# Patient Record
Sex: FEMALE | Race: WHITE | NOT HISPANIC OR LATINO | Employment: OTHER | URBAN - METROPOLITAN AREA
[De-identification: names, ages, dates, MRNs, and addresses within clinical notes are randomized per-mention and may not be internally consistent; named-entity substitution may affect disease eponyms.]

---

## 2017-02-22 ENCOUNTER — GENERIC CONVERSION - ENCOUNTER (OUTPATIENT)
Dept: OTHER | Facility: OTHER | Age: 77
End: 2017-02-22

## 2017-02-23 ENCOUNTER — GENERIC CONVERSION - ENCOUNTER (OUTPATIENT)
Dept: OTHER | Facility: OTHER | Age: 77
End: 2017-02-23

## 2017-04-13 ENCOUNTER — ALLSCRIPTS OFFICE VISIT (OUTPATIENT)
Dept: OTHER | Facility: OTHER | Age: 77
End: 2017-04-13

## 2017-06-16 ENCOUNTER — ALLSCRIPTS OFFICE VISIT (OUTPATIENT)
Dept: OTHER | Facility: OTHER | Age: 77
End: 2017-06-16

## 2017-06-16 ENCOUNTER — TRANSCRIBE ORDERS (OUTPATIENT)
Dept: LAB | Facility: CLINIC | Age: 77
End: 2017-06-16

## 2017-06-16 ENCOUNTER — APPOINTMENT (OUTPATIENT)
Dept: LAB | Facility: CLINIC | Age: 77
End: 2017-06-16
Payer: MEDICARE

## 2017-06-16 ENCOUNTER — HOSPITAL ENCOUNTER (OUTPATIENT)
Dept: ULTRASOUND IMAGING | Facility: HOSPITAL | Age: 77
Discharge: HOME | End: 2017-06-16
Payer: MEDICARE

## 2017-06-16 DIAGNOSIS — I10 ESSENTIAL (PRIMARY) HYPERTENSION: ICD-10-CM

## 2017-06-16 DIAGNOSIS — E03.9 HYPOTHYROIDISM: ICD-10-CM

## 2017-06-16 DIAGNOSIS — M79.661 PAIN OF RIGHT LOWER LEG: ICD-10-CM

## 2017-06-16 DIAGNOSIS — R73.9 HYPERGLYCEMIA: ICD-10-CM

## 2017-06-16 LAB
ALBUMIN SERPL BCP-MCNC: 3.8 G/DL (ref 3.5–5)
ALP SERPL-CCNC: 77 U/L (ref 46–116)
ALT SERPL W P-5'-P-CCNC: 32 U/L (ref 12–78)
ANION GAP SERPL CALCULATED.3IONS-SCNC: 4 MMOL/L (ref 4–13)
AST SERPL W P-5'-P-CCNC: 25 U/L (ref 5–45)
BASOPHILS # BLD AUTO: 0.03 THOUSANDS/ΜL (ref 0–0.1)
BASOPHILS NFR BLD AUTO: 0 % (ref 0–1)
BILIRUB SERPL-MCNC: 0.31 MG/DL (ref 0.2–1)
BUN SERPL-MCNC: 19 MG/DL (ref 5–25)
CALCIUM SERPL-MCNC: 9.5 MG/DL (ref 8.3–10.1)
CHLORIDE SERPL-SCNC: 101 MMOL/L (ref 100–108)
CO2 SERPL-SCNC: 33 MMOL/L (ref 21–32)
CREAT SERPL-MCNC: 0.86 MG/DL (ref 0.6–1.3)
EOSINOPHIL # BLD AUTO: 0.33 THOUSAND/ΜL (ref 0–0.61)
EOSINOPHIL NFR BLD AUTO: 3 % (ref 0–6)
ERYTHROCYTE [DISTWIDTH] IN BLOOD BY AUTOMATED COUNT: 14.2 % (ref 11.6–15.1)
EST. AVERAGE GLUCOSE BLD GHB EST-MCNC: 154 MG/DL
GFR SERPL CREATININE-BSD FRML MDRD: >60 ML/MIN/1.73SQ M
GLUCOSE P FAST SERPL-MCNC: 86 MG/DL (ref 65–99)
HBA1C MFR BLD: 7 % (ref 4.2–6.3)
HCT VFR BLD AUTO: 44.6 % (ref 34.8–46.1)
HGB BLD-MCNC: 14.3 G/DL (ref 11.5–15.4)
LYMPHOCYTES # BLD AUTO: 3.22 THOUSANDS/ΜL (ref 0.6–4.47)
LYMPHOCYTES NFR BLD AUTO: 32 % (ref 14–44)
MCH RBC QN AUTO: 27.4 PG (ref 26.8–34.3)
MCHC RBC AUTO-ENTMCNC: 32.1 G/DL (ref 31.4–37.4)
MCV RBC AUTO: 85 FL (ref 82–98)
MONOCYTES # BLD AUTO: 0.65 THOUSAND/ΜL (ref 0.17–1.22)
MONOCYTES NFR BLD AUTO: 6 % (ref 4–12)
NEUTROPHILS # BLD AUTO: 5.82 THOUSANDS/ΜL (ref 1.85–7.62)
NEUTS SEG NFR BLD AUTO: 59 % (ref 43–75)
NRBC BLD AUTO-RTO: 0 /100 WBCS
PLATELET # BLD AUTO: 281 THOUSANDS/UL (ref 149–390)
PMV BLD AUTO: 11.6 FL (ref 8.9–12.7)
POTASSIUM SERPL-SCNC: 4.7 MMOL/L (ref 3.5–5.3)
PROT SERPL-MCNC: 7.7 G/DL (ref 6.4–8.2)
RBC # BLD AUTO: 5.22 MILLION/UL (ref 3.81–5.12)
SODIUM SERPL-SCNC: 138 MMOL/L (ref 136–145)
TSH SERPL DL<=0.05 MIU/L-ACNC: 2.13 UIU/ML (ref 0.36–3.74)
WBC # BLD AUTO: 10.08 THOUSAND/UL (ref 4.31–10.16)

## 2017-06-16 PROCEDURE — 93970 EXTREMITY STUDY: CPT

## 2017-06-16 PROCEDURE — 85025 COMPLETE CBC W/AUTO DIFF WBC: CPT

## 2017-06-16 PROCEDURE — 84443 ASSAY THYROID STIM HORMONE: CPT

## 2017-06-16 PROCEDURE — 83036 HEMOGLOBIN GLYCOSYLATED A1C: CPT

## 2017-06-16 PROCEDURE — 36415 COLL VENOUS BLD VENIPUNCTURE: CPT

## 2017-06-16 PROCEDURE — 80053 COMPREHEN METABOLIC PANEL: CPT

## 2017-06-18 ENCOUNTER — GENERIC CONVERSION - ENCOUNTER (OUTPATIENT)
Dept: OTHER | Facility: OTHER | Age: 77
End: 2017-06-18

## 2017-06-28 ENCOUNTER — GENERIC CONVERSION - ENCOUNTER (OUTPATIENT)
Dept: OTHER | Facility: OTHER | Age: 77
End: 2017-06-28

## 2017-07-19 ENCOUNTER — ALLSCRIPTS OFFICE VISIT (OUTPATIENT)
Dept: OTHER | Facility: OTHER | Age: 77
End: 2017-07-19

## 2017-07-31 DIAGNOSIS — M54.30 SCIATICA: ICD-10-CM

## 2017-08-17 ENCOUNTER — GENERIC CONVERSION - ENCOUNTER (OUTPATIENT)
Dept: OTHER | Facility: OTHER | Age: 77
End: 2017-08-17

## 2017-08-25 ENCOUNTER — GENERIC CONVERSION - ENCOUNTER (OUTPATIENT)
Dept: OTHER | Facility: OTHER | Age: 77
End: 2017-08-25

## 2017-10-23 ENCOUNTER — ALLSCRIPTS OFFICE VISIT (OUTPATIENT)
Dept: OTHER | Facility: OTHER | Age: 77
End: 2017-10-23

## 2017-10-23 DIAGNOSIS — G25.0 ESSENTIAL TREMOR: ICD-10-CM

## 2017-10-23 DIAGNOSIS — R26.89 OTHER ABNORMALITIES OF GAIT AND MOBILITY: ICD-10-CM

## 2017-10-23 DIAGNOSIS — E03.9 HYPOTHYROIDISM: ICD-10-CM

## 2017-10-23 DIAGNOSIS — R47.89 OTHER SPEECH DISTURBANCES (CODE): ICD-10-CM

## 2017-10-23 DIAGNOSIS — Z96.89 PRESENCE OF OTHER SPECIFIED FUNCTIONAL IMPLANTS: ICD-10-CM

## 2017-10-24 NOTE — PROGRESS NOTES
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)   · w/ surgical implant electrodes 2012  4  Hypertension (401 9) (I10)  5  Hypothyroidism (244 9) (E03 9)  6  S/P deep brain stimulator placement (V45 89) (Z96 89)  7  COPD (chronic obstructive pulmonary disease) (496) (J44 9)    Plan  Benign familial tremor, Episode of change in speech    · (1) CBC/PLT/DIFF; Status:Active; Requested SJD:85RYK0829;   Episode of change in speech    · (1) COMPREHENSIVE METABOLIC PANEL; Status:Active; Requested XTK:18ZQL3240;   Episode of change in speech, Hypertension, Imbalance    · EKG/ECG- POC; Status:Complete;   Done: 94SSF9289 06:10PM  Episode of change in speech, Imbalance, S/P deep brain stimulator placement    · * CT HEAD WO CONTRAST; Status:Hold For - Scheduling; Requested SUJ:73DAE7333;    · 1 - Sophie RUIZ, [place]  Neurology Co-Management  * please re-evaluate re change  in speech/ imbalance w past hx deep brain stimulator    last seen by you 4/17   Status: Active  Requested for: 23Oct2017  () Care Summary provided  : Yes  Hypothyroidism    · (1) TSH; Status:Active; Requested BNS:87GHR1214;     Discussion/Summary    She has noted imbalance x 2 weeks or so w occasional thickened speech for up to few minutes at a time  - past hx deep brain stim implant re tremor - no focal neurological deficit upon exam here today - if worsens- she needs to be seen asap at ER   want her to do CT head - compare to last 2012  run here today - artifact noted precordial leads but appears sinus rhythm ( see tracing)BW ( June CBC/CMP/ TSH were fine w A1C 7 0 then)up re-evaluation w Neurology/ Dr Gurjit Barba   here w us 1 week  w dysphagia at times - she should see GI to do EGD once we look into this change w speech/ imbalance  inhalers as is- resp status as at baseline        Chief Complaint  pt has been lightheaded the past few days and feels unsteady when she walks History of Present Illness  HPI: she relates the past 2 weeks she notes imbalance w gait - past few days w occ lightheadedness  Admits to occ slurred speech for few months, has x 1-2 mins and resolves  Has some choking sensation w swallowing -past months - did not relate at prior visit w me in July  saw Neurology 4/17 - see prev re DBS /tremor  Doing great re tremor  inc SOB/ wheeze  Review of Systems    Constitutional: as noted in HPI,-- no fever-- and-- no chills  ENT: No ear pain, no cold symptoms no increased shortness of breath, but-- no nosebleeds-- and-- no sore throat  Cardiovascular: no chest pain,-- no palpitations-- and-- no lower extremity edema  Respiratory: No increased shortness of breath, no increased cough  Gastrointestinal: Slight nausea with imbalance, but-- no abdominal pain,-- no vomiting-- and-- no blood in stools  Genitourinary: no dysuria  Musculoskeletal: No increased joint pain  Integumentary: No recent rash  Neurological: as noted in HPI,-- no headache,-- no confusion-- and-- no fainting  Active Problems  1  History of Acute UTI (599 0) (N39 0)  2  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)  3  COPD (chronic obstructive pulmonary disease) (496) (J44 9)  4  Edema (782 3) (R60 9)  5  Esophageal reflux (530 81) (K21 9)  6  History of Hip Replacement Left  7  History of peripheral vascular disease (V12 59) (Z86 79)  8  Hypercholesterolemia (272 0) (E78 00)  9  Hyperglycemia (790 29) (R73 9)  10  Hypertension (401 9) (I10)  11  Hypothyroidism (244 9) (E03 9)  12  Multiple joint pain (719 49) (M25 50)  13  OA (osteoarthritis) (715 90) (M19 90)  14  Osteoarthritis of hip (715 95) (M16 9)  15  Pain of right calf (729 5) (M79 661)  16  Right shoulder pain (719 41) (M25 511)  17  S/P deep brain stimulator placement (V45 89) (Z96 89)  18  Sciatica (724 3) (M54 30)  19  Sleep disorder (780 50) (G47 9)    Past Medical History  1  History of Acute UTI (599 0) (N39 0)  2  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)  3  History of Cellulitis of leg, right (682 6) (L03 115)  4  History of Closed Compression Fracture Of Lumbar Vertebral Body (805 4)  5  History of osteopenia (V13 59) (Z87 39)  6  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  7  History of Vitamin D deficiency (268 9) (E55 9)  Active Problems And Past Medical History Reviewed: The active problems and past medical history were reviewed and updated today  Family History  Mother   1  Family history of Breast Cancer (V16 3)  Family History   2  Family history of Laryngeal Cancer (V16 2)    Social History   · Caffeine Use   · Former smoker (U17 83) (X58 557)   · Quit 5/12   · Living Independently   · Marital History -   The social history was reviewed and updated today  Surgical History  1  History of Brain Surgery  2  History of Cataract Surgery  3  History of Complete Colonoscopy  4  History of Hip Replacement Left  5  History of Knee Arthroscopy (Therapeutic)  6  History of Rotator Cuff Repair  Surgical History Reviewed: The surgical history was reviewed and updated today  Current Meds  1  Albuterol Sulfate (2 5 MG/3ML) 0 083% Inhalation Nebulization Solution; USE 1 UNIT   DOSE EVERY 4-6 HOURS AS NEEDED FOR WHEEZING ; Therapy: 44NYW7016 to (Nel Bills)  Requested for: 86PGT8524; Last   Rx:10Mar2016 Ordered  2  Aspirin 81 MG TABS; TAKE 1 TABLET DAILY; Therapy: (Deann Xavier) to Recorded  3  Caltrate 600+D 600-400 MG-UNIT TABS; Therapy: (Deann Xavier) to Recorded  4  DilTIAZem HCl ER Beads 240 MG Oral Capsule Extended Release 24 Hour; take 1   capsule daily; Therapy: 52Uvq5428 to (Evaluate:15Jan2018)  Requested for: 02HWI0749; Last   Rx:67Drs3883 Ordered  5  Flovent Diskus 250 MCG/BLIST Inhalation Aerosol Powder Breath Activated; Uses 1 puff   daily;    Therapy: 42XMQ5289 to (Evaluate:15Jan2018)  Requested for: 27EOX9104; Last   Rx:30Uts8749 Ordered  6  HydroCHLOROthiazide 12 5 MG Oral Capsule; 1 cap by mouth daily; Therapy: 63IGU1809 to (Last Rx:19Jun2017)  Requested for: 01DSB4407 Ordered  7  Ibuprofen 600 MG Oral Tablet; TAKE 1 TAB up to twice a day as needed for pain, try to   avoid, use w food  Requested for: 04Aug2017; Last Rx:72Gao8108 Ordered  8  Levothyroxine Sodium 125 MCG Oral Tablet; 1 tab by mouth every day; Therapy: 19BPK5553 to (Evaluate:15Jan2018)  Requested for: 95ZOA7073; Last   Rx:81Oca7582 Ordered  9  Omeprazole 20 MG Oral Capsule Delayed Release; TAKE 1 CAPSULE Daily PRN   stomach acid  Requested for: 74Ame4225; Last Rx:48Xgv5839 Ordered  10  ProAir  (90 Base) MCG/ACT Inhalation Aerosol Solution; INHALE 2 PUFFS    EVERY 4 HOURS AS NEEDED; Therapy: 03ONH8111 to (Evaluate:28Jun2014)  Requested for: 45Yxb4586; Last    Rx:75Ipk7564 Ordered  11  Simvastatin 10 MG Oral Tablet; TAKE 1 TABLET IN THE EVENING every Monday and    Friday or as directed  Requested for: 80VAV9860; Last Rx:33Hlx9014 Ordered  12  Vitamin D 1000 UNIT CAPS; TAKE 1 CAPSULE Daily; Therapy: (Recorded:05Jun2012) to Recorded    The medication list was reviewed and updated today  Allergies  1  Simvastatin TABS  2  Tetracycline HCl CAPS    Vitals   Recorded: 43WDG5136 05:46PM   Temperature 98 8 F   Heart Rate 70   Respiration 18   Systolic 585   Diastolic 74   Height 5 ft 4 in   Weight 207 lb 4 oz   BMI Calculated 35 57   BSA Calculated 1 99   O2 Saturation 96, RA     Physical Exam    Constitutional pleasant overwt female - somewhat anxious here today but appears ess as at baseline  oxygenating well  Eyes   Conjunctiva and lids: No swelling, erythema or discharge  -- no pallor/ icterus  Pupils and irises: Equal, round and reactive to light  -- no nystagmus Does note sl frontal headache w upward gaze OS -' side I get my shots in eye)  Ears, Nose, Mouth, and Throat   Otoscopic examination: Tympanic membranes translucent with normal light reflex  Canals patent without erythema  Oropharynx: Normal with no erythema, edema, exudate or lesions  -- mucosa moist    Pulmonary sl dec but clear b/l  Cardiovascular RRR 1/6 CLAUDIA LSB/ R2ics  -- no ankle edema  Carotid pulses: Normal     Abdomen   Abdomen: Non-tender, no masses  Lymphatic   Palpation of lymph nodes in neck: No lymphadenopathy  Musculoskeletal mild imbalance noted w gait - has cane  Neurologic   Cranial nerves: Cranial nerves 2-12 intact  -- no weakness arm/ leg finger to nose intact  Psychiatric oriented to person, place- knows it is Oct 2017, Monday 3/3 recall     Mood and affect: Normal          Results/Data  EKG/ECG- POC 80RWA4926 06:10PM Winifred Comment     Test Name Result Flag Reference   EKG/ECG see         Future Appointments    Date/Time Provider Specialty Site   04/12/2018 11:00 AM Barbara Osborne MD Neurology [facility] 515   01/15/2018 11:15 AM Winifred Bates DO Family Medicine [address] FAMILY MEDICINE     Signatures   Electronically signed by : Zina Brown DO; Oct 23 2017  6:37PM EST                       (Author)

## 2017-10-27 ENCOUNTER — HOSPITAL ENCOUNTER (OUTPATIENT)
Dept: CT IMAGING | Facility: HOSPITAL | Age: 77
Discharge: HOME | End: 2017-10-27
Payer: MEDICARE

## 2017-10-27 DIAGNOSIS — R47.89 OTHER SPEECH DISTURBANCES (CODE): ICD-10-CM

## 2017-10-27 DIAGNOSIS — Z96.89 PRESENCE OF OTHER SPECIFIED FUNCTIONAL IMPLANTS: ICD-10-CM

## 2017-10-27 DIAGNOSIS — R26.89 OTHER ABNORMALITIES OF GAIT AND MOBILITY: ICD-10-CM

## 2017-10-27 PROCEDURE — 70450 CT HEAD/BRAIN W/O DYE: CPT

## 2017-10-30 ENCOUNTER — GENERIC CONVERSION - ENCOUNTER (OUTPATIENT)
Dept: OTHER | Facility: OTHER | Age: 77
End: 2017-10-30

## 2018-01-11 NOTE — MISCELLANEOUS
Provider Comments  Provider Comments:   [facility] regarding pts missed appointment  Signatures   Electronically signed by : Mike Nava [facility]; Mar  3 2016  1:52PM EST                       (Author)    Electronically signed by :  Jesenia Garza DO; Mar  4 2016  8:01AM EST                       (Author)

## 2018-01-11 NOTE — RESULT NOTES
Verified Results  ECHO COMPLETE WITH CONTRAST IF INDICATED, TTE / TRANSTHORACIC 74WQA2405 10:49AM Joshua Rudd Order Number: [id number]     Test Name Result Flag Reference   ECHO COMPLETE WITH CONTRAST IF INDICATED (Report)     [facility] [address] [facility]   [phone]     Transthoracic Echocardiogram   2D, M-mode, Doppler, and Color Doppler     Study date: 12-May-2016     Patient: Micheal Salvador   MR number: [id number]   Account number: [de-identified]   : 1940   Age: 66 years   Gender: Female   Status: Outpatient   Location: [facility]   Height: 64 in   Weight: 204 lb   BP: 132/ 70 mmHg     Indications: Dyspnea  Diagnoses: R06 00 - Dyspnea, unspecified     Sonographer: ODILON Cardona, RDCS   Referring Physician: Bhavesh Marroquin Og: Alline Friend [facility]   Interpreting Physician: Quinn Terry MD     SUMMARY     LEFT VENTRICLE:   Systolic function was normal  Ejection fraction was estimated to be 60 %  There were no regional wall motion abnormalities  There was mild concentric hypertrophy  Doppler parameters were consistent with abnormal left ventricular relaxation   (grade 1 diastolic dysfunction)  MITRAL VALVE:   There was mild annular calcification  AORTIC VALVE:   There was mild stenosis  Valve mean gradient was 12 mmHg  HISTORY: PRIOR HISTORY: Former smoker, Brain surgery, hypertension,   hyperlipidemia, hypothyroidism, COPD, edema  PROCEDURE: The study was performed in the [facility]  This was a routine study  The transthoracic approach was used  The study   included complete 2D imaging, M-mode, complete spectral Doppler, and color   Doppler  The heart rate was 74 bpm, at the start of the study  Images were   obtained from the parasternal, apical, subcostal, and suprasternal notch   acoustic windows  Image quality was adequate       LEFT VENTRICLE: Size was normal  Systolic function was normal  Ejection   fraction was estimated to be 60 %  There were no regional wall motion   abnormalities  Wall thickness was mildly increased  There was mild concentric   hypertrophy  DOPPLER: There was an increased relative contribution of atrial   contraction to ventricular filling  Doppler parameters were consistent with   abnormal left ventricular relaxation (grade 1 diastolic dysfunction)  RIGHT VENTRICLE: The size was normal  Systolic function was normal  Wall   thickness was normal      LEFT ATRIUM: Size was normal      RIGHT ATRIUM: Size was normal      MITRAL VALVE: There was mild annular calcification  Valve structure was normal    There was normal leaflet separation  DOPPLER: The transmitral velocity was   within the normal range  There was no evidence for stenosis  There was trace   regurgitation  AORTIC VALVE: The valve was trileaflet  Leaflets exhibited normal thickness,   mild calcification, and mildly reduced cuspal separation  DOPPLER: Transaortic   velocity was minimally increased  There was mild stenosis  There was no   significant regurgitation  TRICUSPID VALVE: The valve structure was normal  There was normal leaflet   separation  DOPPLER: The transtricuspid velocity was within the normal range  There was no evidence for stenosis  There was trace regurgitation  The   tricuspid jet envelope definition was inadequate for estimation of RV systolic   pressure  There are no indirect findings (abnormal RV volume or geometry,   altered pulmonary flow velocity profile, or leftward septal displacement) which   would suggest moderate or severe pulmonary hypertension  PULMONIC VALVE: Leaflets exhibited normal thickness, no calcification, and   normal cuspal separation  DOPPLER: The transpulmonic velocity was within the   normal range  There was trace regurgitation  PERICARDIUM: There was no pericardial effusion   The pericardium was normal in   appearance  AORTA: The root exhibited normal size  SYSTEMIC VEINS: IVC: The inferior vena cava was normal in size  Respirophasic   changes were normal      MEASUREMENT TABLES     DOPPLER MEASUREMENTS   Aortic valve  (Reference normals)   Peak krystian  231 cm/s  (--)   Peak gradient  21 mmHg  (--)   Mean gradient  12 mmHg  (--)     SYSTEM MEASUREMENT TABLES     2D   %FS: 27 84 %   AV Diam: 3 67 cm   EDV(Teich): 63 07 ml   EF(Cube): 62 43 %   EF(Teich): 54 67 %   ESV(Cube): 21 09 ml   ESV(Teich): 28 59 ml   IVSd: 1 26 cm   LA Area: 11 98 cm2   LA Diam: 4 37 cm   LVEDV MOD A4C: 87 75 ml   LVEF MOD A4C: 57 73 %   LVESV MOD A4C: 37 09 ml   LVIDd: 3 83 cm   LVIDs: 2 76 cm   LVLd A4C: 7 6 cm   LVLs A4C: 6 45 cm   LVOT Diam: 2 06 cm   LVPWd: 1 31 cm   RA Area: 11 46 cm2   RV Diam: 2 88 cm   SV MOD A4C: 50 66 ml   SV(Cube): 35 03 ml   SV(Teich): 34 48 ml     CW   AV Env  Ti: 284 66 ms   AV MaxP 75 mmHg   AV SV: 458 79 ml   AV VTI: 43 33 cm   AV Vmax: 2 22 m/s   AV Vmean: 1 52 m/s   AV meanPG: 10 56 mmHg     MM   TAPSE: 2 04 cm     PW   CAM (VTI): 1 87 cm2   CAM Vmax: 1 65 cm2   E': 0 04 m/s   E/E': 16 79   LVOT Env  Ti: 349 35 ms   LVOT VTI: 24 32 cm   LVOT Vmax: 1 09 m/s   LVOT Vmean: 0 7 m/s   LVOT maxP 79 mmHg   LVOT meanP 32 mmHg   LVSV Dopp: 81 2 ml   MV A Krystian: 1 17 m/s   MV Dec Gosper: 2 05 m/s2   MV DecT: 321 67 ms   MV E Krystian: 0 66 m/s   MV E/A Ratio: 0 56     [employer] Accredited Echocardiography Laboratory     Prepared and electronically signed by     Michael Da Silva MD   Signed 12-May-2016 14:58:15

## 2018-01-11 NOTE — MISCELLANEOUS
Message   Recorded as Task   Date: 03/14/2016 05:43 PM, Created By: Sherryle Colonel   Task Name: Medical Complaint Callback   Assigned To: Katie Castelan   Regarding Patient: Becky Gomez, Status: Active   Comment:    Jody Maki - 14 Mar 2016 5:43 PM     TASK CREATED  Caller: Ekaterina Bruce, Adult Child  Daughter called and was upset because pt was not given order for oxygen  She feels that her mother needs it  I did caution her that medicare has specific guidelines for O2 need before they will pay for it  She said she is taking her mother to [place] to be with her from June thru next 6 mo  and is planning to take her to [facility] over Christmas holidays  Concerned about her respiratory status  Freq SOB  Said that pt does not use her inhalers regularly as prescribed  Waits until she is having breathing difficulty to use them  Lucho Becerra - 14 Mar 2016 5:46 PM     TASK REPLIED TO: Previously Assigned To Lucho Becerra  noted-   please have her come in for recheck visit w her mom in few weeks  -  we will be checking oxygen levels then -  Oxygen today was ok  I discussed this w Desi's other daughter  Jody Maki - 14 Mar 2016 7:29 PM     TASK EDITED   Katie Castelan - 15 Mar 2016 10:37 AM     TASK EDITED  L/m for Ekaterina Barrera to call office  Katie Castelan - 15 Mar 2016 3:13 PM     TASK EDITED  Daughter notified and she will have Mom call in morning for an appt  Active Problems    1  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)   2  Chronic bronchitis (491 9) (J42)   3  COPD (chronic obstructive pulmonary disease) (496) (J44 9)   4  Edema (782 3) (R60 9)   5  Esophageal reflux (530 81) (K21 9)   6  Hip pain, unspecified laterality   7  History of peripheral vascular disease (V12 59) (Z86 79)   8  Hypercholesterolemia (272 0) (E78 0)   9  Hypertension (401 9) (I10)   10  Hypothyroidism (244 9) (E03 9)   11  Left hip pain (719 45) (M25 552)   12  Multiple joint pain (719 49) (M25 50)   13  Obstructive chronic bronchitis with acute exacerbation (491 21) (J44 1)   14  Osteoarthritis (715 90) (M19 90)   15  Right shoulder pain (719 41) (M25 511)   16  Sciatica (724 3) (M54 30)   17  Sleep disorder (780 50) (G47 9)    Current Meds   1  Albuterol Sulfate (2 5 MG/3ML) 0 083% Inhalation Nebulization Solution; USE 1 UNIT   DOSE EVERY 4-6 HOURS AS NEEDED FOR WHEEZING ; Therapy: 58SQK6507 to (Saman Finnegan)  Requested for: 62DGF5563; Last   Rx:10Mar2016 Ordered   2  Aspirin 81 MG Oral Tablet; TAKE 1 TABLET DAILY; Therapy: (Meño Arora) to Recorded   3  Azithromycin 250 MG Oral Tablet; TAKE 2 TABLETS ON DAY 1 THEN TAKE 1 TABLET A   DAY FOR 4 DAYS; Therapy: 64HAU3651 to (Complete:19Mar2016)  Requested for: 76EUV6258; Last   Rx:14Mar2016 Ordered   4  Caltrate 600+D 600-400 MG-UNIT TABS; Therapy: (Meño Arora) to Recorded   5  Cartia  MG Oral Capsule Extended Release 24 Hour; 1 CAP BY MOUTH EVERY   DAY; Therapy: 23FYF5516 to (Evaluate:39Gys6011)  Requested for: 73Fqv4135; Last   Rx:94Tvt0187 Ordered   6  Flovent Diskus 250 MCG/BLIST Inhalation Aerosol Powder Breath Activated; USE ONE   INHALATION TWICE A DAY; Therapy: 32TOH9632 to (Evaluate:10Oct2016)  Requested for: 16FUE3167; Last   Rx:14Mar2016 Ordered   7  Hydrochlorothiazide 12 5 MG Oral Capsule; take 1 capsule by mouth once daily; Therapy: 60TXX2331 to (Evaluate:28Rbg3754)  Requested for: 75Jhw7048; Last   Rx:51Mxz5459 Ordered   8  Levothyroxine Sodium 125 MCG Oral Tablet; 1 tab by mouth every day; Therapy: 79DEK3721 to (Evaluate:16Mar2016)  Requested for: 89KKU8589; Last   Rx:17Nov2015 Ordered   9  MethylPREDNISolone (Jeet) 4 MG Oral Tablet; TAKE AS DIRECTED ON PATIENT   INSTRUCTION CARD; Therapy: 32TET3409 to (Complete:20Mar2016)  Requested for: 40PWC8984; Last   Rx:14Mar2016 Ordered   10  Omeprazole 20 MG Oral Capsule Delayed Release; TAKE 1 CAPSULE Daily PRN    stomach acid  Requested for: 21Dec2015;  Last Rx:72Dhk4016 Ordered   11  ProAir  (90 Base) MCG/ACT Inhalation Aerosol Solution; INHALE 2 PUFFS    EVERY 4 HOURS AS NEEDED; Therapy: 51PED6173 to (Evaluate:28Jun2014)  Requested for: 30Dec2013; Last    Rx:30Dec2013 Ordered   12  Simvastatin 10 MG Oral Tablet; TAKE 1 TABLET IN THE EVENING every Monday and    Friday or as directed  Requested for: 21Dec2015; Last Rx:78Jyb7556 Ordered   13  Vitamin D 1000 UNIT CAPS; TAKE 1 CAPSULE Daily; Therapy: (Recorded:05Jun2012) to Recorded    Allergies    1  Simvastatin TABS   2   Tetracycline HCl CAPS    Signatures   Electronically signed by : Dawson Chan, ; Mar 15 2016  3:14PM EST                       (Author)

## 2018-01-12 VITALS
OXYGEN SATURATION: 96 % | SYSTOLIC BLOOD PRESSURE: 128 MMHG | BODY MASS INDEX: 35.68 KG/M2 | HEIGHT: 64 IN | DIASTOLIC BLOOD PRESSURE: 80 MMHG | WEIGHT: 209 LBS | HEART RATE: 75 BPM

## 2018-01-13 NOTE — MISCELLANEOUS
Message   Recorded as Task   Date: 06/07/2016 04:52 PM, Created By: Neeru Powell   Task Name: Medical Complaint Callback   Assigned To: Neeru Powell   Regarding Patient: Raji Cortes, Status: Active   Comment:    Neeru Powell - 07 Jun 2016 4:52 PM     TASK CREATED  Caller: Self; Medical Complaint; [phone] (Home)  Pt called and she is questioning results of urine done by visiting nurse recently  U/a result is in computer and pt now has chills, may we call something in  Pt cont to use the cystex and so does not have any burning with urination  Lucho Becerra - 07 Jun 2016 5:55 PM     TASK REPLIED TO: Previously Assigned To Lucho Becerra  I sent erx Cipro to use BID x 5 days -  culture pending  Neeru Powell - 07 Jun 2016 5:57 PM     TASK EDITED  Pt notified  Active Problems    1  Acute UTI (599 0) (N39 0)   2  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)   3  Chronic bronchitis (491 9) (J42)   4  Chronic left hip pain (174 29,093 78) (M25 552,G89 29)   5  COPD (chronic obstructive pulmonary disease) (496) (J44 9)   6  Dyspnea and respiratory abnormalities (786 09) (R06 00,R06 89)   7  Edema (782 3) (R60 9)   8  Esophageal reflux (530 81) (K21 9)   9  History of peripheral vascular disease (V12 59) (Z86 79)   10  Hypercholesterolemia (272 0) (E78 0)   11  Hyperglycemia (790 29) (R73 9)   12  Hypertension (401 9) (I10)   13  Hypothyroidism (244 9) (E03 9)   14  Multiple joint pain (719 49) (M25 50)   15  Osteoarthritis (715 90) (M19 90)   16  Osteoarthritis of hip (715 95) (M16 9)   17  Pre-op evaluation (V72 84) (Z01 818)   18  Right shoulder pain (719 41) (M25 511)   19  S/P deep brain stimulator placement (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 89) (Z96 89)   20  Sciatica (724 3) (M54 30)   21  Sleep disorder (780 50) (G47 9)    Current Meds   1  Albuterol Sulfate (2 5 MG/3ML) 0 083% Inhalation Nebulization Solution; USE 1 UNIT   DOSE EVERY 4-6 HOURS AS NEEDED FOR WHEEZING ;    Therapy: 01CAN4477 to (Willy Hall)  Requested for: 90WIN8487; Last   Rx:10Mar2016 Ordered   2  Aspirin 81 MG TABS; TAKE 1 TABLET DAILY; Therapy: (Leonora Fine) to Recorded   3  Caltrate 600+D 600-400 MG-UNIT TABS; Therapy: (Leonora Fine) to Recorded   4  Cartia  MG Oral Capsule Extended Release 24 Hour; 1 CAP BY MOUTH EVERY   DAY; Therapy: 44IQY5383 to (Evaluate:64Xal7973)  Requested for: 77Rdi7772; Last   Rx:21Dec2015 Ordered   5  Flovent Diskus 250 MCG/BLIST Inhalation Aerosol Powder Breath Activated; USE ONE   INHALATION TWICE A DAY; Therapy: 52WKG1330 to (Evaluate:10Oct2016)  Requested for: 17KSB4967; Last   Rx:14Mar2016 Ordered   6  Hydrochlorothiazide 12 5 MG Oral Capsule; take 1 capsule by mouth once daily; Therapy: 98JGH9234 to (Evaluate:92Pmd3509)  Requested for: 86Vra2590; Last   Rx:45Xki2303 Ordered   7  Levothyroxine Sodium 125 MCG Oral Tablet; 1 tab by mouth every day; Therapy: 96RMC6895 to (Evaluate:81Jwu2205)  Requested for: 15ILJ7149; Last   Rx:22Mar2016 Ordered   8  Omeprazole 20 MG Oral Capsule Delayed Release; TAKE 1 CAPSULE Daily PRN   stomach acid  Requested for: 89Fwz4862; Last Rx:23Pud5428 Ordered   9  ProAir  (90 Base) MCG/ACT Inhalation Aerosol Solution; INHALE 2 PUFFS   EVERY 4 HOURS AS NEEDED; Therapy: 93MQW8419 to (Evaluate:28Jun2014)  Requested for: 48Rrj3174; Last   Rx:49Yxc4779 Ordered   10  Simvastatin 10 MG Oral Tablet; TAKE 1 TABLET IN THE EVENING every Monday and    Friday or as directed  Requested for: 22Ajf3272; Last Rx:91Grr9373 Ordered   11  Vitamin D 1000 UNIT CAPS; TAKE 1 CAPSULE Daily; Therapy: (Recorded:05Jun2012) to Recorded    Allergies    1  Simvastatin TABS   2   Tetracycline HCl CAPS    Plan  Acute UTI    · Ciprofloxacin HCl - 500 MG Oral Tablet; TAKE 1 TABLET TWICE DAILY    Signatures   Electronically signed by : Lexis Pimentel, ; Jun 7 2016  5:57PM EST                       (Author)

## 2018-01-14 VITALS
HEART RATE: 79 BPM | BODY MASS INDEX: 36.02 KG/M2 | SYSTOLIC BLOOD PRESSURE: 140 MMHG | WEIGHT: 211 LBS | RESPIRATION RATE: 12 BRPM | HEIGHT: 64 IN | DIASTOLIC BLOOD PRESSURE: 80 MMHG

## 2018-01-15 VITALS
BODY MASS INDEX: 35.34 KG/M2 | SYSTOLIC BLOOD PRESSURE: 122 MMHG | HEIGHT: 64 IN | HEART RATE: 68 BPM | WEIGHT: 207 LBS | DIASTOLIC BLOOD PRESSURE: 66 MMHG

## 2018-01-16 NOTE — RESULT NOTES
Verified Results  (1) COMPREHENSIVE METABOLIC PANEL [id number] 96:03CQ Tahir Delgadillo Order Number: [id number]     Test Name Result Flag Reference   GLUCOSE,RANDM 108 mg/dL     If the patient is fasting, the ADA then defines impaired fasting glucose as > 100 mg/dL and diabetes as > or equal to 123 mg/dL  SODIUM 138 mmol/L  136-145   POTASSIUM 4 7 mmol/L  3 5-5 3   CHLORIDE 102 mmol/L  100-108   CARBON DIOXIDE 35 mmol/L H 21-32   ANION GAP (CALC) 1 mmol/L L 4-13   BLOOD UREA NITROGEN 22 mg/dL  5-25   CREATININE 1 01 mg/dL  0 60-1 30   Standardized to IDMS reference method   CALCIUM 9 3 mg/dL  8 3-10 1   BILI, TOTAL 0 33 mg/dL  0 20-1 00   ALK PHOSPHATAS 93 U/L     ALT (SGPT) 38 U/L  12-78   AST(SGOT) 28 U/L  5-45   ALBUMIN 3 9 g/dL  3 5-5 0   TOTAL PROTEIN 7 9 g/dL  6 4-8 2   eGFR Non-African American 53 0 ml/min/1 73sq m     - Patient Instructions: This bloodwork is non-fasting  Please drink two glasses of water morning of bloodwork  National Kidney Disease Education Program recommendations are as follows:  GFR calculation is accurate only with a steady state creatinine  Chronic Kidney disease less than 60 ml/min/1 73 sq  meters  Kidney failure less than 15 ml/min/1 73 sq  meters  (1) TSH [id number] 09:43AM Tahir Delgadillo Order Number: [id number]     Test Name Result Flag Reference   TSH 2 430 uIU/mL  0 358-3 740   - Patient Instructions: This bloodwork is non-fasting  Please drink two glasses of water morning of bloodwork  - Patient Instructions: This bloodwork is non-fasting  Please drink two glasses of water morning of bloodwork  Patients undergoing fluorescein dye angiography may retain small amounts of fluorescein in the body for 48-72 hours post procedure  Samples containing fluorescein can produce falsely depressed TSH values  If the patient had this procedure,a specimen should be resubmitted post fluorescein clearance            The recommended reference ranges for TSH during pregnancy are as follows:  First trimester 0 1 to 2 5 uIU/mL  Second trimester  0 2 to 3 0 uIU/mL  Third trimester 0 3 to 3 0 uIU/m

## 2018-01-17 NOTE — RESULT NOTES
Verified Results  VAS LOWER LIMB VENOUS DUPLEX STUDY, COMPLETE BILATERAL [id number] 04:01PM Korin Mendez Order Number: [id number]    - Patient Instructions: To schedule this appointment, please contact Central Scheduling at [phone]  Test Name Result Flag Reference   VAS LOWER LIMB VENOUS DUPLEX STUDY, COMPLETE BILATERAL (Report)     [facility] REPORT   CLINICAL:   Indications: Patient presents with right lower extremity pain and swelling x   7days  Clinical:Right Lower Limb   There is complaint of pain  FINDINGS:      Segment Right      Left          Impression    Impression       CFV   Normal (Patent) Normal (Patent)             CONCLUSION:      Impression:   RIGHT LOWER LIMB:   No evidence of acute or chronic deep vein thrombosis  No evidence of superficial thrombophlebitis noted  Doppler evaluation shows a normal response to augmentation maneuvers  Popliteal, posterior tibial and anterior tibial arterial Doppler waveforms are   triphasic  LEFT LOWER LIMB:   No evidence of acute or chronic deep vein thrombosis  No evidence of superficial thrombophlebitis noted  Doppler evaluation shows a normal response to augmentation maneuvers  Popliteal, posterior tibial and anterior tibial arterial Doppler waveforms are   triphasic  Tech note: Preliminary report given ton Dr Pat Tran at 4:30pm  VW      SIGNATURE:   Electronically Signed by: Vicki Severs on 2017-06-16 09:40:24 PM     (1) TSH [id number] 02:11PM Korin Mendez Order Number: [id number]     Test Name Result Flag Reference   TSH 2 130 uIU/mL  0 358-3 740   Patients undergoing fluorescein dye angiography may retain small amounts of fluorescein in the body for 48-72 hours post procedure  Samples containing fluorescein can produce falsely depressed TSH values  If the patient had this procedure,a specimen should be resubmitted post fluorescein clearance            The recommended reference ranges for TSH during pregnancy are as follows:  First trimester 0 1 to 2 5 uIU/mL  Second trimester  0 2 to 3 0 uIU/mL  Third trimester 0 3 to 3 0 uIU/m     (1) COMPREHENSIVE METABOLIC PANEL [id number] 86:43JK [facility] Order Number: [id number]     Test Name Result Flag Reference   SODIUM 138 mmol/L  136-145   POTASSIUM 4 7 mmol/L  3 5-5 3   CHLORIDE 101 mmol/L  100-108   CARBON DIOXIDE 33 mmol/L H 21-32   ANION GAP (CALC) 4 mmol/L  4-13   BLOOD UREA NITROGEN 19 mg/dL  5-25   CREATININE 0 86 mg/dL  0 60-1 30   Standardized to IDMS reference method   CALCIUM 9 5 mg/dL  8 3-10 1   BILI, TOTAL 0 31 mg/dL  0 20-1 00   ALK PHOSPHATAS 77 U/L     ALT (SGPT) 32 U/L  12-78   AST(SGOT) 25 U/L  5-45   ALBUMIN 3 8 g/dL  3 5-5 0   TOTAL PROTEIN 7 7 g/dL  6 4-8 2   eGFR Non-African American      >60 0 ml/min/1 73sq [facility] Disease Education Program recommendations are as follows:  GFR calculation is accurate only with a steady state creatinine  Chronic Kidney disease less than 60 ml/min/1 73 sq  meters  Kidney failure less than 15 ml/min/1 73 sq  meters  GLUCOSE FASTING 86 mg/dL  65-99     (1) HEMOGLOBIN A1C [id number] 02:11PM [facility] Order Number: [id number]     Test Name Result Flag Reference   HEMOGLOBIN A1C 7 0 % H 4 2-6 3   EST  AVG  GLUCOSE 154 mg/dl       (1) CBC/PLT/DIFF [id number] 02:11PM [facility] Order Number: [id number]     Test Name Result Flag Reference   WBC COUNT 10 08 Thousand/uL  4 31-10 16   RBC COUNT 5 22 Million/uL H 3 81-5 12   HEMOGLOBIN 14 3 g/dL  11 5-15 4   HEMATOCRIT 44 6 %  34 8-46  1   MCV 85 fL  82-98   MCH 27 4 pg  26 8-34 3   MCHC 32 1 g/dL  31 4-37 4   RDW 14 2 %  11 6-15 1   MPV 11 6 fL  8 9-12 7   PLATELET COUNT 352 Thousands/uL  149-390   nRBC AUTOMATED 0 /100 WBCs     NEUTROPHILS RELATIVE PERCENT 59 %  43-75   LYMPHOCYTES RELATIVE PERCENT 32 %  14-44   MONOCYTES RELATIVE PERCENT 6 %  4-12   EOSINOPHILS RELATIVE PERCENT 3 %  0-6   BASOPHILS RELATIVE PERCENT 0 %  0-1   NEUTROPHILS ABSOLUTE COUNT 5 82 Thousands/? ??L  1 85-7 62   LYMPHOCYTES ABSOLUTE COUNT 3 22 Thousands/? ??L  0 60-4 47   MONOCYTES ABSOLUTE COUNT 0 65 Thousand/? ??L  0 17-1 22   EOSINOPHILS ABSOLUTE COUNT 0 33 Thousand/? ??L  0 00-0 61   BASOPHILS ABSOLUTE COUNT 0 03 Thousands/? ??L  0 00-0 10

## 2018-01-17 NOTE — MISCELLANEOUS
Message   Recorded as Task   Date: 02/21/2017 03:17 PM, Created By: Giovani Levy   Task Name: Med Renewal Request   Assigned To: Keyonna Xie   Regarding Patient: Lien Reina, Status: Active   Comment:    Giovani Levy - 21 Feb 2017 3:17 PM     TASK CREATED  Caller: [facility]; Renew Medication; [phone]  Cartia XT 240mg is not available at the present time and pharmacy questions if there is something similar to switch this med to  It sounded like they had diltiazem  Lucho Becerra - 21 Feb 2017 5:11 PM     TASK REPLIED TO: Previously Assigned To Malgorzata Triage,Team      erx for Diltiazem sent in        Active Problems    1  History of Acute UTI (599 0) (N39 0)   2  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)   3  Chronic bronchitis (491 9) (J42)   4  COPD (chronic obstructive pulmonary disease) (496) (J44 9)   5  Edema (782 3) (R60 9)   6  Esophageal reflux (530 81) (K21 9)   7  Flu vaccine need (V04 81) (Z23)   8  History of Hip Replacement Left   9  History of peripheral vascular disease (V12 59) (Z86 79)   10  Hypercholesterolemia (272 0) (E78 00)   11  Hyperglycemia (790 29) (R73 9)   12  Hypertension (401 9) (I10)   13  Hypothyroidism (244 9) (E03 9)   14  Multiple joint pain (719 49) (M25 50)   15  OA (osteoarthritis) (715 90) (M19 90)   16  Osteoarthritis of hip (715 95) (M16 9)   17  Right shoulder pain (719 41) (M25 511)   18  S/P deep brain stimulator placement (V45 89) (Z96 89)   19  Sciatica (724 3) (M54 30)   20  Sleep disorder (780 50) (G47 9)    Current Meds   1  Albuterol Sulfate (2 5 MG/3ML) 0 083% Inhalation Nebulization Solution; USE 1 UNIT   DOSE EVERY 4-6 HOURS AS NEEDED FOR WHEEZING ; Therapy: 98IMW5576 to (Mariano Zamora)  Requested for: 98SYC2282; Last   Rx:10Mar2016 Ordered   2  Aspirin 81 MG TABS; TAKE 1 TABLET DAILY; Therapy: (Arlyce Leriche) to Recorded   3  Caltrate 600+D 600-400 MG-UNIT TABS;    Therapy: (Recorded:05Jun2012) to Recorded   4  DiltiaZEM HCl ER Beads 240 MG Oral Capsule Extended Release 24 Hour; take 1   capsule daily; Therapy: 89Vfu4792 to (Evaluate:70Qpz0524)  Requested for: 22Vqq2649; Last   Rx:25Jrk6391 Ordered   5  Flovent Diskus 250 MCG/BLIST Inhalation Aerosol Powder Breath Activated; Uses 1 puff   daily; Therapy: 19HAV0221 to (Evaluate:89Jzi1056)  Requested for: 67NBQ6836; Last   Rx:13Oct2016 Ordered   6  HydroCHLOROthiazide 12 5 MG Oral Capsule; 1 cap by mouth daily; Therapy: 70VHJ0697 to (Last Rx:19Jan2017)  Requested for: 80QCO4449 Ordered   7  Ibuprofen 200 MG Oral Tablet; uses 4 at night re arthritis pain; Therapy: (Recorded:13Oct2016) to Recorded   8  Levothyroxine Sodium 125 MCG Oral Tablet; 1 tab by mouth every day; Therapy: 89QFB2573 to (Last Rx:96Nkc6871)  Requested for: 54Qil3294 Ordered   9  Omeprazole 20 MG Oral Capsule Delayed Release; TAKE 1 CAPSULE Daily PRN   stomach acid  Requested for: 26Bxs5399; Last Rx:53Pvj6935 Ordered   10  ProAir  (90 Base) MCG/ACT Inhalation Aerosol Solution; INHALE 2 PUFFS    EVERY 4 HOURS AS NEEDED; Therapy: 78TFE6557 to (Evaluate:28Jun2014)  Requested for: 21Plh5021; Last    Rx:39Xhw6892 Ordered   11  Simvastatin 10 MG Oral Tablet; TAKE 1 TABLET IN THE EVENING every Monday and    Friday or as directed  Requested for: 47Lhb0798; Last Rx:43Hjq3661 Ordered   12  Vitamin D 1000 UNIT CAPS; TAKE 1 CAPSULE Daily; Therapy: (Recorded:05Jun2012) to Recorded    Allergies    1  Simvastatin TABS   2   Tetracycline HCl CAPS    Signatures   Electronically signed by : Johann Soto, ; Feb 22 2017  9:14AM EST                       (Author)

## 2018-01-22 VITALS
WEIGHT: 206.25 LBS | OXYGEN SATURATION: 96 % | SYSTOLIC BLOOD PRESSURE: 128 MMHG | DIASTOLIC BLOOD PRESSURE: 64 MMHG | BODY MASS INDEX: 35.21 KG/M2 | HEART RATE: 69 BPM | HEIGHT: 64 IN

## 2018-05-01 DIAGNOSIS — M19.90 OSTEOARTHRITIS, UNSPECIFIED OSTEOARTHRITIS TYPE, UNSPECIFIED SITE: Primary | ICD-10-CM

## 2018-05-01 RX ORDER — DILTIAZEM HYDROCHLORIDE 240 MG/1
1 CAPSULE, EXTENDED RELEASE ORAL DAILY
COMMUNITY
Start: 2017-02-21 | End: 2018-06-25 | Stop reason: SDUPTHER

## 2018-05-01 RX ORDER — IBUPROFEN 600 MG/1
600 TABLET ORAL 2 TIMES DAILY PRN
Qty: 30 TABLET | Refills: 0 | OUTPATIENT
Start: 2018-05-01

## 2018-05-01 RX ORDER — OMEPRAZOLE 20 MG/1
1 CAPSULE, DELAYED RELEASE ORAL DAILY PRN
COMMUNITY
End: 2021-04-29 | Stop reason: HOSPADM

## 2018-05-01 RX ORDER — IBUPROFEN 600 MG/1
1 TABLET ORAL 2 TIMES DAILY PRN
COMMUNITY
End: 2018-12-03 | Stop reason: ALTCHOICE

## 2018-05-01 NOTE — TELEPHONE ENCOUNTER
Please have her set up appointment with me, I have not seen her since October of last year    I did not refill ibuprofen at this time

## 2018-05-02 NOTE — TELEPHONE ENCOUNTER
Called patient   Ishmael on her home # asking patient to contact our office to help her set up appt with [facility]

## 2018-05-14 ENCOUNTER — TELEPHONE (OUTPATIENT)
Dept: FAMILY MEDICINE CLINIC | Facility: CLINIC | Age: 78
End: 2018-05-14

## 2018-05-14 PROBLEM — R26.9 GAIT DISTURBANCE: Status: ACTIVE | Noted: 2017-10-30

## 2018-05-14 PROBLEM — R26.89 IMBALANCE: Status: ACTIVE | Noted: 2017-10-23

## 2018-05-14 PROBLEM — I67.89 CEREBRAL MICROVASCULAR DISEASE: Status: ACTIVE | Noted: 2017-10-30

## 2018-05-14 NOTE — TELEPHONE ENCOUNTER
Pt's daughter called Karla Villarreal is having severe back pain, she is unable to move her back  She said she cannot bring her on a Tuesday or Thursday, and any other day it needs to be after 3:30pm  Since they have scheduling restrictions, the next available opening for them is June 25th  She does not want to wait that long, how would you like to proceed?

## 2018-06-25 ENCOUNTER — OFFICE VISIT (OUTPATIENT)
Dept: FAMILY MEDICINE CLINIC | Facility: CLINIC | Age: 78
End: 2018-06-25
Payer: MEDICARE

## 2018-06-25 VITALS
DIASTOLIC BLOOD PRESSURE: 76 MMHG | SYSTOLIC BLOOD PRESSURE: 142 MMHG | HEART RATE: 73 BPM | HEIGHT: 64 IN | OXYGEN SATURATION: 96 % | WEIGHT: 209.6 LBS | BODY MASS INDEX: 35.78 KG/M2

## 2018-06-25 DIAGNOSIS — E03.9 ACQUIRED HYPOTHYROIDISM: ICD-10-CM

## 2018-06-25 DIAGNOSIS — M54.50 LOW BACK PAIN WITHOUT SCIATICA, UNSPECIFIED BACK PAIN LATERALITY, UNSPECIFIED CHRONICITY: ICD-10-CM

## 2018-06-25 DIAGNOSIS — M51.36 LUMBAR DEGENERATIVE DISC DISEASE: Primary | ICD-10-CM

## 2018-06-25 DIAGNOSIS — I10 ESSENTIAL HYPERTENSION: ICD-10-CM

## 2018-06-25 DIAGNOSIS — E11.9 TYPE 2 DIABETES MELLITUS WITHOUT COMPLICATION, WITHOUT LONG-TERM CURRENT USE OF INSULIN (HCC): ICD-10-CM

## 2018-06-25 DIAGNOSIS — J44.9 CHRONIC OBSTRUCTIVE PULMONARY DISEASE, UNSPECIFIED COPD TYPE (HCC): ICD-10-CM

## 2018-06-25 LAB — SL AMB POCT HEMOGLOBIN AIC: 6.8

## 2018-06-25 PROCEDURE — 99214 OFFICE O/P EST MOD 30 MIN: CPT | Performed by: FAMILY MEDICINE

## 2018-06-25 PROCEDURE — 83036 HEMOGLOBIN GLYCOSYLATED A1C: CPT | Performed by: FAMILY MEDICINE

## 2018-06-25 RX ORDER — DIPHENOXYLATE HYDROCHLORIDE AND ATROPINE SULFATE 2.5; .025 MG/1; MG/1
1 TABLET ORAL DAILY
COMMUNITY

## 2018-06-25 NOTE — PROGRESS NOTES
FAMILY PRACTICE OFFICE VISIT  [address]  [address] [facility] [address]  [place], [place], [address]      NAME: Brandon Perales  AGE: 76 y o  SEX: female  : 1940   MRN: [id number]    DATE: 2018  TIME: 5:18 PM    Assessment and Plan     Problem List Items Addressed This Visit        Endocrine    Hypothyroidism    Relevant Orders    TSH, 3rd generation    Type 2 diabetes mellitus without complication, without long-term current use of insulin (HCC)    Relevant Orders    POCT hemoglobin A1c (Completed)    CBC    Glucometer    Glucometer test strips    Lancets       Respiratory    COPD (chronic obstructive pulmonary disease) ([address] )    Relevant Orders    CBC       Cardiovascular and Mediastinum    HTN (hypertension)    Relevant Orders    Comprehensive metabolic panel       Musculoskeletal and Integument    Lumbar degenerative disc disease - Primary       Other    Low back pain          Patient Instructions   She has acute on chronic low back pain, nonradicular  In the past she had done well with physical therapy at [facility] in [place] -   She will redo course of therapy  Last x-rays were in /  Past history hip replacement, no increased pain regarding that  See no need for further imaging at present as she has had no trauma  She can use ibuprofen with food watching for stomach upset  Needs recheck Crt also re nsaid use  She has not had blood work in quite some time, slip given to redo blood work  A1c run here today at 6 9 does show early diabetes, I would like her to  glucometer and check home glucometer readings 2-3 times weekly, either in the morning, goal reading around 100, at least less than 120, or 2 hours after a meal, goal reading less than 160/ 180  She will watch her diet closer, work at losing 5-10 lb  She will drop off readings with us in 1 month    We should continue to see her at least twice a year       She also will be seeing Neurology in follow-up, no increased tremors  She has had no increased shortness of breath      Chief Complaint     Chief Complaint   Patient presents with    Back Pain     lower        History of Present Illness   Jluis Delgadillo is a 76y o -year-old female who  He is in today complaining of low back pain, she has had no fall or new trauma, acute on chronic problem, worse past 1 month +  She did do therapy last year and did well, she is able to walk to therapy at [facility] in [place]     over the winter she had no increased shortness of breath or other issues  She is using medication as directed  She does use ibuprofen 600 mg at night for pain, no GI complaints  She has not needed her nebulizer recently  Tremors continue to be well controlled, she does see Neurology      Review of Systems   Review of Systems   Constitutional: Negative for appetite change, fatigue, fever and unexpected weight change  HENT: Negative for sore throat and trouble swallowing  Respiratory: Negative for cough, chest tightness and shortness of breath  Cardiovascular: Negative for chest pain, palpitations and leg swelling  Gastrointestinal: Negative for abdominal pain and blood in stool  No acid reflux     No change in bowel   Genitourinary: Positive for frequency (Chronic)  Negative for dysuria and hematuria  Neurological: Positive for dizziness (occ long term)  Negative for seizures, syncope, speech difficulty, light-headedness and headaches  Hematological: Negative for adenopathy  Does not bruise/bleed easily  Psychiatric/Behavioral: Negative for agitation, behavioral problems and confusion         Active Problem List     Patient Active Problem List   Diagnosis    HTN (hypertension)    Tremors of nervous system    Benign familial tremor    Cerebral microvascular disease    COPD (chronic obstructive pulmonary disease) ([facility] )    Edema    Esophageal reflux    Gait disturbance    Hypercholesterolemia    Sleep disorder    Sciatica    Right shoulder pain    Osteoarthritis of hip    OA (osteoarthritis)    Multiple joint pain    Imbalance    Hypothyroidism    Lumbar degenerative disc disease    Low back pain    Type 2 diabetes mellitus without complication, without long-term current use of insulin ([facility])       Past Medical History:  Past Medical History:   Diagnosis Date    Arthritis     Asthma     Benign neoplasm of large intestine     Cellulitis of leg, right     Closed compression fracture of body of lumbar vertebra (HCC)     L5    COPD (chronic obstructive pulmonary disease) ([facility])     Difficulty waking     post anesthesia    Disease of thyroid gland     High cholesterol     Hypertension     Osteopenia     Osteoporosis     Shortness of breath     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     Urinary leakage     Vitamin D deficiency     Wears glasses     Wears partial dentures     upper       Past Surgical History:  Past Surgical History:   Procedure Laterality Date    CATARACT EXTRACTION W/  INTRAOCULAR LENS IMPLANT Bilateral     COLONOSCOPY      DEEP BRAIN STIMULATOR PLACEMENT      KNEE ARTHROSCOPY      NM TOTAL HIP ARTHROPLASTY Left 5/20/2016    Procedure: ARTHROPLASTY HIP TOTAL ANTERIOR;  Surgeon: Eduar Osorio MD;  Location: [facility] OR;  Service: Orthopedics    ROTATOR CUFF REPAIR         Family History:  Family History   Problem Relation Age of Onset    Breast cancer Mother     Throat cancer Family        Social History:  Social History     Social History    Marital status:      Spouse name: N/A    Number of children: N/A    Years of education: N/A     Occupational History    Not on file       Social History Main Topics    Smoking status: Former Smoker     Quit date: 2013    Smokeless tobacco: Never Used    Alcohol use Yes      Comment: 2 a month    Drug use: No    Sexual activity: Not on file     Other Topics Concern  Not on file     Social History Narrative    Caffeine use    Living independently               Objective     Vitals:    06/25/18 1626   BP: 142/76   Pulse: 73   SpO2: 96%   Weight: 95 1 kg (209 lb 9 6 oz)   Height: 5' 4" (1 626 m)     Body mass index is 35 98 kg/m²  BP Readings from Last 3 Encounters:   06/25/18 142/76   10/30/17 128/64   07/19/17 122/66       Wt Readings from Last 3 Encounters:   06/25/18 95 1 kg (209 lb 9 6 oz)   10/30/17 93 6 kg (206 lb 4 oz)   07/19/17 93 9 kg (207 lb)       Physical Exam   Constitutional: She is oriented to person, place, and time  She appears well-developed and well-nourished  No distress  Eyes: Conjunctivae are normal  No scleral icterus  Cardiovascular: Normal rate and regular rhythm  Murmur (2/6 CLAUDIA R 2ics) heard  Pulmonary/Chest:   Slightly decreased breath sounds bilateral but clear, no rhonchi, rales, wheeze   Abdominal: Soft  There is no tenderness  Musculoskeletal: She exhibits no edema  Tender bilateral lower lumbar, negative straight leg raising, no focal weakness in legs  Mild to moderate decreased range of motion lumbar spine related to age due to pain  Nontender vertebral cervical dorsal spine  Lymphadenopathy:     She has no cervical adenopathy  Neurological: She is alert and oriented to person, place, and time  Psychiatric: She has a normal mood and affect  Her behavior is normal        ALLERGIES:  Allergies   Allergen Reactions    Simvastatin Myalgia    Tetracycline Rash     Reaction Date: [id number];        Current Medications     Current Outpatient Prescriptions   Medication Sig Dispense Refill    albuterol (2 5 mg/3 mL) 0 083 % nebulizer solution Take 2 5 mg by nebulization as needed for wheezing   ascorbic acid (VITAMIN C) 500 mg tablet Take 500 mg by mouth daily   aspirin 81 MG tablet Take 1 tablet by mouth daily      Calcium Carbonate-Vitamin D (CALTRATE 600+D) 600-400 MG-UNIT per chew tablet Chew 1 tablet daily   Cholecalciferol (VITAMIN D-3 PO) Take 1,000 Units by mouth daily   diltiazem (CARDIZEM CD) 240 mg 24 hr capsule Take 240 mg by mouth daily   Fluticasone Propionate, Inhal, (FLOVENT DISKUS) 250 MCG/BLIST AEPB Inhale 1 puff daily      hydrochlorothiazide (HYDRODIURIL) 12 5 mg tablet Take 12 5 mg by mouth daily   ibuprofen (MOTRIN) 600 mg tablet Take 1 tablet by mouth 2 (two) times a day as needed      levothyroxine 125 mcg tablet Take 125 mcg by mouth daily   multivitamin (THERAGRAN) TABS Take 1 tablet by mouth daily      omeprazole (PriLOSEC) 20 mg delayed release capsule Take 1 capsule by mouth daily as needed      simvastatin (ZOCOR) 10 mg tablet Take 10 mg by mouth daily at bedtime  No current facility-administered medications for this visit                Most recent labs available from [address] [place] [address]   ( others may be available in Care Everywhere / Media sections)  Lab Results   Component Value Date    WBC 10 08 06/16/2017    HGB 14 3 06/16/2017    HCT 44 6 06/16/2017     06/16/2017    CHOL 161 04/12/2016    TRIG 131 10/29/2014    HDL 47 04/12/2016    ALT 32 06/16/2017    AST 25 06/16/2017     06/16/2017    K 4 7 06/16/2017     06/16/2017    CREATININE 0 86 06/16/2017    BUN 19 06/16/2017    CO2 33 (H) 06/16/2017    INR 0 96 05/10/2016    GLUF 86 06/16/2017    HGBA1C 6 8 06/25/2018     Lab Results   Component Value Date    LDLCALC 99 10/29/2014     No results found for: TSH      Orders Placed This Encounter   Procedures    Glucometer    Glucometer test strips    Lancets    CBC    Comprehensive metabolic panel    TSH, 3rd generation    POCT hemoglobin A1c         Remy Leonard DO

## 2018-06-25 NOTE — PATIENT INSTRUCTIONS
She has acute on chronic low back pain, nonradicular  In the past she had done well with physical therapy at [facility] in [place] -   She will redo course of therapy  Last x-rays were in 2014/2016  Past history hip replacement, no increased pain regarding that  See no need for further imaging at present as she has had no trauma  She can use ibuprofen with food watching for stomach upset  Needs recheck Crt also re nsaid use  She has not had blood work in quite some time, slip given to redo blood work  A1c run here today at 6 9 does show early diabetes, I would like her to  glucometer and check home glucometer readings 2-3 times weekly, either in the morning, goal reading around 100, at least less than 120, or 2 hours after a meal, goal reading less than 160/ 180  She will watch her diet closer, work at losing 5-10 lb  She will drop off readings with us in 1 month  We should continue to see her at least twice a year  She also will be seeing Neurology in follow-up, no increased tremors      She has had no increased shortness of breath

## 2018-07-02 ENCOUNTER — OFFICE VISIT (OUTPATIENT)
Dept: FAMILY MEDICINE CLINIC | Facility: CLINIC | Age: 78
End: 2018-07-02
Payer: MEDICARE

## 2018-07-02 VITALS
SYSTOLIC BLOOD PRESSURE: 130 MMHG | HEIGHT: 64 IN | TEMPERATURE: 99.8 F | DIASTOLIC BLOOD PRESSURE: 70 MMHG | HEART RATE: 80 BPM | WEIGHT: 206.5 LBS | OXYGEN SATURATION: 92 % | BODY MASS INDEX: 35.26 KG/M2

## 2018-07-02 DIAGNOSIS — I10 ESSENTIAL HYPERTENSION: Primary | ICD-10-CM

## 2018-07-02 DIAGNOSIS — Z79.4 TYPE 2 DIABETES MELLITUS WITHOUT COMPLICATION, WITH LONG-TERM CURRENT USE OF INSULIN (HCC): Primary | ICD-10-CM

## 2018-07-02 DIAGNOSIS — E11.9 TYPE 2 DIABETES MELLITUS WITHOUT COMPLICATION, WITH LONG-TERM CURRENT USE OF INSULIN (HCC): Primary | ICD-10-CM

## 2018-07-02 DIAGNOSIS — R82.90 ABNORMAL URINALYSIS: ICD-10-CM

## 2018-07-02 DIAGNOSIS — R10.84 GENERALIZED ABDOMINAL PAIN: Primary | ICD-10-CM

## 2018-07-02 LAB
SL AMB  POCT GLUCOSE, UA: NEGATIVE
SL AMB LEUKOCYTE ESTERASE,UA: ABNORMAL
SL AMB POCT BILIRUBIN,UA: NEGATIVE
SL AMB POCT BLOOD,UA: NEGATIVE
SL AMB POCT CLARITY,UA: ABNORMAL
SL AMB POCT COLOR,UA: ABNORMAL
SL AMB POCT KETONES,UA: NEGATIVE
SL AMB POCT NITRITE,UA: NEGATIVE
SL AMB POCT PH,UA: 5
SL AMB POCT SPECIFIC GRAVITY,UA: 1.01
SL AMB POCT URINE PROTEIN: ABNORMAL
SL AMB POCT UROBILINOGEN: 0.2

## 2018-07-02 PROCEDURE — 87086 URINE CULTURE/COLONY COUNT: CPT | Performed by: PHYSICIAN ASSISTANT

## 2018-07-02 PROCEDURE — 81002 URINALYSIS NONAUTO W/O SCOPE: CPT | Performed by: PHYSICIAN ASSISTANT

## 2018-07-02 PROCEDURE — 99213 OFFICE O/P EST LOW 20 MIN: CPT | Performed by: PHYSICIAN ASSISTANT

## 2018-07-02 RX ORDER — CIPROFLOXACIN 500 MG/1
500 TABLET, FILM COATED ORAL EVERY 12 HOURS SCHEDULED
Qty: 14 TABLET | Refills: 0 | Status: SHIPPED | OUTPATIENT
Start: 2018-07-02 | End: 2018-07-09

## 2018-07-02 RX ORDER — BLOOD-GLUCOSE METER
EACH MISCELLANEOUS
Qty: 1 EACH | Refills: 0 | Status: SHIPPED | OUTPATIENT
Start: 2018-07-02 | End: 2018-12-06 | Stop reason: ALTCHOICE

## 2018-07-02 RX ORDER — HYDROCHLOROTHIAZIDE 12.5 MG/1
TABLET ORAL
Qty: 30 TABLET | Refills: 5 | Status: SHIPPED | OUTPATIENT
Start: 2018-07-02 | End: 2018-08-09

## 2018-07-02 NOTE — PROGRESS NOTES
[place] [facility]  FAMILY PRACTICE ACUTE OFFICE VISIT       NAME: Michaelle Perales  AGE: 76 y o  SEX: female       : 1940        MRN: [id number]    DATE: 2018  TIME: 7:58 PM    Assessment and Plan     Problem List Items Addressed This Visit     None      Visit Diagnoses     Generalized abdominal pain    -  Primary    Relevant Medications    esomeprazole (NexIUM) 20 mg capsule    Other Relevant Orders    POCT urine dip (Completed)    Abnormal urinalysis        Relevant Medications    ciprofloxacin (CIPRO) 500 mg tablet    Other Relevant Orders    Urine culture        Patient Instructions   I discussed with the patient that there are several possible causes of her abdominal pain  It appears that she could have a possible urinary tract infection given the leukocytes were present in her urine  She was started on ciprofloxacin 500 mg twice daily for the next week while we are awaiting her urine culture results  She was encouraged to drink plenty of fluids as well  We also discussed the possibility of some extra acid production/ gastritis in her stomach as she has primarily discomfort in her upper abdomen  She was given samples of Nexium to start once daily before breakfast   She was encouraged to continue with a bland diet and good fluid intake  She may also have a viral gastroenteritis that needs a few more days to resolve as she does live in a facility with numerous other seniors  Another less likely possibility is cholecystitis  We reviewed that she is not have any rebound tenderness on exam so this speaks against any acute abnormalities such as cholecystitis  She was encouraged to go to the emergency room if she does have any significant worsening of her symptoms  She should call with any problems or concerns prior to us calling her with a urine culture results            Chief Complaint     Chief Complaint   Patient presents with    Abdominal Pain     x 4 days, no diarrhea  History of Present Illness   Dean Richter is a 76y o -year-old female who presents for abdominal pain and nausea  Notes that she had oatmeal this morning and 3 oatmeal cookies - has had normal bowel movements and nausea/dry heaves - has abdominal pain in the upper and lower abdomen that is sharp - never had this - no trigger known - better with bowel movement - afraid to eat but okay with chicken noodle and oatmeal cookies - nothing taken for symptoms - normal TSH last week           Review of Systems   Review of Systems   Constitutional: Positive for chills (yesterday but better today)  Negative for fever  Respiratory: Negative for shortness of breath  Cardiovascular: Negative for chest pain  Gastrointestinal: Positive for abdominal pain and nausea  Negative for diarrhea and vomiting  Genitourinary: Negative for dysuria  Skin: Negative for rash  Neurological: Positive for headaches  Negative for dizziness  Active Problem List     Patient Active Problem List   Diagnosis    HTN (hypertension)    Tremors of nervous system    Benign familial tremor    Cerebral microvascular disease    COPD [facility]ronic obstructive pulmonary disease) ([facility] [address] [facility]    Edema    Esophageal reflux    Gait disturbance    Hypercholesterolemia    Sleep disorder    Sciatica    Right shoulder pain    Osteoarthritis of hip    OA (osteoarthritis)    Multiple joint pain    Imbalance    Hypothyroidism    Lumbar degenerative disc disease    Low back pain    Type 2 diabetes mellitus without complication, without long-term current use of insulin ([facility] [address] )         Social History:  Social History     Social History    Marital status:      Spouse name: N/A    Number of children: N/A    Years of education: N/A     Occupational History    Not on file       Social History Main Topics    Smoking status: Former Smoker     Quit date: 2013    Smokeless tobacco: Never Used    Alcohol use Yes Comment: 2 a month    Drug use: No    Sexual activity: Not on file     Other Topics Concern    Not on file     Social History Narrative    Caffeine use    Living independently               Objective     Vitals:    07/02/18 1507   BP: 130/70   Pulse: 80   Temp: 99 8 °F (37 7 °C)   SpO2: 92%     Wt Readings from Last 3 Encounters:   07/02/18 93 7 kg (206 lb 8 oz)   06/25/18 95 1 kg (209 lb 9 6 oz)   10/30/17 93 6 kg (206 lb 4 oz)       Physical Exam   Constitutional: She appears well-developed and well-nourished  No distress  Neck: Neck supple  No thyromegaly present  Cardiovascular: Normal rate, regular rhythm and intact distal pulses  Murmur heard  Systolic murmur is present with a grade of 3/6   Pulmonary/Chest: Effort normal and breath sounds normal  She has no wheezes  She has no rales  Abdominal: Soft  Bowel sounds are normal  There is no hepatosplenomegaly  There is generalized tenderness  There is CVA tenderness (left)  There is no rigidity, no rebound and no guarding  No hernia  Lymphadenopathy:     She has no cervical adenopathy  Skin: Skin is warm and dry  No rash noted  Psychiatric: She has a normal mood and affect  Her behavior is normal    Vitals reviewed        Pertinent Laboratory/Diagnostic Studies:  Results for orders placed or performed in visit on 07/02/18   POCT urine dip   Result Value Ref Range    LEUKOCYTE ESTERASE,UA Large      NITRITE,UA Negative     SL AMB POCT UROBILINOGEN 0 2     SL AMB POCT URINE PROTEIN 30+      PH,UA 5 0      BLOOD,UA Negative      SPECIFIC GRAVITY,UA 1 010      KETONES,UA Negative      BILIRUBIN,UA Negative     GLUCOSE, UA Negative      COLOR,UA Orange      CLARITY,UA Cloudy          ALLERGIES:  Allergies   Allergen Reactions    Simvastatin Myalgia    Tetracycline Rash     Reaction Date: [id number];        Current Medications     Current Outpatient Prescriptions   Medication Sig Dispense Refill    albuterol (2 5 mg/3 mL) 0 083 % nebulizer solution Take 2 5 mg by nebulization as needed for wheezing   ascorbic acid (VITAMIN C) 500 mg tablet Take 500 mg by mouth daily   aspirin 81 MG tablet Take 1 tablet by mouth daily      Calcium Carbonate-Vitamin D (CALTRATE 600+D) 600-400 MG-UNIT per chew tablet Chew 1 tablet daily   Cholecalciferol (VITAMIN D-3 PO) Take 1,000 Units by mouth daily   diltiazem (CARDIZEM CD) 240 mg 24 hr capsule Take 240 mg by mouth daily   Fluticasone Propionate, Inhal, (FLOVENT DISKUS) 250 MCG/BLIST AEPB Inhale 1 puff daily      hydrochlorothiazide (HYDRODIURIL) 12 5 mg tablet 1 tab BY MOUTH DAILY 30 tablet 5    ibuprofen (MOTRIN) 600 mg tablet Take 1 tablet by mouth 2 (two) times a day as needed      levothyroxine 125 mcg tablet Take 125 mcg by mouth daily   multivitamin (THERAGRAN) TABS Take 1 tablet by mouth daily      simvastatin (ZOCOR) 10 mg tablet Take 10 mg by mouth daily at bedtime   Blood Glucose Monitoring Suppl (ONE TOUCH ULTRA 2) w/Device KIT Obtain kit and check up to daily as discussed 1 each 0    ciprofloxacin (CIPRO) 500 mg tablet Take 1 tablet (500 mg total) by mouth every 12 (twelve) hours for 7 days 14 tablet 0    esomeprazole (NexIUM) 20 mg capsule Take 1 capsule (20 mg total) by mouth daily in the early morning 6 capsule 0    omeprazole (PriLOSEC) 20 mg delayed release capsule Take 1 capsule by mouth daily as needed      ONE TOUCH LANCETS MISC by Does not apply route daily 100 each 3     No current facility-administered medications for this visit            Rodrigue Garza PA-C  7/2/2018 7:58 PM  [facility]

## 2018-07-02 NOTE — TELEPHONE ENCOUNTER
Dr Soledad Kline pt cynthia back her scripts for lancets, strips and glucometer- she doesn't want the "ONE TOUCH MINI" They want One touch Ulta 2

## 2018-07-02 NOTE — PATIENT INSTRUCTIONS
I discussed with the patient that there are several possible causes of her abdominal pain  It appears that she could have a possible urinary tract infection given the leukocytes were present in her urine  She was started on ciprofloxacin 500 mg twice daily for the next week while we are awaiting her urine culture results  She was encouraged to drink plenty of fluids as well  We also discussed the possibility of some extra acid production/ gastritis in her stomach as she has primarily discomfort in her upper abdomen  She was given samples of Nexium to start once daily before breakfast   She was encouraged to continue with a bland diet and good fluid intake  She may also have a viral gastroenteritis that needs a few more days to resolve as she does live in a facility with numerous other seniors  Another less likely possibility is cholecystitis  We reviewed that she is not have any rebound tenderness on exam so this speaks against any acute abnormalities such as cholecystitis  She was encouraged to go to the emergency room if she does have any significant worsening of her symptoms  She should call with any problems or concerns prior to us calling her with a urine culture results

## 2018-07-03 LAB — BACTERIA UR CULT: NORMAL

## 2018-07-13 PROBLEM — K85.90 PANCREATITIS: Status: ACTIVE | Noted: 2018-07-13

## 2018-07-18 ENCOUNTER — TRANSITIONAL CARE MANAGEMENT (OUTPATIENT)
Dept: FAMILY MEDICINE CLINIC | Facility: CLINIC | Age: 78
End: 2018-07-18

## 2018-07-25 ENCOUNTER — TRANSITIONAL CARE MANAGEMENT (OUTPATIENT)
Dept: FAMILY MEDICINE CLINIC | Facility: CLINIC | Age: 78
End: 2018-07-25

## 2018-07-27 DIAGNOSIS — E03.9 ACQUIRED HYPOTHYROIDISM: ICD-10-CM

## 2018-07-27 DIAGNOSIS — I10 ESSENTIAL HYPERTENSION: Primary | ICD-10-CM

## 2018-07-27 RX ORDER — DILTIAZEM HYDROCHLORIDE 240 MG/1
CAPSULE, COATED, EXTENDED RELEASE ORAL
Qty: 30 CAPSULE | Refills: 4 | Status: SHIPPED | OUTPATIENT
Start: 2018-07-27 | End: 2019-01-07 | Stop reason: SDUPTHER

## 2018-07-27 RX ORDER — LEVOTHYROXINE SODIUM 0.12 MG/1
TABLET ORAL
Qty: 30 TABLET | Refills: 4 | Status: SHIPPED | OUTPATIENT
Start: 2018-07-27 | End: 2019-01-07 | Stop reason: SDUPTHER

## 2018-08-04 NOTE — PROGRESS NOTES
Assessment/Plan:     Pancreatitis   The patient appears to be doing well status post her recent hospitalization for pancreatitis  She no longer has abdominal discomfort  She will follow up with General surgery as recommended by the hospital   She was provided a referral for Dr Alem Marroquin as she would prefer to follow up with a [address] [place] [address] provider  She should call with any problems or concerns such as recurrence of previous abdominal discomfort  Type 2 diabetes mellitus without complication, without long-term current use of insulin (HCC)    The patient has been unable to check her blood sugar at home due to difficulty with inserting the lancets and to her current One-Touch lancing device  She was provided a slip to get a new lancing device, possibly the Accu-Chek fast clicks or multi clicks device  She will then resume checking her blood sugar on a daily basis  She should call with any problems or concerns  She reported having good readings in the hospital just over 100  HTN (hypertension)   We discussed that her blood pressure is very well controlled today  I have discontinued her amlodipine due to it being a duplication of a calcium channel blocker  She will continue with her diltiazem 240 mg daily as well as the Lasix 20 mg daily  She was asked to return in a few weeks for recheck on her blood pressure to make sure that she maintains adequate control of her blood pressure without the amlodipine  She should call if she has any concerning symptoms such as chest pain or headaches  Edema    The patient expressed that she has been having some increase in swelling in her legs since home from the hospital   We discussed that this could be related to the amlodipine  We will look for improvement as her amlodipine has been discontinued today  She will continue with the Lasix 20 mg daily  She was encouraged to elevate her legs regularly    We also discussed the importance of good fluid intake as well as limiting her sodium intake  She has had difficulty with compression stockings in the past   We will recheck this at her next visit in several weeks  She was also provided a lab slip to get a BMP checked given that she has now been on Lasix in replacement for her previous hydrochlorothiazide since her hospitalization  Diagnoses and all orders for this visit:    Acute pancreatitis without infection or necrosis, unspecified pancreatitis type  -     Ambulatory referral to General Surgery; Future    Type 2 diabetes mellitus without complication, without long-term current use of insulin (HCC)  -     Lancets  -     Lancet Devices (LANCING DEVICE) MISC; Use to test glucose daily or additionally as needed  Essential hypertension  -     Basic metabolic panel; Future    Edema, unspecified type  -     furosemide (LASIX) 20 mg tablet; Take 1 tablet (20 mg total) by mouth daily    Other orders  -     Discontinue: amLODIPine (NORVASC) 5 mg tablet; Take 5 mg by mouth daily    -     Discontinue: fluticasone-salmeterol (ADVAIR DISKUS) 250-50 mcg/dose inhaler; Inhale 1 puff  -     Discontinue: furosemide (LASIX) 20 mg tablet; Take 20 mg by mouth          General Hospital Follow-up:  Impression: recent hospitalization for acute pancreatitis without infection  Currently the patient's condition is significantly improved /resolved symptoms  Patient Discussion:  Patient was given referral for [facility] general surgeon as she does not want to have follow-up with [facility]  She is given a slip for a BMP to have done due to the recent switch from hydrochlorothiazide to furosemide  Amlodipine was discontinued due to an increase in swelling in her legs since starting this medication as well as a low normal blood pressure allowing room for discontinuation  She will return in a few weeks for recheck to see if her swelling has improved and to ensure that her blood pressure remains controlled without the medication  She is also on diltiazem 240 mg daily  Subjective:     Patient ID: Markie Gustafson is a [de-identified] y o  female  [facility] Hospital Follow-up:  The patient is being seen today for follow-up after hospitalization  Hospital records were reviewed  The patient was hospitalized at [facility]  The date of admission was  07/12/2018,  and date of discharge was  07/17/2018  Diagnosis:   Acute pancreatitis without infection     Hospital Course: The patient presented to the hospital due to abdominal pain  She was diagnosed with acute pancreatitis  It was felt this was possibly related to medication/hydrochlorothiazide versus gallstone pancreatitis  She was seen by Gastroenterology but MRCP was not performed due to mental from another surgery  It was recommended that she be evaluated outpatient by surgery  She was given IV fluids and her pain improved  She had some fluid overload from the IV fluids so she was given Lasix 20 mg to take once daily for a week after discharge  She was also given amlodipine 5 mg daily to take for better blood pressure control  She was directed to get labs done after 1 week  Medication changes:  Amlodipine 5 mg daily and Lasix 20 mg daily for 1 week    The patient was discharged to home  Follow-up appointments: has appointment with the Dr Dung Billingsley (Gen Surgery) next week but prefers [facility] providers    Symptoms: denies fever, fatigue, chest pain, shortness of breath, cough, wheeze, abdominal pain, anorexia, nausea, vomiting, diarrhea, constipation (goes every other day with stool softener on chronically), dysuria  Review of Systems   Constitutional: Negative for chills, fatigue and fever  Respiratory: Negative for cough, shortness of breath and wheezing  Cardiovascular: Positive for leg swelling  Negative for chest pain and palpitations  Gastrointestinal: Negative for abdominal pain, constipation, diarrhea, nausea and vomiting     Genitourinary: Negative for dysuria  Neurological: Negative for dizziness, syncope and headaches  Objective:     Physical Exam   Constitutional: She appears well-developed and well-nourished  No distress  Neck: Neck supple  No thyromegaly present  Cardiovascular: Normal rate, regular rhythm and intact distal pulses  Murmur heard  Systolic (  Aiken best over the right upper sternal border) murmur is present with a grade of 3/6   Pulmonary/Chest: Effort normal and breath sounds normal  She has no wheezes  She has no rales  Abdominal: Soft  Bowel sounds are normal  She exhibits no distension and no mass  There is no tenderness  There is no guarding  Musculoskeletal: She exhibits edema ( 1+ pitting over the shins)  Lymphadenopathy:     She has no cervical adenopathy  Skin: Skin is warm and dry  Psychiatric: She has a normal mood and affect  Her behavior is normal    Vitals reviewed  Vitals:    08/09/18 1407   BP: 122/74   Pulse: 68   Temp: 97 7 °F (36 5 °C)   SpO2: 94%   Weight: 91 9 kg (202 lb 8 oz)   Height: 5' 4" (1 626 m)       Transitional Care Management Review:  Juliocesar Eugene is a [de-identified] y o  female here for TCM follow up  During the TCM phone call patient stated:    Date and time hospital follow up call was made:  7/18/2018  8:09 AM  Hospital care reviewed:  Records reviewed  Patient was hopsitalized at:  [facility]  Date of admission:  7/12/18  Date of discharge:  7/17/18  Diagnosis:  Acute Pancreatitis w/o infection  Disposition:  Home  Were the patients medicaitons reviewed and updated:  No  I have advised the patient to call PCP with any new or worsening symptoms (please type in name along with any credentials):  francine/ Tammi Cardenas RN  Comments:  called and left message on pt voicemail to call the office back  LMOVM again 7/25                Daysi Bettencourt PA-C

## 2018-08-08 ENCOUNTER — TRANSITIONAL CARE MANAGEMENT (OUTPATIENT)
Dept: FAMILY MEDICINE CLINIC | Facility: CLINIC | Age: 78
End: 2018-08-08

## 2018-08-09 ENCOUNTER — APPOINTMENT (OUTPATIENT)
Dept: LAB | Facility: CLINIC | Age: 78
End: 2018-08-09
Payer: MEDICARE

## 2018-08-09 ENCOUNTER — OFFICE VISIT (OUTPATIENT)
Dept: FAMILY MEDICINE CLINIC | Facility: CLINIC | Age: 78
End: 2018-08-09
Payer: MEDICARE

## 2018-08-09 VITALS
OXYGEN SATURATION: 94 % | HEART RATE: 68 BPM | WEIGHT: 202.5 LBS | DIASTOLIC BLOOD PRESSURE: 74 MMHG | BODY MASS INDEX: 34.57 KG/M2 | TEMPERATURE: 97.7 F | HEIGHT: 64 IN | SYSTOLIC BLOOD PRESSURE: 122 MMHG

## 2018-08-09 DIAGNOSIS — K85.90 ACUTE PANCREATITIS WITHOUT INFECTION OR NECROSIS, UNSPECIFIED PANCREATITIS TYPE: Primary | ICD-10-CM

## 2018-08-09 DIAGNOSIS — R60.9 EDEMA, UNSPECIFIED TYPE: ICD-10-CM

## 2018-08-09 DIAGNOSIS — I10 ESSENTIAL HYPERTENSION: ICD-10-CM

## 2018-08-09 DIAGNOSIS — E11.9 TYPE 2 DIABETES MELLITUS WITHOUT COMPLICATION, WITHOUT LONG-TERM CURRENT USE OF INSULIN (HCC): ICD-10-CM

## 2018-08-09 LAB
ANION GAP SERPL CALCULATED.3IONS-SCNC: 6 MMOL/L (ref 4–13)
BUN SERPL-MCNC: 25 MG/DL (ref 5–25)
CALCIUM SERPL-MCNC: 9.5 MG/DL (ref 8.3–10.1)
CHLORIDE SERPL-SCNC: 102 MMOL/L (ref 100–108)
CO2 SERPL-SCNC: 31 MMOL/L (ref 21–32)
CREAT SERPL-MCNC: 0.81 MG/DL (ref 0.6–1.3)
GFR SERPL CREATININE-BSD FRML MDRD: 69 ML/MIN/1.73SQ M
GLUCOSE SERPL-MCNC: 73 MG/DL (ref 65–140)
POTASSIUM SERPL-SCNC: 4 MMOL/L (ref 3.5–5.3)
SODIUM SERPL-SCNC: 139 MMOL/L (ref 136–145)

## 2018-08-09 PROCEDURE — 36415 COLL VENOUS BLD VENIPUNCTURE: CPT

## 2018-08-09 PROCEDURE — 99214 OFFICE O/P EST MOD 30 MIN: CPT | Performed by: PHYSICIAN ASSISTANT

## 2018-08-09 PROCEDURE — 80048 BASIC METABOLIC PNL TOTAL CA: CPT

## 2018-08-09 RX ORDER — FUROSEMIDE 20 MG/1
20 TABLET ORAL
COMMUNITY
Start: 2018-07-18 | End: 2018-08-09 | Stop reason: SDUPTHER

## 2018-08-09 RX ORDER — AMLODIPINE BESYLATE 5 MG/1
5 TABLET ORAL DAILY
COMMUNITY
Start: 2018-07-18 | End: 2018-08-09

## 2018-08-09 RX ORDER — FUROSEMIDE 20 MG/1
20 TABLET ORAL DAILY
Qty: 30 TABLET | Refills: 1 | Status: SHIPPED | OUTPATIENT
Start: 2018-08-09 | End: 2018-08-27 | Stop reason: SDUPTHER

## 2018-08-09 RX ORDER — LANCING DEVICE
EACH MISCELLANEOUS
Qty: 1 EACH | Refills: 0 | Status: SHIPPED | OUTPATIENT
Start: 2018-08-09 | End: 2018-08-27 | Stop reason: SDUPTHER

## 2018-08-09 NOTE — ASSESSMENT & PLAN NOTE
The patient has been unable to check her blood sugar at home due to difficulty with inserting the lancets and to her current One-Touch lancing device  She was provided a slip to get a new lancing device, possibly the Accu-Chek fast clicks or multi clicks device  She will then resume checking her blood sugar on a daily basis  She should call with any problems or concerns  She reported having good readings in the hospital just over 100

## 2018-08-09 NOTE — ASSESSMENT & PLAN NOTE
We discussed that her blood pressure is very well controlled today  I have discontinued her amlodipine due to it being a duplication of a calcium channel blocker  She will continue with her diltiazem 240 mg daily as well as the Lasix 20 mg daily  She was asked to return in a few weeks for recheck on her blood pressure to make sure that she maintains adequate control of her blood pressure without the amlodipine  She should call if she has any concerning symptoms such as chest pain or headaches

## 2018-08-09 NOTE — ASSESSMENT & PLAN NOTE
The patient expressed that she has been having some increase in swelling in her legs since home from the hospital   We discussed that this could be related to the amlodipine  We will look for improvement as her amlodipine has been discontinued today  She will continue with the Lasix 20 mg daily  She was encouraged to elevate her legs regularly  We also discussed the importance of good fluid intake as well as limiting her sodium intake  She has had difficulty with compression stockings in the past   We will recheck this at her next visit in several weeks  She was also provided a lab slip to get a BMP checked given that she has now been on Lasix in replacement for her previous hydrochlorothiazide since her hospitalization

## 2018-08-09 NOTE — PATIENT INSTRUCTIONS
Pancreatitis   The patient appears to be doing well status post her recent hospitalization for pancreatitis  She no longer has abdominal discomfort  She will follow up with General surgery as recommended by the hospital   She was provided a referral for Dr Louise Patrick as she would prefer to follow up with a [facility] provider  She should call with any problems or concerns such as recurrence of previous abdominal discomfort  Type 2 diabetes mellitus without complication, without long-term current use of insulin (HCC)    The patient has been unable to check her blood sugar at home due to difficulty with inserting the lancets and to her current One-Touch lancing device  She was provided a slip to get a new lancing device, possibly the Accu-Chek fast clicks or multi clicks device  She will then resume checking her blood sugar on a daily basis  She should call with any problems or concerns  She reported having good readings in the hospital just over 100  HTN (hypertension)   We discussed that her blood pressure is very well controlled today  I have discontinued her amlodipine due to it being a duplication of a calcium channel blocker  She will continue with her diltiazem 240 mg daily as well as the Lasix 20 mg daily  She was asked to return in a few weeks for recheck on her blood pressure to make sure that she maintains adequate control of her blood pressure without the amlodipine  She should call if she has any concerning symptoms such as chest pain or headaches  Edema    The patient expressed that she has been having some increase in swelling in her legs since home from the hospital   We discussed that this could be related to the amlodipine  We will look for improvement as her amlodipine has been discontinued today  She will continue with the Lasix 20 mg daily  She was encouraged to elevate her legs regularly    We also discussed the importance of good fluid intake as well as limiting her sodium intake  She has had difficulty with compression stockings in the past   We will recheck this at her next visit in several weeks  She was also provided a lab slip to get a BMP checked given that she has now been on Lasix in replacement for her previous hydrochlorothiazide since her hospitalization

## 2018-08-09 NOTE — ASSESSMENT & PLAN NOTE
The patient appears to be doing well status post her recent hospitalization for pancreatitis  She no longer has abdominal discomfort  She will follow up with General surgery as recommended by the hospital   She was provided a referral for Dr Alem Marroquin as she would prefer to follow up with a [facility] provider  She should call with any problems or concerns such as recurrence of previous abdominal discomfort

## 2018-08-25 NOTE — PROGRESS NOTES
Barrett and BARBIE BEJARANO [facility]  FAMILY PRACTICE OFFICE VISIT       NAME: Natasha Perales  AGE: [de-identified] y o  SEX: female       : 1940        MRN: [id number]    DATE: 2018  TIME: 4:07 PM    Assessment and Plan     Problem List Items Addressed This Visit     HTN (hypertension) - Primary       Well controlled despite discontinuation of amlodipine  She will continue on her current medication  She should call with any problems or concerns prior to her next appointment in about 4 months with Dr Nanci Pandey  Relevant Medications    furosemide (LASIX) 20 mg tablet    Other Relevant Orders    Comprehensive metabolic panel    Edema       Only trace on exam   It has improved since her last visit this was likely related primarily to her amlodipine prescription  She does still have a small amount and for this reason, she was encouraged to elevate her legs periodically throughout the day  She was instructed to try to elevate them above the level of the heart  Will continue to monitor  Relevant Medications    furosemide (LASIX) 20 mg tablet    Hypercholesterolemia     Patient states that she has not been taking simvastatin for quite a while  She did have trouble with muscle aches from the medication in the past   She was given a slip to do some labs prior to her upcoming appointment with Dr Nanci Pandey in January  We will reassess her lipid panel with those labs and can be discussed whether she needs medication at that time  Relevant Orders    Comprehensive metabolic panel    Lipid Panel with Direct LDL reflex    Hypothyroidism       Patient is currently taking levothyroxine 125 mcg daily  She was given a lab slip to do prior to her next appointment with Dr Nanci Pandey in January which will include a TSH to recheck her thyroid function           Relevant Orders    TSH, 3rd generation with Free T4 reflex    Type 2 diabetes mellitus without complication, without long-term current use of insulin [facility])     Lab Results   Component Value Date    HGBA1C 6 8 06/25/2018       We reviewed that her last A1c was 6 8% in June  She does not take any medication for her diabetes and controls it solely with diet  She is provided a lab slip to have done prior to her next appointment in January which includes a repeat A1c as well as microalbumin level  She will continue to follow with her ophthalmologist on a regular basis  She notes that she is getting injections periodically for macular degeneration  Her foot exam was completed today  Relevant Orders    Hemoglobin A1C    Microalbumin / creatinine urine ratio    CBC and differential    Aortic valve stenosis       Mild on echocardiogram done in May of 2016  Recheck prior to next visit with Dr Catalina Seals  She denies any symptoms of progression such as exertional dyspnea  Relevant Orders    Echo complete with contrast if indicated          Hypercholesterolemia  Patient states that she has not been taking simvastatin for quite a while  She did have trouble with muscle aches from the medication in the past   She was given a slip to do some labs prior to her upcoming appointment with Dr Catalina Seals in January  We will reassess her lipid panel with those labs and can be discussed whether she needs medication at that time  Hypothyroidism    Patient is currently taking levothyroxine 125 mcg daily  She was given a lab slip to do prior to her next appointment with Dr Catalina Seals in January which will include a TSH to recheck her thyroid function  Type 2 diabetes mellitus without complication, without long-term current use of insulin ([facility] )  Lab Results   Component Value Date    HGBA1C 6 8 06/25/2018       We reviewed that her last A1c was 6 8% in June  She does not take any medication for her diabetes and controls it solely with diet    She is provided a lab slip to have done prior to her next appointment in January which includes a repeat A1c as well as microalbumin level  She will continue to follow with her ophthalmologist on a regular basis  She notes that she is getting injections periodically for macular degeneration  Her foot exam was completed today  HTN (hypertension)    Well controlled despite discontinuation of amlodipine  She will continue on her current medication  She should call with any problems or concerns prior to her next appointment in about 4 months with Dr Kelsey Hutton  Edema    Only trace on exam   It has improved since her last visit this was likely related primarily to her amlodipine prescription  She does still have a small amount and for this reason, she was encouraged to elevate her legs periodically throughout the day  She was instructed to try to elevate them above the level of the heart  Will continue to monitor  Aortic valve stenosis    Mild on echocardiogram done in May of 2016  Recheck prior to next visit with Dr Kelsey Hutton  She denies any symptoms of progression such as exertional dyspnea  Chief Complaint     Chief Complaint   Patient presents with    Follow-up     HTN  History of Present Illness   Luanen Marino is a [de-identified]y o -year-old female who presents for 3 week recheck on edema/blood pressure  The patient presents today for follow-up on her leg swelling and blood pressure since her last visit 3 weeks ago  She was seen at time for follow-up from hospitalization for acute pancreatitis without infection  During her stay, she had been switched from hydrochlorothiazide to furosemide and had also been started on amlodipine  She had been noticing an increase in swelling in her legs since the addition of the amlodipine  Her blood pressure was also low normal at the time of her last appointment  For these reasons, the amlodipine was discontinued  Of note she is also on diltiazem 240 mg daily  She reports a blood pressure readings at home has not been checked  Her swelling has improved    BMP done since her last appointment was completely normal           Review of Systems   Review of Systems   Constitutional: Negative for chills and fever  Respiratory: Negative for shortness of breath  Cardiovascular: Positive for leg swelling (but improving)  Negative for chest pain and palpitations  Neurological: Negative for dizziness and headaches         Active Problem List     Patient Active Problem List   Diagnosis    HTN (hypertension)    Tremors of nervous system    Benign familial tremor    Cerebral microvascular disease    COPD (chronic obstructive pulmonary disease) ([address] )    Edema    Esophageal reflux    Gait disturbance    Hypercholesterolemia    Sleep disorder    Sciatica    Right shoulder pain    Osteoarthritis of hip    OA (osteoarthritis)    Multiple joint pain    Imbalance    Hypothyroidism    Lumbar degenerative disc disease    Low back pain    Type 2 diabetes mellitus without complication, without long-term current use of insulin ([facility]    Pancreatitis    Aortic valve stenosis         Past Medical History:  Past Medical History:   Diagnosis Date    Arthritis     Asthma     Benign neoplasm of large intestine     Cellulitis of leg, right     Closed compression fracture of body of lumbar vertebra (HCC)     L5    [facility]ch[facility]) [facility]     Difficulty waking     post anesthesia    Disease of thyroid gland     High cholesterol     Hypertension     Osteopenia     Osteoporosis     Shortness of breath     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     Urinary leakage     Vitamin D deficiency     Wears glasses     Wears partial dentures     upper       Past Surgical History:  Past Surgical History:   Procedure Laterality Date    CATARACT EXTRACTION W/  INTRAOCULAR LENS IMPLANT Bilateral     COLONOSCOPY      DEEP BRAIN STIMULATOR PLACEMENT      KNEE ARTHROSCOPY      MI TOTAL HIP ARTHROPLASTY Left 5/20/2016    Procedure: ARTHROPLASTY HIP TOTAL ANTERIOR;  Surgeon: Enrrique Ricketts MD;  Location: [facility];  Service: Orthopedics    ROTATOR CUFF REPAIR         Family History:  Family History   Problem Relation Age of Onset    Breast cancer Mother     Throat cancer Family        Social History:  Social History     Social History    Marital status:      Spouse name: N/A    Number of children: N/A    Years of education: N/A     Occupational History    Not on file  Social History Main Topics    Smoking status: Former Smoker     Quit date: 2013    Smokeless tobacco: Never Used    Alcohol use Yes      Comment: 2 a month    Drug use: No    Sexual activity: Not on file     Other Topics Concern    Not on file     Social History Narrative    Caffeine use    Living independently               Objective     Vitals:    08/27/18 1532   BP: 134/70   BP Location: Left arm   Patient Position: Sitting   Cuff Size: Large   Pulse: 78   Temp: 98 8 °F (37 1 °C)   SpO2: 95%   Weight: 91 4 kg (201 lb 8 oz)   Height: 5' 4" (1 626 m)     Wt Readings from Last 3 Encounters:   08/27/18 91 4 kg (201 lb 8 oz)   08/09/18 91 9 kg (202 lb 8 oz)   07/02/18 93 7 kg (206 lb 8 oz)       Physical Exam   Constitutional: She appears well-developed and well-nourished  No distress  Neck: Neck supple  No thyromegaly present  Cardiovascular: Normal rate, regular rhythm and intact distal pulses  Murmur heard  Systolic murmur is present with a grade of 3/6   Pulmonary/Chest: Effort normal and breath sounds normal  She has no wheezes  She has no rales  Musculoskeletal: She exhibits edema (trace edema in the lower extremities bilaterally)  Lymphadenopathy:     She has no cervical adenopathy  Skin: Skin is warm and dry  Psychiatric: She has a normal mood and affect  Her behavior is normal    Vitals reviewed        Pertinent Laboratory/Diagnostic Studies:  Lab Results   Component Value Date    GLUCOSE 108 10/29/2014    BUN 25 08/09/2018    CREATININE 0 81 08/09/2018 CALCIUM 9 5 08/09/2018     08/09/2018    K 4 0 08/09/2018    CO2 31 08/09/2018     08/09/2018     Lab Results   Component Value Date    ALT 32 06/16/2017    AST 25 06/16/2017    ALKPHOS 77 06/16/2017    BILITOT 0 41 10/29/2014       Lab Results   Component Value Date    WBC 10 08 06/16/2017    HGB 14 3 06/16/2017    HCT 44 6 06/16/2017    MCV 85 06/16/2017     06/16/2017     Lab Results   Component Value Date    CHOL 176 10/29/2014     Lab Results   Component Value Date    TRIG 131 10/29/2014     Lab Results   Component Value Date    HDL 47 04/12/2016     Lab Results   Component Value Date    LDLCALC 99 10/29/2014     Lab Results   Component Value Date    HGBA1C 6 8 06/25/2018       Results for orders placed or performed in visit on 79/15/07   Basic metabolic panel   Result Value Ref Range    Sodium 139 136 - 145 mmol/L    Potassium 4 0 3 5 - 5 3 mmol/L    Chloride 102 100 - 108 mmol/L    CO2 31 21 - 32 mmol/L    ANION GAP 6 4 - 13 mmol/L    BUN 25 5 - 25 mg/dL    Creatinine 0 81 0 60 - 1 30 mg/dL    Glucose 73 65 - 140 mg/dL    Calcium 9 5 8 3 - 10 1 mg/dL    eGFR 69 ml/min/1 73sq m       Orders Placed This Encounter   Procedures    Hemoglobin A1C    TSH, 3rd generation with Free T4 reflex    Comprehensive metabolic panel    Lipid Panel with Direct LDL reflex    Microalbumin / creatinine urine ratio    CBC and differential    Echo complete with contrast if indicated       ALLERGIES:  Allergies   Allergen Reactions    Simvastatin Myalgia    Advair Diskus [Fluticasone-Salmeterol] Palpitations    Tetracycline Rash     Reaction Date: [id number];        Current Medications     Current Outpatient Prescriptions   Medication Sig Dispense Refill    albuterol (2 5 mg/3 mL) 0 083 % nebulizer solution Take 2 5 mg by nebulization as needed for wheezing        ascorbic acid (VITAMIN C) 500 mg tablet Take 1,000 mg by mouth daily        aspirin 81 MG tablet Take 1 tablet by mouth daily      Blood Glucose Monitoring Suppl (ONE TOUCH ULTRA 2) w/Device KIT Obtain kit and check up to daily as discussed 1 each 0    Calcium Carbonate-Vitamin D (CALTRATE 600+D) 600-400 MG-UNIT per chew tablet Chew 1 tablet daily   Cholecalciferol (VITAMIN D-3 PO) Take 1,000 Units by mouth daily   diltiazem (CARDIZEM CD) 240 mg 24 hr capsule TAKE 1 CAPSULE DAILY 30 capsule 4    Fluticasone Propionate, Inhal, (FLOVENT DISKUS) 250 MCG/BLIST AEPB Inhale 1 puff daily      furosemide (LASIX) 20 mg tablet Take 1 tablet (20 mg total) by mouth daily 30 tablet 3    levothyroxine 125 mcg tablet 1 TAB BY MOUTH EVERY DAY 30 tablet 4    multivitamin (THERAGRAN) TABS Take 1 tablet by mouth daily      omeprazole (PriLOSEC) 20 mg delayed release capsule Take 1 capsule by mouth daily as needed      ONE TOUCH LANCETS MISC by Does not apply route daily 100 each 3    ibuprofen (MOTRIN) 600 mg tablet Take 1 tablet by mouth 2 (two) times a day as needed       No current facility-administered medications for this visit            Health Maintenance     Health Maintenance   Topic Date Due    Medicare Annual Wellness Visit (AWV)  1940    DXA SCAN  1940    Diabetic Foot Exam  07/22/1948    URINE MICROALBUMIN  07/22/1948    DTaP,Tdap,and Td Vaccines (1 - Tdap) 07/22/1959    Fall Risk  07/22/2003    Urinary Incontinence Screening  07/22/2003    DM Eye Exam  02/22/2018    INFLUENZA VACCINE  09/01/2018    HEMOGLOBIN A1C  12/25/2018    Depression Screening PHQ-9  06/25/2019    Pneumococcal PPSV23/PCV13 65+ Years / Low and Medium Risk  Completed     Immunization History   Administered Date(s) Administered    Influenza 10/13/2016, 10/23/2017    Influenza Split High Dose Preservative Free IM 10/15/2013, 10/16/2014, 10/28/2015, 10/13/2016, 10/23/2017    Influenza TIV (IM) 10/31/2007, 01/05/2011, 10/01/2011, 10/05/2011    Pneumococcal Conjugate 13-Valent 02/24/2015    Pneumococcal Polysaccharide PPV23 04/13/2007 Holland Rick PA-C  8/27/2018 4:07 PM  Get [facility]ARANO [facility]

## 2018-08-27 ENCOUNTER — OFFICE VISIT (OUTPATIENT)
Dept: FAMILY MEDICINE CLINIC | Facility: CLINIC | Age: 78
End: 2018-08-27
Payer: MEDICARE

## 2018-08-27 VITALS
HEART RATE: 78 BPM | BODY MASS INDEX: 34.4 KG/M2 | TEMPERATURE: 98.8 F | WEIGHT: 201.5 LBS | SYSTOLIC BLOOD PRESSURE: 134 MMHG | HEIGHT: 64 IN | OXYGEN SATURATION: 95 % | DIASTOLIC BLOOD PRESSURE: 70 MMHG

## 2018-08-27 DIAGNOSIS — I10 ESSENTIAL HYPERTENSION: Primary | ICD-10-CM

## 2018-08-27 DIAGNOSIS — E78.00 HYPERCHOLESTEROLEMIA: ICD-10-CM

## 2018-08-27 DIAGNOSIS — E11.9 TYPE 2 DIABETES MELLITUS WITHOUT COMPLICATION, WITHOUT LONG-TERM CURRENT USE OF INSULIN (HCC): ICD-10-CM

## 2018-08-27 DIAGNOSIS — I35.0 AORTIC VALVE STENOSIS, ETIOLOGY OF CARDIAC VALVE DISEASE UNSPECIFIED: ICD-10-CM

## 2018-08-27 DIAGNOSIS — E03.9 ACQUIRED HYPOTHYROIDISM: ICD-10-CM

## 2018-08-27 DIAGNOSIS — R60.9 EDEMA, UNSPECIFIED TYPE: ICD-10-CM

## 2018-08-27 PROCEDURE — 99214 OFFICE O/P EST MOD 30 MIN: CPT | Performed by: PHYSICIAN ASSISTANT

## 2018-08-27 RX ORDER — FUROSEMIDE 20 MG/1
20 TABLET ORAL DAILY
Qty: 30 TABLET | Refills: 3 | Status: SHIPPED | OUTPATIENT
Start: 2018-08-27 | End: 2018-12-20 | Stop reason: SDUPTHER

## 2018-08-27 NOTE — ASSESSMENT & PLAN NOTE
Patient states that she has not been taking simvastatin for quite a while  She did have trouble with muscle aches from the medication in the past   She was given a slip to do some labs prior to her upcoming appointment with Dr Katie Jensen in January  We will reassess her lipid panel with those labs and can be discussed whether she needs medication at that time

## 2018-08-27 NOTE — ASSESSMENT & PLAN NOTE
Lab Results   Component Value Date    HGBA1C 6 8 06/25/2018       We reviewed that her last A1c was 6 8% in June  She does not take any medication for her diabetes and controls it solely with diet  She is provided a lab slip to have done prior to her next appointment in January which includes a repeat A1c as well as microalbumin level  She will continue to follow with her ophthalmologist on a regular basis  She notes that she is getting injections periodically for macular degeneration  Her foot exam was completed today

## 2018-08-27 NOTE — ASSESSMENT & PLAN NOTE
Mild on echocardiogram done in May of 2016  Recheck prior to next visit with Dr Jennifer Pereira  She denies any symptoms of progression such as exertional dyspnea

## 2018-08-27 NOTE — ASSESSMENT & PLAN NOTE
Patient is currently taking levothyroxine 125 mcg daily  She was given a lab slip to do prior to her next appointment with Dr Negrita Odom in January which will include a TSH to recheck her thyroid function

## 2018-08-27 NOTE — PROGRESS NOTES
Patient's shoes and socks removed  Right Foot/Ankle   Right Foot Inspection  Skin Exam: dry skin                            Sensory       Monofilament testing: absent  Vascular    The right DP pulse is 2+  The right PT pulse is 2+  Left Foot/Ankle  Left Foot Inspection  Skin Exam: dry skin                                         Sensory       Monofilament: absent  Vascular    The left DP pulse is 2+  The left PT pulse is 2+  Assign Risk Category:  No deformity present; Loss of protective sensation;  No weak pulses       Risk: 2

## 2018-08-27 NOTE — ASSESSMENT & PLAN NOTE
Only trace on exam   It has improved since her last visit this was likely related primarily to her amlodipine prescription  She does still have a small amount and for this reason, she was encouraged to elevate her legs periodically throughout the day  She was instructed to try to elevate them above the level of the heart  Will continue to monitor

## 2018-08-27 NOTE — ASSESSMENT & PLAN NOTE
Well controlled despite discontinuation of amlodipine  She will continue on her current medication  She should call with any problems or concerns prior to her next appointment in about 4 months with Dr Catalina Seals

## 2018-09-27 ENCOUNTER — PROCEDURE VISIT (OUTPATIENT)
Dept: NEUROLOGY | Facility: CLINIC | Age: 78
End: 2018-09-27
Payer: MEDICARE

## 2018-09-27 VITALS
WEIGHT: 200 LBS | HEIGHT: 62 IN | DIASTOLIC BLOOD PRESSURE: 68 MMHG | BODY MASS INDEX: 36.8 KG/M2 | HEART RATE: 68 BPM | SYSTOLIC BLOOD PRESSURE: 146 MMHG

## 2018-09-27 DIAGNOSIS — G25.0 BENIGN FAMILIAL TREMOR: Primary | ICD-10-CM

## 2018-09-27 DIAGNOSIS — R26.9 GAIT DISTURBANCE: ICD-10-CM

## 2018-09-27 PROCEDURE — 99213 OFFICE O/P EST LOW 20 MIN: CPT | Performed by: PSYCHIATRY & NEUROLOGY

## 2018-09-27 NOTE — PATIENT INSTRUCTIONS
Given tremors have been so controlled on these settings  We will see her back in a routine visit, instead of after surgery unless she has return of tremor

## 2018-09-27 NOTE — PROGRESS NOTES
Patient ID: Jorge Canela is a [de-identified] y o  female  Assessment/Plan:    Benign familial tremor  Tremors have been well controlled for over 6 years on low settings  DBS interrogated and right IPG was ANTWON 2 55  Left 2 76  Will refer back to neurosurgery for IPG replacement  Daughter must be called to schedule these appointments  Given tremors have been so controlled on these settings  We will see her back in a routine visit, instead of after surgery unless she has return of tremor for which she will call me  Diagnoses and all orders for this visit:    Benign familial tremor  -     Cancel: Ambulatory referral to Neurosurgery; Future  -     Ambulatory referral to Neurosurgery; Future    Gait disturbance           Subjective: Jorge Canela is a 64year old left handed female with ET with prominent mouth and bilateral hand tremor (L>R) who is s/p bilateral VIM DBS 6/14/12 with very good response who presents for follow up  To review, her diagnosis was changed from PD to ET, she was tapered off of Sinemet and started on primidone which was increased to 225mg qhs over time with improved sleep but little effect on tremor  Tapered off after surgery  Tremors continue to be controlled  She no longer has head or hand tremor  She is able to perform ADLs without difficulty  No trouble with writing but still write big  Previously with moderate head and hand tremors which were prominent, interfering with function and bothersome  She denies any other cramping, or stiffness  She has developed some imbalance, now using cane  She reports a fear of fallbi  No falls  She will use a cane at times  She is now living in [address] [place] [address] and enjoys doing puzzles all day  She is planning on increasing physical activity to lose weight  of system reviewed              The following portions of the patient's history were reviewed and updated as appropriate: allergies, current medications, past family history, past medical history, past social history and past surgical history  Objective:    Blood pressure 146/68, pulse 68, height 5' 2" (1 575 m), weight 90 7 kg (200 lb)  Physical Exam   Constitutional: She appears well-developed  Eyes: Pupils are equal, round, and reactive to light  EOM are normal    Neurological: She has normal strength and normal reflexes  Psychiatric: Her speech is normal    Vitals reviewed  Neurological Exam  Mental Status   Oriented to person, place, time and situation  Recent and remote memory are intact  Speech is normal  Language is fluent with no aphasia  Attention and concentration are normal     Cranial Nerves  CN II: Visual fields full to confrontation  CN III, IV, VI: Extraocular movements intact bilaterally  Pupils equal round and reactive to light bilaterally  CN V: Facial sensation is normal   CN VII: Full and symmetric facial movement  CN VIII: Hearing is normal   CN IX, X: Palate elevates symmetrically  CN XI: Shoulder shrug strength is normal   CN XII: Tongue midline without atrophy or fasciculations  Motor   Normal muscle tone  Strength is 5/5 throughout all four extremities  Sensory  Light touch is normal in upper and lower extremities  Reflexes  Deep tendon reflexes are 2+ and symmetric in all four extremities with downgoing toes bilaterally  Coordination  Right: Finger-to-nose normal   Left: Finger-to-nose normal   No head or vocal tremor       Gait  Casual gait is normal including stance, stride, and arm swing  Uses cane, more hesitant and slow without  ROS:    Review of Systems   Constitutional: Negative  Negative for appetite change and fever  HENT: Negative  Negative for hearing loss, tinnitus, trouble swallowing and voice change  Eyes: Negative  Negative for photophobia and pain  Respiratory: Negative  Negative for shortness of breath  Cardiovascular: Negative  Negative for palpitations  Gastrointestinal: Negative  Negative for nausea and vomiting  Endocrine: Negative  Negative for cold intolerance and heat intolerance  Genitourinary: Negative  Negative for dysuria, frequency and urgency  Musculoskeletal: Negative  Negative for myalgias and neck pain  Skin: Negative  Negative for rash  Neurological: Positive for tremors  Negative for dizziness, seizures, syncope, facial asymmetry, speech difficulty, weakness, light-headedness, numbness and headaches  Hematological: Negative  Does not bruise/bleed easily  Psychiatric/Behavioral: Negative  Negative for confusion, hallucinations and sleep disturbance

## 2018-09-27 NOTE — ASSESSMENT & PLAN NOTE
Tremors have been well controlled for over 6 years on low settings  DBS interrogated and right IPG was ANTWON 2 55  Left 2 76  Will refer back to neurosurgery for IPG replacement  Daughter must be called to schedule these appointments  Given tremors have been so controlled on these settings  We will see her back in a routine visit, instead of after surgery unless she has return of tremor for which she will call me

## 2018-10-15 ENCOUNTER — TELEPHONE (OUTPATIENT)
Dept: NEUROSURGERY | Facility: CLINIC | Age: 78
End: 2018-10-15

## 2018-10-15 ENCOUNTER — OFFICE VISIT (OUTPATIENT)
Dept: NEUROSURGERY | Facility: CLINIC | Age: 78
End: 2018-10-15
Payer: MEDICARE

## 2018-10-15 VITALS
HEART RATE: 80 BPM | DIASTOLIC BLOOD PRESSURE: 66 MMHG | SYSTOLIC BLOOD PRESSURE: 148 MMHG | HEIGHT: 63 IN | RESPIRATION RATE: 16 BRPM | BODY MASS INDEX: 34.73 KG/M2 | WEIGHT: 196 LBS

## 2018-10-15 DIAGNOSIS — G25.0 BENIGN FAMILIAL TREMOR: ICD-10-CM

## 2018-10-15 DIAGNOSIS — G25.0 BENIGN ESSENTIAL TREMOR: Primary | ICD-10-CM

## 2018-10-15 PROCEDURE — 95978 PR ANALYZE NEUROSTIM BRAIN, FIRST 1H: CPT | Performed by: NEUROLOGICAL SURGERY

## 2018-10-15 PROCEDURE — 99213 OFFICE O/P EST LOW 20 MIN: CPT | Performed by: NEUROLOGICAL SURGERY

## 2018-10-15 NOTE — TELEPHONE ENCOUNTER
Signed surgical consent after Dr Leonel Garcia explained procedure in detail REPLACEMENT  right chest deep brain stimulator generator (Right Head)  Scheduled on 11/30   Proposed surgical procedure, verbalized understanding:  Res[facility] in  [facility]   Assessment for comorbid medical conditions:   PCP:JHONATHAN  Cardiac:denies    Pulmonary: COPD nebulizer treated by PCP nebulizer and inhalers , denies Oxygen use   Endocrine:  DX prediabetic , no diabetic medication HgA1c 6/25/2018  6 8   MISC/ONCOLOGY/Hematology//:denies   Anticoagulant/Antiplatelet use etc : denies use   Personal history of venous thromboembolic disease: denies   Imagining: non required   Pain management:    Medication hold list for surgery (AC, ASA, NSAID, vitamins, dietary supplements, OTC): ibuprofen-prn pain  and ASA 81 mg daily   Discussed overview of surgical process from office appointment thru 6 weeks post op:  DBS: DX ET or PD ----Essential tremor need IPG replacement   Patient/grandson verbalized and understanding

## 2018-10-15 NOTE — PROGRESS NOTES
Assessment/Plan:    No problem-specific Assessment & Plan notes found for this encounter  Diagnoses and all orders for this visit:    Benign essential tremor    Benign familial tremor  -     Ambulatory referral to Neurosurgery       Summary:   She is now 6 years from placement of her bilateral deep brain stimulator electrodes into bilateral VIM thalamus with bilateral chest generators for her essential tremor  She has done very well with the device  She is on very low settings  She is presenting at end of service of the right-sided chest generator  She is followed by Dr Ivis Perry  Her right-sided device is ANTWON 2 54  Her left-sided device is service 2 75  Underwent device interrogation and programming in the office in which her voltage was slightly decreased to prolong longevity  Programming in result section  We will proceed with replacement of her right chest generator  The left-sided generator can be followed  Given the low settings with longevity of her generator a rechargeable device is not necessary  The risks and benefits of surgery reviewed with the patient and grandson and they wish to proceed  These risks include, but are not limited to bleeding, infection, device malpositioning, and device malfunction  Her bilateral generators are low lying in her chest, but she reports this is not bothersome to her and she wishes to keep them in the same location  She wishes to schedule the surgery greater than 6 weeks from now secondary to going on vacation for 1 month in [place]  Subjective:      Patient ID: Rain Guadarrama is a [de-identified] y o  female  HPI    She is now 6 years from placement of her bilateral deep brain stimulator electrodes into bilateral VIM thalamus with bilateral chest generators for her essential tremor  She has had complete resolution of her head and upper extremity tremors    She has stopped all her medications for tremor following the procedure 6 years ago   She is very pleased with her results  She is on very low settings  She is presenting at end of service of the right-sided chest generator  She is followed by Dr David Herrera  The following portions of the patient's history were reviewed and updated as appropriate: allergies, current medications, past family history, past medical history, past social history and past surgical history  Review of Systems   Constitutional: Negative for chills, fatigue and fever  HENT: Negative  Eyes: Negative for pain and visual disturbance  Respiratory: Negative for cough, shortness of breath and wheezing  Cardiovascular: Negative for chest pain and palpitations  Gastrointestinal: Negative for abdominal pain and nausea  Genitourinary: Negative for difficulty urinating  Musculoskeletal: Positive for gait problem  Negative for arthralgias, back pain, neck pain and neck stiffness  Neurological: Negative for dizziness, speech difficulty, weakness, numbness and headaches  Objective:      /66 (BP Location: Left arm, Patient Position: Sitting, Cuff Size: Standard)   Pulse 80   Resp 16   Ht 5' 3" (1 6 m)   Wt 91 2 kg (201 lb)   BMI 35 61 kg/m²          Physical Exam   Constitutional: She is oriented to person, place, and time  She appears well-developed  HENT:   Head: Normocephalic  Eyes: Pupils are equal, round, and reactive to light  Neck: Normal range of motion  Pulmonary/Chest: Effort normal    Musculoskeletal: Normal range of motion  Neurological: She is alert and oriented to person, place, and time  She has normal strength  No sensory deficit  Incisions well healed bilateral chest, low lying incision and generators           Results:   Right-sided ViM   ANTWON 2 54  C+ 2- at 1 7/90/130    Left ViM at service 2 75  C+2- at 1 2/90/130

## 2018-10-17 ENCOUNTER — TELEPHONE (OUTPATIENT)
Dept: NEUROSURGERY | Facility: CLINIC | Age: 78
End: 2018-10-17

## 2018-10-17 NOTE — TELEPHONE ENCOUNTER
Received call from [facility] labs reporting the patients PT 13 2 - INR 1 1 - results also faxed and will be sent to [facility]

## 2018-11-23 ENCOUNTER — TELEPHONE (OUTPATIENT)
Dept: NEUROLOGY | Facility: CLINIC | Age: 78
End: 2018-11-23

## 2018-11-23 NOTE — TELEPHONE ENCOUNTER
Amina Barroso patient*    FYI: Pt's daughter, Edd Post, calls to report the following symptoms:  Facial drooping on the right side  Left side lips feel heavy  Bottom jaw feels numb  Trouble talking    Symptoms started this morning  Pt is currently in [place] with family  Daughter is questioning if this could be from her DBS battery is dead  Pt's daughter made aware that she should have pt immediately go to the ED since these are stroke like symptoms

## 2018-11-26 ENCOUNTER — TELEPHONE (OUTPATIENT)
Dept: FAMILY MEDICINE CLINIC | Facility: CLINIC | Age: 78
End: 2018-11-26

## 2018-11-26 PROBLEM — I63.9 CVA (CEREBRAL VASCULAR ACCIDENT) (HCC): Status: ACTIVE | Noted: 2017-10-30

## 2018-11-26 PROBLEM — R47.81 SLURRED SPEECH: Status: ACTIVE | Noted: 2018-11-23

## 2018-11-26 NOTE — TELEPHONE ENCOUNTER
Patient's daughter stated patient had a stroke on Friday and was admitted for 24 hours  and now has to go back for a f/u at [facility] within 7 days  (does not have an appt yet)    She is requesting Dr Briana Huber place an order for patient's batteries to be changed when she's there for her f/u  She isn't sure who would be changing it but wanted to contact our office to see if Dr Briana Huber would be agreeable to entering order for battery change  She's keeping the patient there at [facility] because she's not comfortable with having the patient be in the car for 7 hours and driving her home  Sania Vargas, [phone]   She is requesting a call back

## 2018-11-26 NOTE — TELEPHONE ENCOUNTER
Pt is at her daughters in [place]  She had a stroke Fri and was in hospital down there  She is home with her daughter now and will be staying there a while  She will have records sent to you

## 2018-11-26 NOTE — TELEPHONE ENCOUNTER
I did review records through Tim, she should have visit with me soon after returns to [facility]

## 2018-11-27 ENCOUNTER — TELEPHONE (OUTPATIENT)
Dept: NEUROSURGERY | Facility: CLINIC | Age: 78
End: 2018-11-27

## 2018-11-27 NOTE — TELEPHONE ENCOUNTER
Please inform daughter changing out the battery is a surgical procedure so they would that be able to just do it at a follow up appointment with a script from me  She would need to be evaluated by a neurosurgeon who performs these surgeries and then be scheduled  How long does she plan on keeping her down in [address] ? If she wishes to have battery replacement down there we can contact the [employer] rep to see who the closest person who does IPG replacements is and have her seen by them but I am not sure on the time frame as to how long it would take to get done  If she plans on coming back in the next few weeks we can reach out to neurosurgery to get her the schedule here as her sx for 11/30 was cancelled

## 2018-11-27 NOTE — TELEPHONE ENCOUNTER
----- Message from Alvy Aase, MA sent at 11/27/2018  9:29 AM EST -----  Regarding: FW: RESCHEDULE SURGERY/APPT WITH STEF  Called patient to reschedule surgery date with Dr Karen Thomas for DBS IPG change  Spoke to Son in law  Patient has had a stroke on Friday the 23rd and will not be returning from [place]  They asked is all records could be sent and what type of doctor would do this procedure  I told him Neurosurgery and that as soon as they found a doc down there we could send the records as a continuation of care        ----- Message -----  From: Alvy Aase, MA  Sent: 11/21/2018  12:01 PM  To: Alvy Aase, MA  Subject: RESCHEDULE SURGERY/APPT WITH STEF               PATIENT WILL BE BACK FROM [facility] ON TUES 11/27/18  SHE ASKED THAT I CALL HER BACK TO DISCUSS RESCHEDULING SX AND APPT WITH NEW DOC

## 2018-11-27 NOTE — TELEPHONE ENCOUNTER
Daughter states she is not sure when if/when the patient will be coming back to [place]  Daughter states the patient is still a little confused and is leaving decisions up to the daughter  Daughter stated she would like to have a neurosurgeon set up in [place] to change battery  Daughter states she will also look into a neurosurgeon in the area, and if she finds one she will call us back

## 2018-11-28 NOTE — TELEPHONE ENCOUNTER
Can we reach out to Kameron with [employer] to see if he could help her daughter find a neurosurgeon near them that can replace the battery? Thanks!

## 2018-11-29 NOTE — TELEPHONE ENCOUNTER
Spoke to Lewis yesterday and provided him with Daughters phone number he will contact patients daughter with information      Thank you

## 2018-12-01 ENCOUNTER — TELEPHONE (OUTPATIENT)
Dept: FAMILY MEDICINE CLINIC | Facility: CLINIC | Age: 78
End: 2018-12-01

## 2018-12-01 NOTE — TELEPHONE ENCOUNTER
Daughter called demanding I make appt for pt Monday since she will be at work Monday and might not get a chance  To call herself-pt had stroke in [place] while visiting family over Thanksgiving   Apologized that I was unable to do this for her as I was not near a computer and even if I was I cannot schedule patients on another doctor's schedule

## 2018-12-02 RX ORDER — CIPROFLOXACIN 500 MG/1
TABLET, FILM COATED ORAL
Refills: 0 | COMMUNITY
Start: 2018-11-24 | End: 2018-12-03 | Stop reason: SINTOL

## 2018-12-02 RX ORDER — VANCOMYCIN HYDROCHLORIDE 125 MG/1
CAPSULE ORAL
Refills: 0 | COMMUNITY
Start: 2018-11-27 | End: 2018-11-27

## 2018-12-02 RX ORDER — CIPROFLOXACIN 500 MG/1
500 TABLET, FILM COATED ORAL
COMMUNITY
Start: 2018-11-24 | End: 2018-12-03 | Stop reason: SINTOL

## 2018-12-03 ENCOUNTER — OFFICE VISIT (OUTPATIENT)
Dept: NEUROSURGERY | Facility: CLINIC | Age: 78
End: 2018-12-03
Payer: MEDICARE

## 2018-12-03 ENCOUNTER — OFFICE VISIT (OUTPATIENT)
Dept: FAMILY MEDICINE CLINIC | Facility: CLINIC | Age: 78
End: 2018-12-03
Payer: MEDICARE

## 2018-12-03 VITALS
OXYGEN SATURATION: 94 % | SYSTOLIC BLOOD PRESSURE: 130 MMHG | BODY MASS INDEX: 33.77 KG/M2 | DIASTOLIC BLOOD PRESSURE: 60 MMHG | TEMPERATURE: 97.6 F | HEIGHT: 63 IN | WEIGHT: 190.6 LBS | HEART RATE: 66 BPM

## 2018-12-03 VITALS
BODY MASS INDEX: 33.66 KG/M2 | RESPIRATION RATE: 16 BRPM | TEMPERATURE: 98.6 F | SYSTOLIC BLOOD PRESSURE: 116 MMHG | DIASTOLIC BLOOD PRESSURE: 73 MMHG | HEIGHT: 63 IN | HEART RATE: 80 BPM | WEIGHT: 190 LBS

## 2018-12-03 DIAGNOSIS — E03.9 ACQUIRED HYPOTHYROIDISM: ICD-10-CM

## 2018-12-03 DIAGNOSIS — I63.9 CEREBROVASCULAR ACCIDENT (CVA), UNSPECIFIED MECHANISM (HCC): ICD-10-CM

## 2018-12-03 DIAGNOSIS — R47.81 SLURRED SPEECH: Primary | ICD-10-CM

## 2018-12-03 DIAGNOSIS — E11.9 TYPE 2 DIABETES MELLITUS WITHOUT COMPLICATION, WITHOUT LONG-TERM CURRENT USE OF INSULIN (HCC): ICD-10-CM

## 2018-12-03 DIAGNOSIS — G25.0 BENIGN ESSENTIAL TREMOR: Primary | ICD-10-CM

## 2018-12-03 DIAGNOSIS — Z45.42 END OF BATTERY LIFE OF DEEP BRAIN STIMULATOR: ICD-10-CM

## 2018-12-03 DIAGNOSIS — I35.0 AORTIC VALVE STENOSIS, ETIOLOGY OF CARDIAC VALVE DISEASE UNSPECIFIED: ICD-10-CM

## 2018-12-03 DIAGNOSIS — J44.9 CHRONIC OBSTRUCTIVE PULMONARY DISEASE, UNSPECIFIED COPD TYPE (HCC): ICD-10-CM

## 2018-12-03 DIAGNOSIS — I10 ESSENTIAL HYPERTENSION: ICD-10-CM

## 2018-12-03 DIAGNOSIS — G25.0 BENIGN FAMILIAL TREMOR: ICD-10-CM

## 2018-12-03 DIAGNOSIS — R26.9 NEUROLOGIC GAIT DYSFUNCTION: ICD-10-CM

## 2018-12-03 PROBLEM — R31.29 MICROSCOPIC HEMATURIA: Status: ACTIVE | Noted: 2018-12-03

## 2018-12-03 PROCEDURE — 99214 OFFICE O/P EST MOD 30 MIN: CPT | Performed by: NEUROLOGICAL SURGERY

## 2018-12-03 PROCEDURE — 99215 OFFICE O/P EST HI 40 MIN: CPT | Performed by: FAMILY MEDICINE

## 2018-12-03 RX ORDER — CEFAZOLIN SODIUM 2 G/50ML
2000 SOLUTION INTRAVENOUS ONCE
Status: CANCELLED | OUTPATIENT
Start: 2018-12-03 | End: 2018-12-03

## 2018-12-03 RX ORDER — CHLORHEXIDINE GLUCONATE 0.12 MG/ML
15 RINSE ORAL ONCE
Status: CANCELLED | OUTPATIENT
Start: 2018-12-03 | End: 2018-12-03

## 2018-12-03 NOTE — PROGRESS NOTES
FAMILY PRACTICE OFFICE VISIT  [address]  [address], [address]      NAME: Karen Perales  AGE: [de-identified] y o  SEX: female  : 1940   MRN: [id number]    DATE: 12/3/2018  TIME: 12:43 PM    Assessment and Plan     Problem List Items Addressed This Visit        Unprioritized    Acquired hypothyroidism    Aortic valve stenosis    Relevant Orders    Holter monitor - 48 hour    Benign familial tremor    COPD (chronic obstructive pulmonary disease) ([facility])    CVA (cerebral vascular accident) ([facility] [address] )    Relevant Orders    Ambulatory referral to Speech Therapy    Ambulatory referral to Neurology    Holter monitor - 48 hour    Ambulatory referral to Physical Therapy    End of battery life of deep brain stimulator    Relevant Orders    Ambulatory referral to Neurology    Essential hypertension    Slurred speech - Primary    Relevant Orders    Ambulatory referral to Speech Therapy    Ambulatory referral to Neurology    Type 2 diabetes mellitus without complication, without long-term current use of insulin ([facility] [address] )      Other Visit Diagnoses     Neurologic gait dysfunction        Relevant Orders    Ambulatory referral to Physical Therapy          Patient Instructions   We reviewed her hospital stay in  [place]  - , with slurred speech, mild right oral droop -  does appear she had clinical CVA, no acute finding on CT scan, unable to do MRI    I would like her to do speech therapy along with physical therapy  She is improving, does have mild dysphagia, consider swallowing study  To rule out atrial fibrillation I would like her to do 48 hr Holter monitor  Stay on aspirin 81 mg daily along with other medication  We reviewed inpatient echocardiogram showed moderate aortic stenosis, redo echocardiogram 1 year  Carotid ultrasound less than 50% bilateral stenosis  EKG with no acute finding    She is not on statin due to myalgias  Inpatient cholesterol 171 with HDL 40, LDL 90, triglyceride 2 8  Inpatient A1c 6 4, is watching her diet more closely, has dropped 10 lb, continue to monitor, off medication  Blood pressure is controlled  Inpatient BUN/creatinine 15/0 7 with potassium 4 1  Redo urinalysis in the future regarding mild protein/red blood cells  Respiratory status at baseline, continue with nebulizer at least once daily, stay on inhaler  She will use other medication as is  She was treated with Cipro for possible UTI, that caused diarrhea, stay off of that  Fortunately bowels returned to baseline  She will be setting up appointment with Neurosurgeon regarding brain implant/battery change  I would like her to also move up appointment with Neurology  Has January 7th appointment with me, keep appointment  Chief Complaint     Chief Complaint   Patient presents with    Follow-up     hospital f/up TIA        History of Present Illness   Nj Guadarrama is a [de-identified]y o -year-old female who is in today to evaluate after hospital stay in [facility]  She presented to emergency room November 23rd with right facial droop/slurred speech  ( had been fine on TGiving-   Next AM at breakfast daughter asked what was wrong w speech/ right lip drooped )   No dizziness/ chest pain, inc SOB, palps  No weakness arms/ legs  Inpatient blood work unrevealing, CT scan performed x2, chronic microvascular changes but no acute finding  MRI of head was not performed due to prior implants  Carotids less than 50% bilateral on ultrasound, EKG with no acute finding, chest x-ray with no acute finding  She did undergo echocardiogram which showed moderate AS  ( prev mild 2016)  EF wnl  She was d/cd the next day 'as I really wanted to go home "  Has now retd to area -   Relates '50 % ' better re speech -   No dysphagia w liquids, does note some diff swallowing solids    Appetite good -    Is down 10 lbs vs August-   Has been limiting carbs -   Home sugars not being checked often -    ' was 6'   ( in pt A1C 6 4)    At d/c she received prescription for Cipro, quickly developed loose stools with this, presented to an urgent care, Cipro was stopped, she received a prescription for vancomycin but did not use it, bowels are back to baseline, well-formed, q day  No dysuria  Is in process setting up w NS for f/up/ battery change  Got out to mall w family other day  Her breathing has been as at baseline-   Uses neb once a day    On all other meds -      Review of Systems   Review of Systems   Constitutional: Positive for fatigue  Negative for activity change, appetite change and fever  HENT: Positive for drooling and trouble swallowing  Negative for congestion, ear pain, nosebleeds and sore throat  Eyes: Negative for visual disturbance  Respiratory: Negative for chest tightness  No increased shortness of breath or wheezing, does have COPD, uses nebulizer once daily   Cardiovascular: Negative for chest pain, palpitations and leg swelling  Gastrointestinal: Negative for abdominal pain, blood in stool, nausea and vomiting  No acid reflux        Genitourinary: Negative for dysuria and hematuria  Musculoskeletal: Positive for gait problem  Skin: Negative for rash and wound  Neurological: Positive for tremors, speech difficulty, light-headedness and headaches (Had headache inpatient)  Negative for dizziness, seizures and syncope  Hematological: Bruises/bleeds easily  Psychiatric/Behavioral: Negative for behavioral problems and confusion         Active Problem List     Patient Active Problem List   Diagnosis    Essential hypertension    Benign familial tremor    [facility]cere[facility] accident) [facility] [address] )    COPD (chronic obstructive pulmonary disease) ([facility] [address] )    Edema    Esophageal reflux    Gait disturbance    Hypercholesterolemia    Sleep disorder    Sciatica    Right shoulder pain    Osteoarthritis of hip    OA (osteoarthritis)    Multiple joint pain    Imbalance    Acquired hypothyroidism    Lumbar degenerative disc disease    Low back pain    Type 2 diabetes mellitus without complication, without long-term current use of insulin (HCC)    Pancreatitis    Aortic valve stenosis    Benign essential tremor    Slurred speech    End of battery life of deep brain stimulator    Microscopic hematuria       Past Medical History:  Past Medical History:   Diagnosis Date    Arthritis     Asthma     Benign neoplasm of large intestine     Cellulitis of leg, right     Closed compression fracture of body of lumbar vertebra (HCC)     L5    COPD [facility]ch[facility] obstructive [facility] disease) ([facility])     Difficulty waking     post anesthesia    Disease of thyroid gland     High cholesterol     Hypertension     Osteopenia     Osteoporosis     Shortness of breath     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     Urinary leakage     Vitamin D deficiency     Wears glasses     Wears partial dentures     upper       Past Surgical History:  Past Surgical History:   Procedure Laterality Date    CATARACT EXTRACTION W/  INTRAOCULAR LENS IMPLANT Bilateral     COLONOSCOPY      DEEP BRAIN STIMULATOR PLACEMENT      KNEE ARTHROSCOPY      [place] TOTAL HIP ARTHROPLASTY Left 5/20/2016    Procedure: ARTHROPLASTY HIP TOTAL ANTERIOR;  Surgeon: Kurt Tan MD;  Location: [facility] OR;  Service: Orthopedics    ROTATOR CUFF REPAIR         Family History:  Family History   Problem Relation Age of Onset    Breast cancer Mother     Throat cancer Family        Social History:  Social History     Social History    Marital status:      Spouse name: N/A    Number of children: N/A    Years of education: N/A     Occupational History    Not on file       Social History Main Topics    Smoking status: Former Smoker     Quit date: 2013    Smokeless tobacco: Never Used    Alcohol use Yes      Comment: 2 a month    Drug use: No    Sexual activity: Not on file     Other Topics Concern    Not on file     Social History Narrative    Caffeine use    Living independently               Objective     Vitals:    12/03/18 0948   BP: 130/60   BP Location: Left arm   Patient Position: Sitting   Cuff Size: Large   Pulse: 66   Temp: 97 6 °F (36 4 °C)   SpO2: 94%   Weight: 86 5 kg (190 lb 9 6 oz)   Height: 5' 3" (1 6 m)     Body mass index is 33 76 kg/m²  BP Readings from Last 3 Encounters:   12/03/18 130/60   10/15/18 148/66   09/27/18 146/68       Wt Readings from Last 3 Encounters:   12/03/18 86 5 kg (190 lb 9 6 oz)   10/15/18 88 9 kg (196 lb)   09/27/18 90 7 kg (200 lb)       Physical Exam   Constitutional:   Pleasant female seated on table, no acute distress although noticeable thickened speech with mild right oral droop  She does arise from chair, places self on table as at baseline  Has cane w her -  4 point -  Also has rollator walker at home  HENT:   Right Ear: External ear normal    Left Ear: External ear normal    Mouth/Throat: No oropharyngeal exudate  Eyes: Pupils are equal, round, and reactive to light  Conjunctivae and EOM are normal  No scleral icterus  Neck: Neck supple  Cardiovascular:   Regular rate and rhythm, 2/6 systolic ejection murmur right 2nd intercostal space, no carotid bruit   Pulmonary/Chest:   Decreased breath sounds bilateral but clear without rhonchi, rales, wheeze   Abdominal: Soft  There is no tenderness  There is no guarding  Musculoskeletal: She exhibits no edema  Lymphadenopathy:     She has no cervical adenopathy  Neurological:   Speech thickened w mild right oral droop  No weakness arms/ legs  Able to close eyes b/l -  Symmetric -    Sensation facial intact  A&O x 3, 3/3 recall    DLROW    Serial 3s intact  Skin:   No wound   Psychiatric: She has a normal mood and affect   Her behavior is normal  Thought content normal        ALLERGIES:  Allergies   Allergen Reactions    Ciprofloxacin Diarrhea    Simvastatin Myalgia    Advair Diskus [Fluticasone-Salmeterol] Palpitations    Tetracycline Rash     Reaction Date: [id number];        Current Medications     Current Outpatient Prescriptions   Medication Sig Dispense Refill    albuterol (2 5 mg/3 mL) 0 083 % nebulizer solution Take 2 5 mg by nebulization as needed for wheezing   aspirin 81 MG tablet Take 1 tablet by mouth daily      Cholecalciferol (VITAMIN D-3 PO) Take 1,000 Units by mouth daily   diltiazem (CARDIZEM CD) 240 mg 24 hr capsule TAKE 1 CAPSULE DAILY 30 capsule 4    Fluticasone Propionate, Inhal, (FLOVENT DISKUS) 250 MCG/BLIST AEPB Inhale 1 puff daily      furosemide (LASIX) 20 mg tablet Take 1 tablet (20 mg total) by mouth daily 30 tablet 3    levothyroxine 125 mcg tablet 1 TAB BY MOUTH EVERY DAY 30 tablet 4    Multiple Vitamins-Minerals (PRESERVISION AREDS PO) Take 1 Cap by Mouth daily      multivitamin (THERAGRAN) TABS Take 1 tablet by mouth daily      omeprazole (PriLOSEC) 20 mg delayed release capsule Take 1 capsule by mouth daily as needed      Blood Glucose Monitoring Suppl (ONE TOUCH ULTRA 2) w/Device KIT Obtain kit and check up to daily as discussed 1 each 0    ONE TOUCH LANCETS MISC by Does not apply route daily 100 each 3     No current facility-administered medications for this visit                Most recent labs available from [address] [place] [address]   ( others may be available in Care Everywhere / Media sections)  Lab Results   Component Value Date    WBC 10 08 06/16/2017    HGB 14 3 06/16/2017    HCT 44 6 06/16/2017     06/16/2017    CHOL 176 10/29/2014    TRIG 131 10/29/2014    HDL 47 04/12/2016    ALT 32 06/16/2017    AST 25 06/16/2017     10/29/2014    K 4 0 08/09/2018     08/09/2018    CREATININE 0 81 08/09/2018    BUN 25 08/09/2018    CO2 31 08/09/2018    INR 0 96 05/10/2016    GLUF 86 06/16/2017    HGBA1C 6 8 06/25/2018     Lab Results   Component Value Date    [facility] 10/29/2014     Lab Results   Component Value Date    LOV0RKPVLMUE 2 130 06/16/2017         Orders Placed This Encounter   Procedures    Ambulatory referral to [address] [place] [address] Ambulatory referral to Neurology    Ambulatory referral to Physical Therapy    Holter monitor - 48 hour         Sanjeev Bryant DO

## 2018-12-03 NOTE — PROGRESS NOTES
Assessment/Plan:    No problem-specific Assessment & Plan notes found for this encounter  OR for replacement of DBS generator     Diagnoses and all orders for this visit:    Benign essential tremor  -     Case request operating room: REPLACEMENT IMPLANTABLE PULSE GENERATOR (IPG) DEEP BRAIN STIMULATION (DBS), RIGHT; Standing  -     Ambulatory referral to [facility]; Future  -     Cancel: Case request operating room: REPLACEMENT IMPLANTABLE PULSE GENERATOR (IPG) DEEP BRAIN STIMULATION (DBS), RIGHT    Other orders  -     Diet NPO; Sips with meds; Standing  -     Void on call to OR; Standing  -     Insert peripheral IV; Standing  -     Nursing Communication Use 2 CHG cloths, have the patient wash his/her body from the neck down or have staff wash entire body (from neck down) if patient is unable; Standing  -     chlorhexidine (PERIDEX) 0 12 % oral rinse 15 mL; Swish and spit 15 mL once   -     ceFAZolin (ANCEF) IVPB (premix) 2,000 mg; Infuse 2,000 mg into a venous catheter once           Subjective:      Patient ID: Toni Farias is a [de-identified] y o  female  [de-identified]year old female with essential tremor and bilateral deep brain stimulator implants  She is nearing end of life on her right DBS generator  She had a recent stroke and is on ASA  She reports no significant deficits from the stroke that happened last month  She did have a recent UTI  She otherwise has no major issues recently  She wished to have her generator replaced as her therapy is still effective  The following portions of the patient's history were reviewed and updated as appropriate:   She  has a past medical history of Arthritis; Asthma; Benign neoplasm of large intestine; Cellulitis of leg, right; Closed compression fracture of body of lumbar vertebra (HCC); COPD (chronic obstructive pulmonary disease) ([facility] [address] ); Difficulty waking; Disease of thyroid gland; High cholesterol; Hypertension; Osteopenia; Osteoporosis;  Shortness of breath; 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; Urinary leakage; Vitamin D deficiency; Wears glasses; and Wears partial dentures  She   Patient Active Problem List    Diagnosis Date Noted    End of battery life of deep brain stimulator 12/03/2018    Microscopic hematuria 12/03/2018    Slurred speech 11/23/2018    Benign essential tremor 10/15/2018    Aortic valve stenosis 08/27/2018    Pancreatitis 07/13/2018    Lumbar degenerative disc disease 06/25/2018    Low back pain 06/25/2018    Type 2 diabetes mellitus without complication, without long-term current use of insulin ([facility] [address] ) 06/25/2018    CVA (cerebral vascular accident) ([facility] [address] ) 10/30/2017    Gait disturbance 10/30/2017    Imbalance 10/23/2017    Essential hypertension 05/21/2016    Osteoarthritis of hip 04/12/2016    COPD (chronic obstructive pulmonary disease) ([facility] [address] ) 12/21/2015    Right shoulder pain 10/28/2014    Sciatica 06/23/2014    Esophageal reflux 04/15/2013    OA (osteoarthritis) 04/15/2013    Multiple joint pain 04/15/2013    Edema 10/16/2012    Sleep disorder 07/20/2012    Benign familial tremor 06/05/2012    Hypercholesterolemia 05/18/2012    Acquired hypothyroidism 05/18/2012     She  has a past surgical history that includes Cataract extraction w/  intraocular lens implant (Bilateral); Deep brain stimulator placement; pr total hip arthroplasty (Left, 5/20/2016); Colonoscopy; Knee arthroscopy; and Rotator cuff repair  Her family history includes Breast cancer in her mother; Throat cancer in her family  She  reports that she quit smoking about 5 years ago  She has never used smokeless tobacco  She reports that she drinks alcohol  She reports that she does not use drugs  Current Outpatient Prescriptions on File Prior to Visit   Medication Sig    albuterol (2 5 mg/3 mL) 0 083 % nebulizer solution Take 2 5 mg by nebulization as needed for wheezing      aspirin 81 MG tablet Take 1 tablet by mouth daily    Cholecalciferol (VITAMIN D-3 PO) Take 1,000 Units by mouth daily   diltiazem (CARDIZEM CD) 240 mg 24 hr capsule TAKE 1 CAPSULE DAILY    Fluticasone Propionate, Inhal, (FLOVENT DISKUS) 250 MCG/BLIST AEPB Inhale 1 puff daily as needed      furosemide (LASIX) 20 mg tablet Take 1 tablet (20 mg total) by mouth daily    levothyroxine 125 mcg tablet 1 TAB BY MOUTH EVERY DAY    Multiple Vitamins-Minerals (PRESERVISION AREDS PO) Take 1 Cap by Mouth daily    multivitamin (THERAGRAN) TABS Take 1 tablet by mouth daily    omeprazole (PriLOSEC) 20 mg delayed release capsule Take 1 capsule by mouth daily as needed     No current facility-administered medications on file prior to visit       Review of Systems   Constitutional: Negative for chills, fatigue and fever  HENT: Negative  Eyes: Negative for pain and visual disturbance  Respiratory: Negative for cough, shortness of breath and wheezing  Cardiovascular: Negative for chest pain and palpitations  Gastrointestinal: Negative for abdominal pain and nausea  Genitourinary: Negative for difficulty urinating  Musculoskeletal: Positive for gait problem  Negative for arthralgias, back pain, neck pain and neck stiffness  Neurological: Positive for tremors  Negative for dizziness, speech difficulty, weakness, numbness and headaches  Objective:      /73 (BP Location: Left arm, Patient Position: Sitting, Cuff Size: Standard)   Pulse 80   Temp 98 6 °F (37 °C) (Tympanic)   Resp 16   Ht 5' 3" (1 6 m)   Wt 86 2 kg (190 lb)   BMI 33 66 kg/m²          Physical Exam   Constitutional: She is oriented to person, place, and time  She appears well-developed  HENT:   Head: Normocephalic  Eyes: Pupils are equal, round, and reactive to light  EOM are normal    Neck: Normal range of motion  Cardiovascular: Normal rate  Pulmonary/Chest: Effort normal    Neurological: She is alert and oriented to person, place, and time

## 2018-12-03 NOTE — PATIENT INSTRUCTIONS
We reviewed her hospital stay in  [place] November 23rd - 24th, with slurred speech, mild right oral droop -  does appear she had clinical CVA, no acute finding on CT scan, unable to do MRI    I would like her to do speech therapy along with physical therapy  She is improving, does have mild dysphagia, consider swallowing study  To rule out atrial fibrillation I would like her to do 48 hr Holter monitor  Stay on aspirin 81 mg daily along with other medication  We reviewed inpatient echocardiogram showed moderate aortic stenosis, redo echocardiogram 1 year  Carotid ultrasound less than 50% bilateral stenosis  EKG with no acute finding  She is not on statin due to myalgias  Inpatient cholesterol 171 with HDL 40, LDL 90, triglyceride 2 8  Inpatient A1c 6 4, is watching her diet more closely, has dropped 10 lb, continue to monitor, off medication  Blood pressure is controlled  Inpatient BUN/creatinine 15/0 7 with potassium 4 1  Redo urinalysis in the future regarding mild protein/red blood cells  Respiratory status at baseline, continue with nebulizer at least once daily, stay on inhaler  She will use other medication as is  She was treated with Cipro for possible UTI, that caused diarrhea, stay off of that  Fortunately bowels returned to baseline  She will be setting up appointment with Neurosurgeon regarding brain implant/battery change  I would like her to also move up appointment with Neurology  Has January 7th appointment with me, keep appointment

## 2018-12-05 ENCOUNTER — TELEPHONE (OUTPATIENT)
Dept: FAMILY MEDICINE CLINIC | Facility: CLINIC | Age: 78
End: 2018-12-05

## 2018-12-05 NOTE — TELEPHONE ENCOUNTER
She is scheduled to have the battery replaced in her Deep Brain Stimulator on Fri  Asking if you would be able to clear her considering battery is completely dead?

## 2018-12-06 ENCOUNTER — TELEPHONE (OUTPATIENT)
Dept: NEUROSURGERY | Facility: CLINIC | Age: 78
End: 2018-12-06

## 2018-12-06 ENCOUNTER — DOCUMENTATION (OUTPATIENT)
Dept: NEUROSURGERY | Facility: CLINIC | Age: 78
End: 2018-12-06

## 2018-12-06 DIAGNOSIS — N39.0 URINARY TRACT INFECTION WITHOUT HEMATURIA, SITE UNSPECIFIED: ICD-10-CM

## 2018-12-06 DIAGNOSIS — N39.0 URINARY TRACT INFECTION WITHOUT HEMATURIA, SITE UNSPECIFIED: Primary | ICD-10-CM

## 2018-12-06 RX ORDER — SULFAMETHOXAZOLE AND TRIMETHOPRIM 800; 160 MG/1; MG/1
1 TABLET ORAL EVERY 12 HOURS SCHEDULED
Qty: 14 TABLET | Refills: 0 | Status: SHIPPED | OUTPATIENT
Start: 2018-12-06 | End: 2018-12-12 | Stop reason: SDUPTHER

## 2018-12-06 RX ORDER — SULFAMETHOXAZOLE AND TRIMETHOPRIM 800; 160 MG/1; MG/1
1 TABLET ORAL EVERY 12 HOURS SCHEDULED
Qty: 14 TABLET | Refills: 0 | Status: SHIPPED | OUTPATIENT
Start: 2018-12-06 | End: 2018-12-06 | Stop reason: SDUPTHER

## 2018-12-06 NOTE — PROGRESS NOTES
[place] and [place]  websites were searched for patient that is scheduled for surgery tomorrow  No current record exists

## 2018-12-06 NOTE — TELEPHONE ENCOUNTER
Surgery cancelled for 12/7/2018  Multiple risk after discussion w/ Dr Carlitos Maxwell  UTI treatment just ordered today  Never stopped ASA took dose today   HO recent CVA --late December -early November , need consensus from provider if safe to stop ASA for 7 days before surgery and 7 days after surgery  Will reschedule   Patient expresses concern secondary to low battery DBS for tremor ---  Patient verbalized a d understanding need to start ABX tonight and continue q 12 hours and why surgery cancelled

## 2018-12-07 ENCOUNTER — TELEPHONE (OUTPATIENT)
Dept: NEUROSURGERY | Facility: CLINIC | Age: 78
End: 2018-12-07

## 2018-12-07 ENCOUNTER — TELEPHONE (OUTPATIENT)
Dept: FAMILY MEDICINE CLINIC | Facility: CLINIC | Age: 78
End: 2018-12-07

## 2018-12-07 DIAGNOSIS — Z00.00 PREVENTATIVE HEALTH CARE: Primary | ICD-10-CM

## 2018-12-07 DIAGNOSIS — N39.0 ACUTE UTI: ICD-10-CM

## 2018-12-07 RX ORDER — LACTOBACILLUS ACIDOPH-L.BULGARICUS 1 MILLION CELL CHEWABLE TABLET 1MM CELL
TABLET,CHEWABLE ORAL
Qty: 30 TABLET | Refills: 0 | Status: SHIPPED | OUTPATIENT
Start: 2018-12-07 | End: 2019-01-03 | Stop reason: ALTCHOICE

## 2018-12-07 NOTE — TELEPHONE ENCOUNTER
Telephoned daughter ---informed patient, her mother rescheduled for surgery on 12/13 2018  I will call patient as well per daughter request --called x 2 cannot reach -will try again later  Rich Loya reports Dr Curran--scheduled appointment for 12/12 Wednesday  She is to take patient to emergency room immediately if she develops uncontrolled tremor  I will e-mail Dr Jn Wheatley with new surgery date

## 2018-12-07 NOTE — TELEPHONE ENCOUNTER
Finally reached patient stop ASA last dose today ,none  tomorrow , off until after surgery  Surgery on Thursday 12/13 ----restart ASA 7 days postoperatively  I will call her days before surgery

## 2018-12-07 NOTE — TELEPHONE ENCOUNTER
Received evonne for call center to speak w/ daughter Carol Ty as to why patients surgery was cancelled today  I spoke with patient late yesterday , early evening , explained reasons  The following information is explained to daughter;   Multiple Risk factors  For increased mortality in an [de-identified] YO F to proceed with surgery today  Acute UTI treatment started w/ Septra DS --yesterday  Patient did not stop ASA 5-7 days preoperatively , took dose yesterday ( Thursday)   Patient is a recent stroke (per daughter day after thanksgiving) need documented consensus from PCP when it is appropriate to start holding ASA for 5 - 7 days preoperatively  to proceed with DBS/IPG replacement  As per documentation 12/5 Dr Mary Ann Blevins "She is medically cleared from my standpoint to undergo this -  Uses Aspirin, can hold if needed"    Daughter questioned if patient should be on acidophilus while taking Septra sos she dose not get CDIFF  Questioned daughter if patient has a history of CDIFF -she replied no but that she always takes acidophilus when she is on septra as prophylaxis against Cdiff---reports she, herself has never has cdiff  Daughter reports the IPG is completely dead now , questions what she should do if patient starts with severe uncontrolled tremors  She is advised to contact neurologist today and advise that IPG has reached EOL and patient need alternative management of essential tremor  Sent in-box communication to Dr Loreli Denver and her assistant [place] Handler  Daughter also request to be added as contact because she heard GLADIS call dictating care for patient   Verified she is the emergency contact and will be the only person will be contacted and receive information  Telephoned patient --directed and explained restart ASA now, acidophils ordered , will reschedule surgery, patient verbalized an understanding , made aware spoke w/ daughter, and contacted to Dr Loreli Denver      Returned call to daughter informed of all the above ---she is satisfies and verbalized an understanding

## 2018-12-10 ENCOUNTER — TELEPHONE (OUTPATIENT)
Dept: NEUROLOGY | Facility: CLINIC | Age: 78
End: 2018-12-10

## 2018-12-10 NOTE — TELEPHONE ENCOUNTER
----- Message from MAGNOLIA Heranndez sent at 12/10/2018 10:08 AM EST -----  Regarding: RE: DBS IPG replacement  Thank u scheduled for DBS replacement IPG 12/13/2018   ----- Message -----  From: Morales Leblanc  Sent: 12/10/2018   7:51 AM  To: MAGNOLIA Hernandez  Subject: RE: DBS IPG replacement                          Patient is scheduled with Dr Jn Wheatley on 12/12/18 @ 2pm    ----- Message -----  From: Caleb Cleary  Sent: 12/7/2018   9:29 AM  To: Trinidad Nino MD, Morales Leblanc  Subject: DBS IPG replacement                              Please be advised surgery for today to replace DBS IPG cancelled secondary to     Multiple Risk factors  For increased mortality in an [de-identified] YO F to proceed with surgery today  Acute UTI treatment started w/ Septra DS --yesterday  Patient did not hold ASA 5-7 days preoperatively , took dose yesterday  Patient is a recent stroke (per daughter day after thanksgiving) need documented consensus from PCP when it is appropriate to start holding ASA for 5 - 7 days preoperatively  to proceed with DBS/IPG replacement  Daughter reports IPG has reached EOL , questions how to manage tremors if they become unbearable and uncontrolled  I advised her to contact your office and advise of need for alternative  management of essential tremor  [facility] emergency contact , can you please call her      Thank YUMIKO MERIDA

## 2018-12-12 ENCOUNTER — PROCEDURE VISIT (OUTPATIENT)
Dept: NEUROLOGY | Facility: CLINIC | Age: 78
End: 2018-12-12
Payer: MEDICARE

## 2018-12-12 ENCOUNTER — DOCUMENTATION (OUTPATIENT)
Dept: NEUROSURGERY | Facility: CLINIC | Age: 78
End: 2018-12-12

## 2018-12-12 ENCOUNTER — TELEPHONE (OUTPATIENT)
Dept: NEUROSURGERY | Facility: CLINIC | Age: 78
End: 2018-12-12

## 2018-12-12 VITALS
HEART RATE: 77 BPM | SYSTOLIC BLOOD PRESSURE: 139 MMHG | HEIGHT: 63 IN | DIASTOLIC BLOOD PRESSURE: 65 MMHG | WEIGHT: 188 LBS | BODY MASS INDEX: 33.31 KG/M2

## 2018-12-12 DIAGNOSIS — N39.0 URINARY TRACT INFECTION WITHOUT HEMATURIA, SITE UNSPECIFIED: ICD-10-CM

## 2018-12-12 DIAGNOSIS — G25.0 BENIGN FAMILIAL TREMOR: Primary | ICD-10-CM

## 2018-12-12 DIAGNOSIS — R47.81 SLURRED SPEECH: ICD-10-CM

## 2018-12-12 DIAGNOSIS — Z98.890 STATUS POST SURGERY: Primary | ICD-10-CM

## 2018-12-12 DIAGNOSIS — I63.9 CEREBROVASCULAR ACCIDENT (CVA), UNSPECIFIED MECHANISM (HCC): ICD-10-CM

## 2018-12-12 PROCEDURE — 99215 OFFICE O/P EST HI 40 MIN: CPT | Performed by: PSYCHIATRY & NEUROLOGY

## 2018-12-12 PROCEDURE — 95970 ALYS NPGT W/O PRGRMG: CPT | Performed by: PSYCHIATRY & NEUROLOGY

## 2018-12-12 RX ORDER — SULFAMETHOXAZOLE AND TRIMETHOPRIM 800; 160 MG/1; MG/1
1 TABLET ORAL EVERY 12 HOURS SCHEDULED
Qty: 14 TABLET | Refills: 0 | Status: SHIPPED | OUTPATIENT
Start: 2018-12-12 | End: 2021-05-20 | Stop reason: SDUPTHER

## 2018-12-12 RX ORDER — SULFAMETHOXAZOLE AND TRIMETHOPRIM 800; 160 MG/1; MG/1
1 TABLET ORAL EVERY 12 HOURS SCHEDULED
Qty: 6 TABLET | Refills: 0 | Status: SHIPPED | OUTPATIENT
Start: 2018-12-13 | End: 2018-12-12 | Stop reason: SDUPTHER

## 2018-12-12 NOTE — PROGRESS NOTES
Patient ID: Gage Cordoba is a [de-identified] y o  female  Assessment/Plan:    Benign familial tremor  Tremors have been well controlled for over 6 years on low settings  She now has breakthrough jaw tremor and mild left hand tremor  DBS interrogated:   Right SC 2 39, Implanted 6/2012  1 7V, PW90, Rate 130, C+2-  Left SC2  70  Implanted 6/2012    1 2V, PW90, Rate 130, C+2-    See attached clinical sheets for further details  IPG replacement is scheduled  Needs both IPG changed  CVA (cerebral vascular accident) ([facility] [address] )  History of recent CVA while in [facility] described as right facial weakness and slurred speech  Speech continues be slurred but facial weakness improved  History of sudden onset and symptoms consistent with CVA but not seen on CT head  Will obtain MRI brain to further evaluate  She is very claustrophobic so it needs to be done under anesthesia  Her right DBS battery is ANTWON but left is still functioning  This is unlikely to be related to facial weakness  Perhaps possible that return of jaw tremor contributes to slurred speech but less likely   If this is the case, it would improve after battery replacement  Aspirin being held for IPG replacement surgery at this time  She stopped her statin due to being unable to tolerate cramping in legs  Will need to discuss trying a new one when she returns after MRI  Counseled on better controlling her diabetes via diet  HgA1C 6 4  Diagnoses and all orders for this visit:    Benign familial tremor    Slurred speech  -     MRI brain without contrast; Future    Cerebrovascular accident (CVA), unspecified mechanism ([facility] [address] )  -     MRI brain without contrast; Future           Subjective: Gage Cordoba is a [de-identified]year old left handed female with ET with prominent mouth and bilateral hand tremor (L>R) who is s/p bilateral VIM DBS 6/14/12 with very good response who presents for follow up   To review, her diagnosis was changed from PD to ET, she was tapered off of Sinemet and started on primidone which was increased to 225mg qhs over time with improved sleep but little effect on tremor  Tapered off after surgery  She was down in [place] at her daughters house  The day after Thanksgiving she suddenly developed right lip drooping, there was facial weakness around her eye and her speech was slurred  She went to the ER  CT head was done twice but there was no signs of an acute stroke on imaging  On exam she appeared to have a stroke and therefore was treated as such  They had called interested in getting her DBS battery changed in [place] but daughter here had her come back to have it replaced  This has been delayed due to UTI which is being treated  She has been on Aspirin for years  This was not changed after the stroke  Carotids and Echo were performed  She was started on simvastatin but she stopped it due to leg cramps  She is scheduled for IPG replacement tomorrow  She has mild breakthough jaw tremor and tremor in left hand  No head tremor tremor  She is able to perform ADLs without difficulty  Writing is big and sloppy  Previously with moderate head and hand tremors which were prominent, interfering with function and bothersome  She has developed some imbalance, now using walker since the stroke  She fears falling  No falls  The following portions of the patient's history were reviewed and updated as appropriate: allergies, current medications, past family history, past medical history, past social history and past surgical history  Objective:    Blood pressure 139/65, pulse 77, height 5' 3" (1 6 m), weight 85 3 kg (188 lb)  Physical Exam   Constitutional: She appears well-developed  Eyes: Pupils are equal, round, and reactive to light  EOM are normal    Neurological: She has normal strength  Reflex Scores:       Tricep reflexes are 1+ on the right side and 1+ on the left side         Bicep reflexes are 1+ on the right side and 1+ on the left side  Patellar reflexes are 1+ on the right side and 1+ on the left side  Achilles reflexes are 1+ on the right side and 1+ on the left side  Vitals reviewed  Neurological Exam  Mental Status   Oriented to person, place, time and situation  Recent and remote memory are intact  Language is fluent with no aphasia  Attention and concentration are normal   Mild slurring of speech       Cranial Nerves  CN II: Visual fields full to confrontation  CN III, IV, VI: Extraocular movements intact bilaterally  Pupils equal round and reactive to light bilaterally  CN V: Facial sensation is normal   CN VII: Full and symmetric facial movement  Right: Very slight flattening of the right nasolabial fold  CN VIII: Hearing is normal   CN IX, X: Palate elevates symmetrically  CN XI: Shoulder shrug strength is normal   CN XII: Tongue midline without atrophy or fasciculations  Motor   Normal muscle tone  Strength is 5/5 throughout all four extremities  Sensory  Light touch is normal in upper and lower extremities  Reflexes                                           Right                      Left  Biceps                                 1+                         1+  Triceps                                1+                         1+  Finger flex                           1+                         1+  Patellar                                1+                         1+  Achilles                                1+                         1+  Plantar                           Downgoing                Downgoing    Coordination  Right: Finger-to-nose normal   Left: Finger-to-nose normal   Intermittent moderate jaw tremor  No rest tremor  No tremor on right FTN  Mild on left FTN       Gait  Casual gait is normal including stance, stride, and arm swing  Uses cane, more hesitant and slow without  ROS:    Review of Systems   Constitutional: Negative  Negative for appetite change and fever     HENT: Negative  Negative for hearing loss, tinnitus, trouble swallowing and voice change  Eyes: Negative  Negative for photophobia and pain  Respiratory: Positive for shortness of breath  Cardiovascular: Negative  Negative for palpitations  Gastrointestinal: Negative  Negative for nausea and vomiting  Endocrine: Negative  Negative for cold intolerance and heat intolerance  Genitourinary: Negative  Negative for dysuria, frequency and urgency  Musculoskeletal: Positive for gait problem  Negative for myalgias and neck pain  Skin: Negative  Negative for rash  Neurological: Positive for speech difficulty  Negative for dizziness, tremors, seizures, syncope, facial asymmetry, weakness, light-headedness, numbness and headaches  Positive for waking up at night, trouble falling asleep, balance problems, difficulty walking and slurred speech   Hematological: Negative  Does not bruise/bleed easily  Psychiatric/Behavioral: Negative  Negative for confusion, hallucinations and sleep disturbance

## 2018-12-12 NOTE — ASSESSMENT & PLAN NOTE
Tremors have been well controlled for over 6 years on low settings  She now has breakthrough jaw tremor and mild left hand tremor  DBS interrogated:   Right SC 2 39, Implanted 6/2012  1 7V, PW90, Rate 130, C+2-  Left SC2  70  Implanted 6/2012    1 2V, PW90, Rate 130, C+2-    See attached clinical sheets for further details  IPG replacement is scheduled  Needs both IPG changed

## 2018-12-12 NOTE — TELEPHONE ENCOUNTER
Pre operative call day prior surgery scheduled in the AM w/ Dr Alis Lundberg the following information is confirmed / discussed: REPLACEMENT RIGHT DBS IMPLANTABLE PULSE GENERATOR (Right Chest)  Spoke with daughter Clement Fuller  unable to reach patient   Allergies Reviewed with dgt   Hold medications reviewed: with patient last week ,no ASA hold 7 day preop and 7 days postop  NPO after MN, night prior surgery reviewed:w/dgt  Medication (s) instructed by healthcare provider to take the morning of surgery w/ sip of water 4 OZ discussed:--as per ASU nurse   Post operative scripts electronic transmission: none required --started ABX septra 12/12  X 7 days continue thru 12/19 2018 ----asked daughter please verify patient continues to take medication and thru 12/19 for UTI and now for surgery prophylaxis   PDMP site reviewed accessed and reviewed scheduled drug list printed and scanned into record--no record   Pain management script:non required will take EST every 4 - 6 hours   Pre- operative shower protocol reviewed; Clarify instructions as per protocol, third chlorhexidine shower tonight before surgery, then use MELISSA wipes as per packaging instructions, Use a clean towel and wash cloth starting tonight and continue nightly until seen 2 weeks post operative visit for staple removal --reinforced  Change bed linens tonight and continue at least 1-2 times weekly  --reinforced     Informed will receive a telephone call tonight from a hospital representative with time to report on surgery day: --1200   Informed will receive a f/u call within in 24 -48 hours post-op to assess recovery reinforce instructions, and to answer any questions  --reinforced   Follow-up appointments reviewed  2 week incision assessment   Patient/daughter  verbalized understanding information provided /discussed  Finally reached patient  ---on spoke w/ both on phone

## 2018-12-13 ENCOUNTER — HOSPITAL ENCOUNTER (OUTPATIENT)
Facility: HOSPITAL | Age: 78
Setting detail: OUTPATIENT SURGERY
Discharge: HOME | End: 2018-12-14
Attending: NEUROLOGICAL SURGERY | Admitting: NEUROLOGICAL SURGERY
Payer: MEDICARE

## 2018-12-13 ENCOUNTER — TELEPHONE (OUTPATIENT)
Dept: NEUROLOGY | Facility: CLINIC | Age: 78
End: 2018-12-13

## 2018-12-13 ENCOUNTER — ANESTHESIA EVENT (OUTPATIENT)
Dept: PERIOP | Facility: HOSPITAL | Age: 78
End: 2018-12-13
Payer: MEDICARE

## 2018-12-13 ENCOUNTER — ANESTHESIA (OUTPATIENT)
Dept: PERIOP | Facility: HOSPITAL | Age: 78
End: 2018-12-13
Payer: MEDICARE

## 2018-12-13 DIAGNOSIS — Z45.42 END OF BATTERY LIFE OF DEEP BRAIN STIMULATOR: ICD-10-CM

## 2018-12-13 DIAGNOSIS — G25.0 BENIGN FAMILIAL TREMOR: ICD-10-CM

## 2018-12-13 DIAGNOSIS — Z98.890 POST-OPERATIVE STATE: Primary | ICD-10-CM

## 2018-12-13 DIAGNOSIS — N39.0 URINARY TRACT INFECTION WITHOUT HEMATURIA, SITE UNSPECIFIED: ICD-10-CM

## 2018-12-13 LAB
GLUCOSE SERPL-MCNC: 77 MG/DL (ref 65–140)
PLATELET # BLD AUTO: 208 THOUSANDS/UL (ref 149–390)
PMV BLD AUTO: 10.5 FL (ref 8.9–12.7)

## 2018-12-13 PROCEDURE — C1767 GENERATOR, NEURO NON-RECHARG: HCPCS | Performed by: NEUROLOGICAL SURGERY

## 2018-12-13 PROCEDURE — 61885 INSRT/REDO NEUROSTIM 1 ARRAY: CPT | Performed by: NEUROLOGICAL SURGERY

## 2018-12-13 PROCEDURE — 95978 PR ANALYZE NEUROSTIM BRAIN, FIRST 1H: CPT | Performed by: NEUROLOGICAL SURGERY

## 2018-12-13 PROCEDURE — 82948 REAGENT STRIP/BLOOD GLUCOSE: CPT

## 2018-12-13 PROCEDURE — C1787 PATIENT PROGR, NEUROSTIM: HCPCS | Performed by: NEUROLOGICAL SURGERY

## 2018-12-13 PROCEDURE — 85049 AUTOMATED PLATELET COUNT: CPT | Performed by: NEUROLOGICAL SURGERY

## 2018-12-13 DEVICE — NEUROSTIMULATOR IMPLANTABLE ACTIVA SC MULTIPROGRAM: Type: IMPLANTABLE DEVICE | Site: CHEST | Status: FUNCTIONAL

## 2018-12-13 RX ORDER — OXYCODONE HYDROCHLORIDE AND ACETAMINOPHEN 5; 325 MG/1; MG/1
1 TABLET ORAL EVERY 4 HOURS PRN
Status: DISCONTINUED | OUTPATIENT
Start: 2018-12-13 | End: 2018-12-14 | Stop reason: HOSPADM

## 2018-12-13 RX ORDER — KETAMINE HYDROCHLORIDE 50 MG/ML
INJECTION, SOLUTION, CONCENTRATE INTRAMUSCULAR; INTRAVENOUS AS NEEDED
Status: DISCONTINUED | OUTPATIENT
Start: 2018-12-13 | End: 2018-12-13 | Stop reason: SURG

## 2018-12-13 RX ORDER — FENTANYL CITRATE 50 UG/ML
INJECTION, SOLUTION INTRAMUSCULAR; INTRAVENOUS AS NEEDED
Status: DISCONTINUED | OUTPATIENT
Start: 2018-12-13 | End: 2018-12-13 | Stop reason: SURG

## 2018-12-13 RX ORDER — ALBUTEROL SULFATE 2.5 MG/3ML
2.5 SOLUTION RESPIRATORY (INHALATION) AS NEEDED
Status: DISCONTINUED | OUTPATIENT
Start: 2018-12-13 | End: 2018-12-14 | Stop reason: HOSPADM

## 2018-12-13 RX ORDER — SULFAMETHOXAZOLE AND TRIMETHOPRIM 800; 160 MG/1; MG/1
1 TABLET ORAL EVERY 12 HOURS SCHEDULED
Status: DISCONTINUED | OUTPATIENT
Start: 2018-12-13 | End: 2018-12-14 | Stop reason: HOSPADM

## 2018-12-13 RX ORDER — FLUTICASONE PROPIONATE 220 UG/1
2 AEROSOL, METERED RESPIRATORY (INHALATION) DAILY
Status: DISCONTINUED | OUTPATIENT
Start: 2018-12-14 | End: 2018-12-14 | Stop reason: HOSPADM

## 2018-12-13 RX ORDER — LEVOTHYROXINE SODIUM 0.12 MG/1
125 TABLET ORAL
Status: DISCONTINUED | OUTPATIENT
Start: 2018-12-14 | End: 2018-12-14 | Stop reason: HOSPADM

## 2018-12-13 RX ORDER — SODIUM CHLORIDE, SODIUM LACTATE, POTASSIUM CHLORIDE, CALCIUM CHLORIDE 600; 310; 30; 20 MG/100ML; MG/100ML; MG/100ML; MG/100ML
75 INJECTION, SOLUTION INTRAVENOUS CONTINUOUS
Status: DISCONTINUED | OUTPATIENT
Start: 2018-12-13 | End: 2018-12-14

## 2018-12-13 RX ORDER — SODIUM CHLORIDE, SODIUM LACTATE, POTASSIUM CHLORIDE, CALCIUM CHLORIDE 600; 310; 30; 20 MG/100ML; MG/100ML; MG/100ML; MG/100ML
125 INJECTION, SOLUTION INTRAVENOUS CONTINUOUS
Status: DISCONTINUED | OUTPATIENT
Start: 2018-12-13 | End: 2018-12-14

## 2018-12-13 RX ORDER — DILTIAZEM HYDROCHLORIDE 240 MG/1
240 CAPSULE, COATED, EXTENDED RELEASE ORAL DAILY
Status: DISCONTINUED | OUTPATIENT
Start: 2018-12-14 | End: 2018-12-14 | Stop reason: HOSPADM

## 2018-12-13 RX ORDER — FENTANYL CITRATE/PF 50 MCG/ML
25 SYRINGE (ML) INJECTION
Status: DISCONTINUED | OUTPATIENT
Start: 2018-12-13 | End: 2018-12-13 | Stop reason: HOSPADM

## 2018-12-13 RX ORDER — LACTOBACILLUS ACIDOPHILUS / LACTOBACILLUS BULGARICUS 100 MILLION CFU STRENGTH
1 GRANULES ORAL
Status: DISCONTINUED | OUTPATIENT
Start: 2018-12-14 | End: 2018-12-14 | Stop reason: HOSPADM

## 2018-12-13 RX ORDER — LIDOCAINE HYDROCHLORIDE AND EPINEPHRINE 10; 10 MG/ML; UG/ML
INJECTION, SOLUTION INFILTRATION; PERINEURAL AS NEEDED
Status: DISCONTINUED | OUTPATIENT
Start: 2018-12-13 | End: 2018-12-13 | Stop reason: HOSPADM

## 2018-12-13 RX ORDER — FUROSEMIDE 20 MG/1
20 TABLET ORAL DAILY
Status: DISCONTINUED | OUTPATIENT
Start: 2018-12-14 | End: 2018-12-14 | Stop reason: HOSPADM

## 2018-12-13 RX ORDER — PROPOFOL 10 MG/ML
INJECTION, EMULSION INTRAVENOUS AS NEEDED
Status: DISCONTINUED | OUTPATIENT
Start: 2018-12-13 | End: 2018-12-13 | Stop reason: SURG

## 2018-12-13 RX ORDER — PROPOFOL 10 MG/ML
INJECTION, EMULSION INTRAVENOUS CONTINUOUS PRN
Status: DISCONTINUED | OUTPATIENT
Start: 2018-12-13 | End: 2018-12-13 | Stop reason: SURG

## 2018-12-13 RX ORDER — CEFAZOLIN SODIUM 2 G/50ML
SOLUTION INTRAVENOUS AS NEEDED
Status: DISCONTINUED | OUTPATIENT
Start: 2018-12-13 | End: 2018-12-13 | Stop reason: SURG

## 2018-12-13 RX ORDER — ONDANSETRON 2 MG/ML
INJECTION INTRAMUSCULAR; INTRAVENOUS AS NEEDED
Status: DISCONTINUED | OUTPATIENT
Start: 2018-12-13 | End: 2018-12-13 | Stop reason: SURG

## 2018-12-13 RX ADMIN — PROPOFOL 50 MG: 10 INJECTION, EMULSION INTRAVENOUS at 14:51

## 2018-12-13 RX ADMIN — SODIUM CHLORIDE, SODIUM LACTATE, POTASSIUM CHLORIDE, AND CALCIUM CHLORIDE 125 ML/HR: .6; .31; .03; .02 INJECTION, SOLUTION INTRAVENOUS at 13:14

## 2018-12-13 RX ADMIN — FENTANYL CITRATE 25 MCG: 50 INJECTION, SOLUTION INTRAMUSCULAR; INTRAVENOUS at 15:09

## 2018-12-13 RX ADMIN — PROPOFOL 30 MG: 10 INJECTION, EMULSION INTRAVENOUS at 15:09

## 2018-12-13 RX ADMIN — SULFAMETHOXAZOLE AND TRIMETHOPRIM 1 TABLET: 800; 160 TABLET ORAL at 20:16

## 2018-12-13 RX ADMIN — LACTOBACILLUS ACIDOPHILUS / LACTOBACILLUS BULGARICUS 1 PACKET: 100 MILLION CFU STRENGTH GRANULES at 22:11

## 2018-12-13 RX ADMIN — FENTANYL CITRATE 25 MCG: 50 INJECTION, SOLUTION INTRAMUSCULAR; INTRAVENOUS at 14:51

## 2018-12-13 RX ADMIN — KETAMINE HYDROCHLORIDE 20 MG: 50 INJECTION, SOLUTION INTRAMUSCULAR; INTRAVENOUS at 14:51

## 2018-12-13 RX ADMIN — CEFAZOLIN SODIUM 2000 MG: 2 SOLUTION INTRAVENOUS at 14:59

## 2018-12-13 RX ADMIN — PROPOFOL 60 MCG/KG/MIN: 10 INJECTION, EMULSION INTRAVENOUS at 14:51

## 2018-12-13 RX ADMIN — ONDANSETRON 4 MG: 2 INJECTION INTRAMUSCULAR; INTRAVENOUS at 15:12

## 2018-12-13 NOTE — ANESTHESIA PREPROCEDURE EVALUATION
Review of Systems/Medical History  Patient summary reviewed  Chart reviewed  No history of anesthetic complications     Cardiovascular  EKG reviewed, Exercise tolerance (METS): >4,  Hyperlipidemia, Hypertension on > 1 medication, Valvular heart disease , aortic valve stenosis,    Pulmonary  COPD mild- PRN medicaiton , No asthma , No shortness of breath, No recent URI ,        GI/Hepatic    GERD well controlled,             Endo/Other  Diabetes well controlled type 2 Diet controlled, History of thyroid disease , hypothyroidism,   Obesity    GYN       Hematology   Musculoskeletal    Arthritis     Neurology    CVA , no residual symptoms,    Psychology           Physical Exam    Airway    Mallampati score: III  TM Distance: >3 FB  Neck ROM: full     Dental   upper dentures,     Cardiovascular      Pulmonary      Other Findings        Anesthesia Plan  ASA Score- 3     Anesthesia Type- IV sedation with anesthesia with ASA Monitors  Additional Monitors:   Airway Plan:         Plan Factors-    Induction- intravenous  Postoperative Plan-     Informed Consent- Anesthetic plan and risks discussed with patient

## 2018-12-13 NOTE — H&P (VIEW-ONLY)
Assessment/Plan:    No problem-specific Assessment & Plan notes found for this encounter  OR for replacement of DBS generator     Diagnoses and all orders for this visit:    Benign essential tremor  -     Case request operating room: REPLACEMENT IMPLANTABLE PULSE GENERATOR (IPG) DEEP BRAIN STIMULATION (DBS), RIGHT; Standing  -     Ambulatory referral to [facility]; Future  -     Cancel: Case request operating room: REPLACEMENT IMPLANTABLE PULSE GENERATOR (IPG) DEEP BRAIN STIMULATION (DBS), RIGHT    Other orders  -     Diet NPO; Sips with meds; Standing  -     Void on call to OR; Standing  -     Insert peripheral IV; Standing  -     Nursing Communication Use 2 CHG cloths, have the patient wash his/her body from the neck down or have staff wash entire body (from neck down) if patient is unable; Standing  -     chlorhexidine (PERIDEX) 0 12 % oral rinse 15 mL; Swish and spit 15 mL once   -     ceFAZolin (ANCEF) IVPB (premix) 2,000 mg; Infuse 2,000 mg into a venous catheter once           Subjective:      Patient ID: Nj Guadarrama is a [de-identified] y o  female  [de-identified]year old female with essential tremor and bilateral deep brain stimulator implants  She is nearing end of life on her right DBS generator  She had a recent stroke and is on ASA  She reports no significant deficits from the stroke that happened last month  She did have a recent UTI  She otherwise has no major issues recently  She wished to have her generator replaced as her therapy is still effective  The following portions of the patient's history were reviewed and updated as appropriate:   She  has a past medical history of Arthritis; Asthma; Benign neoplasm of large intestine; Cellulitis of leg, right; Closed compression fracture of body of lumbar vertebra (HCC); COPD (chronic obstructive pulmonary disease) ([facility] [address] ); Difficulty waking; Disease of thyroid gland; High cholesterol; Hypertension; Osteopenia; Osteoporosis;  Shortness of breath; 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; Urinary leakage; Vitamin D deficiency; Wears glasses; and Wears partial dentures  She   Patient Active Problem List    Diagnosis Date Noted    End of battery life of deep brain stimulator 12/03/2018    Microscopic hematuria 12/03/2018    Slurred speech 11/23/2018    Benign essential tremor 10/15/2018    Aortic valve stenosis 08/27/2018    Pancreatitis 07/13/2018    Lumbar degenerative disc disease 06/25/2018    Low back pain 06/25/2018    Type 2 diabetes mellitus without complication, without long-term current use of insulin ([facility] [address] ) 06/25/2018    CVA (cerebral vascular accident) ([facility] [address] ) 10/30/2017    Gait disturbance 10/30/2017    Imbalance 10/23/2017    Essential hypertension 05/21/2016    Osteoarthritis of hip 04/12/2016    COPD (chronic obstructive pulmonary disease) ([facility] [address] ) 12/21/2015    Right shoulder pain 10/28/2014    Sciatica 06/23/2014    Esophageal reflux 04/15/2013    OA (osteoarthritis) 04/15/2013    Multiple joint pain 04/15/2013    Edema 10/16/2012    Sleep disorder 07/20/2012    Benign familial tremor 06/05/2012    Hypercholesterolemia 05/18/2012    Acquired hypothyroidism 05/18/2012     She  has a past surgical history that includes Cataract extraction w/  intraocular lens implant (Bilateral); Deep brain stimulator placement; pr total hip arthroplasty (Left, 5/20/2016); Colonoscopy; Knee arthroscopy; and Rotator cuff repair  Her family history includes Breast cancer in her mother; Throat cancer in her family  She  reports that she quit smoking about 5 years ago  She has never used smokeless tobacco  She reports that she drinks alcohol  She reports that she does not use drugs  Current Outpatient Prescriptions on File Prior to Visit   Medication Sig    albuterol (2 5 mg/3 mL) 0 083 % nebulizer solution Take 2 5 mg by nebulization as needed for wheezing      aspirin 81 MG tablet Take 1 tablet by mouth daily    Cholecalciferol (VITAMIN D-3 PO) Take 1,000 Units by mouth daily   diltiazem (CARDIZEM CD) 240 mg 24 hr capsule TAKE 1 CAPSULE DAILY    Fluticasone Propionate, Inhal, (FLOVENT DISKUS) 250 MCG/BLIST AEPB Inhale 1 puff daily as needed      furosemide (LASIX) 20 mg tablet Take 1 tablet (20 mg total) by mouth daily    levothyroxine 125 mcg tablet 1 TAB BY MOUTH EVERY DAY    Multiple Vitamins-Minerals (PRESERVISION AREDS PO) Take 1 Cap by Mouth daily    multivitamin (THERAGRAN) TABS Take 1 tablet by mouth daily    omeprazole (PriLOSEC) 20 mg delayed release capsule Take 1 capsule by mouth daily as needed     No current facility-administered medications on file prior to visit       Review of Systems   Constitutional: Negative for chills, fatigue and fever  HENT: Negative  Eyes: Negative for pain and visual disturbance  Respiratory: Negative for cough, shortness of breath and wheezing  Cardiovascular: Negative for chest pain and palpitations  Gastrointestinal: Negative for abdominal pain and nausea  Genitourinary: Negative for difficulty urinating  Musculoskeletal: Positive for gait problem  Negative for arthralgias, back pain, neck pain and neck stiffness  Neurological: Positive for tremors  Negative for dizziness, speech difficulty, weakness, numbness and headaches  Objective:      /73 (BP Location: Left arm, Patient Position: Sitting, Cuff Size: Standard)   Pulse 80   Temp 98 6 °F (37 °C) (Tympanic)   Resp 16   Ht 5' 3" (1 6 m)   Wt 86 2 kg (190 lb)   BMI 33 66 kg/m²          Physical Exam   Constitutional: She is oriented to person, place, and time  She appears well-developed  HENT:   Head: Normocephalic  Eyes: Pupils are equal, round, and reactive to light  EOM are normal    Neck: Normal range of motion  Cardiovascular: Normal rate  Pulmonary/Chest: Effort normal    Neurological: She is alert and oriented to person, place, and time

## 2018-12-13 NOTE — ASSESSMENT & PLAN NOTE
History of recent CVA while in [place] described as right facial weakness and slurred speech  Speech continues be slurred but facial weakness improved  History of sudden onset and symptoms consistent with CVA but not seen on CT head  Will obtain MRI brain to further evaluate  She is very claustrophobic so it needs to be done under anesthesia  Her right DBS battery is ANTWON but left is still functioning  This is unlikely to be related to facial weakness  Perhaps possible that return of jaw tremor contributes to slurred speech but less likely   If this is the case, it would improve after battery replacement  Aspirin being held for IPG replacement surgery at this time  She stopped her statin due to being unable to tolerate cramping in legs  Will need to discuss trying a new one when she returns after MRI  Counseled on better controlling her diabetes via diet  HgA1C 6 4

## 2018-12-13 NOTE — OP NOTE
OPERATIVE REPORT  PATIENT NAME: Wagner Dinh    :  1940  MRN: [id number]  Pt Location: BE OR ROOM 17    SURGERY DATE: 2018    Surgeon(s) and Role:     * Lexi Marroquin MD - Primary    Preop Diagnosis:  Benign essential tremor [G25 0]    Post-Op Diagnosis Codes: * Benign essential tremor [G25 0]    Procedure(s) (LRB):  REPLACEMENT RIGHT DBS IMPLANTABLE PULSE GENERATOR (Right)    Specimen(s):  * No specimens in log *    Estimated Blood Loss:   Minimal    Drains:       Anesthesia Type:   IV Sedation with Anesthesia    Operative Indications:  Benign essential tremor [G25 0]      Operative Findings:  See dictated note    Complications:   None    Procedure and Technique:  The patient was taken to the operative theater and successfully induced under sedation  The patient was positioned supine  The patients prior incision was marked on the right chest  Then the patient was prepped and draped in sterile fashion, a timeout was performed  I began by making an incision along the old scar with a #10 Blade  I then used monopolar cautery to dissect down to the generator       I then removed the old generator and this was disconnected from the electrode using the proprietary screwdriver  Then the new single channel generator was brought into the field  The new single channel generator was reconnected with the screwdriver and then the impedances were tested and they were found to be within normal limits  Then I placed the new generator into the pocket and irrigated the wound  I then proceeded to close in layers with 2-0 Vicryl for the deeper tissues and 4-0 running monocryl for the skin       The patient was then was allowed to awaken from sedation and taken to the recovery area in stable condition  All needle and sponge counts were correct at the end of the procedure        I was present for the entire procedure,     Patient Disposition:  PACU     SIGNATURE: Lexi Marroquin MD  DATE: 2018  TIME: 5:01 PM    medtronic activa sc

## 2018-12-13 NOTE — RESPIRATORY THERAPY NOTE
RT Protocol Note  Weston Spain [de-identified] y o  female MRN: [id number]  Unit/Bed#: CW3 340-01 Encounter: [id number]    Assessment    Active Problems:    * No active hospital problems  *      Home Pulmonary Medications:  Albuterol        Past Medical History:   Diagnosis Date    Arthritis     Asthma     Benign neoplasm of large intestine     Cellulitis of leg, right     Closed compression fracture of body of lumbar vertebra (HCC)     L5    COPD (chronic obstructive pulmonary disease) (HCC)     Difficulty waking     post anesthesia    Disease of thyroid gland     High cholesterol     Hypertension     Osteopenia     Osteoporosis     Shortness of breath     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     Urinary leakage     Vitamin D deficiency     Wears glasses     Wears partial dentures     upper     Social History     Social History    Marital status:      Spouse name: N/A    Number of children: N/A    Years of education: N/A     Social History Main Topics    Smoking status: Former Smoker     Quit date: 2013    Smokeless tobacco: Never Used    Alcohol use Yes      Comment: 2 a month    Drug use: No    Sexual activity: Not Asked     Other Topics Concern    None     Social History Narrative    Caffeine use    Living independently               Subjective         Objective    Physical Exam:   Assessment Type: (P) Assess only  General Appearance: (P) Awake, Alert  Respiratory Pattern: (P) Normal  Chest Assessment: (P) Chest expansion symmetrical  Bilateral Breath Sounds: (P) Clear    Vitals:  Blood pressure 119/58, pulse 67, temperature 97 8 °F (36 6 °C), temperature source Oral, resp  rate 19, height 5' 3" (1 6 m), weight 86 2 kg (190 lb), SpO2 94 %  Imaging and other studies: I have personally reviewed pertinent reports              Plan    Respiratory Plan: Home Bronchodilator Patient pathway        Resp Comments: (P) pt uses albuterol udn @ home

## 2018-12-13 NOTE — TELEPHONE ENCOUNTER
FYI: Pt's daughter calls in to state that she spoke with Dr Paula Caruso who states that medicare will only cover for 1 battery replacement and so she will only be getting 1 done today

## 2018-12-14 ENCOUNTER — TELEPHONE (OUTPATIENT)
Dept: NEUROLOGY | Facility: CLINIC | Age: 78
End: 2018-12-14

## 2018-12-14 VITALS
HEART RATE: 66 BPM | RESPIRATION RATE: 18 BRPM | TEMPERATURE: 97.5 F | HEIGHT: 63 IN | WEIGHT: 190 LBS | OXYGEN SATURATION: 92 % | SYSTOLIC BLOOD PRESSURE: 146 MMHG | BODY MASS INDEX: 33.66 KG/M2 | DIASTOLIC BLOOD PRESSURE: 57 MMHG

## 2018-12-14 DIAGNOSIS — I63.9 CEREBROVASCULAR ACCIDENT (CVA), UNSPECIFIED MECHANISM (HCC): Primary | ICD-10-CM

## 2018-12-14 PROCEDURE — 99024 POSTOP FOLLOW-UP VISIT: CPT | Performed by: PHYSICIAN ASSISTANT

## 2018-12-14 RX ADMIN — LEVOTHYROXINE SODIUM 125 MCG: 125 TABLET ORAL at 06:06

## 2018-12-14 RX ADMIN — ENOXAPARIN SODIUM 40 MG: 40 INJECTION SUBCUTANEOUS at 08:23

## 2018-12-14 RX ADMIN — DILTIAZEM HYDROCHLORIDE 240 MG: 240 CAPSULE, COATED, EXTENDED RELEASE ORAL at 08:24

## 2018-12-14 RX ADMIN — SULFAMETHOXAZOLE AND TRIMETHOPRIM 1 TABLET: 800; 160 TABLET ORAL at 08:23

## 2018-12-14 RX ADMIN — FUROSEMIDE 20 MG: 20 TABLET ORAL at 08:23

## 2018-12-14 RX ADMIN — LACTOBACILLUS ACIDOPHILUS / LACTOBACILLUS BULGARICUS 1 PACKET: 100 MILLION CFU STRENGTH GRANULES at 08:24

## 2018-12-14 NOTE — TELEPHONE ENCOUNTER
Awilda Cox from [facility] called stating this patient would need an H/P before her MRI w/ anesthesia  I called pt to let her know this and she stated she would be making appt with her PCP before her MRI  Awilda Cox also stated that the pt would need to get BUN/CREAT, which orders need to be placed into [facility] for  I told patient I would mail BUN/CREAT scripts to her home and would fax script to [facility] home, where lab is located  (contact at [facility] lab     Melina Lewis [phone])

## 2018-12-14 NOTE — PROGRESS NOTES
Rounds done with Ellie (neuro sx) plan is for patient to go home later today  Need to check with neurology about MRI, but will most likely be done out patient

## 2018-12-14 NOTE — PROGRESS NOTES
Progress Note - Neurosurgery   Gage Cordoba [de-identified] y o  female MRN: [id number]  Unit/Bed#: CW3 340-01 Encounter: [id number]    Assessment:  1  POD#1 replacement right DBS implantable pulse generator  2  Benign essential tremor  3  Hypothyroidism  4  Aortic valve stenosis  5  COPD  6  Hx CVA  7  Hypertension  8  Hypercholesteremia  9  OA  10  Type 2 diabetes    Plan:  · Exam:  Alert and oriented x 3, RUELAS without focal weakness, strength 5/5, reflexes 2+ and symmetrical, no huitron or clonus noted, no drift, dressing is clean dry intact and incision appears well approximated  · Patient is okay to start back on ASA 81mg tomorrow due to high risk of stroke per Dr Kiko Zavala  · Okay to remove dressing on POD#2 and shower on POD#3  · Pain control - well controlled on current pain medication  · Percocet 5-325 q4hrs for moderate pain  · D/c all IV fluids and IV sites  · Mobilize as tolerated with assistance  · DVT PPX:  SCDs and Lovenox  · Spoke to Dr Rosalie Serrano - will reschedule MRI outpatient as patient is okay to discharge today   · Patient is cleared medically for discharge today  · All discharge instructions have been reviewed with the patient  · She will follow up outpatient on 12/21/2018 for incision check  · Please call with questions or concerns    Subjective/Objective   Chief Complaint: "I feel good " / POD#1 replacement right DBS implantable pulse generator    Subjective: Patient has no complaints at this time  She has no pain at the incision site and states she can use tylenol at home if she is in pain  She is urinating per baseline; no BM yet but is passing some gas  She did not sleep well last night due to mattress quality  Eating fine  At baseline has numbness in fingertips but otherwise denies new numbness / tingling / weakness  Denies headaches, dizziness, CP, SOB, abdominal pain, N/V     Nursing rounds completed with Ike Bird - no overnight event  Objective: Sitting in chair, NAD      I/O       12/12 0701 - 12/13 0700 12/13 0701 - 12/14 0700 12/14 0701 - 12/15 0700    P  O   300     I V  (mL/kg)  500 (5 8)     Total Intake(mL/kg)  800 (9 3)     Net   +800             Unmeasured Urine Occurrence  4 x           Invasive Devices     Peripheral Intravenous Line            Peripheral IV 12/13/18 Right Arm less than 1 day                Physical Exam:  Vitals: Blood pressure 146/57, pulse 66, temperature 97 5 °F (36 4 °C), temperature source Oral, resp  rate 18, height 5' 3" (1 6 m), weight 86 2 kg (190 lb), SpO2 92 %  ,Body mass index is 33 66 kg/m²  General appearance: alert, appears stated age, cooperative and no distress  Head: Normocephalic, without obvious abnormality, atraumatic  Eyes: EOMI, PERRL  Neck: supple, symmetrical, trachea midline and NT  Back: no kyphosis present, no tenderness to percussion or palpation  Lungs: non labored breathing  Heart: regular heart rate  Skin:  Right sided chest dressing clean dry intact, incision appears well approximated  Neurologic:   Mental status: Alert, oriented x 3, follows commands, able to do simple calculations, able to repeat a sentence, thought content appropriate  Cranial nerves: grossly intact (Cranial nerves II-XII)  Sensory: normal to LT, JPS 3/3, DST intact  Motor: moving all extremities without focal weakness, strength 5/5 throughout  Reflexes: 2+ and symmetric, no huitron or clonus noted  Coordination: finger to nose normal bilaterally, no drift bilaterally    Lab Results:    Results from last 7 days  Lab Units 12/13/18  1913   PLATELETS Thousands/uL 208           Invalid input(s): LABALBU              No results found for: TROPONINT  ABG:No results found for: PHART, PMY1YXA, PO2ART, XTT6RWC, H8EZANHG, BEART, SOURCE    Imaging Studies: I have personally reviewed pertinent reports  and I have personally reviewed pertinent films in PACS    EKG, Pathology, and Other Studies: I have personally reviewed pertinent reports        VTE Pharmacologic Prophylaxis: Enoxaparin (Lovenox)    VTE Mechanical Prophylaxis: sequential compression device

## 2018-12-14 NOTE — DISCHARGE SUMMARY
Discharge Summary - Neurosurgery   Bekah Ellison [de-identified] y o  female MRN: [id number]  Unit/Bed#: CW3 340-01 Encounter: [id number]    Discharge Date: 12/14/2018    Admitting Diagnosis: Benign essential tremor [G25 0]    Discharge Diagnosis:   1  S/p replacement right DBS implantable pulse generator  2  Benign essential tremor  3  Hypothyroidism  4  Aortic valve stenosis  5  COPD  6  Hx CVA  7  Hypertension  8  Hypercholesteremia  9  OA  10  Type 2 diabetes    Resolved Problems  Date Reviewed: 12/13/2018    None          Attending: Cierra Bonds MD    Consulting Physician(s): Francy Kramer MD (anesthesiologist)    Procedures Performed: Replacement of right DBS implantable pulse generator    Hospital Course: Bekah Ellison is an [de-identified] y/o female who presented to the outpatient with hx of benign essential tremors and end of battery life on DBS generator  Patient underwent replacement of right DBS implantable pulse generator under general anesthesia with minimal blood loss and no complications  Patient was kept in the PACU until stable and then moved to the floor  Patient was cleared medically for discharge  Prior to discharge, postoperative instructions were discussed with patient  During that time, all questions and concerns were addressed  Patient will follow up outpatient in about 1 week outpatient  Condition at Discharge: good     Discharge instructions/Information to patient and family:   See after visit summary for information provided to patient and family  Provisions for Follow-Up Care:  See after visit summary for information related to follow-up care and any pertinent home health orders  Disposition: Home        Planned Readmission: No    Discharge Statement   I spent 25 minutes discharging the patient  This time was spent on the day of discharge  I had direct contact with the patient on the day of discharge   Additional documentation is required if more than 30 minutes were spent on discharge  Discharge Medications:  See after visit summary for reconciled discharge medications provided to patient and family

## 2018-12-17 ENCOUNTER — TELEPHONE (OUTPATIENT)
Dept: NEUROSURGERY | Facility: CLINIC | Age: 78
End: 2018-12-17

## 2018-12-17 NOTE — TELEPHONE ENCOUNTER
1st attempt to reach patient to see how she is doing after surgery and recent d/c from the hospital with no answer  LMOM for patient to call back at her convenience

## 2018-12-17 NOTE — TELEPHONE ENCOUNTER
Labs entered & mailed home  [facility] home re labs, please see note below [phone]    I did speak w/david @  PCP will need to complete H&P & they can sign the clearance form  I did call PCP @[phone], spoke w/klaus Kovacs will call pt to schedule, I did fax forms to : [phone]

## 2018-12-18 NOTE — TELEPHONE ENCOUNTER
Faxed lab scripts (bun and creatinine) to [facility] @ [phone] [facility] as requested

## 2018-12-19 NOTE — TELEPHONE ENCOUNTER
2nd attempt - Spoke with Kanwal Jean to see how she is doing after surgery 12/13/2018 with hospital discharge date of 12/14/2018  She reports that she is doing well overall, chin quivering has stopped, she denies any incisional issues or fevers    Advised that after three days she may take a shower and allow soapy water to wash over the surgical site, no direct contact with the water stream, and do not submerge in water until cleared by the surgeon  Went over incision care with patient including keeping incision clean and dry and not to apply any creams or ointments to the site, she has no further questions at this time  Verified date/time/location of her upcoming POV on Visit date not found and advised her to call the office with any further questions or concerns, or if any incisional issues or fevers would arise  Patient was appreciative for the call

## 2018-12-20 ENCOUNTER — HOSPITAL ENCOUNTER (OUTPATIENT)
Dept: NON INVASIVE DIAGNOSTICS | Facility: HOSPITAL | Age: 78
Discharge: HOME | End: 2018-12-20
Payer: MEDICARE

## 2018-12-20 DIAGNOSIS — I63.9 CEREBROVASCULAR ACCIDENT (CVA), UNSPECIFIED MECHANISM (HCC): ICD-10-CM

## 2018-12-20 DIAGNOSIS — I35.0 AORTIC VALVE STENOSIS, ETIOLOGY OF CARDIAC VALVE DISEASE UNSPECIFIED: ICD-10-CM

## 2018-12-20 DIAGNOSIS — R60.9 EDEMA, UNSPECIFIED TYPE: ICD-10-CM

## 2018-12-20 PROCEDURE — 93225 XTRNL ECG REC<48 HRS REC: CPT

## 2018-12-20 PROCEDURE — 93306 TTE W/DOPPLER COMPLETE: CPT

## 2018-12-20 PROCEDURE — 93306 TTE W/DOPPLER COMPLETE: CPT | Performed by: INTERNAL MEDICINE

## 2018-12-20 RX ORDER — FUROSEMIDE 20 MG/1
20 TABLET ORAL DAILY
Qty: 90 TABLET | Refills: 3 | Status: SHIPPED | OUTPATIENT
Start: 2018-12-20 | End: 2019-12-23 | Stop reason: SDUPTHER

## 2018-12-21 ENCOUNTER — OFFICE VISIT (OUTPATIENT)
Dept: NEUROSURGERY | Facility: CLINIC | Age: 78
End: 2018-12-21

## 2018-12-21 VITALS
TEMPERATURE: 98.7 F | DIASTOLIC BLOOD PRESSURE: 61 MMHG | BODY MASS INDEX: 33.66 KG/M2 | SYSTOLIC BLOOD PRESSURE: 117 MMHG | WEIGHT: 190 LBS | HEART RATE: 74 BPM | HEIGHT: 63 IN | RESPIRATION RATE: 16 BRPM

## 2018-12-21 DIAGNOSIS — G25.0 BENIGN FAMILIAL TREMOR: Primary | ICD-10-CM

## 2018-12-21 DIAGNOSIS — Z48.89 AFTERCARE FOLLOWING SURGERY: ICD-10-CM

## 2018-12-21 PROBLEM — G20.A1 PARKINSON'S DISEASE WITH USE OF ELECTRICAL BRAIN STIMULATION: Status: ACTIVE | Noted: 2018-12-21

## 2018-12-21 PROBLEM — G20 PARKINSON'S DISEASE WITH USE OF ELECTRICAL BRAIN STIMULATION (HCC): Status: ACTIVE | Noted: 2018-12-21

## 2018-12-21 PROCEDURE — 99024 POSTOP FOLLOW-UP VISIT: CPT | Performed by: NURSE PRACTITIONER

## 2018-12-21 NOTE — PROGRESS NOTES
Assessment/Plan:  Diagnoses and all orders for this visit:    Benign Familial essential tremor     Aftercare following surgery        Subjective:      Patient ID: Sabina Solares is a [de-identified] y o  female  HPI   She presents for 2 week postoperative visit status post surgery on 12/13/2018 w/ DR Juan Carlos Russell for REPLACEMENT RIGHT DBS IMPLANTABLE PULSE GENERATOR (Right Chest)  She has a longstanding history of familial essential tremor for which she has a DBS delivering efficacy  DBS battery reached ANTWON requiring replacement  She denies exacerbation of symptoms since replacement  Right upper chest incision with dissolvable vicryl inner tissue layers per or report and Monocryl sutures intact in skin  The incision is  clean , dry and intact, without erythema, dehiscence, drainage or s/s of infection, well healed with well approximated wound edges  Reports she has 2 remaining doses of ABX --unsure why, she self medicates and lives in senior living apartment  Wearing a heart monitor current w/u for etiology of recent CVA vs TIA  Dysarthric slow speech  The following instructions are reviewed in detail and continue thru next 4 weeks (6 week post op)    At 2 weeks postop can resume restricted medications such as ASA, products containing ASA, NSAID, fish oils, and OTC products or as previously directed  ---she resumed asa 7 day postoperatively  Resume driving 2 week postoperatively--she does not drive , transported to appointment by KIMBERLY Andre Continue to observe incisions for redness, drainage, swelling dehiscence, increased pain, fever >/= 101, warmth to touch incision or skin surrounding, if occur call or report to office immediately for reassessment  Contact office in 2 weeks if sutures hav not dissolved  Continue showers using clean towel and wash cloth with OTC antibacterial body wash for an additional 2 weeks    Do not apply lotions, creams, powder, or ointments to surgical incisions  Activity as tolerated, Avoid excess use of right arm, avoid repetitive pushing , pulling, and rotation of arm, refrain from  lifting greater than 10 lbs or extreme stretching  No Immersion of incision in water such as swimming, hot tub, or tub bath  If  there is any significant change in her neurologic status, call and/or return to the office immediately for reassessment  Has DBS postoperative interrogation appointment 1/3/2019  These findings, impressions and recommendations are reviewed in great detail with the patient  Patient verbalizes an understanding of information  The following portions of the patient's history were reviewed and updated as appropriate:   She  has a past medical history of Arthritis; Asthma; Benign neoplasm of large intestine; Cellulitis of leg, right; Closed compression fracture of body of lumbar vertebra (HCC); COPD (chronic obstructive pulmonary disease) (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; Urinary leakage; Vitamin D deficiency; Wears glasses; and Wears partial dentures    She   Patient Active Problem List    Diagnosis Date Noted    Parkinson's disease with use of electrical brain stimulation ([facility] ) 12/21/2018    End of battery life of deep brain stimulator 12/03/2018    Microscopic hematuria 12/03/2018    Slurred speech 11/23/2018    Benign essential tremor 10/15/2018    Aortic valve stenosis 08/27/2018    Pancreatitis 07/13/2018    Lumbar degenerative disc disease 06/25/2018    Low back pain 06/25/2018    Type 2 diabetes mellitus without complication, without long-term current use of insulin ([facility] [address] ) 06/25/2018    [facility]cere[facility] vascular accident[facility] ) 10/30/2017    Gait disturbance 10/30/2017    Imbalance 10/23/2017    Essential hypertension 05/21/2016    Osteoarthritis of hip 04/12/2016    COPD (chronic obstructive pulmonary disease) ([facility] [address] ) 12/21/2015    Right shoulder pain 10/28/2014    Sciatica 06/23/2014    Esophageal reflux 04/15/2013    OA (osteoarthritis) 04/15/2013    Multiple joint pain 04/15/2013    Edema 10/16/2012    Sleep disorder 07/20/2012    Benign familial tremor 06/05/2012    Hypercholesterolemia 05/18/2012    Acquired hypothyroidism 05/18/2012     She  has a past surgical history that includes Cataract extraction w/  intraocular lens implant (Bilateral); Deep brain stimulator placement; pr total hip arthroplasty (Left, 5/20/2016); Colonoscopy; Knee arthroscopy; Rotator cuff repair; and pr imp stim,cranial,subq,>1 array (Right, 12/13/2018)  Her family history includes Breast cancer in her mother; Throat cancer in her family  She  reports that she quit smoking about 5 years ago  She has never used smokeless tobacco  She reports that she drinks alcohol  She reports that she does not use drugs  Current Outpatient Prescriptions   Medication Sig Dispense Refill    albuterol (2 5 mg/3 mL) 0 083 % nebulizer solution Take 2 5 mg by nebulization as needed for wheezing   aspirin 81 MG tablet Take 1 tablet (81 mg total) by mouth daily YOU MAY RESTART ASPIRIN ON SATURDAY 12/15/2018   0    Cholecalciferol (VITAMIN D-3 PO) Take 1,000 Units by mouth daily        diltiazem (CARDIZEM CD) 240 mg 24 hr capsule TAKE 1 CAPSULE DAILY 30 capsule 4    Fluticasone Propionate, Inhal, (FLOVENT DISKUS) 250 MCG/BLIST AEPB Inhale 1 puff daily as needed        furosemide (LASIX) 20 mg tablet Take 1 tablet (20 mg total) by mouth daily 90 tablet 3    lactobacillus acidophilus-bulgaricus (LACTINEX) chewable tablet Take one tablet by mouth 3 times per day with meals, 1 hour after antibiotic dose x 10 days (Patient taking differently: Chew 1 tablet 3 (three) times a day with meals Take one tablet by mouth 3 times per day with meals, 1 hour after antibiotic dose x 10 days ) 30 tablet 0    levothyroxine 125 mcg tablet 1 TAB BY MOUTH EVERY DAY 30 tablet 4    Multiple Vitamins-Minerals (PRESERVISION AREDS PO) Take 1 Cap by Mouth daily      multivitamin (THERAGRAN) TABS Take 1 tablet by mouth daily      omeprazole (PriLOSEC) 20 mg delayed release capsule Take 1 capsule by mouth daily as needed       No current facility-administered medications for this visit  She is allergic to ciprofloxacin; simvastatin; advair diskus [fluticasone-salmeterol]; and tetracycline       Review of Systems   Constitutional: Positive for fatigue  HENT: Negative  Eyes: Negative  Respiratory: Negative  Cardiovascular:        Heart monitor since yesterday  Gastrointestinal: Negative  Endocrine: Negative  Genitourinary: Negative  Musculoskeletal: Negative  Skin: Negative  Allergic/Immunologic: Negative  Neurological: Negative  Hematological:        ASA 81 mg   Psychiatric/Behavioral: Negative  Objective:      /61 (BP Location: Left arm, Patient Position: Sitting, Cuff Size: Standard)   Pulse 74   Temp 98 7 °F (37 1 °C) (Tympanic)   Resp 16   Ht 5' 3" (1 6 m)   Wt 86 2 kg (190 lb)   BMI 33 66 kg/m²          Physical Exam   Constitutional: She appears well-developed and well-nourished  HENT:   Head: Normocephalic and atraumatic  Eyes: Right eye exhibits no discharge  Left eye exhibits no discharge  No scleral icterus  Neck: Normal range of motion  Neck supple  No JVD present  Cardiovascular: Normal rate and regular rhythm  Pulmonary/Chest: Effort normal and breath sounds normal  No respiratory distress  Lymphadenopathy:     She has no cervical adenopathy  Skin: Skin is dry  Psychiatric: She has a normal mood and affect  Her behavior is normal  Thought content normal    Nursing note and vitals reviewed

## 2019-01-02 PROCEDURE — 93227 XTRNL ECG REC<48 HR R&I: CPT | Performed by: INTERNAL MEDICINE

## 2019-01-03 ENCOUNTER — PROCEDURE VISIT (OUTPATIENT)
Dept: NEUROLOGY | Facility: CLINIC | Age: 79
End: 2019-01-03
Payer: MEDICARE

## 2019-01-03 VITALS
SYSTOLIC BLOOD PRESSURE: 141 MMHG | DIASTOLIC BLOOD PRESSURE: 63 MMHG | BODY MASS INDEX: 32.96 KG/M2 | WEIGHT: 186 LBS | HEART RATE: 61 BPM | HEIGHT: 63 IN

## 2019-01-03 DIAGNOSIS — G25.0 BENIGN ESSENTIAL TREMOR: ICD-10-CM

## 2019-01-03 DIAGNOSIS — G25.0 BENIGN FAMILIAL TREMOR: Primary | ICD-10-CM

## 2019-01-03 PROCEDURE — 95970 ALYS NPGT W/O PRGRMG: CPT | Performed by: PSYCHIATRY & NEUROLOGY

## 2019-01-03 PROCEDURE — 99213 OFFICE O/P EST LOW 20 MIN: CPT | Performed by: PSYCHIATRY & NEUROLOGY

## 2019-01-03 NOTE — ASSESSMENT & PLAN NOTE
Tremor overall well controlled  She is satisfied with response  Deep brain stimulator was interrogated but no changes were needed  Right stitches are healing well  See attached scanned clinical sheets for detail of settings  Left battery is  2 67  She will need this replaced in the coming months  She is not good at checking her stimulator we may need to plan out surgery

## 2019-01-03 NOTE — PROGRESS NOTES
Patient ID: Ruby Gardner is a [de-identified] y o  female  Assessment/Plan:    Benign familial tremor  Tremor overall well controlled  She is satisfied with response  Deep brain stimulator was interrogated but no changes were needed  Right stitches are healing well  See attached scanned clinical sheets for detail of settings  Left battery is  2 67  She will need this replaced in the coming months  She is not good at checking her stimulator so will try to plan out surgery  Diagnoses and all orders for this visit:    Benign familial tremor    Benign essential tremor           Subjective: Ruby Gardner is a [de-identified]year old left handed female with ET with prominent mouth and bilateral hand tremor (L>R) who is s/p bilateral VIM DBS 6/14/12, s/p right IPG replacement 12/13/18, with very good response, who presents for follow up  To review, her diagnosis was changed from PD to ET, she was tapered off of Sinemet and started on primidone which was increased to 225mg qhs over time with improved sleep but little effect on tremor  Tapered off after surgery  Previously with moderate head and hand tremors which were prominent, interfering with function and bothersome but have been controlled with stimulation  She returns today s/p right IPG replacement 12/13/18  Surgery went well  She is doing well  She no longer has mild jaw tremor or tremor in left hand  No head tremor  She is able to perform ADLs without difficulty  Writing is large  She is still awaiting MRI of the brain for further workup of possible stroke last month  It is scheduled the end of this month as it needs to be done under anesthesia  She has developed imbalance since her hospitalization  No falls  The following portions of the patient's history were reviewed and updated as appropriate: allergies, current medications, past family history, past medical history, past social history and past surgical history  Objective:    Blood pressure 141/63, pulse 61, height 5' 3" (1 6 m), weight 84 4 kg (186 lb)  Physical Exam   Constitutional: She appears well-developed  Eyes: Pupils are equal, round, and reactive to light  Vitals reviewed  Neurological Exam  Mental Status   Oriented to person, place, time and situation  Recent and remote memory are intact  Language is fluent with no aphasia  Attention and concentration are normal     Cranial Nerves  CN III, IV, VI: Pupils equal round and reactive to light bilaterally  CN V: Facial sensation is normal   CN VII: Full and symmetric facial movement  Right: Very slight flattening of the right nasolabial fold  CN VIII: Hearing is normal   CN IX, X: Palate elevates symmetrically  CN XI: Shoulder shrug strength is normal   CN XII: Tongue midline without atrophy or fasciculations  Motor   Normal muscle tone  Sensory  Light touch is normal in upper and lower extremities  Coordination  Right: Finger-to-nose normal   Left: Finger-to-nose normal   No jaw tremor  No rest tremor  No tremor on FTN bilaterally       Gait  Hesitant and slow, good stride  ROS:    Review of Systems   Constitutional: Negative  Negative for appetite change and fever  HENT: Positive for drooling and voice change  Negative for hearing loss, tinnitus and trouble swallowing  Eyes: Negative  Negative for photophobia and pain  Respiratory: Negative  Negative for shortness of breath  Cardiovascular: Negative  Negative for palpitations  Gastrointestinal: Positive for constipation  Negative for nausea and vomiting  Endocrine: Negative  Negative for cold intolerance and heat intolerance  Genitourinary: Positive for frequency and urgency  Negative for dysuria  Musculoskeletal: Positive for arthralgias, gait problem, myalgias and neck pain  Skin: Negative  Negative for rash  Neurological: Positive for speech difficulty   Negative for dizziness, tremors, seizures, syncope, facial asymmetry, weakness, light-headedness, numbness and headaches  Hematological: Negative  Does not bruise/bleed easily  Psychiatric/Behavioral: Positive for sleep disturbance  Negative for confusion and hallucinations  Reviewed ROS

## 2019-01-04 ENCOUNTER — TELEPHONE (OUTPATIENT)
Dept: NEUROSURGERY | Facility: CLINIC | Age: 79
End: 2019-01-04

## 2019-01-04 NOTE — TELEPHONE ENCOUNTER
Telephoned patient --left UC DBS IPG need replacement ---appointment w/ Dr Karlynn Kawasaki yesterday ---stats for note below  Left battery is  2 67  She will need this replaced in the coming months  She is not good at checking her stimulator so will try to plan out surgery     To review, her diagnosis was changed from PD to ET,  tremor  She is able to perform ADLs without difficulty  Writing is large  MA working with patient to schedule H & P for next week ,will sign OR consent at  that time and   surgery date    Patient reports she does not drive and can come for appointment only on a Thursday or Friday

## 2019-01-04 NOTE — TELEPHONE ENCOUNTER
Spoke with patient and scheduled H&P appointment with Mary on 1/10/19 @ 1300  I confirmed date time and location with patient       I also spoke with Gallo N Gonzalez Kaiser to place patient on Dr Brown Rogers schedule for a tentative surgery date of 1/24/2019 w/ a possibility for an earlier date (1/16/19)

## 2019-01-06 PROBLEM — Z96.89 S/P DEEP BRAIN STIMULATOR PLACEMENT: Status: ACTIVE | Noted: 2018-12-03

## 2019-01-06 PROBLEM — G25.0 BENIGN ESSENTIAL TREMOR: Status: RESOLVED | Noted: 2018-10-15 | Resolved: 2019-01-06

## 2019-01-07 ENCOUNTER — OFFICE VISIT (OUTPATIENT)
Dept: FAMILY MEDICINE CLINIC | Facility: CLINIC | Age: 79
End: 2019-01-07
Payer: MEDICARE

## 2019-01-07 VITALS
SYSTOLIC BLOOD PRESSURE: 128 MMHG | WEIGHT: 182.6 LBS | OXYGEN SATURATION: 95 % | BODY MASS INDEX: 32.36 KG/M2 | HEIGHT: 63 IN | DIASTOLIC BLOOD PRESSURE: 74 MMHG | HEART RATE: 69 BPM

## 2019-01-07 DIAGNOSIS — I35.0 AORTIC VALVE STENOSIS, ETIOLOGY OF CARDIAC VALVE DISEASE UNSPECIFIED: ICD-10-CM

## 2019-01-07 DIAGNOSIS — R26.9 GAIT DISTURBANCE: ICD-10-CM

## 2019-01-07 DIAGNOSIS — I10 ESSENTIAL HYPERTENSION: ICD-10-CM

## 2019-01-07 DIAGNOSIS — R26.89 IMBALANCE: ICD-10-CM

## 2019-01-07 DIAGNOSIS — E03.9 ACQUIRED HYPOTHYROIDISM: ICD-10-CM

## 2019-01-07 DIAGNOSIS — I63.9 CEREBROVASCULAR ACCIDENT (CVA), UNSPECIFIED MECHANISM (HCC): ICD-10-CM

## 2019-01-07 DIAGNOSIS — E11.9 TYPE 2 DIABETES MELLITUS WITHOUT COMPLICATION, WITHOUT LONG-TERM CURRENT USE OF INSULIN (HCC): ICD-10-CM

## 2019-01-07 DIAGNOSIS — Z00.00 MEDICARE ANNUAL WELLNESS VISIT, SUBSEQUENT: Primary | ICD-10-CM

## 2019-01-07 DIAGNOSIS — R47.81 SLURRED SPEECH: ICD-10-CM

## 2019-01-07 PROBLEM — Z87.19 HISTORY OF PANCREATITIS: Status: ACTIVE | Noted: 2018-07-13

## 2019-01-07 PROCEDURE — 99213 OFFICE O/P EST LOW 20 MIN: CPT | Performed by: FAMILY MEDICINE

## 2019-01-07 PROCEDURE — G0439 PPPS, SUBSEQ VISIT: HCPCS | Performed by: FAMILY MEDICINE

## 2019-01-07 RX ORDER — DILTIAZEM HYDROCHLORIDE 240 MG/1
240 CAPSULE, COATED, EXTENDED RELEASE ORAL DAILY
Qty: 30 CAPSULE | Refills: 5 | Status: SHIPPED | OUTPATIENT
Start: 2019-01-07 | End: 2019-01-28 | Stop reason: SDUPTHER

## 2019-01-07 RX ORDER — ROSUVASTATIN CALCIUM 5 MG/1
5 TABLET, COATED ORAL 3 TIMES WEEKLY
Qty: 15 TABLET | Refills: 5 | Status: SHIPPED | OUTPATIENT
Start: 2019-01-07 | End: 2019-03-07 | Stop reason: SDUPTHER

## 2019-01-07 RX ORDER — LEVOTHYROXINE SODIUM 0.12 MG/1
125 TABLET ORAL DAILY
Qty: 30 TABLET | Refills: 5 | Status: SHIPPED | OUTPATIENT
Start: 2019-01-07 | End: 2019-07-29 | Stop reason: SDUPTHER

## 2019-01-07 NOTE — PROGRESS NOTES
Assessment and Plan:    Problem List Items Addressed This Visit        Unprioritized    Acquired hypothyroidism    Relevant Medications    levothyroxine 125 mcg tablet    Other Relevant Orders    TSH, 3rd generation    Aortic valve stenosis    Relevant Medications    diltiazem (CARDIZEM CD) 240 mg 24 hr capsule    CVA (cerebral vascular accident) ([address] )    Relevant Medications    rosuvastatin (CRESTOR) 5 mg tablet    Essential hypertension    Relevant Medications    diltiazem (CARDIZEM CD) 240 mg 24 hr capsule    Other Relevant Orders    Comprehensive metabolic panel    Gait disturbance    Imbalance    Slurred speech    Type 2 diabetes mellitus without complication, without long-term current use of insulin (HCC)    Relevant Medications    rosuvastatin (CRESTOR) 5 mg tablet    Other Relevant Orders    Hemoglobin A1C      Other Visit Diagnoses     Medicare annual wellness visit, subsequent    -  Primary        Health Maintenance Due   Topic Date Due    DXA SCAN  1940    URINE MICROALBUMIN  07/22/1948    DTaP,Tdap,and Td Vaccines (1 - Tdap) 07/22/1959    DM Eye Exam  02/22/2018     See other note re provider info    HPI:  Pepe Huddleston is a [de-identified] y o  female here for her Subsequent Wellness Visit      Patient Active Problem List   Diagnosis    Essential hypertension    Benign familial tremor    CVA (cerebral vascular accident) ([address] )    COPD (chronic obstructive pulmonary disease) ([address] )    Edema    Esophageal reflux    Gait disturbance    Hypercholesterolemia    Sleep disorder    Sciatica    Right shoulder pain    Osteoarthritis of hip    OA (osteoarthritis)    Multiple joint pain    Imbalance    Acquired hypothyroidism    Lumbar degenerative disc disease    Low back pain    Type 2 diabetes mellitus without complication, without long-term current use of insulin (HCC)    History of pancreatitis    Aortic valve stenosis    Slurred speech    S/P deep brain stimulator placement    Microscopic hematuria     Past Medical History:   Diagnosis Date    Arthritis     Asthma     Benign essential tremor 10/15/2018    Added automatically from request for surgery [id number]    Benign neoplasm of large intestine     Cellulitis of leg, right     Closed compression fracture of body of lumbar vertebra (HCC)     L5    COPD (chronic obstructive pulmonary disease) (HCC)     Difficulty waking     post anesthesia    Disease of thyroid gland     High cholesterol     Hypertension     Osteopenia     Osteoporosis     Shortness of breath     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     Urinary leakage     Vitamin D deficiency     Wears glasses     Wears partial dentures     upper     Past Surgical History:   Procedure Laterality Date    CATARACT EXTRACTION W/  INTRAOCULAR LENS IMPLANT Bilateral     COLONOSCOPY      DEEP BRAIN STIMULATOR PLACEMENT      KNEE ARTHROSCOPY      MN IMP STIM,CRANIAL,SUBQ,>1 ARRAY Right 12/13/2018    Procedure: REPLACEMENT RIGHT DBS IMPLANTABLE PULSE GENERATOR;  Surgeon: Kristine Meraz MD;  Location: [facility];  Service: Neurosurgery    MN TOTAL HIP ARTHROPLASTY Left 5/20/2016    Procedure: ARTHROPLASTY HIP TOTAL ANTERIOR;  Surgeon: Meseret Bermudez MD;  Location: [facility];  Service: Orthopedics    ROTATOR CUFF REPAIR       Family History   Problem Relation Age of Onset    Breast cancer Mother     Throat cancer Family      History   Smoking Status    Former Smoker    Quit date: 2013   Smokeless Tobacco    Never Used     History   Alcohol Use    Yes     Comment: 2 a month      History   Drug Use No       Current Outpatient Prescriptions   Medication Sig Dispense Refill    albuterol (2 5 mg/3 mL) 0 083 % nebulizer solution Take 2 5 mg by nebulization as needed for wheezing   aspirin 81 MG tablet Take 1 tablet (81 mg total) by mouth daily YOU MAY RESTART ASPIRIN ON SATURDAY 12/15/2018   0    Cholecalciferol (VITAMIN D-3 PO) Take 1,000 Units by mouth daily        diltiazem (CARDIZEM CD) 240 mg 24 hr capsule Take 1 capsule (240 mg total) by mouth daily 30 capsule 5    Fluticasone Propionate, Inhal, (FLOVENT DISKUS) 250 MCG/BLIST AEPB Inhale 1 puff daily as needed        furosemide (LASIX) 20 mg tablet Take 1 tablet (20 mg total) by mouth daily 90 tablet 3    levothyroxine 125 mcg tablet Take 1 tablet (125 mcg total) by mouth daily 30 tablet 5    Multiple Vitamins-Minerals (PRESERVISION AREDS PO) Take 1 Cap by Mouth daily      multivitamin (THERAGRAN) TABS Take 1 tablet by mouth daily      omeprazole (PriLOSEC) 20 mg delayed release capsule Take 1 capsule by mouth daily as needed      rosuvastatin (CRESTOR) 5 mg tablet Take 1 tablet (5 mg total) by mouth 3 (three) times a week Mon/Weds/Fri 15 tablet 5     No current facility-administered medications for this visit  Allergies   Allergen Reactions    Ciprofloxacin Diarrhea    Simvastatin Myalgia    Advair Diskus [Fluticasone-Salmeterol] Palpitations    Tetracycline Rash     Reaction Date: [id number];      Immunization History   Administered Date(s) Administered    Influenza 10/13/2016, 10/23/2017    Influenza Split High Dose Preservative Free IM 10/15/2013, 10/16/2014, 10/28/2015, 10/13/2016, 10/23/2017    Influenza TIV (IM) 10/31/2007, 01/05/2011, 10/01/2011, 10/05/2011, 10/18/2018    Influenza, high dose seasonal 0 5 mL 10/12/2018    Pneumococcal Conjugate 13-Valent 02/24/2015    Pneumococcal Polysaccharide PPV23 04/13/2007       Patient Care Team:  Cecile De Jesus DO as PCP - General    Medicare Screening Tests and Risk Assessments:  Francesca Varela is here for her Subsequent Wellness visit  Health Risk Assessment:  Patient rates overall health as good  Patient feels that their physical health rating is Same  Eyesight was rated as Same  Hearing was rated as Same  Patient feels that their emotional and mental health rating is Same  Pain experienced by patient in the last 7 days has been None       Emotional/Mental Health:  Patient has been feeling nervous/anxious  PHQ-9 Depression Screening:    Frequency of the following problems over the past two weeks:      1  Little interest or pleasure in doing things: 0 - not at all      2  Feeling down, depressed, or hopeless: 0 - not at all  PHQ-2 Score: 0          Broken Bones/Falls: Fall Risk Assessment:    In the past year, patient has experienced: No history of falling in past year          Bladder/Bowel:  Patient has leaked urine accidently in the last six months  Patient reports no loss of bowel control  Immunizations:  Patient has had a flu vaccination within the last year  Patient has received a pneumonia shot  Patient has not received a shingles shot  Home Safety:  Patient does not have trouble with stairs inside or outside of their home  Patient currently reports that there are no safety hazards present in home, working smoke alarms, working carbon monoxide detectors  Preventative Screenings:   Breast cancer screening performed, no colon cancer screen completed, no cholesterol screen completed, glaucoma eye exam completed,     Nutrition:  Current diet: Regular and Limited junk food with servings of the following:    Medications:  Patient is currently taking over-the-counter supplements  List of OTC medications includes: see medication list   Patient is able to manage medications  Lifestyle Choices:  Patient reports no tobacco use  Patient has smoked or used tobacco in the past   Patient has stopped her tobacco use  Tobacco use quit date: 2012  Patient reports no alcohol use  Patient does not drive a vehicle  Patient does not wear seat belt  Current level of exercise of physical activity described by patient as: walks into casino           Activities of Daily Living:  Can get out of bed by his or her self, able to dress self, able to make own meals, able to do own shopping, able to bathe self, can do own laundry/housekeeping, unable to manage own money and other related tasks    Previous Hospitalizations:  Hospitalization or ED visit in past 12 months  Number of hospitalizations within the last year: 1-2        Advanced Directives:  Patient has decided on a power of   Patient has spoken to designated power of   Patient has completed advanced directive

## 2019-01-07 NOTE — PATIENT INSTRUCTIONS
She is slowly improving after stroke, she will continue with speech therapy/physical therapy  We will continue to work at lowering cardiovascular risk, blood pressure control, aspirin 81 mg daily, I would like her to try another statin, Crestor 5 mg 3 days weekly  Call if muscle aches  She has been working on her diet, she has dropped 7-1/2 lb since I saw her in December, keep up the good work  Holter monitor showed average heart rate 72 with rare PVC, no sign of AFib  We will plan to redo echocardiogram in 6 to 12 months, did have some progression to moderate aortic stenosis on echo in [place]  Carotids were w mild stenosis in [place]  She saw Neurology 1 week ago -  To do scan later in month  She does remain medically stable to undergo upcoming left-sided battery replacement  Immunization History   Administered Date(s) Administered    Influenza 10/13/2016, 10/23/2017    Influenza Split High Dose Preservative Free IM 10/15/2013, 10/16/2014, 10/28/2015, 10/13/2016, 10/23/2017    Influenza TIV (IM) 10/31/2007, 01/05/2011, 10/01/2011, 10/05/2011, 10/18/2018    Influenza, high dose seasonal 0 5 mL 10/12/2018    Pneumococcal Conjugate 13-Valent 02/24/2015    Pneumococcal Polysaccharide PPV23 04/13/2007     Discussed Pneumococcal vaccines,    Prevnar  is up to date   Pneumovax  is up to date  She does do yearly Flu shot  Tdap/tetanus shot will be done at a future date  (done every 10 yrs for superficial cuts, every 5 yrs for deep wounds)   Can also look into coverage for new shingles shot, Shingrix (indicated if over 48years of age ) Can do that at pharmacy  Is a former smoker, quit 6 years ago -   she has not required nebulizer treatments, stay on Flovent, uses 1 puff daily  Increase/call us if needed      Regarding Colon Cancer screening,    Not indicated   Uses Omeprazole rarely re acid reflux  She does not see her Gynecologist routinely    not indicated  Mammogram screening was discussed, is not indicated as routine screening -    Discussed bone density screening/ DEXA Scan-   Done in past - stay on Vit D      We discussed end of life planning, she does have a  "LIVING WILL"     Glaucoma screening is up-to-date    We did review previous blood work, testing previously done in November at hospital stay [place], see previous visit with me  Stay on Levothyroxine as is  She is up to date with Lipid screening  She is up to date with Diabetes screening  We will see her back in 6-8 weeks-  sooner as needed    She will redo CMP, TSH, A1c prior to visit

## 2019-01-07 NOTE — PROGRESS NOTES
Lucho LARSEN  [facility]  [address]  [address]  [address] [place], [place], [address]     MEDICARE SUBSEQUENT VISIT NOTE ( part 1 )      NAME: Norma Perales  AGE: [de-identified] y o  SEX: female  : 1940   MRN: [id number]    DATE: 2019  TIME: 4:57 PM    Assessment and Plan     Problem List Items Addressed This Visit        Unprioritized    Acquired hypothyroidism    Relevant Medications    levothyroxine 125 mcg tablet    Other Relevant Orders    TSH, 3rd generation    Aortic valve stenosis    Relevant Medications    diltiazem (CARDIZEM CD) 240 mg 24 hr capsule    CVA (cerebral vascular accident) ([address] )    Relevant Medications    rosuvastatin (CRESTOR) 5 mg tablet    Essential hypertension    Relevant Medications    diltiazem (CARDIZEM CD) 240 mg 24 hr capsule    Other Relevant Orders    Comprehensive metabolic panel    Gait disturbance    Imbalance    Slurred speech    Type 2 diabetes mellitus without complication, without long-term current use of insulin (HCC)    Relevant Medications    rosuvastatin (CRESTOR) 5 mg tablet    Other Relevant Orders    Hemoglobin A1C      Other Visit Diagnoses     Medicare annual wellness visit, subsequent    -  Primary          Medicare Wellness Counseling/ Discussion    Patient Instructions     She is slowly improving after stroke, she will continue with speech therapy/physical therapy  We will continue to work at lowering cardiovascular risk, blood pressure control, aspirin 81 mg daily, I would like her to try another statin, Crestor 5 mg 3 days weekly  Call if muscle aches  She has been working on her diet, she has dropped 7-1/2 lb since I saw her in December, keep up the good work  Holter monitor showed average heart rate 72 with rare PVC, no sign of AFib  We will plan to redo echocardiogram in 6 to 12 months, did have some progression to moderate aortic stenosis on echo in [place]     [place]ot[place] were w [place]  She saw Neurology 1 week ago -  To do scan later in month  She does remain medically stable to undergo upcoming left-sided battery replacement  Immunization History   Administered Date(s) Administered    Influenza 10/13/2016, 10/23/2017    Influenza Split High Dose Preservative Free IM 10/15/2013, 10/16/2014, 10/28/2015, 10/13/2016, 10/23/2017    Influenza TIV (IM) 10/31/2007, 01/05/2011, 10/01/2011, 10/05/2011, 10/18/2018    Influenza, high dose seasonal 0 5 mL 10/12/2018    Pneumococcal Conjugate 13-Valent 02/24/2015    Pneumococcal Polysaccharide PPV23 04/13/2007     Discussed Pneumococcal vaccines,    Prevnar  is up to date   Pneumovax  is up to date  She does do yearly Flu shot  Tdap/tetanus shot will be done at a future date  (done every 10 yrs for superficial cuts, every 5 yrs for deep wounds)   Can also look into coverage for new shingles shot, Shingrix (indicated if over 48years of age ) Can do that at pharmacy  Is a former smoker, quit 6 years ago -   she has not required nebulizer treatments, stay on Flovent, uses 1 puff daily  Increase/call us if needed      Regarding Colon Cancer screening,    Not indicated   Uses Omeprazole rarely re acid reflux  She does not see her Gynecologist routinely  not indicated  Mammogram screening was discussed, is  not indicated as routine screening -    Discussed bone density screening/ DEXA Scan-   Done in past - stay on Vit D      We discussed end of life planning, she does have a  "LIVING WILL"     Glaucoma screening is up-to-date    We did review previous blood work, testing previously done in November at hospital stay [place], see previous visit with me  Stay on Levothyroxine as is  She is up to date with Lipid screening  She is up to date with Diabetes screening  We will see her back in 6-8 weeks-  sooner as needed    She will redo CMP, TSH, A1c prior to visit                 Chief Complaint     Chief Complaint Patient presents with   [facility] Wellness Visit     Subsequent        History of Present Illness     Tyree Feng is a [de-identified]y o -year-old female who is in today for a follow-up visit, I had seen her December 3rd after hospitalization in [place], she is doing speech therapy along with physical therapy, is improving regarding stroke symptoms/change in speech  She is watching her diet closely, has dropped another 7 lb  She is using all medication as directed  Review of Systems     Review of Systems   Constitutional: Negative for activity change (Ambulates with cane, no falls), appetite change, chills, fatigue, fever and unexpected weight change  HENT: Negative for congestion, mouth sores, nosebleeds, sinus pressure, sore throat and trouble swallowing  Eyes: Negative for visual disturbance  Respiratory: Negative for cough, choking, chest tightness and shortness of breath  Cardiovascular: Negative for chest pain, palpitations and leg swelling (Improved, uses Lasix)  Gastrointestinal: Negative for abdominal pain, blood in stool, constipation, diarrhea, nausea and vomiting  Uses PPI once every 1 to 2 weeks  No change in bowel   Genitourinary: Negative for dysuria and hematuria  Skin: Negative for wound  Neurological: Positive for speech difficulty  Negative for dizziness, syncope, light-headedness and headaches  Hematological: Does not bruise/bleed easily  Psychiatric/Behavioral: Negative for behavioral problems, confusion, dysphoric mood and sleep disturbance         Active Problem List     Patient Active Problem List   Diagnosis    Essential hypertension    Benign familial tremor    [facility]) [facility]    COPD (chronic obstructive pulmonary disease) ([facility])    Edema    Esophageal reflux    Gait disturbance    Hypercholesterolemia    Sleep disorder    Sciatica    Right shoulder pain    Osteoarthritis of hip    OA (osteoarthritis)    Multiple joint pain    Imbalance    Acquired hypothyroidism    Lumbar degenerative disc disease    Low back pain    Type 2 diabetes mellitus without complication, without long-term current use of insulin ([facility])    History of pancreatitis    Aortic valve stenosis    Slurred speech    S/P deep brain stimulator placement    Microscopic hematuria       Past Medical History:  Past Medical History:   Diagnosis Date    Arthritis     Asthma     Benign essential tremor 10/15/2018    Added automatically from request for surgery [id number]    Benign neoplasm of large intestine     Cellulitis of leg, right     Closed compression fracture of body of lumbar vertebra ([facility])     L5    COPD (chronic obstructive pulmonary disease) ([facility])     Difficulty waking     post anesthesia    Disease of thyroid gland     High cholesterol     Hypertension     Osteopenia     Osteoporosis     Shortness of breath     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     Urinary leakage     Vitamin D deficiency     Wears glasses     Wears partial dentures     upper       Past Surgical History:  Past Surgical History:   Procedure Laterality Date    CATARACT EXTRACTION W/  INTRAOCULAR LENS IMPLANT Bilateral     COLONOSCOPY      DEEP BRAIN STIMULATOR PLACEMENT      KNEE ARTHROSCOPY      [place] IMP STIM,CRANIAL,SUBQ,>1 ARRAY Right 12/13/2018    Procedure: REPLACEMENT RIGHT DBS IMPLANTABLE PULSE GENERATOR;  Surgeon: Makenna Castelan MD;  [facility];  Service: Neurosurgery    AZ TOTAL HIP ARTHROPLASTY Left 5/20/2016    Procedure: ARTHROPLASTY HIP TOTAL ANTERIOR;  Surgeon: Debbie Alvarez MD;  Location: [facility];  Service: Orthopedics    ROTATOR CUFF REPAIR         Family History:  Family History   Problem Relation Age of Onset    Breast cancer Mother     Throat cancer Family        Social History:  Social History     Social History    Marital status:       Spouse name: N/A    Number of children: N/A    Years of education: N/A     Occupational History    Not on file  Social History Main Topics    Smoking status: Former Smoker     Quit date: 2013    Smokeless tobacco: Never Used    Alcohol use Yes      Comment: 2 a month    Drug use: No    Sexual activity: Not on file     Other Topics Concern    Not on file     Social History Narrative    Caffeine use    Living independently               Objective     Vitals:    01/07/19 1544   BP: 128/74   BP Location: Left arm   Patient Position: Sitting   Cuff Size: Large   Pulse: 69   SpO2: 95%   Weight: 82 8 kg (182 lb 9 6 oz)   Height: 5' 3" (1 6 m)     Body mass index is 32 35 kg/m²  BP Readings from Last 3 Encounters:   01/07/19 128/74   01/03/19 141/63   12/21/18 117/61       Wt Readings from Last 3 Encounters:   01/07/19 82 8 kg (182 lb 9 6 oz)   01/03/19 84 4 kg (186 lb)   12/21/18 86 2 kg (190 lb)       Physical Exam   Constitutional: She is oriented to person, place, and time  Very pleasant overweight female seated on table, speech intelligible, actually appears improved versus prior visit   HENT:   Mouth/Throat: Oropharynx is clear and moist    Eyes: Conjunctivae are normal  No scleral icterus  Neck: Neck supple  Cardiovascular:   Regular rate and rhythm, 2/6 systolic ejection murmur right 2nd intercostal space   Pulmonary/Chest: Effort normal  No respiratory distress  She has no wheezes  She has no rales  Abdominal: Soft  There is no tenderness  Musculoskeletal: She exhibits no edema  Lymphadenopathy:     She has no cervical adenopathy  Neurological: She is alert and oriented to person, place, and time  Psychiatric: She has a normal mood and affect   Her behavior is normal        ALLERGIES:  Allergies   Allergen Reactions    Ciprofloxacin Diarrhea    Simvastatin Myalgia    Advair Diskus [Fluticasone-Salmeterol] Palpitations    Tetracycline Rash     Reaction Date: [id number];        Current Medications     Current Outpatient Prescriptions   Medication Sig Dispense Refill    albuterol (2 5 mg/3 mL) 0 083 % nebulizer solution Take 2 5 mg by nebulization as needed for wheezing   aspirin 81 MG tablet Take 1 tablet (81 mg total) by mouth daily YOU MAY RESTART ASPIRIN ON SATURDAY 12/15/2018   0    Cholecalciferol (VITAMIN D-3 PO) Take 1,000 Units by mouth daily   diltiazem (CARDIZEM CD) 240 mg 24 hr capsule Take 1 capsule (240 mg total) by mouth daily 30 capsule 5    Fluticasone Propionate, Inhal, (FLOVENT DISKUS) 250 MCG/BLIST AEPB Inhale 1 puff daily as needed        furosemide (LASIX) 20 mg tablet Take 1 tablet (20 mg total) by mouth daily 90 tablet 3    levothyroxine 125 mcg tablet Take 1 tablet (125 mcg total) by mouth daily 30 tablet 5    Multiple Vitamins-Minerals (PRESERVISION AREDS PO) Take 1 Cap by Mouth daily      multivitamin (THERAGRAN) TABS Take 1 tablet by mouth daily      omeprazole (PriLOSEC) 20 mg delayed release capsule Take 1 capsule by mouth daily as needed      rosuvastatin (CRESTOR) 5 mg tablet Take 1 tablet (5 mg total) by mouth 3 (three) times a week Mon/Weds/Fri 15 tablet 5     No current facility-administered medications for this visit            Health Maintenance     See other note today re clinical AWV info             Most recent labs available from [address] [facility] [address]   ( others may be available in Care Everywhere / Media sections)  Lab Results   Component Value Date    WBC 10 08 06/16/2017    HGB 14 3 06/16/2017    HCT 44 6 06/16/2017     12/13/2018    CHOL 176 10/29/2014    TRIG 131 10/29/2014    HDL 47 04/12/2016    ALT 32 06/16/2017    AST 25 06/16/2017     10/29/2014    K 4 0 08/09/2018     08/09/2018    CREATININE 0 81 08/09/2018    BUN 25 08/09/2018    CO2 31 08/09/2018    INR 0 96 05/10/2016    GLUF 86 06/16/2017    HGBA1C 6 8 06/25/2018     Lab Results   Component Value Date    LDLCALC 99 10/29/2014     Lab Results   Component Value Date    JHI2KMZFGNIL 2 130 06/16/2017       Orders Placed This Encounter Procedures    Comprehensive metabolic panel    TSH, 3rd generation    Hemoglobin A1C             Judy Coronado DO

## 2019-01-10 ENCOUNTER — OFFICE VISIT (OUTPATIENT)
Dept: NEUROSURGERY | Facility: CLINIC | Age: 79
End: 2019-01-10

## 2019-01-10 VITALS
SYSTOLIC BLOOD PRESSURE: 144 MMHG | HEART RATE: 62 BPM | TEMPERATURE: 98.4 F | RESPIRATION RATE: 16 BRPM | DIASTOLIC BLOOD PRESSURE: 72 MMHG | HEIGHT: 63 IN | WEIGHT: 187 LBS | BODY MASS INDEX: 33.13 KG/M2

## 2019-01-10 DIAGNOSIS — G25.0 BENIGN FAMILIAL TREMOR: Primary | ICD-10-CM

## 2019-01-10 DIAGNOSIS — Z01.818 PREOPERATIVE EXAMINATION: ICD-10-CM

## 2019-01-10 PROCEDURE — PREOP: Performed by: NURSE PRACTITIONER

## 2019-01-10 NOTE — PROGRESS NOTES
Assessment/Plan:   Diagnoses and all orders for this visit:    Benign familial tremor    Preoperative examination       Subjective:      Patient ID: Chata Page is a [de-identified] y o  female  HPI   She presents accompanied by grandson for preoperative H & P , is scheduled for surgery on 1/22 for replacement of DBS IPG Left upper chest   She has a longstanding history of essentia tremor  Recent neurology visit revealed left upper chest DBS nearing EOL 2 62  She recently had right DBS IPG replacement  She reports DBS delivers efficacy for controlling ET symptoms  She has residual slurred speech from a recent CVA but also exhibits left jaw tremor       PLAN:   Preoperative Assessments :  Prior General  Anesthesia Reactions:  Denies   Exercise Capacity ---   she  denies exertional symptoms  of dyspnea or chest pain when climbing 2 flights of stairs or when walking 2 city blocks  Nicotine dependence  ----   Never smoked    Personal history of venous thromboembolic disease;denies recent CVA unk if embolic  Bleeding Tendency --- denies easily bruising    History of cancer or other health condition that requires routine MRI imagining- --denies    Surgery Clearance;  PCP/Medical Clearance- pending completion-pending scheduling appointment  1/17/2019 Medicare wellness examination she understands to evonne Swan and schedule appointment  Cardiac-  Holter report  12/2018  PAT-completed  11/24/2018, HgA1c 6 4   Imagining requirement: none required      In preparation for surgery the following information is explained;  Medication exclusion  list provided and reviewed - do not take 7 days prior surgery or 14 days postoperatively including  OTC, supplements,  ASA containing  products (Excedrin migraine) and NSAID  Explained pre operative skin preparation to bathe/ shower with Hibiclens (chlorhexidine) and use Tad cloth wipes as per protocol         Read and review the education booklet provided by the OR  several weeks ago  Informed an antibiotic will be prescribed the day prior surgery,   start night of surgery and continue x 1 week  Postoperative activity restrictions were reviewed , follow the basic "BLTs" no bending, no lifting greater than 10 lbs  , no twisting, and no stretching thru 6 weeks post op  Can ambulate as tolerated immediately post op   Avoid immersion in water no swimming, hot tub use , or tub bath thru  6 week post -op   Postoperative pain management and postoperative opioid induced constipation and bowel hygiene measures discussed  Will medicated prn w/ EST   Postoperative follow-up appointments  reviewed for 2 week incision assessment cut suture knots  Notified neurologist of surgery replacement date      Impressions and treatment recommendations were discussed in detail with the patient who verbalized understanding, all questions were answered and contact information provided in the event future questions arise  The following portions of the patient's history were reviewed and updated as appropriate:   She  has a past medical history of Arthritis; Asthma; Benign essential tremor (10/15/2018); Benign neoplasm of large intestine; Cellulitis of leg, right; Closed compression fracture of body of lumbar vertebra (HCC); COPD (chronic obstructive pulmonary disease) ([address] ); Difficulty waking; Disease of thyroid gland; High cholesterol; Hypertension; Osteopenia; Osteoporosis; Shortness of breath; 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; Urinary leakage; Vitamin D deficiency; Wears glasses; and Wears partial dentures    She   Patient Active Problem List    Diagnosis Date Noted    Preoperative examination 01/10/2019    S/P deep brain stimulator placement 12/03/2018    Microscopic hematuria 12/03/2018    Slurred speech 11/23/2018    Aortic valve stenosis 08/27/2018    History of pancreatitis 07/13/2018    Lumbar degenerative disc disease 06/25/2018    Low back pain 06/25/2018    Type 2 diabetes mellitus without complication, without long-term current use of insulin ([facility] ) 06/25/2018    CVA (cerebral vascular accident) ([facility] ) 10/30/2017    Gait disturbance 10/30/2017    Imbalance 10/23/2017    Essential hypertension 05/21/2016    Osteoarthritis of hip 04/12/2016    COPD (chronic obstructive pulmonary disease) ([facility] ) 12/21/2015    Right shoulder pain 10/28/2014    Sciatica 06/23/2014    Esophageal reflux 04/15/2013    OA (osteoarthritis) 04/15/2013    Multiple joint pain 04/15/2013    Edema 10/16/2012    Sleep disorder 07/20/2012    Benign familial tremor 06/05/2012    Hypercholesterolemia 05/18/2012    Acquired hypothyroidism 05/18/2012     She  has a past surgical history that includes Cataract extraction w/  intraocular lens implant (Bilateral); Deep brain stimulator placement; pr total hip arthroplasty (Left, 5/20/2016); Colonoscopy; Knee arthroscopy; Rotator cuff repair; and pr imp stim,cranial,subq,>1 array (Right, 12/13/2018)  Her family history includes Breast cancer in her mother; Throat cancer in her family  She  reports that she quit smoking about 6 years ago  She has never used smokeless tobacco  She reports that she drinks alcohol  She reports that she does not use drugs  Current Outpatient Prescriptions   Medication Sig Dispense Refill    albuterol (2 5 mg/3 mL) 0 083 % nebulizer solution Take 2 5 mg by nebulization as needed for wheezing   aspirin 81 MG tablet Take 1 tablet (81 mg total) by mouth daily YOU MAY RESTART ASPIRIN ON SATURDAY 12/15/2018   0    Cholecalciferol (VITAMIN D-3 PO) Take 1,000 Units by mouth daily        diltiazem (CARDIZEM CD) 240 mg 24 hr capsule Take 1 capsule (240 mg total) by mouth daily 30 capsule 5    Fluticasone Propionate, Inhal, (FLOVENT DISKUS) 250 MCG/BLIST AEPB Inhale 1 puff daily as needed        furosemide (LASIX) 20 mg tablet Take 1 tablet (20 mg total) by mouth daily 90 tablet 3    levothyroxine 125 mcg tablet Take 1 tablet (125 mcg total) by mouth daily 30 tablet 5    Multiple Vitamins-Minerals (PRESERVISION AREDS PO) Take 1 Cap by Mouth daily      multivitamin (THERAGRAN) TABS Take 1 tablet by mouth daily      omeprazole (PriLOSEC) 20 mg delayed release capsule Take 1 capsule by mouth as needed        rosuvastatin (CRESTOR) 5 mg tablet Take 1 tablet (5 mg total) by mouth 3 (three) times a week Mon/Weds/Fri 15 tablet 5     No current facility-administered medications for this visit  She is allergic to ciprofloxacin; simvastatin; advair diskus [fluticasone-salmeterol]; and tetracycline       Review of Systems   Constitutional: Negative  HENT: Negative  Eyes: Negative  Respiratory: Negative  Cardiovascular: Negative  Gastrointestinal: Negative  Endocrine: Negative  Genitourinary: Negative  Musculoskeletal: Positive for gait problem  Skin: Negative  Allergic/Immunologic: Negative  Hematological: Negative  Psychiatric/Behavioral: Negative  All other systems reviewed and are negative  Objective:      /72 (BP Location: Right arm, Patient Position: Sitting, Cuff Size: Adult)   Pulse 62   Temp 98 4 °F (36 9 °C) (Tympanic)   Resp 16   Ht 5' 3" (1 6 m)   Wt 84 8 kg (187 lb)   BMI 33 13 kg/m²          Physical Exam   Constitutional: She is oriented to person, place, and time  She appears well-developed and well-nourished  No distress  Eyes: Right eye exhibits no discharge  Left eye exhibits no discharge  No scleral icterus  Neck: Normal range of motion  Neck supple  No JVD present  Cardiovascular: Normal rate, regular rhythm, normal heart sounds and intact distal pulses  Exam reveals no gallop and no friction rub  No murmur heard  Pulmonary/Chest: Effort normal and breath sounds normal  No respiratory distress  She has no wheezes  She has no rales  Abdominal: Soft  Bowel sounds are normal  She exhibits no distension     Neurological: She is alert and oriented to person, place, and time  Speech slurred , left face tremor    Skin: Skin is warm and dry  She is not diaphoretic  Psychiatric: She has a normal mood and affect  Her behavior is normal  Thought content normal    Nursing note and vitals reviewed

## 2019-01-11 PROBLEM — G25.0 BENIGN ESSENTIAL TREMOR: Status: ACTIVE | Noted: 2019-01-11

## 2019-01-11 RX ORDER — CHLORHEXIDINE GLUCONATE 0.12 MG/ML
15 RINSE ORAL ONCE
Status: CANCELLED | OUTPATIENT
Start: 2019-01-22 | End: 2019-01-11

## 2019-01-11 RX ORDER — CEFAZOLIN SODIUM 2 G/50ML
2000 SOLUTION INTRAVENOUS ONCE
Status: CANCELLED | OUTPATIENT
Start: 2019-01-22 | End: 2019-01-11

## 2019-01-17 ENCOUNTER — OFFICE VISIT (OUTPATIENT)
Dept: FAMILY MEDICINE CLINIC | Facility: CLINIC | Age: 79
End: 2019-01-17
Payer: MEDICARE

## 2019-01-17 ENCOUNTER — TELEPHONE (OUTPATIENT)
Dept: FAMILY MEDICINE CLINIC | Facility: CLINIC | Age: 79
End: 2019-01-17

## 2019-01-17 VITALS
SYSTOLIC BLOOD PRESSURE: 128 MMHG | HEIGHT: 63 IN | TEMPERATURE: 98.6 F | DIASTOLIC BLOOD PRESSURE: 70 MMHG | HEART RATE: 76 BPM | WEIGHT: 190 LBS | BODY MASS INDEX: 33.66 KG/M2

## 2019-01-17 DIAGNOSIS — M25.473 ANKLE SWELLING, UNSPECIFIED LATERALITY: Primary | ICD-10-CM

## 2019-01-17 DIAGNOSIS — I87.2 ACUTE VENOUS STASIS DERMATITIS OF BOTH LEGS: ICD-10-CM

## 2019-01-17 PROCEDURE — 99213 OFFICE O/P EST LOW 20 MIN: CPT | Performed by: FAMILY MEDICINE

## 2019-01-17 RX ORDER — TRIAMCINOLONE ACETONIDE 1 MG/G
CREAM TOPICAL
Qty: 30 G | Refills: 0 | Status: SHIPPED | OUTPATIENT
Start: 2019-01-17 | End: 2019-03-07 | Stop reason: ALTCHOICE

## 2019-01-17 RX ORDER — CHLORHEXIDINE GLUCONATE 0.12 MG/ML
15 RINSE ORAL ONCE
Status: CANCELLED | OUTPATIENT
Start: 2019-01-17 | End: 2019-01-17

## 2019-01-17 NOTE — TELEPHONE ENCOUNTER
----- Message from Rodrigue Vaz DO sent at 1/16/2019  9:51 AM EST -----  Regarding: pre-op clearance  I just saw Felix Hernandez this month ( see 1/7/19 notes )  and she is medically stable for upcoming procedure

## 2019-01-17 NOTE — PATIENT INSTRUCTIONS
I would like her to restart furosemide at prior dose 20 mg daily  Continue to avoid salt  Use other medication as is other than she will remain off aspirin/supplements for 1 week preop  She can hold water pill morning of surgery  She can use triamcinolone cream on rash lower extremities/right foot, small amount 2 to 3 times daily for up to 5 to 7 days -  long-term she should use good skin moisturizer at least daily on legs/feet  She remains medically stable for upcoming procedure, she will call if she notes increased redness, no improvement with swelling, other symptoms

## 2019-01-19 NOTE — PRE-PROCEDURE INSTRUCTIONS
Pre-Surgery Instructions:   Medication Instructions    albuterol (2 5 mg/3 mL) 0 083 % nebulizer solution Instructed patient per Anesthesia Guidelines   Cholecalciferol (VITAMIN D-3 PO) Instructed patient per Anesthesia Guidelines   diltiazem (CARDIZEM CD) 240 mg 24 hr capsule Instructed patient per Anesthesia Guidelines   Fluticasone Propionate, Inhal, (FLOVENT DISKUS) 250 MCG/BLIST AEPB Instructed patient per Anesthesia Guidelines   furosemide (LASIX) 20 mg tablet Instructed patient per Anesthesia Guidelines   levothyroxine 125 mcg tablet Instructed patient per Anesthesia Guidelines   omeprazole (PriLOSEC) 20 mg delayed release capsule Instructed patient per Anesthesia Guidelines   rosuvastatin (CRESTOR) 5 mg tablet Instructed patient per Anesthesia Guidelines   triamcinolone (KENALOG) 0 1 % cream Instructed patient per Anesthesia Guidelines  Pre-procedure instructions given to patient without any further questions or concerns at this time  Patient resides at [facility], and [address], uses a walker for mobility  Instructed patient to bring day of surgery  Patient reports she has the G was and will review the written instructions prior to use  Patient reports her grandson will be driving her to and from her procedure

## 2019-01-21 ENCOUNTER — TELEPHONE (OUTPATIENT)
Dept: NEUROSURGERY | Facility: CLINIC | Age: 79
End: 2019-01-21

## 2019-01-21 ENCOUNTER — DOCUMENTATION (OUTPATIENT)
Dept: NEUROSURGERY | Facility: CLINIC | Age: 79
End: 2019-01-21

## 2019-01-21 DIAGNOSIS — Z98.890 STATUS POST SURGERY: Primary | ICD-10-CM

## 2019-01-21 DIAGNOSIS — Z98.890 STATES FOLLOWING SURGERY OF EYE AND ADNEXA: ICD-10-CM

## 2019-01-21 RX ORDER — SULFAMETHOXAZOLE AND TRIMETHOPRIM 800; 160 MG/1; MG/1
TABLET ORAL
Qty: 6 TABLET | Refills: 0 | Status: SHIPPED | OUTPATIENT
Start: 2019-01-22 | End: 2019-01-25

## 2019-01-21 RX ORDER — SULFAMETHOXAZOLE AND TRIMETHOPRIM 800; 160 MG/1; MG/1
TABLET ORAL
Qty: 6 TABLET | Refills: 0 | Status: SHIPPED | OUTPATIENT
Start: 2019-01-22 | End: 2019-01-21 | Stop reason: RX

## 2019-01-21 NOTE — TELEPHONE ENCOUNTER
Attempted preoperative call --left VM ABX sent to pharmacy ;   Take tylenol as pre prior discussions

## 2019-01-21 NOTE — TELEPHONE ENCOUNTER
Pre operative call day prior surgery scheduled in the AM w/ Dr Reddy Arora the following information is confirmed / discussed: 1/22/2019 REPLACEMENT IMPLANTABLE PULSE GENERATOR (IPG) DEEP BRAIN STIMULATION (DBS), LEFT (Left Chest)  Allergies Reviewed done   Hold medications reviewed: asa OTC vitamins and dietary supplement  7/7 days   NPO after MN, night prior surgery reviewed:---reinforced as per ASU nurse   Medication (s) instructed by healthcare provider to take the morning of surgery w/ sip of water 4 OZ discussed: as per ASU nurse   Post operative scripts electronic transmission: Bactrim DS----request I sent to [facility] instead of [facility] ---re=entered   Acidophilus---she will purchase OTC to start with antibiotic per daughter request   PDMP site reviewed accessed and reviewed scheduled drug list printed and scanned into record  Pain management script: will take EST 1 every 6 hours or 2 every 8 hours   Pre- operative shower protocol reviewed; Clarify instructions as per protocol, third chlorhexidine shower tonight before surgery, then use MELISSA wipes as per packaging instructions, Use a clean towel and wash cloth starting tonight and continue nightly until seen 2 weeks post operative visit for staple removal  Change bed linens tonight and continue at least 1-2 times weekly  ---reinforced     Informed will receive a telephone call tonight from a hospital representative with time to report on surgery day: ----reinforced   Informed will receive a f/u call within in 24 -48 hours post-op to assess recovery reinforce instructions, and to answer any questions  ---reinforced   Follow-up appointments reviewed  2 week---2/7/2019   Patient verbalized understanding information provided /discussed

## 2019-01-22 ENCOUNTER — HOSPITAL ENCOUNTER (OUTPATIENT)
Facility: HOSPITAL | Age: 79
Setting detail: OUTPATIENT SURGERY
Discharge: HOME | End: 2019-01-22
Attending: NEUROLOGICAL SURGERY | Admitting: NEUROLOGICAL SURGERY
Payer: MEDICARE

## 2019-01-22 ENCOUNTER — ANESTHESIA (OUTPATIENT)
Dept: PERIOP | Facility: HOSPITAL | Age: 79
End: 2019-01-22
Payer: MEDICARE

## 2019-01-22 ENCOUNTER — ANESTHESIA EVENT (OUTPATIENT)
Dept: PERIOP | Facility: HOSPITAL | Age: 79
End: 2019-01-22
Payer: MEDICARE

## 2019-01-22 VITALS
BODY MASS INDEX: 33.06 KG/M2 | HEART RATE: 57 BPM | HEIGHT: 63 IN | OXYGEN SATURATION: 96 % | DIASTOLIC BLOOD PRESSURE: 65 MMHG | TEMPERATURE: 97.4 F | WEIGHT: 186.6 LBS | RESPIRATION RATE: 18 BRPM | SYSTOLIC BLOOD PRESSURE: 157 MMHG

## 2019-01-22 LAB
BILIRUB UR QL STRIP: NEGATIVE
CLARITY UR: CLEAR
COLOR UR: YELLOW
GLUCOSE UR STRIP-MCNC: NEGATIVE MG/DL
HGB UR QL STRIP.AUTO: NEGATIVE
KETONES UR STRIP-MCNC: NEGATIVE MG/DL
LEUKOCYTE ESTERASE UR QL STRIP: NEGATIVE
NITRITE UR QL STRIP: NEGATIVE
PH UR STRIP.AUTO: 6 [PH] (ref 4.5–8)
PROT UR STRIP-MCNC: NEGATIVE MG/DL
SP GR UR STRIP.AUTO: 1.02 (ref 1–1.03)
UROBILINOGEN UR QL STRIP.AUTO: 0.2 E.U./DL

## 2019-01-22 PROCEDURE — 95983 ALYS BRN NPGT PRGRMG 15 MIN: CPT | Performed by: NEUROLOGICAL SURGERY

## 2019-01-22 PROCEDURE — C1767 GENERATOR, NEURO NON-RECHARG: HCPCS | Performed by: NEUROLOGICAL SURGERY

## 2019-01-22 PROCEDURE — 61885 INSRT/REDO NEUROSTIM 1 ARRAY: CPT | Performed by: NEUROLOGICAL SURGERY

## 2019-01-22 PROCEDURE — 81003 URINALYSIS AUTO W/O SCOPE: CPT | Performed by: PHYSICIAN ASSISTANT

## 2019-01-22 DEVICE — INS 37603 ACTIVA SC NO PARYLN EMN DBSALP
Type: IMPLANTABLE DEVICE | Site: CHEST | Status: NON-FUNCTIONAL
Brand: ACTIVA® SC
Removed: 2021-09-08

## 2019-01-22 RX ORDER — MIDAZOLAM HYDROCHLORIDE 1 MG/ML
INJECTION INTRAMUSCULAR; INTRAVENOUS AS NEEDED
Status: DISCONTINUED | OUTPATIENT
Start: 2019-01-22 | End: 2019-01-22 | Stop reason: SURG

## 2019-01-22 RX ORDER — LIDOCAINE HYDROCHLORIDE AND EPINEPHRINE 10; 10 MG/ML; UG/ML
INJECTION, SOLUTION INFILTRATION; PERINEURAL AS NEEDED
Status: DISCONTINUED | OUTPATIENT
Start: 2019-01-22 | End: 2019-01-22 | Stop reason: HOSPADM

## 2019-01-22 RX ORDER — PROPOFOL 10 MG/ML
INJECTION, EMULSION INTRAVENOUS AS NEEDED
Status: DISCONTINUED | OUTPATIENT
Start: 2019-01-22 | End: 2019-01-22 | Stop reason: SURG

## 2019-01-22 RX ORDER — SODIUM CHLORIDE, SODIUM LACTATE, POTASSIUM CHLORIDE, CALCIUM CHLORIDE 600; 310; 30; 20 MG/100ML; MG/100ML; MG/100ML; MG/100ML
75 INJECTION, SOLUTION INTRAVENOUS CONTINUOUS
Status: DISCONTINUED | OUTPATIENT
Start: 2019-01-22 | End: 2019-01-22 | Stop reason: HOSPADM

## 2019-01-22 RX ORDER — FENTANYL CITRATE 50 UG/ML
INJECTION, SOLUTION INTRAMUSCULAR; INTRAVENOUS AS NEEDED
Status: DISCONTINUED | OUTPATIENT
Start: 2019-01-22 | End: 2019-01-22 | Stop reason: SURG

## 2019-01-22 RX ORDER — HYDROCODONE BITARTRATE AND ACETAMINOPHEN 5; 325 MG/1; MG/1
1 TABLET ORAL EVERY 6 HOURS PRN
Status: DISCONTINUED | OUTPATIENT
Start: 2019-01-22 | End: 2019-01-22 | Stop reason: HOSPADM

## 2019-01-22 RX ORDER — CEFAZOLIN SODIUM 2 G/50ML
2000 SOLUTION INTRAVENOUS ONCE
Status: COMPLETED | OUTPATIENT
Start: 2019-01-22 | End: 2019-01-22

## 2019-01-22 RX ORDER — CHLORHEXIDINE GLUCONATE 0.12 MG/ML
15 RINSE ORAL ONCE
Status: COMPLETED | OUTPATIENT
Start: 2019-01-22 | End: 2019-01-22

## 2019-01-22 RX ADMIN — FENTANYL CITRATE 50 MCG: 50 INJECTION, SOLUTION INTRAMUSCULAR; INTRAVENOUS at 09:25

## 2019-01-22 RX ADMIN — CEFAZOLIN SODIUM 2000 MG: 2 SOLUTION INTRAVENOUS at 09:25

## 2019-01-22 RX ADMIN — CHLORHEXIDINE GLUCONATE 0.12% ORAL RINSE 15 ML: 1.2 LIQUID ORAL at 07:50

## 2019-01-22 RX ADMIN — PROPOFOL 20 MG: 10 INJECTION, EMULSION INTRAVENOUS at 09:32

## 2019-01-22 RX ADMIN — MIDAZOLAM HYDROCHLORIDE 2 MG: 1 INJECTION, SOLUTION INTRAMUSCULAR; INTRAVENOUS at 09:25

## 2019-01-22 RX ADMIN — SODIUM CHLORIDE, SODIUM LACTATE, POTASSIUM CHLORIDE, AND CALCIUM CHLORIDE 75 ML/HR: .6; .31; .03; .02 INJECTION, SOLUTION INTRAVENOUS at 07:49

## 2019-01-22 NOTE — DISCHARGE INSTRUCTIONS
Follow Up Dr Sachin Ulloa 2 weeks  Remove Dressing 3 days  Antibiotics and Pain prescription at pharmacy  Hold aspirin for 7 days after surgery  BATTERY CHANGE DISCHARGE INSTRUCTIONS    · No strenous activities for 3 days following surgery  · You can remove the dressing in 3 days  · You may shower in 3 days  · You will be given a prescription for one week of post-operative antibiotics  Please take them as directed  · Please set up a two-week follow-up appointment with our office  · Contact our office if you experience any of the following after your surgery:       Skin around the incision feels warm to the touch; there is redness, swelling, drainage, or bleeding from the incision site  You are experiencing a fever over 101 degrees  · If any questions arise after your procedure, do not hesitate to call our office at [phone]

## 2019-01-22 NOTE — OP NOTE
OPERATIVE REPORT  PATIENT NAME: Masoud Hernandez    :  1940  MRN: [id number]  Pt Location: [facility] OR ROOM 03    SURGERY DATE: 2019    Surgeon(s) and Role:     * Vickie Root MD - Primary     * Jennifer Peres PA-C - Assisting    Preop Diagnosis:  Benign essential tremor [G25 0]    Post-Op Diagnosis Codes: * Benign essential tremor [G25 0]    Procedure(s) (LRB):  REPLACEMENT IMPLANTABLE PULSE GENERATOR (IPG) DEEP BRAIN STIMULATION (DBS), LEFT (Left)    Specimen(s):  * No specimens in log *    Estimated Blood Loss:   Minimal    Drains:       Anesthesia Type:   IV Sedation with Anesthesia    Operative Indications:  Benign essential tremor [G25 0]      Operative Findings:  See dictated note    Complications:   None    Procedure and Technique:  The patient was taken to the operative theater and successfully induced under sedation and was positioned supine  The patients prior incision was marked on the left chest  Then the patient was prepped and draped in sterile fashion, a timeout was performed  We began by making an incision along the old scar with a #10 Blade  We then used monopolar cautery to dissect down to the generator       We then removed the old generator and this was disconnected from the electrode using the proprietary screwdriver  Then the new single channel generator was brought into the field  The new single channel generator was reconnected with the screwdriver and then the impedances were tested and they were found to be within normal limits  The generator was programed to the prior pre-poerative settings of the 4 contact electrode, amplitude frequency and pulse width  Then we placed the new single channel generator into the pocket  We then proceeded to close in layers with 2-0 Vicryl for the deeper tissues and 4-0 running monocryl for the skin      The patient was then allowed to awaken from anesthesia and taken to the recovery area in stable condition   All needle and sponge counts were correct at the end of the procedure      I was present for the entire procedure, A qualified resident physician was not available and A physician assistant was required during the procedure for retraction tissue handling,dissection and suturing    Patient Disposition:  PACU     SIGNATURE: Juan Antonio Eduardo MD  DATE: January 22, 2019  TIME: 10:09 AM    [employer]

## 2019-01-22 NOTE — ANESTHESIA PREPROCEDURE EVALUATION
Review of Systems/Medical History          Cardiovascular  Hyperlipidemia, Hypertension ,    Pulmonary  COPD moderate- medication dependent , Asthma , well controlled/ stable , Shortness of breath,        GI/Hepatic    GERD well controlled,             Endo/Other  Diabetes type 2 , History of thyroid disease , hypothyroidism,      GYN       Hematology   Musculoskeletal    Comment: BMI 33 Arthritis     Neurology    CVA ,   Comment: tremors Psychology           Physical Exam    Airway    Mallampati score: II         Dental   upper dentures,     Cardiovascular  Rhythm: regular, Rate: normal, Murmur,     Pulmonary  Breath sounds clear to auscultation,     Other Findings        Anesthesia Plan  ASA Score- 3     Anesthesia Type- IV sedation with anesthesia with ASA Monitors  Additional Monitors:   Airway Plan:         Plan Factors-    Induction- intravenous  Postoperative Plan-     Informed Consent- Anesthetic plan and risks discussed with patient

## 2019-01-22 NOTE — H&P (VIEW-ONLY)
FAMILY PRACTICE OFFICE VISIT  [address]  [address], [address]      NAME: Vahe Perales  AGE: [de-identified] y o  SEX: female  : 1940   MRN: [id number]    DATE: 2019  TIME: 10:49 AM    Assessment and Plan     Problem List Items Addressed This Visit        Unprioritized    Ankle swelling - Primary      Other Visit Diagnoses     Acute venous stasis dermatitis of both legs        Relevant Medications    triamcinolone (KENALOG) 0 1 % cream          Patient Instructions   I would like her to restart furosemide at prior dose 20 mg daily  Continue to avoid salt  Use other medication as is other than she will remain off aspirin/supplements for 1 week preop  She can hold water pill morning of surgery  She can use triamcinolone cream on rash lower extremities/right foot, small amount 2 to 3 times daily for up to 5 to 7 days -  long-term she should use good skin moisturizer at least daily on legs/feet  She remains medically stable for upcoming procedure, she will call if she notes increased redness, no improvement with swelling, other symptoms  Chief Complaint     Chief Complaint   Patient presents with    Foot Swelling       History of Present Illness   Rebel Marrufo is a [de-identified]y o -year-old female who has noted increased swelling of her ankles along with itching/excoriations anterior tibial bilateral, right foot past few days  Appears to correlate to her holding Lasix past few days, she thought she was supposed to stop this for 1 week preop  I had seen her  and cleared her for upcoming surgery  She has gained 7 lb since then  Otherwise she has been feeling okay  Review of Systems   Review of Systems   Constitutional: Positive for fatigue (Baseline) and unexpected weight change  Negative for activity change, appetite change, chills and fever     HENT: Negative for congestion, mouth sores, nosebleeds, sinus pressure, sore throat and trouble swallowing  Eyes: Negative for visual disturbance  Respiratory: Negative for cough, choking, chest tightness and shortness of breath  Cardiovascular: Positive for leg swelling  Negative for chest pain and palpitations  No orthopnea   Gastrointestinal: Negative for abdominal pain, blood in stool, constipation, diarrhea, nausea and vomiting  No acid reflux     No change in bowel   Genitourinary: Negative for dysuria and hematuria  Neurological: Negative for dizziness, syncope, speech difficulty, light-headedness and headaches  Hematological: Does not bruise/bleed easily  Psychiatric/Behavioral: Negative for behavioral problems and confusion         Active Problem List     Patient Active Problem List   Diagnosis    Essential hypertension    Benign familial tremor    [facility] accident[facility] [address] [facility]    [facility] obstructive [facility] disease) [facility]    Ankle swelling    Esophageal reflux    Gait disturbance    Hypercholesterolemia    Sleep disorder    Sciatica    Right shoulder pain    Osteoarthritis of hip    OA (osteoarthritis)    Multiple joint pain    Imbalance    Acquired hypothyroidism    Lumbar degenerative disc disease    Low back pain    Type 2 diabetes mellitus without complication, without long-term current use of insulin ([facility])    History of pancreatitis    Aortic valve stenosis    Slurred speech    S/P deep brain stimulator placement    Microscopic hematuria    Preoperative examination    Benign essential tremor       Past Medical History:  Past Medical History:   Diagnosis Date    Arthritis     Asthma     Benign essential tremor 10/15/2018    Added automatically from request for surgery [id number]    Benign neoplasm of large intestine     Cellulitis of leg, right     Closed compression fracture of body of lumbar vertebra ([facility])     L5    COPD (chronic obstructive pulmonary disease) ([facility] [address] [facility]     Difficulty waking     post anesthesia    Disease of thyroid gland     High cholesterol     Hypertension     Osteopenia     Osteoporosis     Shortness of breath     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     Urinary leakage     Vitamin D deficiency     Wears glasses     Wears partial dentures     upper       Past Surgical History:  Past Surgical History:   Procedure Laterality Date    CATARACT EXTRACTION W/  INTRAOCULAR LENS IMPLANT Bilateral     COLONOSCOPY      DEEP BRAIN STIMULATOR PLACEMENT      KNEE ARTHROSCOPY      W[place] IMP STIM,CRANIAL,SUBQ,>1 ARRAY Right 12/13/2018    Procedure: REPLACEMENT RIGHT DBS IMPLANTABLE PULSE GENERATOR;  Surgeon: Kristine Meraz MD;  Location: [facility];  Service: Neurosurgery    W[place] TOTAL HIP ARTHROPLASTY Left 5/20/2016    Procedure: ARTHROPLASTY HIP TOTAL ANTERIOR;  Surgeon: Meseret Bermudez MD;  Location: [facility];  Service: Orthopedics    ROTATOR CUFF REPAIR         Family History:  Family History   Problem Relation Age of Onset    Breast cancer Mother     Throat cancer Family        Social History:  Social History     Social History    Marital status:      Spouse name: N/A    Number of children: N/A    Years of education: N/A     Occupational History    Not on file  Social History Main Topics    Smoking status: Former Smoker     Quit date: 2013    Smokeless tobacco: Never Used    Alcohol use Yes      Comment: 2 a month    Drug use: No    Sexual activity: Not on file     Other Topics Concern    Not on file     Social History Narrative    Caffeine use    Living independently               Objective     Vitals:    01/17/19 1019   BP: 128/70   BP Location: Left arm   Patient Position: Sitting   Cuff Size: Large   Pulse: 76   Temp: 98 6 °F (37 °C)   Weight: 86 2 kg (190 lb)   Height: 5' 3" (1 6 m)     Body mass index is 33 66 kg/m²      BP Readings from Last 3 Encounters:   01/17/19 128/70   01/10/19 144/72   01/07/19 128/74 Wt Readings from Last 3 Encounters:   01/17/19 86 2 kg (190 lb)   01/10/19 84 8 kg (187 lb)   01/07/19 82 8 kg (182 lb 9 6 oz)       Physical Exam   Constitutional: She is oriented to person, place, and time  Seated no acute distress   Eyes: Conjunctivae are normal  No scleral icterus  Cardiovascular: Normal rate, regular rhythm and normal heart sounds  Pulmonary/Chest:   Few rhonchi initially, clear after cough   Musculoskeletal: She exhibits edema (+1 slightly pitting edema bilateral lower extremity, has anterior tibial excoriations without sign of infection bilateral anterior tibial/dorsum right foot  )  Neurological: She is alert and oriented to person, place, and time  Psychiatric: She has a normal mood and affect  Her behavior is normal        ALLERGIES:  Allergies   Allergen Reactions    Ciprofloxacin Diarrhea    Simvastatin Myalgia    Advair Diskus [Fluticasone-Salmeterol] Palpitations     Ok w flovent    Tetracycline Rash     Reaction Date: [id number];        Current Medications     Current Outpatient Prescriptions   Medication Sig Dispense Refill    albuterol (2 5 mg/3 mL) 0 083 % nebulizer solution Take 2 5 mg by nebulization as needed for wheezing   aspirin 81 MG tablet Take 1 tablet (81 mg total) by mouth daily YOU MAY RESTART ASPIRIN ON SATURDAY 12/15/2018   0    Cholecalciferol (VITAMIN D-3 PO) Take 1,000 Units by mouth daily        diltiazem (CARDIZEM CD) 240 mg 24 hr capsule Take 1 capsule (240 mg total) by mouth daily 30 capsule 5    Fluticasone Propionate, Inhal, (FLOVENT DISKUS) 250 MCG/BLIST AEPB Inhale 1 puff daily as needed        furosemide (LASIX) 20 mg tablet Take 1 tablet (20 mg total) by mouth daily 90 tablet 3    levothyroxine 125 mcg tablet Take 1 tablet (125 mcg total) by mouth daily 30 tablet 5    Multiple Vitamins-Minerals (PRESERVISION AREDS PO) Take 1 Cap by Mouth daily      multivitamin (THERAGRAN) TABS Take 1 tablet by mouth daily      omeprazole (PriLOSEC) 20 mg delayed release capsule Take 1 capsule by mouth as needed        rosuvastatin (CRESTOR) 5 mg tablet Take 1 tablet (5 mg total) by mouth 3 (three) times a week Mon/Weds/Fri 15 tablet 5    triamcinolone (KENALOG) 0 1 % cream Use small amount on rash lower legs 2 to 3 times daily for up to 5 to 7 days as needed  30 g 0     No current facility-administered medications for this visit  Most recent labs available from [address] [place] [address]   ( others may be available in [facility] / Media sections)  Lab Results   Component Value Date    WBC 10 08 06/16/2017    HGB 14 3 06/16/2017    HCT 44 6 06/16/2017     12/13/2018    CHOL 176 10/29/2014    TRIG 131 10/29/2014    HDL 47 04/12/2016    ALT 32 06/16/2017    AST 25 06/16/2017     10/29/2014    K 4 0 08/09/2018     08/09/2018    CREATININE 0 81 08/09/2018    BUN 25 08/09/2018    CO2 31 08/09/2018    INR 0 96 05/10/2016    GLUF 86 06/16/2017    HGBA1C 6 8 06/25/2018     Lab Results   Component Value Date    LDLCALC 99 10/29/2014     Lab Results   Component Value Date    ORV5AFHYHMGK 2 130 06/16/2017         No orders of the defined types were placed in this encounter          Xavier Wu DO

## 2019-01-23 ENCOUNTER — TELEPHONE (OUTPATIENT)
Dept: NEUROSURGERY | Facility: CLINIC | Age: 79
End: 2019-01-23

## 2019-01-23 NOTE — TELEPHONE ENCOUNTER
Called Dario Perales to see how she is doing after surgery on 1/22/19  She reports that she is doing very well overall and denies any incisional issues or fevers  Patient denies any bleeding, fever, redness, significant pain and swelling  Verified date/time/location of her upcoming POV on 2/7/2019 and advised her to call the office with any further questions or concerns, or if any incisional issues or fevers would arise  Went over incision care with patient and she has no further questions at this time  Patient was appreciative for the call

## 2019-01-28 DIAGNOSIS — I10 ESSENTIAL HYPERTENSION: ICD-10-CM

## 2019-01-28 RX ORDER — DILTIAZEM HYDROCHLORIDE 240 MG/1
CAPSULE, COATED, EXTENDED RELEASE ORAL
Qty: 30 CAPSULE | Refills: 5 | Status: SHIPPED | OUTPATIENT
Start: 2019-01-28 | End: 2019-07-29 | Stop reason: SDUPTHER

## 2019-01-29 ENCOUNTER — ANESTHESIA EVENT (OUTPATIENT)
Dept: RADIOLOGY | Facility: HOSPITAL | Age: 79
End: 2019-01-29

## 2019-01-30 ENCOUNTER — ANESTHESIA (OUTPATIENT)
Dept: RADIOLOGY | Facility: HOSPITAL | Age: 79
End: 2019-01-30

## 2019-01-30 ENCOUNTER — HOSPITAL ENCOUNTER (OUTPATIENT)
Dept: RADIOLOGY | Facility: HOSPITAL | Age: 79
Discharge: HOME | End: 2019-01-30
Attending: PSYCHIATRY & NEUROLOGY
Payer: MEDICARE

## 2019-01-30 VITALS
TEMPERATURE: 98.9 F | RESPIRATION RATE: 20 BRPM | WEIGHT: 186 LBS | SYSTOLIC BLOOD PRESSURE: 139 MMHG | HEART RATE: 80 BPM | OXYGEN SATURATION: 97 % | DIASTOLIC BLOOD PRESSURE: 77 MMHG | HEIGHT: 63 IN | BODY MASS INDEX: 32.96 KG/M2

## 2019-01-30 DIAGNOSIS — R47.81 SLURRED SPEECH: ICD-10-CM

## 2019-01-30 DIAGNOSIS — I63.9 CEREBROVASCULAR ACCIDENT (CVA), UNSPECIFIED MECHANISM (HCC): ICD-10-CM

## 2019-01-30 PROCEDURE — 70551 MRI BRAIN STEM W/O DYE: CPT

## 2019-01-30 RX ORDER — PROPOFOL 10 MG/ML
INJECTION, EMULSION INTRAVENOUS AS NEEDED
Status: DISCONTINUED | OUTPATIENT
Start: 2019-01-30 | End: 2019-01-30 | Stop reason: SURG

## 2019-01-30 RX ORDER — LIDOCAINE HYDROCHLORIDE 10 MG/ML
INJECTION, SOLUTION INFILTRATION; PERINEURAL AS NEEDED
Status: DISCONTINUED | OUTPATIENT
Start: 2019-01-30 | End: 2019-01-30 | Stop reason: SURG

## 2019-01-30 RX ORDER — SODIUM CHLORIDE, SODIUM LACTATE, POTASSIUM CHLORIDE, CALCIUM CHLORIDE 600; 310; 30; 20 MG/100ML; MG/100ML; MG/100ML; MG/100ML
125 INJECTION, SOLUTION INTRAVENOUS CONTINUOUS
Status: DISCONTINUED | OUTPATIENT
Start: 2019-01-30 | End: 2019-01-31 | Stop reason: HOSPADM

## 2019-01-30 RX ADMIN — PROPOFOL 200 MG: 10 INJECTION, EMULSION INTRAVENOUS at 13:48

## 2019-01-30 RX ADMIN — LIDOCAINE HYDROCHLORIDE 40 MG: 10 INJECTION, SOLUTION INFILTRATION; PERINEURAL at 13:48

## 2019-01-30 RX ADMIN — SODIUM CHLORIDE, SODIUM LACTATE, POTASSIUM CHLORIDE, AND CALCIUM CHLORIDE: .6; .31; .03; .02 INJECTION, SOLUTION INTRAVENOUS at 13:43

## 2019-01-30 RX ADMIN — SODIUM CHLORIDE, SODIUM LACTATE, POTASSIUM CHLORIDE, AND CALCIUM CHLORIDE 125 ML/HR: .6; .31; .03; .02 INJECTION, SOLUTION INTRAVENOUS at 11:24

## 2019-01-30 NOTE — DISCHARGE INSTRUCTIONS
Magnetic Resonance Imaging   WHAT YOU NEED TO KNOW:   Magnetic resonance imaging (MRI) is a test that uses magnetic fields and radio waves to take pictures inside of your body  An MRI is used to see blood vessels, tissue, muscles, and bones  It can also show organs, such as your heart, lungs, or liver  An MRI can help your healthcare provider diagnose or treat a medical condition  It does not use radiation  DISCHARGE INSTRUCTIONS:   Drink liquids as directed:  Liquids will help flush the contrast dye out of your body  Ask how much liquid to drink after your MRI, and which liquids to drink  Follow up with your healthcare provider as directed:  Write down your questions so you remember to ask them during your visits  Contact your healthcare provider if:   · You have questions or concerns about your condition or care  Seek care immediately or call 911 if:  You have any signs of an allergic reaction to the contrast dye, such as:  · Trouble breathing    · Dizziness or fainting    · Swelling of your mouth or face    · Nausea or vomiting    · Sudden decrease in urination    · A rash, itching, or swollen skin  © 2017 [facility] Information is for End User's use only and may not be sold, redistributed or otherwise used for commercial purposes  All illustrations and images included in CareNotes® are the copyrighted property of A D A M , Inc  or Louis Walker  The above information is an  only  It is not intended as medical advice for individual conditions or treatments  Talk to your doctor, nurse or pharmacist before following any medical regimen to see if it is safe and effective for you

## 2019-01-30 NOTE — ANESTHESIA POSTPROCEDURE EVALUATION
Post-Op Assessment Note      CV Status:  Stable    Mental Status:  Somnolent and awake    Hydration Status:  Stable    PONV Controlled:  None    Airway Patency:  Patent and adequate  Airway: intubated    Post Op Vitals Reviewed: Yes          Staff: Anesthesiologist           BP      Temp      Pulse     Resp      SpO2

## 2019-01-30 NOTE — ANESTHESIA PREPROCEDURE EVALUATION
Review of Systems/Medical History  Patient summary reviewed  Chart reviewed      Cardiovascular  Hyperlipidemia, Hypertension ,    Pulmonary  COPD mild- PRN medicaiton , Asthma , Shortness of breath,        GI/Hepatic    GERD ,             Endo/Other  Diabetes well controlled type 2 Oral agent, History of thyroid disease , hypothyroidism,   Obesity  morbid obesity   GYN  Negative gynecology ROS          Hematology   Musculoskeletal    Arthritis     Neurology    CVA , no residual symptoms,    Psychology   Negative psychology ROS              Physical Exam    Airway    Mallampati score: II  TM Distance: >3 FB  Neck ROM: full     Dental   upper dentures,     Cardiovascular  Rhythm: regular, Rate: normal,     Pulmonary  Breath sounds clear to auscultation, Decreased breath sounds,     Other Findings        Anesthesia Plan  ASA Score- 3     Anesthesia Type- general with ASA Monitors  Additional Monitors:   Airway Plan: LMA  Plan Factors-    Induction- intravenous  Postoperative Plan- Plan for postoperative opioid use  Informed Consent- Anesthetic plan and risks discussed with patient and daughter  I personally reviewed this patient with the CRNA  Discussed and agreed on the Anesthesia Plan with the CRNA  Corey Hernandez

## 2019-01-30 NOTE — PERIOPERATIVE NURSING NOTE
Spoke to Latrell Decker in MRI  She stated that the representative turned the pt's dbs on in recovery

## 2019-01-30 NOTE — PROGRESS NOTES
Pt awake and alert  VS stable  Pt resting comfortably, no questions at this time  Notified pt of transfer to [facility], called [facility] spoke with Charbel

## 2019-02-07 ENCOUNTER — TELEPHONE (OUTPATIENT)
Dept: NEUROLOGY | Facility: CLINIC | Age: 79
End: 2019-02-07

## 2019-02-07 ENCOUNTER — CLINICAL SUPPORT (OUTPATIENT)
Dept: NEUROSURGERY | Facility: CLINIC | Age: 79
End: 2019-02-07

## 2019-02-07 ENCOUNTER — PROCEDURE VISIT (OUTPATIENT)
Dept: NEUROLOGY | Facility: CLINIC | Age: 79
End: 2019-02-07
Payer: MEDICARE

## 2019-02-07 VITALS
SYSTOLIC BLOOD PRESSURE: 147 MMHG | HEIGHT: 63 IN | WEIGHT: 184 LBS | HEART RATE: 81 BPM | DIASTOLIC BLOOD PRESSURE: 71 MMHG | BODY MASS INDEX: 32.6 KG/M2

## 2019-02-07 VITALS — TEMPERATURE: 97.7 F | SYSTOLIC BLOOD PRESSURE: 150 MMHG | DIASTOLIC BLOOD PRESSURE: 70 MMHG

## 2019-02-07 DIAGNOSIS — Z98.890 POST-OPERATIVE STATE: Primary | ICD-10-CM

## 2019-02-07 DIAGNOSIS — G25.0 BENIGN FAMILIAL TREMOR: Primary | ICD-10-CM

## 2019-02-07 DIAGNOSIS — I63.9 CEREBROVASCULAR ACCIDENT (CVA), UNSPECIFIED MECHANISM (HCC): ICD-10-CM

## 2019-02-07 PROBLEM — R47.81 SLURRED SPEECH: Status: RESOLVED | Noted: 2018-11-23 | Resolved: 2019-02-07

## 2019-02-07 PROCEDURE — 95970 ALYS NPGT W/O PRGRMG: CPT | Performed by: PSYCHIATRY & NEUROLOGY

## 2019-02-07 PROCEDURE — 99213 OFFICE O/P EST LOW 20 MIN: CPT | Performed by: PSYCHIATRY & NEUROLOGY

## 2019-02-07 PROCEDURE — 99024 POSTOP FOLLOW-UP VISIT: CPT

## 2019-02-07 NOTE — PROGRESS NOTES
Post-Op Visit-Neurosurgery    Mirtha Barnes [de-identified] y o  female MRN: [id number]    Chief Complaint  Patient presents post: REPLACEMENT IMPLANTABLE PULSE GENERATOR (IPG) DEEP BRAIN STIMULATION (DBS), LEFT (Left Chest)    History of Present Illness  Patient presents for 2 week POV for incision check  Arrived accompanied by her daughter, who stayed in the waiting room during visit  Mildly unsteady and ambulated without assistive device  Reports she has no surgical pain at this time and has not had to take any pain meds for some time now  Assessment  Wound Exam: Incision is clean, dry, and intact, well approximated without, heat, redness, swelling, or drainage  See image below:           Procedure  Staple/suture removal    location: Left chest wall  Procedure Note: Dissolvable sutures not visible  Patient Status:  the patient tolerated the procedure well  Complications: None  Incision SIS  Discussion/Summary  Had appointment with Dr Nina Chandler earlier today for programming check  Reports she does not have to follow up with Dr Nina Chandler for 1 year  Reviewed incision care with patient including daily observation for s/s infection including: increased erythema, edema, drainage, dehiscence of incision or fever >101  Should these be observed, she understands that she is to call and/or return immediately for reassessment  Advised patient to continue cleansing area with mild soap and water and pat dry  Not to apply any lotions, creams, or ointments, & not to submerge in any water for two more weeks  She is to maintain activity restrictions and continue wearing cervical collar until cleared by the surgeon  Activity levels were also reviewed with the patient in detail, she is to lift no greater than 10 pounds, advised to limit bend/twisting at the waist and ambulation is encouraged as tolerated   Patient is aware that she cannot drive while still in the cervical collar  Verified that she required a 6w pov with Dr Jayjay Correia however she was hesitant to schedule this and transportation is an issue for her  Discussed this with Dr Jayjay Correia who would like to see her at her convenience and may wait until after 3 months to see him if she would like     No appointment scheduled at this time will coordinate with the patient in the near future

## 2019-02-07 NOTE — ASSESSMENT & PLAN NOTE
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

## 2019-02-07 NOTE — PROGRESS NOTES
Patient ID: Jacoby Jones is a [de-identified] y o  female  Assessment/Plan:    Benign familial tremor  Tremor continues to be well controlled  IPG replacements went well Deep brain stimulator was interrogated given recent IPG change but no changes made  See attaches clinical sheets  CVA (cerebral vascular accident) ([facility] [address] )  MRI of the brain showed a chronic right posterior lateral frontal lobe infarct  She reports today facial weakness was left sided  She has no residual symptoms other than a sense of imbalance  She continues on Aspirin  Now tolerating rosuvastatin  Discussed blood pressure and diet control  Repeat labs have already been ordered by PCP  She was encouraged to exercise  Diagnoses and all orders for this visit:    Benign familial tremor    Cerebrovascular accident (CVA), unspecified mechanism ([facility] [address] )           Subjective: Jacoby Jones is a left handed female with ET with prominent mouth and bilateral hand tremor (L>R) who is s/p bilateral VIM DBS 6/14/12, s/p right IPG replacement 12/13/18, with very good response, who presents for follow up  To review, her diagnosis was changed from PD to ET, she was tapered off of Sinemet and started on primidone which was increased to 225mg qhs over time with improved sleep but little effect on tremor  Tapered off after surgery  Previously with moderate head and hand tremors which were prominent, interfering with function and bothersome but have been controlled with stimulation  She returns today s/p right IPG replacement 12/13/18 and left IPG replacement 1/22/19  Surgery went well  Tremor is well controlled with no jaw, head or hand tremor  She is able to perform ADLs without difficulty  Writing is large  MRI of the brain performed to follow up on suspected stroke event in November 2018  MRI showed chronic infarct right posterior lateral frontal lobe and advanced small vessel disease  Time spent reviewing imaging and discussing results  She continues on statin and aspirin  She previously could not tolerate simvastatin due to myalgia  She is tolerating rosunvastatin is being taken only M, W, F  She is on Aspirin 81mg  She is on cardizem for hypertension  Lasix recenlty prescribed for lower extremity edema  She is trying to watch salt intake  PCP has ordered follow up labs for next visit  She does walk with a walker  No falls  No residual deficits from stroke other than feeling less steady on her feet  She can walk with cane  The following portions of the patient's history were reviewed and updated as appropriate: allergies, current medications, past family history, past medical history, past social history and past surgical history  Objective:    Blood pressure 147/71, pulse 81, height 5' 3" (1 6 m), weight 83 5 kg (184 lb)  Physical Exam   Constitutional: She appears well-developed  Neurological: She has normal strength  Vitals reviewed  Neurological Exam  Mental Status   Oriented to person, place, time and situation  Recent and remote memory are intact  Language is fluent with no aphasia  Attention and concentration are normal     Cranial Nerves  CN II: Visual fields full to confrontation  CN III, IV, VI: Extraocular movements intact bilaterally  CN V: Facial sensation is normal   CN VII: Full and symmetric facial movement  CN VIII: Hearing is normal   CN IX, X: Palate elevates symmetrically  CN XI: Shoulder shrug strength is normal   CN XII: Tongue midline without atrophy or fasciculations  Motor   Normal muscle tone  Strength is 5/5 throughout all four extremities  Sensory  Light touch is normal in upper and lower extremities  Coordination  Right: Finger-to-nose normal  Rapid alternating movement normal   Left: Finger-to-nose normal  Rapid alternating movement normal   No head, jaw,  vocal , or hand tremor       Gait Able to rise from chair without using arms  Ambulates with walker  Good stride           ROS:    Review of Systems   Constitutional: Negative  Negative for appetite change and fever  HENT: Negative  Negative for hearing loss, tinnitus, trouble swallowing and voice change  Eyes: Negative  Negative for photophobia and pain  Respiratory: Negative  Negative for shortness of breath  Cardiovascular: Negative  Negative for palpitations  Gastrointestinal: Negative  Negative for nausea and vomiting  Endocrine: Negative  Negative for cold intolerance and heat intolerance  Genitourinary: Positive for frequency and urgency  Negative for dysuria  Musculoskeletal: Positive for back pain and gait problem  Negative for myalgias and neck pain  Skin: Negative  Negative for rash  Neurological: Positive for speech difficulty  Negative for dizziness, tremors, seizures, syncope, facial asymmetry, weakness, light-headedness, numbness and headaches  Hematological: Negative  Does not bruise/bleed easily  Psychiatric/Behavioral: Positive for sleep disturbance  Negative for confusion and hallucinations  Review of system reviewed and agree with above

## 2019-02-07 NOTE — PATIENT INSTRUCTIONS
Mri brain showed chronic infarct right posterior lateral frontal lobe,  Also advanced small vessel disease  Continue statin and aspirin  Obtain labs as ordered by PCP for follow up  Encouraged to exercise  Call prior to follow up should tremor return

## 2019-02-07 NOTE — ASSESSMENT & PLAN NOTE
Tremor continues to be well controlled  IPG replacements went well Deep brain stimulator was interrogated given recent IPG change but no changes made  See attaches clinical sheets

## 2019-02-08 ENCOUNTER — TELEPHONE (OUTPATIENT)
Dept: NEUROSURGERY | Facility: CLINIC | Age: 79
End: 2019-02-08

## 2019-02-08 NOTE — TELEPHONE ENCOUNTER
Contacted [place] to get her scheduled for 6w post op visit  Discussed with Dr Pat Villa and would like to see her post operatively  Said he was willing to push this visit out to after 3 months if this makes it easier on her  Left her message to call back at her convenience

## 2019-02-14 LAB — HBA1C MFR BLD HPLC: 6.5 %

## 2019-03-07 ENCOUNTER — OFFICE VISIT (OUTPATIENT)
Dept: FAMILY MEDICINE CLINIC | Facility: CLINIC | Age: 79
End: 2019-03-07
Payer: MEDICARE

## 2019-03-07 VITALS
WEIGHT: 185 LBS | HEIGHT: 63 IN | SYSTOLIC BLOOD PRESSURE: 112 MMHG | OXYGEN SATURATION: 96 % | BODY MASS INDEX: 32.78 KG/M2 | HEART RATE: 67 BPM | DIASTOLIC BLOOD PRESSURE: 66 MMHG

## 2019-03-07 DIAGNOSIS — I63.9 CEREBROVASCULAR ACCIDENT (CVA), UNSPECIFIED MECHANISM (HCC): ICD-10-CM

## 2019-03-07 DIAGNOSIS — E11.9 TYPE 2 DIABETES MELLITUS WITHOUT COMPLICATION, WITHOUT LONG-TERM CURRENT USE OF INSULIN (HCC): ICD-10-CM

## 2019-03-07 PROCEDURE — 99213 OFFICE O/P EST LOW 20 MIN: CPT | Performed by: FAMILY MEDICINE

## 2019-03-07 RX ORDER — UREA 10 %
1 LOTION (ML) TOPICAL DAILY
COMMUNITY
End: 2019-06-28 | Stop reason: ALTCHOICE

## 2019-03-07 RX ORDER — DOXYCYCLINE 100 MG/1
100 TABLET ORAL 2 TIMES DAILY
COMMUNITY
End: 2019-05-28 | Stop reason: ALTCHOICE

## 2019-03-07 RX ORDER — ASCORBIC ACID 250 MG
500 TABLET ORAL DAILY
COMMUNITY
End: 2020-02-13

## 2019-03-07 RX ORDER — ROSUVASTATIN CALCIUM 5 MG/1
5 TABLET, COATED ORAL DAILY
Qty: 30 TABLET | Refills: 5 | Status: SHIPPED | OUTPATIENT
Start: 2019-03-07 | End: 2019-03-07 | Stop reason: SDUPTHER

## 2019-03-07 RX ORDER — ROSUVASTATIN CALCIUM 5 MG/1
5 TABLET, COATED ORAL DAILY
Qty: 30 TABLET | Refills: 5 | Status: SHIPPED | OUTPATIENT
Start: 2019-03-07 | End: 2019-10-08 | Stop reason: SDUPTHER

## 2019-03-07 NOTE — PATIENT INSTRUCTIONS
She is doing well here today, she will complete out the doxycycline she received from urgent center regarding respiratory infection, continue with twice daily nebulizer treatments along with Flovent  She will use other medication as is and we will see her again in 4 months, redo CMP/TSH/lipids/ A1C after that ( see 2/19 BW)  She is tolerating Crestor 5 mg 3 times weekly, she can increase to once daily

## 2019-06-28 ENCOUNTER — OFFICE VISIT (OUTPATIENT)
Dept: FAMILY MEDICINE CLINIC | Facility: CLINIC | Age: 79
End: 2019-06-28
Payer: MEDICARE

## 2019-06-28 VITALS
SYSTOLIC BLOOD PRESSURE: 136 MMHG | WEIGHT: 183 LBS | HEART RATE: 72 BPM | OXYGEN SATURATION: 97 % | HEIGHT: 63 IN | DIASTOLIC BLOOD PRESSURE: 80 MMHG | BODY MASS INDEX: 32.43 KG/M2 | TEMPERATURE: 97.3 F

## 2019-06-28 DIAGNOSIS — R26.89 IMBALANCE: ICD-10-CM

## 2019-06-28 DIAGNOSIS — R47.1 DYSARTHRIA: ICD-10-CM

## 2019-06-28 DIAGNOSIS — I10 ESSENTIAL HYPERTENSION: ICD-10-CM

## 2019-06-28 DIAGNOSIS — E03.9 ACQUIRED HYPOTHYROIDISM: ICD-10-CM

## 2019-06-28 DIAGNOSIS — J44.9 CHRONIC OBSTRUCTIVE PULMONARY DISEASE, UNSPECIFIED COPD TYPE (HCC): ICD-10-CM

## 2019-06-28 DIAGNOSIS — I63.9 CEREBROVASCULAR ACCIDENT (CVA), UNSPECIFIED MECHANISM (HCC): Primary | ICD-10-CM

## 2019-06-28 DIAGNOSIS — E11.9 TYPE 2 DIABETES MELLITUS WITHOUT COMPLICATION, WITHOUT LONG-TERM CURRENT USE OF INSULIN (HCC): ICD-10-CM

## 2019-06-28 DIAGNOSIS — R26.9 GAIT DISTURBANCE: ICD-10-CM

## 2019-06-28 DIAGNOSIS — I35.0 AORTIC VALVE STENOSIS, ETIOLOGY OF CARDIAC VALVE DISEASE UNSPECIFIED: ICD-10-CM

## 2019-06-28 PROBLEM — G25.0 BENIGN ESSENTIAL TREMOR: Status: RESOLVED | Noted: 2019-01-11 | Resolved: 2019-06-28

## 2019-06-28 PROBLEM — Z01.818 PREOPERATIVE EXAMINATION: Status: RESOLVED | Noted: 2019-01-10 | Resolved: 2019-06-28

## 2019-06-28 PROCEDURE — 99214 OFFICE O/P EST MOD 30 MIN: CPT | Performed by: FAMILY MEDICINE

## 2019-07-09 ENCOUNTER — HOSPITAL ENCOUNTER (OUTPATIENT)
Dept: RADIOLOGY | Facility: HOSPITAL | Age: 79
Discharge: HOME | End: 2019-07-09
Payer: MEDICARE

## 2019-07-09 ENCOUNTER — OFFICE VISIT (OUTPATIENT)
Dept: FAMILY MEDICINE CLINIC | Facility: CLINIC | Age: 79
End: 2019-07-09
Payer: MEDICARE

## 2019-07-09 VITALS
OXYGEN SATURATION: 92 % | TEMPERATURE: 98.7 F | DIASTOLIC BLOOD PRESSURE: 62 MMHG | BODY MASS INDEX: 32.74 KG/M2 | HEART RATE: 73 BPM | HEIGHT: 63 IN | SYSTOLIC BLOOD PRESSURE: 104 MMHG | WEIGHT: 184.8 LBS

## 2019-07-09 DIAGNOSIS — J45.41 MODERATE PERSISTENT ASTHMATIC BRONCHITIS WITH ACUTE EXACERBATION: Primary | ICD-10-CM

## 2019-07-09 DIAGNOSIS — J45.41 MODERATE PERSISTENT ASTHMATIC BRONCHITIS WITH ACUTE EXACERBATION: ICD-10-CM

## 2019-07-09 DIAGNOSIS — J44.9 CHRONIC OBSTRUCTIVE PULMONARY DISEASE, UNSPECIFIED COPD TYPE (HCC): ICD-10-CM

## 2019-07-09 LAB — HBA1C MFR BLD HPLC: 6.7 %

## 2019-07-09 PROCEDURE — 1124F ACP DISCUSS-NO DSCNMKR DOCD: CPT | Performed by: FAMILY MEDICINE

## 2019-07-09 PROCEDURE — 71046 X-RAY EXAM CHEST 2 VIEWS: CPT

## 2019-07-09 PROCEDURE — 99214 OFFICE O/P EST MOD 30 MIN: CPT | Performed by: FAMILY MEDICINE

## 2019-07-09 RX ORDER — AZITHROMYCIN 250 MG/1
TABLET, FILM COATED ORAL
Qty: 6 TABLET | Refills: 0 | Status: SHIPPED | OUTPATIENT
Start: 2019-07-09 | End: 2019-07-13

## 2019-07-09 RX ORDER — ALBUTEROL SULFATE 2.5 MG/3ML
2.5 SOLUTION RESPIRATORY (INHALATION) 2 TIMES DAILY
Qty: 60 VIAL | Refills: 1 | Status: ON HOLD | OUTPATIENT
Start: 2019-07-09 | End: 2020-02-19 | Stop reason: SDUPTHER

## 2019-07-09 RX ORDER — PREDNISONE 10 MG/1
TABLET ORAL
Qty: 30 TABLET | Refills: 0 | Status: SHIPPED | OUTPATIENT
Start: 2019-07-09 | End: 2019-08-08

## 2019-07-09 NOTE — PROGRESS NOTES
FAMILY PRACTICE OFFICE VISIT  [facility]  [address]  [address], [address]      NAME: Madisyn Perales  AGE: [de-identified] y o  SEX: female  : 1940   MRN: [id number]    DATE: 2019  TIME: 4:59 PM    Assessment and Plan     Problem List Items Addressed This Visit        Respiratory    COPD (chronic obstructive pulmonary disease) (HCC)    Relevant Medications    albuterol (2 5 mg/3 mL) 0 083 % nebulizer solution    predniSONE 10 mg tablet      Other Visit Diagnoses     Moderate persistent asthmatic bronchitis with acute exacerbation    -  Primary    Relevant Medications    albuterol (2 5 mg/3 mL) 0 083 % nebulizer solution    predniSONE 10 mg tablet    azithromycin (ZITHROMAX) 250 mg tablet    Other Relevant Orders    XR chest pa & lateral          Patient Instructions   Has exacerbation COPD/asthma, she will use azithromycin, she will use prednisone, watch for palpitations-in the past she did have palpitations with prednisone, discussed use , can use albuterol nebulizer every 4 to 6 hours as needed  She will go for chest x-ray  If any worsening she will present to the emergency room, call if questions  She did have blood work earlier today, CBC, CMP, TSH were fine, cholesterol improved  She will cancel her follow-up appointment with me in 2 days, update us then  Chief Complaint     Chief Complaint   Patient presents with    Cough    Wheezing       History of Present Illness   Carlitos yRan is a [de-identified]y o -year-old female who had onset 3 days ago increased cough, chest congestion with wheezing  Notes some mild increased dyspnea on exertion  Cough kept her up last night, mostly nonproductive  No chest pain or increased palpitations, no increased ankle edema  Did use some DayQuil  Review of Systems   Review of Systems   Constitutional: Negative for chills and fever     HENT:        Recent days had mild upper congestion with postnasal drip/rhinorrhea  No sinus pain, no difficulty swallowing, no sore throat   Respiratory: Positive for cough and wheezing  Cardiovascular: Negative for chest pain and leg swelling  Gastrointestinal: Negative for abdominal pain  No change in bowel, no rectal bleeding, no nausea or vomiting   Genitourinary: Negative for dysuria and hematuria  Neurological: Negative for dizziness and light-headedness  Hematological: Does not bruise/bleed easily  Psychiatric/Behavioral: Negative for behavioral problems and confusion  Active Problem List     Patient Active Problem List   Diagnosis    Essential hypertension    Benign familial tremor    [facility]A [facility]cere[facility] accident) [facility] [address] )    [facility]    Esophageal reflux    Gait disturbance    Hypercholesterolemia    Sleep disorder    Sciatica    Right shoulder pain    Osteoarthritis of hip    OA (osteoarthritis)    Multiple joint pain    Imbalance    Acquired hypothyroidism    Lumbar degenerative disc disease    Low back pain    Type 2 diabetes mellitus without complication, without long-term current use of insulin (HCC)    History of pancreatitis    Aortic valve stenosis - Moderate    S/P deep brain stimulator placement    Microscopic hematuria    Dysarthria related to Nov 2018 CVA       Past Medical History:  Reviewed    Past Surgical History:  Reviewed    Family History:  Reviewed    Social History:  Reviewed    Objective     Vitals:    07/09/19 1611   BP: 104/62   BP Location: Left arm   Patient Position: Sitting   Cuff Size: Large   Pulse: 73   Temp: 98 7 °F (37 1 °C)   SpO2: 92%   Weight: 83 8 kg (184 lb 12 8 oz)   Height: 5' 3" (1 6 m)     Body mass index is 32 74 kg/m²      BP Readings from Last 3 Encounters:   07/09/19 104/62   06/28/19 136/80   03/07/19 112/66       Wt Readings from Last 3 Encounters:   07/09/19 83 8 kg (184 lb 12 8 oz) 06/28/19 83 kg (183 lb)   03/07/19 83 9 kg (185 lb)       Physical Exam   Constitutional: She is oriented to person, place, and time  Occasional cough, oxygenating at 92%/93% room air, afebrile   HENT:   Mouth/Throat: Oropharynx is clear and moist  No oropharyngeal exudate  Eyes: Conjunctivae are normal  No scleral icterus  Cardiovascular: Normal rate, regular rhythm and normal heart sounds  Pulmonary/Chest:   Diffuse rhonchi,   Abdominal: Soft  There is no tenderness  Musculoskeletal: She exhibits no edema  Lymphadenopathy:     She has no cervical adenopathy  Neurological: She is alert and oriented to person, place, and time  Psychiatric: She has a normal mood and affect  ALLERGIES:  Allergies   Allergen Reactions    Ciprofloxacin Diarrhea    Simvastatin Myalgia    Advair Diskus [Fluticasone-Salmeterol] Palpitations     Ok w flovent    Tetracycline Rash     Reaction Date: [id number];        Current Medications     Current Outpatient Medications   Medication Sig Dispense Refill    albuterol (2 5 mg/3 mL) 0 083 % nebulizer solution Take 1 vial (2 5 mg total) by nebulization 2 (two) times a day 60 vial 1    ascorbic acid (VITAMIN C) 250 MG tablet Take 500 mg by mouth daily      aspirin 81 MG tablet Take 1 tablet (81 mg total) by mouth daily YOU MAY RESTART ASPIRIN ON SATURDAY 12/15/2018   0    Cholecalciferol (VITAMIN D-3 PO) Take 1,000 Units by mouth daily        diltiazem (CARDIZEM CD) 240 mg 24 hr capsule TAKE 1 CAPSULE DAILY 30 capsule 5    Fluticasone Propionate, Inhal, (FLOVENT DISKUS) 250 MCG/BLIST AEPB Inhale 1 puff daily as needed        furosemide (LASIX) 20 mg tablet Take 1 tablet (20 mg total) by mouth daily 90 tablet 3    levothyroxine 125 mcg tablet Take 1 tablet (125 mcg total) by mouth daily 30 tablet 5    Multiple Vitamins-Minerals (PRESERVISION AREDS PO) Take 1 Cap by Mouth daily      multivitamin (THERAGRAN) TABS Take 1 tablet by mouth daily      omeprazole (PriLOSEC) 20 mg delayed release capsule Take 1 capsule by mouth as needed        rosuvastatin (CRESTOR) 5 mg tablet Take 1 tablet (5 mg total) by mouth daily 30 tablet 5    azithromycin (ZITHROMAX) 250 mg tablet Take 2 tablets today then 1 tablet daily x 4 days 6 tablet 0    predniSONE 10 mg tablet Use 40mg x 3 days, 30 mg x 3 days, 20 mg x 3 days, 10 mg x 3 days then stop 30 tablet 0     No current facility-administered medications for this visit               Orders Placed This Encounter   Procedures    XR chest pa & lateral         Bessie Bars, DO

## 2019-07-09 NOTE — PATIENT INSTRUCTIONS
Has exacerbation COPD/asthma, she will use azithromycin, she will use prednisone, watch for palpitations-in the past she did have palpitations with prednisone, discussed use , can use albuterol nebulizer every 4 to 6 hours as needed  She will go for chest x-ray  If any worsening she will present to the emergency room, call if questions  She did have blood work earlier today, CBC, CMP, TSH were fine, cholesterol improved  She will cancel her follow-up appointment with me in 2 days, update us then

## 2019-07-16 ENCOUNTER — TELEPHONE (OUTPATIENT)
Dept: FAMILY MEDICINE CLINIC | Facility: CLINIC | Age: 79
End: 2019-07-16

## 2019-07-16 DIAGNOSIS — J44.9 CHRONIC OBSTRUCTIVE PULMONARY DISEASE, UNSPECIFIED COPD TYPE (HCC): Primary | ICD-10-CM

## 2019-07-16 RX ORDER — PROMETHAZINE HYDROCHLORIDE AND CODEINE PHOSPHATE 6.25; 1 MG/5ML; MG/5ML
SYRUP ORAL
Qty: 120 ML | Refills: 0 | Status: SHIPPED | OUTPATIENT
Start: 2019-07-16 | End: 2019-08-08

## 2019-07-16 NOTE — TELEPHONE ENCOUNTER
Please call her back, it is unusual that she did not respond to steroids, antibiotic, along with nebulizer treatments  Fortunately her chest x-ray did not show pneumonia  I did send in a codeine cough medicine to use 1 tsp at bedtime, no more  If she is not improving in 48 hours she needs re-evaluation in our office    Call us sooner if needed

## 2019-07-16 NOTE — TELEPHONE ENCOUNTER
Pt called and lm on nurse line stating she cannot shake this cough and would really like some cough medicine if you can send some to the pharmacy for her  I called pt and she states cough is a little better than when she saw you but is still there  Pt states she is productive of a very small amount of sputum  Pt denies fevers, cp, sob, etc   Pt did complete the medications you previously ordered for her  Please advise

## 2019-07-29 DIAGNOSIS — I10 ESSENTIAL HYPERTENSION: ICD-10-CM

## 2019-07-29 DIAGNOSIS — E03.9 ACQUIRED HYPOTHYROIDISM: ICD-10-CM

## 2019-07-29 RX ORDER — DILTIAZEM HYDROCHLORIDE 240 MG/1
240 CAPSULE, COATED, EXTENDED RELEASE ORAL DAILY
Qty: 30 CAPSULE | Refills: 5 | Status: SHIPPED | OUTPATIENT
Start: 2019-07-29 | End: 2020-01-24 | Stop reason: SDUPTHER

## 2019-07-29 RX ORDER — LEVOTHYROXINE SODIUM 0.12 MG/1
125 TABLET ORAL DAILY
Qty: 30 TABLET | Refills: 5 | Status: SHIPPED | OUTPATIENT
Start: 2019-07-29 | End: 2020-01-24 | Stop reason: SDUPTHER

## 2019-08-15 NOTE — PROGRESS NOTES
FAMILY PRACTICE OFFICE VISIT  [address]  [address], [address]      NAME: Fauzia Perales  AGE: [de-identified] y o  SEX: female  : 1940   MRN: [id number]    DATE: 2019  TIME: 10:49 AM    Assessment and Plan     Problem List Items Addressed This Visit        Unprioritized    Ankle swelling - Primary      Other Visit Diagnoses     Acute venous stasis dermatitis of both legs        Relevant Medications    triamcinolone (KENALOG) 0 1 % cream          Patient Instructions   I would like her to restart furosemide at prior dose 20 mg daily  Continue to avoid salt  Use other medication as is other than she will remain off aspirin/supplements for 1 week preop  She can hold water pill morning of surgery  She can use triamcinolone cream on rash lower extremities/right foot, small amount 2 to 3 times daily for up to 5 to 7 days -  long-term she should use good skin moisturizer at least daily on legs/feet  She remains medically stable for upcoming procedure, she will call if she notes increased redness, no improvement with swelling, other symptoms  Chief Complaint     Chief Complaint   Patient presents with    Foot Swelling       History of Present Illness   Wagner Dinh is a [de-identified]y o -year-old female who has noted increased swelling of her ankles along with itching/excoriations anterior tibial bilateral, right foot past few days  Appears to correlate to her holding Lasix past few days, she thought she was supposed to stop this for 1 week preop  I had seen her  and cleared her for upcoming surgery  She has gained 7 lb since then  Otherwise she has been feeling okay  Review of Systems   Review of Systems   Constitutional: Positive for fatigue (Baseline) and unexpected weight change  Negative for activity change, appetite change, chills and fever     HENT: Negative for congestion, mouth sores, nosebleeds, sinus pressure, sore throat and trouble swallowing  Eyes: Negative for visual disturbance  Respiratory: Negative for cough, choking, chest tightness and shortness of breath  Cardiovascular: Positive for leg swelling  Negative for chest pain and palpitations  No orthopnea   Gastrointestinal: Negative for abdominal pain, blood in stool, constipation, diarrhea, nausea and vomiting  No acid reflux     No change in bowel   Genitourinary: Negative for dysuria and hematuria  Neurological: Negative for dizziness, syncope, speech difficulty, light-headedness and headaches  Hematological: Does not bruise/bleed easily  Psychiatric/Behavioral: Negative for behavioral problems and confusion         Active Problem List     Patient Active Problem List   Diagnosis    Essential hypertension    Benign familial tremor    [facility] [address] [facility]    [facility]    Ankle swelling    Esophageal reflux    Gait disturbance    Hypercholesterolemia    Sleep disorder    Sciatica    Right shoulder pain    Osteoarthritis of hip    OA (osteoarthritis)    Multiple joint pain    Imbalance    Acquired hypothyroidism    Lumbar degenerative disc disease    Low back pain    Type 2 diabetes mellitus without complication, without long-term current use of insulin ([facility])    History of pancreatitis    Aortic valve stenosis    Slurred speech    S/P deep brain stimulator placement    Microscopic hematuria    Preoperative examination    Benign essential tremor       Past Medical History:  Past Medical History:   Diagnosis Date    Arthritis     Asthma     Benign essential tremor 10/15/2018    Added automatically from request for surgery [id number]    Benign neoplasm of large intestine     Cellulitis of leg, right     Closed compression fracture of body of lumbar vertebra ([facility])     L5    COPD [facility]chronic obstructive pulmonary disease) ([facility] [address] [facility]     Difficulty waking     post anesthesia    Disease of thyroid gland     High cholesterol     Hypertension     Osteopenia     Osteoporosis     Shortness of breath     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     Urinary leakage     Vitamin D deficiency     Wears glasses     Wears partial dentures     upper       Past Surgical History:  Past Surgical History:   Procedure Laterality Date    CATARACT EXTRACTION W/  INTRAOCULAR LENS IMPLANT Bilateral     COLONOSCOPY      DEEP BRAIN STIMULATOR PLACEMENT      KNEE ARTHROSCOPY      FL IMP STIM,CRANIAL,SUBQ,>1 ARRAY Right 12/13/2018    Procedure: REPLACEMENT RIGHT DBS IMPLANTABLE PULSE GENERATOR;  Surgeon: Erma Wilson MD;  Location: [facility];  Service: Neurosurgery    FL TOTAL HIP ARTHROPLASTY Left 5/20/2016    Procedure: ARTHROPLASTY HIP TOTAL ANTERIOR;  Surgeon: Tesha Yadav MD;  Location: [facility];  Service: Orthopedics    ROTATOR CUFF REPAIR         Family History:  Family History   Problem Relation Age of Onset    Breast cancer Mother     Throat cancer Family        Social History:  Social History     Social History    Marital status:      Spouse name: N/A    Number of children: N/A    Years of education: N/A     Occupational History    Not on file  Social History Main Topics    Smoking status: Former Smoker     Quit date: 2013    Smokeless tobacco: Never Used    Alcohol use Yes      Comment: 2 a month    Drug use: No    Sexual activity: Not on file     Other Topics Concern    Not on file     Social History Narrative    Caffeine use    Living independently               Objective     Vitals:    01/17/19 1019   BP: 128/70   BP Location: Left arm   Patient Position: Sitting   Cuff Size: Large   Pulse: 76   Temp: 98 6 °F (37 °C)   Weight: 86 2 kg (190 lb)   Height: 5' 3" (1 6 m)     Body mass index is 33 66 kg/m²      BP Readings from Last 3 Encounters:   01/17/19 128/70   01/10/19 144/72   01/07/19 128/74 Wt Readings from Last 3 Encounters:   01/17/19 86 2 kg (190 lb)   01/10/19 84 8 kg (187 lb)   01/07/19 82 8 kg (182 lb 9 6 oz)       Physical Exam   Constitutional: She is oriented to person, place, and time  Seated no acute distress   Eyes: Conjunctivae are normal  No scleral icterus  Cardiovascular: Normal rate, regular rhythm and normal heart sounds  Pulmonary/Chest:   Few rhonchi initially, clear after cough   Musculoskeletal: She exhibits edema (+1 slightly pitting edema bilateral lower extremity, has anterior tibial excoriations without sign of infection bilateral anterior tibial/dorsum right foot  )  Neurological: She is alert and oriented to person, place, and time  Psychiatric: She has a normal mood and affect  Her behavior is normal        ALLERGIES:  Allergies   Allergen Reactions    Ciprofloxacin Diarrhea    Simvastatin Myalgia    Advair Diskus [Fluticasone-Salmeterol] Palpitations     Ok w flovent    Tetracycline Rash     Reaction Date: [id number];        Current Medications     Current Outpatient Prescriptions   Medication Sig Dispense Refill    albuterol (2 5 mg/3 mL) 0 083 % nebulizer solution Take 2 5 mg by nebulization as needed for wheezing   aspirin 81 MG tablet Take 1 tablet (81 mg total) by mouth daily YOU MAY RESTART ASPIRIN ON SATURDAY 12/15/2018   0    Cholecalciferol (VITAMIN D-3 PO) Take 1,000 Units by mouth daily        diltiazem (CARDIZEM CD) 240 mg 24 hr capsule Take 1 capsule (240 mg total) by mouth daily 30 capsule 5    Fluticasone Propionate, Inhal, (FLOVENT DISKUS) 250 MCG/BLIST AEPB Inhale 1 puff daily as needed        furosemide (LASIX) 20 mg tablet Take 1 tablet (20 mg total) by mouth daily 90 tablet 3    levothyroxine 125 mcg tablet Take 1 tablet (125 mcg total) by mouth daily 30 tablet 5    Multiple Vitamins-Minerals (PRESERVISION AREDS PO) Take 1 Cap by Mouth daily      multivitamin (THERAGRAN) TABS Take 1 tablet by mouth daily      omeprazole (PriLOSEC) 20 mg delayed release capsule Take 1 capsule by mouth as needed        rosuvastatin (CRESTOR) 5 mg tablet Take 1 tablet (5 mg total) by mouth 3 (three) times a week Mon/Weds/Fri 15 tablet 5    triamcinolone (KENALOG) 0 1 % cream Use small amount on rash lower legs 2 to 3 times daily for up to 5 to 7 days as needed  30 g 0     No current facility-administered medications for this visit  Most recent labs available from [address] [place] [address]   ( others may be available in [facility] / Media sections)  Lab Results   Component Value Date    WBC 10 08 06/16/2017    HGB 14 3 06/16/2017    HCT 44 6 06/16/2017     12/13/2018    CHOL 176 10/29/2014    TRIG 131 10/29/2014    HDL 47 04/12/2016    ALT 32 06/16/2017    AST 25 06/16/2017     10/29/2014    K 4 0 08/09/2018     08/09/2018    CREATININE 0 81 08/09/2018    BUN 25 08/09/2018    CO2 31 08/09/2018    INR 0 96 05/10/2016    GLUF 86 06/16/2017    HGBA1C 6 8 06/25/2018     Lab Results   Component Value Date    LDLCALC 99 10/29/2014     Lab Results   Component Value Date    RPE5UWFLANZS 2 130 06/16/2017         No orders of the defined types were placed in this encounter          Taty Lemus DO  used

## 2019-09-03 NOTE — TELEPHONE ENCOUNTER
Therese Brito,     I had a reminder to call patient and schedule a 1 year follow up  Dr Jn Wheatley did not have her scheduled built this far out at the time of patients last appointment  Could you please call patient and schedule her with Dr Jn Wheatley      Thank you,  [facility]

## 2019-09-12 LAB — LEFT EYE DIABETIC RETINOPATHY: NORMAL

## 2019-09-17 NOTE — TELEPHONE ENCOUNTER
Spoke to the patient regarding scheduling a dbs f/u  Call got disconnected and tried to call again but [facility]  When patient calls back please transfer call to either me or Kike

## 2019-09-23 LAB — HBA1C MFR BLD HPLC: 6.8 %

## 2019-09-30 ENCOUNTER — TRANSITIONAL CARE MANAGEMENT (OUTPATIENT)
Dept: FAMILY MEDICINE CLINIC | Facility: CLINIC | Age: 79
End: 2019-09-30

## 2019-10-08 ENCOUNTER — OFFICE VISIT (OUTPATIENT)
Dept: FAMILY MEDICINE CLINIC | Facility: CLINIC | Age: 79
End: 2019-10-08
Payer: MEDICARE

## 2019-10-08 VITALS
OXYGEN SATURATION: 96 % | HEART RATE: 72 BPM | SYSTOLIC BLOOD PRESSURE: 158 MMHG | BODY MASS INDEX: 33.13 KG/M2 | DIASTOLIC BLOOD PRESSURE: 84 MMHG | WEIGHT: 187 LBS | HEIGHT: 63 IN

## 2019-10-08 DIAGNOSIS — R29.898 WEAKNESS OF LEFT LEG: ICD-10-CM

## 2019-10-08 DIAGNOSIS — R60.9 EDEMA, UNSPECIFIED TYPE: ICD-10-CM

## 2019-10-08 DIAGNOSIS — I10 ESSENTIAL HYPERTENSION: ICD-10-CM

## 2019-10-08 DIAGNOSIS — J44.9 CHRONIC OBSTRUCTIVE PULMONARY DISEASE, UNSPECIFIED COPD TYPE (HCC): ICD-10-CM

## 2019-10-08 DIAGNOSIS — Z96.89 S/P DEEP BRAIN STIMULATOR PLACEMENT: ICD-10-CM

## 2019-10-08 DIAGNOSIS — E11.9 TYPE 2 DIABETES MELLITUS WITHOUT COMPLICATION, WITHOUT LONG-TERM CURRENT USE OF INSULIN (HCC): ICD-10-CM

## 2019-10-08 DIAGNOSIS — I63.9 CEREBROVASCULAR ACCIDENT (CVA), UNSPECIFIED MECHANISM (HCC): Primary | ICD-10-CM

## 2019-10-08 DIAGNOSIS — R26.89 IMBALANCE: ICD-10-CM

## 2019-10-08 DIAGNOSIS — G25.0 BENIGN FAMILIAL TREMOR: ICD-10-CM

## 2019-10-08 DIAGNOSIS — I35.0 AORTIC VALVE STENOSIS, ETIOLOGY OF CARDIAC VALVE DISEASE UNSPECIFIED: ICD-10-CM

## 2019-10-08 DIAGNOSIS — E03.9 ACQUIRED HYPOTHYROIDISM: ICD-10-CM

## 2019-10-08 DIAGNOSIS — I63.239 CAROTID STENOSIS, SYMPTOMATIC, WITH INFARCTION (HCC): ICD-10-CM

## 2019-10-08 DIAGNOSIS — R47.1 DYSARTHRIA: ICD-10-CM

## 2019-10-08 PROBLEM — R13.19 OTHER DYSPHAGIA: Status: ACTIVE | Noted: 2019-10-08

## 2019-10-08 PROBLEM — R13.10 SWALLOWING DYSFUNCTION: Status: ACTIVE | Noted: 2019-09-24

## 2019-10-08 PROCEDURE — 99495 TRANSJ CARE MGMT MOD F2F 14D: CPT | Performed by: FAMILY MEDICINE

## 2019-10-08 RX ORDER — CLOPIDOGREL BISULFATE 75 MG/1
75 TABLET ORAL DAILY
Status: ON HOLD
Start: 2019-10-08 | End: 2019-11-08 | Stop reason: SDUPTHER

## 2019-10-08 RX ORDER — ROSUVASTATIN CALCIUM 5 MG/1
5 TABLET, COATED ORAL DAILY
Qty: 30 TABLET | Refills: 5 | Status: SHIPPED | OUTPATIENT
Start: 2019-10-08 | End: 2019-11-13 | Stop reason: SDUPTHER

## 2019-10-08 NOTE — PATIENT INSTRUCTIONS
We reviewed her hospital stay September 22nd through 25th at [facility], was discharged with diagnosis TIA/dysarthria, past history CVA November 2018, appears she had new event although no new brain lesion was seen on CT scanning  As event occurred on aspirin, Plavix was added, hospital discharge states to use both Plavix and aspirin for 1 month, then stop aspirin  Hospital had switched her from Crestor 5 mg daily to pravastatin, I would like her to go back to Crestor 5 mg daily, inpatient cholesterol 179 with HDL 43, LDL 86  She will do PT/OT/speech therapy  Had inpatient swallowing study, her diet should be --   modified diet with Level 5 Minced & Moist (dysphagia mechanically altered), Level 2 Mildly Thick (nectar thick)  [id number] [address] for cup sips thin water between meals- if increased symptoms aspiration, please discontinue sips of water and thicken all liquids to nectar thick consistency   and whole medication with puree  I would like her to re-evaluate with her neurologist, Dr Mellisa Redding  Appears she would benefit from re-doing MRI of brain, does have implant for tremor as before  Inpatient CTA along with ultrasound did show right-sided carotid stenosis, I would like her to see vascular regarding possible intervention  No known history AFib, hospital reports did not state atrial fibrillation, inpatient echocardiogram read is only mild AS/mild valvular changes with normal ejection fraction  No wall motion abnormalities  We reviewed medication, her furosemide 20 mg was stopped inpatient, I would like her to restart 20 mg daily  She will use other medication as before  History COPD, has not required nebulizer recently, had required prednisone few months ago  Observe  Inpatient TSH 2 9, stay on levothyroxine 125 mcg daily  History hyperglycemia, A1c inpatient essentially unchanged at 6 8  Continue with healthy diet  Re-evaluate here in 3 to 4 weeks          Last MRI 1/19 = Bilateral deep brain stimulator leads are noted terminating in the topography of the basal ganglia  Minimal FLAIR signal abnormality along the tract is noted    Chronic infarct right posterior lateral frontal lobe    Advanced chronic small vessel ischemic changes      CT angiogram neck and head:  Done inpatient at [facility]   Calcification of bilateral proximal internal carotid arteries with stenosis  Appearance of high-grade stenosis of the origin of right internal carotid  artery, difficult to assess the exact degree of stenosis and probably about 70%  stenosis by NASCET criteria  About 23% stenosis of left proximal internal carotid artery by NASCET criteria  Calcification with mild to moderate stenoses of bilateral cavernous and  paraclinoid internal carotid arteries  No filling defect or significant stenosis of proximal branches of [place] of  [place]  Questionable stenosis or partial filling defect of a distal branch of left  posterior cerebral artery  Carotid ultrasound- Moderate to marked calcified plaque in the proximal right internal  carotid artery with degree of stenosis at the upper limits of 50-69% range    0-19% narrowing on the left

## 2019-10-08 NOTE — PROGRESS NOTES
Diabetic Foot Exam    Patient's shoes and socks removed  Right Foot/Ankle   Right Foot Inspection  Skin Exam: skin normal and skin intact no dry skin, no warmth, no callus, no erythema, no maceration, no abnormal color, no pre-ulcer, no ulcer and no callus                            Sensory       Monofilament testing: diminished      Left Foot/Ankle  Left Foot Inspection  Skin Exam: skin normal and skin intactno dry skin, no warmth, no erythema, no maceration, normal color, no pre-ulcer, no ulcer and no callus                                         Sensory       Monofilament: intact    Assign Risk Category:  No deformity present; No loss of protective sensation;  No weak pulses       Risk: 0

## 2019-10-08 NOTE — PROGRESS NOTES
FAMILY PRACTICE OFFICE VISIT  Lola SCHRADER O  [facility]  [id number]   [address] [place], [place], [address]      NAME: Radha Perales  AGE: 78 y o   SEX: female  : 1940   MRN: [id number]    DATE: 10/8/2019  TIME: 6:05 PM    Assessment and Plan     Problem List Items Addressed This Visit        Endocrine    Acquired hypothyroidism    Type 2 diabetes mellitus without complication, without long-term current use of insulin ([facility])    Relevant Medications    rosuvastatin (CRESTOR) 5 mg tablet       Respiratory    COPD (chronic obstructive pulmonary disease) ([facility])       Cardiovascular and Mediastinum    Aortic valve stenosis - Mild -Moderate    Relevant Medications    clopidogrel (PLAVIX) 75 mg tablet    Carotid stenosis right,  symptomatic, with infarction [facility])    Relevant Orders    Ambulatory referral to Neurology    Ambulatory referral to Vascular Surgery    CVA (cerebral vascular accident) ([facility] [address] ) - Primary    Relevant Medications    rosuvastatin (CRESTOR) 5 mg tablet    clopidogrel (PLAVIX) 75 mg tablet    Other Relevant Orders    Ambulatory referral to Neurology    Ambulatory referral to Vascular Surgery    Essential hypertension       Nervous and Auditory    Benign familial tremor    Relevant Orders    Ambulatory referral to Neurology    Weakness of left leg    Relevant Orders    Ambulatory referral to Neurology       Other    Dysarthria related to 2018 CVA, worsened 2019    Relevant Orders    Ambulatory referral to Neurology    Imbalance    S/P deep brain stimulator placement    Relevant Orders    Ambulatory referral to Neurology      Other Visit Diagnoses     Edema, unspecified type              Patient Instructions   We reviewed her hospital stay  through  at [facility], was discharged with diagnosis TIA/dysarthria, past history CVA 2018, appears she had new event although no new brain lesion was seen on CT scanning  As event occurred on aspirin, Plavix was added, hospital discharge states to use both Plavix and aspirin for 1 month, then stop aspirin  Hospital had switched her from Crestor 5 mg daily to pravastatin, I would like her to go back to Crestor 5 mg daily, inpatient cholesterol 179 with HDL 43, LDL 86  She will do PT/OT/speech therapy  Had inpatient swallowing study, her diet should be --   modified diet with Level 5 Minced & Moist (dysphagia mechanically altered), Level 2 Mildly Thick (nectar thick)  [id number] [address] for cup sips thin water between meals- if increased symptoms aspiration, please discontinue sips of water and thicken all liquids to nectar thick consistency   and whole medication with puree  I would like her to re-evaluate with her neurologist, Dr Stella Mora  Appears she would benefit from re-doing MRI of brain, does have implant for tremor as before  Inpatient CTA along with ultrasound did show right-sided carotid stenosis, I would like her to see vascular regarding possible intervention  No known history AFib, hospital reports did not state atrial fibrillation, inpatient echocardiogram read is only mild AS/mild valvular changes with normal ejection fraction  No wall motion abnormalities  We reviewed medication, her furosemide 20 mg was stopped inpatient, I would like her to restart 20 mg daily  She will use other medication as before  History COPD, has not required nebulizer recently, had required prednisone few months ago  Observe  Inpatient TSH 2 9, stay on levothyroxine 125 mcg daily  History hyperglycemia, A1c inpatient essentially unchanged at 6 8  Continue with healthy diet  Re-evaluate here in 3 to 4 weeks  Last MRI 1/19 = Bilateral deep brain stimulator leads are noted terminating in the topography of the basal ganglia      Minimal FLAIR signal abnormality along the tract is noted    Chronic infarct right posterior lateral frontal lobe    Advanced chronic small vessel ischemic changes      CT angiogram neck and head:  Done inpatient at [facility]   Calcification of bilateral proximal internal carotid arteries with stenosis  Appearance of high-grade stenosis of the origin of right internal carotid  artery, difficult to assess the exact degree of stenosis and probably about 70%  stenosis by NASCET criteria  About 23% stenosis of left proximal internal carotid artery by NASCET criteria  Calcification with mild to moderate stenoses of bilateral cavernous and  paraclinoid internal carotid arteries  No filling defect or significant stenosis of proximal branches of [place] of  [place]  Questionable stenosis or partial filling defect of a distal branch of left  posterior cerebral artery  Carotid ultrasound- Moderate to marked calcified plaque in the proximal right internal  carotid artery with degree of stenosis at the upper limits of 50-69% range  0-19% narrowing on the left      Chief Complaint     Chief Complaint   Patient presents with    Transition of Care Management     TCM Call (since 9/7/2019)     Date and time call was made  9/30/2019  5:30 PM    Hospital care reviewed  Records reviewed    Patient was hospitialized at  [facility]    Date of Admission  09/22/19    Date of discharge  09/25/19    Diagnosis  TIA    Disposition  Home    Were the patients medications reviewed and updated  No    Current Symptoms  None      TCM Call (since 9/7/2019)     Post hospital issues  None    Should patient be enrolled in anticoag monitoring? No    Scheduled for follow up?   Yes    Did you obtain your prescribed medications  Yes    Do you need help managing your prescriptions or medications  No    Is transportation to your appointment needed  No    I have advised the patient to call PCP with any new or worsening symptoms  lhunter    Comments  scheduled with Dr Raj Mcduffie on 10/8        History of Present Illness   Adry Reynaga is a 78y o -year-old female who is in today for a post hospital evaluation  Was hospitalized September 22nd through 25th  She was seated, had sudden aphasia, went blank  During admission she was seen by Neurology, CTA head neck showed no acute finding, MRI was not performed  Note she did have CVA last November, see prior notes  She returned quickly to her mental baseline but is complaining of increased dysarthria along with some left-sided leg weakness  She was started on Plavix  Is tolerating this okay, no bleeding  Had swallowing study, did have aspiration, is now on dietary modification  Denies any choking episodes with swallowing since home  Has been doing speech therapy, PT/OT  Plays cards with friends frequently  Inpatient testing did show roughly 70% stenosis right carotid  Review of Systems   Review of Systems   Constitutional: Negative for appetite change, fatigue, fever and unexpected weight change  HENT: Positive for trouble swallowing  Negative for sore throat  Eyes: Negative for visual disturbance  Respiratory: Negative for chest tightness  She had use prednisone back in July for exacerbation COPD, recently has not required rescue inhaler, feels her shortness of breath/mild cough is at baseline  Cardiovascular: Negative for chest pain, palpitations and leg swelling  Gastrointestinal: Negative for abdominal pain, blood in stool, nausea and vomiting  No acid reflux     No change in bowel   Genitourinary: Negative for dysuria and hematuria  Musculoskeletal:        No increased joint pain, neck pain or back pain  Skin: Negative for wound  Neurological: Positive for tremors (See prior regarding implant) and speech difficulty  Negative for dizziness, seizures, syncope, light-headedness and headaches  Hematological: Does not bruise/bleed easily  Psychiatric/Behavioral: Negative for behavioral problems and confusion         Active Problem List     Patient Active Problem List   Diagnosis    Essential hypertension    Benign familial tremor    CVA [facility]cerebral vascular accident) ([facility] [address] )    COPD (chronic obstructive pulmonary disease) ([facility] [address] )    Esophageal reflux    Gait disturbance    Hypercholesterolemia    Sleep disorder    Sciatica    Right shoulder pain    Osteoarthritis of hip    OA (osteoarthritis)    Multiple joint pain    Imbalance    Acquired hypothyroidism    Lumbar degenerative disc disease    Low back pain    Type 2 diabetes mellitus without complication, without long-term current use of insulin ([facility])    History of pancreatitis    Aortic valve stenosis - Mild -Moderate    S/P deep brain stimulator placement    Microscopic hematuria    Dysarthria related to Nov 2018 CVA, worsened Sept 2019    Swallowing dysfunction    Weakness of left leg    Carotid stenosis right,  symptomatic, with infarction [facility])       Past Medical History:  Reviewed    Past Surgical History:  Reviewed    Family History:  Reviewed    Social History:  Reviewed    Objective     Vitals:    10/08/19 1359   BP: 158/84   BP Location: Left arm   Patient Position: Sitting   Cuff Size: Large   Pulse: 72   SpO2: 96%   Weight: 84 8 kg (187 lb)   Height: 5' 3" (1 6 m)     Body mass index is 33 13 kg/m²  BP Readings from Last 3 Encounters:   10/08/19 158/84   07/09/19 104/62   06/28/19 136/80       Wt Readings from Last 3 Encounters:   10/08/19 84 8 kg (187 lb)   07/09/19 83 8 kg (184 lb 12 8 oz)   06/28/19 83 kg (183 lb)       Physical Exam   Constitutional: She is oriented to person, place, and time  Very pleasant 78year-old seated on table, noticeable change in speech, prior dysarthria has worsened  Appears comfortable, well-kept  HENT:   Right Ear: External ear normal    Left Ear: External ear normal    Eyes: Pupils are equal, round, and reactive to light  Conjunctivae and EOM are normal  No scleral icterus  No nystagmus   Cardiovascular: Normal rate and regular rhythm     Murmur (1/6 systolic ejection murmur right 2nd intercostal space) heard  Pulmonary/Chest:   Decreased breath sounds bilateral but no rhonchi, rales, wheeze   Abdominal: Soft  There is no tenderness  There is no guarding  Musculoskeletal: She exhibits no edema  Lymphadenopathy:     She has no cervical adenopathy  Neurological: She is alert and oriented to person, place, and time  Mild weakness left lower extremity  Thickened speech  Answers are appropriate  Psychiatric: She has a normal mood and affect  Her behavior is normal        ALLERGIES:  Allergies   Allergen Reactions    Ciprofloxacin Diarrhea    Simvastatin Myalgia    Advair Diskus [Fluticasone-Salmeterol] Palpitations     Ok w flovent    Tetracycline Rash     Reaction Date: [id number];        Current Medications     Current Outpatient Medications   Medication Sig Dispense Refill    ascorbic acid (VITAMIN C) 250 MG tablet Take 500 mg by mouth daily      aspirin 81 MG tablet Take 1 tablet (81 mg total) by mouth daily YOU MAY RESTART ASPIRIN ON SATURDAY 12/15/2018   0    Cholecalciferol (VITAMIN D-3 PO) Take 1,000 Units by mouth daily        diltiazem (CARDIZEM CD) 240 mg 24 hr capsule Take 1 capsule (240 mg total) by mouth daily 30 capsule 5    Fluticasone Propionate, Inhal, (FLOVENT DISKUS) 250 MCG/BLIST AEPB Inhale 1 puff daily as needed        furosemide (LASIX) 20 mg tablet Take 1 tablet (20 mg total) by mouth daily 90 tablet 3    levothyroxine 125 mcg tablet Take 1 tablet (125 mcg total) by mouth daily 30 tablet 5    Multiple Vitamins-Minerals (PRESERVISION AREDS PO) Take 1 Cap by Mouth daily      multivitamin (THERAGRAN) TABS Take 1 tablet by mouth daily      omeprazole (PriLOSEC) 20 mg delayed release capsule Take 1 capsule by mouth as needed        rosuvastatin (CRESTOR) 5 mg tablet Take 1 tablet (5 mg total) by mouth daily 30 tablet 5    albuterol (2 5 mg/3 mL) 0 083 % nebulizer solution Take 1 vial (2 5 mg total) by nebulization 2 (two) times a day 60 vial 1    clopidogrel (PLAVIX) 75 mg tablet Take 1 tablet (75 mg total) by mouth daily       No current facility-administered medications for this visit               Orders Placed This Encounter   Procedures    Ambulatory referral to Neurology    Ambulatory referral to Vascular Surgery         Juan Lundberg DO

## 2019-10-10 ENCOUNTER — TELEPHONE (OUTPATIENT)
Dept: NEUROLOGY | Facility: CLINIC | Age: 79
End: 2019-10-10

## 2019-10-11 ENCOUNTER — TELEPHONE (OUTPATIENT)
Dept: FAMILY MEDICINE CLINIC | Facility: CLINIC | Age: 79
End: 2019-10-11

## 2019-10-11 DIAGNOSIS — I63.9 CEREBROVASCULAR ACCIDENT (CVA), UNSPECIFIED MECHANISM (HCC): Primary | ICD-10-CM

## 2019-10-11 NOTE — TELEPHONE ENCOUNTER
Pt is going to be D/C ed and going home so they will need a referral faxed for home health and pt and speech

## 2019-10-13 NOTE — TELEPHONE ENCOUNTER
I have not seen this patient since 2013  She will need to be seen by 1 of our providers prior to us initiating prescriptions for any services

## 2019-10-14 NOTE — TELEPHONE ENCOUNTER
I printed orders, but don't have a fax # for [facility]  I LM at PT dept  [phone], for a call back w fax #

## 2019-10-14 NOTE — TELEPHONE ENCOUNTER
Her daughter called  Pt is upset b/c she thought she was to have PT /OT/speech therapy at [facility]   They do not have an order  OK to fax order for her?

## 2019-10-14 NOTE — TELEPHONE ENCOUNTER
I just saw her October 8th, I had thought she was already set up from the hospital with OT/PT/speech therapy, please set those up, I am unsure why this note went to Dr Arlene Eric as I had just seen her and have seen her for years

## 2019-10-16 ENCOUNTER — OFFICE VISIT (OUTPATIENT)
Dept: NEUROLOGY | Facility: CLINIC | Age: 79
End: 2019-10-16
Payer: MEDICARE

## 2019-10-16 VITALS
HEIGHT: 63 IN | WEIGHT: 189 LBS | HEART RATE: 68 BPM | BODY MASS INDEX: 33.49 KG/M2 | SYSTOLIC BLOOD PRESSURE: 140 MMHG | DIASTOLIC BLOOD PRESSURE: 80 MMHG

## 2019-10-16 DIAGNOSIS — G25.0 BENIGN FAMILIAL TREMOR: ICD-10-CM

## 2019-10-16 DIAGNOSIS — I63.239 CAROTID STENOSIS, SYMPTOMATIC, WITH INFARCTION (HCC): ICD-10-CM

## 2019-10-16 DIAGNOSIS — R29.898 WEAKNESS OF LEFT LEG: ICD-10-CM

## 2019-10-16 DIAGNOSIS — R47.1 DYSARTHRIA: ICD-10-CM

## 2019-10-16 DIAGNOSIS — Z96.89 S/P DEEP BRAIN STIMULATOR PLACEMENT: ICD-10-CM

## 2019-10-16 DIAGNOSIS — I63.9 CEREBROVASCULAR ACCIDENT (CVA), UNSPECIFIED MECHANISM (HCC): ICD-10-CM

## 2019-10-16 PROCEDURE — 99215 OFFICE O/P EST HI 40 MIN: CPT | Performed by: PSYCHIATRY & NEUROLOGY

## 2019-10-16 RX ORDER — LORAZEPAM 1 MG/1
1 TABLET ORAL
Qty: 2 TABLET | Refills: 0 | Status: SHIPPED | OUTPATIENT
Start: 2019-10-16 | End: 2019-11-19 | Stop reason: SDUPTHER

## 2019-10-16 NOTE — PATIENT INSTRUCTIONS
Stop aspirin 10/22 and continue with plavix indefinitely     C/w crestor   C/w PT, OT and ST 3 times a week (she will start Monday)

## 2019-10-16 NOTE — ASSESSMENT & PLAN NOTE
-for secondary stroke prevention, c/w with combination of aspirin, plavix and crestor      -I want to get MRI brain to see if she did infact have a small stroke, although CT head done from 9/22 which I reviewed the images of was negative for any acute findings  -BP goal < 130/80, BP is at goal currently     -Defer to PCP regarding BP management  -does not smoke  -denies any history of snoring    -will start PT/OT/ST from Monday   -I advised patient to avoid using NSAIDs for headaches or other pain and instead to just stick to tylenol    -I educated regarding mediterranean style diet and regular exercise regimen   -I educated patient/family regarding medication compliance

## 2019-10-16 NOTE — PROGRESS NOTES
Patient ID: Dane Leon is a 78 y o  female  Assessment/Plan: This is a 77 y/o Female with R CVA 2018 and patient had Left sided symptoms and was maintained on aspirin for stroke prevention  Patient does have essential tremor with b/l DBS who follows Dr Alka Davies  Patient had TIA with L sided facial droop which did resolve  She had CT and CTA head and neck which shows R ICA stenosis 50-69%  At this time carotid stenosis will be managed medically  She is seeing Dr Moises Smyth next week for evaluation of the Carotid stenosis  She has some dysarthria and L sided weakness since the stroke  Denies any new TIA/CVA like symptoms  PLAN:      Problem List Items Addressed This Visit        Cardiovascular and Mediastinum    CVA (cerebral vascular accident) ([facility] )     -for secondary stroke prevention, c/w with combination of aspirin, plavix and crestor      -I want to get MRI brain to see if she did infact have a small stroke, although CT head done from 9/22 which I reviewed the images of was negative for any acute findings  -BP goal < 130/80, BP is at goal currently  -Defer to PCP regarding BP management  -does not smoke  -denies any history of snoring    -will start PT/OT/ST from Monday   -I advised patient to avoid using NSAIDs for headaches or other pain and instead to just stick to tylenol    -I educated regarding mediterranean style diet and regular exercise regimen   -I educated patient/family regarding medication compliance           Relevant Medications    LORazepam (ATIVAN) 1 mg tablet    Other Relevant Orders    MRI brain without contrast    Carotid stenosis right,  symptomatic, with infarction ([facility] )     -patient on Aspirin and plavix for stroke prevention  -I do think the right carotid is likely symptomatic at this time  -patient seeing vascular surgery next week               Nervous and Auditory    Benign familial tremor     -s/p DBS placement  -c/w current management           Weakness of left leg       Other    S/P deep brain stimulator placement    Dysarthria related to Nov 2018 CVA, worsened Sept 2019          I would like to follow up in 4 weeks  I would be happy to see the patient sooner if any new questions/concerns arise  Patient/Guardian was advised to the call the office if they have any questions and concerns in the meantime  Patient/Guardian does understand that if they have any new stroke like symptoms such as facial droop on one side, weakness/paralysis on either side, speech trouble, numbness on one side, balance issues, any vision changes, extreme dizziness or any new headache, to call 9-1-1 immediately or to proceed to the nearest ER immediately  Subjective:    HPI    This is a 79 y/o Female who is here for workup for CVA  Patient had Chronic R posterior lateral frontal lobe infarct  Patient has been following up with Dr Trenton Eisenmenger for benign familial tremor and has DBS in place  Patient had a stroke in November 2018  Patient then was seen at [facility] on September 22, 2019 with witnessed aphasia, left facial droop, left-sided weakness  After the prior stroke patient did have some residual left-sided weakness and dysarthria for which he was receiving PT and speech therapy  Stroke alert was canceled  Patient actually wanted to leave  She had a CT head and CTA head and neck which were all within normal limits  She did not want to get an MRI brain because she does have severe anxiety and is scared  Also did not get MRI brain due to her DBS  Neurology at [facility] start her on Plavix in addition to aspirin to treat empirically for Cerebrovascular accident/TIA  She did have a carotid ultrasound which showed the right ICA to be 50-69% stenosis  This was deemed to be managed medically for now  She will be getting a repeat ultrasound in 6 months  She was discharged with home PT OT and speech therapy follow-up    She had echocardiogram which showed EF of 55% without any wall motion abnormalities, or atrial size abnormalities  She is here now is doing well  She is complaint with her medications  She is currently maintained on aspirin, plavix for 1 month and atorvastatin  She is seeing vascular surgery next week  She is starting PT, OT, ST next Monday  She denies any other concerns at this time  The following portions of the patient's history were reviewed and updated as appropriate:   She  has a past medical history of Arthritis, Asthma, Benign essential tremor (10/15/2018), Benign neoplasm of large intestine, Cellulitis of leg, right, Closed compression fracture of body of lumbar vertebra ([address] ), COPD (chronic obstructive pulmonary disease) ([address] ), Difficulty waking, Disease of thyroid gland, GERD (gastroesophageal reflux disease), High cholesterol, Hypertension, Osteopenia, Osteoporosis, Shortness of breath, 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, Urinary leakage, Vitamin D deficiency, Wears glasses, and Wears partial dentures    She   Patient Active Problem List    Diagnosis Date Noted    Weakness of left leg 10/08/2019    Carotid stenosis right,  symptomatic, with infarction ([facility] ) 10/08/2019    Swallowing dysfunction 09/24/2019    Dysarthria related to Nov 2018 CVA, worsened Sept 2019 06/28/2019    S/P deep brain stimulator placement 12/03/2018    Microscopic hematuria 12/03/2018    Aortic valve stenosis - Mild -Moderate 08/27/2018    History of pancreatitis 07/13/2018    Lumbar degenerative disc disease 06/25/2018    Low back pain 06/25/2018    Type 2 diabetes mellitus without complication, without long-term current use of insulin ([facility] ) 06/25/2018    CVA (cerebral vascular accident) ([facility] ) 10/30/2017    Gait disturbance 10/30/2017    Imbalance 10/23/2017    Essential hypertension 05/21/2016    Osteoarthritis of hip 04/12/2016    COPD (chronic obstructive pulmonary disease) ([address] ) 12/21/2015    Right shoulder pain 10/28/2014    Sciatica 06/23/2014    Esophageal reflux 04/15/2013    OA (osteoarthritis) 04/15/2013    Multiple joint pain 04/15/2013    Sleep disorder 07/20/2012    Benign familial tremor 06/05/2012    Hypercholesterolemia 05/18/2012    Acquired hypothyroidism 05/18/2012     She  has a past surgical history that includes Cataract extraction w/  intraocular lens implant (Bilateral); Deep brain stimulator placement; pr total hip arthroplasty (Left, 5/20/2016); Colonoscopy; Knee arthroscopy; Rotator cuff repair; pr imp stim,cranial,subq,>1 array (Right, 12/13/2018); and pr imp stim,cranial,subq,1 array (Left, 1/22/2019)  Her family history includes Breast cancer in her mother; Throat cancer in her family  She  reports that she quit smoking about 6 years ago  She has never used smokeless tobacco  She reports that she drinks alcohol  She reports that she does not use drugs  Current Outpatient Medications   Medication Sig Dispense Refill    albuterol (2 5 mg/3 mL) 0 083 % nebulizer solution Take 1 vial (2 5 mg total) by nebulization 2 (two) times a day 60 vial 1    ascorbic acid (VITAMIN C) 250 MG tablet Take 500 mg by mouth daily      aspirin 81 MG tablet Take 1 tablet (81 mg total) by mouth daily YOU MAY RESTART ASPIRIN ON SATURDAY 12/15/2018   0    Cholecalciferol (VITAMIN D-3 PO) Take 1,000 Units by mouth daily        clopidogrel (PLAVIX) 75 mg tablet Take 1 tablet (75 mg total) by mouth daily      diltiazem (CARDIZEM CD) 240 mg 24 hr capsule Take 1 capsule (240 mg total) by mouth daily 30 capsule 5    Fluticasone Propionate, Inhal, (FLOVENT DISKUS) 250 MCG/BLIST AEPB Inhale 1 puff daily as needed        furosemide (LASIX) 20 mg tablet Take 1 tablet (20 mg total) by mouth daily 90 tablet 3    levothyroxine 125 mcg tablet Take 1 tablet (125 mcg total) by mouth daily 30 tablet 5    Multiple Vitamins-Minerals (PRESERVISION AREDS PO) Take 1 Cap by Mouth daily      multivitamin (THERAGRAN) TABS Take 1 tablet by mouth daily  omeprazole (PriLOSEC) 20 mg delayed release capsule Take 1 capsule by mouth as needed        rosuvastatin (CRESTOR) 5 mg tablet Take 1 tablet (5 mg total) by mouth daily 30 tablet 5    LORazepam (ATIVAN) 1 mg tablet Take 1 tablet (1 mg total) by mouth 30 min pre-procedure 2 tablet 0     No current facility-administered medications for this visit  Current Outpatient Medications on File Prior to Visit   Medication Sig    albuterol (2 5 mg/3 mL) 0 083 % nebulizer solution Take 1 vial (2 5 mg total) by nebulization 2 (two) times a day    ascorbic acid (VITAMIN C) 250 MG tablet Take 500 mg by mouth daily    aspirin 81 MG tablet Take 1 tablet (81 mg total) by mouth daily YOU MAY RESTART ASPIRIN ON SATURDAY 12/15/2018   Cholecalciferol (VITAMIN D-3 PO) Take 1,000 Units by mouth daily   clopidogrel (PLAVIX) 75 mg tablet Take 1 tablet (75 mg total) by mouth daily    diltiazem (CARDIZEM CD) 240 mg 24 hr capsule Take 1 capsule (240 mg total) by mouth daily    Fluticasone Propionate, Inhal, (FLOVENT DISKUS) 250 MCG/BLIST AEPB Inhale 1 puff daily as needed      furosemide (LASIX) 20 mg tablet Take 1 tablet (20 mg total) by mouth daily    levothyroxine 125 mcg tablet Take 1 tablet (125 mcg total) by mouth daily    Multiple Vitamins-Minerals (PRESERVISION AREDS PO) Take 1 Cap by Mouth daily    multivitamin (THERAGRAN) TABS Take 1 tablet by mouth daily    omeprazole (PriLOSEC) 20 mg delayed release capsule Take 1 capsule by mouth as needed      rosuvastatin (CRESTOR) 5 mg tablet Take 1 tablet (5 mg total) by mouth daily     No current facility-administered medications on file prior to visit  She is allergic to ciprofloxacin; simvastatin; advair diskus [fluticasone-salmeterol]; and tetracycline            Objective:    Blood pressure 140/80, pulse 68, height 5' 3" (1 6 m), weight 85 7 kg (189 lb)      Physical Exam  General - Patient is alert, awake, follows commands  Speech - mild to moderate dysarthria noted, no aphasia noted  Neuro:   Cranial nerves: PERRL, EOMI, no facial droop noted, facial sensation intact, tongue midline  Motor: LUE and LLE 4-/5 and RUE and RLE 5/5  Sensory - intact to soft touch throughout  Coordination - no ataxia/dysmetria noted  Gait - ambulates with a cane    ROS:  I personally reviewed ROS  Review of Systems   Constitutional: Negative  Negative for appetite change and fever  HENT: Negative  Negative for hearing loss, tinnitus, trouble swallowing and voice change  Eyes: Negative  Negative for photophobia and pain  Respiratory: Negative  Negative for shortness of breath  Cardiovascular: Negative  Negative for palpitations  Gastrointestinal: Negative  Negative for nausea and vomiting  Endocrine: Negative  Negative for cold intolerance and heat intolerance  Genitourinary: Negative  Negative for dysuria, frequency and urgency  Musculoskeletal: Positive for gait problem  Negative for myalgias and neck pain  Patient states that her balance is off at times  Patient also use a cane to help  balance  Skin: Negative  Negative for rash  Neurological: Negative for dizziness, tremors, seizures, syncope, facial asymmetry, speech difficulty, weakness, light-headedness, numbness and headaches  Hematological: Negative  Does not bruise/bleed easily  Psychiatric/Behavioral: Negative  Negative for confusion, hallucinations and sleep disturbance

## 2019-10-16 NOTE — ASSESSMENT & PLAN NOTE
-patient on Aspirin and plavix for stroke prevention  -I do think the right carotid is likely symptomatic at this time  -patient seeing vascular surgery next week

## 2019-10-22 ENCOUNTER — CONSULT (OUTPATIENT)
Dept: VASCULAR SURGERY | Facility: CLINIC | Age: 79
End: 2019-10-22
Payer: MEDICARE

## 2019-10-22 ENCOUNTER — TELEPHONE (OUTPATIENT)
Dept: ADMINISTRATIVE | Facility: HOSPITAL | Age: 79
End: 2019-10-22

## 2019-10-22 VITALS — SYSTOLIC BLOOD PRESSURE: 152 MMHG | DIASTOLIC BLOOD PRESSURE: 78 MMHG | HEART RATE: 75 BPM

## 2019-10-22 DIAGNOSIS — I63.239 CAROTID STENOSIS, SYMPTOMATIC, WITH INFARCTION (HCC): ICD-10-CM

## 2019-10-22 DIAGNOSIS — I63.9 CEREBROVASCULAR ACCIDENT (CVA), UNSPECIFIED MECHANISM (HCC): ICD-10-CM

## 2019-10-22 PROCEDURE — 99205 OFFICE O/P NEW HI 60 MIN: CPT | Performed by: SURGERY

## 2019-10-22 RX ORDER — CHLORHEXIDINE GLUCONATE 0.12 MG/ML
15 RINSE ORAL ONCE
Status: CANCELLED | OUTPATIENT
Start: 2020-02-18 | End: 2019-10-22

## 2019-10-22 RX ORDER — PRAVASTATIN SODIUM 10 MG
TABLET ORAL
Refills: 1 | COMMUNITY
Start: 2019-09-25 | End: 2019-10-22 | Stop reason: ALTCHOICE

## 2019-10-22 NOTE — TELEPHONE ENCOUNTER
Patient needs cardiac clearance for Right Carotid Endarterectomy by Dr Raleigh Pedraza  Spoke with Bonita from [address] [place] [address] office and schedule patient with Dr Donna Timmons for [email or web address]  Form were given  to Bonita to forward them to Dr Donna Timmons

## 2019-10-22 NOTE — LETTER
October 22, 2019     Stalin Rincon, DO  [facility]  [address]    Patient: Main Rivas   YOB: 1940   Date of Visit: 10/22/2019       Dear Dr Otoniel Norris: Thank you for referring Main Rivas to me for evaluation  Below are the relevant portions of my assessment and plan of care  Diagnoses and all orders for this visit:    Carotid stenosis right,  symptomatic, with infarction ([address] )  Symptomatic right carotid artery stenosis with a remote history of right hemispheric stroke and recent recurrent stroke with residual left leg weakness and dysphagia  Her recovery appears to have plateaued  We discussed the options of treatment along with the associated risks and benefits along with appropriate timing  At this time we will plan on proceeding with right carotid endarterectomy following cardiac clearance  In the interval she will continue her current medical management  Of note to the degree of stenosis in the right internal carotid artery is estimated at 60-70% and though somewhat borderline in degree of stenosis, based upon her recurrent unilateral right hemispheric events and negative cardiac workup it is likely this is the etiology of her stroke  As well the lesion is fairly eccentric and irregular  If you have questions, please do not hesitate to call me  I look forward to following Kelly Singh along with you           Sincerely,        Estrada Hernández MD        CC: No Recipients

## 2019-10-22 NOTE — ASSESSMENT & PLAN NOTE
Symptomatic right carotid artery stenosis with a remote history of right hemispheric stroke and recent recurrent stroke with residual left leg weakness and dysphagia  Her recovery appears to have plateaued  We discussed the options of treatment along with the associated risks and benefits along with appropriate timing  At this time we will plan on proceeding with right carotid endarterectomy following cardiac clearance  In the interval she will continue her current medical management  Of note to the degree of stenosis in the right internal carotid artery is estimated at 60-70% and though somewhat borderline in degree of stenosis, based upon her recurrent unilateral right hemispheric events and negative cardiac workup it is likely this is the etiology of her stroke  As well the lesion is fairly eccentric and irregular

## 2019-10-22 NOTE — PROGRESS NOTES
Assessment/Plan:    Carotid stenosis right,  symptomatic, with infarction (HCC)  Symptomatic right carotid artery stenosis with a remote history of right hemispheric stroke and recent recurrent stroke with residual left leg weakness and dysphagia  Her recovery appears to have plateaued  We discussed the options of treatment along with the associated risks and benefits along with appropriate timing  At this time we will plan on proceeding with right carotid endarterectomy following cardiac clearance  In the interval she will continue her current medical management  Of note to the degree of stenosis in the right internal carotid artery is estimated at 60-70% and though somewhat borderline in degree of stenosis, based upon her recurrent unilateral right hemispheric events and negative cardiac workup it is likely this is the etiology of her stroke  As well the lesion is fairly eccentric and irregular  Diagnoses and all orders for this visit:    Cerebrovascular accident (CVA), unspecified mechanism ([facility] )  -     Ambulatory referral to Vascular Surgery    Carotid stenosis right,  symptomatic, with infarction [facility])  -     Ambulatory referral to Vascular Surgery          Subjective:      Patient ID: Arthur Hammond is a 78 y o  female  Pt is new to our office  She was referred here by Dr Ernesto Leal DO for carotid stenosis  Pt had a CTA- head done 9/22/2019, a CV done 9/23/2019, and a CT head done 9/24/2019 done at [facility]  Pt has a hx of stroke 11 months ago at [facility]   Second stroke was 9/22/2019  Pt has a continuing problem with left sided weakness and dysphasia since the 2nd stroke  She says that she had some trouble swallowing right after the stroke but that has subsided  She denies any sudden loss of vision, headache, dizziness, worsening or changing of weakness symptoms, TIA or Stroke symptoms  Pt is taking Plavix, ASA 81 mg and Crestor  Pt is a former smoker      78year-old with remote history of right hemispheric stroke approximately 1 year ago at [facility]  At that time she had dysarthria with left leg and arm weakness  She now has had a similar event at which time she was admitted to hospital 09/22/2019 again with left facial droop, dysarthria, dysphagia and left leg weakness  She has recovered and no longer has any associated dysphagia but does have some residual left leg weakness and dysarthria  During her hospitalization she underwent cardiac evaluation for dysrhythmia which was negative  She denies any previous cardiac history  Carotid duplex from [facility] 09/23/2018 with right carotid artery stenosis with flow velocity 184/58 centimeters/second  On the left there is a less than 50% stenosis  CT angiogram 09/22/2019 from [facility] is reviewed at length which shows a highly calcified eccentric irregular plaque creating a stenosis of 60-70% within the internal carotid artery  By my measurement the degree of stenosis is approximately 60% but the formal report indicates a 70% stenosis  The following portions of the patient's history were reviewed and updated as appropriate: allergies, current medications, past family history, past medical history, past social history, past surgical history and problem list     The ROS as bellow was reviewed and changes made as indictated       Review of Systems   Constitutional: Positive for fatigue  HENT: Positive for drooling  Eyes: Negative  Respiratory: Positive for shortness of breath (SOB with activity) and wheezing  Cardiovascular: Negative  Gastrointestinal: Negative  Endocrine: Positive for cold intolerance  Genitourinary: Negative  Musculoskeletal:        Leg cramping when walking   Skin: Negative  Allergic/Immunologic: Negative  Neurological: Positive for speech difficulty and weakness  Hematological: Negative  Psychiatric/Behavioral: Negative  Objective:      /78 (BP Location: Right arm, Patient Position: Sitting, Cuff Size: Large)   Pulse 75          Physical Exam   Constitutional: She is oriented to person, place, and time  She appears well-developed and well-nourished  HENT:   Head: Normocephalic and atraumatic  Eyes: Pupils are equal, round, and reactive to light  EOM are normal    Neck: Normal range of motion  Neck supple  No JVD present  Carotid bruit is not present  Cardiovascular: Normal rate and regular rhythm  Murmur heard  Systolic murmur is present  Pulses:       Carotid pulses are 2+ on the right side, and 2+ on the left side  Radial pulses are 2+ on the right side, and 2+ on the left side  Pulmonary/Chest: Effort normal and breath sounds normal  No respiratory distress  Musculoskeletal: Normal range of motion  She exhibits no edema or tenderness  Neurological: She is alert and oriented to person, place, and time  No sensory deficit  Left leg with 4/5 strength  Speech is slow with some residual slurring  Also with some word searching difficulty  Skin: Skin is warm and dry  Psychiatric: She has a normal mood and affect           Operative Scheduling Information:    Hospital:  [facility]    Physician:  [address] [place] [address]    Surgery:  Right carotid endarterectomy    Urgency:  Standard    Case Length:  Normal    Post-op Bed:  ICU    OR Table:  Standard    Equipment Needs:  Vascular technologist    Medication Instructions:  Aspirin:   Continue (do not hold)  Plavix:  Continue (do not hold)    Hydration:  No

## 2019-10-22 NOTE — PATIENT INSTRUCTIONS
Carotid stenosis right,  symptomatic, with infarction (HCC)  Symptomatic right carotid artery stenosis with a remote history of right hemispheric stroke and recent recurrent stroke with residual left leg weakness and dysphagia  Her recovery appears to have plateaued  We discussed the options of treatment along with the associated risks and benefits along with appropriate timing  At this time we will plan on proceeding with right carotid endarterectomy following cardiac clearance  In the interval she will continue her current medical management  Of note to the degree of stenosis in the right internal carotid artery is estimated at 60-70% and though somewhat borderline in degree of stenosis, based upon her recurrent unilateral right hemispheric events and negative cardiac workup it is likely this is the etiology of her stroke  As well the lesion is fairly eccentric and irregular

## 2019-11-04 PROBLEM — Z01.810 PREOPERATIVE CARDIOVASCULAR EXAMINATION: Status: ACTIVE | Noted: 2019-11-04

## 2019-11-04 NOTE — PROGRESS NOTES
Cardiology Consultation     Ag Sterling  [id number]  1940  [address] [place] [address]      1  Preoperative cardiovascular examination  POCT ECG    NM myocardial perfusion spect (rx stress and/or rest)   2  Carotid stenosis right,  symptomatic, with infarction [facility])  Ambulatory referral to Cardiology   3  Nonrheumatic aortic valve stenosis     4  Benign essential hypertension     5  Dyslipidemia         Discussion/Summary:  Ms Hima Rosraio is an 25-year-old female who presents to the office today for preoperative evaluation for upcoming carotid surgery  She is rather sedentary given her stroke  She does report shortness of breath albeit chronic and she attributes this to her underlying COPD  Nonetheless given her multiple risk factors for coronary disease I have asked that she undergo a nuclear stress test prior to proceeding  Otherwise she is noted to have mild-to-moderate aortic stenosis by echo, the most recent echo from [facility] revealing this being mild with an aortic valve area of 1 8 centimeters squared and mean gradient of 8 mmHg  This will require ongoing surveillance  She is maintained on Plavix and statin therapy in the setting of her stroke  Her blood pressure is well controlled in the office today  Her lipids are under the care of her primary care provider  No specific follow-up will be arranged  However if it is felt she needs re-evaluation at any point in future I would be glad to see her again  History of Present Illness:  Ms Hima Rosario is an 25-year-old female who presents to the office today for preoperative evaluation  Apparently she was admitted to [facility] with a stroke  She had aphasia and left-sided facial droop and weakness    Due to a deep brain stimulator she did not have an MRI although underwent a CTA of her head and neck revealing high-grade lesion in her right carotid artery  She was seen in consultation by vascular surgery and is scheduled to undergo a carotid endarterectomy in the near future  She leads a sedentary lifestyle  She does report that she has ongoing issues with speech and weakness in her left thigh from the stroke  She ambulates with the aid of a cane  She does walk for exercise on a regular basis  She walks 3/4 of a mile around the building in which she lives  She occasionally will ascending steps  In doing so she does report some exertional shortness of breath which is chronic and unchanged in the last few years  She denies any exertional chest pain  She denies any signs or symptoms of congestive heart failure including increasing lower extremity edema, paroxysmal nocturnal dyspnea, orthopnea, acute weight gain or increasing abdominal girth  She denies lightheadedness, syncope or presyncope  She denies palpitations or symptoms of claudication      Patient Active Problem List   Diagnosis    Benign essential hypertension    Benign familial tremor    [facility]ce[facility] vascular accident) [facility]    [facility]    Esophageal reflux    Gait disturbance    Dyslipidemia    Sleep disorder    Sciatica    Right shoulder pain    Osteoarthritis of hip    OA (osteoarthritis)    Multiple joint pain    Imbalance    Acquired hypothyroidism    Lumbar degenerative disc disease    Low back pain    Type 2 diabetes mellitus without complication, without long-term current use of insulin (HCC)    History of pancreatitis    Aortic valve stenosis - Mild -Moderate    S/P deep brain stimulator placement    Microscopic hematuria    Dysarthria related to Nov 2018 CVA, worsened Sept 2019    Swallowing dysfunction    Weakness of left leg    Carotid stenosis right,  symptomatic, with infarction [facility])    Preoperative cardiovascular examination     Past Medical History: Diagnosis Date    Arthritis     Asthma     Benign essential tremor 10/15/2018    Added automatically from request for surgery [id number]    Benign neoplasm of large intestine     Cellulitis of leg, right     Closed compression fracture of body of lumbar vertebra (HCC)     L5    COPD (chronic obstructive pulmonary disease) (HCC)     Difficulty waking     post anesthesia    Disease of thyroid gland     GERD (gastroesophageal reflux disease)     High cholesterol     Hypertension     Osteopenia     Osteoporosis     Shortness of breath     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     Urinary leakage     Vitamin D deficiency     Wears glasses     Wears partial dentures     upper     Social History     Socioeconomic History    Marital status:       Spouse name: Not on file    Number of children: Not on file    Years of education: Not on file    Highest education level: Not on file   Occupational History    Not on file   Social Needs    Financial resource strain: Not on file    Food insecurity:     Worry: Not on file     Inability: Not on file    Transportation needs:     Medical: Not on file     Non-medical: Not on file   Tobacco Use    Smoking status: Former Smoker     Last attempt to quit: 2013     Years since quittin 8    Smokeless tobacco: Never Used   Substance and Sexual Activity    Alcohol use: Yes     Comment: 2 a month    Drug use: No    Sexual activity: Not on file   Lifestyle    Physical activity:     Days per week: Not on file     Minutes per session: Not on file    Stress: Not on file   Relationships    Social connections:     Talks on phone: Not on file     Gets together: Not on file     Attends Confucianism service: Not on file     Active member of club or organization: Not on file     Attends meetings of clubs or organizations: Not on file     Relationship status: Not on file    Intimate partner violence:     Fear of current or ex partner: Not on file     Emotionally abused: Not on file     Physically abused: Not on file     Forced sexual activity: Not on file   Other Topics Concern    Not on file   Social History Narrative    Caffeine use    Living independently          Family History   Problem Relation Age of Onset    Breast cancer Mother     Throat cancer Family      Past Surgical History:   Procedure Laterality Date    CATARACT EXTRACTION W/  INTRAOCULAR LENS IMPLANT Bilateral     COLONOSCOPY      DEEP BRAIN STIMULATOR PLACEMENT      KNEE ARTHROSCOPY      HI IMP STIM,CRANIAL,SUBQ,1 ARRAY Left 1/22/2019    Procedure: REPLACEMENT IMPLANTABLE PULSE GENERATOR (IPG) DEEP BRAIN STIMULATION (DBS), LEFT;  Surgeon: Tanesha Mcmahan MD;  [facility];  Service: Neurosurgery    HI IMP STIM,CRANIAL,SUBQ,>1 ARRAY Right 12/13/2018    Procedure: REPLACEMENT RIGHT DBS IMPLANTABLE PULSE GENERATOR;  Surgeon: Tanesha Mcmahan MD;  [facility];  Service: Neurosurgery    HI TOTAL HIP ARTHROPLASTY Left 5/20/2016    Procedure: ARTHROPLASTY HIP TOTAL ANTERIOR;  Surgeon: Joseph Stuart MD;  [facility];  Service: Orthopedics    ROTATOR CUFF REPAIR         Current Outpatient Medications:     albuterol (2 5 mg/3 mL) 0 083 % nebulizer solution, Take 1 vial (2 5 mg total) by nebulization 2 (two) times a day, Disp: 60 vial, Rfl: 1    ascorbic acid (VITAMIN C) 250 MG tablet, Take 500 mg by mouth daily, Disp: , Rfl:     Cholecalciferol (VITAMIN D-3 PO), Take 1,000 Units by mouth daily  , Disp: , Rfl:     clopidogrel (PLAVIX) 75 mg tablet, Take 1 tablet (75 mg total) by mouth daily, Disp: , Rfl:     diltiazem (CARDIZEM CD) 240 mg 24 hr capsule, Take 1 capsule (240 mg total) by mouth daily, Disp: 30 capsule, Rfl: 5    Fluticasone Propionate, Inhal, (FLOVENT DISKUS) 250 MCG/BLIST AEPB, Inhale 1 puff daily as needed  , Disp: , Rfl:     furosemide (LASIX) 20 mg tablet, Take 1 tablet (20 mg total) by mouth daily, Disp: 90 tablet, Rfl: 3    levothyroxine 125 mcg tablet, Take 1 tablet (125 mcg total) by mouth daily, Disp: 30 tablet, Rfl: 5    LORazepam (ATIVAN) 1 mg tablet, Take 1 tablet (1 mg total) by mouth 30 min pre-procedure, Disp: 2 tablet, Rfl: 0    Multiple Vitamins-Minerals (PRESERVISION AREDS PO), Take 1 Cap by Mouth daily, Disp: , Rfl:     multivitamin (THERAGRAN) TABS, Take 1 tablet by mouth daily, Disp: , Rfl:     omeprazole (PriLOSEC) 20 mg delayed release capsule, Take 1 capsule by mouth as needed  , Disp: , Rfl:     rosuvastatin (CRESTOR) 5 mg tablet, Take 1 tablet (5 mg total) by mouth daily, Disp: 30 tablet, Rfl: 5    aspirin 81 MG tablet, Take 1 tablet (81 mg total) by mouth daily YOU MAY RESTART ASPIRIN ON SATURDAY 12/15/2018  (Patient not taking: Reported on 11/5/2019), Disp: , Rfl: 0  Allergies   Allergen Reactions    Ciprofloxacin Diarrhea    Simvastatin Myalgia    Advair Diskus [Fluticasone-Salmeterol] Palpitations     Ok w flovent    Tetracycline Rash     Reaction Date: [id number];      ECG:  Baseline artifact, normal sinus rhythm with an isolated PVC, left axis deviation, poor anterior R-wave progression     Review of Systems:  Review of Systems   Respiratory: Positive for shortness of breath  All other systems reviewed and are negative          Vitals:    11/05/19 1330   BP: 132/70   Pulse: 79   Weight: 84 6 kg (186 lb 9 6 oz)   Height: 5' 3" (1 6 m)     Vitals:    11/05/19 1330   Weight: 84 6 kg (186 lb 9 6 oz)     Height: 5' 3" (160 cm)     Physical Exam:  General appearance:  Appears stated age, alert, well appearing and in no distress  HEENT:  PERRLA, EOMI, no scleral icterus, no conjunctival pallor  NECK:  Supple, No elevated JVP, no thyromegaly, no carotid bruits  HEART:  Regular rate and rhythm, normal S1/S2, no S3/S4, no murmur or rub  LUNGS:  Clear to auscultation bilaterally  ABDOMEN:  Soft, non-tender, positive bowel sounds, no rebound or guarding, no organomegaly   EXTREMITIES:  No edema  VASCULAR:  Normal pedal pulses   SKIN: No lesions or rashes on exposed skin  NEURO:  CN II-XII intact, no focal deficits

## 2019-11-05 ENCOUNTER — CONSULT (OUTPATIENT)
Dept: CARDIOLOGY CLINIC | Facility: CLINIC | Age: 79
End: 2019-11-05
Payer: MEDICARE

## 2019-11-05 VITALS
BODY MASS INDEX: 33.06 KG/M2 | HEIGHT: 63 IN | DIASTOLIC BLOOD PRESSURE: 70 MMHG | HEART RATE: 79 BPM | SYSTOLIC BLOOD PRESSURE: 132 MMHG | WEIGHT: 186.6 LBS

## 2019-11-05 DIAGNOSIS — Z01.810 PREOPERATIVE CARDIOVASCULAR EXAMINATION: Primary | ICD-10-CM

## 2019-11-05 DIAGNOSIS — I63.239 CAROTID STENOSIS, SYMPTOMATIC, WITH INFARCTION (HCC): ICD-10-CM

## 2019-11-05 DIAGNOSIS — E78.5 DYSLIPIDEMIA: ICD-10-CM

## 2019-11-05 DIAGNOSIS — I10 BENIGN ESSENTIAL HYPERTENSION: ICD-10-CM

## 2019-11-05 DIAGNOSIS — I35.0 NONRHEUMATIC AORTIC VALVE STENOSIS: ICD-10-CM

## 2019-11-05 PROCEDURE — 93000 ELECTROCARDIOGRAM COMPLETE: CPT | Performed by: INTERNAL MEDICINE

## 2019-11-05 PROCEDURE — 99204 OFFICE O/P NEW MOD 45 MIN: CPT | Performed by: INTERNAL MEDICINE

## 2019-11-06 ENCOUNTER — HOSPITAL ENCOUNTER (INPATIENT)
Facility: HOSPITAL | Age: 79
LOS: 2 days | Discharge: HOME | DRG: 661 | End: 2019-11-08
Attending: EMERGENCY MEDICINE | Admitting: INTERNAL MEDICINE
Payer: MEDICARE

## 2019-11-06 ENCOUNTER — ANESTHESIA EVENT (INPATIENT)
Dept: PERIOP | Facility: HOSPITAL | Age: 79
DRG: 661 | End: 2019-11-06
Payer: MEDICARE

## 2019-11-06 ENCOUNTER — APPOINTMENT (EMERGENCY)
Dept: CT IMAGING | Facility: HOSPITAL | Age: 79
DRG: 661 | End: 2019-11-06
Payer: MEDICARE

## 2019-11-06 DIAGNOSIS — R31.29 MICROSCOPIC HEMATURIA: ICD-10-CM

## 2019-11-06 DIAGNOSIS — R10.9 LEFT FLANK PAIN: Primary | ICD-10-CM

## 2019-11-06 DIAGNOSIS — N13.30 HYDRONEPHROSIS OF LEFT KIDNEY: ICD-10-CM

## 2019-11-06 DIAGNOSIS — I63.9 CEREBROVASCULAR ACCIDENT (CVA), UNSPECIFIED MECHANISM (HCC): ICD-10-CM

## 2019-11-06 PROBLEM — N12 PYELONEPHRITIS OF LEFT KIDNEY: Status: ACTIVE | Noted: 2019-11-06

## 2019-11-06 PROBLEM — R93.89 ABNORMAL CT SCAN: Status: ACTIVE | Noted: 2019-11-06

## 2019-11-06 PROBLEM — M54.9 BACK PAIN: Status: ACTIVE | Noted: 2018-06-25

## 2019-11-06 LAB
ALBUMIN SERPL BCP-MCNC: 3.4 G/DL (ref 3.5–5)
ALP SERPL-CCNC: 78 U/L (ref 46–116)
ALT SERPL W P-5'-P-CCNC: 21 U/L (ref 12–78)
ANION GAP SERPL CALCULATED.3IONS-SCNC: 7 MMOL/L (ref 4–13)
APTT PPP: 33 SECONDS (ref 23–37)
AST SERPL W P-5'-P-CCNC: 22 U/L (ref 5–45)
BACTERIA UR QL AUTO: ABNORMAL /HPF
BASOPHILS # BLD AUTO: 0.05 THOUSANDS/ΜL (ref 0–0.1)
BASOPHILS NFR BLD AUTO: 1 % (ref 0–1)
BILIRUB SERPL-MCNC: 0.24 MG/DL (ref 0.2–1)
BILIRUB UR QL STRIP: NEGATIVE
BUN SERPL-MCNC: 18 MG/DL (ref 5–25)
CALCIUM SERPL-MCNC: 8.9 MG/DL (ref 8.3–10.1)
CHLORIDE SERPL-SCNC: 101 MMOL/L (ref 100–108)
CLARITY UR: ABNORMAL
CO2 SERPL-SCNC: 30 MMOL/L (ref 21–32)
COLOR UR: YELLOW
CREAT SERPL-MCNC: 0.98 MG/DL (ref 0.6–1.3)
EOSINOPHIL # BLD AUTO: 0.28 THOUSAND/ΜL (ref 0–0.61)
EOSINOPHIL NFR BLD AUTO: 3 % (ref 0–6)
ERYTHROCYTE [DISTWIDTH] IN BLOOD BY AUTOMATED COUNT: 13.7 % (ref 11.6–15.1)
GFR SERPL CREATININE-BSD FRML MDRD: 54 ML/MIN/1.73SQ M
GLUCOSE SERPL-MCNC: 131 MG/DL (ref 65–140)
GLUCOSE SERPL-MCNC: 88 MG/DL (ref 65–140)
GLUCOSE UR STRIP-MCNC: NEGATIVE MG/DL
HCT VFR BLD AUTO: 43.5 % (ref 34.8–46.1)
HGB BLD-MCNC: 13.4 G/DL (ref 11.5–15.4)
HGB UR QL STRIP.AUTO: ABNORMAL
IMM GRANULOCYTES # BLD AUTO: 0.03 THOUSAND/UL (ref 0–0.2)
IMM GRANULOCYTES NFR BLD AUTO: 0 % (ref 0–2)
INR PPP: 1 (ref 0.84–1.19)
KETONES UR STRIP-MCNC: NEGATIVE MG/DL
LEUKOCYTE ESTERASE UR QL STRIP: ABNORMAL
LYMPHOCYTES # BLD AUTO: 1.51 THOUSANDS/ΜL (ref 0.6–4.47)
LYMPHOCYTES NFR BLD AUTO: 14 % (ref 14–44)
MCH RBC QN AUTO: 27.1 PG (ref 26.8–34.3)
MCHC RBC AUTO-ENTMCNC: 30.8 G/DL (ref 31.4–37.4)
MCV RBC AUTO: 88 FL (ref 82–98)
MONOCYTES # BLD AUTO: 0.72 THOUSAND/ΜL (ref 0.17–1.22)
MONOCYTES NFR BLD AUTO: 7 % (ref 4–12)
NEUTROPHILS # BLD AUTO: 8.35 THOUSANDS/ΜL (ref 1.85–7.62)
NEUTS SEG NFR BLD AUTO: 75 % (ref 43–75)
NITRITE UR QL STRIP: NEGATIVE
NON-SQ EPI CELLS URNS QL MICRO: ABNORMAL /HPF
NRBC BLD AUTO-RTO: 0 /100 WBCS
PH UR STRIP.AUTO: 7 [PH] (ref 4.5–8)
PLATELET # BLD AUTO: 253 THOUSANDS/UL (ref 149–390)
PMV BLD AUTO: 9.4 FL (ref 8.9–12.7)
POTASSIUM SERPL-SCNC: 4.2 MMOL/L (ref 3.5–5.3)
PROT SERPL-MCNC: 7.9 G/DL (ref 6.4–8.2)
PROT UR STRIP-MCNC: >=300 MG/DL
PROTHROMBIN TIME: 13.3 SECONDS (ref 11.6–14.5)
RBC # BLD AUTO: 4.95 MILLION/UL (ref 3.81–5.12)
RBC #/AREA URNS AUTO: ABNORMAL /HPF
SODIUM SERPL-SCNC: 138 MMOL/L (ref 136–145)
SP GR UR STRIP.AUTO: 1.02 (ref 1–1.03)
UROBILINOGEN UR QL STRIP.AUTO: 0.2 E.U./DL
WBC # BLD AUTO: 10.94 THOUSAND/UL (ref 4.31–10.16)
WBC #/AREA URNS AUTO: ABNORMAL /HPF

## 2019-11-06 PROCEDURE — 74177 CT ABD & PELVIS W/CONTRAST: CPT

## 2019-11-06 PROCEDURE — 71275 CT ANGIOGRAPHY CHEST: CPT

## 2019-11-06 PROCEDURE — 99223 1ST HOSP IP/OBS HIGH 75: CPT | Performed by: INTERNAL MEDICINE

## 2019-11-06 PROCEDURE — 85025 COMPLETE CBC W/AUTO DIFF WBC: CPT | Performed by: EMERGENCY MEDICINE

## 2019-11-06 PROCEDURE — 90662 IIV NO PRSV INCREASED AG IM: CPT | Performed by: INTERNAL MEDICINE

## 2019-11-06 PROCEDURE — 99285 EMERGENCY DEPT VISIT HI MDM: CPT | Performed by: EMERGENCY MEDICINE

## 2019-11-06 PROCEDURE — 81001 URINALYSIS AUTO W/SCOPE: CPT

## 2019-11-06 PROCEDURE — 96361 HYDRATE IV INFUSION ADD-ON: CPT

## 2019-11-06 PROCEDURE — 99285 EMERGENCY DEPT VISIT HI MDM: CPT

## 2019-11-06 PROCEDURE — 94760 N-INVAS EAR/PLS OXIMETRY 1: CPT

## 2019-11-06 PROCEDURE — 82948 REAGENT STRIP/BLOOD GLUCOSE: CPT

## 2019-11-06 PROCEDURE — 94640 AIRWAY INHALATION TREATMENT: CPT

## 2019-11-06 PROCEDURE — 85730 THROMBOPLASTIN TIME PARTIAL: CPT | Performed by: EMERGENCY MEDICINE

## 2019-11-06 PROCEDURE — G0008 ADMIN INFLUENZA VIRUS VAC: HCPCS | Performed by: INTERNAL MEDICINE

## 2019-11-06 PROCEDURE — 80053 COMPREHEN METABOLIC PANEL: CPT | Performed by: EMERGENCY MEDICINE

## 2019-11-06 PROCEDURE — 85610 PROTHROMBIN TIME: CPT | Performed by: EMERGENCY MEDICINE

## 2019-11-06 PROCEDURE — 87040 BLOOD CULTURE FOR BACTERIA: CPT | Performed by: EMERGENCY MEDICINE

## 2019-11-06 PROCEDURE — 87086 URINE CULTURE/COLONY COUNT: CPT

## 2019-11-06 PROCEDURE — 96374 THER/PROPH/DIAG INJ IV PUSH: CPT

## 2019-11-06 PROCEDURE — 96375 TX/PRO/DX INJ NEW DRUG ADDON: CPT

## 2019-11-06 PROCEDURE — 99223 1ST HOSP IP/OBS HIGH 75: CPT | Performed by: UROLOGY

## 2019-11-06 PROCEDURE — 93005 ELECTROCARDIOGRAM TRACING: CPT

## 2019-11-06 PROCEDURE — 36415 COLL VENOUS BLD VENIPUNCTURE: CPT | Performed by: EMERGENCY MEDICINE

## 2019-11-06 RX ORDER — LEVOTHYROXINE SODIUM 0.12 MG/1
125 TABLET ORAL DAILY
Status: DISCONTINUED | OUTPATIENT
Start: 2019-11-06 | End: 2019-11-08 | Stop reason: HOSPADM

## 2019-11-06 RX ORDER — CLOPIDOGREL BISULFATE 75 MG/1
75 TABLET ORAL DAILY
Status: DISCONTINUED | OUTPATIENT
Start: 2019-11-06 | End: 2019-11-08 | Stop reason: HOSPADM

## 2019-11-06 RX ORDER — DILTIAZEM HYDROCHLORIDE 240 MG/1
240 CAPSULE, COATED, EXTENDED RELEASE ORAL DAILY
Status: DISCONTINUED | OUTPATIENT
Start: 2019-11-06 | End: 2019-11-08 | Stop reason: HOSPADM

## 2019-11-06 RX ORDER — FUROSEMIDE 20 MG/1
20 TABLET ORAL DAILY
Status: DISCONTINUED | OUTPATIENT
Start: 2019-11-06 | End: 2019-11-08 | Stop reason: HOSPADM

## 2019-11-06 RX ORDER — FLUTICASONE PROPIONATE 220 UG/1
1 AEROSOL, METERED RESPIRATORY (INHALATION) EVERY 12 HOURS SCHEDULED
Status: DISCONTINUED | OUTPATIENT
Start: 2019-11-06 | End: 2019-11-08 | Stop reason: HOSPADM

## 2019-11-06 RX ORDER — ACETAMINOPHEN 325 MG/1
650 TABLET ORAL EVERY 6 HOURS PRN
Status: DISCONTINUED | OUTPATIENT
Start: 2019-11-06 | End: 2019-11-08 | Stop reason: HOSPADM

## 2019-11-06 RX ORDER — HYDROMORPHONE HCL/PF 1 MG/ML
0.5 SYRINGE (ML) INJECTION ONCE
Status: COMPLETED | OUTPATIENT
Start: 2019-11-06 | End: 2019-11-06

## 2019-11-06 RX ORDER — MELATONIN
1000 DAILY
COMMUNITY
End: 2019-11-22 | Stop reason: SDUPTHER

## 2019-11-06 RX ORDER — HEPARIN SODIUM 5000 [USP'U]/ML
5000 INJECTION, SOLUTION INTRAVENOUS; SUBCUTANEOUS EVERY 8 HOURS SCHEDULED
Status: DISCONTINUED | OUTPATIENT
Start: 2019-11-06 | End: 2019-11-08 | Stop reason: HOSPADM

## 2019-11-06 RX ORDER — IPRATROPIUM BROMIDE AND ALBUTEROL SULFATE 2.5; .5 MG/3ML; MG/3ML
3 SOLUTION RESPIRATORY (INHALATION)
Status: DISCONTINUED | OUTPATIENT
Start: 2019-11-06 | End: 2019-11-08 | Stop reason: HOSPADM

## 2019-11-06 RX ORDER — PANTOPRAZOLE SODIUM 40 MG/1
40 TABLET, DELAYED RELEASE ORAL
Status: DISCONTINUED | OUTPATIENT
Start: 2019-11-06 | End: 2019-11-08 | Stop reason: HOSPADM

## 2019-11-06 RX ORDER — ASPIRIN 81 MG/1
81 TABLET, CHEWABLE ORAL DAILY
Status: DISCONTINUED | OUTPATIENT
Start: 2019-11-06 | End: 2019-11-08 | Stop reason: HOSPADM

## 2019-11-06 RX ADMIN — HYDROMORPHONE HYDROCHLORIDE 0.5 MG: 1 INJECTION, SOLUTION INTRAMUSCULAR; INTRAVENOUS; SUBCUTANEOUS at 06:37

## 2019-11-06 RX ADMIN — MORPHINE SULFATE 2 MG: 2 INJECTION, SOLUTION INTRAMUSCULAR; INTRAVENOUS at 05:08

## 2019-11-06 RX ADMIN — FLUTICASONE PROPIONATE 1 PUFF: 220 AEROSOL, METERED RESPIRATORY (INHALATION) at 21:58

## 2019-11-06 RX ADMIN — SODIUM CHLORIDE 500 ML: 0.9 INJECTION, SOLUTION INTRAVENOUS at 05:06

## 2019-11-06 RX ADMIN — HEPARIN SODIUM 5000 UNITS: 5000 INJECTION INTRAVENOUS; SUBCUTANEOUS at 21:58

## 2019-11-06 RX ADMIN — IODIXANOL 100 ML: 320 INJECTION, SOLUTION INTRAVASCULAR at 07:00

## 2019-11-06 RX ADMIN — CEFTRIAXONE 1000 MG: 1 INJECTION, POWDER, FOR SOLUTION INTRAMUSCULAR; INTRAVENOUS at 23:23

## 2019-11-06 RX ADMIN — CEFTRIAXONE SODIUM 1000 MG: 10 INJECTION, POWDER, FOR SOLUTION INTRAVENOUS at 08:53

## 2019-11-06 RX ADMIN — DILTIAZEM HYDROCHLORIDE 240 MG: 240 CAPSULE, COATED, EXTENDED RELEASE ORAL at 13:37

## 2019-11-06 RX ADMIN — IPRATROPIUM BROMIDE AND ALBUTEROL SULFATE 3 ML: 2.5; .5 SOLUTION RESPIRATORY (INHALATION) at 18:48

## 2019-11-06 RX ADMIN — INFLUENZA A VIRUS A/MICHIGAN/45/2015 X-275 (H1N1) ANTIGEN (FORMALDEHYDE INACTIVATED), INFLUENZA A VIRUS A/SINGAPORE/INFIMH-16-0019/2016 IVR-186 (H3N2) ANTIGEN (FORMALDEHYDE INACTIVATED), AND INFLUENZA B VIRUS B/MARYLAND/15/2016 BX-69A (A B/COLORADO/6/2017-LIKE VIRUS) ANTIGEN (FORMALDEHYDE INACTIVATED) 0.5 ML: 60; 60; 60 INJECTION, SUSPENSION INTRAMUSCULAR at 13:41

## 2019-11-06 RX ADMIN — FLUTICASONE PROPIONATE 1 PUFF: 220 AEROSOL, METERED RESPIRATORY (INHALATION) at 13:40

## 2019-11-06 RX ADMIN — ASPIRIN 81 MG 81 MG: 81 TABLET ORAL at 13:37

## 2019-11-06 RX ADMIN — FUROSEMIDE 20 MG: 20 TABLET ORAL at 13:37

## 2019-11-06 RX ADMIN — HEPARIN SODIUM 5000 UNITS: 5000 INJECTION INTRAVENOUS; SUBCUTANEOUS at 13:43

## 2019-11-06 RX ADMIN — CLOPIDOGREL BISULFATE 75 MG: 75 TABLET ORAL at 13:37

## 2019-11-06 RX ADMIN — IPRATROPIUM BROMIDE AND ALBUTEROL SULFATE 3 ML: 2.5; .5 SOLUTION RESPIRATORY (INHALATION) at 13:10

## 2019-11-06 NOTE — ASSESSMENT & PLAN NOTE
77 y/o F with history of hypertension, hyperlipidemia, CVA, right carotid stenosis, COPD not on home oxygen, hypothyroidism presenting with acute onset of left flank pain      -CT scan revealed moderate left LV ureteral urothelial hyperemia and thickening extending through UPJ, mild proximal hydroureter with potential transition into inferior sacroiliac joint level, no distal obstructing lesion or calculus, finding suggested of ascending pyelo ureteritis, moderate left hydronephrosis with transition at the UPJ and multifocal cortical scarring attributed to chronic UPJ stenosis  -urology consultation appreciated, plan for cystoscopy and left ureteral stent placement tomorrow, NPO after midnight

## 2019-11-06 NOTE — ASSESSMENT & PLAN NOTE
-no acute exacerbation, continue with bronchodilators  -CT scan revealed 2 mm noncalcified nodule in left upper lobe likely could noncalcified granuloma recommend 12 month follow-up with noncontrast CT chest

## 2019-11-06 NOTE — ED PROVIDER NOTES
History  Chief Complaint   Patient presents with    Back Pain     Back pain to L flank radiating to abdomen  Recent stroke in Sept '19 w/ minimal L side residual deficits & speech       History provided by:  Patient and EMS personnel   used: No    Back Pain   Associated symptoms: no abdominal pain, no chest pain, no dysuria, no fever and no headaches    Flank Pain   Pain location:  L flank  Pain quality: aching    Pain radiates to:  Does not radiate  Pain severity:  Moderate  Onset quality:  Gradual  Duration:  1 hour  Timing:  Intermittent  Progression:  Waxing and waning  Chronicity:  New  Context: awakening from sleep    Relieved by:  Nothing  Worsened by:  Nothing  Ineffective treatments:  None tried  Associated symptoms: no chest pain, no chills, no constipation, no cough, no diarrhea, no dysuria, no fever, no hematuria, no nausea, no shortness of breath, no sore throat and no vomiting    Risk factors: being elderly and obesity        Prior to Admission Medications   Prescriptions Last Dose Informant Patient Reported? Taking? Cholecalciferol (VITAMIN D-3 PO) Not Taking at Unknown time Self Yes No   Sig: Take 1,000 Units by mouth daily     Fluticasone Propionate, Inhal, (FLOVENT DISKUS) 250 MCG/BLIST AEPB Not Taking at Unknown time Self Yes No   Sig: Inhale 1 puff daily as needed     LORazepam (ATIVAN) 1 mg tablet Not Taking at Unknown time Self No No   Sig: Take 1 tablet (1 mg total) by mouth 30 min pre-procedure   Patient not taking: Reported on 11/6/2019   Multiple Vitamins-Minerals (PRESERVISION AREDS PO)  Self Yes Yes   Sig: Take 1 Cap by Mouth daily   albuterol (2 5 mg/3 mL) 0 083 % nebulizer solution Not Taking at Unknown time Self No No   Sig: Take 1 vial (2 5 mg total) by nebulization 2 (two) times a day   Patient not taking: Reported on 11/6/2019   ascorbic acid (VITAMIN C) 250 MG tablet Not Taking at Unknown time Self Yes No   Sig: Take 500 mg by mouth daily   aspirin 81 MG tablet  Self No Yes   Sig: Take 1 tablet (81 mg total) by mouth daily YOU MAY RESTART ASPIRIN ON SATURDAY 12/15/2018     cholecalciferol (VITAMIN D3) 1,000 units tablet  Self Yes Yes   Sig: Take 1,000 Units by mouth daily   clopidogrel (PLAVIX) 75 mg tablet Not Taking at Unknown time Self No No   Sig: Take 1 tablet (75 mg total) by mouth daily   Patient not taking: Reported on 11/6/2019   diltiazem (CARDIZEM CD) 240 mg 24 hr capsule  Self No Yes   Sig: Take 1 capsule (240 mg total) by mouth daily   furosemide (LASIX) 20 mg tablet  Self No Yes   Sig: Take 1 tablet (20 mg total) by mouth daily   levothyroxine 125 mcg tablet  Self No Yes   Sig: Take 1 tablet (125 mcg total) by mouth daily   multivitamin (THERAGRAN) TABS  Self Yes Yes   Sig: Take 1 tablet by mouth daily   omeprazole (PriLOSEC) 20 mg delayed release capsule Not Taking at Unknown time Self Yes No   Sig: Take 1 capsule by mouth as needed     rosuvastatin (CRESTOR) 5 mg tablet  Self No Yes   Sig: Take 1 tablet (5 mg total) by mouth daily      Facility-Administered Medications: None       Past Medical History:   Diagnosis Date    Arthritis     Asthma     Benign essential tremor 10/15/2018    Added automatically from request for surgery [id number]    Benign neoplasm of large intestine     Cellulitis of leg, right     Closed compression fracture of body of lumbar vertebra (HCC)     L5    COPD (chronic obstructive pulmonary disease) (HCC)     Difficulty waking     post anesthesia    Disease of thyroid gland     GERD (gastroesophageal reflux disease)     High cholesterol     Hypertension     Osteopenia     Osteoporosis     Shortness of breath     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     Urinary leakage     Vitamin D deficiency     Wears glasses     Wears partial dentures     upper       Past Surgical History:   Procedure Laterality Date    CATARACT EXTRACTION W/  INTRAOCULAR LENS IMPLANT Bilateral     COLONOSCOPY      DEEP BRAIN STIMULATOR PLACEMENT  KNEE ARTHROSCOPY      NM IMP STIM,CRANIAL,SUBQ,1 ARRAY Left 2019    Procedure: REPLACEMENT IMPLANTABLE PULSE GENERATOR (IPG) DEEP BRAIN STIMULATION (DBS), LEFT;  Surgeon: Rancho Cole MD;  [facility];  Service: Neurosurgery    NM IMP STIM,CRANIAL,SUBQ,>1 ARRAY Right 2018    Procedure: REPLACEMENT RIGHT DBS IMPLANTABLE PULSE GENERATOR;  Surgeon: Rancho Cole MD;  [facility];  Service: Neurosurgery    NM TOTAL HIP ARTHROPLASTY Left 2016    Procedure: ARTHROPLASTY HIP TOTAL ANTERIOR;  Surgeon: Ephraim Urias MD;  [facility];  Service: Orthopedics    ROTATOR CUFF REPAIR         Family History   Problem Relation Age of Onset    Breast cancer Mother     Throat cancer Family      I have reviewed and agree with the history as documented  Social History     Tobacco Use    Smoking status: Former Smoker     Last attempt to quit:      Years since quittin 8    Smokeless tobacco: Never Used   Substance Use Topics    Alcohol use: Never     Frequency: Never    Drug use: No        Review of Systems   Constitutional: Negative for chills and fever  HENT: Negative for facial swelling, sore throat and trouble swallowing  Eyes: Negative for pain and visual disturbance  Respiratory: Negative for cough and shortness of breath  Cardiovascular: Negative for chest pain and leg swelling  Gastrointestinal: Negative for abdominal pain, blood in stool, constipation, diarrhea, nausea and vomiting  Genitourinary: Positive for flank pain  Negative for dysuria and hematuria  Musculoskeletal: Positive for back pain  Negative for neck pain and neck stiffness  Skin: Negative for pallor and rash  Allergic/Immunologic: Negative for environmental allergies and immunocompromised state  Neurological: Negative for dizziness and headaches  Hematological: Negative for adenopathy  Does not bruise/bleed easily     Psychiatric/Behavioral: Negative for agitation and behavioral problems  All other systems reviewed and are negative  Physical Exam  Physical Exam   Constitutional: She is oriented to person, place, and time  She appears well-developed and well-nourished  No distress  HENT:   Head: Normocephalic and atraumatic  Eyes: EOM are normal    Neck: Normal range of motion  Neck supple  Cardiovascular: Normal rate, regular rhythm, normal heart sounds and intact distal pulses  Pulmonary/Chest: Effort normal and breath sounds normal    Abdominal: Soft  Bowel sounds are normal  There is no tenderness  There is no rebound and no guarding  Mild left CVA tenderness   Musculoskeletal: Normal range of motion  Neurological: She is alert and oriented to person, place, and time  Skin: Skin is warm and dry  Psychiatric: She has a normal mood and affect  Nursing note and vitals reviewed        Vital Signs  ED Triage Vitals [11/06/19 0459]   Temperature Pulse Respirations Blood Pressure SpO2   98 °F (36 7 °C) 87 20 (!) 173/79 94 %      Temp Source Heart Rate Source Patient Position - Orthostatic VS BP Location FiO2 (%)   Oral Monitor Sitting Right arm --      Pain Score       9           Vitals:    11/06/19 0459 11/06/19 0636   BP: (!) 173/79 162/82   Pulse: 87 77   Patient Position - Orthostatic VS: Sitting          Visual Acuity      ED Medications  Medications   morphine injection 2 mg (2 mg Intravenous Given 11/6/19 0508)   sodium chloride 0 9 % bolus 500 mL (0 mL Intravenous Stopped 11/6/19 0635)   HYDROmorphone (DILAUDID) injection 0 5 mg (0 5 mg Intravenous Given 11/6/19 0637)       Diagnostic Studies  Results Reviewed     Procedure Component Value Units Date/Time    Comprehensive metabolic panel [[id number]]  (Abnormal) Collected:  11/06/19 0502    Lab Status:  Final result Specimen:  Blood from Arm, Left Updated:  11/06/19 0529     Sodium 138 mmol/L      Potassium 4 2 mmol/L      Chloride 101 mmol/L      CO2 30 mmol/L      ANION GAP 7 mmol/L      BUN 18 mg/dL Creatinine 0 98 mg/dL      Glucose 131 mg/dL      Calcium 8 9 mg/dL      AST 22 U/L      ALT 21 U/L      Alkaline Phosphatase 78 U/L      Total Protein 7 9 g/dL      Albumin 3 4 g/dL      Total Bilirubin 0 24 mg/dL      eGFR 54 ml/min/1 73sq m     Narrative:       [facility] guidelines for Chronic Kidney Disease (CKD):     Stage 1 with normal or high GFR (GFR > 90 mL/min/1 73 square meters)    Stage 2 Mild CKD (GFR = 60-89 mL/min/1 73 square meters)    Stage 3A Moderate CKD (GFR = 45-59 mL/min/1 73 square meters)    Stage 3B Moderate CKD (GFR = 30-44 mL/min/1 73 square meters)    Stage 4 Severe CKD (GFR = 15-29 mL/min/1 73 square meters)    Stage 5 End Stage CKD (GFR <15 mL/min/1 73 square meters)  Note: GFR calculation is accurate only with a steady state creatinine    Protime-INR [[id number]]  (Normal) Collected:  11/06/19 0502    Lab Status:  Final result Specimen:  Blood from Arm, Left Updated:  11/06/19 0518     Protime 13 3 seconds      INR 1 00    APTT [[id number]]  (Normal) Collected:  11/06/19 0502    Lab Status:  Final result Specimen:  Blood from Arm, Left Updated:  11/06/19 0518     PTT 33 seconds     CBC and differential [[id number]]  (Abnormal) Collected:  11/06/19 0502    Lab Status:  Final result Specimen:  Blood from Arm, Left Updated:  11/06/19 0508     WBC 10 94 Thousand/uL      RBC 4 95 Million/uL      Hemoglobin 13 4 g/dL      Hematocrit 43 5 %      MCV 88 fL      MCH 27 1 pg      MCHC 30 8 g/dL      RDW 13 7 %      MPV 9 4 fL      Platelets 389 Thousands/uL      nRBC 0 /100 WBCs      Neutrophils Relative 75 %      Immat GRANS % 0 %      Lymphocytes Relative 14 %      Monocytes Relative 7 %      Eosinophils Relative 3 %      Basophils Relative 1 %      Neutrophils Absolute 8 35 Thousands/µL      Immature Grans Absolute 0 03 Thousand/uL      Lymphocytes Absolute 1 51 Thousands/µL      Monocytes Absolute 0 72 Thousand/µL      Eosinophils Absolute 0 28 Thousand/µL Basophils Absolute 0 05 Thousands/µL     POCT urinalysis dipstick [[id number]]     Lab Status:  No result Specimen:  Urine                  PE Study with CT Abdomen and Pelvis with contrast    (Results Pending)              Procedures  ECG 12 Lead Documentation Only  Date/Time: 11/6/2019 5:06 AM  Performed by: Governor Sage MD  Authorized by: Governor Sage MD     Indications / Diagnosis:  Left flank pain  ECG reviewed by me, the ED Provider: yes    Patient location:  ED  Interpretation:     Interpretation: normal    Rate:     ECG rate assessment: normal    Rhythm:     Rhythm: sinus rhythm    Ectopy:     Ectopy: none    QRS:     QRS axis:  Normal  Conduction:     Conduction: normal    ST segments:     ST segments:  Normal  T waves:     T waves: normal             ED Course  ED Course as of Nov 06 0643   Wed Nov 06, 2019   0630 WBC(!): 10 94   0630 Hemoglobin: 13 4   0630 Sodium: 138   0630 Potassium: 4 2   0630 BUN: 18   0630 Labs no significant acute abnormality  Creatinine: 0 98     0650  Patient signed out to Dr Marcello Mahmood, for follow up of ER workup including CTA Chest, A/P                           MDM  Number of Diagnoses or Management Options  Left flank pain: new and requires workup  Diagnosis management comments: Patient is an 75-year-old female, comes in with acute left flank pain, woke up from sleep with the pain, called 911, brought to the ER for evaluation, denies chest pain, dyspnea, fever, cough, dysuria, hematuria, vomiting, diarrhea, constipation, blood in stool or any other symptoms  On exam patient is elderly, vital signs noted, afebrile, oxygen saturation 94% noted, lungs clear, heart sounds normal, abdomen soft nontender, mild left CVA tenderness noted  Differential diagnosis: Left flank pain, possible ureteric colic, diverticulitis, will check labs, urine, get CT scan, give IV fluids, pain meds         Amount and/or Complexity of Data Reviewed  Clinical lab tests: reviewed and ordered  Tests in the radiology section of CPT®: ordered and reviewed  Tests in the medicine section of CPT®: ordered and reviewed  Independent visualization of images, tracings, or specimens: yes        Disposition  Final diagnoses:   Left flank pain     Time reflects when diagnosis was documented in both MDM as applicable and the Disposition within this note     Time User Action Codes Description Comment    11/6/2019  5:11 AM Fariha Lester Add [R10 9] Left flank pain       ED Disposition     None      Follow-up Information    None         Patient's Medications   Discharge Prescriptions    No medications on file     No discharge procedures on file      ED Provider  Electronically Signed by           Silvia Parker MD  11/06/19 9534

## 2019-11-06 NOTE — ANESTHESIA PREPROCEDURE EVALUATION
Review of Systems/Medical History  Patient summary reviewed  Chart reviewed  History of anesthetic complications     Cardiovascular  Hyperlipidemia, Hypertension ,   Comment: Echo 12/2018  SUMMARY:  1  Sinus rhythm  2  Good technical quality  3  Mild calcific aortic stenosis with trivial aortic regurgitation  4  Minimal aortic root enlargement  5  Mild mitral regurgitation with severe mitral annulus calcification  No evidence of mitral stenosis  6  Mild left atrial enlargement  7  Mild left ventricular systolic dysfunction, EF 54%  8  Mild left ventricular cavity enlargement  9  Grade 1 left ventricular diastolic dysfunction with mildly elevated left ventricular filling pressures,  Pulmonary  COPD moderate- medication dependent , Asthma , well controlled/ stable Asthma type of rescue: chronic medication usage, Shortness of breath,        GI/Hepatic    GERD ,             Endo/Other  Diabetes Diet controlled, History of thyroid disease , hypothyroidism,   Obesity  morbid obesity   GYN       Hematology   Musculoskeletal  Back pain ,   Arthritis     Neurology    CVA , no residual symptoms,   Comment: Tremor, deep brain stimulator Psychology           Physical Exam    Airway    Mallampati score: II  TM Distance: >3 FB  Neck ROM: full     Dental   upper dentures and lower dentures,     Cardiovascular  Rhythm: regular, Rate: normal, Murmur,     Pulmonary  Wheezes,     Other Findings  Pt states she has delayed emergence      Anesthesia Plan  ASA Score- 3 Emergent    Anesthesia Type- general with ASA Monitors  Additional Monitors:   Airway Plan: ETT and LMA  Comment: DNR status suspended for OR per pts request      Plan Factors-  Patient did not smoke on day of surgery  Induction- intravenous  Postoperative Plan- Plan for postoperative opioid use  Informed Consent- Anesthetic plan and risks discussed with patient

## 2019-11-06 NOTE — ASSESSMENT & PLAN NOTE
-CT scan reveals 2 mm noncalcified nodule of left upper lobe likely representing noncalcified granuloma, 2 cm short axis noncalcified left paraesophageal lymph node recommend CT PET scanner CT chest in 3 months  1 2 cm short axis portacaval lymph node nonspecific could be followed up with CT abdomen pelvis in 3 months

## 2019-11-06 NOTE — PLAN OF CARE
Problem: Potential for Falls  Goal: Patient will remain free of falls  Description  INTERVENTIONS:  - Assess patient frequently for physical needs  -  Identify cognitive and physical deficits and behaviors that affect risk of falls    -  [place] fall precautions as indicated by assessment   - Educate patient/family on patient safety including physical limitations  - Instruct patient to call for assistance with activity based on assessment  - Modify environment to reduce risk of injury  - Consider OT/PT consult to assist with strengthening/mobility  Outcome: Progressing     Problem: PAIN - ADULT  Goal: Verbalizes/displays adequate comfort level or baseline comfort level  Description  Interventions:  - Encourage patient to monitor pain and request assistance  - Assess pain using appropriate pain scale  - Administer analgesics based on type and severity of pain and evaluate response  - Implement non-pharmacological measures as appropriate and evaluate response  - Consider cultural and social influences on pain and pain management  - Notify physician/advanced practitioner if interventions unsuccessful or patient reports new pain  Outcome: Progressing     Problem: INFECTION - ADULT  Goal: Absence or prevention of progression during hospitalization  Description  INTERVENTIONS:  - Assess and monitor for signs and symptoms of infection  - Monitor lab/diagnostic results  - Monitor all insertion sites, i e  indwelling lines, tubes, and drains  - Monitor endotracheal if appropriate and nasal secretions for changes in amount and color  - [place] appropriate cooling/warming therapies per order  - Administer medications as ordered  - Instruct and encourage patient and family to use good hand hygiene technique  - Identify and instruct in appropriate isolation precautions for identified infection/condition  Outcome: Progressing  Goal: Absence of fever/infection during neutropenic period  Description  INTERVENTIONS:  - Monitor WBC    Outcome: Progressing     Problem: SAFETY ADULT  Goal: Maintain or return to baseline ADL function  Description  INTERVENTIONS:  -  Assess patient's ability to carry out ADLs; assess patient's baseline for ADL function and identify physical deficits which impact ability to perform ADLs (bathing, care of mouth/teeth, toileting, grooming, dressing, etc )  - Assess/evaluate cause of self-care deficits   - Assess range of motion  - Assess patient's mobility; develop plan if impaired  - Assess patient's need for assistive devices and provide as appropriate  - Encourage maximum independence but intervene and supervise when necessary  - Involve family in performance of ADLs  - Assess for home care needs following discharge   - Consider OT consult to assist with ADL evaluation and planning for discharge  - Provide patient education as appropriate  Outcome: Progressing  Goal: Maintain or return mobility status to optimal level  Description  INTERVENTIONS:  - Assess patient's baseline mobility status (ambulation, transfers, stairs, etc )    - Identify cognitive and physical deficits and behaviors that affect mobility  - Identify mobility aids required to assist with transfers and/or ambulation (gait belt, sit-to-stand, lift, walker, cane, etc )  - [place] fall precautions as indicated by assessment  - Record patient progress and toleration of activity level on Mobility SBAR; progress patient to next Phase/Stage  - Instruct patient to call for assistance with activity based on assessment  - Consider rehabilitation consult to assist with strengthening/weightbearing, etc   Outcome: Progressing     Problem: DISCHARGE PLANNING  Goal: Discharge to home or other facility with appropriate resources  Description  INTERVENTIONS:  - Identify barriers to discharge w/patient and caregiver  - Arrange for needed discharge resources and transportation as appropriate  - Identify discharge learning needs (meds, wound care, etc )  - Arrange for interpretive services to assist at discharge as needed  - Refer to Case Management Department for coordinating discharge planning if the patient needs post-hospital services based on physician/advanced practitioner order or complex needs related to functional status, cognitive ability, or social support system  Outcome: Progressing     Problem: Knowledge Deficit  Goal: Patient/family/caregiver demonstrates understanding of disease process, treatment plan, medications, and discharge instructions  Description  Complete learning assessment and assess knowledge base    Interventions:  - Provide teaching at level of understanding  - Provide teaching via preferred learning methods  Outcome: Progressing

## 2019-11-06 NOTE — H&P
H&P- Evelio Potter 1940, 78 y o  female MRN: [id number]    Unit/Bed#: E5 -01 Encounter: [id number]    Primary Care Provider: Lynda Downing DO   Date and time admitted to hospital: 11/6/2019  4:58 AM        * Left flank pain  Assessment & Plan   77 y/o F with history of hypertension, hyperlipidemia, CVA, right carotid stenosis, COPD not on home oxygen, hypothyroidism presenting with acute onset of left flank pain      -CT scan revealed moderate left LV ureteral urothelial hyperemia and thickening extending through UPJ, mild proximal hydroureter with potential transition into inferior sacroiliac joint level, no distal obstructing lesion or calculus, finding suggested of ascending pyelo ureteritis, moderate left hydronephrosis with transition at the UPJ and multifocal cortical scarring attributed to chronic UPJ stenosis  -urology consultation appreciated, plan for cystoscopy and left ureteral stent placement tomorrow, NPO after midnight    Pyelonephritis of left kidney  Assessment & Plan  -continue with ceftriaxone, follow-up cultures    Abnormal CT scan  Assessment & Plan  -CT scan reveals 2 mm noncalcified nodule of left upper lobe likely representing noncalcified granuloma, 2 cm short axis noncalcified left paraesophageal lymph node recommend CT PET scanner CT chest in 3 months  1 2 cm short axis portacaval lymph node nonspecific could be followed up with CT abdomen pelvis in 3 months    Carotid stenosis right,  symptomatic, with infarction ([address] )  Assessment & Plan  -maintained on aspirin, Plavix, statin  -follow with vascular surgery, Dr Toyin Duran, plan for right carotid endarterectomy  -patient is scheduled to undergo nuclear stress test prior to proceeding with surgery    Microscopic hematuria  Assessment & Plan  -urinalysis feels large blood, RBC present  -urology consultation appreciated    Acquired hypothyroidism  Assessment & Plan  C/w levothyroxine    COPD (chronic obstructive pulmonary disease) ([facility] )  Assessment & Plan  -no acute exacerbation, continue with bronchodilators  -CT scan revealed 2 mm noncalcified nodule in left upper lobe likely could noncalcified granuloma recommend 12 month follow-up with noncontrast CT chest    CVA (cerebral vascular accident) ([facility] )  Assessment & Plan  -history of CVA, maintained on aspirin, statin    Benign familial tremor  Assessment & Plan  -follows with Neurology outpatient  -S/P DBS placement    Benign essential hypertension  Assessment & Plan  -continue Cardizem, monitor blood pressure        VTE Prophylaxis: Heparin  / sequential compression device   Code Status: DNR/DNI  POLST: There is no POLST form on file for this patient (pre-hospital)    Anticipated Length of Stay:  Patient will be admitted on an Inpatient basis with an anticipated length of stay of  greater than 2 midnights  Justification for Hospital Stay:  Left flank pain    Total Time for Visit, including Counseling / Coordination of Care: 30 minutes  Greater than 50% of this total time spent on direct patient counseling and coordination of care  Chief Complaint:   Left flank pain    History of Present Illness:    Jimmy Ballard is a 78 y o  female who presents with acute onset of left flank pain  Patient has history of hypertension, hyperlipidemia, hypothyroidism, history of CVA with minimal left sided residual deficits, lives at home with family walks on assistance  States she woke up with left-sided flank pain  Denies any fever, chills, nausea, vomiting  Denies any chest pain, palpitations, dyspnea  Denies any dysuria or hematuria  Review of Systems:    Review of Systems   Constitutional: Negative  HENT: Negative  Eyes: Negative  Respiratory: Negative  Cardiovascular: Negative  Gastrointestinal: Negative  Endocrine: Negative  Genitourinary: Positive for flank pain  Skin: Negative  Allergic/Immunologic: Negative  Neurological: Negative  Hematological: Negative  Psychiatric/Behavioral: Negative  Past Medical and Surgical History:     Past Medical History:   Diagnosis Date    Arthritis     Asthma     Benign essential tremor 10/15/2018    Added automatically from request for surgery [id number]    Benign neoplasm of large intestine     Cellulitis of leg, right     Closed compression fracture of body of lumbar vertebra (HCC)     L5    COPD (chronic obstructive pulmonary disease) (HCC)     Difficulty waking     post anesthesia    Disease of thyroid gland     GERD (gastroesophageal reflux disease)     High cholesterol     Hypertension     Osteopenia     Osteoporosis     Shortness of breath     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     Urinary leakage     Vitamin D deficiency     Wears glasses     Wears partial dentures     upper       Past Surgical History:   Procedure Laterality Date    CATARACT EXTRACTION W/  INTRAOCULAR LENS IMPLANT Bilateral     COLONOSCOPY      DEEP BRAIN STIMULATOR PLACEMENT      KNEE ARTHROSCOPY      NC IMP STIM,CRANIAL,SUBQ,1 ARRAY Left 1/22/2019    Procedure: REPLACEMENT IMPLANTABLE PULSE GENERATOR (IPG) DEEP BRAIN STIMULATION (DBS), LEFT;  Surgeon: Fredy Chery MD;  Location: [facility];  Service: Neurosurgery    NC IMP STIM,CRANIAL,SUBQ,>1 ARRAY Right 12/13/2018    Procedure: REPLACEMENT RIGHT DBS IMPLANTABLE PULSE GENERATOR;  Surgeon: Fredy Chery MD;  [facility];  Service: Neurosurgery    NC TOTAL HIP ARTHROPLASTY Left 5/20/2016    Procedure: ARTHROPLASTY HIP TOTAL ANTERIOR;  Surgeon: Sánchez Hoover MD;  Location: [facility];  Service: Orthopedics    ROTATOR CUFF REPAIR         Meds/Allergies:    Prior to Admission medications    Medication Sig Start Date End Date Taking?  Authorizing Provider   aspirin 81 MG tablet Take 1 tablet (81 mg total) by mouth daily YOU MAY RESTART ASPIRIN ON SATURDAY 12/15/2018  12/14/18  Yes Flores Perez PA-C   cholecalciferol (VITAMIN D3) 1,000 units tablet Take 1,000 Units by mouth daily   Yes Historical Provider, MD   diltiazem (CARDIZEM CD) 240 mg 24 hr capsule Take 1 capsule (240 mg total) by mouth daily 7/29/19  Yes Stalin Rincon DO   furosemide (LASIX) 20 mg tablet Take 1 tablet (20 mg total) by mouth daily 12/20/18  Yes Stalin Rincon, DO   levothyroxine 125 mcg tablet Take 1 tablet (125 mcg total) by mouth daily 7/29/19  Yes Stalin Rincon, DO   Multiple Vitamins-Minerals (PRESERVISION AREDS PO) Take 1 Cap by Mouth daily   Yes Historical Provider, MD   multivitamin (THERAGRAN) TABS Take 1 tablet by mouth daily   Yes Historical Provider, MD   rosuvastatin (CRESTOR) 5 mg tablet Take 1 tablet (5 mg total) by mouth daily 10/8/19  Yes Stalin Rincon DO   albuterol (2 5 mg/3 mL) 0 083 % nebulizer solution Take 1 vial (2 5 mg total) by nebulization 2 (two) times a day  Patient not taking: Reported on 11/6/2019 7/9/19   Stalin Rincon DO   ascorbic acid (VITAMIN C) 250 MG tablet Take 500 mg by mouth daily    Historical Provider, MD   Cholecalciferol (VITAMIN D-3 PO) Take 1,000 Units by mouth daily  Historical Provider, MD   clopidogrel (PLAVIX) 75 mg tablet Take 1 tablet (75 mg total) by mouth daily  Patient not taking: Reported on 11/6/2019 10/8/19   Stalin Rincon DO   Fluticasone Propionate, Inhal, (FLOVENT DISKUS) 250 MCG/BLIST AEPB Inhale 1 puff daily as needed   3/14/16   Historical Provider, MD   LORazepam (ATIVAN) 1 mg tablet Take 1 tablet (1 mg total) by mouth 30 min pre-procedure  Patient not taking: Reported on 11/6/2019 10/16/19   Elijah Cintron MD   omeprazole (PriLOSEC) 20 mg delayed release capsule Take 1 capsule by mouth as needed      Historical Provider, MD     I have reviewed home medications with patient personally  Allergies:    Allergies   Allergen Reactions    Ciprofloxacin Diarrhea    Simvastatin Myalgia    Advair Diskus [Fluticasone-Salmeterol] Palpitations     Ok w flovent    Tetracycline Rash     Reaction Date: [id number]; Social History:     Social History     Substance and Sexual Activity   Alcohol Use Never    Frequency: Never     Social History     Tobacco Use   Smoking Status Former Smoker    Last attempt to quit: 2013    Years since quittin 8   Smokeless Tobacco Never Used     Social History     Substance and Sexual Activity   Drug Use No       Family History:    Family History   Problem Relation Age of Onset    Breast cancer Mother     Throat cancer Family        Physical Exam:     Vitals:   Blood Pressure: 123/71 (19 1524)  Pulse: 76 (19 1524)  Temperature: 97 7 °F (36 5 °C) (19 1524)  Temp Source: Temporal (19 1524)  Respirations: 20 (19 1524)  Height: 5' 4" (162 6 cm) (19 1135)  Weight - Scale: 85 kg (187 lb 6 3 oz) (19 1135)  SpO2: 96 % (19 1524)    Constitutional: Patient is oriented to person, place and time, no acute distress  HEENT:  Normocephalic, atraumatic, EOMI, PERRLA, no scleral icterus, no pallor, moist oral mucosa  Neck:  Supple, no masses, no thyromegaly, no bruits Normal range of motion  Lymph nodes:  No lymphadenopathy  Cardiovascular: Normal S1S2, RRR, No murmurs/rubs/gallops appreciated  Pulmonary:  Bilateral air entry, No rhonchi/rales/wheezing appreciated  Abdominal: Soft, Bowel sounds present, Non-tender, Non-distended, No rebound/guarding, no hepatomegaly   Musculoskeletal: No tenderness/abnormality   Extremities:  No cyanosis, clubbing or edema  Peripheral pulses palpable and equal bilaterally  Neurological: Cranial nerves II-XII grossly intact, sensation intact, otherwise no focal neurological symptoms  Skin: Skin is warm and dry, no rashes  Additional Data:     Lab Results: I have personally reviewed pertinent reports        Results from last 7 days   Lab Units 19  0502   WBC Thousand/uL 10 94*   HEMOGLOBIN g/dL 13 4   HEMATOCRIT % 43 5   PLATELETS Thousands/uL 253   NEUTROS PCT % 75   LYMPHS PCT % 14   MONOS PCT % 7   EOS PCT % 3     Results from last 7 days   Lab Units 11/06/19  0502   POTASSIUM mmol/L 4 2   CHLORIDE mmol/L 101   CO2 mmol/L 30   BUN mg/dL 18   CREATININE mg/dL 0 98   CALCIUM mg/dL 8 9   ALK PHOS U/L 78   ALT U/L 21   AST U/L 22     Results from last 7 days   Lab Units 11/06/19  0502   INR  1 00       Imaging: I have personally reviewed pertinent reports  Pe Study With Ct Abdomen And Pelvis With Contrast    Addendum Date: 11/6/2019 Addendum:   ADDENDUM: IV Contrast:  100 mL of iohexol (Visipaque)  320 Enteric Contrast:  Enteric contrast was not administered  Result Date: 11/6/2019  Narrative: CT PULMONARY ANGIOGRAM OF THE CHEST AND CT ABDOMEN AND PELVIS WITH INTRAVENOUS CONTRAST INDICATION:   Left flank/lower rib pain, Low oxygen saturation  COMPARISON:  None  TECHNIQUE:  CT examination of the chest, abdomen and pelvis was performed  Thin section CT angiographic technique was used in the chest in order to evaluate for pulmonary embolus and coronal 3D MIP postprocessing was performed on the acquisition scanner  In addition to portal venous phase postcontrast scanning through the abdomen and pelvis, delayed phase postcontrast scanning was performed through the upper abdominal viscera  Axial, sagittal, and coronal 2D reformatted images were created from the source data and submitted for interpretation  Radiation dose length product (DLP) for this visit:  6744 mGy-cm   This examination, like all CT scans performed in the [facility], was performed utilizing techniques to minimize radiation dose exposure, including the use of iterative reconstruction and automated exposure control  IV Contrast:  100 mL of iohexol (OMNIPAQUE)  350 Enteric Contrast:  Enteric contrast was not administered  FINDINGS: CHEST PULMONARY ARTERIAL TREE:  No pulmonary embolus is seen  LUNGS:  Diffuse hypoattenuating alveolar disease compatible with chronic obstructive pulmonary disease    Minimal bibasilar left greater than right and lingular subsegmental atelectasis  Minimal subpleural scarring at the lung bases left greater than right  No infiltrate  No suspicious lung nodule  2 mm subpleural calcified granuloma left upper lobe  2 mm noncalcified left upper lobe nodule image 20 series 3  Thin wispy mucosal strands in the trachea  Central airways otherwise clear  PLEURA:  Unremarkable  HEART/AORTA:  Heart is borderline enlarged  No pericardial effusion  Aortic and coronary artery calcification  MEDIASTINUM AND SACHA:  2 cm short axis left paraesophageal noncalcified lymph node node image 108 series 2  Small calcified mediastinal lymph nodes compatible with old granulomata  Prominent bilateral cardiophrenic angle fat pads  Small sliding hiatal  hernia  CHEST WALL AND LOWER NECK:   Bilateral chest wall deep brain stimulator generator pack in place  ABDOMEN LIVER/BILIARY TREE:  Unremarkable  GALLBLADDER:  No calcified gallstones  No pericholecystic inflammatory change  SPLEEN:  Calcified granulomata are noted in the spleen  No suspicious splenic mass  PANCREAS:  Unremarkable  ADRENAL GLANDS:  Unremarkable  KIDNEYS/URETERS:  Moderate hydronephrosis and mild hydroureter with thickened hyperemic urothelium extending to the UVJ no definitive distal obstructing calculus or lesion  Transition is present at the UPJ with hydroureter tapering distally to nondilated distal ureter  Potential 2nd transition at the inferior SI joint level pelvicalyceal excreted contrast noted on the delayed images  Noncalcific nodular debris in the left renal pelvis  One or more sharply circumscribed subcentimeter renal hypodensities are noted  These lesions are too small to accurately characterize, but are statistically most likely to represent benign cortical renal cyst(s)  According to the guidelines published in the [facility] of the ACR Incidental Findings Committee (Radiology 2010), no further workup of these lesions is recommended  Multifocal cortical scarring of the left kidney  4 mm and smaller left renal calculi  Punctate vascular calcifications bilateral renal nazanin  No perinephric collection  STOMACH AND BOWEL:  Sigmoid diverticulosis without immediate adjacent stranding  No bowel obstruction  APPENDIX:  A normal appendix was visualized  ABDOMINOPELVIC CAVITY:  1 2 cm short axis portacaval lymph node  Prominent subcentimeter short axis left para-aortic lymph nodes likely reactive  No free fluid or free gas  VESSELS:  Aortoiliac calcification  Multifocal infrarenal aortic ectasia maximum diameter 2 2 cm  Incidentally noted duplicated IVC  Margarite Keddie PELVIS REPRODUCTIVE ORGANS:  Unremarkable for patient's age  URINARY BLADDER:  Small cystocele  Otherwise unremarkable  ABDOMINAL WALL/INGUINAL REGIONS:  Subcentimeter ventral umbilical abdominal wall diastases containing fat  No bowel herniation  No inguinal hernia or mass  OSSEOUS STRUCTURES:  No acute fracture or osseous destructive lesion identified  Degenerative changes of the spine, pubic symphysis, and multiple joints  Partially visualized left hip prosthesis  Diffuse osteopenia  Impression: CTA chest: No pulmonary embolus  Trace bibasilar and lingular subsegmental atelectasis  Otherwise, no evidence of acute thoracic process  COPD  2 mm noncalcified nodule left upper lobe with unknown long-term stability  This likely represents a noncalcified granuloma  Based on current Fleischner Society 2017 Guidelines on incidental pulmonary nodule, because the patient is considered high risk for lung cancer, 12 month follow-up non-contrast chest CT is recommended  Old calcified intrathoracic granulomata  2 cm short axis noncalcified left paraesophageal lymph node  This could be followed up with CT PET or CT chest, preferably with IV contrast, in 3 months  CT abdomen and pelvis: Moderate left pelviureteral urothelial hyperemia and thickening extending through the UVJ    Mild proximal hydroureter with potential transition at the inferior SI joint level  No definitive distal obstructing lesion or calculus  Findings suggestive of ascending pyeloureteritis  Pyonephrosis not excluded  No perinephric fluid collection  Multifocal nonenhancing noncalcific presumed debris in the dependent left renal pelvis  Moderate left hydronephrosis with transition at the UPJ and multifocal cortical scarring attributed to chronic UPJ stenosis  Prominent subcentimeter left para-aortic and perirenal lymph nodes likely reactive  Left nephrolithiasis 4 mm and smaller Sigmoid diverticulosis  1 2 cm short axis portacaval lymph node is nonspecific  This could be followed up with CT abdomen and pelvis, preferably with IV contrast, in 3 months  The study was marked in [facility] for immediate notification  Workstation performed: [id number]       EKG, Pathology, and Other Studies Reviewed on Admission:   · EKG:  Sinus rhythm without acute ST-T changes    Allscripts / Epic Records Reviewed: Yes     ** Please Note: This note has been constructed using a voice recognition system   **

## 2019-11-06 NOTE — CONSULTS
UROLOGY CONSULTATION NOTE     Patient Identifiers: Thien Gomez (MRN [id number])  Service Requesting Consultation:  [facility]  Service Providing Consultation:  Urology, Shelbi Garcia MD    Date of Service: 11/6/2019  Consults    Reason for Consultation:  Left flank pain, hydronephrosis    History of Present Illness:     Thien Gomez is a 78 y o  old with a history of left flank pain which she describes as moderate to severe, comes and goes, bad enough for her to come to the hospital for further evaluation  This is been going on for a number of hours  In terms of associated symptoms she does not have any nausea or vomiting or other gastrointestinal or urologic symptoms     Nothing particular and pain medications are identified as aggravating and alleviating factors, respectively  Previous therapies include intravenous fluids and IV antibiotics and previous work-up includes imaging and laboratory workup  The patient has not previously seen a urologist for this problem  The patient otherwise denies a history of urologic malignancy or malady  The patient denies a family history of urologic malignancy or malady      Past Medical, Past Surgical History:     Past Medical History:   Diagnosis Date    Arthritis     Asthma     Benign essential tremor 10/15/2018    Added automatically from request for surgery [id number]    Benign neoplasm of large intestine     Cellulitis of leg, right     Closed compression fracture of body of lumbar vertebra (HCC)     L5    COPD (chronic obstructive pulmonary disease) (HCC)     Difficulty waking     post anesthesia    Disease of thyroid gland     GERD (gastroesophageal reflux disease)     High cholesterol     Hypertension     Osteopenia     Osteoporosis     Shortness of breath     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     Urinary leakage     Vitamin D deficiency     Wears glasses     Wears partial dentures     upper   :    Past Surgical History:   Procedure Laterality Date    CATARACT EXTRACTION W/  INTRAOCULAR LENS IMPLANT Bilateral     COLONOSCOPY      DEEP BRAIN STIMULATOR PLACEMENT      KNEE ARTHROSCOPY      TN IMP STIM,CRANIAL,SUBQ,1 ARRAY Left 1/22/2019    Procedure: REPLACEMENT IMPLANTABLE PULSE GENERATOR (IPG) DEEP BRAIN STIMULATION (DBS), LEFT;  Surgeon: Renata Silver MD;  [facility];  Service: Neurosurgery    TN IMP STIM,CRANIAL,SUBQ,>1 ARRAY Right 12/13/2018    Procedure: REPLACEMENT RIGHT DBS IMPLANTABLE PULSE GENERATOR;  Surgeon: Renata Silver MD;  [facility];  Service: Neurosurgery    TN TOTAL HIP ARTHROPLASTY Left 5/20/2016    Procedure: ARTHROPLASTY HIP TOTAL ANTERIOR;  Surgeon: Katy Silva MD;  [facility];  Service: Orthopedics    ROTATOR CUFF REPAIR     :    Medications, Allergies:     Current Facility-Administered Medications   Medication Dose Route Frequency    acetaminophen (TYLENOL) tablet 650 mg  650 mg Oral Q6H PRN    aspirin chewable tablet 81 mg  81 mg Oral Daily    [START ON 11/7/2019] cefTRIAXone (ROCEPHIN) 1,000 mg in dextrose 5 % 50 mL IVPB  1,000 mg Intravenous Q24H    clopidogrel (PLAVIX) tablet 75 mg  75 mg Oral Daily    diltiazem (CARDIZEM CD) 24 hr capsule 240 mg  240 mg Oral Daily    fluticasone (FLOVENT HFA) 220 mcg/act inhaler 1 puff  1 puff Inhalation Q12H NILS    furosemide (LASIX) tablet 20 mg  20 mg Oral Daily    heparin (porcine) subcutaneous injection 5,000 Units  5,000 Units Subcutaneous Q8H [address]    influenza vaccine, high-dose (FLUZONE HIGH-DOSE) IM injection BAUDILIO 0 5 mL  0 5 mL Intramuscular Once    ipratropium-albuterol (DUO-NEB) 0 5-2 5 mg/3 mL inhalation solution 3 mL  3 mL Nebulization Q6H    levothyroxine tablet 125 mcg  125 mcg Oral Daily    morphine injection 2 mg  2 mg Intravenous Q4H PRN    pantoprazole (PROTONIX) EC tablet 40 mg  40 mg Oral Early Morning       Allergies:   Allergies   Allergen Reactions    Ciprofloxacin Diarrhea    Simvastatin Myalgia    Advair Diskus [Fluticasone-Salmeterol] Palpitations     Ok w flovent    Tetracycline Rash     Reaction Date: ;    :    Social and Family History:   Social History:   Social History     Tobacco Use    Smoking status: Former Smoker     Last attempt to quit:      Years since quittin 8    Smokeless tobacco: Never Used   Substance Use Topics    Alcohol use: Never     Frequency: Never    Drug use: No        Social History     Tobacco Use   Smoking Status Former Smoker    Last attempt to quit:     Years since quittin 8   Smokeless Tobacco Never Used       Family History:  Family History   Problem Relation Age of Onset    Breast cancer Mother     Throat cancer Family    :     Review of Systems:     General: Fever, chills, or night sweats: negative  Cardiac: Negative for chest pain  Pulmonary: Negative for shortness of breath  Gastrointestinal: Abdominal pain negative  Nausea, vomiting, or diarrhea negative,  Genitourinary: See HPI above  Patient does not have hematuria  All other systems were queried and were negative except as listed above in HPI and here in the ROS  Physical Exam:   /88 (BP Location: Right arm)   Pulse 84   Temp 98 2 °F (36 8 °C) (Temporal)   Resp 20   Ht 5' 4" (1 626 m)   Wt 85 kg (187 lb 6 3 oz)   SpO2 96%   BMI 32 17 kg/m² Temp (24hrs), Av 1 °F (36 7 °C), Min:98 °F (36 7 °C), Max:98 2 °F (36 8 °C)  current; Temperature: 98 2 °F (36 8 °C)  No intake/output data recorded  General: Patient is pleasant and in NAD  Awake and alert, somewhat pale, appears stated age    Cardiac: Peripheral edema: negative, peripheral pulses are present and indicated a regular rate and rhythm    Pulmonary: Non-labored breathing, speaking in full sentences, no wheezing, no coughing    Abdomen: Soft, non-tender, non-distended  Genitourinary: Positive CVA tenderness, negative suprapubic tenderness          Neurological: Cranial nerves II-XII are intact, no sensory deficits, no neurological deficits    Musculoskeletal: Extremities are warm, ROM is limited    Psychiatric: The patient's train of thought is linear and logical, mood and affect are normal, the patient denies suicidal and homicidal ideations  Lymphatic: There is not adenopathy in the abdominal region  Skin: intact    WILKS: none        Labs:     Lab Results   Component Value Date    HGB 13 4 11/06/2019    HCT 43 5 11/06/2019    WBC 10 94 (H) 11/06/2019     11/06/2019   ]    Lab Results   Component Value Date     10/29/2014    K 4 2 11/06/2019     11/06/2019    CO2 30 11/06/2019    BUN 18 11/06/2019    CREATININE 0 98 11/06/2019    CALCIUM 8 9 11/06/2019    GLUCOSE 108 10/29/2014   ]    Imaging:   I personally reviewed the images and report of the following studies, and reviewed them with the patient:    CT Abdomen/Pelvis: Patient with pyelitis, also with an apparent filling defect within the proximal ureter and the collecting system, question pus versus blood products versus mass      ASSESSMENT:     78 y o  old female with  left-sided flank pain, slight leukocytosis, and normal creatinine  Her imaging is quite impressive with concern for either mass or blood clot or pus within the collecting system  At the bedside and with her family members I explained to her her normal anatomy on the right-hand side, as well as her abnormal anatomy on the left which shows hydronephrosis with filling defects  I spoke with him about the decision for surgery for cystoscopy with retrograde pyelography and ureteral stent placement with a plan for subsequent ureteroscopic intervention to look for tumor and other process that could be causing these filling defects  I spoke with him about the risks of infection, bleeding, discomfort, need for an additional, staged procedure, and risk of stent colic and discomfort  Informed consent for this procedure was signed    I feel it prudent that this be done tomorrow after another day of intravenous antibiotics, should the patient start having fevers we would place a stent more emergently  At this point we will plan for a operative intervention tomorrow afternoon  Sandra Carter PLAN:     Patient should be NPO after midnight, I have placed order for this purpose  Please optimize for sedation/general anesthesia from a medical perspective  Plan for cystoscopy and left ureteral stent placement with retrograde pyelography tomorrow, to be followed by eventual diagnostic ureteroscopy and potential intervention for whatever is found at that time, with the time frame of a number of weeks from now after initial stent placement  Continued management per primary team     The following portions of the patient's history were reviewed and updated as appropriate: allergies, current medications, past family history, past medical history, past social history, past surgical history and problem list     Portions of the above record have been created with voice recognition software  Occasional wrong word or "sound alike" substitution may have occurred due to the inherent limitations of voice recognition software  Read the chart carefully and recognize, using context, where substitution may have occurred  Thank you for allowing me to participate in this patients care  Please do not hesitate to call with any additional questions    Leonor Nunn MD

## 2019-11-06 NOTE — ASSESSMENT & PLAN NOTE
-maintained on aspirin, Plavix, statin  -follow with vascular surgery, Dr Raleigh Pedraza, plan for right carotid endarterectomy  -patient is scheduled to undergo nuclear stress test prior to proceeding with surgery

## 2019-11-07 ENCOUNTER — TELEPHONE (OUTPATIENT)
Dept: NEUROLOGY | Facility: CLINIC | Age: 79
End: 2019-11-07

## 2019-11-07 ENCOUNTER — APPOINTMENT (INPATIENT)
Dept: RADIOLOGY | Facility: HOSPITAL | Age: 79
DRG: 661 | End: 2019-11-07
Payer: MEDICARE

## 2019-11-07 ENCOUNTER — TELEPHONE (OUTPATIENT)
Dept: UROLOGY | Facility: CLINIC | Age: 79
End: 2019-11-07

## 2019-11-07 ENCOUNTER — ANESTHESIA (INPATIENT)
Dept: PERIOP | Facility: HOSPITAL | Age: 79
DRG: 661 | End: 2019-11-07
Payer: MEDICARE

## 2019-11-07 DIAGNOSIS — Q62.11 HYDRONEPHROSIS WITH URETEROPELVIC JUNCTION (UPJ) OBSTRUCTION: Primary | ICD-10-CM

## 2019-11-07 LAB
ALBUMIN SERPL BCP-MCNC: 3.2 G/DL (ref 3.5–5)
ALP SERPL-CCNC: 73 U/L (ref 46–116)
ALT SERPL W P-5'-P-CCNC: 14 U/L (ref 12–78)
ANION GAP SERPL CALCULATED.3IONS-SCNC: 9 MMOL/L (ref 4–13)
AST SERPL W P-5'-P-CCNC: 14 U/L (ref 5–45)
ATRIAL RATE: 83 BPM
BACTERIA UR CULT: NORMAL
BASOPHILS # BLD AUTO: 0.05 THOUSANDS/ΜL (ref 0–0.1)
BASOPHILS NFR BLD AUTO: 1 % (ref 0–1)
BILIRUB SERPL-MCNC: 0.18 MG/DL (ref 0.2–1)
BUN SERPL-MCNC: 22 MG/DL (ref 5–25)
CALCIUM SERPL-MCNC: 8.9 MG/DL (ref 8.3–10.1)
CHLORIDE SERPL-SCNC: 102 MMOL/L (ref 100–108)
CO2 SERPL-SCNC: 29 MMOL/L (ref 21–32)
CREAT SERPL-MCNC: 0.96 MG/DL (ref 0.6–1.3)
EOSINOPHIL # BLD AUTO: 0.53 THOUSAND/ΜL (ref 0–0.61)
EOSINOPHIL NFR BLD AUTO: 8 % (ref 0–6)
ERYTHROCYTE [DISTWIDTH] IN BLOOD BY AUTOMATED COUNT: 13.9 % (ref 11.6–15.1)
GFR SERPL CREATININE-BSD FRML MDRD: 56 ML/MIN/1.73SQ M
GLUCOSE SERPL-MCNC: 99 MG/DL (ref 65–140)
HCT VFR BLD AUTO: 40.9 % (ref 34.8–46.1)
HGB BLD-MCNC: 12.4 G/DL (ref 11.5–15.4)
IMM GRANULOCYTES # BLD AUTO: 0.02 THOUSAND/UL (ref 0–0.2)
IMM GRANULOCYTES NFR BLD AUTO: 0 % (ref 0–2)
LYMPHOCYTES # BLD AUTO: 1.93 THOUSANDS/ΜL (ref 0.6–4.47)
LYMPHOCYTES NFR BLD AUTO: 27 % (ref 14–44)
MCH RBC QN AUTO: 27.1 PG (ref 26.8–34.3)
MCHC RBC AUTO-ENTMCNC: 30.3 G/DL (ref 31.4–37.4)
MCV RBC AUTO: 90 FL (ref 82–98)
MONOCYTES # BLD AUTO: 0.57 THOUSAND/ΜL (ref 0.17–1.22)
MONOCYTES NFR BLD AUTO: 8 % (ref 4–12)
NEUTROPHILS # BLD AUTO: 3.98 THOUSANDS/ΜL (ref 1.85–7.62)
NEUTS SEG NFR BLD AUTO: 56 % (ref 43–75)
NRBC BLD AUTO-RTO: 0 /100 WBCS
P AXIS: 83 DEGREES
PLATELET # BLD AUTO: 251 THOUSANDS/UL (ref 149–390)
PMV BLD AUTO: 9.7 FL (ref 8.9–12.7)
POTASSIUM SERPL-SCNC: 4 MMOL/L (ref 3.5–5.3)
PR INTERVAL: 120 MS
PROT SERPL-MCNC: 7.7 G/DL (ref 6.4–8.2)
QRS AXIS: -52 DEGREES
QRSD INTERVAL: 72 MS
QT INTERVAL: 344 MS
QTC INTERVAL: 404 MS
RBC # BLD AUTO: 4.57 MILLION/UL (ref 3.81–5.12)
SODIUM SERPL-SCNC: 140 MMOL/L (ref 136–145)
T WAVE AXIS: 47 DEGREES
VENTRICULAR RATE: 83 BPM
WBC # BLD AUTO: 7.08 THOUSAND/UL (ref 4.31–10.16)

## 2019-11-07 PROCEDURE — BT1F1ZZ FLUOROSCOPY OF LEFT KIDNEY, URETER AND BLADDER USING LOW OSMOLAR CONTRAST: ICD-10-PCS | Performed by: RADIOLOGY

## 2019-11-07 PROCEDURE — 85025 COMPLETE CBC W/AUTO DIFF WBC: CPT | Performed by: INTERNAL MEDICINE

## 2019-11-07 PROCEDURE — 0T778DZ DILATION OF LEFT URETER WITH INTRALUMINAL DEVICE, VIA NATURAL OR ARTIFICIAL OPENING ENDOSCOPIC: ICD-10-PCS | Performed by: UROLOGY

## 2019-11-07 PROCEDURE — 80053 COMPREHEN METABOLIC PANEL: CPT | Performed by: INTERNAL MEDICINE

## 2019-11-07 PROCEDURE — NC001 PR NO CHARGE: Performed by: UROLOGY

## 2019-11-07 PROCEDURE — 93010 ELECTROCARDIOGRAM REPORT: CPT | Performed by: INTERNAL MEDICINE

## 2019-11-07 PROCEDURE — 94760 N-INVAS EAR/PLS OXIMETRY 1: CPT

## 2019-11-07 PROCEDURE — C1769 GUIDE WIRE: HCPCS | Performed by: UROLOGY

## 2019-11-07 PROCEDURE — 99232 SBSQ HOSP IP/OBS MODERATE 35: CPT | Performed by: INTERNAL MEDICINE

## 2019-11-07 PROCEDURE — 52351 CYSTOURETERO & OR PYELOSCOPE: CPT | Performed by: UROLOGY

## 2019-11-07 PROCEDURE — 99232 SBSQ HOSP IP/OBS MODERATE 35: CPT | Performed by: UROLOGY

## 2019-11-07 PROCEDURE — 94640 AIRWAY INHALATION TREATMENT: CPT

## 2019-11-07 PROCEDURE — 74420 UROGRAPHY RTRGR +-KUB: CPT

## 2019-11-07 RX ORDER — ONDANSETRON 2 MG/ML
INJECTION INTRAMUSCULAR; INTRAVENOUS AS NEEDED
Status: DISCONTINUED | OUTPATIENT
Start: 2019-11-07 | End: 2019-11-07 | Stop reason: SURG

## 2019-11-07 RX ORDER — FENTANYL CITRATE 50 UG/ML
INJECTION, SOLUTION INTRAMUSCULAR; INTRAVENOUS AS NEEDED
Status: DISCONTINUED | OUTPATIENT
Start: 2019-11-07 | End: 2019-11-07 | Stop reason: SURG

## 2019-11-07 RX ORDER — MAGNESIUM HYDROXIDE 1200 MG/15ML
LIQUID ORAL AS NEEDED
Status: DISCONTINUED | OUTPATIENT
Start: 2019-11-07 | End: 2019-11-07 | Stop reason: HOSPADM

## 2019-11-07 RX ORDER — PROPOFOL 10 MG/ML
INJECTION, EMULSION INTRAVENOUS AS NEEDED
Status: DISCONTINUED | OUTPATIENT
Start: 2019-11-07 | End: 2019-11-07 | Stop reason: SURG

## 2019-11-07 RX ORDER — SODIUM CHLORIDE 9 MG/ML
125 INJECTION, SOLUTION INTRAVENOUS CONTINUOUS
Status: DISCONTINUED | OUTPATIENT
Start: 2019-11-07 | End: 2019-11-08 | Stop reason: HOSPADM

## 2019-11-07 RX ORDER — HYDROMORPHONE HCL/PF 1 MG/ML
0.5 SYRINGE (ML) INJECTION
Status: DISCONTINUED | OUTPATIENT
Start: 2019-11-07 | End: 2019-11-07 | Stop reason: HOSPADM

## 2019-11-07 RX ORDER — OXYCODONE HYDROCHLORIDE AND ACETAMINOPHEN 5; 325 MG/1; MG/1
1 TABLET ORAL EVERY 6 HOURS PRN
Qty: 8 TABLET | Refills: 0
Start: 2019-11-07 | End: 2019-11-08 | Stop reason: SDUPTHER

## 2019-11-07 RX ORDER — ONDANSETRON 2 MG/ML
4 INJECTION INTRAMUSCULAR; INTRAVENOUS ONCE AS NEEDED
Status: DISCONTINUED | OUTPATIENT
Start: 2019-11-07 | End: 2019-11-07 | Stop reason: HOSPADM

## 2019-11-07 RX ORDER — MEPERIDINE HYDROCHLORIDE 50 MG/ML
12.5 INJECTION INTRAMUSCULAR; INTRAVENOUS; SUBCUTANEOUS ONCE AS NEEDED
Status: DISCONTINUED | OUTPATIENT
Start: 2019-11-07 | End: 2019-11-07 | Stop reason: HOSPADM

## 2019-11-07 RX ORDER — DOCUSATE SODIUM 100 MG/1
100 CAPSULE, LIQUID FILLED ORAL 3 TIMES DAILY
Qty: 21 CAPSULE | Refills: 0
Start: 2019-11-07 | End: 2019-11-22

## 2019-11-07 RX ORDER — FENTANYL CITRATE/PF 50 MCG/ML
50 SYRINGE (ML) INJECTION
Status: DISCONTINUED | OUTPATIENT
Start: 2019-11-07 | End: 2019-11-07 | Stop reason: HOSPADM

## 2019-11-07 RX ADMIN — HEPARIN SODIUM 5000 UNITS: 5000 INJECTION INTRAVENOUS; SUBCUTANEOUS at 20:31

## 2019-11-07 RX ADMIN — HEPARIN SODIUM 5000 UNITS: 5000 INJECTION INTRAVENOUS; SUBCUTANEOUS at 05:32

## 2019-11-07 RX ADMIN — IPRATROPIUM BROMIDE AND ALBUTEROL SULFATE 3 ML: 2.5; .5 SOLUTION RESPIRATORY (INHALATION) at 01:21

## 2019-11-07 RX ADMIN — IPRATROPIUM BROMIDE AND ALBUTEROL SULFATE 3 ML: 2.5; .5 SOLUTION RESPIRATORY (INHALATION) at 19:15

## 2019-11-07 RX ADMIN — FENTANYL CITRATE 25 MCG: 50 INJECTION, SOLUTION INTRAMUSCULAR; INTRAVENOUS at 13:34

## 2019-11-07 RX ADMIN — SODIUM CHLORIDE 125 ML/HR: 0.9 INJECTION, SOLUTION INTRAVENOUS at 12:55

## 2019-11-07 RX ADMIN — SODIUM CHLORIDE: 0.9 INJECTION, SOLUTION INTRAVENOUS at 13:56

## 2019-11-07 RX ADMIN — FUROSEMIDE 20 MG: 20 TABLET ORAL at 09:08

## 2019-11-07 RX ADMIN — FLUTICASONE PROPIONATE 1 PUFF: 220 AEROSOL, METERED RESPIRATORY (INHALATION) at 20:30

## 2019-11-07 RX ADMIN — LEVOTHYROXINE SODIUM 125 MCG: 125 TABLET ORAL at 05:32

## 2019-11-07 RX ADMIN — FLUTICASONE PROPIONATE 1 PUFF: 220 AEROSOL, METERED RESPIRATORY (INHALATION) at 09:09

## 2019-11-07 RX ADMIN — PANTOPRAZOLE SODIUM 40 MG: 40 TABLET, DELAYED RELEASE ORAL at 05:32

## 2019-11-07 RX ADMIN — FENTANYL CITRATE 25 MCG: 50 INJECTION, SOLUTION INTRAMUSCULAR; INTRAVENOUS at 13:49

## 2019-11-07 RX ADMIN — IPRATROPIUM BROMIDE AND ALBUTEROL SULFATE 3 ML: 2.5; .5 SOLUTION RESPIRATORY (INHALATION) at 07:20

## 2019-11-07 RX ADMIN — ONDANSETRON 4 MG: 2 INJECTION INTRAMUSCULAR; INTRAVENOUS at 13:47

## 2019-11-07 RX ADMIN — PROPOFOL 150 MG: 10 INJECTION, EMULSION INTRAVENOUS at 13:27

## 2019-11-07 RX ADMIN — DILTIAZEM HYDROCHLORIDE 240 MG: 240 CAPSULE, COATED, EXTENDED RELEASE ORAL at 09:08

## 2019-11-07 RX ADMIN — LIDOCAINE HYDROCHLORIDE 60 MG: 20 INJECTION, SOLUTION INTRAVENOUS at 13:27

## 2019-11-07 RX ADMIN — FENTANYL CITRATE 50 MCG: 50 INJECTION, SOLUTION INTRAMUSCULAR; INTRAVENOUS at 14:13

## 2019-11-07 NOTE — OP NOTE
OPERATIVE REPORT  PATIENT NAME: Austen Figueredo    :  1940  MRN: [id number]  Pt Location: [facility] 04    SURGERY DATE: 2019    Surgeon(s) and Role:     * Delmy Leiva MD - Primary    Preop Diagnosis:  Left flank pain [R10 9]  Hydronephrosis of left kidney [N13 30]  Microscopic hematuria [R31 29]    Post-Op Diagnosis Codes:     * Left flank pain [R10 9]     * Hydronephrosis of left kidney [N13 30]     * Microscopic hematuria [R31 29]    Procedure(s) (LRB):  CYSTO RETROGRADE PYELOGRAM, URETEROSCOPY (Left) common attempted ureteral stent placement    Specimen(s):  * No specimens in log *    Estimated Blood Loss:   Minimal    Drains:  * No LDAs found *    Anesthesia Type:   General    Operative Indications:  Left flank pain [R10 9]  Hydronephrosis of left kidney [N13 30]  Microscopic hematuria [R31 29]      Operative Findings:  Pelvic organ prolapse is present, anterior vaginal wall prolapse present requiring vaginal packing (this was removed at the end of the case), orthotopic orifices, bladder otherwise normal and without lesions, retrograde pyelogram be shows an abrupt cut off of the ureter and a bird beak fashion with a very scant amount of contrast passing proximally, multiple attempts at passing a wire with fluoroscopic and direct visual guidance with a ureteral scope were not successful  Complications:   None    Procedure and Technique: On an inpatient basis the patient was seen to have hydronephrosis with some filling defects within the left ureter  As such she was advised to undergo cystoscopy and retrograde pyelogram with stent placement  Informed consent was signed for this reason  Her preference of DNR DNI status was verified preoperatively, we also mentioned that in the operating room this is suspended, briefly  She was cleared for the operating room by the perioperative nursing and anesthesia staff and brought to operating room 4   For induction of general anesthesia by way of a laryngeal mask airway without issue  She had been on Rocephin for prophylaxis  She was placed in the lithotomy position taking care to pad all pressure points, examination under anesthesia showed stage III pelvic organ prolapse with anterior vaginal wall weakness and prolapse, a vaginal packing was necessary to restore the trigone to a mostly normal position  Cystoscopy was then performed which showed no lesions, tumors, or defects, the scope did have to be rotated 180° to gain access to the left ureteral orifice with moderate difficulty  A wire was placed and seen to curl in the proximal ureter, a retrograde pyelogram was performed which showed a bird beak appearance to the proximal ureter consistent with a stricture or UPJ obstruction  There was a very scant amount of contrast that was seen to pass proximally into a very dilated renal pelvis that otherwise looked normal apart from pelvocaliectasis  I made multiple attempts to pass a wire under fluoroscopic guidance including some brief periods of live fluoroscopy  I then placed a semi rigid scope into the ureter and attempted to visualize a lumen within the proximal ureter but was unable to see any lumen, multiple wire temp placements were then attempted with a hydrophilic wire under direct vision, these also were unsuccessful  At this point the decision was made to terminate our efforts with the plan for potential nephrostomy tube placement after discussion with the patient's daughter, this was deemed to not be in the patient's best interest and she was awakened from general anesthesia  All instrument counts and sponge counts were correct, the previously placed vaginal packing was removed    And the bladder was drained     I was present for the entire procedure and A qualified resident physician was not available    Patient Disposition:  PACU      Plan:  The patient has retrograde pyelogram shows nearly complete UPJ obstruction, 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, I was unable to gain access in a retrograde fashion to the kidney, despite multiple attempts  I did have a telephone call with the patient's daughter, who works for the Urology group, regarding for ostomy tube placement versus observation and she believes that a nephrostomy tube would not be in Desi's best interest   After today's operative findings I am inclined to agree  I recommended the patient be kept overnight to look for any infectious complications after today's procedure, pending a smooth postoperative recovery she could be discharged home from a urologic perspective if cleared by the Medicine Service, with a plan for outpatient follow-up in our office in 6 weeks with a renal ultrasound prior      SIGNATURE: Justin Buchanan MD  DATE: November 7, 2019  TIME: 2:11 PM

## 2019-11-07 NOTE — ASSESSMENT & PLAN NOTE
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

## 2019-11-07 NOTE — PROGRESS NOTES
Progress Note - Urology  Lisa Mancini 1940, 78 y o  female MRN: [id number]    Unit/Bed#: E5 -01 Encounter: [id number]    Hydronephrosis of left kidney  Assessment & Plan  Left hydronephrosis with purulence vs  Mass in collecting system  NPO for OR today for cystoscopy with retrograde pyelography and ureteral stent placement with a plan for subsequent ureteroscopic intervention to look for tumor and other process that could be causing these filling defects with Dr Gabino Gustafson  Consent signed and on chart  OR scheduled for 1pm    Plan for stent removal with office cystoscopy in 3 weeks to follow discharge  Discussed with Dr Gabino Gustafson  Subjective/Objective     Subjective:   CC: "My pain is a little bit better today"  HPI:  Patient reports her left flank pain is improved today  No nausea or vomiting  She has had nothing to eat or drink since midnight  ROS:  Review of Systems   Constitutional: Negative for activity change and appetite change  HENT: Negative for congestion and ear pain  Eyes: Negative for pain  Respiratory: Negative for cough and shortness of breath  Cardiovascular: Negative for chest pain and palpitations  Gastrointestinal: Negative for abdominal distention, abdominal pain, blood in stool, constipation, diarrhea and nausea  Genitourinary: Negative for difficulty urinating and dysuria  Musculoskeletal: Negative for arthralgias and myalgias  Skin: Negative for rash  Allergic/Immunologic: Negative for immunocompromised state  Neurological: Negative for dizziness and headaches  Hematological: Negative for adenopathy  Does not bruise/bleed easily  Psychiatric/Behavioral: Negative for agitation  The patient is not nervous/anxious  Objective:  Vitals: Blood pressure 128/66, pulse 85, temperature 97 5 °F (36 4 °C), temperature source Temporal, resp  rate 22, height 5' 4" (1 626 m), weight 85 kg (187 lb 6 3 oz), SpO2 93 %  ,Body mass index is 32 17 kg/m²  Intake/Output Summary (Last 24 hours) at 11/7/2019 0838  Last data filed at 11/7/2019 0535  Gross per 24 hour   Intake 215 ml   Output    Net 215 ml     Invasive Devices     Peripheral Intravenous Line            Peripheral IV 11/06/19 Left Antecubital 1 day                Physical Exam   Constitutional: She is oriented to person, place, and time  She appears well-developed and well-nourished  She is cooperative  She does not appear ill  No distress  Pleasant 49-year-old female, out of bed to the chair  No acute distress  HENT:   Head: Normocephalic and atraumatic  Moist mucous membranes  Eyes: Conjunctivae and EOM are normal    Neck: Normal range of motion  Neck supple  No tracheal deviation present  Cardiovascular: Normal rate, regular rhythm and normal heart sounds  No murmur heard  Pulmonary/Chest: Effort normal and breath sounds normal  No respiratory distress  She has no wheezes  Good airflow bilaterally on deep inspiration  Abdominal: Soft  Bowel sounds are normal  She exhibits no distension and no mass  There is no tenderness  Abdomen obese, soft and benign  No rigidity rebound or guarding  Musculoskeletal: Normal range of motion  She exhibits no edema  Bilateral CVA without tenderness   Neurological: She is alert and oriented to person, place, and time  Skin: Skin is warm and dry  No rash noted  She is not diaphoretic  No erythema  No pallor  Psychiatric: She has a normal mood and affect  Her behavior is normal  Judgment and thought content normal    Nursing note and vitals reviewed        History:    Past Medical History:   Diagnosis Date    Arthritis     Asthma     Benign essential tremor 10/15/2018    Added automatically from request for surgery [id number]    Benign neoplasm of large intestine     Cellulitis of leg, right     Closed compression fracture of body of lumbar vertebra (HCC)     L5    COPD (chronic obstructive pulmonary disease) (HCC)     Difficulty waking     post anesthesia    Disease of thyroid gland     GERD (gastroesophageal reflux disease)     High cholesterol     Hypertension     Osteopenia     Osteoporosis     Shortness of breath     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     Urinary leakage     Vitamin D deficiency     Wears glasses     Wears partial dentures     upper     Past Surgical History:   Procedure Laterality Date    CATARACT EXTRACTION W/  INTRAOCULAR LENS IMPLANT Bilateral     COLONOSCOPY      DEEP BRAIN STIMULATOR PLACEMENT      KNEE ARTHROSCOPY      SC IMP STIM,CRANIAL,SUBQ,1 ARRAY Left 2019    Procedure: REPLACEMENT IMPLANTABLE PULSE GENERATOR (IPG) DEEP BRAIN STIMULATION (DBS), LEFT;  Surgeon: Rene Buenrostro MD;  [facility];  Service: Neurosurgery    SC IMP STIM,CRANIAL,SUBQ,>1 ARRAY Right 2018    Procedure: REPLACEMENT RIGHT DBS IMPLANTABLE PULSE GENERATOR;  Surgeon: Rene Buenrostro MD;  [facility];  Service: Neurosurgery    SC TOTAL HIP ARTHROPLASTY Left 2016    Procedure: ARTHROPLASTY HIP TOTAL ANTERIOR;  Surgeon: Ezequiel Guadarrama MD;  [facility];  Service: Orthopedics    ROTATOR CUFF REPAIR       Family History   Problem Relation Age of Onset    Breast cancer Mother     Throat cancer Family      Social History     Socioeconomic History    Marital status:       Spouse name: None    Number of children: None    Years of education: None    Highest education level: None   Occupational History    None   Social Needs    Financial resource strain: None    Food insecurity:     Worry: None     Inability: None    Transportation needs:     Medical: None     Non-medical: None   Tobacco Use    Smoking status: Former Smoker     Last attempt to quit: 2013     Years since quittin 8    Smokeless tobacco: Never Used   Substance and Sexual Activity    Alcohol use: Never     Frequency: Never    Drug use: No    Sexual activity: None   Lifestyle    Physical activity:     Days per week: None     Minutes per session: None    Stress: None   Relationships    Social connections:     Talks on phone: None     Gets together: None     Attends [employer] service: None     Active member of club or organization: None     Attends meetings of clubs or organizations: None     Relationship status: None    Intimate partner violence:     Fear of current or ex partner: None     Emotionally abused: None     Physically abused: None     Forced sexual activity: None   Other Topics Concern    None   Social History Narrative    Caffeine use    Living independently           Labs:  Results from last 7 days   Lab Units 11/07/19  0531 11/06/19  0502   WBC Thousand/uL 7 08 10 94*   HEMOGLOBIN g/dL 12 4 13 4   PLATELETS Thousands/uL 251 253     Results from last 7 days   Lab Units 11/07/19  0531 11/06/19  0502   SODIUM mmol/L 140 138   POTASSIUM mmol/L 4 0 4 2   CHLORIDE mmol/L 102 101   CO2 mmol/L 29 30   BUN mg/dL 22 18   CREATININE mg/dL 0 96 0 98   EGFR ml/min/1 73sq m 56 54   CALCIUM mg/dL 8 9 8 9   AST U/L 14 22   ALT U/L 14 21   ALK PHOS U/L 73 78               Sridhar Holman PA-C  Date: 11/7/2019 Time: 8:38 AM

## 2019-11-07 NOTE — PLAN OF CARE
Problem: Potential for Falls  Goal: Patient will remain free of falls  Description  INTERVENTIONS:  - Assess patient frequently for physical needs  -  Identify cognitive and physical deficits and behaviors that affect risk of falls    -  [place] fall precautions as indicated by assessment   - Educate patient/family on patient safety including physical limitations  - Instruct patient to call for assistance with activity based on assessment  - Modify environment to reduce risk of injury  - Consider OT/PT consult to assist with strengthening/mobility  Outcome: Progressing     Problem: PAIN - ADULT  Goal: Verbalizes/displays adequate comfort level or baseline comfort level  Description  Interventions:  - Encourage patient to monitor pain and request assistance  - Assess pain using appropriate pain scale  - Administer analgesics based on type and severity of pain and evaluate response  - Implement non-pharmacological measures as appropriate and evaluate response  - Consider cultural and social influences on pain and pain management  - Notify physician/advanced practitioner if interventions unsuccessful or patient reports new pain  Outcome: Progressing     Problem: INFECTION - ADULT  Goal: Absence or prevention of progression during hospitalization  Description  INTERVENTIONS:  - Assess and monitor for signs and symptoms of infection  - Monitor lab/diagnostic results  - Monitor all insertion sites, i e  indwelling lines, tubes, and drains  - Monitor endotracheal if appropriate and nasal secretions for changes in amount and color  - [place] appropriate cooling/warming therapies per order  - Administer medications as ordered  - Instruct and encourage patient and family to use good hand hygiene technique  - Identify and instruct in appropriate isolation precautions for identified infection/condition  Outcome: Progressing  Goal: Absence of fever/infection during neutropenic period  Description  INTERVENTIONS:  - Monitor WBC    Outcome: Progressing     Problem: SAFETY ADULT  Goal: Maintain or return to baseline ADL function  Description  INTERVENTIONS:  -  Assess patient's ability to carry out ADLs; assess patient's baseline for ADL function and identify physical deficits which impact ability to perform ADLs (bathing, care of mouth/teeth, toileting, grooming, dressing, etc )  - Assess/evaluate cause of self-care deficits   - Assess range of motion  - Assess patient's mobility; develop plan if impaired  - Assess patient's need for assistive devices and provide as appropriate  - Encourage maximum independence but intervene and supervise when necessary  - Involve family in performance of ADLs  - Assess for home care needs following discharge   - Consider OT consult to assist with ADL evaluation and planning for discharge  - Provide patient education as appropriate  Outcome: Progressing  Goal: Maintain or return mobility status to optimal level  Description  INTERVENTIONS:  - Assess patient's baseline mobility status (ambulation, transfers, stairs, etc )    - Identify cognitive and physical deficits and behaviors that affect mobility  - Identify mobility aids required to assist with transfers and/or ambulation (gait belt, sit-to-stand, lift, walker, cane, etc )  - [place] fall precautions as indicated by assessment  - Record patient progress and toleration of activity level on Mobility SBAR; progress patient to next Phase/Stage  - Instruct patient to call for assistance with activity based on assessment  - Consider rehabilitation consult to assist with strengthening/weightbearing, etc   Outcome: Progressing     Problem: DISCHARGE PLANNING  Goal: Discharge to home or other facility with appropriate resources  Description  INTERVENTIONS:  - Identify barriers to discharge w/patient and caregiver  - Arrange for needed discharge resources and transportation as appropriate  - Identify discharge learning needs (meds, wound care, etc )  - Arrange for interpretive services to assist at discharge as needed  - Refer to Case Management Department for coordinating discharge planning if the patient needs post-hospital services based on physician/advanced practitioner order or complex needs related to functional status, cognitive ability, or social support system  Outcome: Progressing     Problem: Knowledge Deficit  Goal: Patient/family/caregiver demonstrates understanding of disease process, treatment plan, medications, and discharge instructions  Description  Complete learning assessment and assess knowledge base    Interventions:  - Provide teaching at level of understanding  - Provide teaching via preferred learning methods  Outcome: Progressing

## 2019-11-07 NOTE — DISCHARGE INSTRUCTIONS
Ureteral Stent Placement   WHAT YOU NEED TO KNOW:     Jocelyn Crane,    Today you had cystoscopy with retrograde pyelogram as well as ureterostomy with attempted stent placement, this was not successful due to a severe left-sided ureteropelvic junction obstruction  I am sorry that we could not gain access to your kidney  I did have a conversation with your daughter who has the benefit of working for a urology group, regarding the options of a nephrostomy tube placement (a small tube it goes through the skin of her back and into your kidney tube drainage), observation, and other surgical reconstruction of you're ureteropelvic junction  Your daughter and I both believe that observation is the best course of action here given that we are unable to gain access to drain your kidney in a noninvasive fashion  We will arrange for a follow-up visit in our office in 6 weeks with a renal ultrasound prior, if you have questions or concerns, please not hesitate contact our office  Take care and be well,  Dr Lavern Martínez  Ureteral stent placement is a procedure to open a blocked or narrow ureter  The ureter is the tube that carries urine from your kidney into your bladder  A stent is a thin hollow plastic tube used to hold your ureter open and allow urine to flow  The stent may stay in for several weeks  DISCHARGE INSTRUCTIONS:   Medicines:   · Pain medicine  may be given to take away or decrease pain  Do not wait until the pain is severe before you take your medicine  · Antibiotics  help prevent infections  Your healthcare provider may prescribe these for you while your stent remains in  · Take your medicine as directed  Contact your healthcare provider if you think your medicine is not helping or if you have side effects  Tell him or her if you are allergic to any medicine  Keep a list of the medicines, vitamins, and herbs you take  Include the amounts, and when and why you take them   Bring the list or the pill bottles to follow-up visits  Carry your medicine list with you in case of an emergency  Follow up with your urologist as directed: You will need regular follow-up visits with your urologist as long as the stent remains in  He will check to make sure the stent is working properly  He may do urine cultures to check for infection  Write down your questions so you remember to ask them during your visits  Self-care:   · Drink liquids  as directed  Ask your healthcare provider how much liquid to drink each day and which liquids are best for you  Fluids such as cranberry or apple juice may be especially helpful to prevent urinary infections  · Return to normal activities  the day after your stent placement or as directed by your healthcare provider  · You may take a shower  the day after your stent placement if your healthcare provider says it is okay  Contact your healthcare provider or urologist if:   · You have a fever or chills  · You feel like you need to urinate often  · You have pain when you urinate or pain around your bladder or kidney  · You see blood in your urine or it looks cloudy  · You have questions or concerns about your condition or care  Seek care immediately or call 911 if:   · You urinate little or not at all  · You have severe pain in your abdomen  © 2017 [facility] Information is for End User's use only and may not be sold, redistributed or otherwise used for commercial purposes  All illustrations and images included in CareNotes® are the copyrighted property of [employer]  or Louis Walker  The above information is an  only  It is not intended as medical advice for individual conditions or treatments  Talk to your doctor, nurse or pharmacist before following any medical regimen to see if it is safe and effective for you

## 2019-11-07 NOTE — TELEPHONE ENCOUNTER
Patient found to have complete ureteropelvic junction obstruction, needs a visit with 1 of the [facility] urologist in 6 weeks with an ultrasound prior, likely long-term plan will be for observation

## 2019-11-07 NOTE — ASSESSMENT & PLAN NOTE
Left hydronephrosis with Left UPJ obstruction  1 Day Post-Op  Procedure(s):  CYSTO RETROGRADE PYELOGRAM, URETEROSCOPY  Surgeon(s):  Kayla Hernandez MD  11/7/2019    Unfortunately, stent was unable to be placed in this left ureter  Patient will require L PCN - Plavix must be held for 5 days  Planned for Tuesday 11/12  Discussed with Dr Rodger Manuel of IR, Dr Alley Jimenez, and patient's daughter, Maggie Ariza

## 2019-11-07 NOTE — ANESTHESIA POSTPROCEDURE EVALUATION
Post-Op Assessment Note    CV Status:  Stable    Pain management: adequate     Mental Status:  Alert and awake   Hydration Status:  Euvolemic   PONV Controlled:  Controlled   Airway Patency:  Patent   Post Op Vitals Reviewed: Yes      Staff: Anesthesiologist           /67 (11/07/19 1433)    Temp      Pulse 60 (11/07/19 1433)   Resp 18 (11/07/19 1433)    SpO2 99 % (11/07/19 1433)

## 2019-11-07 NOTE — PROGRESS NOTES
Progress Note - Zofia Sees 1940, 78 y o  female MRN: [id number]    Unit/Bed#: E5 -01 Encounter: [id number]    Primary Care Provider: Woodrow Koyanagi, DO   Date and time admitted to hospital: 11/6/2019  4:58 AM        * Left flank pain  Assessment & Plan   77 y/o F with history of hypertension, hyperlipidemia, CVA, right carotid stenosis, COPD not on home oxygen, hypothyroidism presenting with acute onset of left flank pain      -CT scan revealed moderate left LV ureteral urothelial hyperemia and thickening extending through UPJ, mild proximal hydroureter with potential transition into inferior sacroiliac joint level, no distal obstructing lesion or calculus, finding suggested of ascending pyelo ureteritis, moderate left hydronephrosis with transition at the UPJ and multifocal cortical scarring attributed to chronic UPJ stenosis  -urology consultation appreciated, plan for cystoscopy and left ureteral stent placement today    Pyelonephritis of left kidney  Assessment & Plan  -continue with ceftriaxone, follow-up cultures    Abnormal CT scan  Assessment & Plan  -CT scan reveals 2 mm noncalcified nodule of left upper lobe likely representing noncalcified granuloma, 2 cm short axis noncalcified left paraesophageal lymph node recommend CT PET scanner CT chest in 3 months  1 2 cm short axis portacaval lymph node nonspecific could be followed up with CT abdomen pelvis in 3 months    Carotid stenosis right,  symptomatic, with infarction (HCC)  Assessment & Plan  -maintained on aspirin, Plavix, statin  -follow with vascular surgery, Dr Zunilda Mancia, plan for right carotid endarterectomy  -patient is scheduled to undergo nuclear stress test prior to proceeding with surgery    Microscopic hematuria  Assessment & Plan  -urinalysis feels large blood, RBC present  -urology consultation appreciated    Acquired hypothyroidism  Assessment & Plan  C/w levothyroxine    COPD (chronic obstructive pulmonary disease) [facility])  Assessment & Plan  -no acute exacerbation, continue with bronchodilators  -CT scan revealed 2 mm noncalcified nodule in left upper lobe likely could noncalcified granuloma recommend 12 month follow-up with noncontrast CT chest    CVA (cerebral vascular accident) ([address] )  Assessment & Plan  -history of CVA, maintained on aspirin, statin    Benign familial tremor  Assessment & Plan  -follows with Neurology outpatient  -S/P DBS placement    Benign essential hypertension  Assessment & Plan  -continue Cardizem, monitor blood pressure      VTE Pharmacologic Prophylaxis:   Pharmacologic:  Heparin    Patient Centered Rounds: I have performed bedside rounds with nursing staff today  Time Spent for Care: 20 minutes  More than 50% of total time spent on counseling and coordination of care as described above  Current Length of Stay: 1 day(s)    Current Patient Status: Inpatient   Certification Statement: The patient will continue to require additional inpatient hospital stay due to Cystoscopy, stent placement    Discharge Plan / Estimated Discharge Date: 24-48 hours    Code Status: Level 3 - DNAR and DNI      Subjective:   Patient seen and examined at bedside, currently denies any complaints    Objective:     Vitals:   Temp (24hrs), Av 7 °F (36 5 °C), Min:97 °F (36 1 °C), Max:98 3 °F (36 8 °C)    Temp:  [97 °F (36 1 °C)-98 3 °F (36 8 °C)] 97 4 °F (36 3 °C)  HR:  [60-85] 68  Resp:  [14-22] 20  BP: (111-138)/(58-67) 138/64  SpO2:  [91 %-99 %] 91 %  Body mass index is 32 17 kg/m²  Input and Output Summary (last 24 hours):        Intake/Output Summary (Last 24 hours) at 2019 1719  Last data filed at 2019 1501  Gross per 24 hour   Intake 1365 ml   Output 400 ml   Net 965 ml       Physical Exam:    Constitutional: Patient is oriented to person, place and time, no acute distress  HEENT:  Normocephalic, atraumatic, EOMI, PERRLA, no scleral icterus, no pallor, moist oral mucosa  Neck:  Supple, no masses, no thyromegaly, no bruits Normal range of motion  Lymph nodes:  No lymphadenopathy  Cardiovascular: Normal S1S2, RRR, No murmurs/rubs/gallops appreciated  Pulmonary:  Bilateral air entry, No rhonchi/rales/wheezing appreciated  Abdominal: Soft, Bowel sounds present, Non-tender, Non-distended, No rebound/guarding, no hepatomegaly   Musculoskeletal: No tenderness/abnormality   Extremities:  No cyanosis, clubbing or edema  Peripheral pulses palpable and equal bilaterally  Neurological: Cranial nerves II-XII grossly intact, sensation intact, otherwise no focal neurological symptoms  Skin: Skin is warm and dry, no rashes  Additional Data:     Labs:    Results from last 7 days   Lab Units 11/07/19  0531   WBC Thousand/uL 7 08   HEMOGLOBIN g/dL 12 4   HEMATOCRIT % 40 9   PLATELETS Thousands/uL 251   NEUTROS PCT % 56   LYMPHS PCT % 27   MONOS PCT % 8   EOS PCT % 8*     Results from last 7 days   Lab Units 11/07/19  0531   POTASSIUM mmol/L 4 0   CHLORIDE mmol/L 102   CO2 mmol/L 29   BUN mg/dL 22   CREATININE mg/dL 0 96   CALCIUM mg/dL 8 9   ALK PHOS U/L 73   ALT U/L 14   AST U/L 14     Results from last 7 days   Lab Units 11/06/19  0502   INR  1 00        I Have Reviewed All Lab Data Listed Above  Recent Cultures (last 7 days):     Results from last 7 days   Lab Units 11/06/19  0852 11/06/19  0846 11/06/19  0811   BLOOD CULTURE  No Growth at 24 hrs   No Growth at 24 hrs   --    URINE CULTURE   --   --  [id number] cfu/ml        Last 24 Hours Medication List:     Current Facility-Administered Medications:  acetaminophen 650 mg Oral Q6H PRN Ernesto Bland MD    aspirin 81 mg Oral Daily Ernesto Bland MD    cefTRIAXone 1,000 mg Intravenous Q24H Ernesto Bland MD Last Rate: Stopped (11/07/19 0018)   clopidogrel 75 mg Oral Daily Ernesto Bland MD    diltiazem 240 mg Oral Daily Ernesto Bland MD    fluticasone 1 puff Inhalation Q12H [address] Ernesto Bland MD    furosemide 20 mg Oral Daily Ernesto Bland MD heparin (porcine) 5,000 Units Subcutaneous [facility] Martita Edwards MD    ipratropium-albuterol 3 mL Nebulization Q6H Martita Edwards MD    levothyroxine 125 mcg Oral Daily Martita Edwards MD    morphine injection 2 mg Intravenous Q4H PRN Martita Edwards MD    pantoprazole 40 mg Oral Early Morning Martita Edwards MD    sodium chloride 125 mL/hr Intravenous Continuous Danish Diaz DO Last Rate: 125 mL/hr (11/07/19 1255)        Today, Patient Was Seen By: Omar Rodriguez MD

## 2019-11-07 NOTE — H&P
The patient was seen and examined  There are no changes from the prior inpatient history and physical examination  The patient is appropriately prepared for surgery today as planned      /66 (BP Location: Right arm)   Pulse 85   Temp 97 5 °F (36 4 °C) (Temporal)   Resp 22   Ht 5' 4" (1 626 m)   Wt 85 kg (187 lb 6 3 oz)   SpO2 93%   BMI 32 17 kg/m²       Marked on left arm

## 2019-11-07 NOTE — TELEPHONE ENCOUNTER
Called pt to set up appointment during the week of December 16th  Does not have Mailbox set up  Will attempt later

## 2019-11-07 NOTE — ASSESSMENT & PLAN NOTE
-maintained on aspirin, Plavix, statin  -follow with vascular surgery, Dr Toyin Duran, plan for right carotid endarterectomy  -patient is scheduled to undergo nuclear stress test prior to proceeding with surgery

## 2019-11-08 ENCOUNTER — TELEPHONE (OUTPATIENT)
Dept: OTHER | Facility: HOSPITAL | Age: 79
End: 2019-11-08

## 2019-11-08 ENCOUNTER — TELEPHONE (OUTPATIENT)
Dept: RADIOLOGY | Facility: HOSPITAL | Age: 79
End: 2019-11-08

## 2019-11-08 VITALS
HEIGHT: 64 IN | WEIGHT: 187.39 LBS | RESPIRATION RATE: 18 BRPM | SYSTOLIC BLOOD PRESSURE: 133 MMHG | OXYGEN SATURATION: 92 % | TEMPERATURE: 98.9 F | BODY MASS INDEX: 31.99 KG/M2 | HEART RATE: 77 BPM | DIASTOLIC BLOOD PRESSURE: 63 MMHG

## 2019-11-08 LAB
ANION GAP SERPL CALCULATED.3IONS-SCNC: 9 MMOL/L (ref 4–13)
BASOPHILS # BLD AUTO: 0.04 THOUSANDS/ΜL (ref 0–0.1)
BASOPHILS NFR BLD AUTO: 1 % (ref 0–1)
BUN SERPL-MCNC: 21 MG/DL (ref 5–25)
CALCIUM SERPL-MCNC: 8.8 MG/DL (ref 8.3–10.1)
CHLORIDE SERPL-SCNC: 105 MMOL/L (ref 100–108)
CO2 SERPL-SCNC: 27 MMOL/L (ref 21–32)
CREAT SERPL-MCNC: 0.89 MG/DL (ref 0.6–1.3)
EOSINOPHIL # BLD AUTO: 0.38 THOUSAND/ΜL (ref 0–0.61)
EOSINOPHIL NFR BLD AUTO: 6 % (ref 0–6)
ERYTHROCYTE [DISTWIDTH] IN BLOOD BY AUTOMATED COUNT: 14 % (ref 11.6–15.1)
GFR SERPL CREATININE-BSD FRML MDRD: 61 ML/MIN/1.73SQ M
GLUCOSE SERPL-MCNC: 123 MG/DL (ref 65–140)
HCT VFR BLD AUTO: 38.6 % (ref 34.8–46.1)
HGB BLD-MCNC: 11.7 G/DL (ref 11.5–15.4)
IMM GRANULOCYTES # BLD AUTO: 0.03 THOUSAND/UL (ref 0–0.2)
IMM GRANULOCYTES NFR BLD AUTO: 1 % (ref 0–2)
LYMPHOCYTES # BLD AUTO: 1.42 THOUSANDS/ΜL (ref 0.6–4.47)
LYMPHOCYTES NFR BLD AUTO: 22 % (ref 14–44)
MCH RBC QN AUTO: 27 PG (ref 26.8–34.3)
MCHC RBC AUTO-ENTMCNC: 30.3 G/DL (ref 31.4–37.4)
MCV RBC AUTO: 89 FL (ref 82–98)
MONOCYTES # BLD AUTO: 0.44 THOUSAND/ΜL (ref 0.17–1.22)
MONOCYTES NFR BLD AUTO: 7 % (ref 4–12)
NEUTROPHILS # BLD AUTO: 4.23 THOUSANDS/ΜL (ref 1.85–7.62)
NEUTS SEG NFR BLD AUTO: 63 % (ref 43–75)
NRBC BLD AUTO-RTO: 0 /100 WBCS
PLATELET # BLD AUTO: 245 THOUSANDS/UL (ref 149–390)
PMV BLD AUTO: 9.6 FL (ref 8.9–12.7)
POTASSIUM SERPL-SCNC: 4.2 MMOL/L (ref 3.5–5.3)
RBC # BLD AUTO: 4.33 MILLION/UL (ref 3.81–5.12)
SODIUM SERPL-SCNC: 141 MMOL/L (ref 136–145)
WBC # BLD AUTO: 6.54 THOUSAND/UL (ref 4.31–10.16)

## 2019-11-08 PROCEDURE — 94640 AIRWAY INHALATION TREATMENT: CPT

## 2019-11-08 PROCEDURE — 85025 COMPLETE CBC W/AUTO DIFF WBC: CPT | Performed by: INTERNAL MEDICINE

## 2019-11-08 PROCEDURE — 99239 HOSP IP/OBS DSCHRG MGMT >30: CPT | Performed by: INTERNAL MEDICINE

## 2019-11-08 PROCEDURE — 99221 1ST HOSP IP/OBS SF/LOW 40: CPT | Performed by: RADIOLOGY

## 2019-11-08 PROCEDURE — 99232 SBSQ HOSP IP/OBS MODERATE 35: CPT | Performed by: UROLOGY

## 2019-11-08 PROCEDURE — 94760 N-INVAS EAR/PLS OXIMETRY 1: CPT

## 2019-11-08 PROCEDURE — 80048 BASIC METABOLIC PNL TOTAL CA: CPT | Performed by: INTERNAL MEDICINE

## 2019-11-08 RX ORDER — OXYCODONE HYDROCHLORIDE AND ACETAMINOPHEN 5; 325 MG/1; MG/1
1 TABLET ORAL EVERY 6 HOURS PRN
Qty: 6 TABLET | Refills: 0
Start: 2019-11-08 | End: 2019-11-08 | Stop reason: SDUPTHER

## 2019-11-08 RX ORDER — OXYCODONE HYDROCHLORIDE AND ACETAMINOPHEN 5; 325 MG/1; MG/1
1 TABLET ORAL EVERY 6 HOURS PRN
Qty: 8 TABLET | Refills: 0
Start: 2019-11-08 | End: 2019-11-08 | Stop reason: HOSPADM

## 2019-11-08 RX ORDER — CLOPIDOGREL BISULFATE 75 MG/1
75 TABLET ORAL DAILY
Qty: 1 TABLET | Refills: 0
Start: 2019-11-08 | End: 2019-12-05 | Stop reason: SDUPTHER

## 2019-11-08 RX ORDER — CEFDINIR 300 MG/1
300 CAPSULE ORAL EVERY 12 HOURS SCHEDULED
Qty: 8 CAPSULE | Refills: 0 | Status: SHIPPED | OUTPATIENT
Start: 2019-11-08 | End: 2019-11-12

## 2019-11-08 RX ORDER — IPRATROPIUM BROMIDE AND ALBUTEROL SULFATE 2.5; .5 MG/3ML; MG/3ML
SOLUTION RESPIRATORY (INHALATION)
Status: DISCONTINUED
Start: 2019-11-08 | End: 2019-11-08 | Stop reason: HOSPADM

## 2019-11-08 RX ORDER — CEFDINIR 300 MG/1
300 CAPSULE ORAL EVERY 12 HOURS SCHEDULED
Qty: 8 CAPSULE | Refills: 0
Start: 2019-11-08 | End: 2019-11-08 | Stop reason: SDUPTHER

## 2019-11-08 RX ORDER — SODIUM CHLORIDE 9 MG/ML
50 INJECTION, SOLUTION INTRAVENOUS ONCE
Status: CANCELLED | OUTPATIENT
Start: 2019-11-12

## 2019-11-08 RX ORDER — OXYCODONE HYDROCHLORIDE AND ACETAMINOPHEN 5; 325 MG/1; MG/1
1 TABLET ORAL EVERY 6 HOURS PRN
Qty: 6 TABLET | Refills: 0 | Status: SHIPPED | OUTPATIENT
Start: 2019-11-08 | End: 2019-11-18

## 2019-11-08 RX ADMIN — CEFTRIAXONE 1000 MG: 1 INJECTION, POWDER, FOR SOLUTION INTRAMUSCULAR; INTRAVENOUS at 00:32

## 2019-11-08 RX ADMIN — IPRATROPIUM BROMIDE AND ALBUTEROL SULFATE 3 ML: 2.5; .5 SOLUTION RESPIRATORY (INHALATION) at 07:15

## 2019-11-08 RX ADMIN — IPRATROPIUM BROMIDE AND ALBUTEROL SULFATE 3 ML: 2.5; .5 SOLUTION RESPIRATORY (INHALATION) at 01:47

## 2019-11-08 RX ADMIN — IPRATROPIUM BROMIDE AND ALBUTEROL SULFATE 3 ML: 2.5; .5 SOLUTION RESPIRATORY (INHALATION) at 13:19

## 2019-11-08 NOTE — DISCHARGE SUMMARY
Discharge- Fahad Grier 1940, 78 y o  female MRN: [id number]    Unit/Bed#: E5 -01 Encounter: [id number]    Primary Care Provider: Pietro Manning DO   Date and time admitted to hospital: 11/6/2019  4:58 AM        * Left flank pain  Assessment & Plan   79 y/o F with history of hypertension, hyperlipidemia, CVA, right carotid stenosis, COPD not on home oxygen, hypothyroidism presenting with acute onset of left flank pain  -CT scan revealed moderate left LV ureteral urothelial hyperemia and thickening extending through UPJ, mild proximal hydroureter with potential transition into inferior sacroiliac joint level, no distal obstructing lesion or calculus, finding suggested of ascending pyelo ureteritis, moderate left hydronephrosis with transition at the UPJ and multifocal cortical scarring attributed to chronic UPJ stenosis  -status post cystoscopy on 11/07/2019, unfortunately stent unable to be placed in left ureter  Patient will require left percutaneous nephrostomy however needs to hold Plavix for 5 days    Explained to patient that she should not take Plavix upon return home, needs to follow up with intervention Radiology for percutaneous nephrostomy tube placement on 11/12/2019   -interventional radiology consult placed    Pyelonephritis of left kidney  Assessment & Plan  -continue with ceftriaxone, urine cultures nonrevealing however given patient's CT scan findings of pyelo ureter right is, will continue with empiric antibiotics to complete 7 days of therapy until nephrostomy tube placement    Abnormal CT scan  Assessment & Plan  -CT scan reveals 2 mm noncalcified nodule of left upper lobe likely representing noncalcified granuloma, 2 cm short axis noncalcified left paraesophageal lymph node recommend CT PET scanner CT chest in 3 months  1 2 cm short axis portacaval lymph node nonspecific could be followed up with CT abdomen pelvis in 3 months  -dated patient at bedside and patient's daughter Laura Raya via telephone, explained that patient needs close follow with repeat CT scan in 3 months and 12 months to monitor nodules, unclear whether these are inflammatory and benign, if worsening or increasing in size or quantity can be concerning and may require biopsy to rule out malignancy, patient and patient's daughter Laura Raya states the understand      Carotid stenosis right,  symptomatic, with infarction [facility])  Assessment & Plan  -maintained on aspirin, Plavix, statin  -follow with vascular surgery, Dr Ag Standing, plan for right carotid endarterectomy  -patient is scheduled to undergo nuclear stress test prior to proceeding with surgery  -Plavix to be held for the next 5 days for percutaneous nephrostomy tube placement    Microscopic hematuria  Assessment & Plan  -urinalysis feels large blood, RBC present  -urology consultation appreciated  -will follow with Urology outpatient    Acquired hypothyroidism  Assessment & Plan  C/w levothyroxine    [facility]ch[facility] ([facility] )  Assessment & Plan  -no acute exacerbation, continue with bronchodilators  -CT scan revealed 2 mm noncalcified nodule in left upper lobe likely could noncalcified granuloma recommend 12 month follow-up with noncontrast CT chest    CVA (cerebral vascular accident) ([facility] )  Assessment & Plan  -history of CVA, maintained on aspirin, statin    Benign familial tremor  Assessment & Plan  -follows with Neurology outpatient  -S/P DBS placement    Benign essential hypertension  Assessment & Plan  -continue Cardizem, monitor blood pressure      Transition of Care Discharge Summary - [facility] Internal Medicine    Patient Information: Lisa Mancini 78 y o  female MRN: [id number]  Unit/Bed#: E5 -01 Encounter: [id number]    Discharging Physician / Practitioner: Marco Antonio Beard MD  PCP: Kit Blakely DO  Admission Date: 11/6/2019  Discharge Date: 11/08/19    Disposition:      Other: independent living facility    For Discharges to   [address] SNF:   · Not Applicable to this Patient - Not Applicable to this Patient    Reason for Admission:  Left flank pain    Discharge Diagnoses:     Principal Problem:    Left flank pain  Active Problems:    Pyelonephritis of left kidney    Benign essential hypertension    Benign familial tremor    CVA (cerebral vascular accident) ([facility] [facility] )    COPD (chronic obstructive pulmonary disease) ([facility] )    Acquired hypothyroidism    Microscopic hematuria    Carotid stenosis right,  symptomatic, with infarction ([facility] [place] [facility] )    Hydronephrosis of left kidney    Abnormal CT scan  Resolved Problems:    * No resolved hospital problems  *      Consultations During Hospital Stay:  · Urology    Procedures Performed:     · Cystoscopy    Medication Adjustments and Discharge Medications:  · Medication Dosing Tapers - Please refer to Discharge Medication List for details on any medication dosing tapers (if applicable to patient)  · Discharge Medication List: See after visit summary for reconciled discharge medications  Wound Care Recommendations:  When applicable, please see wound care section of After Visit Summary  Diet Recommendations at Discharge:  Diet -        Diet Orders   (From admission, onward)             Start     Ordered    11/08/19 0001  Diet NPO  Diet effective midnight     Question Answer Comment   Diet Type NPO    RD to adjust diet per protocol? No        11/07/19 1951              Fluid Restriction - No Fluid Restriction at Discharge  Significant Findings / Test Results:     CT chest abdomen pelvis:CTA chest:     No pulmonary embolus      Trace bibasilar and lingular subsegmental atelectasis  Otherwise, no evidence of acute thoracic process      COPD      2 mm noncalcified nodule left upper lobe with unknown long-term stability  This likely represents a noncalcified granuloma   Based on current Fleischner Society 2017 Guidelines on incidental pulmonary nodule, because the patient is considered high risk   for lung cancer, 12 month follow-up non-contrast chest CT is recommended      Old calcified intrathoracic granulomata      2 cm short axis noncalcified left paraesophageal lymph node  This could be followed up with CT PET or CT chest, preferably with IV contrast, in 3 months      CT abdomen and pelvis:     Moderate left pelviureteral urothelial hyperemia and thickening extending through the UVJ  Mild proximal hydroureter with potential transition at the inferior SI joint level  No definitive distal obstructing lesion or calculus  Findings suggestive of   ascending pyeloureteritis  Pyonephrosis not excluded  No perinephric fluid collection      Multifocal nonenhancing noncalcific presumed debris in the dependent left renal pelvis      Moderate left hydronephrosis with transition at the UPJ and multifocal cortical scarring attributed to chronic UPJ stenosis      Prominent subcentimeter left para-aortic and perirenal lymph nodes likely reactive      Left nephrolithiasis 4 mm and smaller     Sigmoid diverticulosis      1 2 cm short axis portacaval lymph node is nonspecific  This could be followed up with CT abdomen and pelvis, preferably with IV contrast, in 3 months  Hospital Course: Duc Hoff is a 78 y o  female patient who originally presented to the hospital on 11/6/2019 due to left flank pain  Patient history of hypertension, hyperlipidemia, hypothyroidism, CVA  Imaging revealed UPJ stenosis, urology consulted and patient underwent cystoscopy  Unfortunately, stent was unable to be placed in left ureter and therefore patient will require left percutaneous nephrostomy however Plavix needs to be held 5 days prior to procedure  Plan for Tuesday 11/12/2019  Patient is otherwise medically stable and cleared from Urology aspect for discharge home today with outpatient follow-up  Discussed with patient, patient's daughter Ronda Barksdalearlin via telephone      Please see above problem list for further details  Condition at Discharge: good     Discharge Day Visit / Exam:     Subjective:  Patient seen examined at bedside, currently denies any abdominal pain, nausea, vomiting    Vitals: Blood Pressure: 133/63 (11/08/19 0724)  Pulse: 77 (11/08/19 0724)  Temperature: 98 9 °F (37 2 °C) (11/08/19 0724)  Temp Source: Temporal (11/08/19 0724)  Respirations: 18 (11/08/19 0724)  Height: 5' 4" (162 6 cm) (11/06/19 1135)  Weight - Scale: 85 kg (187 lb 6 3 oz) (11/06/19 1135)  SpO2: 92 % (11/08/19 1319)    Physical Exam:    Constitutional: Patient is oriented to person, place and time, no acute distress  HEENT:  Normocephalic, atraumatic, EOMI, PERRLA, no scleral icterus, no pallor, moist oral mucosa  Neck:  Supple, no masses, no thyromegaly, no bruits Normal range of motion  Lymph nodes:  No lymphadenopathy  Cardiovascular: Normal S1S2, RRR, No murmurs/rubs/gallops appreciated  Pulmonary:  Bilateral air entry, No rhonchi/rales/wheezing appreciated  Abdominal: Soft, Bowel sounds present, Non-tender, Non-distended, No rebound/guarding, no hepatomegaly   Musculoskeletal: No tenderness/abnormality   Extremities:  No cyanosis, clubbing or edema  Peripheral pulses palpable and equal bilaterally  Neurological: Cranial nerves II-XII grossly intact, sensation intact, otherwise no focal neurological symptoms  Skin: Skin is warm and dry, no rashes  Discharge instructions/Information to patient and family:   See after visit summary section titled Discharge Instructions for information provided to patient and family  Planned Readmission: no     Discharge Statement:  I spent 35 minutes discharging the patient  This time was spent on the day of discharge  I had direct contact with the patient on the day of discharge   Greater than 50% of the total time was spent examining patient, answering all patient questions, arranging and discussing plan of care with patient as well as directly providing post-discharge instructions  Additional time then spent on discharge activities      ** Please Note: This note has been constructed using a voice recognition system **

## 2019-11-08 NOTE — ASSESSMENT & PLAN NOTE
79 y/o F with history of hypertension, hyperlipidemia, CVA, right carotid stenosis, COPD not on home oxygen, hypothyroidism presenting with acute onset of left flank pain      -CT scan revealed moderate left LV ureteral urothelial hyperemia and thickening extending through UPJ, mild proximal hydroureter with potential transition into inferior sacroiliac joint level, no distal obstructing lesion or calculus, finding suggested of ascending pyelo ureteritis, moderate left hydronephrosis with transition at the UPJ and multifocal cortical scarring attributed to chronic UPJ stenosis  -urology consultation appreciated, plan for cystoscopy and left ureteral stent placement today

## 2019-11-08 NOTE — PROGRESS NOTES
Progress Note - Urology  Arthur Hammond 1940, 78 y o  female MRN: [id number]    Unit/Bed#: E5 -01 Encounter: [id number]    Hydronephrosis of left kidney  Assessment & Plan  Left hydronephrosis with Left UPJ obstruction  1 Day Post-Op  Procedure(s):  CYSTO RETROGRADE PYELOGRAM, URETEROSCOPY  Surgeon(s):  Renetta Pineda MD  11/7/2019    Unfortunately, stent was unable to be placed in this left ureter  Patient will require L PCN - Plavix must be held for 5 days  Planned for Tuesday 11/12  Discussed with Dr Kevin Gold of IR, Dr Chula Estrella, and patient's daughter, Prabha Saavedra  Urology will sign off but remain available for any further inpatient needs  Please feel free to call with questions or concerns  Bedside rounds performed with RN  Discussed with Dr Wilder Black  Subjective/Objective     Subjective:   CC: "I feel well today"  HPI:  Salena Arreguin is feeling well  No nausea or vomiting  No flank pain  No chest pain or shortness of breath  She is having some hematuria, clearing with each void  With some dysuria  ROS:  Review of Systems   Constitutional: Negative for activity change and appetite change  HENT: Negative for congestion and ear pain  Eyes: Negative for pain  Respiratory: Negative for cough and shortness of breath  Cardiovascular: Negative for chest pain and palpitations  Gastrointestinal: Negative for abdominal distention, abdominal pain, blood in stool, constipation, diarrhea and nausea  Genitourinary: Negative for difficulty urinating, dysuria, flank pain and hematuria  Musculoskeletal: Negative for arthralgias and myalgias  Skin: Negative for rash  Allergic/Immunologic: Negative for immunocompromised state  Neurological: Negative for dizziness and headaches  Hematological: Negative for adenopathy  Does not bruise/bleed easily  Psychiatric/Behavioral: Negative for agitation  The patient is not nervous/anxious          Objective:  Vitals: Blood pressure 133/63, pulse 77, temperature 98 9 °F (37 2 °C), temperature source Temporal, resp  rate 18, height 5' 4" (1 626 m), weight 85 kg (187 lb 6 3 oz), SpO2 92 %  ,Body mass index is 32 17 kg/m²  Intake/Output Summary (Last 24 hours) at 11/8/2019 1411  Last data filed at 11/8/2019 0915  Gross per 24 hour   Intake 150 ml   Output 1000 ml   Net -850 ml     Invasive Devices     Peripheral Intravenous Line            Peripheral IV 11/07/19 Right Hand 1 day                Physical Exam   Constitutional: She is oriented to person, place, and time  She appears well-developed and well-nourished  She is cooperative  She does not appear ill  No distress  Maria De Jesus Lee is out of bed in the chair, resting comfortably  HENT:   Head: Normocephalic and atraumatic  Moist mucous membranes  Eyes: Conjunctivae and EOM are normal    Neck: Normal range of motion  Neck supple  No tracheal deviation present  Cardiovascular: Normal rate, regular rhythm and normal heart sounds  No murmur heard  Pulmonary/Chest: Effort normal and breath sounds normal  No respiratory distress  She has no wheezes  Good airflow bilaterally on deep inspiration  Abdominal: Soft  Bowel sounds are normal  She exhibits no distension and no mass  There is no tenderness  Abdomen soft, nontender  Musculoskeletal: Normal range of motion  She exhibits no edema  Bilateral CVA without tenderness  Neurological: She is alert and oriented to person, place, and time  Skin: Skin is warm and dry  No rash noted  She is not diaphoretic  No erythema  No pallor  Psychiatric: She has a normal mood and affect  Her behavior is normal  Judgment and thought content normal    Nursing note and vitals reviewed        History:    Past Medical History:   Diagnosis Date    Arthritis     Asthma     Benign essential tremor 10/15/2018    Added automatically from request for surgery [id number]    Benign neoplasm of large intestine     Cellulitis of leg, right     Closed compression fracture of body of lumbar vertebra (HCC)     L5    COPD (chronic obstructive pulmonary disease) (HCC)     Difficulty waking     post anesthesia    Disease of thyroid gland     GERD (gastroesophageal reflux disease)     High cholesterol     Hypertension     Osteopenia     Osteoporosis     Shortness of breath     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     Urinary leakage     Vitamin D deficiency     Wears glasses     Wears partial dentures     upper     Past Surgical History:   Procedure Laterality Date    CATARACT EXTRACTION W/  INTRAOCULAR LENS IMPLANT Bilateral     COLONOSCOPY      DEEP BRAIN STIMULATOR PLACEMENT      FL RETROGRADE PYELOGRAM  11/7/2019    KNEE ARTHROSCOPY      CA CYSTOURETHROSCOPY,URETER CATHETER Left 11/7/2019    Procedure: CYSTO RETROGRADE PYELOGRAM, URETEROSCOPY;  Surgeon: Munir Calixto MD;  Location: [facility];  Service: Urology    CA IMP STIM,CRANIAL,SUBQ,1 ARRAY Left 1/22/2019    Procedure: REPLACEMENT IMPLANTABLE PULSE GENERATOR (IPG) DEEP BRAIN STIMULATION (DBS), LEFT;  Surgeon: Juana Acharya MD;  Location: [facility];  Service: Neurosurgery    CA IMP STIM,CRANIAL,SUBQ,>1 ARRAY Right 12/13/2018    Procedure: REPLACEMENT RIGHT DBS IMPLANTABLE PULSE GENERATOR;  Surgeon: Juana Acharya MD;  [facility];  Service: Neurosurgery    CA TOTAL HIP ARTHROPLASTY Left 5/20/2016    Procedure: ARTHROPLASTY HIP TOTAL ANTERIOR;  Surgeon: Darien Davis MD;  Location: [facility];  Service: Orthopedics    ROTATOR CUFF REPAIR       Family History   Problem Relation Age of Onset    Breast cancer Mother     Throat cancer Family      Social History     Socioeconomic History    Marital status:       Spouse name: None    Number of children: None    Years of education: None    Highest education level: None   Occupational History    None   Social Needs    Financial resource strain: None    Food insecurity:     Worry: None     Inability: None    Transportation needs: Medical: None     Non-medical: None   Tobacco Use    Smoking status: Former Smoker     Last attempt to quit: 2013     Years since quittin 8    Smokeless tobacco: Never Used   Substance and Sexual Activity    Alcohol use: Never     Frequency: Never    Drug use: No    Sexual activity: None   Lifestyle    Physical activity:     Days per week: None     Minutes per session: None    Stress: None   Relationships    Social connections:     Talks on phone: None     Gets together: None     Attends Mosque service: None     Active member of club or organization: None     Attends meetings of clubs or organizations: None     Relationship status: None    Intimate partner violence:     Fear of current or ex partner: None     Emotionally abused: None     Physically abused: None     Forced sexual activity: None   Other Topics Concern    None   Social History Narrative    Caffeine use    Living independently           Labs:  Results from last 7 days   Lab Units 19  0531 19  0531 19  0502   WBC Thousand/uL 6 54 7 08 10 94*   HEMOGLOBIN g/dL 11 7 12 4 13 4   PLATELETS Thousands/uL 245 251 253     Results from last 7 days   Lab Units 19  0531 19  0531 19  0502   SODIUM mmol/L 141 140 138   POTASSIUM mmol/L 4 2 4 0 4 2   CHLORIDE mmol/L 105 102 101   CO2 mmol/L 27 29 30   BUN mg/dL 21 22 18   CREATININE mg/dL 0 89 0 96 0 98   EGFR ml/min/1 73sq m 61 56 54   CALCIUM mg/dL 8 8 8 9 8 9   AST U/L  --  14 22   ALT U/L  --  14 21   ALK PHOS U/L  --  73 78     Results from last 7 days   Lab Units 19  0852 19  0846 19  0811   BLOOD CULTURE  No Growth at 48 hrs  No Growth at 48 hrs    --    URINE CULTURE   --   --  1437-1226 cfu/ml            Honorio Clinton PA-C  Date: 2019 Time: 2:11 PM

## 2019-11-08 NOTE — ASSESSMENT & PLAN NOTE
-maintained on aspirin, Plavix, statin  -follow with vascular surgery, Dr Bisi Varghese, plan for right carotid endarterectomy  -patient is scheduled to undergo nuclear stress test prior to proceeding with surgery

## 2019-11-08 NOTE — NURSING NOTE
Awake, alert, oriented  Denies any dizziness or lightheadedness at this time/ Ambulating with stand by assist to bathroom with walker and steady gait  Continent of pink bloody urine  SCDs in place while in recliner  Denies pain  Will continue to monitor

## 2019-11-08 NOTE — CONSULTS
IR has been consulted to evaluate the patient, determine the appropriate procedure, and whether or not a procedure can and should be performed  Interventional Radiology  Consultation 11/8/2019     Ewelina Kramer   1940   [id number]      Assessment/Plan:  High-grade left ureteropelvic junction obstruction requiring percutaneous nephrostomy/nephroureterostomy  The patient is currently taking Plavix which will require a 5 day  Will schedule procedure as an outpatient early next week unless she begins to show signs of sepsis requiring drainage sooner  Problem List     * (Principal) Left flank pain    Benign essential hypertension    Benign familial tremor    Overview Addendum 1/6/2019 10:43 AM by Beverley Faye DO      w/ surgical implant electrodes 2012 12/13/2018 w/ DR Mendoza Bolton for REPLACEMENT RIGHT DBS IMPLANTABLE PULSE GENERATOR (Right Chest)  DBS battery reached ANTWON requiring replacement  CVA (cerebral vascular accident) [facility])    Overview Addendum 10/8/2019  2:39 PM by Beverley Faye DO     Tx in [facility] -   Had right facial droop w some dysphagia/ imbalance  Also w clinical CVA 9/2019  ( hospitalized at [facility] )    MRI 1/2019 =   Bilateral deep brain stimulator leads are noted terminating in the topography of the basal ganglia  Minimal FLAIR signal abnormality along the tract is noted      Chronic infarct right posterior lateral frontal lobe      Advanced chronic small vessel ischemic changes           COPD (chronic obstructive pulmonary disease) ([facility] [address] )    Overview Signed 5/14/2018 11:11 AM by Beverley Faye DO     Transitioned From: Chronic bronchitis         Esophageal reflux    Overview Signed 5/14/2018 11:11 AM by Beverley Faye DO     Transitioned From: Esophageal Reflux         Gait disturbance    Dyslipidemia    Overview Signed 11/26/2018  4:28 PM by Beverley Faye DO     Overview:   Last Assessment & Plan:   Patient states that she has not been taking simvastatin for quite a while   She did have trouble with muscle aches from the medication in the past   She was given a slip to do some labs prior to her upcoming appointment with Dr Lakesha Allison in January  We will reassess her lipid panel with those labs and can be discussed whether she needs medication at that time  Sleep disorder    Sciatica    Right shoulder pain    Osteoarthritis of hip    Overview Signed 5/14/2018 11:11 AM by Prabha Pina DO     Transitioned From: Chronic left hip pain         OA (osteoarthritis)    Overview Signed 5/14/2018 11:11 AM by Prabha Pina DO     Transitioned From: Osteoarthritis         Multiple joint pain    Imbalance    Acquired hypothyroidism    Overview Addendum 12/3/2018 10:43 AM by Prabha Pina DO     Overview:   Last Assessment & Plan:     Patient is currently taking levothyroxine 125 mcg daily  Lumbar degenerative disc disease    Back pain    Type 2 diabetes mellitus without complication, without long-term current use of insulin ([facility]    Overview Signed 11/26/2018  4:28 PM by Prabha Pina DO     Overview:   Last Assessment & Plan:   Formatting of this note may be different from the original   Lab Results   Component Value Date    HGBA1C 6 8 06/25/2018       We reviewed that her last A1c was 6 8% in June  She does not take any medication for her diabetes and controls it solely with diet  She is provided a lab slip to have done prior to her next appointment in January which includes a repeat A1c as well as microalbumin level  She will continue to follow with her ophthalmologist on a regular basis  She notes that she is getting injections periodically for macular degeneration  Her foot exam was completed today           Lab Results   Component Value Date    HGBA1C 6 7 07/09/2019       Recent Labs     11/06/19  1208   POCGLU 88       Blood Sugar Average: Last 72 hrs:  (P) 88        History of pancreatitis    Aortic valve stenosis - Mild -Moderate    Overview Addendum 10/8/2019 2:42 PM by Stalin Rincon, DO     Mild on Echo 2016    Mod on Echo 11/18 in [place], Mild on Echo at [address] 9/19         S/P deep brain stimulator placement    Overview Signed 1/6/2019 10:42 AM by Stalin Rincon, DO     status post surgery on 12/13/2018 w/ DR Jude Sidhu for REPLACEMENT RIGHT DBS IMPLANTABLE PULSE GENERATOR (Right Chest)  She has a longstanding history of familial essential tremor for which she has a DBS delivering efficacy  DBS battery reached ANTWON requiring replacement  Microscopic hematuria    Dysarthria related to Nov 2018 CVA, worsened Sept 2019    Swallowing dysfunction    Overview Addendum 10/8/2019  1:03 PM by Stalin Rincon, DO     Diet as of Sept 2019 [facility] Admission w TIA )  Recommended --   modified diet with Level 5 Minced & Moist (dysphagia mechanically altered), Level 2 Mildly Thick (nectar thick)  [facility] for cup sips thin water between meals- if increased symptoms aspiration, please discontinue sips of water and thicken all liquids to nectar thick consistency   and whole medication with puree  Weakness of left leg    Carotid stenosis right,  symptomatic, with infarction [facility])    Preoperative cardiovascular examination    Hydronephrosis of left kidney    Overview Signed 11/6/2019  2:29 PM by Azeb Falk MD     Added automatically from request for surgery [id number]         Pyelonephritis of left kidney    Abnormal CT scan            Subjective:     Patient ID: Main Rivas is a 78 y o  female  History of Present Illness  The patient is an [de-identified] yo female who presented to the emergency department with a complaint of intermittent moderate to severe left flank pain  A CT scan shows a left uteropelvic junction obstruction resulting in moderate hydronephrosis with findings c/w pyelonephritis  On 11/7 the patient underwent a retrograde pyelogram which confirmed the UPJ, but was unable to be crossed and treated with a double J ureteral stent      Review of Systems Constitutional: Negative  HENT: Negative  Eyes: Negative  Respiratory: Negative  Cardiovascular: Negative  Gastrointestinal: Negative  Genitourinary: Positive for dysuria and flank pain  Skin: Negative  Allergic/Immunologic: Negative  Neurological: Negative            Past Medical History:   Diagnosis Date    Arthritis     Asthma     Benign essential tremor 10/15/2018    Added automatically from request for surgery [id number]    Benign neoplasm of large intestine     Cellulitis of leg, right     Closed compression fracture of body of lumbar vertebra (HCC)     L5    COPD (chronic obstructive pulmonary disease) (HCC)     Difficulty waking     post anesthesia    Disease of thyroid gland     GERD (gastroesophageal reflux disease)     High cholesterol     Hypertension     Osteopenia     Osteoporosis     Shortness of breath     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     Urinary leakage     Vitamin D deficiency     Wears glasses     Wears partial dentures     upper        Past Surgical History:   Procedure Laterality Date    CATARACT EXTRACTION W/  INTRAOCULAR LENS IMPLANT Bilateral     COLONOSCOPY      DEEP BRAIN STIMULATOR PLACEMENT      FL RETROGRADE PYELOGRAM  11/7/2019    KNEE ARTHROSCOPY      KY CYSTOURETHROSCOPY,URETER CATHETER Left 11/7/2019    Procedure: CYSTO RETROGRADE PYELOGRAM, URETEROSCOPY;  Surgeon: Ryan Roman MD;  [facility];  Service: Urology    KY IMP STIM,CRANIAL,SUBQ,1 ARRAY Left 1/22/2019    Procedure: REPLACEMENT IMPLANTABLE PULSE GENERATOR (IPG) DEEP BRAIN STIMULATION (DBS), LEFT;  Surgeon: Marsha Coronado MD;  [facility]: [facility];  Service: Neurosurgery    KY IMP STIM,CRANIAL,SUBQ,>1 ARRAY Right 12/13/2018    Procedure: REPLACEMENT RIGHT DBS IMPLANTABLE PULSE GENERATOR;  Surgeon: Marsha Coronado MD;  [facility];  Service: Neurosurgery    KY TOTAL HIP ARTHROPLASTY Left 5/20/2016    Procedure: ARTHROPLASTY HIP TOTAL ANTERIOR;  Surgeon: Janusz Robbins MD;  [facility]: [facility];  Service: Orthopedics    ROTATOR CUFF REPAIR          Social History     Tobacco Use   Smoking Status Former Smoker    Last attempt to quit:     Years since quittin 8   Smokeless Tobacco Never Used        Social History     Substance and Sexual Activity   Alcohol Use Never    Frequency: Never        Social History     Substance and Sexual Activity   Drug Use No        Allergies   Allergen Reactions    Ciprofloxacin Diarrhea    Simvastatin Myalgia    Advair Diskus [Fluticasone-Salmeterol] Palpitations     Ok w flovent    Tetracycline Rash     Reaction Date: 2012;        Current Facility-Administered Medications   Medication Dose Route Frequency Provider Last Rate Last Dose    acetaminophen (TYLENOL) tablet 650 mg  650 mg Oral Q6H PRN Crystal Mcgee MD        aspirin chewable tablet 81 mg  81 mg Oral Daily Crystal Mcgee MD   Stopped at 19 0931    cefTRIAXone (ROCEPHIN) 1,000 mg in dextrose 5 % 50 mL IVPB  1,000 mg Intravenous Q24H Crystal Mcgee  mL/hr at 19 0032 1,000 mg at 19 0032    clopidogrel (PLAVIX) tablet 75 mg  75 mg Oral Daily Crystal Mcgee MD   Stopped at 19 0931    diltiazem (CARDIZEM CD) 24 hr capsule 240 mg  240 mg Oral Daily Crystal Mcgee MD   240 mg at 19 0908    fluticasone (FLOVENT HFA) 220 mcg/act inhaler 1 puff  1 puff Inhalation Q12H [facility] Crystal Mcgee MD   1 puff at 19    furosemide (LASIX) tablet 20 mg  20 mg Oral Daily Crystal Mcgee MD   20 mg at 19 0908    heparin (porcine) subcutaneous injection 5,000 Units  5,000 Units Subcutaneous [facility] Crystal Mcgee MD   5,000 Units at 19    ipratropium-albuterol (DUO-NEB) 0 5-2 5 mg/3 mL inhalation solution **ADS Override Pull**             ipratropium-albuterol (DUO-NEB) 0 5-2 5 mg/3 mL inhalation solution 3 mL  3 mL Nebulization Q6H Crystal Mcgee MD   3 mL at 19 1319    levothyroxine tablet 125 mcg  125 mcg Oral Daily Nguyen Rayo MD   125 mcg at 11/07/19 0532    morphine injection 2 mg  2 mg Intravenous Q4H PRN Nguyen Rayo MD        pantoprazole (PROTONIX) EC tablet 40 mg  40 mg Oral Early Morning Nguyen Rayo MD   40 mg at 11/07/19 0532    sodium chloride 0 9 % infusion  125 mL/hr Intravenous Continuous Zack Dimple,  mL/hr at 11/07/19 1255            Objective:    Vitals:    11/08/19 0147 11/08/19 0715 11/08/19 0724 11/08/19 1319   BP:   133/63    BP Location:   Left arm    Pulse:   77    Resp:   18    Temp:   98 9 °F (37 2 °C)    TempSrc:   Temporal    SpO2: 93% 94% 99% 92%   Weight:       Height:            Physical Exam   Constitutional: She appears well-developed and well-nourished  No distress  HENT:   Head: Normocephalic and atraumatic  Neck: Normal range of motion  Neck supple  Cardiovascular: Normal rate and regular rhythm  Exam reveals no gallop and no friction rub  Murmur heard  Pulmonary/Chest: Effort normal  She has wheezes  She has rales  Abdominal: Soft  Bowel sounds are normal  She exhibits no distension and no mass  There is no tenderness  There is no rebound and no guarding  Musculoskeletal: She exhibits tenderness  Arms:  Skin: Skin is warm  She is not diaphoretic  No erythema  Vitals reviewed  Lab Results   Component Value Date    BNP 20 01/06/2014      Lab Results   Component Value Date    WBC 6 54 11/08/2019    HGB 11 7 11/08/2019    HCT 38 6 11/08/2019    MCV 89 11/08/2019     11/08/2019     Lab Results   Component Value Date    INR 1 00 11/06/2019    INR 0 96 05/10/2016    PROTIME 13 3 11/06/2019    PROTIME 12 8 05/10/2016     Lab Results   Component Value Date    PTT 33 11/06/2019         I have personally reviewed pertinent imaging and laboratory results  This procedure has been fully reviewed with the patient and written informed consent has been obtained       Code Status: Level 3 - DNAR and DNI  Advance Directive and Living Will:      Power of :    POLST:      IR has been consulted to evaluate the patient, determine the appropriate procedure, and whether or not a procedure can and should be performed  Thank you for allowing me to participate in the care of Tamika Magana  Please don't hesitate to call, text, email, or TigerText with any questions  This text is generated with voice recognition software  There may be translation, syntax,  or grammatical errors  If you have any questions, please contact the dictating provider

## 2019-11-08 NOTE — TELEPHONE ENCOUNTER
Spoke to pt  Appointment set for 12/16 with Dr Acuna Lose  Needs US scheduled prior to appointment  Will direct to Clerical to schedule  Pt lives at [facility], to be scheduled at facility near residence  Pt request paperwork with directions and date and time of US and appointment to be mailed to her

## 2019-11-08 NOTE — TELEPHONE ENCOUNTER
Patient went to the OR with Dr Paras Urrutia 11/7 with attempt at L ureteral stent attempt - unable to pass past L UPJ obstruction  Will hold plavix  Will order L PCN on 11/12 in IR  Please schedule patient for office visit in 4-5 weeks to see how her symptoms are s/p L PCN      Pop [place] PA-C  Date: 11/8/2019 Time: 2:14 PM

## 2019-11-11 ENCOUNTER — TRANSITIONAL CARE MANAGEMENT (OUTPATIENT)
Dept: FAMILY MEDICINE CLINIC | Facility: CLINIC | Age: 79
End: 2019-11-11

## 2019-11-11 ENCOUNTER — TRANSCRIBE ORDERS (OUTPATIENT)
Dept: ADMINISTRATIVE | Facility: HOSPITAL | Age: 79
End: 2019-11-11

## 2019-11-11 DIAGNOSIS — I63.9 CEREBRAL INFARCTION, UNSPECIFIED MECHANISM (HCC): ICD-10-CM

## 2019-11-11 DIAGNOSIS — I63.9 CEREBROVASCULAR ACCIDENT (CVA), UNSPECIFIED MECHANISM (HCC): Primary | ICD-10-CM

## 2019-11-11 LAB
BACTERIA BLD CULT: NORMAL
BACTERIA BLD CULT: NORMAL

## 2019-11-12 ENCOUNTER — HOSPITAL ENCOUNTER (OUTPATIENT)
Dept: RADIOLOGY | Facility: HOSPITAL | Age: 79
Discharge: HOME | End: 2019-11-12
Admitting: RADIOLOGY
Payer: MEDICARE

## 2019-11-12 ENCOUNTER — ANESTHESIA EVENT (OUTPATIENT)
Dept: RADIOLOGY | Facility: HOSPITAL | Age: 79
End: 2019-11-12

## 2019-11-12 ENCOUNTER — OFFICE VISIT (OUTPATIENT)
Dept: NEUROLOGY | Facility: CLINIC | Age: 79
End: 2019-11-12
Payer: MEDICARE

## 2019-11-12 ENCOUNTER — ANESTHESIA (OUTPATIENT)
Dept: RADIOLOGY | Facility: HOSPITAL | Age: 79
End: 2019-11-12

## 2019-11-12 ENCOUNTER — TELEPHONE (OUTPATIENT)
Dept: NEUROLOGY | Facility: CLINIC | Age: 79
End: 2019-11-12

## 2019-11-12 VITALS
WEIGHT: 186 LBS | HEIGHT: 64 IN | TEMPERATURE: 98.1 F | SYSTOLIC BLOOD PRESSURE: 140 MMHG | DIASTOLIC BLOOD PRESSURE: 64 MMHG | RESPIRATION RATE: 18 BRPM | HEART RATE: 71 BPM | BODY MASS INDEX: 31.76 KG/M2 | OXYGEN SATURATION: 92 %

## 2019-11-12 VITALS
WEIGHT: 186 LBS | BODY MASS INDEX: 31.76 KG/M2 | HEART RATE: 70 BPM | SYSTOLIC BLOOD PRESSURE: 152 MMHG | HEIGHT: 64 IN | DIASTOLIC BLOOD PRESSURE: 79 MMHG

## 2019-11-12 DIAGNOSIS — I63.239 CAROTID STENOSIS, SYMPTOMATIC, WITH INFARCTION (HCC): ICD-10-CM

## 2019-11-12 DIAGNOSIS — Z86.73 HISTORY OF CVA (CEREBROVASCULAR ACCIDENT): Primary | ICD-10-CM

## 2019-11-12 PROCEDURE — 99214 OFFICE O/P EST MOD 30 MIN: CPT | Performed by: PHYSICIAN ASSISTANT

## 2019-11-12 PROCEDURE — 50432 PLMT NEPHROSTOMY CATHETER: CPT | Performed by: RADIOLOGY

## 2019-11-12 PROCEDURE — C1729 CATH, DRAINAGE: HCPCS

## 2019-11-12 PROCEDURE — 50432 PLMT NEPHROSTOMY CATHETER: CPT

## 2019-11-12 RX ORDER — SODIUM CHLORIDE 9 MG/ML
INJECTION, SOLUTION INTRAVENOUS CONTINUOUS PRN
Status: DISCONTINUED | OUTPATIENT
Start: 2019-11-12 | End: 2019-11-12 | Stop reason: SURG

## 2019-11-12 RX ORDER — ROCURONIUM BROMIDE 10 MG/ML
INJECTION, SOLUTION INTRAVENOUS AS NEEDED
Status: DISCONTINUED | OUTPATIENT
Start: 2019-11-12 | End: 2019-11-12 | Stop reason: SURG

## 2019-11-12 RX ORDER — FENTANYL CITRATE 50 UG/ML
INJECTION, SOLUTION INTRAMUSCULAR; INTRAVENOUS AS NEEDED
Status: DISCONTINUED | OUTPATIENT
Start: 2019-11-12 | End: 2019-11-12 | Stop reason: SURG

## 2019-11-12 RX ORDER — GLYCOPYRROLATE 0.2 MG/ML
INJECTION INTRAMUSCULAR; INTRAVENOUS AS NEEDED
Status: DISCONTINUED | OUTPATIENT
Start: 2019-11-12 | End: 2019-11-12 | Stop reason: SURG

## 2019-11-12 RX ORDER — SODIUM CHLORIDE 9 MG/ML
50 INJECTION, SOLUTION INTRAVENOUS ONCE
Status: COMPLETED | OUTPATIENT
Start: 2019-11-12 | End: 2019-11-12

## 2019-11-12 RX ORDER — ONDANSETRON 2 MG/ML
INJECTION INTRAMUSCULAR; INTRAVENOUS AS NEEDED
Status: DISCONTINUED | OUTPATIENT
Start: 2019-11-12 | End: 2019-11-12 | Stop reason: SURG

## 2019-11-12 RX ORDER — EPHEDRINE SULFATE 50 MG/ML
INJECTION INTRAVENOUS AS NEEDED
Status: DISCONTINUED | OUTPATIENT
Start: 2019-11-12 | End: 2019-11-12 | Stop reason: SURG

## 2019-11-12 RX ORDER — NEOSTIGMINE METHYLSULFATE 1 MG/ML
INJECTION INTRAVENOUS AS NEEDED
Status: DISCONTINUED | OUTPATIENT
Start: 2019-11-12 | End: 2019-11-12 | Stop reason: SURG

## 2019-11-12 RX ORDER — PROPOFOL 10 MG/ML
INJECTION, EMULSION INTRAVENOUS AS NEEDED
Status: DISCONTINUED | OUTPATIENT
Start: 2019-11-12 | End: 2019-11-12 | Stop reason: SURG

## 2019-11-12 RX ORDER — CEFAZOLIN SODIUM 1 G/3ML
INJECTION, POWDER, FOR SOLUTION INTRAMUSCULAR; INTRAVENOUS AS NEEDED
Status: DISCONTINUED | OUTPATIENT
Start: 2019-11-12 | End: 2019-11-12 | Stop reason: SURG

## 2019-11-12 RX ADMIN — SODIUM CHLORIDE: 0.9 INJECTION, SOLUTION INTRAVENOUS at 14:45

## 2019-11-12 RX ADMIN — NEOSTIGMINE METHYLSULFATE 3 MG: 1 INJECTION, SOLUTION INTRAVENOUS at 15:51

## 2019-11-12 RX ADMIN — PROPOFOL 170 MG: 10 INJECTION, EMULSION INTRAVENOUS at 14:57

## 2019-11-12 RX ADMIN — FENTANYL CITRATE 100 MCG: 50 INJECTION, SOLUTION INTRAMUSCULAR; INTRAVENOUS at 14:56

## 2019-11-12 RX ADMIN — EPHEDRINE SULFATE 5 MG: 50 INJECTION, SOLUTION INTRAVENOUS at 15:30

## 2019-11-12 RX ADMIN — CEFAZOLIN SODIUM 2000 MG: 1 INJECTION, POWDER, FOR SOLUTION INTRAMUSCULAR; INTRAVENOUS at 15:19

## 2019-11-12 RX ADMIN — IOHEXOL 15 ML: 350 INJECTION, SOLUTION INTRAVENOUS at 17:50

## 2019-11-12 RX ADMIN — GLYCOPYRROLATE 0.4 MG: 0.2 INJECTION INTRAMUSCULAR; INTRAVENOUS at 15:51

## 2019-11-12 RX ADMIN — ROCURONIUM BROMIDE 30 MG: 50 INJECTION, SOLUTION INTRAVENOUS at 14:57

## 2019-11-12 RX ADMIN — EPHEDRINE SULFATE 10 MG: 50 INJECTION, SOLUTION INTRAVENOUS at 15:44

## 2019-11-12 RX ADMIN — SODIUM CHLORIDE 50 ML/HR: 0.9 INJECTION, SOLUTION INTRAVENOUS at 12:45

## 2019-11-12 RX ADMIN — SODIUM CHLORIDE: 0.9 INJECTION, SOLUTION INTRAVENOUS at 15:55

## 2019-11-12 RX ADMIN — ONDANSETRON 4 MG: 2 INJECTION INTRAMUSCULAR; INTRAVENOUS at 15:47

## 2019-11-12 NOTE — ANESTHESIA POSTPROCEDURE EVALUATION
Post-Op Assessment Note    CV Status:  Stable  Pain Score: 1    Pain management: adequate     Mental Status:  Alert and awake   Hydration Status:  Euvolemic   PONV Controlled:  Controlled   Airway Patency:  Patent   Post Op Vitals Reviewed: Yes      Staff: Anesthesiologist           /70 (11/12/19 1608)    Temp 98 2 °F (36 8 °C) (11/12/19 1608)    Pulse 73 (11/12/19 1608)   Resp 18 (11/12/19 1608)    SpO2 95 % (11/12/19 1608)

## 2019-11-12 NOTE — PROGRESS NOTES
Patient ID: Fahad Grier is a 78 y o  female  Assessment/Plan:    Patient with history of right sided CVA in Nov 2018  She more recently presented to [facility] in Sept 2019 with aphasia, left facial droop and left sided weakness which continued after hospitalization  MRI not done in the hospital   She had a carotid ultrasound which showed 50-69% stenosis of ANURADHA  She was started on Plavix in addition to her home ASA with plan for DAPT x 1 month  She saw Dr Ivon Zamora for follow up last month, MRI brain was ordered  She is currently doing well, denies new symptoms to indicate recurrent CVA/TIA  She stopped ASA on 10/22 as directed and now on Plavix 75mg daily as monotherapy, along with statin  She saw vascular surgery who is planning right CEA for symptomatic carotid stenosis  Unfortunately she was back in the hospital for UPJ stenosis and stent not able to be placed  She is scheduled later today for percutaneous nephrostomy tube placement  She has been holding Plavix x 5 days for this procedure  Plan:  -currently holding Plavix due to IR procedure  Advised patient to confirm when she is able to restart Plavix  Very important  She should then remain on Plavix 75mg daily for secondary stroke prevention, along with statin  -maintain good blood pressure control <130/80; management per PCP  -MRI brain pending (scheduled 11/19/19)  -continue PT/OT/ST  -continue to follow with vascular surgery for eventual right CEA  -follow up with Dr Ivon Zamora as scheduled on 12/31/19    Patient was advised to call for any questions or concerns  Signs and symptoms or stroke reviewed with patient in detail today  Diagnoses and all orders for this visit:    History of CVA (cerebrovascular accident)    Carotid stenosis right,  symptomatic, with infarction [facility])           Subjective:    HPI    Patient is an 79 y/o female who is here for CVA follow up    She was seen by Dr Ivon Zamora 1 month ago regarding prior CVA  She is also known to our practice as she follows with Dr Gerald Cameron for DBS due to essential tremor, which has been quite stable  Patient had Chronic R posterior lateral frontal lobe infarct  Patient had a stroke in November 2018  Patient then was more recently seen at [facility] on September 22, 2019 with witnessed aphasia, left facial droop, left-sided weakness  After the prior stroke patient did have some residual left-sided weakness and dysarthria for which he was receiving PT and ST  Stroke alert was cancelled and patient actually wanted to leave  She had a CT head and CTA head and neck which were both unremarkable  She did not want to get an MRI brain because she does have severe anxiety and claustrophobia, as well as having DBS  Neurology at [facility] start her on Plavix in addition to aspirin to treat empirically for Cerebrovascular accident/TIA  She had a carotid ultrasound which showed 50-69% stenosis of ANURADHA  She will be getting a repeat ultrasound in 6 months  She had echocardiogram which showed EF of 55% without any wall motion abnormalities, or atrial size abnormalities  At [facility] with Dr Joy Lundberg, she was told to cont DAPT x 1 month and then stop ASA and continue on Plavix and statin  Patient was to follow up with vascular surgery for symptomatic right carotid  It was advised that she obtain MRI brain to see if in fact she had a new small stroke  Today, patient reports she is doing ok from a neuro standpoint  She stopped ASA 10/22 as directed by Dr Joy Lundberg   She continues with therapy (PT/OT/ST)  No new symptoms at this time to indicate recurrent CVA/TIA  She saw Dr Keshia Hadley from vascular who suggested right CEA pending cardiac clearance  Unfortunately, she was recently hospitalized last week due to left flank pain and found to have UPJ stenosis    Stent was not able to be placed in the hospital, so she will require percutaneous nephrostomy tube placement, which she is having done later this afternoon  She was told to hold Plavix x 5 days leading up to today, so she is not currently taking it  She is scheduled for brain MRI next week on 11/19  The following portions of the patient's history were reviewed and updated as appropriate: current medications, past family history, past medical history, past social history, past surgical history and problem list          Objective:    Blood pressure 152/79, pulse 70, height 5' 4" (1 626 m), weight 84 4 kg (186 lb)  Physical Exam   Constitutional: She appears well-developed and well-nourished  HENT:   Head: Normocephalic and atraumatic  Eyes: Pupils are equal, round, and reactive to light  EOM are normal    Cardiovascular: Intact distal pulses  Neurological: She has normal reflexes  Coordination normal    Skin: Skin is warm and dry  Psychiatric: She has a normal mood and affect  Neurological Exam  Mental Status   Oriented to person, place, time and situation  Recent and remote memory are intact  Mild dysarthria present  Language is fluent with no aphasia  Attention and concentration are normal     Cranial Nerves  CN II: Visual fields full to confrontation  CN III, IV, VI: Extraocular movements intact bilaterally  Pupils equal round and reactive to light bilaterally  CN V: Facial sensation is normal   CN VII: Full and symmetric facial movement  CN VIII: Hearing is normal   CN IX, X: Palate elevates symmetrically  CN XI: Shoulder shrug strength is normal   CN XII: Tongue midline without atrophy or fasciculations  Motor   Normal muscle tone  Strength is 5/5 in all four extremities except as noted  LUE/LLE 4+/5  Sensory  Light touch is normal in upper and lower extremities  Reflexes  Deep tendon reflexes are 2+ and symmetric in all four extremities with downgoing toes bilaterally      Coordination  Finger-to-nose, rapid alternating movements and heel-to-shin normal bilaterally without dysmetria  Gait  Uses cane  ROS:    Review of Systems   Constitutional: Positive for fatigue  Negative for appetite change and fever  HENT: Negative  Negative for hearing loss, tinnitus, trouble swallowing and voice change  Eyes: Negative  Negative for photophobia and pain  Respiratory: Positive for shortness of breath  Cardiovascular: Negative  Negative for palpitations  Gastrointestinal: Negative  Negative for nausea and vomiting  Endocrine: Negative  Negative for cold intolerance and heat intolerance  Genitourinary: Positive for frequency  Negative for dysuria and urgency  Loss of bladder control   Musculoskeletal: Negative  Negative for myalgias and neck pain  Skin: Negative  Negative for rash  Neurological: Negative  Negative for dizziness, tremors, seizures, syncope, facial asymmetry, speech difficulty, weakness, light-headedness, numbness and headaches  Hematological: Negative  Does not bruise/bleed easily  Psychiatric/Behavioral: Positive for confusion (memory problems) and sleep disturbance  Negative for hallucinations       I personally reviewed and updated the ROS as appropriate

## 2019-11-12 NOTE — TELEPHONE ENCOUNTER
Called patient regarding her hospital follow up appointment with [facility] in [facility] today  Patient already saw Dr Jude Tirado for her hospital follow up  Calling patient to see if she would still like to keep appointment with [facility] today at [facility] as per [facility]

## 2019-11-12 NOTE — DISCHARGE INSTRUCTIONS
Nephrostomy Tube Care     WHAT YOU NEED TO KNOW:   A nephrostomy tube is a catheter (thin plastic tube) that is inserted through your skin and into your kidney  The nephrostomy tube drains urine from your kidney into a collecting bag outside your body  You may need a nephrostomy tube when something is blocking the normal flow of urine  A nephrostomy tube may be used for a short or a long period of time  The nephrostomy tube comes out of your back, so you will need someone to help care for your nephrostomy tube  DISCHARGE INSTRUCTIONS:      How to clean the skin around the nephrostomy tube and change the bandage:  Since the nephrostomy tube comes out of your back, you will not be able to care for it by yourself  Ask someone to follow the general directions below to check and care for your nephrostomy tube  Gather the items you will need  Disposable (single use) under-pad, and a clean washcloth  ¨ Plain soap, warm water, and new medical gloves  ¨ Sterile gauze bandages  ¨ Clear adhesive dressing or medical tape  ¨ Skin barrier  ¨ Protective skin film  ¨ Trash bag  · Remove the old bandage, and check the tube entry site  ¨ Have the patient lie on his side with the nephrostomy tube entry site facing up  Place the under-pad where it will catch drainage as you are working with the nephrostomy tube  ¨ Wash your hands with soap and water  Put on new medical gloves  ¨ Gently remove the old bandage, without pulling on the tube  Do this by holding the skin beside the tube with one hand  With the other hand, gently remove sticky tape and the skin barrier by pulling in the same direction as hair growth  Do not touch the side of the bandage that is placed over or around the tube  Throw the bandage and skin barrier away in a trash bag  ¨ Look for signs of infection, such as skin redness and swelling  Report any skin changes to healthcare providers  ¨ Clean the tube entry site      ¨ Hold the tube in place to keep it from being pulled out while you are cleaning around it  ¨ You will need to clean the area twice  For the first cleaning, wet a new gauze bandage with soap and water  Begin at the entry site of the tube  Wipe the skin in circles, moving away from the entry site  Remove blood and any other material with the gauze  Do this as often as needed  Use a new gauze bandage each time you clean the area, moving away from the entry site  ¨ For the second cleaning, wet a new gauze bandage with water  Begin at the entry site of the tube  Wipe the skin in circles, moving away from the entry site  Use a new gauze bandage each time you clean the area, moving away from the entry site  ¨ Gently pat the skin with a clean washcloth to dry it  · Apply the skin barrier and bandages  ¨ Roll up a bandage to make it thick, and place it under  the place where the tube enters the skin  Place it to support the tube, and stop it from kinking or bending  Tape the bandage in place, and apply more bandages if directed by a healthcare provider  ¨ Bring the tubing forward to the front and tape it to the skin  Do not stretch the tube tight, because this may pull the nephrostomy tube out  How often to change the bandage  Change the bandage around the tube, every other day  If your bandages  get dirty or wet, change them right away, and as often as needed  If your nephrostomy tube is to be used for a long period of time, the tube needs to be changed every 2 to 3 months  Healthcare providers will tell you when you need to make an appointment to have your tube changed  How to care for the urine drainage bag:   · Ask if you need to measure and write down how much urine is in the bag before you empty it  Drain urine out of the drainage bag when it is ½ to ? full  Open the spout at the bottom of the bag to empty the urine into the toilet  · You may need to detach the drainage bag from the nephrostomy tube to change it    If so, attach a new drainage bag tightly to the nephrostomy tube  ·   How to prevent problems with your nephrostomy tube:   · Change bandages, directed  This helps to prevent infection  Throw away or clean your drainage bag as directed by your healthcare provider  · Wipe the connecting ends of the drainage bag with alcohol before you reconnect the bag to the tube  This helps prevent infection  Keep the tube taped to your skin and connected to a drainage bag placed below the level of your kidneys  This helps prevent urine from backing up into your kidneys  You may wear a small drainage bag strapped to your leg to let you move around more easily  · Check the catheter to be sure it is in place after you change your clothes or do other activities  Do not wear tight clothing over the tube  Place the tubing over your thigh rather than under it when you are sitting down  Be sure that nothing is pulling on the nephrostomy tube when you move around  · Change positions if you see little or no urine in your drainage bag  Check to see if the urine tube is twisted or bent  Be sure that you are not sitting or lying on the tube  If there are no kinks and there is little or no urine in the drainage bag, tell your healthcare provider  · Flush out the tube as directed  Some tubes get flushed one time a day with 10 mls of NSS You will be given a prescription for the flushes  To flush the nephrostomy tube, clean both connections with alcohol swap  Twist off the drainage bag tube and twist the saline syringe into the nephrostomy tube and flush briskly  Remove the syringe and twist the drainage bag tube back into the nephrostomy tube  · Keep the site covered while you shower  Tape a piece of clear adhesive plastic over the dressing to keep it dry while you shower  Do not take tub baths      Contact Interventional Radiology at [phone] [facility] PATIENTS: Contact Interventional Radiology at [phone]) Neda Dubin PATIENTS: Contact Interventional Radiology at [phone]) if:  · The skin around the nephrostomy tube is red, swollen, itches, or has a rash  · You have a fever greater than 101 or chills  · You have lower back or hip pain  · There are changes in how your urine looks or smells  · You have little or no urine draining from the nephrostomy tube  · You have nausea and are vomiting  · The black wander on your tube has moved, or the tube is longer than when it was put in    · You have questions or concerns about your condition or care  · The nephrostomy tube comes out completely  · There is blood, pus, or a bad smell coming from the place where the tube enters your skin  · Urine is leaking around the tube  The following pharmacies carry the flush syringes  [facility]                     [facility]       [address]  Phone [phone]            Phone [phone]  [address] [facility]                      [address]                                 [phone]  Phone [phone]            Phone [phone]    [facility]                                                                         [facility] [phone]  [address]   Phone [phone]

## 2019-11-12 NOTE — PATIENT INSTRUCTIONS
Plan for MRI brain as scheduled on 11/19/19  Continue on Plavix (understand it is being held for surgery today  Please ask specifically for when you can restart this)  Continue to maintain good control of blood pressure, cholesterol, blood sugar  Agree with carotid intervention through vascular surgery  Follow up with Dr Vish Choudhury as scheduled on 12/31/19  Call for any new or worsening symptoms  If you experience any facial droop, weakness on one side of the body, speech or swallowing difficulty, painless loss of vision in one eye, double vision, vertigo that does not resolve quickly/imbalance, go to the ER/call 911

## 2019-11-12 NOTE — ANESTHESIA PREPROCEDURE EVALUATION
Review of Systems/Medical History  Patient summary reviewed  Chart reviewed  No history of anesthetic complications     Cardiovascular  Hyperlipidemia, Hypertension controlled, Valvular heart disease , aortic valve stenosis,   Comment: SUMMARY:  1  Sinus rhythm  2  Good technical quality  3  Mild calcific aortic stenosis with trivial aortic regurgitation  4  Minimal aortic root enlargement  5  Mild mitral regurgitation with severe mitral annulus calcification  No evidence of mitral stenosis  6  Mild left atrial enlargement  7  Mild left ventricular systolic dysfunction, EF 23%  8  Mild left ventricular cavity enlargement  9  Grade 1 left ventricular diastolic dysfunction with mildly elevated left ventricular filling pressures  ,  Pulmonary  COPD moderate- medication dependent ,        GI/Hepatic       Kidney disease ,        Endo/Other  Diabetes type 2 , History of thyroid disease , hypothyroidism,      GYN       Hematology   Musculoskeletal  Back pain , lumbar pain, Sciatica,   Arthritis     Neurology    CVA (2018) , residual symptoms,   Comment: Benign tremor  DBS implantation Psychology           Physical Exam    Airway    Mallampati score: II  TM Distance: >3 FB  Neck ROM: full     Dental   upper dentures and lower dentures,     Cardiovascular  Rhythm: regular, Rate: normal, Murmur,     Pulmonary  Rhonchi, Decreased breath sounds,     Other Findings        Anesthesia Plan  ASA Score- 3     Anesthesia Type- general with ASA Monitors  Additional Monitors:   Airway Plan:         Plan Factors-    Induction- intravenous  Postoperative Plan-     Informed Consent- Anesthetic plan and risks discussed with patient

## 2019-11-12 NOTE — BRIEF OP NOTE (RAD/CATH)
IR TUBE PLACEMENT  Procedure Note    PATIENT NAME: Harry Collet  : 1940  MRN: [id number]     Pre-op Diagnosis: No diagnosis found  Post-op Diagnosis: No diagnosis found  Surgeon:   Roddy Smyth DO  Assistants:     No qualified resident was available  Estimated Blood Loss:  None  Findings:  Dilated left renal pelvis with high-grade stenosis of UPJ  Eight Polish nephrostomy tube placed  Specimens:  None  Complications:  None      Anesthesia: General    Roddy Smyth DO     Date: 2019  Time: 3:55 PM

## 2019-11-13 DIAGNOSIS — N13.30 HYDRONEPHROSIS OF LEFT KIDNEY: Primary | ICD-10-CM

## 2019-11-13 DIAGNOSIS — E11.9 TYPE 2 DIABETES MELLITUS WITHOUT COMPLICATION, WITHOUT LONG-TERM CURRENT USE OF INSULIN (HCC): ICD-10-CM

## 2019-11-13 DIAGNOSIS — I63.9 CEREBROVASCULAR ACCIDENT (CVA), UNSPECIFIED MECHANISM (HCC): ICD-10-CM

## 2019-11-13 RX ORDER — ROSUVASTATIN CALCIUM 5 MG/1
5 TABLET, COATED ORAL DAILY
Qty: 30 TABLET | Refills: 5 | Status: SHIPPED | OUTPATIENT
Start: 2019-11-13 | End: 2020-01-24 | Stop reason: SDUPTHER

## 2019-11-13 NOTE — SOCIAL WORK
CM was asked by the hospital supervisor to set up VNA for patient  CM confirmed with Dr Lavern Martínez home care orders  CM called patient at home  Pt lives alone in an independent senior housing  She walks with a walker and has some weakness due to an old stroke but is independent at home  Her daughter is able to change the nephrostomy tube tonight but pt reports she will need VNA to help with tube after tonight  CM provided pt with a choice of VNA providers  Pt did not have a preference and asked that the soonest agency is able to see her  CM arranged [facility] - orders sent  CM informed pt and her daughter Jessenia Bustillos [phone]

## 2019-11-19 DIAGNOSIS — I63.9 CEREBROVASCULAR ACCIDENT (CVA), UNSPECIFIED MECHANISM (HCC): ICD-10-CM

## 2019-11-19 RX ORDER — LORAZEPAM 1 MG/1
1 TABLET ORAL
Qty: 2 TABLET | Refills: 0 | Status: SHIPPED | OUTPATIENT
Start: 2019-11-19 | End: 2019-12-16

## 2019-11-21 PROBLEM — R10.9 LEFT FLANK PAIN: Status: RESOLVED | Noted: 2019-11-06 | Resolved: 2019-11-21

## 2019-11-21 PROBLEM — Z01.810 PREOPERATIVE CARDIOVASCULAR EXAMINATION: Status: RESOLVED | Noted: 2019-11-04 | Resolved: 2019-11-21

## 2019-11-22 ENCOUNTER — OFFICE VISIT (OUTPATIENT)
Dept: FAMILY MEDICINE CLINIC | Facility: CLINIC | Age: 79
End: 2019-11-22
Payer: MEDICARE

## 2019-11-22 VITALS
TEMPERATURE: 98.6 F | WEIGHT: 186 LBS | BODY MASS INDEX: 31.76 KG/M2 | DIASTOLIC BLOOD PRESSURE: 74 MMHG | OXYGEN SATURATION: 94 % | RESPIRATION RATE: 18 BRPM | SYSTOLIC BLOOD PRESSURE: 138 MMHG | HEART RATE: 72 BPM | HEIGHT: 64 IN

## 2019-11-22 DIAGNOSIS — I35.0 NONRHEUMATIC AORTIC VALVE STENOSIS: ICD-10-CM

## 2019-11-22 DIAGNOSIS — Z96.89 S/P DEEP BRAIN STIMULATOR PLACEMENT: ICD-10-CM

## 2019-11-22 DIAGNOSIS — N12 PYELONEPHRITIS OF LEFT KIDNEY: Primary | ICD-10-CM

## 2019-11-22 DIAGNOSIS — R13.10 SWALLOWING DYSFUNCTION: ICD-10-CM

## 2019-11-22 DIAGNOSIS — R47.1 DYSARTHRIA: ICD-10-CM

## 2019-11-22 DIAGNOSIS — E03.9 ACQUIRED HYPOTHYROIDISM: ICD-10-CM

## 2019-11-22 DIAGNOSIS — R10.9 LEFT FLANK PAIN: ICD-10-CM

## 2019-11-22 DIAGNOSIS — J44.1 COPD EXACERBATION (HCC): ICD-10-CM

## 2019-11-22 DIAGNOSIS — J44.9 CHRONIC OBSTRUCTIVE PULMONARY DISEASE, UNSPECIFIED COPD TYPE (HCC): ICD-10-CM

## 2019-11-22 DIAGNOSIS — I63.9 CEREBROVASCULAR ACCIDENT (CVA), UNSPECIFIED MECHANISM (HCC): ICD-10-CM

## 2019-11-22 DIAGNOSIS — G25.0 BENIGN FAMILIAL TREMOR: ICD-10-CM

## 2019-11-22 DIAGNOSIS — R29.898 WEAKNESS OF LEFT LEG: ICD-10-CM

## 2019-11-22 DIAGNOSIS — I10 BENIGN ESSENTIAL HYPERTENSION: ICD-10-CM

## 2019-11-22 DIAGNOSIS — E11.9 TYPE 2 DIABETES MELLITUS WITHOUT COMPLICATION, WITHOUT LONG-TERM CURRENT USE OF INSULIN (HCC): ICD-10-CM

## 2019-11-22 DIAGNOSIS — I63.239 CAROTID STENOSIS, SYMPTOMATIC, WITH INFARCTION (HCC): ICD-10-CM

## 2019-11-22 DIAGNOSIS — Z93.6 NEPHROSTOMY STATUS (HCC): ICD-10-CM

## 2019-11-22 DIAGNOSIS — N13.30 HYDRONEPHROSIS OF LEFT KIDNEY: ICD-10-CM

## 2019-11-22 DIAGNOSIS — R26.9 GAIT DISTURBANCE: ICD-10-CM

## 2019-11-22 PROBLEM — R26.89 IMBALANCE: Status: RESOLVED | Noted: 2017-10-23 | Resolved: 2019-11-22

## 2019-11-22 PROCEDURE — 99495 TRANSJ CARE MGMT MOD F2F 14D: CPT | Performed by: FAMILY MEDICINE

## 2019-11-22 RX ORDER — PREDNISONE 10 MG/1
TABLET ORAL
Qty: 20 TABLET | Refills: 0 | Status: SHIPPED | OUTPATIENT
Start: 2019-11-22 | End: 2019-12-12 | Stop reason: ALTCHOICE

## 2019-11-22 RX ORDER — DOCUSATE SODIUM 100 MG/1
100 CAPSULE, LIQUID FILLED ORAL DAILY PRN
Qty: 21 CAPSULE | Refills: 0 | Status: ON HOLD
Start: 2019-11-22 | End: 2021-03-27 | Stop reason: CLARIF

## 2019-11-22 NOTE — PATIENT INSTRUCTIONS
We reviewed most recent hospital stay, she is status post nephrostomy tube placement on left November 12th  She appears to be tolerating this, urine is clear  She will be seeing Urology in follow-up December 16th  Has home nurse to keep an eye on tubing  Nurse had requested urine culture, slip given for same although she has no fevers or chills  She is status post cefdinir use for 4 days after discharge  Hospital blood work reviewed, CBC essentially unremarkable with hemoglobin 11 7, WBC 6 54  Glucose 123 with BUN/creatinine 21/0 89, potassium 4 2  Last A1c in September 6 0 8, TSH 2 9 then  Cholesterol in September 1 79 with HDL 43, LDL 86  She does have increased cough with wheezing, exacerbation COPD, long history of same  She needs a new nebulizer, I did give slip to get new nebulizer, she has albuterol to use at home, does get palpitations with this, if notes significant palpitations she can use 1/2 amp you will every 4 to 6 hours rather than 1 whole  I do feel she requires prednisone course  Hold off on antibiotic  Did have inpatient CTA chest, abdomen, pelvis, consider redo CT of chest 3 to 6 months  We reviewed visit with vascular, she will be doing preop stress test December 9th, had seen Cardiology for preop clearance  She does have some increased swelling in ankles, I would like her to take extra furosemide daily for 5 days  Then resume as before  Regarding prior stroke, she will continue to see Neurology, continue on Plavix as is  No current bleeding, she did have some bleeding at nephrostomy site previously  Plans to redo MR brain  Stay on other medication as is, call us with any questions, re-evaluate here 3 to 4 weeks

## 2019-11-22 NOTE — PROGRESS NOTES
BMI Counseling: Body mass index is 31 93 kg/m²  Follow-up plan was not completed due to patient being in urgent or emergent medical situation  Body mass index is 31 93 kg/m²  Follow-up plan was not completed due to elderly patient (70 years old) where weight reduction/weight gain would complicate underlying health condition such as: illness or physical disability  FAMILY PRACTICE OFFICE VISIT  [facility]  [address]   [address] [place], [place], [address]      NAME: Alix Perales  AGE: 78 y o   SEX: female  : 1940   MRN: [id number]    DATE: 2019  TIME: 4:08 PM    Assessment and Plan     Problem List Items Addressed This Visit        Endocrine    Acquired hypothyroidism    Relevant Medications    predniSONE 10 mg tablet    Type 2 diabetes mellitus without complication, without long-term current use of insulin (HCC)       Respiratory    COPD (chronic obstructive pulmonary disease) (HCC)    Relevant Medications    Fluticasone Propionate, Inhal, (FLOVENT DISKUS) 250 MCG/BLIST AEPB    predniSONE 10 mg tablet    Other Relevant Orders    Nebulizer    Nebulizer Supplies    Nebulizer       Cardiovascular and Mediastinum    Aortic valve stenosis - Mild -Moderate    Benign essential hypertension    Carotid stenosis right,  symptomatic, with infarction ([address] )    Cerebrovascular accident (CVA) ([address] )       Nervous and Auditory    Benign familial tremor    Weakness of left leg       Genitourinary    Hydronephrosis of left kidney    Relevant Orders    Urine culture    Pyelonephritis of left kidney - Primary    Relevant Orders    Urine culture       Other    Dysarthria related to 2018 CVA, worsened 2019    Gait disturbance    Nephrostomy tube Left    Relevant Orders    Urine culture    S/P deep brain stimulator placement    Swallowing dysfunction      Other Visit Diagnoses     Left flank pain        Relevant Medications docusate sodium (COLACE) 100 mg capsule    COPD exacerbation (HCC)        Relevant Medications    Fluticasone Propionate, Inhal, (FLOVENT DISKUS) 250 MCG/BLIST AEPB    predniSONE 10 mg tablet    Other Relevant Orders    Nebulizer          Patient Instructions   We reviewed most recent hospital stay, she is status post nephrostomy tube placement on left November 12th  She appears to be tolerating this, urine is clear  She will be seeing Urology in follow-up December 16th  Has home nurse to keep an eye on tubing  Nurse had requested urine culture, slip given for same although she has no fevers or chills  She is status post cefdinir use for 4 days after discharge  Hospital blood work reviewed, CBC essentially unremarkable with hemoglobin 11 7, WBC 6 54  Glucose 123 with BUN/creatinine 21/0 89, potassium 4 2  Last A1c in September 6 0 8, TSH 2 9 then  Cholesterol in September 1 79 with HDL 43, LDL 86  She does have increased cough with wheezing, exacerbation COPD, long history of same  She needs a new nebulizer, I did give slip to get new nebulizer, she has albuterol to use at home, does get palpitations with this, if notes significant palpitations she can use 1/2 amp you will every 4 to 6 hours rather than 1 whole  I do feel she requires prednisone course  Hold off on antibiotic  Did have inpatient CTA chest, abdomen, pelvis, consider redo CT of chest 3 to 6 months  We reviewed visit with vascular, she will be doing preop stress test December 9th, had seen Cardiology for preop clearance  She does have some increased swelling in ankles, I would like her to take extra furosemide daily for 5 days  Then resume as before  Regarding prior stroke, she will continue to see Neurology, continue on Plavix as is  No current bleeding, she did have some bleeding at nephrostomy site previously  Plans to redo MR brain      Stay on other medication as is, call us with any questions, re-evaluate here 3 to 4 weeks       Chief Complaint     Chief Complaint   Patient presents with    Transition of Care Management     TCM Call (since 10/22/2019)     Date and time call was made  11/11/2019  5:05 PM    Hospital care reviewed  Records reviewed    Patient was hospitialized at  [facility] [facility] [address]    Date of Admission  11/06/19    Date of discharge  11/08/19    Diagnosis  left flank pain    Disposition  Home    Were the patients medications reviewed and updated  No    Current Symptoms  None      TCM Call (since 10/22/2019)     Post hospital issues  None    Should patient be enrolled in anticoag monitoring? No    Did you obtain your prescribed medications  Yes    Do you need help managing your prescriptions or medications  No    Is transportation to your appointment needed  Yes    I have advised the patient to call PCP with any new or worsening symptoms  lhunter    Living Arrangements  Family members    Comments  scheduled with dr Rigo Uribe 11/22          History of Present Illness   Donavon Vides is a 78y o -year-old female who I had seen in early October after hospital stay, had symptoms of stroke at that time  After visit she did see Neurology, off aspirin, still on Plavix  Had not redone MRI brain yet, presented to do test but needed premedicated with benzo  She had seen vascular regarding carotid stenosis on right, they had advised right CEA  Did see Cardiology for preop evaluation, they ordered stress testing which will be done in December  She has had no new neurological symptoms  Unfortunately she was admitted November 6th with left flank pain, testing showed pyelonephritis left kidney with ureteropelvic obstruction, subsequently after discharge had nephrostomy tube placed  Since that time she relates she has not had fevers or chills, had used antibiotic, Omnicef, for 4 days after discharge    She does feel she has increased ankle swelling, blood pressure has been up at times in home with visiting nurse   No chest pain or increased palpitations  History significant COPD, she relates her breathing recently has been worse, relates she needs a new nebulizer  Has increased cough with congestion for 10 to 14 days  White mucus production     She had undergone CTA chest, abdomen, pelvis  This did show nodules which appeared benign but inpatient was advised to redo CT of chest in 3 months  Regarding prior stroke, she had been doing OT, PT, speech therapy  Therapy currently on hold  See previous note regarding issues with dysphagia, she has had no choking with food intake, is using a regular diet    Review of Systems   Review of Systems   Constitutional: Positive for fatigue (Improved)  Negative for appetite change, fever and unexpected weight change  HENT: Negative for sore throat and trouble swallowing  Eyes:        No vision change   Respiratory: Positive for cough, shortness of breath and wheezing  Negative for choking and chest tightness  Cardiovascular: Positive for leg swelling  Negative for chest pain and palpitations  Gastrointestinal: Negative for abdominal pain, blood in stool, nausea and vomiting  No acid reflux     No diarrhea, did have some constipation, has Colace to use as needed  Genitourinary: Negative for dysuria and hematuria  Had some bleeding at left nephrostomy site, that resolved, relates urine has been clear   Neurological: Negative for dizziness, syncope, light-headedness and headaches  Hematological: Bruises/bleeds easily (No other bleeding, no hemoptysis)  Psychiatric/Behavioral: Negative for behavioral problems and confusion         Active Problem List     Patient Active Problem List   Diagnosis    Benign essential hypertension    Benign familial tremor    History of CVA (cerebrovascular accident)    COPD [facility] disease) [facility]    Esophageal reflux    Gait disturbance    Dyslipidemia    Sleep disorder    Sciatica    Right shoulder pain    Osteoarthritis of hip    OA (osteoarthritis)    Multiple joint pain    Acquired hypothyroidism    Lumbar degenerative disc disease    Back pain    Type 2 diabetes mellitus without complication, without long-term current use of insulin (HCC)    History of pancreatitis    Aortic valve stenosis - Mild -Moderate    S/P deep brain stimulator placement    Microscopic hematuria    Dysarthria related to Nov 2018 CVA, worsened Sept 2019    Swallowing dysfunction    Weakness of left leg    Carotid stenosis right,  symptomatic, with infarction ([address] )    Hydronephrosis of left kidney    Pyelonephritis of left kidney    Abnormal CT scan    Cerebrovascular accident (CVA) ([address] )    Nephrostomy tube Left       Past Medical History:  Reviewed    Past Surgical History:  Reviewed    Family History:  Reviewed    Social History:  Reviewed    Objective     Vitals:    11/22/19 1422   BP: 138/74   Pulse: 72   Resp: 18   Temp: 98 6 °F (37 °C)   SpO2: 94%   Weight: 84 4 kg (186 lb)   Height: 5' 4" (1 626 m)     Body mass index is 31 93 kg/m²  BP Readings from Last 3 Encounters:   11/22/19 138/74   11/12/19 140/64   11/12/19 152/79       Wt Readings from Last 3 Encounters:   11/22/19 84 4 kg (186 lb)   11/12/19 84 4 kg (186 lb)   11/12/19 84 4 kg (186 lb)       Physical Exam   Constitutional: She is oriented to person, place, and time  Pleasant overweight female, actually looks fairly well here today  Oxygenating okay on room air, afebrile  Eyes: Conjunctivae are normal  No scleral icterus  Cardiovascular:   Regular rate and rhythm, 1/6 systolic ejection murmur   Pulmonary/Chest:   Bilateral scattered rhonchi, no rales   Abdominal: There is no tenderness  There is no guarding  Nephrostomy tube on left, urine clear   Musculoskeletal:   Trace pitting bilateral ankles   Lymphadenopathy:     She has no cervical adenopathy  Neurological: She is alert and oriented to person, place, and time  Psychiatric: She has a normal mood and affect  Her behavior is normal        ALLERGIES:  Allergies   Allergen Reactions    Ciprofloxacin Diarrhea    Simvastatin Myalgia    Advair Diskus [Fluticasone-Salmeterol] Palpitations     Ok w flovent    Tetracycline Rash     Reaction Date: [id number];        Current Medications     Current Outpatient Medications   Medication Sig Dispense Refill    albuterol (2 5 mg/3 mL) 0 083 % nebulizer solution Take 1 vial (2 5 mg total) by nebulization 2 (two) times a day 60 vial 1    ascorbic acid (VITAMIN C) 250 MG tablet Take 500 mg by mouth daily      Cholecalciferol (VITAMIN D-3 PO) Take 1,000 Units by mouth daily   clopidogrel (PLAVIX) 75 mg tablet Take 1 tablet (75 mg total) by mouth daily HOLD FOR NEPHROSTOMY TUBE PLACEMENT 1 tablet 0    diltiazem (CARDIZEM CD) 240 mg 24 hr capsule Take 1 capsule (240 mg total) by mouth daily 30 capsule 5    docusate sodium (COLACE) 100 mg capsule Take 1 capsule (100 mg total) by mouth daily as needed for constipation for up to 7 days 21 capsule 0    Fluticasone Propionate, Inhal, (FLOVENT DISKUS) 250 MCG/BLIST AEPB Inhale 1 puff 2 (two) times a day      furosemide (LASIX) 20 mg tablet Take 1 tablet (20 mg total) by mouth daily 90 tablet 3    levothyroxine 125 mcg tablet Take 1 tablet (125 mcg total) by mouth daily 30 tablet 5    Multiple Vitamins-Minerals (PRESERVISION AREDS PO) Take 1 Cap by Mouth daily      multivitamin (THERAGRAN) TABS Take 1 tablet by mouth daily      omeprazole (PriLOSEC) 20 mg delayed release capsule Take 1 capsule by mouth as needed        rosuvastatin (CRESTOR) 5 mg tablet Take 1 tablet (5 mg total) by mouth daily 30 tablet 5    LORazepam (ATIVAN) 1 mg tablet Take 1 tablet (1 mg total) by mouth 30 min pre-procedure 2 tablet 0    predniSONE 10 mg tablet 4 tablets daily for 5 days 20 tablet 0     No current facility-administered medications for this visit               Orders Placed This Encounter Procedures    Nebulizer   [employer]    Nebulizer    Urine culture         Leona Ask, DO

## 2019-11-26 ENCOUNTER — TELEPHONE (OUTPATIENT)
Dept: FAMILY MEDICINE CLINIC | Facility: CLINIC | Age: 79
End: 2019-11-26

## 2019-11-26 DIAGNOSIS — R60.9 EDEMA, UNSPECIFIED TYPE: Primary | ICD-10-CM

## 2019-12-04 ENCOUNTER — HOSPITAL ENCOUNTER (OUTPATIENT)
Dept: ULTRASOUND IMAGING | Facility: HOSPITAL | Age: 79
Discharge: HOME | End: 2019-12-04
Attending: UROLOGY
Payer: MEDICARE

## 2019-12-04 DIAGNOSIS — Q62.11 HYDRONEPHROSIS WITH URETEROPELVIC JUNCTION (UPJ) OBSTRUCTION: ICD-10-CM

## 2019-12-04 PROCEDURE — 51798 US URINE CAPACITY MEASURE: CPT

## 2019-12-05 DIAGNOSIS — I63.9 CEREBROVASCULAR ACCIDENT (CVA), UNSPECIFIED MECHANISM (HCC): ICD-10-CM

## 2019-12-05 RX ORDER — CLOPIDOGREL BISULFATE 75 MG/1
75 TABLET ORAL DAILY
Qty: 30 TABLET | Refills: 5 | Status: SHIPPED | OUTPATIENT
Start: 2019-12-05 | End: 2020-02-12 | Stop reason: SDUPTHER

## 2019-12-09 ENCOUNTER — HOSPITAL ENCOUNTER (OUTPATIENT)
Dept: NON INVASIVE DIAGNOSTICS | Facility: HOSPITAL | Age: 79
Discharge: HOME | End: 2019-12-09
Attending: INTERNAL MEDICINE
Payer: MEDICARE

## 2019-12-09 ENCOUNTER — HOSPITAL ENCOUNTER (OUTPATIENT)
Dept: NUCLEAR MEDICINE | Facility: HOSPITAL | Age: 79
Discharge: HOME | End: 2019-12-09
Attending: INTERNAL MEDICINE
Payer: MEDICARE

## 2019-12-09 DIAGNOSIS — Z01.810 PREOPERATIVE CARDIOVASCULAR EXAMINATION: ICD-10-CM

## 2019-12-09 LAB
CHEST PAIN STATEMENT: NORMAL
MAX DIASTOLIC BP: 80 MMHG
MAX HEART RATE: 107 BPM
MAX PREDICTED HEART RATE: 139 BPM
MAX. SYSTOLIC BP: 146 MMHG
PROTOCOL NAME: NORMAL
REASON FOR TERMINATION: NORMAL
TARGET HR FORMULA: NORMAL
TIME IN EXERCISE PHASE: NORMAL

## 2019-12-09 PROCEDURE — 93017 CV STRESS TEST TRACING ONLY: CPT

## 2019-12-09 PROCEDURE — A9502 TC99M TETROFOSMIN: HCPCS

## 2019-12-09 PROCEDURE — 78452 HT MUSCLE IMAGE SPECT MULT: CPT

## 2019-12-09 RX ADMIN — REGADENOSON 0.4 MG: 0.08 INJECTION, SOLUTION INTRAVENOUS at 10:17

## 2019-12-10 PROCEDURE — 93018 CV STRESS TEST I&R ONLY: CPT

## 2019-12-10 PROCEDURE — 78452 HT MUSCLE IMAGE SPECT MULT: CPT

## 2019-12-10 PROCEDURE — 93016 CV STRESS TEST SUPVJ ONLY: CPT

## 2019-12-12 ENCOUNTER — TELEPHONE (OUTPATIENT)
Dept: CARDIOLOGY CLINIC | Facility: CLINIC | Age: 79
End: 2019-12-12

## 2019-12-12 ENCOUNTER — OFFICE VISIT (OUTPATIENT)
Dept: FAMILY MEDICINE CLINIC | Facility: CLINIC | Age: 79
End: 2019-12-12
Payer: MEDICARE

## 2019-12-12 VITALS
TEMPERATURE: 98.3 F | HEART RATE: 71 BPM | OXYGEN SATURATION: 95 % | HEIGHT: 64 IN | WEIGHT: 186.8 LBS | BODY MASS INDEX: 31.89 KG/M2 | SYSTOLIC BLOOD PRESSURE: 130 MMHG | DIASTOLIC BLOOD PRESSURE: 70 MMHG

## 2019-12-12 DIAGNOSIS — I10 BENIGN ESSENTIAL HYPERTENSION: ICD-10-CM

## 2019-12-12 DIAGNOSIS — R47.1 DYSARTHRIA: ICD-10-CM

## 2019-12-12 DIAGNOSIS — I63.9 CEREBROVASCULAR ACCIDENT (CVA), UNSPECIFIED MECHANISM (HCC): ICD-10-CM

## 2019-12-12 DIAGNOSIS — J44.9 CHRONIC OBSTRUCTIVE PULMONARY DISEASE, UNSPECIFIED COPD TYPE (HCC): ICD-10-CM

## 2019-12-12 DIAGNOSIS — E11.9 TYPE 2 DIABETES MELLITUS WITHOUT COMPLICATION, WITHOUT LONG-TERM CURRENT USE OF INSULIN (HCC): ICD-10-CM

## 2019-12-12 DIAGNOSIS — Z93.6 NEPHROSTOMY STATUS (HCC): ICD-10-CM

## 2019-12-12 DIAGNOSIS — I63.239 CAROTID STENOSIS, SYMPTOMATIC, WITH INFARCTION (HCC): ICD-10-CM

## 2019-12-12 DIAGNOSIS — E03.9 ACQUIRED HYPOTHYROIDISM: Primary | ICD-10-CM

## 2019-12-12 PROCEDURE — 99213 OFFICE O/P EST LOW 20 MIN: CPT | Performed by: FAMILY MEDICINE

## 2019-12-12 NOTE — TELEPHONE ENCOUNTER
Her stress test is abnormal - patient aware  Placed task to Silvio Him  Will need cath       Triceene Nickel

## 2019-12-12 NOTE — TELEPHONE ENCOUNTER
Called [facility]ology, left message on nursing line requesting update on clearance  Pt had stress 12/9/19

## 2019-12-12 NOTE — TELEPHONE ENCOUNTER
Alirio De La Torre from [facility]  [place] calling for update on clearance  Pt had recent cardiac testing done

## 2019-12-12 NOTE — PATIENT INSTRUCTIONS
Reviewed health hx/ meds  She is status post nephrostomy tube placement on left November 12th  She will be seeing Urology in follow-up December 16th  Has home nurse to keep an eye on [place] blood work again  reviewed, CBC essentially unremarkable with hemoglobin 11 7, WBC 6 54  Glucose 123 with BUN/creatinine 21/0 89, potassium 4 2  Last A1c in September 6 8, TSH 2 9 then  Cholesterol in September 179 with HDL 43, LDL 86       After visit November 22nd she did use prednisone, she feels her respiratory status is back to baseline, does use nebulizer with albuterol twice daily  Did have inpatient CTA chest, abdomen, pelvis, consider redo CT of chest 3 to 6 months      We reviewed visit with vascular,  Planning future carotid surgery  Underwent stress test via Cardiology for preop clearance, some apical ischemia, she is in the process of doing cardiac catheterization  She does have some increased swelling in ankles , resolves with compression stockings  Blood pressure 130/70 here today, stay on diltiazem 240 daily, furosemide 20 mg daily,     Regarding prior stroke, she will continue to see Neurology, she is trying to set up with Dr iLz Day who she knows well  She will continue on Plavix as is  No current bleeding, she did have some bleeding at nephrostomy site previously  Plans to redo MR brain via Neurology ( test was cancelled, is upset was not told needed to  Lorazepam at pharmacy, thought it would be given at facility before MRI -   She now does have rx and can use that 1 hr prior)   We will try to reschedule study for her  She placed therapy on hold as she has so many other appts-  Try to do home exercise as tolerated, read aloud, play games  Stay on levothyroxine 125 mcg daily, TSH in September 2 9      Recheck here late Jan -call sooner if needed

## 2019-12-12 NOTE — PROGRESS NOTES
FAMILY PRACTICE OFFICE VISIT  [facility]  [address]  [address] [place], [place], [address]      NAME: Maryjane Perales  AGE: 78 y o  SEX: female  : 1940   MRN: [id number]    DATE: 2019  TIME: 5:12 PM    Assessment and Plan     Problem List Items Addressed This Visit        Endocrine    Acquired hypothyroidism - Primary    Type 2 diabetes mellitus without complication, without long-term current use of insulin (HCC)       Respiratory    COPD (chronic obstructive pulmonary disease) ([facility] )       Cardiovascular and Mediastinum    Benign essential hypertension    Carotid stenosis right,  symptomatic, with infarction [facility])    Cerebrovascular accident (CVA) ([facility] )       Other    Dysarthria related to 2018 CVA, worsened 2019    Nephrostomy tube Left          Patient Instructions   Reviewed health hx/ meds  She is status post nephrostomy tube placement on left   She will be seeing Urology in follow-up   Has home nurse to keep an eye on [place] blood work again  reviewed, CBC essentially unremarkable with hemoglobin 11 7, WBC 6 54  Glucose 123 with BUN/creatinine 21/0 89, potassium 4 2  Last A1c in  8, TSH 2 9 then  Cholesterol in September 179 with HDL 43, LDL 86       After visit  she did use prednisone, she feels her respiratory status is back to baseline, does use nebulizer with albuterol twice daily  Did have inpatient CTA chest, abdomen, pelvis, consider redo CT of chest 3 to 6 months      We reviewed visit with vascular,  Planning future carotid surgery  Underwent stress test via Cardiology for preop clearance, some apical ischemia, she is in the process of doing cardiac catheterization  She does have some increased swelling in ankles , resolves with compression stockings    Blood pressure 130/70 here today, stay on diltiazem 240 daily, furosemide 20 mg daily,     Regarding prior stroke, she will continue to see Neurology,  she is trying to set up with Dr Kalpesh Almaraz who she knows well  She will continue on Plavix as is  No current bleeding, she did have some bleeding at nephrostomy site previously  Plans to redo MR brain via Neurology ( test was cancelled, is upset was not told needed to  Lorazepam at pharmacy, thought it would be given at facility before MRI -   She now does have rx and can use that 1 hr prior)   We will try to reschedule study for her  She placed therapy on hold as she has so many other appts-  Try to do home exercise as tolerated, read aloud, play games  Stay on levothyroxine 125 mcg daily, TSH in September 2 9  Recheck here late Jan -call sooner if needed          Chief Complaint     Chief Complaint   Patient presents with    Follow-up     3-4 week FU       History of Present Illness   Hardeep Majano is a 78y o -year-old female who is in today for a follow-up visit, I had seen her November 22nd after hospital stay/for Tcm with nephrostomy tube placement, at that time she had significant shortness of breath with cough and wheezing, she had used antibiotic along with prednisone, feels her breathing status is back to baseline, did obtain new nebulizer in uses that twice daily  As before status post another CVA, she is in the process of having cardiac clearance prior to carotid surgery  Has no chest pain or increased palpitations  No new weakness  She relates she placed her therapy on hold due to nephrostomy tube/multiple other doctor visits  Had seen Neurology, did not do MRI as she showed up at facility thinking she would receive lorazepam there  Feels overwhelmed with multiple medical issues  She does have friends/family helping her out  Review of Systems   Review of Systems   Constitutional: Positive for fatigue  Negative for appetite change, fever and unexpected weight change     HENT: Negative for sore throat and trouble swallowing  Respiratory: Positive for cough and shortness of breath  Negative for chest tightness  Back to baseline   Cardiovascular: Negative for chest pain, palpitations and leg swelling  Gastrointestinal: Negative for abdominal pain, blood in stool, nausea and vomiting  No acid reflux     No change in bowel   Genitourinary:        No further bleeding nephrostomy site left, urine has been clear,   Neurological: Positive for speech difficulty (Unchanged)  Negative for dizziness, syncope, light-headedness and headaches  Psychiatric/Behavioral: Negative for behavioral problems and confusion         Active Problem List     Patient Active Problem List   Diagnosis    Benign essential hypertension    Benign familial tremor    History of CVA (cerebrovascular accident)    COPD (chronic obstructive pulmonary disease) ([address] )    Esophageal reflux    Gait disturbance    Dyslipidemia    Sleep disorder    Sciatica    Right shoulder pain    Osteoarthritis of hip    OA (osteoarthritis)    Multiple joint pain    Acquired hypothyroidism    Lumbar degenerative disc disease    Back pain    Type 2 diabetes mellitus without complication, without long-term current use of insulin ([facility])    History of pancreatitis    Aortic valve stenosis - Mild -Moderate    S/P deep brain stimulator placement    Microscopic hematuria    Dysarthria related to Nov 2018 CVA, worsened Sept 2019    Swallowing dysfunction    Weakness of left leg    Carotid stenosis right,  symptomatic, with infarction ([address] )    Hydronephrosis of left kidney    Pyelonephritis of left kidney    Abnormal CT scan    Cerebrovascular accident (CVA) ([address] )    Nephrostomy tube Left       Past Medical History:  Reviewed    Past Surgical History:  Reviewed    Family History:  Reviewed    Social History:  Reviewed    Objective     Vitals:    12/12/19 1414   BP: 130/70   BP Location: Right arm   Patient Position: Sitting   Cuff Size: Large   Pulse: 71   Temp: 98 3 °F (36 8 °C)   TempSrc: Tympanic   SpO2: 95%   Weight: 84 7 kg (186 lb 12 8 oz)   Height: 5' 4" (1 626 m)     Body mass index is 32 06 kg/m²  BP Readings from Last 3 Encounters:   12/12/19 130/70   11/22/19 138/74   11/12/19 140/64       Wt Readings from Last 3 Encounters:   12/12/19 84 7 kg (186 lb 12 8 oz)   11/22/19 84 4 kg (186 lb)   11/12/19 84 4 kg (186 lb)       Physical Exam   Constitutional: She is oriented to person, place, and time  Pleasant overweight female seated in chair, speech intelligible but somewhat thickened as before  Eyes: Conjunctivae are normal  No scleral icterus  Cardiovascular: Normal rate and regular rhythm  Murmur (1/6 systolic ejection murmur) heard  Pulmonary/Chest:   Slightly decreased bilateral but clear without rhonchi, rales, wheeze   Abdominal: Soft  There is no tenderness  Musculoskeletal: She exhibits edema (Trace pitting bilateral)  Lymphadenopathy:     She has no cervical adenopathy  Neurological: She is alert and oriented to person, place, and time  Psychiatric:   Somewhat anxious here today discussing multiple medical issues along with concerns, this is understandable  ALLERGIES:  Allergies   Allergen Reactions    Ciprofloxacin Diarrhea    Simvastatin Myalgia    Advair Diskus [Fluticasone-Salmeterol] Palpitations     Ok w flovent    Tetracycline Rash     Reaction Date: [id number];        Current Medications     Current Outpatient Medications   Medication Sig Dispense Refill    albuterol (2 5 mg/3 mL) 0 083 % nebulizer solution Take 1 vial (2 5 mg total) by nebulization 2 (two) times a day 60 vial 1    Cholecalciferol (VITAMIN D-3 PO) Take 1,000 Units by mouth daily        clopidogrel (PLAVIX) 75 mg tablet Take 1 tablet (75 mg total) by mouth daily HOLD FOR NEPHROSTOMY TUBE PLACEMENT 30 tablet 5    diltiazem (CARDIZEM CD) 240 mg 24 hr capsule Take 1 capsule (240 mg total) by mouth daily 30 capsule 5    docusate sodium (COLACE) 100 mg capsule Take 1 capsule (100 mg total) by mouth daily as needed for constipation for up to 7 days 21 capsule 0    Fluticasone Propionate, Inhal, (FLOVENT DISKUS) 250 MCG/BLIST AEPB Inhale 1 puff 2 (two) times a day      furosemide (LASIX) 20 mg tablet Take 1 tablet (20 mg total) by mouth daily 90 tablet 3    levothyroxine 125 mcg tablet Take 1 tablet (125 mcg total) by mouth daily 30 tablet 5    Multiple Vitamins-Minerals (PRESERVISION AREDS PO) Take 1 Cap by Mouth daily      multivitamin (THERAGRAN) TABS Take 1 tablet by mouth daily      omeprazole (PriLOSEC) 20 mg delayed release capsule Take 1 capsule by mouth as needed        rosuvastatin (CRESTOR) 5 mg tablet Take 1 tablet (5 mg total) by mouth daily 30 tablet 5    ascorbic acid (VITAMIN C) 250 MG tablet Take 500 mg by mouth daily      LORazepam (ATIVAN) 1 mg tablet Take 1 tablet (1 mg total) by mouth 30 min pre-procedure 2 tablet 0     No current facility-administered medications for this visit  No orders of the defined types were placed in this encounter          Leona Ask, DO

## 2019-12-16 ENCOUNTER — OFFICE VISIT (OUTPATIENT)
Dept: UROLOGY | Facility: MEDICAL CENTER | Age: 79
End: 2019-12-16
Payer: MEDICARE

## 2019-12-16 VITALS
HEART RATE: 71 BPM | SYSTOLIC BLOOD PRESSURE: 116 MMHG | DIASTOLIC BLOOD PRESSURE: 78 MMHG | WEIGHT: 186 LBS | HEIGHT: 64 IN | BODY MASS INDEX: 31.76 KG/M2

## 2019-12-16 DIAGNOSIS — N13.30 HYDRONEPHROSIS OF LEFT KIDNEY: Primary | ICD-10-CM

## 2019-12-16 PROCEDURE — 87186 SC STD MICRODIL/AGAR DIL: CPT | Performed by: UROLOGY

## 2019-12-16 PROCEDURE — 87077 CULTURE AEROBIC IDENTIFY: CPT | Performed by: UROLOGY

## 2019-12-16 PROCEDURE — 87086 URINE CULTURE/COLONY COUNT: CPT | Performed by: UROLOGY

## 2019-12-16 PROCEDURE — 99214 OFFICE O/P EST MOD 30 MIN: CPT | Performed by: UROLOGY

## 2019-12-16 NOTE — H&P (VIEW-ONLY)
HISTORY:    Now five weeks out from attempted left stent placement for tight stricture at [facility]  Unable to get a wire through the stricture, underwent percutaneous nephrostomy which is draining well since that time  Never had any kidney troubles or pain before that, unclear etiology  No stones seen  I told her she can shower with the tube in place         ASSESSMENT / PLAN:    Will have IR do nephrostogram, place a ureteral stent, and removed the perc neph  Patient's understandably disappointed that she cannot have been perc removed at this time, but I think she understand why that is not possible  May need further investigation as to why the stricture, at this point nothing to suggest any neoplasm on the imaging  The following portions of the patient's history were reviewed and updated as appropriate: allergies, current medications, past family history, past medical history, past social history, past surgical history and problem list     Review of Systems   All other systems reviewed and are negative  Objective:     Physical Exam   Constitutional: She appears well-developed and well-nourished     Abdominal:   Percutaneous site clean         No results found for: PSA]  BUN   Date Value Ref Range Status   11/08/2019 21 5 - 25 mg/dL Final   10/29/2014 18 5 - 25 mg/dL Final     Creatinine   Date Value Ref Range Status   11/08/2019 0 89 0 60 - 1 30 mg/dL Final     Comment:     Standardized to IDMS reference method   10/29/2014 0 95 0 60 - 1 30 mg/dL Final     Comment:     Standardized to IDMS reference method     No components found for: CBC      Patient Active Problem List   Diagnosis    Benign essential hypertension    Benign familial tremor    History of CVA (cerebrovascular accident)    COPD (chronic obstructive pulmonary disease) ([address]    Esophageal reflux    Gait disturbance    Dyslipidemia    Sleep disorder    Sciatica    Right shoulder pain    Osteoarthritis of hip    OA (osteoarthritis)    Multiple joint pain    Acquired hypothyroidism    Lumbar degenerative disc disease    Back pain    Type 2 diabetes mellitus without complication, without long-term current use of insulin (HCC)    History of pancreatitis    Aortic valve stenosis - Mild -Moderate    S/P deep brain stimulator placement    Microscopic hematuria    Dysarthria related to Nov 2018 CVA, worsened Sept 2019    Swallowing dysfunction    Weakness of left leg    Carotid stenosis right,  symptomatic, with infarction ([address] )    Hydronephrosis of left kidney    Pyelonephritis of left kidney    Abnormal CT scan    Cerebrovascular accident (CVA) ([address] )    Nephrostomy tube Left        Diagnoses and all orders for this visit:    Hydronephrosis of left kidney  -     IR consult; Future  -     Urine culture           Patient ID: Austen Figueredo is a 78 y o  female  Current Outpatient Medications:     albuterol (2 5 mg/3 mL) 0 083 % nebulizer solution, Take 1 vial (2 5 mg total) by nebulization 2 (two) times a day, Disp: 60 vial, Rfl: 1    ascorbic acid (VITAMIN C) 250 MG tablet, Take 500 mg by mouth daily, Disp: , Rfl:     Cholecalciferol (VITAMIN D-3 PO), Take 1,000 Units by mouth daily  , Disp: , Rfl:     clopidogrel (PLAVIX) 75 mg tablet, Take 1 tablet (75 mg total) by mouth daily HOLD FOR NEPHROSTOMY TUBE PLACEMENT, Disp: 30 tablet, Rfl: 5    diltiazem (CARDIZEM CD) 240 mg 24 hr capsule, Take 1 capsule (240 mg total) by mouth daily, Disp: 30 capsule, Rfl: 5    docusate sodium (COLACE) 100 mg capsule, Take 1 capsule (100 mg total) by mouth daily as needed for constipation for up to 7 days, Disp: 21 capsule, Rfl: 0    Fluticasone Propionate, Inhal, (FLOVENT DISKUS) 250 MCG/BLIST AEPB, Inhale 1 puff 2 (two) times a day, Disp: , Rfl:     furosemide (LASIX) 20 mg tablet, Take 1 tablet (20 mg total) by mouth daily, Disp: 90 tablet, Rfl: 3    levothyroxine 125 mcg tablet, Take 1 tablet (125 mcg total) by mouth daily, Disp: 30 tablet, Rfl: 5    Multiple Vitamins-Minerals (PRESERVISION AREDS PO), Take 1 Cap by Mouth daily, Disp: , Rfl:     multivitamin (THERAGRAN) TABS, Take 1 tablet by mouth daily, Disp: , Rfl:     omeprazole (PriLOSEC) 20 mg delayed release capsule, Take 1 capsule by mouth as needed  , Disp: , Rfl:     rosuvastatin (CRESTOR) 5 mg tablet, Take 1 tablet (5 mg total) by mouth daily, Disp: 30 tablet, Rfl: 5    Past Medical History:   Diagnosis Date    Arthritis     Asthma     Benign essential tremor 10/15/2018    Added automatically from request for surgery [id number]    Benign neoplasm of large intestine     Cellulitis of leg, right     Closed compression fracture of body of lumbar vertebra (HCC)     L5    COPD (chronic obstructive [facility] disease) [facility]     Difficulty waking     post anesthesia    Disease of thyroid gland     GERD (gastroesophageal reflux disease)     High cholesterol     Hypertension     Osteopenia     Osteoporosis     Shortness of breath     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     Urinary leakage     Vitamin D deficiency     Wears glasses     Wears partial dentures     upper       Past Surgical History:   Procedure Laterality Date    CATARACT EXTRACTION W/  INTRAOCULAR LENS IMPLANT Bilateral     COLONOSCOPY      DEEP BRAIN STIMULATOR PLACEMENT      FL RETROGRADE PYELOGRAM  11/7/2019    IR TUBE PLACEMENT  11/12/2019    KNEE ARTHROSCOPY      LA CYSTOURETHROSCOPY,URETER CATHETER Left 11/7/2019    Procedure: CYSTO RETROGRADE PYELOGRAM, URETEROSCOPY;  Surgeon: Azeb Falk MD;  [facility];  Service: Urology    LA IMP STIM,CRANIAL,SUBQ,1 ARRAY Left 1/22/2019    Procedure: REPLACEMENT IMPLANTABLE PULSE GENERATOR (IPG) DEEP BRAIN STIMULATION (DBS), LEFT;  Surgeon: Johnny Valencia MD;  [facility];  Service: Neurosurgery    LA IMP STIM,CRANIAL,SUBQ,>1 ARRAY Right 12/13/2018    Procedure: REPLACEMENT RIGHT DBS IMPLANTABLE PULSE GENERATOR; Surgeon: Renata Silver MD;  [facility];  Service: Neurosurgery    CO TOTAL HIP ARTHROPLASTY Left 5/20/2016    Procedure: ARTHROPLASTY HIP TOTAL ANTERIOR;  Surgeon: Katy Silva MD;  [facility];  Service: Orthopedics    ROTATOR CUFF REPAIR         Social History

## 2019-12-16 NOTE — PROGRESS NOTES
HISTORY:    Now five weeks out from attempted left stent placement for tight stricture at [facility]  Unable to get a wire through the stricture, underwent percutaneous nephrostomy which is draining well since that time  Never had any kidney troubles or pain before that, unclear etiology  No stones seen  I told her she can shower with the tube in place         ASSESSMENT / PLAN:    Will have IR do nephrostogram, place a ureteral stent, and removed the perc neph  Patient's understandably disappointed that she cannot have been perc removed at this time, but I think she understand why that is not possible  May need further investigation as to why the stricture, at this point nothing to suggest any neoplasm on the imaging  The following portions of the patient's history were reviewed and updated as appropriate: allergies, current medications, past family history, past medical history, past social history, past surgical history and problem list     Review of Systems   All other systems reviewed and are negative  Objective:     Physical Exam   Constitutional: She appears well-developed and well-nourished     Abdominal:   Percutaneous site clean         No results found for: PSA]  BUN   Date Value Ref Range Status   11/08/2019 21 5 - 25 mg/dL Final   10/29/2014 18 5 - 25 mg/dL Final     Creatinine   Date Value Ref Range Status   11/08/2019 0 89 0 60 - 1 30 mg/dL Final     Comment:     Standardized to IDMS reference method   10/29/2014 0 95 0 60 - 1 30 mg/dL Final     Comment:     Standardized to IDMS reference method     No components found for: CBC      Patient Active Problem List   Diagnosis    Benign essential hypertension    Benign familial tremor    History of CVA (cerebrovascular accident)    COPD (chronic obstructive pulmonary disease) ([address] )    Esophageal reflux    Gait disturbance    Dyslipidemia    Sleep disorder    Sciatica    Right shoulder pain    Osteoarthritis of hip    OA (osteoarthritis)    Multiple joint pain    Acquired hypothyroidism    Lumbar degenerative disc disease    Back pain    Type 2 diabetes mellitus without complication, without long-term current use of insulin (HCC)    History of pancreatitis    Aortic valve stenosis - Mild -Moderate    S/P deep brain stimulator placement    Microscopic hematuria    Dysarthria related to Nov 2018 CVA, worsened Sept 2019    Swallowing dysfunction    Weakness of left leg    Carotid stenosis right,  symptomatic, with infarction ([address] )    Hydronephrosis of left kidney    Pyelonephritis of left kidney    Abnormal CT scan    [facility] ([facility]) [facility]    Nephrostomy tube Left        Diagnoses and all orders for this visit:    Hydronephrosis of left kidney  -     IR consult; Future  -     Urine culture           Patient ID: Michael Nur is a 78 y o  female  Current Outpatient Medications:     albuterol (2 5 mg/3 mL) 0 083 % nebulizer solution, Take 1 vial (2 5 mg total) by nebulization 2 (two) times a day, Disp: 60 vial, Rfl: 1    ascorbic acid (VITAMIN C) 250 MG tablet, Take 500 mg by mouth daily, Disp: , Rfl:     Cholecalciferol (VITAMIN D-3 PO), Take 1,000 Units by mouth daily  , Disp: , Rfl:     clopidogrel (PLAVIX) 75 mg tablet, Take 1 tablet (75 mg total) by mouth daily HOLD FOR NEPHROSTOMY TUBE PLACEMENT, Disp: 30 tablet, Rfl: 5    diltiazem (CARDIZEM CD) 240 mg 24 hr capsule, Take 1 capsule (240 mg total) by mouth daily, Disp: 30 capsule, Rfl: 5    docusate sodium (COLACE) 100 mg capsule, Take 1 capsule (100 mg total) by mouth daily as needed for constipation for up to 7 days, Disp: 21 capsule, Rfl: 0    Fluticasone Propionate, Inhal, (FLOVENT DISKUS) 250 MCG/BLIST AEPB, Inhale 1 puff 2 (two) times a day, Disp: , Rfl:     furosemide (LASIX) 20 mg tablet, Take 1 tablet (20 mg total) by mouth daily, Disp: 90 tablet, Rfl: 3    levothyroxine 125 mcg tablet, Take 1 tablet (125 mcg total) by mouth daily, Disp: 30 tablet, Rfl: 5    Multiple Vitamins-Minerals (PRESERVISION AREDS PO), Take 1 Cap by Mouth daily, Disp: , Rfl:     multivitamin (THERAGRAN) TABS, Take 1 tablet by mouth daily, Disp: , Rfl:     omeprazole (PriLOSEC) 20 mg delayed release capsule, Take 1 capsule by mouth as needed  , Disp: , Rfl:     rosuvastatin (CRESTOR) 5 mg tablet, Take 1 tablet (5 mg total) by mouth daily, Disp: 30 tablet, Rfl: 5    Past Medical History:   Diagnosis Date    Arthritis     Asthma     Benign essential tremor 10/15/2018    Added automatically from request for surgery [id number]    Benign neoplasm of large intestine     Cellulitis of leg, right     Closed compression fracture of body of lumbar vertebra (HCC)     L5    COPD (chronic obstructive [facility] disease) [facility] [address] [facility]     Difficulty waking     post anesthesia    Disease of thyroid gland     GERD (gastroesophageal reflux disease)     High cholesterol     Hypertension     Osteopenia     Osteoporosis     Shortness of breath     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     Urinary leakage     Vitamin D deficiency     Wears glasses     Wears partial dentures     upper       Past Surgical History:   Procedure Laterality Date    CATARACT EXTRACTION W/  INTRAOCULAR LENS IMPLANT Bilateral     COLONOSCOPY      DEEP BRAIN STIMULATOR PLACEMENT      FL RETROGRADE PYELOGRAM  11/7/2019    IR TUBE PLACEMENT  11/12/2019    KNEE ARTHROSCOPY      FL CYSTOURETHROSCOPY,URETER CATHETER Left 11/7/2019    Procedure: CYSTO RETROGRADE PYELOGRAM, URETEROSCOPY;  Surgeon: Delmy Leiva MD;  [facility];  Service: Urology    FL IMP STIM,CRANIAL,SUBQ,1 ARRAY Left 1/22/2019    Procedure: REPLACEMENT IMPLANTABLE PULSE GENERATOR (IPG) DEEP BRAIN STIMULATION (DBS), LEFT;  Surgeon: Autumn Rosenthal MD;  [facility];  Service: Neurosurgery    FL IMP STIM,CRANIAL,SUBQ,>1 ARRAY Right 12/13/2018    Procedure: REPLACEMENT RIGHT DBS IMPLANTABLE PULSE GENERATOR; Surgeon: Bernardo Cortes MD;  [facility];  Service: Neurosurgery    VT TOTAL HIP ARTHROPLASTY Left 5/20/2016    Procedure: ARTHROPLASTY HIP TOTAL ANTERIOR;  Surgeon: Ravi Cisneros MD;  [facility];  Service: Orthopedics    ROTATOR CUFF REPAIR         Social History

## 2019-12-17 ENCOUNTER — TELEPHONE (OUTPATIENT)
Dept: CARDIOLOGY CLINIC | Facility: CLINIC | Age: 79
End: 2019-12-17

## 2019-12-17 NOTE — TELEPHONE ENCOUNTER
[facility] cardiology and spoke w/ Joel Wall to see if cardiac cath is scheduled  She is going to check w/ Dr Leonora Patino today and will call us back

## 2019-12-17 NOTE — TELEPHONE ENCOUNTER
Received call back from [facility] at [facility] cardiology  She discussed clearance w/ Dr Melvin Mckay and she stated that Dr Melvin Mckay needs to talk to Dr Issa Lee directly about clearance and cardiac cath  Dr Melvin Mckay sent task to Dr Issa Lee on Epic and is waiting to discuss w/ him  Will send this to Dr Issa Lee

## 2019-12-17 NOTE — TELEPHONE ENCOUNTER
Call received from vascular center about patient/ spoke with Reji Robison and told her Dr Irina Quesada had sent Task and is wainting to hear from Dr Maris Robison will check into this

## 2019-12-18 DIAGNOSIS — N30.00 ACUTE CYSTITIS WITHOUT HEMATURIA: Primary | ICD-10-CM

## 2019-12-18 LAB
BACTERIA UR CULT: ABNORMAL
BACTERIA UR CULT: ABNORMAL

## 2019-12-18 RX ORDER — CEPHALEXIN 500 MG/1
500 CAPSULE ORAL EVERY 12 HOURS SCHEDULED
Qty: 20 CAPSULE | Refills: 0 | Status: SHIPPED | OUTPATIENT
Start: 2019-12-18 | End: 2019-12-28

## 2019-12-20 ENCOUNTER — TELEPHONE (OUTPATIENT)
Dept: UROLOGY | Facility: MEDICAL CENTER | Age: 79
End: 2019-12-20

## 2019-12-20 NOTE — TELEPHONE ENCOUNTER
Called Bao Anaya - she is told that it was fine that she missed her flush  She will have someone go out first thing tomorrow to do the flush

## 2019-12-20 NOTE — TELEPHONE ENCOUNTER
Patient of Dr Marii Gale seen in the [facility] office  Rachel VILLALBA called and advised that the patient gets daily flushes for her Nephrostomy tube she stated that the patient had a death in the family and is refusing it today  The nurse would like to know if that is okay  Please advise  I have personally evaluated and examined the patient. The Attending was available to me as a supervising provider if needed.

## 2019-12-23 DIAGNOSIS — R60.9 EDEMA, UNSPECIFIED TYPE: ICD-10-CM

## 2019-12-23 RX ORDER — FUROSEMIDE 20 MG/1
20 TABLET ORAL DAILY
Qty: 90 TABLET | Refills: 0 | Status: SHIPPED | OUTPATIENT
Start: 2019-12-23 | End: 2019-12-30 | Stop reason: SDUPTHER

## 2019-12-24 ENCOUNTER — TELEPHONE (OUTPATIENT)
Dept: CARDIOLOGY CLINIC | Facility: CLINIC | Age: 79
End: 2019-12-24

## 2019-12-24 ENCOUNTER — TELEPHONE (OUTPATIENT)
Dept: UROLOGY | Facility: MEDICAL CENTER | Age: 79
End: 2019-12-24

## 2019-12-24 NOTE — TELEPHONE ENCOUNTER
Call from Roby Moe at cardiology, she said Dr Ranjana Le s/w Dr Duy Corea, they are trying to reach pt's urologist Dr Tiana Varela "to see if procedures can be done while on plavix"

## 2019-12-24 NOTE — TELEPHONE ENCOUNTER
Guerrero Abdullahi from Cardiology calling for additional information      She can be reached at [phone]

## 2019-12-24 NOTE — TELEPHONE ENCOUNTER
Lory Mccoy           12/24/19 10:08 AM   Note      Vascular left message about scheduling cath for pt  I did message Dr Humera Doyle on tiger text if Dr Raymundo Walter had sent back task :     Per Dr Humera Doyle-   Yes    I talked to him  And I tried to get a hold of Dr Mikhail Alvarez her urologist  She has ureteral Stenta and wanted to make  Sure any further procedures he needed to do could be done on Plavix  Can someone please call his office and make sure he is aware of this before we set up the cath  He never got back to me  Its Rosario Fletcher      I did speak to Mendy Josue from Vascular, she is aware we are waiting on Dr Emerald Del Angel response   I will try calling today again to urology

## 2019-12-24 NOTE — TELEPHONE ENCOUNTER
Per chart pt not yet scheduled for cath[facility]  for nursing req update

## 2019-12-24 NOTE — TELEPHONE ENCOUNTER
I did call Dr Nikki Barnett office, spoke with Jb Christianson  She does see original task sent by Dr Zhen Nails is not in office today  She will forward to clinical staff and Dr Michaelle Nails  Office will call back

## 2019-12-26 ENCOUNTER — TELEPHONE (OUTPATIENT)
Dept: CARDIOLOGY CLINIC | Facility: CLINIC | Age: 79
End: 2019-12-26

## 2019-12-26 DIAGNOSIS — R94.39 ABNORMAL STRESS TEST: ICD-10-CM

## 2019-12-26 DIAGNOSIS — I35.0 NONRHEUMATIC AORTIC VALVE STENOSIS: Primary | ICD-10-CM

## 2019-12-26 DIAGNOSIS — R94.39 ABNORMAL CARDIOVASCULAR STRESS TEST: Primary | ICD-10-CM

## 2019-12-26 NOTE — TELEPHONE ENCOUNTER
Pt's daughter calling to set up cardiac cath  Pt scheduled for 1/16/20  Lab work to be done  Pt aware cath is in [place]

## 2019-12-26 NOTE — TELEPHONE ENCOUNTER
Please schedule for [facility] - ok per Dr Michaelle Nails to proceed with any procedures on Plavix      Iris Marck

## 2019-12-26 NOTE — TELEPHONE ENCOUNTER
Calling pt to set up cardiac cath  No answer, mailbox not available  I did try calling daughter, No answer as well  I did leave message for daughter Brea Arm to call back

## 2019-12-30 ENCOUNTER — TELEPHONE (OUTPATIENT)
Dept: CARDIOLOGY CLINIC | Facility: CLINIC | Age: 79
End: 2019-12-30

## 2019-12-30 DIAGNOSIS — R60.9 EDEMA, UNSPECIFIED TYPE: ICD-10-CM

## 2019-12-30 RX ORDER — FUROSEMIDE 20 MG/1
20 TABLET ORAL DAILY
Qty: 90 TABLET | Refills: 1 | Status: SHIPPED | OUTPATIENT
Start: 2019-12-30 | End: 2020-06-29 | Stop reason: SDUPTHER

## 2019-12-30 NOTE — TELEPHONE ENCOUNTER
Spoke with Madeleine Lozano and DEVI for daughter Fadi Goode  BW scripts mailed last Friday to pt's home

## 2019-12-30 NOTE — TELEPHONE ENCOUNTER
Lo Cosby from PCP office called  She states she got a call from pt for their office to order pre-cath labs? Please call Lo Cosby back at [phone]        Thank you

## 2020-01-02 ENCOUNTER — TELEPHONE (OUTPATIENT)
Dept: RADIOLOGY | Facility: HOSPITAL | Age: 80
End: 2020-01-02

## 2020-01-02 RX ORDER — CEFAZOLIN SODIUM 2 G/50ML
2000 SOLUTION INTRAVENOUS ONCE
Status: CANCELLED | OUTPATIENT
Start: 2020-01-10

## 2020-01-02 RX ORDER — SODIUM CHLORIDE 9 MG/ML
25 INJECTION, SOLUTION INTRAVENOUS ONCE
Status: CANCELLED | OUTPATIENT
Start: 2020-01-10

## 2020-01-09 ENCOUNTER — TELEPHONE (OUTPATIENT)
Dept: SURGERY | Facility: HOSPITAL | Age: 80
End: 2020-01-09

## 2020-01-10 ENCOUNTER — HOSPITAL ENCOUNTER (OUTPATIENT)
Dept: RADIOLOGY | Facility: HOSPITAL | Age: 80
Discharge: HOME | End: 2020-01-10
Attending: UROLOGY | Admitting: RADIOLOGY
Payer: MEDICARE

## 2020-01-10 VITALS
RESPIRATION RATE: 18 BRPM | OXYGEN SATURATION: 94 % | HEIGHT: 64 IN | SYSTOLIC BLOOD PRESSURE: 176 MMHG | HEART RATE: 84 BPM | WEIGHT: 186 LBS | TEMPERATURE: 97.8 F | DIASTOLIC BLOOD PRESSURE: 77 MMHG | BODY MASS INDEX: 31.76 KG/M2

## 2020-01-10 DIAGNOSIS — N13.30 HYDRONEPHROSIS OF LEFT KIDNEY: ICD-10-CM

## 2020-01-10 PROCEDURE — 88112 CYTOPATH CELL ENHANCE TECH: CPT | Performed by: PATHOLOGY

## 2020-01-10 PROCEDURE — C1769 GUIDE WIRE: HCPCS

## 2020-01-10 PROCEDURE — 50706 BALLOON DILATE URTRL STRIX: CPT | Performed by: RADIOLOGY

## 2020-01-10 PROCEDURE — 50606 ENDOLUMINAL BX URTR RNL PLVS: CPT | Performed by: RADIOLOGY

## 2020-01-10 PROCEDURE — C1894 INTRO/SHEATH, NON-LASER: HCPCS

## 2020-01-10 PROCEDURE — 99152 MOD SED SAME PHYS/QHP 5/>YRS: CPT | Performed by: RADIOLOGY

## 2020-01-10 PROCEDURE — 50434 CONVERT NEPHROSTOMY CATHETER: CPT | Performed by: RADIOLOGY

## 2020-01-10 PROCEDURE — C2625 STENT, NON-COR, TEM W/DEL SY: HCPCS

## 2020-01-10 PROCEDURE — 50434 CONVERT NEPHROSTOMY CATHETER: CPT

## 2020-01-10 PROCEDURE — 99152 MOD SED SAME PHYS/QHP 5/>YRS: CPT

## 2020-01-10 PROCEDURE — C1725 CATH, TRANSLUMIN NON-LASER: HCPCS

## 2020-01-10 PROCEDURE — 99153 MOD SED SAME PHYS/QHP EA: CPT

## 2020-01-10 RX ORDER — SODIUM CHLORIDE 9 MG/ML
25 INJECTION, SOLUTION INTRAVENOUS ONCE
Status: COMPLETED | OUTPATIENT
Start: 2020-01-10 | End: 2020-01-10

## 2020-01-10 RX ORDER — MIDAZOLAM HYDROCHLORIDE 2 MG/2ML
INJECTION, SOLUTION INTRAMUSCULAR; INTRAVENOUS CODE/TRAUMA/SEDATION MEDICATION
Status: COMPLETED | OUTPATIENT
Start: 2020-01-10 | End: 2020-01-10

## 2020-01-10 RX ORDER — FENTANYL CITRATE 50 UG/ML
INJECTION, SOLUTION INTRAMUSCULAR; INTRAVENOUS CODE/TRAUMA/SEDATION MEDICATION
Status: COMPLETED | OUTPATIENT
Start: 2020-01-10 | End: 2020-01-10

## 2020-01-10 RX ORDER — CEFAZOLIN SODIUM 2 G/50ML
2000 SOLUTION INTRAVENOUS ONCE
Status: COMPLETED | OUTPATIENT
Start: 2020-01-10 | End: 2020-01-10

## 2020-01-10 RX ADMIN — IOHEXOL 35 ML: 350 INJECTION, SOLUTION INTRAVENOUS at 09:26

## 2020-01-10 RX ADMIN — FENTANYL CITRATE 50 MCG: 50 INJECTION INTRAMUSCULAR; INTRAVENOUS at 08:25

## 2020-01-10 RX ADMIN — MIDAZOLAM 1 MG: 1 INJECTION INTRAMUSCULAR; INTRAVENOUS at 08:25

## 2020-01-10 RX ADMIN — FENTANYL CITRATE 25 MCG: 50 INJECTION INTRAMUSCULAR; INTRAVENOUS at 08:35

## 2020-01-10 RX ADMIN — MIDAZOLAM 1 MG: 1 INJECTION INTRAMUSCULAR; INTRAVENOUS at 08:35

## 2020-01-10 RX ADMIN — FENTANYL CITRATE 25 MCG: 50 INJECTION INTRAMUSCULAR; INTRAVENOUS at 08:44

## 2020-01-10 RX ADMIN — CEFAZOLIN SODIUM 2000 MG: 2 SOLUTION INTRAVENOUS at 07:40

## 2020-01-10 RX ADMIN — SODIUM CHLORIDE 25 ML/HR: 0.9 INJECTION, SOLUTION INTRAVENOUS at 07:26

## 2020-01-10 NOTE — DISCHARGE INSTRUCTIONS
Nephrostomy Tube Care     WHAT YOU NEED TO KNOW:   A nephrostomy tube is a catheter (thin plastic tube) that is inserted through your skin and into your kidney  The nephrostomy tube drains urine from your kidney into a collecting bag outside your body  You may need a nephrostomy tube when something is blocking the normal flow of urine  A nephrostomy tube may be used for a short or a long period of time  The nephrostomy tube comes out of your back, so you will need someone to help care for your nephrostomy tube  DISCHARGE INSTRUCTIONS:      How to clean the skin around the nephrostomy tube and change the bandage:  Since the nephrostomy tube comes out of your back, you will not be able to care for it by yourself  Ask someone to follow the general directions below to check and care for your nephrostomy tube  Gather the items you will need  Disposable (single use) under-pad, and a clean washcloth  ¨ Plain soap, warm water, and new medical gloves  ¨ Sterile gauze bandages  ¨ Clear adhesive dressing or medical tape  ¨ Skin barrier  ¨ Protective skin film  ¨ Trash bag  · Remove the old bandage, and check the tube entry site  ¨ Have the patient lie on his side with the nephrostomy tube entry site facing up  Place the under-pad where it will catch drainage as you are working with the nephrostomy tube  ¨ Wash your hands with soap and water  Put on new medical gloves  ¨ Gently remove the old bandage, without pulling on the tube  Do this by holding the skin beside the tube with one hand  With the other hand, gently remove sticky tape and the skin barrier by pulling in the same direction as hair growth  Do not touch the side of the bandage that is placed over or around the tube  Throw the bandage and skin barrier away in a trash bag  ¨ Look for signs of infection, such as skin redness and swelling  Report any skin changes to healthcare providers  ¨ Clean the tube entry site      ¨ Hold the tube in place to keep it from being pulled out while you are cleaning around it  ¨ You will need to clean the area twice  For the first cleaning, wet a new gauze bandage with soap and water  Begin at the entry site of the tube  Wipe the skin in circles, moving away from the entry site  Remove blood and any other material with the gauze  Do this as often as needed  Use a new gauze bandage each time you clean the area, moving away from the entry site  ¨ For the second cleaning, wet a new gauze bandage with water  Begin at the entry site of the tube  Wipe the skin in circles, moving away from the entry site  Use a new gauze bandage each time you clean the area, moving away from the entry site  ¨ Gently pat the skin with a clean washcloth to dry it  · Apply the skin barrier and bandages  ¨ Roll up a bandage to make it thick, and place it under  the place where the tube enters the skin  Place it to support the tube, and stop it from kinking or bending  Tape the bandage in place, and apply more bandages if directed by a healthcare provider  ¨ Bring the tubing forward to the front and tape it to the skin  Do not stretch the tube tight, because this may pull the nephrostomy tube out  How often to change the bandage  Change the bandage around the tube, every other day  If your bandages  get dirty or wet, change them right away, and as often as needed  If your nephrostomy tube is to be used for a long period of time, the tube needs to be changed every 2 to 3 months  Healthcare providers will tell you when you need to make an appointment to have your tube changed  How to care for the urine drainage bag:   · Ask if you need to measure and write down how much urine is in the bag before you empty it  Drain urine out of the drainage bag when it is ½ to ? full  Open the spout at the bottom of the bag to empty the urine into the toilet  · You may need to detach the drainage bag from the nephrostomy tube to change it    If so, attach a new drainage bag tightly to the nephrostomy tube  ·   How to prevent problems with your nephrostomy tube:   · Change bandages, directed  This helps to prevent infection  Throw away or clean your drainage bag as directed by your healthcare provider  · Wipe the connecting ends of the drainage bag with alcohol before you reconnect the bag to the tube  This helps prevent infection  Keep the tube taped to your skin and connected to a drainage bag placed below the level of your kidneys  This helps prevent urine from backing up into your kidneys  You may wear a small drainage bag strapped to your leg to let you move around more easily  · Check the catheter to be sure it is in place after you change your clothes or do other activities  Do not wear tight clothing over the tube  Place the tubing over your thigh rather than under it when you are sitting down  Be sure that nothing is pulling on the nephrostomy tube when you move around  · Change positions if you see little or no urine in your drainage bag  Check to see if the urine tube is twisted or bent  Be sure that you are not sitting or lying on the tube  If there are no kinks and there is little or no urine in the drainage bag, tell your healthcare provider  · Flush out the tube as directed  Some tubes get flushed one time a day with 10 mls of NSS You will be given a prescription for the flushes  To flush the nephrostomy tube, clean both connections with alcohol swap  Twist off the drainage bag tube and twist the saline syringe into the nephrostomy tube and flush briskly  Remove the syringe and twist the drainage bag tube back into the nephrostomy tube  · Keep the site covered while you shower  Tape a piece of clear adhesive plastic over the dressing to keep it dry while you shower  Do not take tub baths      Contact Interventional Radiology at [phone] [facility] PATIENTS: Contact Interventional Radiology at [phone]) See [facility] PATIENTS: Contact Interventional Radiology at [phone]) if:  · The skin around the nephrostomy tube is red, swollen, itches, or has a rash  · You have a fever greater than 101 or chills  · You have lower back or hip pain  · There are changes in how your urine looks or smells  · You have little or no urine draining from the nephrostomy tube  · You have nausea and are vomiting  · The black wander on your tube has moved, or the tube is longer than when it was put in    · You have questions or concerns about your condition or care  · The nephrostomy tube comes out completely  · There is blood, pus, or a bad smell coming from the place where the tube enters your skin  · Urine is leaking around the tube  Please cap tube on 1/11/2020      The following pharmacies carry the flush syringes  [facility]                     [facility]       [address]  Phone [phone]            Phone [phone]  [address] & [facility]                      [address]                                 [phone]  Phone [phone]            Phone [phone]    [facility]                                                                         [facility] [phone]  [address]   Phone [phone]

## 2020-01-10 NOTE — INTERVAL H&P NOTE
Update: (This section must be completed if the H&P was completed greater than 24 hrs to procedure or admission)    H&P reviewed  After examining the patient, I find no changed to the H&P since it had been written  /75   Pulse 74   Temp 97 8 °F (36 6 °C) (Tympanic)   Resp 18   Ht 5' 4" (1 626 m)   Wt 84 4 kg (186 lb)   SpO2 97%   BMI 31 93 kg/m²      Patient re-evaluated  Accept as history and physical     Assessment/Plan     79 yo female with left UPJ stricture post nephrostomy placement referred for conversion to nephroureterostomy (PCNU)  Will attempt conversion to PCNU and plan for dilatation of the stricture with a 4 mm balloon  Stricture can be re evaluated at next tube change      Jas Vazquez MD/January 10, 2020/8:16 AM

## 2020-01-10 NOTE — BRIEF OP NOTE (RAD/CATH)
IR PCN TO PCNU CONVERSION Procedure Note    PATIENT NAME: Yaritza Bermeo  : 1940  MRN: [id number]    Pre-op Diagnosis:   1  Hydronephrosis of left kidney      Post-op Diagnosis:   1  Hydronephrosis of left kidney        Surgeon:   Bladimir Weems MD  Assistants:     No qualified resident was available, Resident is only observing    Estimated Blood Loss: minimal  Findings: Left UPJ stricture successfully crossed with brush biopsy for cytology, dilatation with a 4 mm balloon, and placement of an 8 Fr x 26 cm PCNU      Specimens: left ureter brushing    Complications:  none    Anesthesia: Conscious sedation and Local    Bladimir Weems MD     Date: 1/10/2020  Time: 9:07 AM

## 2020-01-13 ENCOUNTER — OFFICE VISIT (OUTPATIENT)
Dept: UROLOGY | Facility: MEDICAL CENTER | Age: 80
End: 2020-01-13
Payer: MEDICARE

## 2020-01-13 VITALS
DIASTOLIC BLOOD PRESSURE: 86 MMHG | HEART RATE: 84 BPM | HEIGHT: 64 IN | SYSTOLIC BLOOD PRESSURE: 142 MMHG | WEIGHT: 186 LBS | BODY MASS INDEX: 31.76 KG/M2

## 2020-01-13 DIAGNOSIS — Q62.11 HYDRONEPHROSIS WITH URETEROPELVIC JUNCTION (UPJ) OBSTRUCTION: Primary | ICD-10-CM

## 2020-01-13 PROCEDURE — 99213 OFFICE O/P EST LOW 20 MIN: CPT | Performed by: UROLOGY

## 2020-01-13 RX ORDER — CEPHALEXIN 500 MG/1
500 CAPSULE ORAL EVERY 8 HOURS SCHEDULED
Qty: 15 CAPSULE | Refills: 0 | Status: SHIPPED | OUTPATIENT
Start: 2020-01-13 | End: 2020-01-18

## 2020-01-13 NOTE — PROGRESS NOTES
HISTORY:    Follow-up for left hydronephrosis, uncertain etiology  Last week, IR changed the tube to an internal nephro ureteral stent, brush biopsy of stricture was taken and is pending  Doing well with internal stent, no significant pain, slight increased frequency  External tube has been capped for several days, no pain         ASSESSMENT / PLAN:    Will ask IR to completely internalize the stent, not leave an external tube  I will then change the internal stent every three months or so  I told her she can shower with the tube bandage in place, just before visiting nurse changes it for her    The following portions of the patient's history were reviewed and updated as appropriate: allergies, current medications, past family history, past medical history, past social history, past surgical history and problem list     Review of Systems   All other systems reviewed and are negative  Objective:     Physical Exam   Constitutional: She appears well-developed and well-nourished     Abdominal:   Perc site clean         No results found for: PSA]  BUN   Date Value Ref Range Status   11/08/2019 21 5 - 25 mg/dL Final   10/29/2014 18 5 - 25 mg/dL Final     Creatinine   Date Value Ref Range Status   11/08/2019 0 89 0 60 - 1 30 mg/dL Final     Comment:     Standardized to IDMS reference method   10/29/2014 0 95 0 60 - 1 30 mg/dL Final     Comment:     Standardized to IDMS reference method     No components found for: CBC      Patient Active Problem List   Diagnosis    Benign essential hypertension    Benign familial tremor    History of CVA (cerebrovascular accident)    COPD [facility]chronic obstructive pulmonary disease) [facility] [address] [facility]    Esophageal reflux    Gait disturbance    Dyslipidemia    Sleep disorder    Sciatica    Right shoulder pain    Osteoarthritis of hip    OA (osteoarthritis)    Multiple joint pain    Acquired hypothyroidism    Lumbar degenerative disc disease    Back pain    Type 2 diabetes mellitus without complication, without long-term current use of insulin ([facility])    History of pancreatitis    Aortic valve stenosis - Mild -Moderate    S/P deep brain stimulator placement    Microscopic hematuria    Dysarthria related to Nov 2018 CVA, worsened Sept 2019    Swallowing dysfunction    Weakness of left leg    Carotid stenosis right,  symptomatic, with infarction ([address] )    Hydronephrosis of left kidney    Pyelonephritis of left kidney    Abnormal CT scan    Cerebrovascular accident (CVA) [facility] [address] [facility]    Nephrostomy tube Left        Diagnoses and all orders for this visit:    Hydronephrosis with ureteropelvic junction (UPJ) obstruction  -     IR consult; Future           Patient ID: Yaritza Bermeo is a 78 y o  female  Current Outpatient Medications:     albuterol (2 5 mg/3 mL) 0 083 % nebulizer solution, Take 1 vial (2 5 mg total) by nebulization 2 (two) times a day, Disp: 60 vial, Rfl: 1    ascorbic acid (VITAMIN C) 250 MG tablet, Take 500 mg by mouth daily, Disp: , Rfl:     Cholecalciferol (VITAMIN D-3 PO), Take 1,000 Units by mouth daily  , Disp: , Rfl:     clopidogrel (PLAVIX) 75 mg tablet, Take 1 tablet (75 mg total) by mouth daily HOLD FOR NEPHROSTOMY TUBE PLACEMENT, Disp: 30 tablet, Rfl: 5    diltiazem (CARDIZEM CD) 240 mg 24 hr capsule, Take 1 capsule (240 mg total) by mouth daily, Disp: 30 capsule, Rfl: 5    Fluticasone Propionate, Inhal, (FLOVENT DISKUS) 250 MCG/BLIST AEPB, Inhale 1 puff 2 (two) times a day, Disp: , Rfl:     furosemide (LASIX) 20 mg tablet, Take 1 tablet (20 mg total) by mouth daily, Disp: 90 tablet, Rfl: 1    levothyroxine 125 mcg tablet, Take 1 tablet (125 mcg total) by mouth daily, Disp: 30 tablet, Rfl: 5    Multiple Vitamins-Minerals (PRESERVISION AREDS PO), Take 1 Cap by Mouth daily, Disp: , Rfl:     multivitamin (THERAGRAN) TABS, Take 1 tablet by mouth daily, Disp: , Rfl:     omeprazole (PriLOSEC) 20 mg delayed release capsule, Take 1 capsule by mouth as needed  , Disp: , Rfl:     rosuvastatin (CRESTOR) 5 mg tablet, Take 1 tablet (5 mg total) by mouth daily, Disp: 30 tablet, Rfl: 5    docusate sodium (COLACE) 100 mg capsule, Take 1 capsule (100 mg total) by mouth daily as needed for constipation for up to 7 days, Disp: 21 capsule, Rfl: 0    Past Medical History:   Diagnosis Date    Arthritis     Asthma     Benign essential tremor 10/15/2018    Added automatically from request for surgery [id number]    Benign neoplasm of large intestine     Cellulitis of leg, right     Closed compression fracture of body of lumbar vertebra (HCC)     L5    COPD (chronic obstructive pulmonary disease) [facility] [address] [facility]     Difficulty waking     post anesthesia    Disease of thyroid gland     GERD (gastroesophageal reflux disease)     High cholesterol     Hypertension     Osteopenia     Osteoporosis     Shortness of breath     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     Urinary leakage     Vitamin D deficiency     Wears glasses     Wears partial dentures     upper       Past Surgical History:   Procedure Laterality Date    CATARACT EXTRACTION W/  INTRAOCULAR LENS IMPLANT Bilateral     COLONOSCOPY      DEEP BRAIN STIMULATOR PLACEMENT      FL RETROGRADE PYELOGRAM  11/7/2019    IR PCN TO PCNU CONVERSION  1/10/2020    IR TUBE PLACEMENT  11/12/2019    KNEE ARTHROSCOPY      ID CYSTOURETHROSCOPY,URETER CATHETER Left 11/7/2019    Procedure: CYSTO RETROGRADE PYELOGRAM, URETEROSCOPY;  Surgeon: Azeb Falk MD;  [facility];  Service: Urology    ID IMP STIM,CRANIAL,SUBQ,1 ARRAY Left 1/22/2019    Procedure: REPLACEMENT IMPLANTABLE PULSE GENERATOR (IPG) DEEP BRAIN STIMULATION (DBS), LEFT;  Surgeon: Johnny Valencia MD;  [facility];  Service: Neurosurgery    ID IMP STIM,CRANIAL,SUBQ,>1 ARRAY Right 12/13/2018    Procedure: REPLACEMENT RIGHT DBS IMPLANTABLE PULSE GENERATOR;  Surgeon: Johnny Valencia MD;  [facility];  Service: Neurosurgery    ID TOTAL HIP ARTHROPLASTY Left 5/20/2016    Procedure: ARTHROPLASTY HIP TOTAL ANTERIOR;  Surgeon: Evin Collins MD;  [facility];  Service: Orthopedics    ROTATOR CUFF REPAIR         Social History

## 2020-01-14 ENCOUNTER — TELEPHONE (OUTPATIENT)
Dept: CARDIOLOGY CLINIC | Facility: CLINIC | Age: 80
End: 2020-01-14

## 2020-01-14 NOTE — TELEPHONE ENCOUNTER
Spoke with daughter just now, she states they cannot go for cath tomorrow since her ride ( her grandson) works   If you have any questions please call office and ask for [facility]

## 2020-01-14 NOTE — TELEPHONE ENCOUNTER
[facility] office calling to move pt's cath up to 1/15/20 in [place]  I did try calling pt's grandson Jessica Chapin who is her ride and got no answer  I did leave a detailed message on daughter Leonora's cell  Ronda Nicely in office is aware of this as well in case I am not in office

## 2020-01-15 ENCOUNTER — TELEPHONE (OUTPATIENT)
Dept: SURGERY | Facility: HOSPITAL | Age: 80
End: 2020-01-15

## 2020-01-15 RX ORDER — ASPIRIN 325 MG
325 TABLET ORAL ONCE
Status: CANCELLED | OUTPATIENT
Start: 2020-01-16

## 2020-01-15 RX ORDER — SODIUM CHLORIDE 9 MG/ML
100 INJECTION, SOLUTION INTRAVENOUS ONCE
Status: CANCELLED | OUTPATIENT
Start: 2020-01-16 | End: 2020-01-16

## 2020-01-16 ENCOUNTER — HOSPITAL ENCOUNTER (EMERGENCY)
Facility: HOSPITAL | Age: 80
Discharge: HOME | End: 2020-01-17
Attending: EMERGENCY MEDICINE
Payer: MEDICARE

## 2020-01-16 ENCOUNTER — HOSPITAL ENCOUNTER (OUTPATIENT)
Dept: NON INVASIVE DIAGNOSTICS | Facility: HOSPITAL | Age: 80
Discharge: HOME | End: 2020-01-16
Attending: INTERNAL MEDICINE | Admitting: INTERNAL MEDICINE
Payer: MEDICARE

## 2020-01-16 ENCOUNTER — APPOINTMENT (EMERGENCY)
Dept: RADIOLOGY | Facility: HOSPITAL | Age: 80
End: 2020-01-16
Payer: MEDICARE

## 2020-01-16 ENCOUNTER — DOCUMENTATION (OUTPATIENT)
Dept: CARDIOLOGY CLINIC | Facility: CLINIC | Age: 80
End: 2020-01-16

## 2020-01-16 VITALS
DIASTOLIC BLOOD PRESSURE: 59 MMHG | BODY MASS INDEX: 31.76 KG/M2 | SYSTOLIC BLOOD PRESSURE: 115 MMHG | RESPIRATION RATE: 17 BRPM | WEIGHT: 186 LBS | HEIGHT: 64 IN | HEART RATE: 83 BPM | OXYGEN SATURATION: 94 %

## 2020-01-16 DIAGNOSIS — R50.9 FEVER, UNSPECIFIED FEVER CAUSE: Primary | ICD-10-CM

## 2020-01-16 DIAGNOSIS — R50.9 FEVER: Primary | ICD-10-CM

## 2020-01-16 DIAGNOSIS — N39.0 UTI (URINARY TRACT INFECTION): ICD-10-CM

## 2020-01-16 DIAGNOSIS — R94.39 ABNORMAL CARDIOVASCULAR STRESS TEST: ICD-10-CM

## 2020-01-16 LAB
ANION GAP SERPL CALCULATED.3IONS-SCNC: 7 MMOL/L (ref 4–13)
BILIRUB UR QL STRIP: NEGATIVE
BUN SERPL-MCNC: 22 MG/DL (ref 5–25)
CALCIUM SERPL-MCNC: 8.7 MG/DL (ref 8.3–10.1)
CHLORIDE SERPL-SCNC: 103 MMOL/L (ref 100–108)
CLARITY UR: CLEAR
CO2 SERPL-SCNC: 30 MMOL/L (ref 21–32)
COLOR UR: YELLOW
CREAT SERPL-MCNC: 0.89 MG/DL (ref 0.6–1.3)
ERYTHROCYTE [DISTWIDTH] IN BLOOD BY AUTOMATED COUNT: 14.7 % (ref 11.6–15.1)
GFR SERPL CREATININE-BSD FRML MDRD: 61 ML/MIN/1.73SQ M
GLUCOSE P FAST SERPL-MCNC: 110 MG/DL (ref 65–99)
GLUCOSE SERPL-MCNC: 110 MG/DL (ref 65–140)
GLUCOSE UR STRIP-MCNC: NEGATIVE MG/DL
HCT VFR BLD AUTO: 39.6 % (ref 34.8–46.1)
HGB BLD-MCNC: 12.2 G/DL (ref 11.5–15.4)
HGB UR QL STRIP.AUTO: ABNORMAL
KETONES UR STRIP-MCNC: NEGATIVE MG/DL
LEUKOCYTE ESTERASE UR QL STRIP: ABNORMAL
MCH RBC QN AUTO: 27.2 PG (ref 26.8–34.3)
MCHC RBC AUTO-ENTMCNC: 30.8 G/DL (ref 31.4–37.4)
MCV RBC AUTO: 88 FL (ref 82–98)
NITRITE UR QL STRIP: NEGATIVE
PH UR STRIP.AUTO: 6 [PH] (ref 4.5–8)
PLATELET # BLD AUTO: 210 THOUSANDS/UL (ref 149–390)
PMV BLD AUTO: 9.5 FL (ref 8.9–12.7)
POTASSIUM SERPL-SCNC: 4.3 MMOL/L (ref 3.5–5.3)
PROT UR STRIP-MCNC: >=300 MG/DL
RBC # BLD AUTO: 4.49 MILLION/UL (ref 3.81–5.12)
SODIUM SERPL-SCNC: 140 MMOL/L (ref 136–145)
SP GR UR STRIP.AUTO: 1.01 (ref 1–1.03)
UROBILINOGEN UR QL STRIP.AUTO: 0.2 E.U./DL
WBC # BLD AUTO: 9.36 THOUSAND/UL (ref 4.31–10.16)

## 2020-01-16 PROCEDURE — 87186 SC STD MICRODIL/AGAR DIL: CPT

## 2020-01-16 PROCEDURE — 99152 MOD SED SAME PHYS/QHP 5/>YRS: CPT | Performed by: INTERNAL MEDICINE

## 2020-01-16 PROCEDURE — 85027 COMPLETE CBC AUTOMATED: CPT | Performed by: PHYSICIAN ASSISTANT

## 2020-01-16 PROCEDURE — 93454 CORONARY ARTERY ANGIO S&I: CPT | Performed by: INTERNAL MEDICINE

## 2020-01-16 PROCEDURE — 99283 EMERGENCY DEPT VISIT LOW MDM: CPT | Performed by: EMERGENCY MEDICINE

## 2020-01-16 PROCEDURE — 87077 CULTURE AEROBIC IDENTIFY: CPT

## 2020-01-16 PROCEDURE — 80048 BASIC METABOLIC PNL TOTAL CA: CPT | Performed by: PHYSICIAN ASSISTANT

## 2020-01-16 PROCEDURE — 93005 ELECTROCARDIOGRAM TRACING: CPT

## 2020-01-16 PROCEDURE — C1769 GUIDE WIRE: HCPCS | Performed by: INTERNAL MEDICINE

## 2020-01-16 PROCEDURE — C1894 INTRO/SHEATH, NON-LASER: HCPCS | Performed by: INTERNAL MEDICINE

## 2020-01-16 PROCEDURE — 99284 EMERGENCY DEPT VISIT MOD MDM: CPT

## 2020-01-16 PROCEDURE — 71046 X-RAY EXAM CHEST 2 VIEWS: CPT

## 2020-01-16 PROCEDURE — 81001 URINALYSIS AUTO W/SCOPE: CPT

## 2020-01-16 PROCEDURE — 87631 RESP VIRUS 3-5 TARGETS: CPT | Performed by: EMERGENCY MEDICINE

## 2020-01-16 PROCEDURE — 87086 URINE CULTURE/COLONY COUNT: CPT

## 2020-01-16 RX ORDER — CLOPIDOGREL BISULFATE 75 MG/1
TABLET ORAL CODE/TRAUMA/SEDATION MEDICATION
Status: COMPLETED | OUTPATIENT
Start: 2020-01-16 | End: 2020-01-16

## 2020-01-16 RX ORDER — MIDAZOLAM HYDROCHLORIDE 2 MG/2ML
INJECTION, SOLUTION INTRAMUSCULAR; INTRAVENOUS CODE/TRAUMA/SEDATION MEDICATION
Status: COMPLETED | OUTPATIENT
Start: 2020-01-16 | End: 2020-01-16

## 2020-01-16 RX ORDER — ASPIRIN 325 MG
325 TABLET ORAL ONCE
Status: COMPLETED | OUTPATIENT
Start: 2020-01-16 | End: 2020-01-16

## 2020-01-16 RX ORDER — HEPARIN SODIUM 1000 [USP'U]/ML
INJECTION, SOLUTION INTRAVENOUS; SUBCUTANEOUS CODE/TRAUMA/SEDATION MEDICATION
Status: COMPLETED | OUTPATIENT
Start: 2020-01-16 | End: 2020-01-16

## 2020-01-16 RX ORDER — LIDOCAINE HYDROCHLORIDE 10 MG/ML
INJECTION, SOLUTION EPIDURAL; INFILTRATION; INTRACAUDAL; PERINEURAL CODE/TRAUMA/SEDATION MEDICATION
Status: COMPLETED | OUTPATIENT
Start: 2020-01-16 | End: 2020-01-16

## 2020-01-16 RX ORDER — SODIUM CHLORIDE 9 MG/ML
100 INJECTION, SOLUTION INTRAVENOUS CONTINUOUS
Status: DISPENSED | OUTPATIENT
Start: 2020-01-16 | End: 2020-01-16

## 2020-01-16 RX ORDER — SODIUM CHLORIDE 9 MG/ML
100 INJECTION, SOLUTION INTRAVENOUS ONCE
Status: COMPLETED | OUTPATIENT
Start: 2020-01-16 | End: 2020-01-16

## 2020-01-16 RX ORDER — VERAPAMIL HYDROCHLORIDE 2.5 MG/ML
INJECTION, SOLUTION INTRAVENOUS CODE/TRAUMA/SEDATION MEDICATION
Status: COMPLETED | OUTPATIENT
Start: 2020-01-16 | End: 2020-01-16

## 2020-01-16 RX ORDER — ACETAMINOPHEN 325 MG/1
975 TABLET ORAL ONCE
Status: COMPLETED | OUTPATIENT
Start: 2020-01-16 | End: 2020-01-16

## 2020-01-16 RX ORDER — NITROGLYCERIN 20 MG/100ML
INJECTION INTRAVENOUS CODE/TRAUMA/SEDATION MEDICATION
Status: COMPLETED | OUTPATIENT
Start: 2020-01-16 | End: 2020-01-16

## 2020-01-16 RX ORDER — FENTANYL CITRATE 50 UG/ML
INJECTION, SOLUTION INTRAMUSCULAR; INTRAVENOUS CODE/TRAUMA/SEDATION MEDICATION
Status: COMPLETED | OUTPATIENT
Start: 2020-01-16 | End: 2020-01-16

## 2020-01-16 RX ADMIN — MIDAZOLAM 1 MG: 1 INJECTION INTRAMUSCULAR; INTRAVENOUS at 09:43

## 2020-01-16 RX ADMIN — HEPARIN SODIUM 4000 UNITS: 1000 INJECTION INTRAVENOUS; SUBCUTANEOUS at 09:43

## 2020-01-16 RX ADMIN — ACETAMINOPHEN 975 MG: 325 TABLET ORAL at 23:34

## 2020-01-16 RX ADMIN — IOHEXOL 55 ML: 350 INJECTION, SOLUTION INTRAVENOUS at 09:49

## 2020-01-16 RX ADMIN — CLOPIDOGREL 75 MG: 75 TABLET, FILM COATED ORAL at 09:11

## 2020-01-16 RX ADMIN — VERAPAMIL HYDROCHLORIDE 2.5 MG: 2.5 INJECTION INTRAVENOUS at 09:44

## 2020-01-16 RX ADMIN — SODIUM CHLORIDE 100 ML/HR: 0.9 INJECTION, SOLUTION INTRAVENOUS at 08:49

## 2020-01-16 RX ADMIN — MIDAZOLAM 1 MG: 1 INJECTION INTRAMUSCULAR; INTRAVENOUS at 09:34

## 2020-01-16 RX ADMIN — ASPIRIN 325 MG ORAL TABLET 325 MG: 325 PILL ORAL at 08:39

## 2020-01-16 RX ADMIN — FENTANYL CITRATE 50 MCG: 50 INJECTION, SOLUTION INTRAMUSCULAR; INTRAVENOUS at 09:34

## 2020-01-16 RX ADMIN — LIDOCAINE HYDROCHLORIDE 1 ML: 10 INJECTION, SOLUTION EPIDURAL; INFILTRATION; INTRACAUDAL; PERINEURAL at 09:43

## 2020-01-16 RX ADMIN — NITROGLYCERIN 200 MCG: 20 INJECTION INTRAVENOUS at 09:43

## 2020-01-16 NOTE — BRIEF OP NOTE (RAD/CATH)
Catheterization    Findings:  Left main coronary artery, circumflex and LAD have mild nonobstructive atherosclerotic disease  The right coronary artery is 100% chronically occluded proximally and well collateralized of the LAD septals and distal circumflex        Plan:  Medical therapy with statin, aspirin, blood pressure control recommended

## 2020-01-16 NOTE — DISCHARGE INSTRUCTIONS
After Radial Heart Catheterization   WHAT YOU NEED TO KNOW:   What will happen after a radial heart catheterization? · You will be attached to a heart monitor until you are fully awake  A heart monitor is an EKG that stays on continuously to record your heart's electrical activity  Healthcare providers will monitor your vital signs and pulses in your arm  They will frequently check your pressure bandage for bleeding or swelling  · You may have a band wrapped tightly around your wrist  The band puts pressure on your wound and helps prevent bleeding  A healthcare provider can put air into the band or remove air from the band  A healthcare provider will gradually remove air from the band and decrease pressure on your wrist  The band may be removed in 2 hours or when your wound stops bleeding  · You will need to keep your wrist straight for 2 to 4 hours  Do not  push or pull with your arm  Arm movements can cause serious bleeding  After you are monitored for several hours, you may go home or may need to stay in the hospital overnight  What do I need to know before I go home? · Care for your wound as directed  Remove the pressure bandage in 24 hours or as directed  Mild bruising is normal and expected  A small bandage can be placed on your wound after you remove the pressure bandage  Do not put powders, lotions, or creams on your wound  They may cause your wound to get infected  Monitor your wound every day for signs of infection, such as redness, swelling, or pus  · Shower the day after your procedure or as directed  Remove your pressure bandage before you shower  Do not take baths or go in hot tubs or pools  Carefully wash the wound with soap and water  Pat the area dry  A small bandage can be placed on your wound after you shower  · Apply firm, steady pressure to your wound if it bleeds  Apply pressure with a clean gauze or towel for 5 to 10 minutes   Call 911 if bleeding becomes heavy or does not stop  · Drink liquids as directed  Liquids will help flush the contrast liquid from your body  Ask how much liquid to drink each day and which liquids are best for you  · Do not lift anything heavier than 5 pounds until directed by your healthcare provider  Heavy lifting can put stress on your wound and cause bleeding  Do not push or pull with the arm that was used for the procedure  Do not do vigorous activity for at least 48 hours  Vigorous activity may cause bleeding from your wound  Rest and do quiet activities  Take short walks around the house to prevent a blood clot  Ask your healthcare provider when you can return to your normal activities  · Do not drive or return to work until your healthcare provider says it is okay  Your healthcare provider may tell you to wait 48 hours before you drive to decrease your risk for bleeding  You may not be able to return to work for at least 2 days after your procedure if your job involves heavy lifting  What medicines may I need? You may need any of the following:  · Blood thinners    help prevent blood clots  Examples of blood thinners include heparin and warfarin  Clots can cause strokes, heart attacks, and death  The following are general safety guidelines to follow while you are taking a blood thinner:    ¨ Watch for bleeding and bruising while you take blood thinners  Watch for bleeding from your gums or nose  Watch for blood in your urine and bowel movements  Use a soft washcloth on your skin, and a soft toothbrush to brush your teeth  This can keep your skin and gums from bleeding  If you shave, use an electric shaver  Do not play contact sports  ¨ Tell your dentist and other healthcare providers that you take anticoagulants  Wear a bracelet or necklace that says you take this medicine  ¨ Do not start or stop any medicines unless your healthcare provider tells you to  Many medicines cannot be used with blood thinners       ¨ Tell your healthcare provider right away if you forget to take the medicine, or if you take too much  ¨ Warfarin  is a blood thinner that you may need to take  The following are things you should be aware of if you take warfarin  § Foods and medicines can affect the amount of warfarin in your blood  Do not make major changes to your diet while you take warfarin  Warfarin works best when you eat about the same amount of vitamin K every day  Vitamin K is found in green leafy vegetables and certain other foods  Ask for more information about what to eat when you are taking warfarin  § You will need to see your healthcare provider for follow-up visits when you are on warfarin  You will need regular blood tests  These tests are used to decide how much medicine you need  · Acetaminophen  helps decrease pain and fever  This medicine is available without a doctor's order  Ask how much medicine is safe to take, and how often to take it  Acetaminophen can cause liver damage if not taken correctly  · Take your medicine as directed  Contact your healthcare provider if you think your medicine is not helping or if you have side effects  Tell him or her if you are allergic to any medicine  Keep a list of the medicines, vitamins, and herbs you take  Include the amounts, and when and why you take them  Bring the list or the pill bottles to follow-up visits  Carry your medicine list with you in case of an emergency    Call 911 for any of the following:   · You have any of the following signs of a heart attack:      ¨ Squeezing, pressure, or pain in your chest that lasts longer than 5 minutes or returns    ¨ Discomfort or pain in your back, neck, jaw, stomach, or arm     ¨ Trouble breathing    ¨ Nausea or vomiting    ¨ Lightheadedness or a sudden cold sweat, especially with chest pain or trouble breathing    · You have any of the following signs of a stroke:      ¨ Numbness or drooping on one side of your face     ¨ Weakness in an arm or leg    ¨ Confusion or difficulty speaking    ¨ Dizziness, a severe headache, or vision loss    · You feel lightheaded, short of breath, and have chest pain  · You cough up blood  · You have trouble breathing  · You cannot stop the bleeding from your wound even after you hold firm pressure for 10 minutes  When should I seek immediate care? · Blood soaks through your bandage  · Your stitches come apart  · Your hand or arm feels numb, cool, or looks pale  · Your wound gets swollen quickly  When should I contact my healthcare provider? · You have a fever or chills  · Your wound is red, swollen, or draining pus  · Your wound looks more bruised or you have new bruising on the side of your wrist      · You have nausea or are vomiting  · Your skin is itchy, swollen, or you have a rash  · You have questions or concerns about your condition or care  CARE AGREEMENT:   You have the right to help plan your care  Learn about your health condition and how it may be treated  Discuss treatment options with your caregivers to decide what care you want to receive  You always have the right to refuse treatment  The above information is an  only  It is not intended as medical advice for individual conditions or treatments  Talk to your doctor, nurse or pharmacist before following any medical regimen to see if it is safe and effective for you  © 2017 [employer] Information is for End User's use only and may not be sold, redistributed or otherwise used for commercial purposes  All illustrations and images included in CareNotes® are the copyrighted property of [employer]  or [employer]

## 2020-01-16 NOTE — H&P
HISTORY AND PHYSICAL - Cardiology   Dane Leon 78 y o  female MRN: [id number]  Unit/Bed#:  Encounter: [id number]      Assessment:  1  Apical ischemia, referred for elective cath by Dr Bhavesh Hanks:  Catheterization today    History of Present Illness     HPI: Dane Leon is a 78y o  year old female who presents for diagnostic cardiac catheterization as referred to by her cardiologist Dr Jerry Hutson    Has apical ischemia on the stress test         Review of Systems:  Review of Systems    14 systems reviewed and negative with the exception of the above and the following      Current Facility-Administered Medications:  fentanyl citrate (PF)  Intravenous Code/Trauma/Sedation Med Rhoderick Spillers, DO   heparin (porcine)   Code/Trauma/Sedation Med Rhoderick Spillers, DO   lidocaine (PF)   Code/Trauma/Sedation Med Rhoderick Spillers, DO   midazolam   Code/Trauma/Sedation Med Rhoderick Spillers, DO   nitroGLYcerin  Intra-arterial Code/Trauma/Sedation Med Rhoderick Spillers, DO   verapamil   Code/Trauma/Sedation Med Rhoderick Spillers, DO              Historical Information   Past Medical History:   Diagnosis Date    Arthritis     Asthma     Benign essential tremor 10/15/2018    Added automatically from request for surgery [id number]    Benign neoplasm of large intestine     Cellulitis of leg, right     Closed compression fracture of body of lumbar vertebra (HCC)     L5    COPD [facility]chronic obstructive pulmonary disease) ([facility] [address] [facility]     Difficulty waking     post anesthesia    Disease of thyroid gland     GERD (gastroesophageal reflux disease)     High cholesterol     Hypertension     Osteopenia     Osteoporosis     Shortness of breath     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     Urinary leakage     Vitamin D deficiency     Wears glasses     Wears partial dentures     upper     Past Surgical History:   Procedure Laterality Date    CATARACT EXTRACTION W/  INTRAOCULAR LENS IMPLANT Bilateral     COLONOSCOPY      DEEP BRAIN STIMULATOR PLACEMENT      FL RETROGRADE PYELOGRAM  2019    IR PCN TO PCNU CONVERSION  1/10/2020    IR TUBE PLACEMENT  2019    KNEE ARTHROSCOPY      FL CYSTOURETHROSCOPY,URETER CATHETER Left 2019    Procedure: CYSTO RETROGRADE PYELOGRAM, URETEROSCOPY;  Surgeon: Nirmal Lo MD;  [facility];  Service: Urology    FL IMP STIM,CRANIAL,SUBQ,1 ARRAY Left 2019    Procedure: REPLACEMENT IMPLANTABLE PULSE GENERATOR (IPG) DEEP BRAIN STIMULATION (DBS), LEFT;  Surgeon: Teresa Cardona MD;  [facility];  Service: Neurosurgery    FL IMP STIM,CRANIAL,SUBQ,>1 ARRAY Right 2018    Procedure: REPLACEMENT RIGHT DBS IMPLANTABLE PULSE GENERATOR;  Surgeon: Teresa Cardona MD;  [facility];  Service: Neurosurgery    FL TOTAL HIP ARTHROPLASTY Left 2016    Procedure: ARTHROPLASTY HIP TOTAL ANTERIOR;  Surgeon: Romelia Sorenson MD;  [facility];  Service: Orthopedics    ROTATOR CUFF REPAIR       Social History     Substance and Sexual Activity   Alcohol Use Never    Frequency: Never     Social History     Substance and Sexual Activity   Drug Use No     Social History     Tobacco Use   Smoking Status Former Smoker    Last attempt to quit:     Years since quittin 0   Smokeless Tobacco Never Used     Family History:   Family History   Problem Relation Age of Onset    Breast cancer Mother     Throat cancer Family        Meds/Allergies         Allergies   Allergen Reactions    Ciprofloxacin Diarrhea    Simvastatin Myalgia    Advair Diskus [Fluticasone-Salmeterol] Palpitations     Ok w flovent    Tetracycline Rash     Reaction Date: 2012;        Objective   Vitals: Blood pressure 130/83, pulse 89, resp  rate 16, height 5' 4" (1 626 m), weight 84 4 kg (186 lb), SpO2 94 %  , Body mass index is 31 93 kg/m² , Orthostatic Blood Pressures      Most Recent Value   Blood Pressure  130/83 filed at 2020 3331 No intake or output data in the 24 hours ending 01/16/20 0948    Invasive Devices     Peripheral Intravenous Line            Peripheral IV 01/16/20 Left Hand less than 1 day          Line            Arterial Sheath 6 Fr  Right Radial less than 1 day          Drain            Percutaneous Nephroureteral Tube (PCNU) Left 8 5 Fr 26 Cm 6 days                    Physical Exam:  Physical Exam    Gen: No acute distress  HEENT: anicteric, mucous membranes moist  Neck: supple, no jugular venous distention, or carotid bruit  Heart: regular, normal s1 and s2, no murmur/rub or gallop  Lungs :clear to auscultation bilaterally, no rales/rhonchi or wheeze  Abdomen: soft nontender, normoactive bowel sounds, no organomegaly  Ext: warm and perfused, normal femoral pulses, no edema, clubbing  Skin: warm, no rashes  Neuro: AAO x 3, no focal findings  Psychiatric: normal affect  Musculoskeletal: no obvious joint deformities      Lab Results:     Results from last 7 days   Lab Units 01/16/20  0842   POTASSIUM mmol/L 4 3   CHLORIDE mmol/L 103   CO2 mmol/L 30   BUN mg/dL 22   CREATININE mg/dL 0 89   CALCIUM mg/dL 8 7             Results from last 7 days   Lab Units 01/16/20  0842   WBC Thousand/uL 9 36   HEMOGLOBIN g/dL 12 2   HEMATOCRIT % 39 6   PLATELETS Thousands/uL 210       Lab Results   Component Value Date    CHOL 176 10/29/2014     Lab Results   Component Value Date    HDL 47 04/12/2016    HDL 51 10/29/2014     Lab Results   Component Value Date    LDLCALC 99 10/29/2014     Lab Results   Component Value Date    TRIG 131 10/29/2014       Lab Results   Component Value Date    ALT 14 11/07/2019    ALT 21 11/06/2019    AST 14 11/07/2019    AST 22 11/06/2019             No results found for: NTBNP    Lab Results   Component Value Date    HGBA1C 6 7 07/09/2019    HGBA1C 6 5 02/14/2019    HGBA1C 7 0 (H) 06/16/2017

## 2020-01-17 ENCOUNTER — TELEPHONE (OUTPATIENT)
Dept: UROLOGY | Facility: MEDICAL CENTER | Age: 80
End: 2020-01-17

## 2020-01-17 VITALS
WEIGHT: 192.46 LBS | TEMPERATURE: 99.6 F | HEART RATE: 91 BPM | RESPIRATION RATE: 18 BRPM | BODY MASS INDEX: 33.04 KG/M2 | SYSTOLIC BLOOD PRESSURE: 121 MMHG | OXYGEN SATURATION: 94 % | DIASTOLIC BLOOD PRESSURE: 58 MMHG

## 2020-01-17 LAB
BACTERIA UR QL AUTO: ABNORMAL /HPF
FLUAV RNA NPH QL NAA+PROBE: NORMAL
FLUBV RNA NPH QL NAA+PROBE: NORMAL
NON-SQ EPI CELLS URNS QL MICRO: ABNORMAL /HPF
RBC #/AREA URNS AUTO: ABNORMAL /HPF
RSV RNA NPH QL NAA+PROBE: NORMAL
WBC #/AREA URNS AUTO: ABNORMAL /HPF

## 2020-01-17 RX ORDER — CEPHALEXIN 250 MG/1
500 CAPSULE ORAL EVERY 8 HOURS SCHEDULED
Qty: 60 CAPSULE | Refills: 0 | Status: SHIPPED | OUTPATIENT
Start: 2020-01-17 | End: 2020-01-27

## 2020-01-17 RX ORDER — CEPHALEXIN 250 MG/1
500 CAPSULE ORAL ONCE
Status: COMPLETED | OUTPATIENT
Start: 2020-01-17 | End: 2020-01-17

## 2020-01-17 RX ADMIN — CEPHALEXIN 500 MG: 250 CAPSULE ORAL at 01:26

## 2020-01-17 NOTE — ED PROVIDER NOTES
History  Chief Complaint   Patient presents with    Fever - 9 weeks to 74 years     Pt reports fever and feeling lightheaded since today  Pt reports she had cardiac cath today and discharged home  Denies taking meds for fever  Pt underwent cardiac cath on 1/16 without intervention and was released around 1230 per pt  Since cardiac cath, she says that she has been lightheaded, dizzy, and had a temp of 102 w/chills  Denies any nasal congestion, cough, or URI symptoms, abd pain, or urinary discomfort  R radial insertion site w/out swelling or erythema  Patient states that she was with her grandson throughout the day and reported chills  Patient's grandson states that they did have the temperature in the house up to 75° with a 90 degree space heater next to the patient and the patient was wrapped in blankets  They took her temperature after several hours and noted that it was 102  They called Cardiology who stated to them to unwrap the patient, decrease the heat in the house and take the temperature again  They did so, waited 30 minutes and the temperature was still 102 so they came to the ER for further evaluation    No treatment was given prior to arrival       History provided by:  Patient and relative   used: No    Fever - 75 years or older   Max temp prior to arrival:  102  Temp source:  Oral  Severity:  Moderate  Onset quality:  Gradual  Timing:  Constant  Progression:  Improving  Chronicity:  New  Relieved by:  None tried  Ineffective treatments:  None tried  Associated symptoms: chills    Associated symptoms: no chest pain, no congestion, no cough, no diarrhea, no dysuria, no headaches, no nausea, no rhinorrhea and no sore throat    Risk factors: no sick contacts    Dizziness   Quality:  Lightheadedness  Progression:  Unchanged  Chronicity:  New  Relieved by:  None tried  Associated symptoms: no chest pain, no diarrhea, no headaches, no nausea, no palpitations, no shortness of breath and no syncope        Prior to Admission Medications   Prescriptions Last Dose Informant Patient Reported? Taking? Cholecalciferol (VITAMIN D-3 PO)  Self Yes Yes   Sig: Take 1,000 Units by mouth daily     Fluticasone Propionate, Inhal, (FLOVENT DISKUS) 250 MCG/BLIST AEPB  Self No Yes   Sig: Inhale 1 puff 2 (two) times a day   Multiple Vitamins-Minerals (PRESERVISION AREDS PO)  Self Yes Yes   Sig: Take 1 Cap by Mouth daily   albuterol (2 5 mg/3 mL) 0 083 % nebulizer solution  Self No Yes   Sig: Take 1 vial (2 5 mg total) by nebulization 2 (two) times a day   ascorbic acid (VITAMIN C) 250 MG tablet  Self Yes Yes   Sig: Take 500 mg by mouth daily   cephalexin (KEFLEX) 500 mg capsule   No Yes   Sig: Take 1 capsule (500 mg total) by mouth every 8 (eight) hours for 5 days Start two days before procedure at radiology   clopidogrel (PLAVIX) 75 mg tablet  Self No Yes   Sig: Take 1 tablet (75 mg total) by mouth daily HOLD FOR NEPHROSTOMY TUBE PLACEMENT   diltiazem (CARDIZEM CD) 240 mg 24 hr capsule  Self No Yes   Sig: Take 1 capsule (240 mg total) by mouth daily   docusate sodium (COLACE) 100 mg capsule   No Yes   Sig: Take 1 capsule (100 mg total) by mouth daily as needed for constipation for up to 7 days   furosemide (LASIX) 20 mg tablet   No Yes   Sig: Take 1 tablet (20 mg total) by mouth daily   levothyroxine 125 mcg tablet  Self No Yes   Sig: Take 1 tablet (125 mcg total) by mouth daily   multivitamin (THERAGRAN) TABS  Self Yes Yes   Sig: Take 1 tablet by mouth daily   omeprazole (PriLOSEC) 20 mg delayed release capsule  Self Yes Yes   Sig: Take 1 capsule by mouth as needed     rosuvastatin (CRESTOR) 5 mg tablet  Self No Yes   Sig: Take 1 tablet (5 mg total) by mouth daily      Facility-Administered Medications: None       Past Medical History:   Diagnosis Date    Arthritis     Asthma     Benign essential tremor 10/15/2018    Added automatically from request for surgery [id number]    Benign neoplasm of large intestine     Cellulitis of leg, right     Closed compression fracture of body of lumbar vertebra (HCC)     L5    COPD (chronic obstructive pulmonary disease) (HCC)     Difficulty waking     post anesthesia    Disease of thyroid gland     GERD (gastroesophageal reflux disease)     High cholesterol     Hypertension     Osteopenia     Osteoporosis     Shortness of breath     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     Urinary leakage     Vitamin D deficiency     Wears glasses     Wears partial dentures     upper       Past Surgical History:   Procedure Laterality Date    CATARACT EXTRACTION W/  INTRAOCULAR LENS IMPLANT Bilateral     COLONOSCOPY      DEEP BRAIN STIMULATOR PLACEMENT      FL RETROGRADE PYELOGRAM  2019    IR PCN TO PCNU CONVERSION  1/10/2020    IR TUBE PLACEMENT  2019    KNEE ARTHROSCOPY      WI CYSTOURETHROSCOPY,URETER CATHETER Left 2019    Procedure: CYSTO RETROGRADE PYELOGRAM, URETEROSCOPY;  Surgeon: Otoniel Griffiths MD;  Location[facility];  Service: Urology    WI IMP STIM,CRANIAL,SUBQ,1 ARRAY Left 2019    Procedure: REPLACEMENT IMPLANTABLE PULSE GENERATOR (IPG) DEEP BRAIN STIMULATION (DBS), LEFT;  Surgeon: Reinaldo Houston MD;  [facility];  Service: Neurosurgery    WI IMP STIM,CRANIAL,SUBQ,>1 ARRAY Right 2018    Procedure: REPLACEMENT RIGHT DBS IMPLANTABLE PULSE GENERATOR;  Surgeon: Reinaldo Houston MD;  [facility];  Service: Neurosurgery    WI TOTAL HIP ARTHROPLASTY Left 2016    Procedure: ARTHROPLASTY HIP TOTAL ANTERIOR;  Surgeon: Rick Leiva MD;  Location: [facility];  Service: Orthopedics    ROTATOR CUFF REPAIR         Family History   Problem Relation Age of Onset    Breast cancer Mother     Throat cancer Family      I have reviewed and agree with the history as documented      Social History     Tobacco Use    Smoking status: Former Smoker     Last attempt to quit:      Years since quittin 0    Smokeless tobacco: Never Used   Substance Use Topics    Alcohol use: Never     Frequency: Never    Drug use: No        Review of Systems   Constitutional: Positive for chills and fever  HENT: Negative for congestion, rhinorrhea and sore throat  Respiratory: Negative for cough, chest tightness and shortness of breath  Cardiovascular: Negative for chest pain, palpitations and syncope  Gastrointestinal: Negative for abdominal pain, constipation, diarrhea and nausea  Genitourinary: Negative for difficulty urinating, dysuria and flank pain  Neurological: Positive for dizziness and light-headedness  Negative for headaches  All other systems reviewed and are negative  Physical Exam  Physical Exam   Constitutional: She is oriented to person, place, and time  She appears well-developed and well-nourished  Non-toxic appearance  She does not have a sickly appearance  She does not appear ill  No distress  HENT:   Head: Normocephalic and atraumatic  Mouth/Throat: Oropharynx is clear and moist    Eyes: Conjunctivae are normal    Neck: Normal range of motion  Neck supple  Cardiovascular: Normal rate, regular rhythm and intact distal pulses  Exam reveals no friction rub  Murmur heard  Systolic murmur is present with a grade of 3/6  Pulmonary/Chest: Effort normal and breath sounds normal  No respiratory distress  She has no wheezes  She has no rales  Abdominal: Soft  She exhibits no distension  There is no tenderness  There is no rigidity, no rebound, no guarding, no CVA tenderness, no tenderness at McBurney's point and negative Ellis's sign  Musculoskeletal: Normal range of motion  She exhibits edema (mild 1+ b/l LE)  She exhibits no tenderness or deformity  Neurological: She is alert and oriented to person, place, and time  Skin: Skin is warm and dry  No petechiae noted  She is not diaphoretic  No cyanosis or erythema  Psychiatric: She has a normal mood and affect     Nursing note and vitals reviewed  Vital Signs  ED Triage Vitals   Temperature Pulse Respirations Blood Pressure SpO2   01/16/20 2230 01/16/20 2230 01/16/20 2230 01/16/20 2230 01/16/20 2230   100 3 °F (37 9 °C) 102 20 136/64 92 %      Temp Source Heart Rate Source Patient Position - Orthostatic VS BP Location FiO2 (%)   01/16/20 2230 01/16/20 2230 01/16/20 2327 01/16/20 2230 --   Oral Monitor Sitting Right arm       Pain Score       01/16/20 2334       No Pain           Vitals:    01/16/20 2230 01/16/20 2327 01/17/20 0022 01/17/20 0127   BP: 136/64 125/79 114/57 121/58   Pulse: 102 99 96 91   Patient Position - Orthostatic VS:  Sitting Sitting Sitting         Visual Acuity      ED Medications  Medications   acetaminophen (TYLENOL) tablet 975 mg (975 mg Oral Given 1/16/20 2334)   cephalexin (KEFLEX) capsule 500 mg (500 mg Oral Given 1/17/20 0126)       Diagnostic Studies  Results Reviewed     Procedure Component Value Units Date/Time    Urine Microscopic [[id number]]  (Abnormal) Collected:  01/16/20 2346    Lab Status:  Final result Specimen:  Urine, Clean Catch Updated:  01/17/20 0120     RBC, UA       Field obscured, unable to enumerate     /hpf     WBC, UA Innumerable /hpf      Epithelial Cells       Field obscured, unable to enumerate     /hpf     Bacteria, UA       Field obscured, unable to enumerate     /hpf    Urine culture [[id number]] Collected:  01/16/20 2346    Lab Status:   In process Specimen:  Urine, Clean Catch Updated:  01/17/20 0120    Influenza A/B and RSV PCR [[id number]]  (Normal) Collected:  01/16/20 2329    Lab Status:  Final result Specimen:  Nasopharyngeal Swab Updated:  01/17/20 0040     INFLUENZA A PCR None Detected     INFLUENZA B PCR None Detected     RSV PCR None Detected    POCT urinalysis dipstick [[id number]]  (Abnormal) Resulted:  01/16/20 2354    Lab Status:  Final result Specimen:  Urine Updated:  01/16/20 2354    Urine Macroscopic, POC [[id number]]  (Abnormal) Collected:  01/16/20 2346    Lab Status:  Final result Specimen:  Urine Updated:  01/16/20 2377     Color, UA Yellow     Clarity, UA Clear     pH, UA 6 0     Leukocytes, UA Large     Nitrite, UA Negative     Protein, UA >=300 mg/dl      Glucose, UA Negative mg/dl      Ketones, UA Negative mg/dl      Urobilinogen, UA 0 2 E U /dl      Bilirubin, UA Negative     Blood, UA Moderate     Specific [place], UA 1 015    Narrative:       CLINITEK RESULT                 XR chest 2 views   ED Interpretation by Catherine Brasher DO (01/17 0019)   NAD                 Procedures  Procedures         ED Course                               MDM  Number of Diagnoses or Management Options  Fever: new and requires workup  UTI (urinary tract infection): new and requires workup  Diagnosis management comments: Patient presents with general complaints of fever and chills since being discharged earlier today after cardiac catheterization  Patient did not receive any intervention today  She denies any symptoms such as upper respiratory symptoms, dysuria, pain or drainage around her left-sided nephrostomy tube, etc   Temperature here is normal but she is slightly tachycardic  Patient denies any preceding symptoms of infection prior to the catheterization  Will obtain a chest x-ray, urinalysis and give Tylenol  I do not see the need for lab values at this time  1:23 AM  UA noted  Patient had a similar urinalysis in December and grew out E coli  Previous 1 to that was mixed  Patient does have a prescription for Keflex to be taken prior to her next nephrostomy procedure  Given her symptoms of fever and chills, chronic nephrostomy and urinalysis I will start her on Keflex, have her follow up with Urology to review results and the next 2-3 days and have her return to the ER if anything was to worsen  Patient understands these precautions, when to return and agrees with plan of care  Questions and concerns answered          Amount and/or Complexity of Data Reviewed  Clinical lab tests: ordered and reviewed  Tests in the radiology section of CPT®: ordered and reviewed  Tests in the medicine section of CPT®: reviewed and ordered  Review and summarize past medical records: yes  Independent visualization of images, tracings, or specimens: yes          Disposition  Final diagnoses:   Fever   UTI (urinary tract infection)     Time reflects when diagnosis was documented in both MDM as applicable and the Disposition within this note     Time User Action Codes Description Comment    1/17/2020  1:27 AM Elicia Miranda Add [R50 9] Fever     1/17/2020  1:27 AM Pricila Addison Add [N39 0] UTI (urinary tract infection)       ED Disposition     ED Disposition Condition Date/Time Comment    Discharge Stable Fri Jan 17, 2020  1:27 AM Amber Perales discharge to home/self care  Follow-up Information     Follow up With Specialties Details Why Contact Info Additional Information    Ita Posey, DO Family Medicine Call in 3 days To schedule an appointment as soon as you can, If symptoms persist [id number] Emergency Department Emergency Medicine Go to  If symptoms worsen [facility] [id number] [phone] [place] [facility], [address] [facility] Awilda Maldonado , Shawna Sepulveda, Main Reza MD Urology Call on 1/20/2020 To discuss urine culture results, continue treatment with antibiotics per results and her upcoming nephrostomy change [address]  [phone]             Discharge Medication List as of 1/17/2020  1:37 AM      START taking these medications    Details   !! cephalexin (KEFLEX) 250 mg capsule Take 2 capsules (500 mg total) by mouth every 8 (eight) hours for 10 days, Starting Fri 1/17/2020, Until Mon 1/27/2020, Print       !! - Potential duplicate medications found  Please discuss with provider  CONTINUE these medications which have NOT CHANGED    Details   albuterol (2 5 mg/3 mL) 0 083 % nebulizer solution Take 1 vial (2 5 mg total) by nebulization 2 (two) times a day, Starting Tue 7/9/2019, Normal      ascorbic acid (VITAMIN C) 250 MG tablet Take 500 mg by mouth daily, Historical Med      !! cephalexin (KEFLEX) 500 mg capsule Take 1 capsule (500 mg total) by mouth every 8 (eight) hours for 5 days Start two days before procedure at radiology, Starting Mon 1/13/2020, Until Sat 1/18/2020, Normal      Cholecalciferol (VITAMIN D-3 PO) Take 1,000 Units by mouth daily  , Until Discontinued, Historical Med      clopidogrel (PLAVIX) 75 mg tablet Take 1 tablet (75 mg total) by mouth daily HOLD FOR NEPHROSTOMY TUBE PLACEMENT, Starting Thu 12/5/2019, Normal      diltiazem (CARDIZEM CD) 240 mg 24 hr capsule Take 1 capsule (240 mg total) by mouth daily, Starting Mon 7/29/2019, Normal      docusate sodium (COLACE) 100 mg capsule Take 1 capsule (100 mg total) by mouth daily as needed for constipation for up to 7 days, Starting Fri 11/22/2019, Until Thu 1/16/2020, No Print      Fluticasone Propionate, Inhal, (FLOVENT DISKUS) 250 MCG/BLIST AEPB Inhale 1 puff 2 (two) times a day, Starting Fri 11/22/2019, No Print      furosemide (LASIX) 20 mg tablet Take 1 tablet (20 mg total) by mouth daily, Starting Mon 12/30/2019, Normal      levothyroxine 125 mcg tablet Take 1 tablet (125 mcg total) by mouth daily, Starting Mon 7/29/2019, Normal      Multiple Vitamins-Minerals (PRESERVISION AREDS PO) Take 1 Cap by Mouth daily, Historical Med      multivitamin (THERAGRAN) TABS Take 1 tablet by mouth daily, Historical Med      omeprazole (PriLOSEC) 20 mg delayed release capsule Take 1 capsule by mouth as needed  , Historical Med      rosuvastatin (CRESTOR) 5 mg tablet Take 1 tablet (5 mg total) by mouth daily, Starting Wed 11/13/2019, Normal       !! - Potential duplicate medications found  Please discuss with provider          No discharge procedures on file      ED Provider  Electronically Signed by           Sammie Ballard DO  01/17/20 0424

## 2020-01-17 NOTE — TELEPHONE ENCOUNTER
Patient of Dr Usman Bettencourt seen in the [facility] office  Rachel VILLALBA called and advised that the patient was in the ED yesterday with a fever and a UTI  She advised that when she got to the patient house today she took her temperature and it was still 102 5 she gave her 1000mg of Tylenol and it only brought her fever down to 101 3  Spoke to Michelle YANG at the back line she advised to tell the ORLY to have the patient go back to the ED due to her fever

## 2020-01-17 NOTE — TELEPHONE ENCOUNTER
FYI  As per the VNA, patient is refusing to go back to the Emergency room  [facility]NA services are daily at this time  Please be advised    Thank you

## 2020-01-18 ENCOUNTER — HOSPITAL ENCOUNTER (EMERGENCY)
Facility: HOSPITAL | Age: 80
Discharge: HOME | End: 2020-01-18
Attending: EMERGENCY MEDICINE | Admitting: EMERGENCY MEDICINE
Payer: MEDICARE

## 2020-01-18 ENCOUNTER — APPOINTMENT (EMERGENCY)
Dept: RADIOLOGY | Facility: HOSPITAL | Age: 80
End: 2020-01-18
Payer: MEDICARE

## 2020-01-18 VITALS
WEIGHT: 188.93 LBS | HEART RATE: 68 BPM | OXYGEN SATURATION: 93 % | DIASTOLIC BLOOD PRESSURE: 59 MMHG | TEMPERATURE: 98.4 F | RESPIRATION RATE: 18 BRPM | SYSTOLIC BLOOD PRESSURE: 111 MMHG | BODY MASS INDEX: 32.43 KG/M2
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
## 2020-01-18 DIAGNOSIS — S22.39XA RIB FRACTURE: ICD-10-CM

## 2020-01-18 LAB
ANION GAP SERPL CALCULATED.3IONS-SCNC: 12 MMOL/L (ref 4–13)
APTT PPP: 41 SECONDS (ref 23–37)
ATRIAL RATE: 89 BPM
BASOPHILS # BLD AUTO: 0.04 THOUSANDS/ΜL (ref 0–0.1)
BASOPHILS NFR BLD AUTO: 0 % (ref 0–1)
BUN SERPL-MCNC: 22 MG/DL (ref 5–25)
CALCIUM SERPL-MCNC: 8.6 MG/DL (ref 8.3–10.1)
CHLORIDE SERPL-SCNC: 99 MMOL/L (ref 100–108)
CO2 SERPL-SCNC: 27 MMOL/L (ref 21–32)
CREAT SERPL-MCNC: 1.08 MG/DL (ref 0.6–1.3)
EOSINOPHIL # BLD AUTO: 0.05 THOUSAND/ΜL (ref 0–0.61)
EOSINOPHIL NFR BLD AUTO: 1 % (ref 0–6)
ERYTHROCYTE [DISTWIDTH] IN BLOOD BY AUTOMATED COUNT: 14.6 % (ref 11.6–15.1)
GFR SERPL CREATININE-BSD FRML MDRD: 48 ML/MIN/1.73SQ M
GLUCOSE SERPL-MCNC: 91 MG/DL (ref 65–140)
HCT VFR BLD AUTO: 35 % (ref 34.8–46.1)
HGB BLD-MCNC: 11.1 G/DL (ref 11.5–15.4)
IMM GRANULOCYTES # BLD AUTO: 0.03 THOUSAND/UL (ref 0–0.2)
IMM GRANULOCYTES NFR BLD AUTO: 0 % (ref 0–2)
INR PPP: 1.18 (ref 0.84–1.19)
LYMPHOCYTES # BLD AUTO: 1.49 THOUSANDS/ΜL (ref 0.6–4.47)
LYMPHOCYTES NFR BLD AUTO: 17 % (ref 14–44)
MAGNESIUM SERPL-MCNC: 2.2 MG/DL (ref 1.6–2.6)
MCH RBC QN AUTO: 27.5 PG (ref 26.8–34.3)
MCHC RBC AUTO-ENTMCNC: 31.7 G/DL (ref 31.4–37.4)
MCV RBC AUTO: 87 FL (ref 82–98)
MONOCYTES # BLD AUTO: 1 THOUSAND/ΜL (ref 0.17–1.22)
MONOCYTES NFR BLD AUTO: 11 % (ref 4–12)
NEUTROPHILS # BLD AUTO: 6.29 THOUSANDS/ΜL (ref 1.85–7.62)
NEUTS SEG NFR BLD AUTO: 71 % (ref 43–75)
NRBC BLD AUTO-RTO: 0 /100 WBCS
P AXIS: 32 DEGREES
PLATELET # BLD AUTO: 194 THOUSANDS/UL (ref 149–390)
PMV BLD AUTO: 9.5 FL (ref 8.9–12.7)
POTASSIUM SERPL-SCNC: 3.7 MMOL/L (ref 3.5–5.3)
PR INTERVAL: 56 MS
PROTHROMBIN TIME: 15.2 SECONDS (ref 11.6–14.5)
QRS AXIS: -54 DEGREES
QRSD INTERVAL: 188 MS
QT INTERVAL: 478 MS
QTC INTERVAL: 581 MS
RBC # BLD AUTO: 4.03 MILLION/UL (ref 3.81–5.12)
SODIUM SERPL-SCNC: 138 MMOL/L (ref 136–145)
T WAVE AXIS: -12 DEGREES
VENTRICULAR RATE: 89 BPM
WBC # BLD AUTO: 8.9 THOUSAND/UL (ref 4.31–10.16)

## 2020-01-18 PROCEDURE — 85025 COMPLETE CBC W/AUTO DIFF WBC: CPT | Performed by: EMERGENCY MEDICINE

## 2020-01-18 PROCEDURE — 93005 ELECTROCARDIOGRAM TRACING: CPT

## 2020-01-18 PROCEDURE — 83735 ASSAY OF MAGNESIUM: CPT | Performed by: EMERGENCY MEDICINE

## 2020-01-18 PROCEDURE — 85730 THROMBOPLASTIN TIME PARTIAL: CPT | Performed by: EMERGENCY MEDICINE

## 2020-01-18 PROCEDURE — 71101 X-RAY EXAM UNILAT RIBS/CHEST: CPT

## 2020-01-18 PROCEDURE — 93010 ELECTROCARDIOGRAM REPORT: CPT

## 2020-01-18 PROCEDURE — 99284 EMERGENCY DEPT VISIT MOD MDM: CPT

## 2020-01-18 PROCEDURE — 80048 BASIC METABOLIC PNL TOTAL CA: CPT | Performed by: EMERGENCY MEDICINE

## 2020-01-18 PROCEDURE — 36415 COLL VENOUS BLD VENIPUNCTURE: CPT | Performed by: EMERGENCY MEDICINE

## 2020-01-18 PROCEDURE — 85610 PROTHROMBIN TIME: CPT | Performed by: EMERGENCY MEDICINE

## 2020-01-18 PROCEDURE — 99283 EMERGENCY DEPT VISIT LOW MDM: CPT | Performed by: EMERGENCY MEDICINE

## 2020-01-18 RX ORDER — LIDOCAINE 50 MG/G
1 PATCH TOPICAL ONCE
Status: DISCONTINUED | OUTPATIENT
Start: 2020-01-18 | End: 2020-01-18 | Stop reason: HOSPADM

## 2020-01-18 RX ORDER — TRAMADOL HYDROCHLORIDE 50 MG/1
50 TABLET ORAL EVERY 6 HOURS PRN
Qty: 10 TABLET | Refills: 0 | Status: SHIPPED | OUTPATIENT
Start: 2020-01-18 | End: 2020-01-24 | Stop reason: ALTCHOICE

## 2020-01-18 RX ORDER — LIDOCAINE 50 MG/G
1 PATCH TOPICAL DAILY
Qty: 7 PATCH | Refills: 0 | Status: SHIPPED | OUTPATIENT
Start: 2020-01-18 | End: 2020-02-13

## 2020-01-18 RX ADMIN — LIDOCAINE 1 PATCH: 50 PATCH TOPICAL at 15:29

## 2020-01-18 NOTE — ED PROVIDER NOTES
History  Chief Complaint   Patient presents with    Rib Injury     fell last night became lightheaded walking to bathroom struck posterior ribs on table, swelling/echymosis to right posterior ribs, denies head inj/loc  takes plavix       History provided by:  Patient   used: No    Fall   Mechanism of injury: fall    Injury location:  Torso  Torso injury location:  R chest (right posterior chest)  Incident location:  Home  Time since incident:  1 day  Arrived directly from scene: no    Fall:     Fall occurred: Berta Roa after feeling a little lightheaded into the coffee table with her right posterior ribs  She did not hit her head  Height of fall:  Ground level    Impact surface:  [employer] of impact:  Back    Entrapped after fall: no    Suspicion of alcohol use: no    Suspicion of drug use: no    Prior to arrival data:     Loss of consciousness: no      Amnesic to event: no      Airway condition since incident:  Stable    Breathing condition since incident:  Stable    Circulation condition since incident:  Stable    Mental status condition since incident:  Stable    Disability condition since incident:  Stable  Associated symptoms: back pain    Associated symptoms: no abdominal pain, no chest pain, no difficulty breathing, no headaches, no loss of consciousness, no nausea, no neck pain and no vomiting    Risk factors: anticoagulation therapy    Risk factors comment:  Plavix      Prior to Admission Medications   Prescriptions Last Dose Informant Patient Reported? Taking? Cholecalciferol (VITAMIN D-3 PO)  Self Yes No   Sig: Take 1,000 Units by mouth daily     Fluticasone Propionate, Inhal, (FLOVENT DISKUS) 250 MCG/BLIST AEPB  Self No No   Sig: Inhale 1 puff 2 (two) times a day   Multiple Vitamins-Minerals (PRESERVISION AREDS PO)  Self Yes No   Sig: Take 1 Cap by Mouth daily   albuterol (2 5 mg/3 mL) 0 083 % nebulizer solution  Self No No   Sig: Take 1 vial (2 5 mg total) by nebulization 2 (two) times a day   ascorbic acid (VITAMIN C) 250 MG tablet  Self Yes No   Sig: Take 500 mg by mouth daily   cephalexin (KEFLEX) 250 mg capsule   No No   Sig: Take 2 capsules (500 mg total) by mouth every 8 (eight) hours for 10 days   cephalexin (KEFLEX) 500 mg capsule   No No   Sig: Take 1 capsule (500 mg total) by mouth every 8 (eight) hours for 5 days Start two days before procedure at radiology   clopidogrel (PLAVIX) 75 mg tablet  Self No No   Sig: Take 1 tablet (75 mg total) by mouth daily HOLD FOR NEPHROSTOMY TUBE PLACEMENT   diltiazem (CARDIZEM CD) 240 mg 24 hr capsule  Self No No   Sig: Take 1 capsule (240 mg total) by mouth daily   docusate sodium (COLACE) 100 mg capsule   No No   Sig: Take 1 capsule (100 mg total) by mouth daily as needed for constipation for up to 7 days   furosemide (LASIX) 20 mg tablet   No No   Sig: Take 1 tablet (20 mg total) by mouth daily   levothyroxine 125 mcg tablet  Self No No   Sig: Take 1 tablet (125 mcg total) by mouth daily   multivitamin (THERAGRAN) TABS  Self Yes No   Sig: Take 1 tablet by mouth daily   omeprazole (PriLOSEC) 20 mg delayed release capsule  Self Yes No   Sig: Take 1 capsule by mouth as needed     rosuvastatin (CRESTOR) 5 mg tablet  Self No No   Sig: Take 1 tablet (5 mg total) by mouth daily      Facility-Administered Medications: None       Past Medical History:   Diagnosis Date    Arthritis     Asthma     Benign essential tremor 10/15/2018    Added automatically from request for surgery [id number]    Benign neoplasm of large intestine     Cellulitis of leg, right     Closed compression fracture of body of lumbar vertebra (HCC)     L5    COPD (chronic obstructive pulmonary disease) (HCC)     Difficulty waking     post anesthesia    Disease of thyroid gland     GERD (gastroesophageal reflux disease)     High cholesterol     Hypertension     Osteopenia     Osteoporosis     Shortness of breath     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     Urinary leakage  Vitamin D deficiency     Wears glasses     Wears partial dentures     upper       Past Surgical History:   Procedure Laterality Date    CATARACT EXTRACTION W/  INTRAOCULAR LENS IMPLANT Bilateral     COLONOSCOPY      DEEP BRAIN STIMULATOR PLACEMENT      FL RETROGRADE PYELOGRAM  2019    IR PCN TO PCNU CONVERSION  1/10/2020    IR TUBE PLACEMENT  2019    KNEE ARTHROSCOPY      AR CYSTOURETHROSCOPY,URETER CATHETER Left 2019    Procedure: CYSTO RETROGRADE PYELOGRAM, URETEROSCOPY;  Surgeon: Delmy Leiva MD;  [facility];  Service: Urology    AR IMP STIM,CRANIAL,SUBQ,1 ARRAY Left 2019    Procedure: REPLACEMENT IMPLANTABLE PULSE GENERATOR (IPG) DEEP BRAIN STIMULATION (DBS), LEFT;  Surgeon: Autumn Rosenthal MD;  [facility];  Service: Neurosurgery    AR IMP STIM,CRANIAL,SUBQ,>1 ARRAY Right 2018    Procedure: REPLACEMENT RIGHT DBS IMPLANTABLE PULSE GENERATOR;  Surgeon: Autumn Rosenthal MD;  [facility];  Service: Neurosurgery    AR TOTAL HIP ARTHROPLASTY Left 2016    Procedure: ARTHROPLASTY HIP TOTAL ANTERIOR;  Surgeon: Gloria Henriquez MD;  [facility];  Service: Orthopedics    ROTATOR CUFF REPAIR         Family History   Problem Relation Age of Onset    Breast cancer Mother     Throat cancer Family      I have reviewed and agree with the history as documented  Social History     Tobacco Use    Smoking status: Former Smoker     Last attempt to quit: 2013     Years since quittin 0    Smokeless tobacco: Never Used   Substance Use Topics    Alcohol use: Never     Frequency: Never    Drug use: No        Review of Systems   HENT:        No head injury   Respiratory: Negative for chest tightness and shortness of breath  Cardiovascular: Negative for chest pain  Gastrointestinal: Negative for abdominal pain, nausea and vomiting  Genitourinary: Negative for flank pain  Musculoskeletal: Positive for back pain   Negative for gait problem, neck pain and neck stiffness  Skin: Negative for wound  Neurological: Negative for loss of consciousness and headaches  All other systems reviewed and are negative  Physical Exam  Physical Exam   Constitutional: She appears well-developed and well-nourished  She is cooperative  Non-toxic appearance  She does not have a sickly appearance  She does not appear ill  No distress  HENT:   Head: Normocephalic and atraumatic  Right Ear: Hearing normal  No drainage or swelling  Left Ear: Hearing normal  No drainage or swelling  Mouth/Throat: Mucous membranes are normal    Eyes: Conjunctivae and lids are normal  Right eye exhibits no discharge  Left eye exhibits no discharge  Neck: Trachea normal and normal range of motion  No JVD present  Cardiovascular: Normal rate, regular rhythm, intact distal pulses and normal pulses  Exam reveals no gallop and no friction rub  Murmur heard  Pulmonary/Chest: Effort normal and breath sounds normal  No stridor  No respiratory distress  She has no wheezes  She has no rales  She exhibits tenderness  Mostly tender over the area bruising which is over the posterior lateral right lower ribs  Abdominal: Soft  Normal appearance  There is no tenderness  There is no rebound, no guarding and no CVA tenderness  Musculoskeletal: Normal range of motion  She exhibits no edema or tenderness  Lymphadenopathy:     She has no cervical adenopathy  Neurological: She is alert  She has normal strength  No cranial nerve deficit or sensory deficit  She exhibits normal muscle tone  GCS eye subscore is 4  GCS verbal subscore is 5  GCS motor subscore is 6  Skin: Skin is warm, dry and intact  No rash noted  She is not diaphoretic  No pallor  Psychiatric: She has a normal mood and affect  Her speech is normal  Cognition and memory are normal    Nursing note and vitals reviewed        Vital Signs  ED Triage Vitals   Temperature Pulse Respirations Blood Pressure SpO2 01/18/20 1310 01/18/20 1310 01/18/20 1310 01/18/20 1310 01/18/20 1310   98 4 °F (36 9 °C) 72 16 127/60 96 %      Temp Source Heart Rate Source Patient Position - Orthostatic VS BP Location FiO2 (%)   01/18/20 1310 01/18/20 1501 01/18/20 1310 01/18/20 1310 --   Oral Monitor Lying Left arm       Pain Score       01/18/20 1310       7           Vitals:    01/18/20 1310 01/18/20 1501   BP: 127/60 111/59   Pulse: 72 68   Patient Position - Orthostatic VS: Lying Lying         Visual Acuity      ED Medications  Medications   lidocaine (LIDODERM) 5 % patch 1 patch (1 patch Topical Medication Applied 1/18/20 1529)       Diagnostic Studies  Results Reviewed     Procedure Component Value Units Date/Time    Protime-INR [[id number]]  (Abnormal) Collected:  01/18/20 1413    Lab Status:  Final result Specimen:  Blood from Arm, Left Updated:  01/18/20 1437     Protime 15 2 seconds      INR 1 18    APTT [[id number]]  (Abnormal) Collected:  01/18/20 1413    Lab Status:  Final result Specimen:  Blood from Arm, Left Updated:  01/18/20 1437     PTT 41 seconds     Basic metabolic panel [[id number]]  (Abnormal) Collected:  01/18/20 1413    Lab Status:  Final result Specimen:  Blood from Arm, Left Updated:  01/18/20 1427     Sodium 138 mmol/L      Potassium 3 7 mmol/L      Chloride 99 mmol/L      CO2 27 mmol/L      ANION GAP 12 mmol/L      BUN 22 mg/dL      Creatinine 1 08 mg/dL      Glucose 91 mg/dL      Calcium 8 6 mg/dL      eGFR 48 ml/min/1 73sq m     Narrative:       [place] guidelines for Chronic Kidney Disease (CKD):     Stage 1 with normal or high GFR (GFR > 90 mL/min/1 73 square meters)    Stage 2 Mild CKD (GFR = 60-89 mL/min/1 73 square meters)    Stage 3A Moderate CKD (GFR = 45-59 mL/min/1 73 square meters)    Stage 3B Moderate CKD (GFR = 30-44 mL/min/1 73 square meters)    Stage 4 Severe CKD (GFR = 15-29 mL/min/1 73 square meters)    Stage 5 End Stage CKD (GFR <15 mL/min/1 73 square meters)  Note: GFR calculation is accurate only with a steady state creatinine    Magnesium [[id number]]  (Normal) Collected:  01/18/20 1413    Lab Status:  Final result Specimen:  Blood from Arm, Left Updated:  01/18/20 1427     Magnesium 2 2 mg/dL     CBC and differential [[id number]]  (Abnormal) Collected:  01/18/20 1413    Lab Status:  Final result Specimen:  Blood from Arm, Left Updated:  01/18/20 1418     WBC 8 90 Thousand/uL      RBC 4 03 Million/uL      Hemoglobin 11 1 g/dL      Hematocrit 35 0 %      MCV 87 fL      MCH 27 5 pg      MCHC 31 7 g/dL      RDW 14 6 %      MPV 9 5 fL      Platelets 352 Thousands/uL      nRBC 0 /100 WBCs      Neutrophils Relative 71 %      Immat GRANS % 0 %      Lymphocytes Relative 17 %      Monocytes Relative 11 %      Eosinophils Relative 1 %      Basophils Relative 0 %      Neutrophils Absolute 6 29 Thousands/µL      Immature Grans Absolute 0 03 Thousand/uL      Lymphocytes Absolute 1 49 Thousands/µL      Monocytes Absolute 1 00 Thousand/µL      Eosinophils Absolute 0 05 Thousand/µL      Basophils Absolute 0 04 Thousands/µL                  XR ribs with pa chest min 3 views RIGHT   ED Interpretation by Manuel Casiano MD (01/18 0469)   I have personally reviewed the x-ray and my findings are: rib fracture                   Procedures  ECG 12 Lead Documentation Only  Date/Time: 1/18/2020 3:38 PM  Performed by: Manuel Casiano MD  Authorized by: Manuel Casiano MD     Indications / Diagnosis:  Fall and lightheaded yesterday  ECG reviewed by me, the ED Provider: yes    Patient location:  ED  Interpretation:     Interpretation: normal    Rate:     ECG rate assessment: normal    Rhythm:     Rhythm: sinus rhythm    Ectopy:     Ectopy: none    QRS:     QRS axis:  Normal    QRS intervals:  Normal  Conduction:     Conduction: normal    ST segments:     ST segments:  Non-specific  T waves:     T waves: non-specific               ED Course MDM  Number of Diagnoses or Management Options  Fall, initial encounter:   Rib fracture:   Diagnosis management comments: Patient with a fall  Laboratory studies unremarkable  I do suspect that she has a rib fracture  There is no pneumothorax  I did check the provider data bank and she has no red flag prescriptions  I did prescribe her tramadol and gave her printed prescription because this may cause a hardship in her picking it up at a specific pharmacy  Patient was given an incentive spirometer and encouraged use of multiple times every hour over the course of the day  Amount and/or Complexity of Data Reviewed  Clinical lab tests: reviewed and ordered  Tests in the radiology section of CPT®: ordered and reviewed  Tests in the medicine section of CPT®: ordered and reviewed  Independent visualization of images, tracings, or specimens: yes          Disposition  Final diagnoses:   Fall, initial encounter   Rib fracture     Time reflects when diagnosis was documented in both MDM as applicable and the Disposition within this note     Time User Action Codes Description Comment    1/18/2020  3:16 PM Catarino Aguila Add [D22  QSGT] Fall, initial encounter     1/18/2020  3:16 PM [address], [address] Rib fracture       ED Disposition     ED Disposition Condition Date/Time Comment    Discharge Stable Sat Jan 18, 2020  3:16 PM Consuelo Perales discharge to home/self care  Follow-up Information     Follow up With Specialties Details Why Contact Info    Zack Barreto DO Family Medicine Schedule an appointment as soon as possible for a visit in 2 days If symptoms worsen [address]    [address] [place] [address]  [phone]            Patient's Medications   Discharge Prescriptions    LIDOCAINE (LIDODERM) 5 %    Apply 1 patch topically daily Remove & Discard patch within 12 hours or as directed by MD       Start Date: 1/18/2020 End Date: --       Order Dose: 1 patch       Quantity: 7 patch    Refills: 0    TRAMADOL (ULTRAM) 50 MG TABLET    Take 1 tablet (50 mg total) by mouth every 6 (six) hours as needed for moderate pain for up to 10 doses       Start Date: 1/18/2020 End Date: --       Order Dose: 50 mg       Quantity: 10 tablet    Refills: 0     No discharge procedures on file      ED Provider  Electronically Signed by           Raisa Bishop MD  01/18/20 26 [id number]

## 2020-01-19 PROBLEM — I25.10 CORONARY ARTERY DISEASE INVOLVING NATIVE CORONARY ARTERY OF NATIVE HEART WITHOUT ANGINA PECTORIS: Status: ACTIVE | Noted: 2020-01-19

## 2020-01-19 LAB
ATRIAL RATE: 67 BPM
BACTERIA UR CULT: ABNORMAL
P AXIS: 16 DEGREES
QRS AXIS: -44 DEGREES
QRSD INTERVAL: 152 MS
QT INTERVAL: 414 MS
QTC INTERVAL: 437 MS
T WAVE AXIS: 18 DEGREES
VENTRICULAR RATE: 67 BPM

## 2020-01-19 PROCEDURE — 93010 ELECTROCARDIOGRAM REPORT: CPT | Performed by: INTERNAL MEDICINE

## 2020-01-20 ENCOUNTER — TELEPHONE (OUTPATIENT)
Dept: CARDIOLOGY CLINIC | Facility: CLINIC | Age: 80
End: 2020-01-20

## 2020-01-20 ENCOUNTER — TELEPHONE (OUTPATIENT)
Dept: UROLOGY | Facility: AMBULATORY SURGERY CENTER | Age: 80
End: 2020-01-20

## 2020-01-20 NOTE — TELEPHONE ENCOUNTER
[facility] requesting [facility] on [facility]  Pt did have cath done and is not scheduled for return visit w/ them

## 2020-01-20 NOTE — TELEPHONE ENCOUNTER
Vascular called stating patient had her cardiac cath on 1/16/20 they are looking for a letter of clearance for her upcoming vascular procedure with Dr gA Standing from Dr Leonora Patino

## 2020-01-20 NOTE — TELEPHONE ENCOUNTER
Pt Daughter Thor Munoz called about her mother Brian Mena, pt fell and broke her rib on Friday and they noticed in her chart that she had 6mm kidney stones and wants to know why they where not taken care of? Would like a call back from a nurse asap to discuss this   [phone] is at work will start soon

## 2020-01-20 NOTE — TELEPHONE ENCOUNTER
I did and will complete - thank you  I also spoke to the patient re: cath and clearance       Meghana Galeano

## 2020-01-20 NOTE — TELEPHONE ENCOUNTER
I've brought the message to Dr Donnie Engel attention- He states he'd like to speak with the patient's daughter concerning this  Routing message to the doctor

## 2020-01-21 ENCOUNTER — PREP FOR PROCEDURE (OUTPATIENT)
Dept: VASCULAR SURGERY | Facility: CLINIC | Age: 80
End: 2020-01-21

## 2020-01-21 ENCOUNTER — TELEPHONE (OUTPATIENT)
Dept: VASCULAR SURGERY | Facility: CLINIC | Age: 80
End: 2020-01-21

## 2020-01-21 NOTE — TELEPHONE ENCOUNTER
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

## 2020-01-21 NOTE — TELEPHONE ENCOUNTER
Rec CC from nursing dept  Patient was last seen by Dr Raleigh Pedraza on 10/22/19  I set up an office visit for 2/3/20 1pm at [facility] for her to see Dr Raleigh Pedraza prior to her R CEA we scheduled for 2/18/20 at [facility]/OR  Patient advised me that on 1/18/20 she fell and broke some ribs and will let us know if she is ok to have this procedure  Right now she is in a lot of pain  I mailed her the office visit card, PAT's and instructions per her request  Patient has [employer]  Patient does not have to hold her aspirin or Plavix for procedure

## 2020-01-22 ENCOUNTER — TELEPHONE (OUTPATIENT)
Dept: RADIOLOGY | Facility: HOSPITAL | Age: 80
End: 2020-01-22

## 2020-01-22 RX ORDER — CEFAZOLIN SODIUM 2 G/50ML
2000 SOLUTION INTRAVENOUS ONCE
Status: CANCELLED | OUTPATIENT
Start: 2020-01-22 | End: 2020-01-22

## 2020-01-22 RX ORDER — SODIUM CHLORIDE 9 MG/ML
75 INJECTION, SOLUTION INTRAVENOUS CONTINUOUS
Status: CANCELLED | OUTPATIENT
Start: 2020-01-22

## 2020-01-22 NOTE — TELEPHONE ENCOUNTER
Discussed procedure and preparation with the pt  All questions answered  Pt states that she fractured her ribs on the 18th of January, but denies any respiratory issues currently  She also states that the pain is subsiding and is willing to  try laying prone with sedation and pain meds day of procedure  No hold Plavix as per IR guidelines

## 2020-01-24 ENCOUNTER — OFFICE VISIT (OUTPATIENT)
Dept: FAMILY MEDICINE CLINIC | Facility: CLINIC | Age: 80
End: 2020-01-24
Payer: MEDICARE

## 2020-01-24 VITALS
HEIGHT: 64 IN | RESPIRATION RATE: 18 BRPM | BODY MASS INDEX: 32.2 KG/M2 | OXYGEN SATURATION: 96 % | SYSTOLIC BLOOD PRESSURE: 138 MMHG | WEIGHT: 188.6 LBS | DIASTOLIC BLOOD PRESSURE: 70 MMHG | HEART RATE: 72 BPM

## 2020-01-24 DIAGNOSIS — I10 ESSENTIAL HYPERTENSION: ICD-10-CM

## 2020-01-24 DIAGNOSIS — R47.1 DYSARTHRIA: ICD-10-CM

## 2020-01-24 DIAGNOSIS — Z96.89 S/P DEEP BRAIN STIMULATOR PLACEMENT: ICD-10-CM

## 2020-01-24 DIAGNOSIS — E11.9 DIABETES MELLITUS TYPE 2, DIET-CONTROLLED (HCC): ICD-10-CM

## 2020-01-24 DIAGNOSIS — I25.10 CORONARY ARTERY DISEASE INVOLVING NATIVE CORONARY ARTERY OF NATIVE HEART WITHOUT ANGINA PECTORIS: ICD-10-CM

## 2020-01-24 DIAGNOSIS — E03.9 ACQUIRED HYPOTHYROIDISM: ICD-10-CM

## 2020-01-24 DIAGNOSIS — G47.9 SLEEP DISORDER: ICD-10-CM

## 2020-01-24 DIAGNOSIS — B96.89 UTI DUE TO KLEBSIELLA SPECIES: ICD-10-CM

## 2020-01-24 DIAGNOSIS — I63.9 CEREBROVASCULAR ACCIDENT (CVA), UNSPECIFIED MECHANISM (HCC): ICD-10-CM

## 2020-01-24 DIAGNOSIS — R26.9 GAIT DISTURBANCE: ICD-10-CM

## 2020-01-24 DIAGNOSIS — I63.239 CAROTID STENOSIS, SYMPTOMATIC, WITH INFARCTION (HCC): ICD-10-CM

## 2020-01-24 DIAGNOSIS — J44.9 CHRONIC OBSTRUCTIVE PULMONARY DISEASE, UNSPECIFIED COPD TYPE (HCC): ICD-10-CM
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
## 2020-01-24 DIAGNOSIS — S20.211S CONTUSION OF RIB ON RIGHT SIDE, SEQUELA: Primary | ICD-10-CM

## 2020-01-24 DIAGNOSIS — N39.0 UTI DUE TO KLEBSIELLA SPECIES: ICD-10-CM

## 2020-01-24 PROBLEM — N12 PYELONEPHRITIS OF LEFT KIDNEY: Status: RESOLVED | Noted: 2019-11-06 | Resolved: 2020-01-24

## 2020-01-24 PROCEDURE — 99214 OFFICE O/P EST MOD 30 MIN: CPT | Performed by: FAMILY MEDICINE

## 2020-01-24 RX ORDER — ROSUVASTATIN CALCIUM 5 MG/1
5 TABLET, COATED ORAL DAILY
Qty: 90 TABLET | Refills: 3 | Status: SHIPPED | OUTPATIENT
Start: 2020-01-24 | End: 2020-02-07 | Stop reason: SDUPTHER

## 2020-01-24 RX ORDER — LEVOTHYROXINE SODIUM 0.12 MG/1
125 TABLET ORAL DAILY
Qty: 90 TABLET | Refills: 3 | Status: SHIPPED | OUTPATIENT
Start: 2020-01-24 | End: 2021-02-09

## 2020-01-24 RX ORDER — DILTIAZEM HYDROCHLORIDE 240 MG/1
240 CAPSULE, COATED, EXTENDED RELEASE ORAL DAILY
Qty: 90 CAPSULE | Refills: 3 | Status: SHIPPED | OUTPATIENT
Start: 2020-01-24 | End: 2021-02-09

## 2020-01-24 NOTE — PROGRESS NOTES
FAMILY PRACTICE OFFICE VISIT  Beau SCHRADER O  [facility]  [id number]   [address], [place], [address]      NAME: Pascual Perales  AGE: 78 y o  SEX: female  : 1940   MRN: [id number]    DATE: 2020  TIME: 5:28 PM    Assessment and Plan     Problem List Items Addressed This Visit        Endocrine    Acquired hypothyroidism    Relevant Medications    levothyroxine 125 mcg tablet    RESOLVED: Type 2 diabetes mellitus without complication, without long-term current use of insulin ([facility])    Relevant Medications    rosuvastatin (CRESTOR) 5 mg tablet       Respiratory    COPD (chronic obstructive pulmonary disease) ([facility])       Cardiovascular and Mediastinum    Carotid stenosis right,  symptomatic, with infarction [facility])    Cerebrovascular accident (CVA) ([facility] [address] )    Relevant Medications    rosuvastatin (CRESTOR) 5 mg tablet    Coronary artery disease involving native coronary artery of native heart without angina pectoris    Relevant Medications    diltiazem (CARDIZEM CD) 240 mg 24 hr capsule       Other    Dysarthria related to 2018 CVA, worsened 2019    Gait disturbance    S/P deep brain stimulator placement    Sleep disorder      Other Visit Diagnoses     Contusion of rib on right side, sequela w/ hematoma    -  Primary    Fall, sequela        UTI due to Klebsiella species        Essential hypertension        Relevant Medications    diltiazem (CARDIZEM CD) 240 mg 24 hr capsule          Patient Instructions   We did review her recent emergency room visits, she had undergone catheterization of heart , subsequent fever, urine culture at emergency room showed Klebsiella, she has only been using Keflex 250mg twice daily, she will use 500 mg ( 2 of the 250mg pills)  3 times daily until gone  Does have nephrostomy tube, will be undergoing internalization  She will continue to see Urology in follow-up        Catheterization did show 100% occlusion RCA, good collaterals, otherwise only mild arterial sclerotic heart disease  Continue Crestor 5 mg daily, she is tolerating this, past myalgia with statins  Continue with good blood pressure control, she also does have carotid stenosis with history CVA  She will be seeing vascular again in February to discuss carotid procedure  Continues on Plavix  She did have a fall January 18th, contusion right posterior rib, x-ray at ER was negative for fracture, she is improving,   Does have significant bruising with hematoma right flank  Never used tramadol, she will continue Tylenol every 8 hours as is  Also has been using lidocaine patch  Call if increased pain  History CVA, imbalance, dysarthria, left leg weakness, I would like her to restart her speech therapy / balance therapy / physical therapy  Respiratory status at baseline, stay on nebulizer twice daily as is along with inhalers  Call if worsening  Longstanding issue with sleep, she wishes to hold off on other medication for this  Stair     TSH was fine in September at 2 9, stay on levothyroxine 125 mcg daily as is  Other blood work at emergency room showed BUN / creatinine 22/1 08 with potassium 3 7  History diet-controlled diabetes, A1c back in September 6 0 8, glucose at emergency room 91  Hemoglobin 11 1  Recheck here in March  Chief Complaint     Chief Complaint   Patient presents with    Follow-up     6wk       History of Present Illness   Duc Hoff is a 78y o -year-old female who   Is in for a re-evaluation, I had seen her back in December  She had undergone cardiac catheterization in January, subsequently developed fever and was seen at the emergency room, received prescription for Keflex 500 every 8 hours times 10 days, appears she has been using 1 pill twice daily  Does note occasional chill, no fever  Does have left nephrostomy tube as before, Urology will be internalizing this       she did have a fall 2 days after emergency room visit, was again seen at the ER, x-ray negative for fracture right posterior rib, is improving  Never use tramadol, is using Tylenol  Also using lidocaine patch  Has no increased shortness of breath  She is set up for carotid procedure in February, no new symptoms of stroke  Therapy has been on hold  Is on Plavix/statin / blood pressure meds, no issues with medication  Continues on Lasix 20 mg daily, weight has been stable  Review of Systems   Review of Systems   Constitutional: Positive for fatigue  Negative for appetite change, fever and unexpected weight change  HENT: Negative for sore throat  Respiratory: Positive for shortness of breath (Back at baseline)  Negative for cough and chest tightness  Cardiovascular: Negative for chest pain, palpitations and leg swelling  Gastrointestinal: Negative for abdominal pain, blood in stool, nausea and vomiting  No acid reflux     No change in bowel   Genitourinary:        See HPI along with Urology consults   Musculoskeletal: Positive for back pain (Right flank)  Neurological: Negative for dizziness, syncope, light-headedness and headaches  Psychiatric/Behavioral: Negative for behavioral problems and confusion         Active Problem List     Patient Active Problem List   Diagnosis    Benign essential hypertension    Benign familial tremor    History of CVA (cerebrovascular accident)    [facility]) [facility] [address] [facility]    Esophageal reflux    Gait disturbance    Dyslipidemia    Sleep disorder    Sciatica    Right shoulder pain    Osteoarthritis of hip    OA (osteoarthritis)    Multiple joint pain    Acquired hypothyroidism    Lumbar degenerative disc disease    Back pain    History of pancreatitis    Aortic valve stenosis - Mild -Moderate    S/P deep brain stimulator placement    Microscopic hematuria    Dysarthria related to Nov 2018 CVA, worsened Sept 2019    Swallowing dysfunction    Weakness of left leg    Carotid stenosis right,  symptomatic, with infarction ([facility] [address] )    Hydronephrosis of left kidney    Abnormal CT scan    Cerebrovascular accident (CVA) ([facility] [address] [facility]    Nephrostomy tube Left    Coronary artery disease involving native coronary artery of native heart without angina pectoris    Diabetes mellitus type 2, diet-controlled ([facility] [address] )       Past Medical History:  Reviewed    Past Surgical History:  Reviewed    Family History:  Reviewed    Social History:  Reviewed    Objective     Vitals:    01/24/20 1013   BP: 138/70   Pulse: 72   Resp: 18   SpO2: 96%   Weight: 85 5 kg (188 lb 9 6 oz)   Height: 5' 4" (1 626 m)     Body mass index is 32 37 kg/m²  BP Readings from Last 3 Encounters:   01/24/20 138/70   01/18/20 111/59   01/17/20 121/58       Wt Readings from Last 3 Encounters:   01/24/20 85 5 kg (188 lb 9 6 oz)   01/18/20 85 7 kg (188 lb 15 oz)   01/16/20 87 3 kg (192 lb 7 4 oz)       Physical Exam   Constitutional: She is oriented to person, place, and time  Pleasant overweight female, overall appears as at baseline other than she is complaining of right flank pain, area she struck with fall  HENT:   Mouth/Throat: Oropharynx is clear and moist  No oropharyngeal exudate  TM clear   Eyes: Conjunctivae are normal  No scleral icterus  Cardiovascular: Normal rate, regular rhythm and normal heart sounds  Occasional ectopic   Pulmonary/Chest: No respiratory distress  Slightly decreased breath sounds bilateral but clear  Significant bruising with tenderness right posterior lower ribs with superficial hematoma, no open wound   Abdominal: Soft  There is no tenderness  Musculoskeletal: She exhibits no edema  Lymphadenopathy:     She has no cervical adenopathy  Neurological: She is alert and oriented to person, place, and time  Psychiatric: She has a normal mood and affect   Her behavior is normal        ALLERGIES:  Allergies   Allergen Reactions    Ciprofloxacin Diarrhea    Simvastatin Myalgia    Advair Diskus [Fluticasone-Salmeterol] Palpitations     Ok w flovent    Tetracycline Rash     Reaction Date: [id number];        Current Medications     Current Outpatient Medications   Medication Sig Dispense Refill    albuterol (2 5 mg/3 mL) 0 083 % nebulizer solution Take 1 vial (2 5 mg total) by nebulization 2 (two) times a day 60 vial 1    ascorbic acid (VITAMIN C) 250 MG tablet Take 500 mg by mouth daily      cephalexin (KEFLEX) 250 mg capsule Take 2 capsules (500 mg total) by mouth every 8 (eight) hours for 10 days 60 capsule 0    Cholecalciferol (VITAMIN D-3 PO) Take 1,000 Units by mouth daily   clopidogrel (PLAVIX) 75 mg tablet Take 1 tablet (75 mg total) by mouth daily HOLD FOR NEPHROSTOMY TUBE PLACEMENT 30 tablet 5    diltiazem (CARDIZEM CD) 240 mg 24 hr capsule Take 1 capsule (240 mg total) by mouth daily 90 capsule 3    Fluticasone Propionate, Inhal, (FLOVENT DISKUS) 250 MCG/BLIST AEPB Inhale 1 puff 2 (two) times a day      furosemide (LASIX) 20 mg tablet Take 1 tablet (20 mg total) by mouth daily 90 tablet 1    levothyroxine 125 mcg tablet Take 1 tablet (125 mcg total) by mouth daily 90 tablet 3    lidocaine (LIDODERM) 5 % Apply 1 patch topically daily Remove & Discard patch within 12 hours or as directed by MD 7 patch 0    Multiple Vitamins-Minerals (PRESERVISION AREDS PO) Take 1 Cap by Mouth daily      multivitamin (THERAGRAN) TABS Take 1 tablet by mouth daily      rosuvastatin (CRESTOR) 5 mg tablet Take 1 tablet (5 mg total) by mouth daily 90 tablet 3    docusate sodium (COLACE) 100 mg capsule Take 1 capsule (100 mg total) by mouth daily as needed for constipation for up to 7 days 21 capsule 0    omeprazole (PriLOSEC) 20 mg delayed release capsule Take 1 capsule by mouth as needed         No current facility-administered medications for this visit               No orders of the defined types were placed in this encounter          Gertrudis Deep, DO

## 2020-01-24 NOTE — PATIENT INSTRUCTIONS
We did review her recent emergency room visits, she had undergone catheterization of heart January 16th, subsequent fever, urine culture at emergency room showed Klebsiella, she has only been using Keflex 250mg twice daily, she will use 500 mg ( 2 of the 250mg pills)  3 times daily until gone  Does have nephrostomy tube, will be undergoing internalization  She will continue to see Urology in follow-up  Catheterization did show 100% occlusion RCA, good collaterals, otherwise only mild arterial sclerotic heart disease  Continue Crestor 5 mg daily, she is tolerating this, past myalgia with statins  Continue with good blood pressure control, she also does have carotid stenosis with history CVA  She will be seeing vascular again in February to discuss carotid procedure  Continues on Plavix  She did have a fall January 18th, contusion right posterior rib, x-ray at ER was negative for fracture, she is improving,   Does have significant bruising with hematoma right flank  Never used tramadol, she will continue Tylenol every 8 hours as is  Also has been using lidocaine patch  Call if increased pain  History CVA, imbalance, dysarthria, left leg weakness, I would like her to restart her speech therapy / balance therapy / physical therapy  Respiratory status at baseline, stay on nebulizer twice daily as is along with inhalers  Call if worsening  Longstanding issue with sleep, she wishes to hold off on other medication for this  Stair     TSH was fine in September at 2 9, stay on levothyroxine 125 mcg daily as is  Other blood work at emergency room showed BUN / creatinine 22/1 08 with potassium 3 7  History diet-controlled diabetes, A1c back in September 6 0 8, glucose at emergency room 91  Hemoglobin 11 1  Recheck here in March

## 2020-01-27 ENCOUNTER — TELEPHONE (OUTPATIENT)
Dept: SURGERY | Facility: HOSPITAL | Age: 80
End: 2020-01-27

## 2020-01-28 ENCOUNTER — HOSPITAL ENCOUNTER (OUTPATIENT)
Dept: RADIOLOGY | Facility: HOSPITAL | Age: 80
Discharge: HOME | End: 2020-01-28
Attending: UROLOGY
Payer: MEDICARE

## 2020-01-28 VITALS
TEMPERATURE: 99 F | SYSTOLIC BLOOD PRESSURE: 145 MMHG | HEART RATE: 84 BPM | RESPIRATION RATE: 20 BRPM | WEIGHT: 188 LBS | OXYGEN SATURATION: 94 % | HEIGHT: 64 IN | BODY MASS INDEX: 32.1 KG/M2 | DIASTOLIC BLOOD PRESSURE: 95 MMHG

## 2020-01-28 DIAGNOSIS — Q62.11 HYDRONEPHROSIS WITH URETEROPELVIC JUNCTION (UPJ) OBSTRUCTION: ICD-10-CM

## 2020-01-28 PROCEDURE — C1729 CATH, DRAINAGE: HCPCS

## 2020-01-28 PROCEDURE — 50434 CONVERT NEPHROSTOMY CATHETER: CPT

## 2020-01-28 PROCEDURE — 50693 PLMT URETERAL STENT PRQ: CPT | Performed by: RADIOLOGY

## 2020-01-28 PROCEDURE — 99152 MOD SED SAME PHYS/QHP 5/>YRS: CPT | Performed by: RADIOLOGY

## 2020-01-28 PROCEDURE — C1769 GUIDE WIRE: HCPCS

## 2020-01-28 PROCEDURE — C1894 INTRO/SHEATH, NON-LASER: HCPCS

## 2020-01-28 PROCEDURE — 1123F ACP DISCUSS/DSCN MKR DOCD: CPT | Performed by: RADIOLOGY

## 2020-01-28 RX ORDER — SODIUM CHLORIDE 9 MG/ML
75 INJECTION, SOLUTION INTRAVENOUS CONTINUOUS
Status: DISCONTINUED | OUTPATIENT
Start: 2020-01-28 | End: 2020-01-29 | Stop reason: HOSPADM

## 2020-01-28 RX ORDER — FENTANYL CITRATE 50 UG/ML
INJECTION, SOLUTION INTRAMUSCULAR; INTRAVENOUS CODE/TRAUMA/SEDATION MEDICATION
Status: COMPLETED | OUTPATIENT
Start: 2020-01-28 | End: 2020-01-28

## 2020-01-28 RX ORDER — MIDAZOLAM HYDROCHLORIDE 2 MG/2ML
INJECTION, SOLUTION INTRAMUSCULAR; INTRAVENOUS CODE/TRAUMA/SEDATION MEDICATION
Status: COMPLETED | OUTPATIENT
Start: 2020-01-28 | End: 2020-01-28

## 2020-01-28 RX ORDER — CEFAZOLIN SODIUM 2 G/50ML
SOLUTION INTRAVENOUS
Status: COMPLETED | OUTPATIENT
Start: 2020-01-28 | End: 2020-01-28

## 2020-01-28 RX ADMIN — CEFAZOLIN SODIUM 2000 MG: 2 SOLUTION INTRAVENOUS at 11:52

## 2020-01-28 RX ADMIN — IOHEXOL 25 ML: 350 INJECTION, SOLUTION INTRAVENOUS at 12:32

## 2020-01-28 RX ADMIN — SODIUM CHLORIDE 75 ML/HR: 0.9 INJECTION, SOLUTION INTRAVENOUS at 10:27

## 2020-01-28 RX ADMIN — FENTANYL CITRATE 50 MCG: 50 INJECTION INTRAMUSCULAR; INTRAVENOUS at 11:42

## 2020-01-28 RX ADMIN — FENTANYL CITRATE 50 MCG: 50 INJECTION INTRAMUSCULAR; INTRAVENOUS at 11:31

## 2020-01-28 RX ADMIN — MIDAZOLAM 1 MG: 1 INJECTION INTRAMUSCULAR; INTRAVENOUS at 11:31

## 2020-01-28 RX ADMIN — MIDAZOLAM 1 MG: 1 INJECTION INTRAMUSCULAR; INTRAVENOUS at 11:42

## 2020-01-28 NOTE — BRIEF OP NOTE (RAD/CATH)
IR PCNU TUBE CHANGE Procedure Note    PATIENT NAME: Michael Nur  : 1940  MRN: [id number]    Pre-op Diagnosis:   1  Hydronephrosis with ureteropelvic junction (UPJ) obstruction      Post-op Diagnosis:   1  Hydronephrosis with ureteropelvic junction (UPJ) obstruction        Surgeon:   Vijay Rose MD    Estimated Blood Loss:  Minimal    Findings:   1  Successful conversion to left internal 8 x 24 cm double-J ureteral stent and 8 [place] safety nephrostomy tube  2  Internal drainage through newly placed stent appears prompt    Plan for 1 week capping trial 1 week capping trial       Specimens:  None    Complications:  None    Anesthesia: Conscious sedation    Vijay Rose MD     Date: 2020  Time: 12:11 PM
Speaking Coherently

## 2020-01-28 NOTE — DISCHARGE INSTRUCTIONS
Nephrostomy Tube Care     WHAT YOU NEED TO KNOW:   A nephrostomy tube is a catheter (thin plastic tube) that is inserted through your skin and into your kidney  The nephrostomy tube drains urine from your kidney into a collecting bag outside your body  You may need a nephrostomy tube when something is blocking the normal flow of urine  A nephrostomy tube may be used for a short or a long period of time  The nephrostomy tube comes out of your back, so you will need someone to help care for your nephrostomy tube  DISCHARGE INSTRUCTIONS:      How to clean the skin around the nephrostomy tube and change the bandage:  Since the nephrostomy tube comes out of your back, you will not be able to care for it by yourself  Ask someone to follow the general directions below to check and care for your nephrostomy tube  Gather the items you will need  Disposable (single use) under-pad, and a clean washcloth  ¨ Plain soap, warm water, and new medical gloves  ¨ Sterile gauze bandages  ¨ Clear adhesive dressing or medical tape  ¨ Skin barrier  ¨ Protective skin film  ¨ Trash bag  · Remove the old bandage, and check the tube entry site  ¨ Have the patient lie on his side with the nephrostomy tube entry site facing up  Place the under-pad where it will catch drainage as you are working with the nephrostomy tube  ¨ Wash your hands with soap and water  Put on new medical gloves  ¨ Gently remove the old bandage, without pulling on the tube  Do this by holding the skin beside the tube with one hand  With the other hand, gently remove sticky tape and the skin barrier by pulling in the same direction as hair growth  Do not touch the side of the bandage that is placed over or around the tube  Throw the bandage and skin barrier away in a trash bag  ¨ Look for signs of infection, such as skin redness and swelling  Report any skin changes to healthcare providers  ¨ Clean the tube entry site      ¨ Hold the tube in place to keep it from being pulled out while you are cleaning around it  ¨ You will need to clean the area twice  For the first cleaning, wet a new gauze bandage with soap and water  Begin at the entry site of the tube  Wipe the skin in circles, moving away from the entry site  Remove blood and any other material with the gauze  Do this as often as needed  Use a new gauze bandage each time you clean the area, moving away from the entry site  ¨ For the second cleaning, wet a new gauze bandage with water  Begin at the entry site of the tube  Wipe the skin in circles, moving away from the entry site  Use a new gauze bandage each time you clean the area, moving away from the entry site  ¨ Gently pat the skin with a clean washcloth to dry it  · Apply the skin barrier and bandages  ¨ Roll up a bandage to make it thick, and place it under  the place where the tube enters the skin  Place it to support the tube, and stop it from kinking or bending  Tape the bandage in place, and apply more bandages if directed by a healthcare provider  ¨ Bring the tubing forward to the front and tape it to the skin  Do not stretch the tube tight, because this may pull the nephrostomy tube out  How often to change the bandage  Change the bandage around the tube, every other day  If your bandages  get dirty or wet, change them right away, and as often as needed  If your nephrostomy tube is to be used for a long period of time, the tube needs to be changed every 2 to 3 months  Healthcare providers will tell you when you need to make an appointment to have your tube changed  How to care for the urine drainage bag:   · Ask if you need to measure and write down how much urine is in the bag before you empty it  Drain urine out of the drainage bag when it is ½ to ? full  Open the spout at the bottom of the bag to empty the urine into the toilet  · You may need to detach the drainage bag from the nephrostomy tube to change it    If so, attach a new drainage bag tightly to the nephrostomy tube  ·   How to prevent problems with your nephrostomy tube:   · Change bandages, directed  This helps to prevent infection  Throw away or clean your drainage bag as directed by your healthcare provider  · Wipe the connecting ends of the drainage bag with alcohol before you reconnect the bag to the tube  This helps prevent infection  Keep the tube taped to your skin and connected to a drainage bag placed below the level of your kidneys  This helps prevent urine from backing up into your kidneys  You may wear a small drainage bag strapped to your leg to let you move around more easily  · Check the catheter to be sure it is in place after you change your clothes or do other activities  Do not wear tight clothing over the tube  Place the tubing over your thigh rather than under it when you are sitting down  Be sure that nothing is pulling on the nephrostomy tube when you move around  · Change positions if you see little or no urine in your drainage bag  Check to see if the urine tube is twisted or bent  Be sure that you are not sitting or lying on the tube  If there are no kinks and there is little or no urine in the drainage bag, tell your healthcare provider  · Flush out the tube as directed  Some tubes get flushed one time a day with 10 mls of NSS You will be given a prescription for the flushes  To flush the nephrostomy tube, clean both connections with alcohol swap  Twist off the drainage bag tube and twist the saline syringe into the nephrostomy tube and flush briskly  Remove the syringe and twist the drainage bag tube back into the nephrostomy tube  · Keep the site covered while you shower  Tape a piece of clear adhesive plastic over the dressing to keep it dry while you shower  Do not take tub baths      Contact Interventional Radiology at [phone] [facility] PATIENTS: Contact Interventional Radiology at [phone]) [place] PATIENTS: Contact Interventional Radiology at [phone]) if:  · The skin around the nephrostomy tube is red, swollen, itches, or has a rash  · You have a fever greater than 101 or chills  · You have lower back or hip pain  · There are changes in how your urine looks or smells  · You have little or no urine draining from the nephrostomy tube  · You have nausea and are vomiting  · The black wander on your tube has moved, or the tube is longer than when it was put in    · You have questions or concerns about your condition or care  · The nephrostomy tube comes out completely  · There is blood, pus, or a bad smell coming from the place where the tube enters your skin  · Urine is leaking around the tube  The following pharmacies carry the flush syringes  [facility]       [address]  Phone [phone]            Phone [phone]  [address] & [facility]                      [address]                                 [phone]  Phone [phone]            Phone [phone]    [facility]                                                                         [facility] [phone]  [address]   Phone [phone]        Nephrostomy Tube Care     WHAT YOU NEED TO KNOW:   A nephrostomy tube is a catheter (thin plastic tube) that is inserted through your skin and into your kidney  The nephrostomy tube drains urine from your kidney into a collecting bag outside your body  You may need a nephrostomy tube when something is blocking the normal flow of urine  A nephrostomy tube may be used for a short or a long period of time  The nephrostomy tube comes out of your back, so you will need someone to help care for your nephrostomy tube  DISCHARGE INSTRUCTIONS:     Resume your normal diet  Small sips of flat soda will help with mild nausea  How to clean the skin around the nephrostomy tube and change the bandage:  Since the nephrostomy tube comes out of your back, you will not be able to care for it by yourself  Ask someone to follow the general directions below to check and care for your nephrostomy tube  Gather the items you will need  Disposable (single use) under-pad, and a clean washcloth  ¨ Plain soap, warm water, and new medical gloves  ¨ Sterile gauze bandages  ¨ Clear adhesive dressing or medical tape  ¨ Skin barrier  ¨ Protective skin film  ¨ Trash bag  · Remove the old bandage, and check the tube entry site  ¨ Have the patient lie on his side with the nephrostomy tube entry site facing up  Place the under-pad where it will catch drainage as you are working with the nephrostomy tube  ¨ Wash your hands with soap and water  Put on new medical gloves  ¨ Gently remove the old bandage, without pulling on the tube  Do this by holding the skin beside the tube with one hand  With the other hand, gently remove sticky tape and the skin barrier by pulling in the same direction as hair growth  Do not touch the side of the bandage that is placed over or around the tube  Throw the bandage and skin barrier away in a trash bag  ¨ Look for signs of infection, such as skin redness and swelling  Report any skin changes to healthcare providers  ¨ Clean the tube entry site  ¨ Hold the tube in place to keep it from being pulled out while you are cleaning around it  ¨ You will need to clean the area twice  For the first cleaning, wet a new gauze bandage with soap and water  Begin at the entry site of the tube  Wipe the skin in circles, moving away from the entry site  Remove blood and any other material with the gauze  Do this as often as needed  Use a new gauze bandage each time you clean the area, moving away from the entry site  ¨ For the second cleaning, wet a new gauze bandage with water  Begin at the entry site of the tube  Wipe the skin in circles, moving away from the entry site  Use a new gauze bandage each time you clean the area, moving away from the entry site  ¨ Gently pat the skin with a clean washcloth to dry it  · Apply the skin barrier and bandages  ¨ Roll up a bandage to make it thick, and place it under  the place where the tube enters the skin  Place it to support the tube, and stop it from kinking or bending  Tape the bandage in place, and apply more bandages if directed by a healthcare provider  ¨ Bring the tubing forward to the front and tape it to the skin  Do not stretch the tube tight, because this may pull the nephrostomy tube out  How often to change the bandage  Change the bandage around the tube, every other day  If your bandages  get dirty or wet, change them right away, and as often as needed  If your nephrostomy tube is to be used for a long period of time, the tube needs to be changed every 2 to 3 months  Healthcare providers will tell you when you need to make an appointment to have your tube changed  How to care for the urine drainage bag:   · Ask if you need to measure and write down how much urine is in the bag before you empty it  Drain urine out of the drainage bag when it is ½ to ? full  Open the spout at the bottom of the bag to empty the urine into the toilet  · You may need to detach the drainage bag from the nephrostomy tube to change it    If so, attach a new drainage bag tightly to the nephrostomy tube  ·   How to prevent problems with your nephrostomy tube:   · Change bandages, directed    This helps to prevent infection  Throw away or clean your drainage bag as directed by your healthcare provider  · Wipe the connecting ends of the drainage bag with alcohol before you reconnect the bag to the tube  This helps prevent infection  Keep the tube taped to your skin and connected to a drainage bag placed below the level of your kidneys  This helps prevent urine from backing up into your kidneys  You may wear a small drainage bag strapped to your leg to let you move around more easily  · Check the catheter to be sure it is in place after you change your clothes or do other activities  Do not wear tight clothing over the tube  Place the tubing over your thigh rather than under it when you are sitting down  Be sure that nothing is pulling on the nephrostomy tube when you move around  · Change positions if you see little or no urine in your drainage bag  Check to see if the urine tube is twisted or bent  Be sure that you are not sitting or lying on the tube  If there are no kinks and there is little or no urine in the drainage bag, tell your healthcare provider  · Flush out the tube as directed  Some tubes get flushed one time a day with 10 mls of NSS You will be given a prescription for the flushes  To flush the nephrostomy tube, clean both connections with alcohol swap  Twist off the drainage bag tube and twist the saline syringe into the nephrostomy tube and flush briskly  Remove the syringe and twist the drainage bag tube back into the nephrostomy tube  · Keep the site covered while you shower  Tape a piece of clear adhesive plastic over the dressing to keep it dry while you shower  Do not take tub baths  Contact Interventional Radiology at [phone] [place] PATIENTS: Contact Interventional Radiology at [phone]) [place] [facility] PATIENTS: Contact Interventional Radiology at [phone]) if:  · The skin around the nephrostomy tube is red, swollen, itches, or has a rash  · You have a fever    · You have lower back or hip pain  · There are changes in how your urine looks or smells  · You have little or no urine draining from the nephrostomy tube  · You have nausea and are vomiting  · The black wander on your tube has moved, or the tube is longer than when it was put in    · You have questions or concerns about your condition or care  · The nephrostomy tube comes out completely  · There is blood, pus, or a bad smell coming from the place where the tube enters your skin  · Urine is leaking around the tube  Tube Capping Trial        If your tube is capped and has no drainage bag, the urine will flow through the ureter         and into the bladder for you to urinate normally  This is called a capping trial                    Continue to flush your tube one time per day  You will have an extra drainage bag to       keep at home in case the capping trial fails  Call the IR department if you experience        any of the following: Pain, fever greater than 101  Persistent nausea or vomiting  The following pharmacies carry the flush syringes  [facility]                     [facility]       [address]  Phone [phone]            Phone [phone]  [address] & [facility]                      [address]                                 [phone]  Phone [phone]            Phone [phone]    [facility]                                                                         [facility] [phone]  Gerda     Babatunde PA   Phone [phone]      Ureteral Stent Placement   WHAT YOU NEED TO KNOW:   Ureteral stent placement is a procedure to open a blocked or narrow ureter  The ureter is the tube that carries urine from your kidney into your bladder  A stent is a thin hollow plastic tube used to hold your ureter open and allow urine to flow  The stent may stay in for several weeks  DISCHARGE INSTRUCTIONS:   Medicines:   · Pain medicine  may be given to take away or decrease pain  Do not wait until the pain is severe before you take your medicine  · Antibiotics  help prevent infections  Your healthcare provider may prescribe these for you while your stent remains in  · Take your medicine as directed  Contact your healthcare provider if you think your medicine is not helping or if you have side effects  Tell him or her if you are allergic to any medicine  Keep a list of the medicines, vitamins, and herbs you take  Include the amounts, and when and why you take them  Bring the list or the pill bottles to follow-up visits  Carry your medicine list with you in case of an emergency  Follow up with your urologist as directed: You will need regular follow-up visits with your urologist as long as the stent remains in  He will check to make sure the stent is working properly  He may do urine cultures to check for infection  Write down your questions so you remember to ask them during your visits  Self-care:   · Drink liquids  as directed  Ask your healthcare provider how much liquid to drink each day and which liquids are best for you  Fluids such as cranberry or apple juice may be especially helpful to prevent urinary infections  · Return to normal activities  the day after your stent placement or as directed by your healthcare provider  · You may take a shower  the day after your stent placement if your healthcare provider says it is okay    Contact your healthcare provider or urologist if:   · You have a fever or chills  · You feel like you need to urinate often  · You have pain when you urinate or pain around your bladder or kidney  · You see blood in your urine or it looks cloudy  · You have questions or concerns about your condition or care  Seek care immediately or call 911 if:   · You urinate little or not at all  · You have severe pain in your abdomen  © 2017 [address] Information is for End User's use only and may not be sold, redistributed or otherwise used for commercial purposes  All illustrations and images included in CareNotes® are the copyrighted property of [employer]  or Louis Walker  The above information is an  only  It is not intended as medical advice for individual conditions or treatments  Talk to your doctor, nurse or pharmacist before following any medical regimen to see if it is safe and effective for you

## 2020-01-28 NOTE — PROGRESS NOTES
H and P reviewed  There have been no interval changes  /77   Pulse 92   Temp 99 °F (37 2 °C) (Tympanic)   Resp 16   Ht 5' 4" (1 626 m)   Wt 85 3 kg (188 lb)   SpO2 98%   BMI 32 27 kg/m²     Prior imaging was reviewed  Recent fall with right-sided posterior rib contusion  Patient presents today for conversion of her left nephroureterostomy tube to a internalized double-J ureteral stent and safety nephrostomy tube  She has a UPJ stricture and underwent brush biopsy at the time of her prior conversion which was negative for urothelial carcinoma  Only benign cells were seen  Procedure and plan was discussed with patient and her daughter and all questions answered  As long as there is good internal drainage through the stent today, we will have her undergo a short capping trial approximately 1 week and hopefully remove her safety nephrostomy tube if she tolerates that  Informed written consent was obtained      Jennifer Martinez MD  01/28/20  11:28 AM For information on Fall & Injury Prevention, visit [email or web address]

## 2020-01-29 ENCOUNTER — TELEPHONE (OUTPATIENT)
Dept: FAMILY MEDICINE CLINIC | Facility: CLINIC | Age: 80
End: 2020-01-29
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
## 2020-01-29 DIAGNOSIS — R26.9 GAIT DISTURBANCE: Primary | ICD-10-CM

## 2020-01-29 DIAGNOSIS — R29.898 WEAKNESS OF LEFT LEG: ICD-10-CM

## 2020-01-29 DIAGNOSIS — I63.9 CEREBROVASCULAR ACCIDENT (CVA), UNSPECIFIED MECHANISM (HCC): ICD-10-CM

## 2020-01-29 DIAGNOSIS — R47.1 DYSARTHRIA: ICD-10-CM

## 2020-01-29 DIAGNOSIS — R26.89 IMBALANCE: ICD-10-CM

## 2020-01-29 NOTE — TELEPHONE ENCOUNTER
Pt thought you ordered speech therapy for her and maybe PT and /or OT? I don't see any orders in her chart  Please advise     FaX to: [phone]

## 2020-01-29 NOTE — TELEPHONE ENCOUNTER
At last visit I wanted her to restart those therapies      I had ordered previously, she does therapy at [facility]

## 2020-01-31 ENCOUNTER — TELEPHONE (OUTPATIENT)
Dept: OTHER | Facility: OTHER | Age: 80
End: 2020-01-31

## 2020-01-31 ENCOUNTER — OFFICE VISIT (OUTPATIENT)
Dept: UROLOGY | Facility: MEDICAL CENTER | Age: 80
End: 2020-01-31
Payer: MEDICARE

## 2020-01-31 VITALS
HEIGHT: 64 IN | HEART RATE: 70 BPM | WEIGHT: 188 LBS | SYSTOLIC BLOOD PRESSURE: 128 MMHG | DIASTOLIC BLOOD PRESSURE: 72 MMHG | BODY MASS INDEX: 32.1 KG/M2

## 2020-01-31 DIAGNOSIS — N13.30 HYDRONEPHROSIS OF LEFT KIDNEY: Primary | ICD-10-CM

## 2020-01-31 DIAGNOSIS — N20.0 CALCULUS OF KIDNEY: ICD-10-CM

## 2020-01-31 PROCEDURE — 99214 OFFICE O/P EST MOD 30 MIN: CPT | Performed by: UROLOGY

## 2020-01-31 NOTE — PATIENT INSTRUCTIONS
Radiology placed the stent on the inside from the kidney down through the scar tissue into the bladder  The scar tissue was the problem, the small stone in the kidney is not the problem  Our plan is to see you back here in late March, and schedule a short procedure at the hospital in April to change that tube on the inside  We will change it every 3-4 months

## 2020-01-31 NOTE — PROGRESS NOTES
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, see prior notes regarding inability get a stent through it in OR back in November 2019  Radiology internalized her perc neph last week, and they will remove the external tube next week  Daughter had called me last week about a kidney stone on the CT scan, and I left a detailed mentions saying the stone was not the problem, the tight UPJ was  ASSESSMENT / PLAN:    At this point I recommend maintaining an internal stent and changing every three months  There is some risk of infection, but I think that is safer than reconstructive surgery, and I do not think there is any advantage to trying to open up the UPJ and take care of the stone  Follow-up two months      The following portions of the patient's history were reviewed and updated as appropriate: allergies, current medications, past family history, past medical history, past social history, past surgical history and problem list     Review of Systems   All other systems reviewed and are negative  Objective:     Physical Exam   Constitutional: She appears well-developed and well-nourished     Abdominal:   Soft nontender         No results found for: PSA]  BUN   Date Value Ref Range Status   01/18/2020 22 5 - 25 mg/dL Final   10/29/2014 18 5 - 25 mg/dL Final     Creatinine   Date Value Ref Range Status   01/18/2020 1 08 0 60 - 1 30 mg/dL Final     Comment:     Standardized to IDMS reference method   10/29/2014 0 95 0 60 - 1 30 mg/dL Final     Comment:     Standardized to IDMS reference method     No components found for: CBC      Patient Active Problem List   Diagnosis    Benign essential hypertension    Benign familial tremor    History of CVA (cerebrovascular accident)    COPD (chronic obstructive pulmonary disease) ([address] )    Esophageal reflux    Gait disturbance    Dyslipidemia    Sleep disorder    Sciatica    Right shoulder pain    Osteoarthritis of hip    OA (osteoarthritis)    Multiple joint pain    Acquired hypothyroidism    Lumbar degenerative disc disease    Back pain    History of pancreatitis    Aortic valve stenosis - Mild -Moderate    S/P deep brain stimulator placement    Microscopic hematuria    Dysarthria related to Nov 2018 CVA, worsened Sept 2019    Swallowing dysfunction    Weakness of left leg    Carotid stenosis right,  symptomatic, with infarction ([facility]    Hydronephrosis of left kidney    Abnormal CT scan    Cerebrovascular accident (CVA) ([facility] )    Nephrostomy tube Left    Coronary artery disease involving native coronary artery of native heart without angina pectoris    Diabetes mellitus type 2, diet-controlled ([facility] )        There are no diagnoses linked to this encounter  Patient ID: Donavon Vides is a 78 y o  female  Current Outpatient Medications:     albuterol (2 5 mg/3 mL) 0 083 % nebulizer solution, Take 1 vial (2 5 mg total) by nebulization 2 (two) times a day, Disp: 60 vial, Rfl: 1    ascorbic acid (VITAMIN C) 250 MG tablet, Take 500 mg by mouth daily, Disp: , Rfl:     Cholecalciferol (VITAMIN D-3 PO), Take 1,000 Units by mouth daily  , Disp: , Rfl:     clopidogrel (PLAVIX) 75 mg tablet, Take 1 tablet (75 mg total) by mouth daily HOLD FOR NEPHROSTOMY TUBE PLACEMENT, Disp: 30 tablet, Rfl: 5    diltiazem (CARDIZEM CD) 240 mg 24 hr capsule, Take 1 capsule (240 mg total) by mouth daily, Disp: 90 capsule, Rfl: 3    docusate sodium (COLACE) 100 mg capsule, Take 1 capsule (100 mg total) by mouth daily as needed for constipation for up to 7 days, Disp: 21 capsule, Rfl: 0    Fluticasone Propionate, Inhal, (FLOVENT DISKUS) 250 MCG/BLIST AEPB, Inhale 1 puff 2 (two) times a day (Patient taking differently: Inhale 1 puff 2 (two) times a day as needed ), Disp: , Rfl:     furosemide (LASIX) 20 mg tablet, Take 1 tablet (20 mg total) by mouth daily, Disp: 90 tablet, Rfl: 1    levothyroxine 125 mcg tablet, Take 1 tablet (125 mcg total) by mouth daily, Disp: 90 tablet, Rfl: 3    Multiple Vitamins-Minerals (PRESERVISION AREDS PO), Take 1 Cap by Mouth daily, Disp: , Rfl:     multivitamin (THERAGRAN) TABS, Take 1 tablet by mouth daily, Disp: , Rfl:     omeprazole (PriLOSEC) 20 mg delayed release capsule, Take 1 capsule by mouth daily as needed , Disp: , Rfl:     rosuvastatin (CRESTOR) 5 mg tablet, Take 1 tablet (5 mg total) by mouth daily, Disp: 90 tablet, Rfl: 3    lidocaine (LIDODERM) 5 %, Apply 1 patch topically daily Remove & Discard patch within 12 hours or as directed by MD (Patient not taking: Reported on 1/31/2020), Disp: 7 patch, Rfl: 0    Past Medical History:   Diagnosis Date    Arthritis     Asthma     Benign essential tremor 10/15/2018    Added automatically from request for surgery [id number]    Benign neoplasm of large intestine     Broken ribs     Cellulitis of leg, right     Closed compression fracture of body of lumbar vertebra (HCC)     L5    COPD (chronic obstructive pulmonary disease) (HCC)     Difficulty waking     post anesthesia    Disease of thyroid gland     GERD (gastroesophageal reflux disease)     High cholesterol     Hypertension     Osteopenia     Osteoporosis     Shortness of breath     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     Urinary leakage     Vitamin D deficiency     Wears glasses     Wears partial dentures     upper       Past Surgical History:   Procedure Laterality Date    CATARACT EXTRACTION W/  INTRAOCULAR LENS IMPLANT Bilateral     COLONOSCOPY      DEEP BRAIN STIMULATOR PLACEMENT      FL RETROGRADE PYELOGRAM  11/7/2019    IR PCN TO PCNU CONVERSION  1/10/2020    IR TUBE PLACEMENT  11/12/2019    KNEE ARTHROSCOPY      AR CYSTOURETHROSCOPY,URETER CATHETER Left 11/7/2019    Procedure: CYSTO RETROGRADE PYELOGRAM, URETEROSCOPY;  Surgeon: Mary Villa MD;  [facility];  Service: Urology    AR IMP STIM,CRANIAL,SUBQ,1 ARRAY Left 1/22/2019    Procedure: REPLACEMENT IMPLANTABLE PULSE GENERATOR (IPG) DEEP BRAIN STIMULATION (DBS), LEFT;  Surgeon: Jean Flowers MD;  [facility];  Service: Neurosurgery    IA IMP STIM,CRANIAL,SUBQ,>1 ARRAY Right 12/13/2018    Procedure: REPLACEMENT RIGHT DBS IMPLANTABLE PULSE GENERATOR;  Surgeon: Jean Flowers MD;  [facility];  Service: Neurosurgery    IA TOTAL HIP ARTHROPLASTY Left 5/20/2016    Procedure: ARTHROPLASTY HIP TOTAL ANTERIOR;  Surgeon: Nicole Gonzales MD;  [facility];  Service: Orthopedics    ROTATOR CUFF REPAIR         Social History

## 2020-02-01 ENCOUNTER — HOSPITAL ENCOUNTER (EMERGENCY)
Facility: HOSPITAL | Age: 80
Discharge: HOME | End: 2020-02-01
Attending: EMERGENCY MEDICINE | Admitting: EMERGENCY MEDICINE
Payer: MEDICARE

## 2020-02-01 ENCOUNTER — APPOINTMENT (EMERGENCY)
Dept: RADIOLOGY | Facility: HOSPITAL | Age: 80
End: 2020-02-01
Payer: MEDICARE

## 2020-02-01 VITALS
TEMPERATURE: 98.4 F | DIASTOLIC BLOOD PRESSURE: 67 MMHG | WEIGHT: 190.48 LBS | BODY MASS INDEX: 32.7 KG/M2 | OXYGEN SATURATION: 94 % | HEART RATE: 90 BPM | SYSTOLIC BLOOD PRESSURE: 147 MMHG | RESPIRATION RATE: 16 BRPM

## 2020-02-01 DIAGNOSIS — R68.83 CHILLS: Primary | ICD-10-CM

## 2020-02-01 LAB
ALBUMIN SERPL BCP-MCNC: 3.4 G/DL (ref 3.5–5)
ALP SERPL-CCNC: 80 U/L (ref 46–116)
ALT SERPL W P-5'-P-CCNC: 22 U/L (ref 12–78)
ANION GAP SERPL CALCULATED.3IONS-SCNC: 6 MMOL/L (ref 4–13)
AST SERPL W P-5'-P-CCNC: 18 U/L (ref 5–45)
BACTERIA UR QL AUTO: ABNORMAL /HPF
BASOPHILS # BLD AUTO: 0.05 THOUSANDS/ΜL (ref 0–0.1)
BASOPHILS NFR BLD AUTO: 0 % (ref 0–1)
BILIRUB SERPL-MCNC: 0.67 MG/DL (ref 0.2–1)
BILIRUB UR QL STRIP: NEGATIVE
BUN SERPL-MCNC: 23 MG/DL (ref 5–25)
CALCIUM SERPL-MCNC: 8.8 MG/DL (ref 8.3–10.1)
CHLORIDE SERPL-SCNC: 101 MMOL/L (ref 100–108)
CLARITY UR: ABNORMAL
CO2 SERPL-SCNC: 32 MMOL/L (ref 21–32)
COLOR UR: YELLOW
CREAT SERPL-MCNC: 1.04 MG/DL (ref 0.6–1.3)
EOSINOPHIL # BLD AUTO: 0.05 THOUSAND/ΜL (ref 0–0.61)
EOSINOPHIL NFR BLD AUTO: 0 % (ref 0–6)
ERYTHROCYTE [DISTWIDTH] IN BLOOD BY AUTOMATED COUNT: 15 % (ref 11.6–15.1)
GFR SERPL CREATININE-BSD FRML MDRD: 51 ML/MIN/1.73SQ M
GLUCOSE SERPL-MCNC: 97 MG/DL (ref 65–140)
GLUCOSE UR STRIP-MCNC: NEGATIVE MG/DL
HCT VFR BLD AUTO: 38.4 % (ref 34.8–46.1)
HGB BLD-MCNC: 11.6 G/DL (ref 11.5–15.4)
HGB UR QL STRIP.AUTO: ABNORMAL
IMM GRANULOCYTES # BLD AUTO: 0.04 THOUSAND/UL (ref 0–0.2)
IMM GRANULOCYTES NFR BLD AUTO: 0 % (ref 0–2)
KETONES UR STRIP-MCNC: NEGATIVE MG/DL
LEUKOCYTE ESTERASE UR QL STRIP: ABNORMAL
LYMPHOCYTES # BLD AUTO: 1.88 THOUSANDS/ΜL (ref 0.6–4.47)
LYMPHOCYTES NFR BLD AUTO: 16 % (ref 14–44)
MCH RBC QN AUTO: 26.6 PG (ref 26.8–34.3)
MCHC RBC AUTO-ENTMCNC: 30.2 G/DL (ref 31.4–37.4)
MCV RBC AUTO: 88 FL (ref 82–98)
MONOCYTES # BLD AUTO: 1.14 THOUSAND/ΜL (ref 0.17–1.22)
MONOCYTES NFR BLD AUTO: 10 % (ref 4–12)
NEUTROPHILS # BLD AUTO: 8.62 THOUSANDS/ΜL (ref 1.85–7.62)
NEUTS SEG NFR BLD AUTO: 74 % (ref 43–75)
NITRITE UR QL STRIP: NEGATIVE
NON-SQ EPI CELLS URNS QL MICRO: ABNORMAL /HPF
NRBC BLD AUTO-RTO: 0 /100 WBCS
PH UR STRIP.AUTO: 6 [PH] (ref 4.5–8)
PLATELET # BLD AUTO: 269 THOUSANDS/UL (ref 149–390)
PMV BLD AUTO: 9.5 FL (ref 8.9–12.7)
POTASSIUM SERPL-SCNC: 4.2 MMOL/L (ref 3.5–5.3)
PROT SERPL-MCNC: 8 G/DL (ref 6.4–8.2)
PROT UR STRIP-MCNC: ABNORMAL MG/DL
RBC # BLD AUTO: 4.36 MILLION/UL (ref 3.81–5.12)
RBC #/AREA URNS AUTO: ABNORMAL /HPF
SODIUM SERPL-SCNC: 139 MMOL/L (ref 136–145)
SP GR UR STRIP.AUTO: 1.01 (ref 1–1.03)
UROBILINOGEN UR QL STRIP.AUTO: 0.2 E.U./DL
WBC # BLD AUTO: 11.78 THOUSAND/UL (ref 4.31–10.16)
WBC #/AREA URNS AUTO: ABNORMAL /HPF

## 2020-02-01 PROCEDURE — 85025 COMPLETE CBC W/AUTO DIFF WBC: CPT | Performed by: EMERGENCY MEDICINE

## 2020-02-01 PROCEDURE — 87077 CULTURE AEROBIC IDENTIFY: CPT

## 2020-02-01 PROCEDURE — 99284 EMERGENCY DEPT VISIT MOD MDM: CPT

## 2020-02-01 PROCEDURE — 99284 EMERGENCY DEPT VISIT MOD MDM: CPT | Performed by: EMERGENCY MEDICINE

## 2020-02-01 PROCEDURE — 87186 SC STD MICRODIL/AGAR DIL: CPT

## 2020-02-01 PROCEDURE — 87086 URINE CULTURE/COLONY COUNT: CPT

## 2020-02-01 PROCEDURE — 36415 COLL VENOUS BLD VENIPUNCTURE: CPT | Performed by: EMERGENCY MEDICINE

## 2020-02-01 PROCEDURE — 80053 COMPREHEN METABOLIC PANEL: CPT | Performed by: EMERGENCY MEDICINE

## 2020-02-01 PROCEDURE — 96360 HYDRATION IV INFUSION INIT: CPT

## 2020-02-01 PROCEDURE — 71046 X-RAY EXAM CHEST 2 VIEWS: CPT

## 2020-02-01 PROCEDURE — 93005 ELECTROCARDIOGRAM TRACING: CPT

## 2020-02-01 PROCEDURE — 81001 URINALYSIS AUTO W/SCOPE: CPT

## 2020-02-01 RX ADMIN — SODIUM CHLORIDE 500 ML: 0.9 INJECTION, SOLUTION INTRAVENOUS at 13:03

## 2020-02-01 NOTE — ED PROVIDER NOTES
History  Chief Complaint   Patient presents with    Chills     pt initially went to urgent care with c/o chills since last night  pt seen today and was told due to lower leg edema, o2 saturation of 93% concerned with chf andwas told to come to ED for further evaluation  denies cp or sob  Patient is a 78year old female with a past medical history significant for COPD, CHF, HTN, HLD, tight UPJ with recent internalization of stent and a nephrostomy tube who presents with chills x 1 day  Patient reports that earlier today she developed chills and just cannot get warm all day  She denies any other associated symptoms including cough, congestion, abdominal pain, dysuria, hematuria, flank pain, chest pain, shortness of breath, lower extremity edema  Denies any known sick contacts or recent travel  Patient presented to an urgent care facility where she reports that they told her she had a viral illness, but they were concerned for a CHF exacerbation because her O2 saturation was 93-94% and she had lower extremity edema (which patient states is her baseline edema) so they instructed patient to go to the emergency department for evaluation  Prior to Admission Medications   Prescriptions Last Dose Informant Patient Reported? Taking? Cholecalciferol (VITAMIN D-3 PO)  Self Yes No   Sig: Take 1,000 Units by mouth daily     Fluticasone Propionate, Inhal, (FLOVENT DISKUS) 250 MCG/BLIST AEPB  Self No No   Sig: Inhale 1 puff 2 (two) times a day   Patient taking differently: Inhale 1 puff 2 (two) times a day as needed    Multiple Vitamins-Minerals (PRESERVISION AREDS PO)  Self Yes No   Sig: Take 1 Cap by Mouth daily   albuterol (2 5 mg/3 mL) 0 083 % nebulizer solution  Self No No   Sig: Take 1 vial (2 5 mg total) by nebulization 2 (two) times a day   ascorbic acid (VITAMIN C) 250 MG tablet  Self Yes No   Sig: Take 500 mg by mouth daily   clopidogrel (PLAVIX) 75 mg tablet  Self No No   Sig: Take 1 tablet (75 mg total) by mouth daily HOLD FOR NEPHROSTOMY TUBE PLACEMENT   diltiazem (CARDIZEM CD) 240 mg 24 hr capsule   No No   Sig: Take 1 capsule (240 mg total) by mouth daily   docusate sodium (COLACE) 100 mg capsule   No No   Sig: Take 1 capsule (100 mg total) by mouth daily as needed for constipation for up to 7 days   furosemide (LASIX) 20 mg tablet  Self No No   Sig: Take 1 tablet (20 mg total) by mouth daily   levothyroxine 125 mcg tablet   No No   Sig: Take 1 tablet (125 mcg total) by mouth daily   lidocaine (LIDODERM) 5 %  Self No No   Sig: Apply 1 patch topically daily Remove & Discard patch within 12 hours or as directed by MD   Patient not taking: Reported on 1/31/2020   multivitamin (THERAGRAN) TABS  Self Yes No   Sig: Take 1 tablet by mouth daily   omeprazole (PriLOSEC) 20 mg delayed release capsule  Self Yes No   Sig: Take 1 capsule by mouth daily as needed    rosuvastatin (CRESTOR) 5 mg tablet   No No   Sig: Take 1 tablet (5 mg total) by mouth daily      Facility-Administered Medications: None       Past Medical History:   Diagnosis Date    Arthritis     Asthma     Benign essential tremor 10/15/2018    Added automatically from request for surgery [id number]    Benign neoplasm of large intestine     Broken ribs     Cellulitis of leg, right     Closed compression fracture of body of lumbar vertebra (HCC)     L5    COPD (chronic obstructive pulmonary disease) (HCC)     Difficulty waking     post anesthesia    Disease of thyroid gland     GERD (gastroesophageal reflux disease)     High cholesterol     Hypertension     Osteopenia     Osteoporosis     Shortness of breath     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     Urinary leakage     Vitamin D deficiency     Wears glasses     Wears partial dentures     upper       Past Surgical History:   Procedure Laterality Date    CATARACT EXTRACTION W/  INTRAOCULAR LENS IMPLANT Bilateral     COLONOSCOPY      DEEP BRAIN STIMULATOR PLACEMENT      FL RETROGRADE PYELOGRAM  2019    IR PCN TO PCNU CONVERSION  1/10/2020    IR TUBE PLACEMENT  2019    KNEE ARTHROSCOPY      NV CYSTOURETHROSCOPY,URETER CATHETER Left 2019    Procedure: CYSTO RETROGRADE PYELOGRAM, URETEROSCOPY;  Surgeon: Ernesto Bland MD;  [facility];  Service: Urology    NV IMP STIM,CRANIAL,SUBQ,1 ARRAY Left 2019    Procedure: REPLACEMENT IMPLANTABLE PULSE GENERATOR (IPG) DEEP BRAIN STIMULATION (DBS), LEFT;  Surgeon: Joy French MD;  [facility];  Service: Neurosurgery    NV IMP STIM,CRANIAL,SUBQ,>1 ARRAY Right 2018    Procedure: REPLACEMENT RIGHT DBS IMPLANTABLE PULSE GENERATOR;  Surgeon: Joy French MD;  [facility];  Service: Neurosurgery    NV TOTAL HIP ARTHROPLASTY Left 2016    Procedure: ARTHROPLASTY HIP TOTAL ANTERIOR;  Surgeon: Endy Jarquin MD;  [facility];  Service: Orthopedics    ROTATOR CUFF REPAIR         Family History   Problem Relation Age of Onset    Breast cancer Mother     Throat cancer Family      I have reviewed and agree with the history as documented  Social History     Tobacco Use    Smoking status: Former Smoker     Last attempt to quit: 2013     Years since quittin 0    Smokeless tobacco: Never Used   Substance Use Topics    Alcohol use: Never     Frequency: Never    Drug use: No        Review of Systems   Constitutional: Positive for chills  Negative for fever  HENT: Negative for congestion and rhinorrhea  Eyes: Negative for photophobia and visual disturbance  Respiratory: Negative for chest tightness and shortness of breath  Cardiovascular: Negative for chest pain and palpitations  Gastrointestinal: Negative for abdominal pain, constipation, diarrhea, nausea and vomiting  Genitourinary: Negative for dysuria and hematuria  Musculoskeletal: Negative for back pain and neck pain  Skin: Negative for pallor and rash     Neurological: Negative for dizziness, weakness, light-headedness, numbness and headaches  Physical Exam  Physical Exam   Constitutional: She is oriented to person, place, and time  She appears well-developed and well-nourished  No distress  HENT:   Head: Normocephalic and atraumatic  Right Ear: External ear normal    Left Ear: External ear normal    Nose: Nose normal    Mouth/Throat: Oropharynx is clear and moist    Eyes: Pupils are equal, round, and reactive to light  Conjunctivae and EOM are normal    Neck: Normal range of motion  Neck supple  Cardiovascular: Normal rate, regular rhythm, normal heart sounds and intact distal pulses  Exam reveals no gallop and no friction rub  No murmur heard  Pulmonary/Chest: Effort normal and breath sounds normal  No stridor  No respiratory distress  She has no wheezes  She has no rales  She exhibits no tenderness  Abdominal: Soft  Bowel sounds are normal  She exhibits no distension  There is no tenderness  Musculoskeletal: Normal range of motion  She exhibits edema  Neurological: She is alert and oriented to person, place, and time  Skin: Skin is warm and dry  No rash noted  She is not diaphoretic  No erythema  No pallor  Psychiatric: She has a normal mood and affect  Her behavior is normal    Nursing note and vitals reviewed        Vital Signs  ED Triage Vitals [02/01/20 1114]   Temperature Pulse Respirations Blood Pressure SpO2   98 4 °F (36 9 °C) 83 14 137/65 94 %      Temp Source Heart Rate Source Patient Position - Orthostatic VS BP Location FiO2 (%)   Temporal Monitor Sitting Right arm --      Pain Score       No Pain           Vitals:    02/01/20 1114 02/01/20 1305 02/01/20 1330 02/01/20 1430   BP: 137/65  137/63 147/67   Pulse: 83 94 92 90   Patient Position - Orthostatic VS: Sitting   Lying         Visual Acuity      ED Medications  Medications   sodium chloride 0 9 % bolus 500 mL (0 mL Intravenous Stopped 2/1/20 1359)       Diagnostic Studies  Results Reviewed     Procedure Component Value Units Date/Time    Urine culture [[id number]]  (Abnormal) Collected:  02/01/20 1246    Lab Status:  Preliminary result Specimen:  Urine, Clean Catch Updated:  02/02/20 1922     Urine Culture 10,000-19,000 cfu/ml Gram Negative Gabe Enteric Like    Comprehensive metabolic panel [[id number]]  (Abnormal) Collected:  02/01/20 1259    Lab Status:  Final result Specimen:  Blood from Arm, Left Updated:  02/01/20 1331     Sodium 139 mmol/L      Potassium 4 2 mmol/L      Chloride 101 mmol/L      CO2 32 mmol/L      ANION GAP 6 mmol/L      BUN 23 mg/dL      Creatinine 1 04 mg/dL      Glucose 97 mg/dL      Calcium 8 8 mg/dL      AST 18 U/L      ALT 22 U/L      Alkaline Phosphatase 80 U/L      Total Protein 8 0 g/dL      Albumin 3 4 g/dL      Total Bilirubin 0 67 mg/dL      eGFR 51 ml/min/1 73sq m     Narrative:       [place] guidelines for Chronic Kidney Disease (CKD):     Stage 1 with normal or high GFR (GFR > 90 mL/min/1 73 square meters)    Stage 2 Mild CKD (GFR = 60-89 mL/min/1 73 square meters)    Stage 3A Moderate CKD (GFR = 45-59 mL/min/1 73 square meters)    Stage 3B Moderate CKD (GFR = 30-44 mL/min/1 73 square meters)    Stage 4 Severe CKD (GFR = 15-29 mL/min/1 73 square meters)    Stage 5 End Stage CKD (GFR <15 mL/min/1 73 square meters)  Note: GFR calculation is accurate only with a steady state creatinine    Urine Microscopic [[id number]]  (Abnormal) Collected:  02/01/20 1246    Lab Status:  Final result Specimen:  Urine, Clean Catch Updated:  02/01/20 1327     RBC, UA 1-2 /hpf      WBC, UA Innumerable /hpf      Epithelial Cells Occasional /hpf      Bacteria, UA Occasional /hpf     CBC and differential [[id number]]  (Abnormal) Collected:  02/01/20 1259    Lab Status:  Final result Specimen:  Blood from Arm, Left Updated:  02/01/20 1309     WBC 11 78 Thousand/uL      RBC 4 36 Million/uL      Hemoglobin 11 6 g/dL      Hematocrit 38 4 %      MCV 88 fL      MCH 26 6 pg      MCHC 30 2 g/dL RDW 15 0 %      MPV 9 5 fL      Platelets 888 Thousands/uL      nRBC 0 /100 WBCs      Neutrophils Relative 74 %      Immat GRANS % 0 %      Lymphocytes Relative 16 %      Monocytes Relative 10 %      Eosinophils Relative 0 %      Basophils Relative 0 %      Neutrophils Absolute 8 62 Thousands/µL      Immature Grans Absolute 0 04 Thousand/uL      Lymphocytes Absolute 1 88 Thousands/µL      Monocytes Absolute 1 14 Thousand/µL      Eosinophils Absolute 0 05 Thousand/µL      Basophils Absolute 0 05 Thousands/µL     POCT urinalysis dipstick [[id number]]  (Abnormal) Resulted:  02/01/20 1249    Lab Status:  Final result Updated:  02/01/20 1249    Urine Macroscopic, POC [[id number]]  (Abnormal) Collected:  02/01/20 1246    Lab Status:  Final result Specimen:  Urine Updated:  02/01/20 1247     Color, UA Yellow     Clarity, UA Cloudy     pH, UA 6 0     Leukocytes, UA Large     Nitrite, UA Negative     Protein,  (2+) mg/dl      Glucose, UA Negative mg/dl      Ketones, UA Negative mg/dl      Urobilinogen, UA 0 2 E U /dl      Bilirubin, UA Negative     Blood, UA Moderate     Specific [place], UA 1 015    Narrative:       CLINITEK RESULT                 XR chest 2 views   ED Interpretation by Shasta Mcburney, DO (02/01 1433)   No acute abnormalities as interpreted by me independently      Final Result by Cinthia Jane MD (02/02 9612)      No acute cardiopulmonary disease              Workstation performed: [id number]                    Procedures  ECG 12 Lead Documentation Only  Date/Time: 2/2/2020 8:00 PM  Performed by: Shasta Mcburney, DO  Authorized by: Shasta Mcburney, DO     Indications / Diagnosis:  Chills   ECG reviewed by me, the ED Provider: yes    Patient location:  ED  Previous ECG:     Previous ECG:  Compared to current    Comparison ECG info:  01/18/2020    Similarity:  No change  Interpretation:     Interpretation: non-specific    Quality:     Tracing quality:  Limited by artifact  Rate:     ECG rate:  93    ECG rate assessment: normal    Rhythm:     Rhythm: sinus rhythm    Ectopy:     Ectopy: none    QRS:     QRS axis:  Left    QRS intervals:  Normal  Conduction:     Conduction: normal    ST segments:     ST segments:  Normal  T waves:     T waves: normal               ED Course  ED Course as of Feb 02 1955   Sat Feb 01, 2020   1442 1/28/2020 1  Successful antegrade placement of left 8 x 24 cm double-J ureteral stent  2   8-[place]anish safety nephrostomy tube placed  Prompt internal drainage through the newly placed ureteral stent  Patient to be scheduled for one week capping trial and follow-up nephrostogram to assess for safety nephrostomy removal       1442 WBC, UA(!): Innumerable   1509 Discussed case with Dr Juan Carlos Christie, urology who took a look back through patient's records and cultures  Does not believe that in setting of no urinary symptoms or flank pain that antibiotics course is going to be helpful at this stage  Asked to make sure that patient has a nephrostomy bag at home  Instructed patient to call the urology office for fever > 101, inability to urinate, flank pain, dysuria  I told the patient that I spoke with urology and what their recommendations were  Patient verbalized understanding and had no further questions at time of discharge  MDM  Number of Diagnoses or Management Options  Chills:   Diagnosis management comments: Assessment and Plan:   Chills: though patient has not copmlaints of viral symptoms, will get a CXR to rule out PNA, check labs to assess for anemia, leukocytosis, electrolyte abnormalities, check kidney function and urinalysis as patient did have recent urological procedures  Regarding CHF exacerbation   Do not believe that patient is currently in acute exacerbation as lower extremity edema at baseline, no shortness of breath at rest or with exertion different than baseline, no weight gain and in setting of COPD, O2 saturations acceptable at 88% and above  Patient is not dyspneic on exam and is able to speak in full sentences  O2 sats range from 93-95%  Urinalysis has innumerable WBCs- will discuss with urology for recommendations regarding current treatment versus watching and waiting until culture results  Disposition  Final diagnoses:   Chills     Time reflects when diagnosis was documented in both MDM as applicable and the Disposition within this note     Time User Action Codes Description Comment    2/1/2020  3:12 PM Simona Agauyo Add [[id number]] [address]       ED Disposition     ED Disposition Condition Date/Time Comment    Discharge Stable Sat Feb 1, 2020  3:12 PM [address] discharge to home/self care  Follow-up Information     Follow up With Specialties Details Why Contact Info Additional Information    Juanita Montano MD Urology  keep your scheduled appointment [address] [place] [address],  Family Medicine Schedule an appointment as soon as possible for a visit in 3 days for re-evaluation [address] Emergency Department Emergency Medicine Go to  As needed, If symptoms worsen, for re-evaluation [facility] [id number] [phone] [place] [facility], [address] [facility] , [place], [place], [address]          Discharge Medication List as of 2/1/2020  3:15 PM      CONTINUE these medications which have NOT CHANGED    Details   albuterol (2 5 mg/3 mL) 0 083 % nebulizer solution Take 1 vial (2 5 mg total) by nebulization 2 (two) times a day, Starting Tue 7/9/2019, Normal      ascorbic acid (VITAMIN C) 250 MG tablet Take 500 mg by mouth daily, Historical Med      Cholecalciferol (VITAMIN D-3 PO) Take 1,000 Units by mouth daily  , Until Discontinued, Historical Med      clopidogrel (PLAVIX) 75 mg tablet Take 1 tablet (75 mg total) by mouth daily HOLD FOR NEPHROSTOMY TUBE PLACEMENT, Starting Thu 12/5/2019, Normal      diltiazem (CARDIZEM CD) 240 mg 24 hr capsule Take 1 capsule (240 mg total) by mouth daily, Starting Fri 1/24/2020, Normal      docusate sodium (COLACE) 100 mg capsule Take 1 capsule (100 mg total) by mouth daily as needed for constipation for up to 7 days, Starting Fri 11/22/2019, Until Fri 1/31/2020, No Print      Fluticasone Propionate, Inhal, (FLOVENT DISKUS) 250 MCG/BLIST AEPB Inhale 1 puff 2 (two) times a day, Starting Fri 11/22/2019, No Print      furosemide (LASIX) 20 mg tablet Take 1 tablet (20 mg total) by mouth daily, Starting Mon 12/30/2019, Normal      levothyroxine 125 mcg tablet Take 1 tablet (125 mcg total) by mouth daily, Starting Fri 1/24/2020, Normal      lidocaine (LIDODERM) 5 % Apply 1 patch topically daily Remove & Discard patch within 12 hours or as directed by MD, Starting Sat 1/18/2020, Print      Multiple Vitamins-Minerals (PRESERVISION AREDS PO) Take 1 Cap by Mouth daily, Historical Med      multivitamin (THERAGRAN) TABS Take 1 tablet by mouth daily, Historical Med      omeprazole (PriLOSEC) 20 mg delayed release capsule Take 1 capsule by mouth daily as needed , Historical Med      rosuvastatin (CRESTOR) 5 mg tablet Take 1 tablet (5 mg total) by mouth daily, Starting Fri 1/24/2020, Normal           No discharge procedures on file      ED Provider  Electronically Signed by           Imelda Gomez DO  02/02/20 2016

## 2020-02-01 NOTE — DISCHARGE INSTRUCTIONS
Keep your urology follow up appointment as is  Call the urology office if you develop fever > 101, flank pain, difficult or inability to urinate, burning with urination, blood in urine  Return to the emergency department the following, but not limited to change in your mental status, high fevers, difficulty breathing, chest pain, shortness of breath or for any of the symptoms that you discussed with Urology and if they recommend coming to the emergency department

## 2020-02-01 NOTE — ED NOTES
Orville Vanessa Bright states need to leave a this time but would like to be updated about everything that happens [address]  02/01/20 9488

## 2020-02-01 NOTE — TELEPHONE ENCOUNTER
DAUGHTER STATED HER MOM IS FREEZING / VERY COLD, SHE'S UNABLE TO GET WARM      (PATIENT OF DR CALDERÓN)    PAGED OUT TO DR SHANE VIA LEANNE

## 2020-02-01 NOTE — ED NOTES
C/O hot and cold flashed though she was getting a UTI took x2 Keflex and x2 tylenol after which she went to and urgent care where she was told to go to ER for r/o CHF        Mukul Goetz RN  02/01/20 [id number]

## 2020-02-03 ENCOUNTER — OFFICE VISIT (OUTPATIENT)
Dept: VASCULAR SURGERY | Facility: CLINIC | Age: 80
End: 2020-02-03
Payer: MEDICARE

## 2020-02-03 VITALS
DIASTOLIC BLOOD PRESSURE: 62 MMHG | BODY MASS INDEX: 31.69 KG/M2 | WEIGHT: 185.6 LBS | SYSTOLIC BLOOD PRESSURE: 138 MMHG | HEIGHT: 64 IN | HEART RATE: 86 BPM

## 2020-02-03 DIAGNOSIS — I63.239 CAROTID STENOSIS, SYMPTOMATIC, WITH INFARCTION (HCC): Primary | ICD-10-CM

## 2020-02-03 LAB
ATRIAL RATE: 97 BPM
P AXIS: 84 DEGREES
QRS AXIS: -43 DEGREES
QRSD INTERVAL: 76 MS
QT INTERVAL: 334 MS
QTC INTERVAL: 415 MS
T WAVE AXIS: 73 DEGREES
VENTRICULAR RATE: 93 BPM

## 2020-02-03 PROCEDURE — 4040F PNEUMOC VAC/ADMIN/RCVD: CPT | Performed by: SURGERY

## 2020-02-03 PROCEDURE — 3078F DIAST BP <80 MM HG: CPT | Performed by: SURGERY

## 2020-02-03 PROCEDURE — 2022F DILAT RTA XM EVC RTNOPTHY: CPT | Performed by: SURGERY

## 2020-02-03 PROCEDURE — 99214 OFFICE O/P EST MOD 30 MIN: CPT | Performed by: SURGERY

## 2020-02-03 PROCEDURE — 3075F SYST BP GE 130 - 139MM HG: CPT | Performed by: SURGERY

## 2020-02-03 PROCEDURE — 1036F TOBACCO NON-USER: CPT | Performed by: SURGERY

## 2020-02-03 PROCEDURE — 1160F RVW MEDS BY RX/DR IN RCRD: CPT | Performed by: SURGERY

## 2020-02-03 PROCEDURE — 3008F BODY MASS INDEX DOCD: CPT | Performed by: SURGERY

## 2020-02-03 PROCEDURE — 93010 ELECTROCARDIOGRAM REPORT: CPT | Performed by: INTERNAL MEDICINE

## 2020-02-03 NOTE — H&P (VIEW-ONLY)
Assessment/Plan:    Carotid stenosis right,  symptomatic, with infarction ([facility] )  Right carotid artery stenosis with history of right CVA with recurrent symptoms was originally scheduled for endarterectomy but surgery was delayed secondary to a fall  She now presents in follow-up  We again discussed the indications for treatment, the treatment options available and the associated risks and benefits  Will plan on proceeding with carotid endarterectomy in the near future but since it has been 3 months since imaging has been performed we will perform a carotid duplex prior to surgery to assure there is no significant change  Diagnoses and all orders for this visit:    Carotid stenosis right,  symptomatic, with infarction ([facility] [address] )  -     VAS carotid complete study; Future          Subjective:      Patient ID: Duc Hoff is a 78 y o  female  Pt is here to discuss R CEA scheduled for 02/18/20  Pt is not a good historian, patient brought her son in law with her today  Pt has chest pain on the R side due to a fall on 1/18  Patient denies having any numbness or weakness, however she does ambulate with a cane  Pt currently takes Plavix and Rosuvastatin  55-year-old originally evaluated approximately 3 months ago for right carotid artery stenosis with history of 2 right hemispheric strokes over the past year  At that time she was being scheduled for a right carotid endarterectomy but after cardiac evaluation and clearance suffered from a fall with rib fractures thus surgery was delayed  On re-evaluation today she denies any recurrent focal neurologic symptoms which would be consistent with TIA, CVA or amaurosis fugax  She states she is recovering well from her rib fractures with marked reduction in pain but still some mild residual discomfort  She denies any shortness of breath or non ribbed chest pain  She does walk with a cane      Previous imaging studies were performed at [facility] approximately 4 months ago  The following portions of the patient's history were reviewed and updated as appropriate: allergies, current medications, past family history, past medical history, past social history, past surgical history and problem list     I have reviewed and made appropriate changes to the review of systems input by the medical assistant      Vitals:    02/03/20 1602   BP: 138/62   BP Location: Right arm   Patient Position: Sitting   Pulse: 86   Weight: 84 2 kg (185 lb 9 6 oz)   Height: 5' 4" (1 626 m)       Patient Active Problem List   Diagnosis    Benign essential hypertension    Benign familial tremor    History of CVA (cerebrovascular accident)    [facility]    Esophageal reflux    Gait disturbance    Dyslipidemia    Sleep disorder    Sciatica    Right shoulder pain    Osteoarthritis of hip    OA (osteoarthritis)    Multiple joint pain    Acquired hypothyroidism    Lumbar degenerative disc disease    Back pain    History of pancreatitis    Aortic valve stenosis - Mild -Moderate    S/P deep brain stimulator placement    Microscopic hematuria    Dysarthria related to Nov 2018 CVA, worsened Sept 2019    Swallowing dysfunction    Weakness of left leg    Carotid stenosis right,  symptomatic, with infarction ([facility])    Hydronephrosis of left kidney    Abnormal CT scan    Cerebrovascular accident (CVA) ([address] )    Nephrostomy tube Left    Coronary artery disease involving native coronary artery of native heart without angina pectoris    Diabetes mellitus type 2, diet-controlled ([address] )    Calculus of kidney       Past Surgical History:   Procedure Laterality Date    CATARACT EXTRACTION W/  INTRAOCULAR LENS IMPLANT Bilateral     COLONOSCOPY      DEEP BRAIN STIMULATOR PLACEMENT      FL RETROGRADE PYELOGRAM  11/7/2019    IR PCN TO PCNU CONVERSION  1/10/2020    IR TUBE PLACEMENT  11/12/2019    KNEE ARTHROSCOPY      MA CYSTOURETHROSCOPY,URETER CATHETER Left 2019    Procedure: CYSTO RETROGRADE PYELOGRAM, URETEROSCOPY;  Surgeon: Wilfrid Bach MD;  [facility];  Service: Urology    AZ IMP STIM,CRANIAL,SUBQ,1 ARRAY Left 2019    Procedure: REPLACEMENT IMPLANTABLE PULSE GENERATOR (IPG) DEEP BRAIN STIMULATION (DBS), LEFT;  Surgeon: Tanesha Mcmahan MD;  [facility];  Service: Neurosurgery    AZ IMP STIM,CRANIAL,SUBQ,>1 ARRAY Right 2018    Procedure: REPLACEMENT RIGHT DBS IMPLANTABLE PULSE GENERATOR;  Surgeon: Tanesha Mcmahan MD;  [facility];  Service: Neurosurgery    AZ TOTAL HIP ARTHROPLASTY Left 2016    Procedure: ARTHROPLASTY HIP TOTAL ANTERIOR;  Surgeon: Joseph Stuart MD;  [facility];  Service: Orthopedics    ROTATOR CUFF REPAIR         Family History   Problem Relation Age of Onset    Breast cancer Mother     Throat cancer Family        Social History     Socioeconomic History    Marital status:       Spouse name: Not on file    Number of children: Not on file    Years of education: Not on file    Highest education level: Not on file   Occupational History    Not on file   Social Needs    Financial resource strain: Not on file    Food insecurity:     Worry: Not on file     Inability: Not on file    Transportation needs:     Medical: Not on file     Non-medical: Not on file   Tobacco Use    Smoking status: Former Smoker     Last attempt to quit: 2013     Years since quittin 0    Smokeless tobacco: Never Used   Substance and Sexual Activity    Alcohol use: Never     Frequency: Never    Drug use: No    Sexual activity: Not Currently     Birth control/protection: Post-menopausal   Lifestyle    Physical activity:     Days per week: Not on file     Minutes per session: Not on file    Stress: Not on file   Relationships    Social connections:     Talks on phone: Not on file     Gets together: Not on file     Attends Jewish service: Not on file Active member of club or organization: Not on file     Attends meetings of clubs or organizations: Not on file     Relationship status: Not on file    Intimate partner violence:     Fear of current or ex partner: Not on file     Emotionally abused: Not on file     Physically abused: Not on file     Forced sexual activity: Not on file   Other Topics Concern    Not on file   Social History Narrative    Caffeine use    Living independently           Allergies   Allergen Reactions    Ciprofloxacin Diarrhea    Simvastatin Myalgia    Advair Diskus [Fluticasone-Salmeterol] Palpitations     Ok w flovent    Tetracycline Rash     Reaction Date: [id number];          Current Outpatient Medications:     albuterol (2 5 mg/3 mL) 0 083 % nebulizer solution, Take 1 vial (2 5 mg total) by nebulization 2 (two) times a day, Disp: 60 vial, Rfl: 1    Cholecalciferol (VITAMIN D-3 PO), Take 1,000 Units by mouth daily  , Disp: , Rfl:     clopidogrel (PLAVIX) 75 mg tablet, Take 1 tablet (75 mg total) by mouth daily HOLD FOR NEPHROSTOMY TUBE PLACEMENT, Disp: 30 tablet, Rfl: 5    diltiazem (CARDIZEM CD) 240 mg 24 hr capsule, Take 1 capsule (240 mg total) by mouth daily, Disp: 90 capsule, Rfl: 3    Fluticasone Propionate, Inhal, (FLOVENT DISKUS) 250 MCG/BLIST AEPB, Inhale 1 puff 2 (two) times a day (Patient taking differently: Inhale 1 puff 2 (two) times a day as needed ), Disp: , Rfl:     furosemide (LASIX) 20 mg tablet, Take 1 tablet (20 mg total) by mouth daily, Disp: 90 tablet, Rfl: 1    levothyroxine 125 mcg tablet, Take 1 tablet (125 mcg total) by mouth daily, Disp: 90 tablet, Rfl: 3    Multiple Vitamins-Minerals (PRESERVISION AREDS PO), Take 1 Cap by Mouth daily, Disp: , Rfl:     multivitamin (THERAGRAN) TABS, Take 1 tablet by mouth daily, Disp: , Rfl:     omeprazole (PriLOSEC) 20 mg delayed release capsule, Take 1 capsule by mouth daily as needed , Disp: , Rfl:     rosuvastatin (CRESTOR) 5 mg tablet, Take 1 tablet (5 mg total) by mouth daily, Disp: 90 tablet, Rfl: 3    ascorbic acid (VITAMIN C) 250 MG tablet, Take 500 mg by mouth daily, Disp: , Rfl:     docusate sodium (COLACE) 100 mg capsule, Take 1 capsule (100 mg total) by mouth daily as needed for constipation for up to 7 days, Disp: 21 capsule, Rfl: 0    lidocaine (LIDODERM) 5 %, Apply 1 patch topically daily Remove & Discard patch within 12 hours or as directed by MD (Patient not taking: Reported on 1/31/2020), Disp: 7 patch, Rfl: 0    Review of Systems   Constitutional: Positive for activity change  HENT: Negative  Eyes: Negative  Respiratory: Positive for shortness of breath  Negative for chest tightness  Cardiovascular: Positive for chest pain (R side due to fall )  Gastrointestinal: Negative  Endocrine: Negative  Genitourinary: Negative  Musculoskeletal: Positive for gait problem  Skin: Negative  Negative for wound  Allergic/Immunologic: Negative  Neurological: Positive for light-headedness (when getting up )  Negative for dizziness, weakness, numbness and headaches  Hematological: Negative  Psychiatric/Behavioral: Positive for confusion  Objective:      /62 (BP Location: Right arm, Patient Position: Sitting)   Pulse 86   Ht 5' 4" (1 626 m)   Wt 84 2 kg (185 lb 9 6 oz)   BMI 31 86 kg/m²          Physical Exam   Constitutional: She is oriented to person, place, and time  She appears well-developed and well-nourished  HENT:   Head: Normocephalic and atraumatic  Eyes: Pupils are equal, round, and reactive to light  EOM are normal    Neck: Normal range of motion  Neck supple  No JVD present  Carotid bruit is present (Bilateral)  Cardiovascular: Normal rate and regular rhythm  Murmur heard  Systolic murmur is present  Pulses:       Carotid pulses are 2+ on the right side with bruit, and 2+ on the left side with bruit  Radial pulses are 2+ on the right side, and 2+ on the left side     Pulmonary/Chest: Effort normal and breath sounds normal  No respiratory distress  Musculoskeletal: Normal range of motion  She exhibits no edema or tenderness  Neurological: She is alert and oriented to person, place, and time  She has normal strength  No sensory deficit  Skin: Skin is warm and dry  Psychiatric: She has a normal mood and affect

## 2020-02-03 NOTE — PATIENT INSTRUCTIONS
Carotid stenosis right,  symptomatic, with infarction ([facility] [address] )  Right carotid artery stenosis with history of right CVA with recurrent symptoms was originally scheduled for endarterectomy but surgery was delayed secondary to a fall  She now presents in follow-up  We again discussed the indications for treatment, the treatment options available and the associated risks and benefits  Will plan on proceeding with carotid endarterectomy in the near future but since it has been 3 months since imaging has been performed we will perform a carotid duplex prior to surgery to assure there is no significant change

## 2020-02-03 NOTE — ASSESSMENT & PLAN NOTE
Right carotid artery stenosis with history of right CVA with recurrent symptoms was originally scheduled for endarterectomy but surgery was delayed secondary to a fall  She now presents in follow-up  We again discussed the indications for treatment, the treatment options available and the associated risks and benefits  Will plan on proceeding with carotid endarterectomy in the near future but since it has been 3 months since imaging has been performed we will perform a carotid duplex prior to surgery to assure there is no significant change

## 2020-02-03 NOTE — LETTER
February 3, 2020     Heidi Whitehead DO  [id number]     Patient: Mago aHley   YOB: 1940   Date of Visit: 2/3/2020       Dear Dr Edith Thomas: Thank you for referring Mago Haley to me for evaluation  Below are the relevant portions of my assessment and plan of care  Carotid stenosis right,  symptomatic, with infarction ([address] )  Right carotid artery stenosis with history of right CVA with recurrent symptoms was originally scheduled for endarterectomy but surgery was delayed secondary to a fall  She now presents in follow-up  We again discussed the indications for treatment, the treatment options available and the associated risks and benefits  Will plan on proceeding with carotid endarterectomy in the near future but since it has been 3 months since imaging has been performed we will perform a carotid duplex prior to surgery to assure there is no significant change  If you have questions, please do not hesitate to call me  I look forward to following Moon Olson along with you           Sincerely,        Geoff Peterson MD        CC: No Recipients

## 2020-02-03 NOTE — PROGRESS NOTES
Assessment/Plan:    Carotid stenosis right,  symptomatic, with infarction ([facility] [address] )  Right carotid artery stenosis with history of right CVA with recurrent symptoms was originally scheduled for endarterectomy but surgery was delayed secondary to a fall  She now presents in follow-up  We again discussed the indications for treatment, the treatment options available and the associated risks and benefits  Will plan on proceeding with carotid endarterectomy in the near future but since it has been 3 months since imaging has been performed we will perform a carotid duplex prior to surgery to assure there is no significant change  Diagnoses and all orders for this visit:    Carotid stenosis right,  symptomatic, with infarction ([facility] [address] )  -     VAS carotid complete study; Future          Subjective:      Patient ID: Michael Nur is a 78 y o  female  Pt is here to discuss R CEA scheduled for 02/18/20  Pt is not a good historian, patient brought her son in law with her today  Pt has chest pain on the R side due to a fall on 1/18  Patient denies having any numbness or weakness, however she does ambulate with a cane  Pt currently takes Plavix and Rosuvastatin  78-year-old originally evaluated approximately 3 months ago for right carotid artery stenosis with history of 2 right hemispheric strokes over the past year  At that time she was being scheduled for a right carotid endarterectomy but after cardiac evaluation and clearance suffered from a fall with rib fractures thus surgery was delayed  On re-evaluation today she denies any recurrent focal neurologic symptoms which would be consistent with TIA, CVA or amaurosis fugax  She states she is recovering well from her rib fractures with marked reduction in pain but still some mild residual discomfort  She denies any shortness of breath or non ribbed chest pain  She does walk with a cane      Previous imaging studies were performed at [facility] approximately 4 months ago  The following portions of the patient's history were reviewed and updated as appropriate: allergies, current medications, past family history, past medical history, past social history, past surgical history and problem list     I have reviewed and made appropriate changes to the review of systems input by the medical assistant      Vitals:    02/03/20 1602   BP: 138/62   BP Location: Right arm   Patient Position: Sitting   Pulse: 86   Weight: 84 2 kg (185 lb 9 6 oz)   Height: 5' 4" (1 626 m)       Patient Active Problem List   Diagnosis    Benign essential hypertension    Benign familial tremor    History of CVA (cerebrovascular accident)    COPD [facility]ch[facility] disease) ([facility])    Esophageal reflux    Gait disturbance    Dyslipidemia    Sleep disorder    Sciatica    Right shoulder pain    Osteoarthritis of hip    OA (osteoarthritis)    Multiple joint pain    Acquired hypothyroidism    Lumbar degenerative disc disease    Back pain    History of pancreatitis    Aortic valve stenosis - Mild -Moderate    S/P deep brain stimulator placement    Microscopic hematuria    Dysarthria related to Nov 2018 CVA, worsened Sept 2019    Swallowing dysfunction    Weakness of left leg    Carotid stenosis right,  symptomatic, with infarction ([facility])    Hydronephrosis of left kidney    Abnormal CT scan    Cerebrovascular accident (CVA) ([address] )    Nephrostomy tube Left    Coronary artery disease involving native coronary artery of native heart without angina pectoris    Diabetes mellitus type 2, diet-controlled ([address] )    Calculus of kidney       Past Surgical History:   Procedure Laterality Date    CATARACT EXTRACTION W/  INTRAOCULAR LENS IMPLANT Bilateral     COLONOSCOPY      DEEP BRAIN STIMULATOR PLACEMENT      FL RETROGRADE PYELOGRAM  11/7/2019    IR PCN TO PCNU CONVERSION  1/10/2020    IR TUBE PLACEMENT  11/12/2019    KNEE ARTHROSCOPY      [place] CYSTOURETHROSCOPY,URETER CATHETER Left 2019    Procedure: CYSTO RETROGRADE PYELOGRAM, URETEROSCOPY;  Surgeon: Miguel A Ledesma MD;  [facility];  Service: Urology    IA IMP STIM,CRANIAL,SUBQ,1 ARRAY Left 2019    Procedure: REPLACEMENT IMPLANTABLE PULSE GENERATOR (IPG) DEEP BRAIN STIMULATION (DBS), LEFT;  Surgeon: Morgan Sol MD;  [facility];  Service: Neurosurgery    IA IMP STIM,CRANIAL,SUBQ,>1 ARRAY Right 2018    Procedure: REPLACEMENT RIGHT DBS IMPLANTABLE PULSE GENERATOR;  Surgeon: Morgan Sol MD;  [facility];  Service: Neurosurgery    IA TOTAL HIP ARTHROPLASTY Left 2016    Procedure: ARTHROPLASTY HIP TOTAL ANTERIOR;  Surgeon: Dick Goode MD;  [facility];  Service: Orthopedics    ROTATOR CUFF REPAIR         Family History   Problem Relation Age of Onset    Breast cancer Mother     Throat cancer Family        Social History     Socioeconomic History    Marital status:       Spouse name: Not on file    Number of children: Not on file    Years of education: Not on file    Highest education level: Not on file   Occupational History    Not on file   Social Needs    Financial resource strain: Not on file    Food insecurity:     Worry: Not on file     Inability: Not on file    Transportation needs:     Medical: Not on file     Non-medical: Not on file   Tobacco Use    Smoking status: Former Smoker     Last attempt to quit: 2013     Years since quittin 0    Smokeless tobacco: Never Used   Substance and Sexual Activity    Alcohol use: Never     Frequency: Never    Drug use: No    Sexual activity: Not Currently     Birth control/protection: Post-menopausal   Lifestyle    Physical activity:     Days per week: Not on file     Minutes per session: Not on file    Stress: Not on file   Relationships    Social connections:     Talks on phone: Not on file     Gets together: Not on file     Attends Orthodoxy service: Not on file Active member of club or organization: Not on file     Attends meetings of clubs or organizations: Not on file     Relationship status: Not on file    Intimate partner violence:     Fear of current or ex partner: Not on file     Emotionally abused: Not on file     Physically abused: Not on file     Forced sexual activity: Not on file   Other Topics Concern    Not on file   Social History Narrative    Caffeine use    Living independently           Allergies   Allergen Reactions    Ciprofloxacin Diarrhea    Simvastatin Myalgia    Advair Diskus [Fluticasone-Salmeterol] Palpitations     Ok w flovent    Tetracycline Rash     Reaction Date: [id number];          Current Outpatient Medications:     albuterol (2 5 mg/3 mL) 0 083 % nebulizer solution, Take 1 vial (2 5 mg total) by nebulization 2 (two) times a day, Disp: 60 vial, Rfl: 1    Cholecalciferol (VITAMIN D-3 PO), Take 1,000 Units by mouth daily  , Disp: , Rfl:     clopidogrel (PLAVIX) 75 mg tablet, Take 1 tablet (75 mg total) by mouth daily HOLD FOR NEPHROSTOMY TUBE PLACEMENT, Disp: 30 tablet, Rfl: 5    diltiazem (CARDIZEM CD) 240 mg 24 hr capsule, Take 1 capsule (240 mg total) by mouth daily, Disp: 90 capsule, Rfl: 3    Fluticasone Propionate, Inhal, (FLOVENT DISKUS) 250 MCG/BLIST AEPB, Inhale 1 puff 2 (two) times a day (Patient taking differently: Inhale 1 puff 2 (two) times a day as needed ), Disp: , Rfl:     furosemide (LASIX) 20 mg tablet, Take 1 tablet (20 mg total) by mouth daily, Disp: 90 tablet, Rfl: 1    levothyroxine 125 mcg tablet, Take 1 tablet (125 mcg total) by mouth daily, Disp: 90 tablet, Rfl: 3    Multiple Vitamins-Minerals (PRESERVISION AREDS PO), Take 1 Cap by Mouth daily, Disp: , Rfl:     multivitamin (THERAGRAN) TABS, Take 1 tablet by mouth daily, Disp: , Rfl:     omeprazole (PriLOSEC) 20 mg delayed release capsule, Take 1 capsule by mouth daily as needed , Disp: , Rfl:     rosuvastatin (CRESTOR) 5 mg tablet, Take 1 tablet (5 mg total) by mouth daily, Disp: 90 tablet, Rfl: 3    ascorbic acid (VITAMIN C) 250 MG tablet, Take 500 mg by mouth daily, Disp: , Rfl:     docusate sodium (COLACE) 100 mg capsule, Take 1 capsule (100 mg total) by mouth daily as needed for constipation for up to 7 days, Disp: 21 capsule, Rfl: 0    lidocaine (LIDODERM) 5 %, Apply 1 patch topically daily Remove & Discard patch within 12 hours or as directed by MD (Patient not taking: Reported on 1/31/2020), Disp: 7 patch, Rfl: 0    Review of Systems   Constitutional: Positive for activity change  HENT: Negative  Eyes: Negative  Respiratory: Positive for shortness of breath  Negative for chest tightness  Cardiovascular: Positive for chest pain (R side due to fall )  Gastrointestinal: Negative  Endocrine: Negative  Genitourinary: Negative  Musculoskeletal: Positive for gait problem  Skin: Negative  Negative for wound  Allergic/Immunologic: Negative  Neurological: Positive for light-headedness (when getting up )  Negative for dizziness, weakness, numbness and headaches  Hematological: Negative  Psychiatric/Behavioral: Positive for confusion  Objective:      /62 (BP Location: Right arm, Patient Position: Sitting)   Pulse 86   Ht 5' 4" (1 626 m)   Wt 84 2 kg (185 lb 9 6 oz)   BMI 31 86 kg/m²          Physical Exam   Constitutional: She is oriented to person, place, and time  She appears well-developed and well-nourished  HENT:   Head: Normocephalic and atraumatic  Eyes: Pupils are equal, round, and reactive to light  EOM are normal    Neck: Normal range of motion  Neck supple  No JVD present  Carotid bruit is present (Bilateral)  Cardiovascular: Normal rate and regular rhythm  Murmur heard  Systolic murmur is present  Pulses:       Carotid pulses are 2+ on the right side with bruit, and 2+ on the left side with bruit  Radial pulses are 2+ on the right side, and 2+ on the left side     Pulmonary/Chest: Effort normal and breath sounds normal  No respiratory distress  Musculoskeletal: Normal range of motion  She exhibits no edema or tenderness  Neurological: She is alert and oriented to person, place, and time  She has normal strength  No sensory deficit  Skin: Skin is warm and dry  Psychiatric: She has a normal mood and affect

## 2020-02-04 ENCOUNTER — HOSPITAL ENCOUNTER (OUTPATIENT)
Dept: RADIOLOGY | Facility: HOSPITAL | Age: 80
Discharge: HOME | End: 2020-02-04
Attending: UROLOGY
Payer: MEDICARE

## 2020-02-04 DIAGNOSIS — N13.30 HYDRONEPHROSIS: ICD-10-CM

## 2020-02-04 LAB — BACTERIA UR CULT: ABNORMAL

## 2020-02-04 PROCEDURE — 50389 REMOVE RENAL TUBE W/FLUORO: CPT | Performed by: RADIOLOGY

## 2020-02-04 PROCEDURE — 50431 NJX PX NFROSGRM &/URTRGRM: CPT

## 2020-02-04 RX ADMIN — IOHEXOL 5 ML: 350 INJECTION, SOLUTION INTRAVENOUS at 10:10

## 2020-02-04 NOTE — DISCHARGE INSTRUCTIONS
Drainage Tube Removal    Your drainage tube was removed today  What you need know at home:   Keep a clean dry dressing at the tube site until the small opening closes  It will take twenty four to forty eight hours  Keep the site dry until it heals  A small amount of drainage on your dressing is normal  Resume your normal diet  Small sips of flat soda will help with any nausea  Contact Interventional Radiology for any of the following: You have pain, fever greater than 101, shaking chills  If you have increased redness or swelling at the site  I the drainage from your site does not stop  If the site drains pus or has a bad odor       Contact Interventional Radiology at [phone]   Carol Ann PATIENTS: Contact Interventional Radiology at [phone]) Lo Moreno PATIENTS: Contact Interventional Radiology at [phone]) if:

## 2020-02-04 NOTE — SEDATION DOCUMENTATION
Pt had tube check in IR with Dr Valente Hernandez  Pt tolerated well, tube removed per MD order  Pt given instructions on site care and signs and symptoms of infection

## 2020-02-06 ENCOUNTER — HOSPITAL ENCOUNTER (OUTPATIENT)
Dept: NON INVASIVE DIAGNOSTICS | Facility: CLINIC | Age: 80
Discharge: HOME | End: 2020-02-06
Attending: SURGERY
Payer: MEDICARE

## 2020-02-06 DIAGNOSIS — I63.239 CAROTID STENOSIS, SYMPTOMATIC, WITH INFARCTION (HCC): ICD-10-CM

## 2020-02-06 PROCEDURE — 93880 EXTRACRANIAL BILAT STUDY: CPT

## 2020-02-07 DIAGNOSIS — I63.9 CEREBROVASCULAR ACCIDENT (CVA), UNSPECIFIED MECHANISM (HCC): ICD-10-CM

## 2020-02-07 DIAGNOSIS — E11.9 DIABETES MELLITUS TYPE 2, DIET-CONTROLLED (HCC): ICD-10-CM

## 2020-02-07 PROCEDURE — 93880 EXTRACRANIAL BILAT STUDY: CPT | Performed by: SURGERY

## 2020-02-07 RX ORDER — ROSUVASTATIN CALCIUM 5 MG/1
5 TABLET, COATED ORAL DAILY
Qty: 90 TABLET | Refills: 3 | Status: SHIPPED | OUTPATIENT
Start: 2020-02-07 | End: 2020-06-18

## 2020-02-12 DIAGNOSIS — I63.9 CEREBROVASCULAR ACCIDENT (CVA), UNSPECIFIED MECHANISM (HCC): ICD-10-CM

## 2020-02-12 RX ORDER — CLOPIDOGREL BISULFATE 75 MG/1
75 TABLET ORAL DAILY
Qty: 30 TABLET | Refills: 5 | Status: SHIPPED | OUTPATIENT
Start: 2020-02-12 | End: 2020-08-12

## 2020-02-13 RX ORDER — CEPHALEXIN 250 MG/1
250 CAPSULE ORAL EVERY 12 HOURS SCHEDULED
COMMUNITY
End: 2020-04-13

## 2020-02-13 RX ORDER — ACETAMINOPHEN 500 MG
1000 TABLET ORAL EVERY 6 HOURS PRN
COMMUNITY

## 2020-02-13 NOTE — PRE-PROCEDURE INSTRUCTIONS
Pre-Surgery Instructions:   Medication Instructions    acetaminophen (TYLENOL) 500 mg tablet Instructed patient per Anesthesia Guidelines   albuterol (2 5 mg/3 mL) 0 083 % nebulizer solution Instructed patient per Anesthesia Guidelines   Cholecalciferol (VITAMIN D-3 PO) Instructed patient per Anesthesia Guidelines   clopidogrel (PLAVIX) 75 mg tablet Patient was instructed by Physician and understands   diltiazem (CARDIZEM CD) 240 mg 24 hr capsule Instructed patient per Anesthesia Guidelines   docusate sodium (COLACE) 100 mg capsule Instructed patient per Anesthesia Guidelines   Fluticasone Propionate, Inhal, (FLOVENT DISKUS) 250 MCG/BLIST AEPB Instructed patient per Anesthesia Guidelines   furosemide (LASIX) 20 mg tablet Instructed patient per Anesthesia Guidelines   levothyroxine 125 mcg tablet Instructed patient per Anesthesia Guidelines   Multiple Vitamins-Minerals (PRESERVISION AREDS PO) Instructed patient per Anesthesia Guidelines   multivitamin (THERAGRAN) TABS Instructed patient per Anesthesia Guidelines   omeprazole (PriLOSEC) 20 mg delayed release capsule Instructed patient per Anesthesia Guidelines   rosuvastatin (CRESTOR) 5 mg tablet Instructed patient per Anesthesia Guidelines  Instructed to take levothyroxine and diltiazem am of surgery with sip of water per anesthesia/ can take omeprazole if needed  Use nebulizer am of surgery and flovent inhaler

## 2020-02-14 ENCOUNTER — ANESTHESIA EVENT (OUTPATIENT)
Dept: PERIOP | Facility: HOSPITAL | Age: 80
DRG: 039 | End: 2020-02-14
Payer: MEDICARE

## 2020-02-17 ENCOUNTER — PREP FOR PROCEDURE (OUTPATIENT)
Dept: VASCULAR SURGERY | Facility: CLINIC | Age: 80
End: 2020-02-17

## 2020-02-18 ENCOUNTER — HOSPITAL ENCOUNTER (OUTPATIENT)
Dept: NON INVASIVE DIAGNOSTICS | Facility: HOSPITAL | Age: 80
Discharge: HOME | DRG: 039 | End: 2020-02-18
Payer: MEDICARE

## 2020-02-18 ENCOUNTER — HOSPITAL ENCOUNTER (INPATIENT)
Facility: HOSPITAL | Age: 80
LOS: 1 days | Discharge: HOME | DRG: 039 | End: 2020-02-19
Attending: SURGERY | Admitting: SURGERY
Payer: MEDICARE

## 2020-02-18 ENCOUNTER — ANESTHESIA (OUTPATIENT)
Dept: PERIOP | Facility: HOSPITAL | Age: 80
DRG: 039 | End: 2020-02-18
Payer: MEDICARE

## 2020-02-18 DIAGNOSIS — Z86.73 HISTORY OF CVA (CEREBROVASCULAR ACCIDENT): Primary | ICD-10-CM

## 2020-02-18 DIAGNOSIS — E03.9 ACQUIRED HYPOTHYROIDISM: ICD-10-CM

## 2020-02-18 DIAGNOSIS — I63.239 CAROTID STENOSIS, SYMPTOMATIC, WITH INFARCTION (HCC): ICD-10-CM

## 2020-02-18 DIAGNOSIS — N17.9 AKI (ACUTE KIDNEY INJURY) (HCC): ICD-10-CM

## 2020-02-18 DIAGNOSIS — I65.21 CAROTID STENOSIS, RIGHT: ICD-10-CM

## 2020-02-18 DIAGNOSIS — J45.41 MODERATE PERSISTENT ASTHMATIC BRONCHITIS WITH ACUTE EXACERBATION: ICD-10-CM

## 2020-02-18 DIAGNOSIS — E78.5 DYSLIPIDEMIA: ICD-10-CM

## 2020-02-18 DIAGNOSIS — J44.9 CHRONIC OBSTRUCTIVE PULMONARY DISEASE, UNSPECIFIED COPD TYPE (HCC): ICD-10-CM

## 2020-02-18 DIAGNOSIS — I10 BENIGN ESSENTIAL HYPERTENSION: ICD-10-CM

## 2020-02-18 LAB
ABO GROUP BLD: NORMAL
BLD GP AB SCN SERPL QL: NEGATIVE
EST. AVERAGE GLUCOSE BLD GHB EST-MCNC: 143 MG/DL
GLUCOSE SERPL-MCNC: 129 MG/DL (ref 65–140)
GLUCOSE SERPL-MCNC: 98 MG/DL (ref 65–140)
HBA1C MFR BLD: 6.6 %
KCT BLD-ACNC: 206 SEC (ref 89–137)
KCT BLD-ACNC: 272 SEC (ref 89–137)
RH BLD: NEGATIVE
SPECIMEN EXPIRATION DATE: NORMAL
SPECIMEN SOURCE: ABNORMAL
SPECIMEN SOURCE: ABNORMAL

## 2020-02-18 PROCEDURE — 83036 HEMOGLOBIN GLYCOSYLATED A1C: CPT | Performed by: SURGERY

## 2020-02-18 PROCEDURE — 82948 REAGENT STRIP/BLOOD GLUCOSE: CPT

## 2020-02-18 PROCEDURE — 03UH0KZ SUPPLEMENT RIGHT COMMON CAROTID ARTERY WITH NONAUTOLOGOUS TISSUE SUBSTITUTE, OPEN APPROACH: ICD-10-PCS | Performed by: SURGERY

## 2020-02-18 PROCEDURE — 93882 EXTRACRANIAL UNI/LTD STUDY: CPT

## 2020-02-18 PROCEDURE — 35301 RECHANNELING OF ARTERY: CPT | Performed by: SURGERY

## 2020-02-18 PROCEDURE — 03UK0KZ SUPPLEMENT RIGHT INTERNAL CAROTID ARTERY WITH NONAUTOLOGOUS TISSUE SUBSTITUTE, OPEN APPROACH: ICD-10-PCS | Performed by: SURGERY

## 2020-02-18 PROCEDURE — 35301 RECHANNELING OF ARTERY: CPT | Performed by: PHYSICIAN ASSISTANT

## 2020-02-18 PROCEDURE — 86850 RBC ANTIBODY SCREEN: CPT | Performed by: SURGERY

## 2020-02-18 PROCEDURE — 03CM0ZZ EXTIRPATION OF MATTER FROM RIGHT EXTERNAL CAROTID ARTERY, OPEN APPROACH: ICD-10-PCS | Performed by: SURGERY

## 2020-02-18 PROCEDURE — 03CK0ZZ EXTIRPATION OF MATTER FROM RIGHT INTERNAL CAROTID ARTERY, OPEN APPROACH: ICD-10-PCS | Performed by: SURGERY

## 2020-02-18 PROCEDURE — 86901 BLOOD TYPING SEROLOGIC RH(D): CPT | Performed by: SURGERY

## 2020-02-18 PROCEDURE — NC001 PR NO CHARGE: Performed by: SURGERY

## 2020-02-18 PROCEDURE — 03CH0ZZ EXTIRPATION OF MATTER FROM RIGHT COMMON CAROTID ARTERY, OPEN APPROACH: ICD-10-PCS | Performed by: SURGERY

## 2020-02-18 PROCEDURE — 85347 COAGULATION TIME ACTIVATED: CPT

## 2020-02-18 PROCEDURE — 99221 1ST HOSP IP/OBS SF/LOW 40: CPT | Performed by: PHYSICIAN ASSISTANT

## 2020-02-18 PROCEDURE — 86900 BLOOD TYPING SEROLOGIC ABO: CPT | Performed by: SURGERY

## 2020-02-18 PROCEDURE — 03UM0KZ SUPPLEMENT RIGHT EXTERNAL CAROTID ARTERY WITH NONAUTOLOGOUS TISSUE SUBSTITUTE, OPEN APPROACH: ICD-10-PCS | Performed by: SURGERY

## 2020-02-18 DEVICE — XENOSURE BIOLOGIC PATCH, 0.8CM X 8CM, EIFU
Type: IMPLANTABLE DEVICE | Site: CAROTID | Status: FUNCTIONAL
Brand: XENOSURE BIOLOGIC PATCH

## 2020-02-18 RX ORDER — ONDANSETRON 2 MG/ML
4 INJECTION INTRAMUSCULAR; INTRAVENOUS ONCE AS NEEDED
Status: DISCONTINUED | OUTPATIENT
Start: 2020-02-18 | End: 2020-02-18 | Stop reason: HOSPADM

## 2020-02-18 RX ORDER — FENTANYL CITRATE 50 UG/ML
INJECTION, SOLUTION INTRAMUSCULAR; INTRAVENOUS AS NEEDED
Status: DISCONTINUED | OUTPATIENT
Start: 2020-02-18 | End: 2020-02-18 | Stop reason: SURG

## 2020-02-18 RX ORDER — ONDANSETRON 2 MG/ML
4 INJECTION INTRAMUSCULAR; INTRAVENOUS EVERY 6 HOURS PRN
Status: DISCONTINUED | OUTPATIENT
Start: 2020-02-18 | End: 2020-02-19 | Stop reason: HOSPADM

## 2020-02-18 RX ORDER — PANTOPRAZOLE SODIUM 40 MG/1
40 TABLET, DELAYED RELEASE ORAL
Status: DISCONTINUED | OUTPATIENT
Start: 2020-02-19 | End: 2020-02-19 | Stop reason: HOSPADM

## 2020-02-18 RX ORDER — ONDANSETRON 2 MG/ML
INJECTION INTRAMUSCULAR; INTRAVENOUS AS NEEDED
Status: DISCONTINUED | OUTPATIENT
Start: 2020-02-18 | End: 2020-02-18 | Stop reason: SURG

## 2020-02-18 RX ORDER — SODIUM CHLORIDE 9 MG/ML
INJECTION, SOLUTION INTRAVENOUS AS NEEDED
Status: DISCONTINUED | OUTPATIENT
Start: 2020-02-18 | End: 2020-02-18 | Stop reason: HOSPADM

## 2020-02-18 RX ORDER — ACETAMINOPHEN 325 MG/1
650 TABLET ORAL EVERY 6 HOURS PRN
Status: DISCONTINUED | OUTPATIENT
Start: 2020-02-18 | End: 2020-02-19 | Stop reason: HOSPADM

## 2020-02-18 RX ORDER — HEPARIN SODIUM 5000 [USP'U]/ML
5000 INJECTION, SOLUTION INTRAVENOUS; SUBCUTANEOUS EVERY 8 HOURS SCHEDULED
Status: DISCONTINUED | OUTPATIENT
Start: 2020-02-19 | End: 2020-02-19 | Stop reason: HOSPADM

## 2020-02-18 RX ORDER — PRAVASTATIN SODIUM 40 MG
40 TABLET ORAL
Status: DISCONTINUED | OUTPATIENT
Start: 2020-02-18 | End: 2020-02-19 | Stop reason: HOSPADM

## 2020-02-18 RX ORDER — PROTAMINE SULFATE 10 MG/ML
INJECTION, SOLUTION INTRAVENOUS AS NEEDED
Status: DISCONTINUED | OUTPATIENT
Start: 2020-02-18 | End: 2020-02-18 | Stop reason: SURG

## 2020-02-18 RX ORDER — FENTANYL CITRATE/PF 50 MCG/ML
50 SYRINGE (ML) INJECTION
Status: DISCONTINUED | OUTPATIENT
Start: 2020-02-18 | End: 2020-02-18 | Stop reason: HOSPADM

## 2020-02-18 RX ORDER — LIDOCAINE HYDROCHLORIDE 10 MG/ML
INJECTION, SOLUTION EPIDURAL; INFILTRATION; INTRACAUDAL; PERINEURAL AS NEEDED
Status: DISCONTINUED | OUTPATIENT
Start: 2020-02-18 | End: 2020-02-18 | Stop reason: SURG

## 2020-02-18 RX ORDER — CLOPIDOGREL BISULFATE 75 MG/1
75 TABLET ORAL DAILY
Status: DISCONTINUED | OUTPATIENT
Start: 2020-02-18 | End: 2020-02-19 | Stop reason: HOSPADM

## 2020-02-18 RX ORDER — CHLORHEXIDINE GLUCONATE 0.12 MG/ML
15 RINSE ORAL ONCE
Status: COMPLETED | OUTPATIENT
Start: 2020-02-18 | End: 2020-02-18

## 2020-02-18 RX ORDER — LABETALOL 20 MG/4 ML (5 MG/ML) INTRAVENOUS SYRINGE
5
Status: DISCONTINUED | OUTPATIENT
Start: 2020-02-18 | End: 2020-02-19 | Stop reason: HOSPADM

## 2020-02-18 RX ORDER — HEPARIN SODIUM 1000 [USP'U]/ML
INJECTION, SOLUTION INTRAVENOUS; SUBCUTANEOUS AS NEEDED
Status: DISCONTINUED | OUTPATIENT
Start: 2020-02-18 | End: 2020-02-18 | Stop reason: SURG

## 2020-02-18 RX ORDER — LEVOTHYROXINE SODIUM 0.12 MG/1
125 TABLET ORAL
Status: DISCONTINUED | OUTPATIENT
Start: 2020-02-19 | End: 2020-02-19 | Stop reason: HOSPADM

## 2020-02-18 RX ORDER — MEPERIDINE HYDROCHLORIDE 50 MG/ML
12.5 INJECTION INTRAMUSCULAR; INTRAVENOUS; SUBCUTANEOUS ONCE AS NEEDED
Status: DISCONTINUED | OUTPATIENT
Start: 2020-02-18 | End: 2020-02-18 | Stop reason: HOSPADM

## 2020-02-18 RX ORDER — PROPOFOL 10 MG/ML
INJECTION, EMULSION INTRAVENOUS AS NEEDED
Status: DISCONTINUED | OUTPATIENT
Start: 2020-02-18 | End: 2020-02-18 | Stop reason: SURG

## 2020-02-18 RX ORDER — HYDROMORPHONE HCL/PF 1 MG/ML
0.5 SYRINGE (ML) INJECTION
Status: DISCONTINUED | OUTPATIENT
Start: 2020-02-18 | End: 2020-02-18 | Stop reason: HOSPADM

## 2020-02-18 RX ORDER — HYDRALAZINE HYDROCHLORIDE 20 MG/ML
15 INJECTION INTRAMUSCULAR; INTRAVENOUS
Status: DISCONTINUED | OUTPATIENT
Start: 2020-02-18 | End: 2020-02-19 | Stop reason: HOSPADM

## 2020-02-18 RX ORDER — ROCURONIUM BROMIDE 10 MG/ML
INJECTION, SOLUTION INTRAVENOUS AS NEEDED
Status: DISCONTINUED | OUTPATIENT
Start: 2020-02-18 | End: 2020-02-18 | Stop reason: SURG

## 2020-02-18 RX ORDER — HYDROCODONE BITARTRATE AND ACETAMINOPHEN 5; 325 MG/1; MG/1
1 TABLET ORAL EVERY 6 HOURS PRN
Status: DISCONTINUED | OUTPATIENT
Start: 2020-02-18 | End: 2020-02-19 | Stop reason: HOSPADM

## 2020-02-18 RX ORDER — ASPIRIN 81 MG/1
81 TABLET ORAL DAILY
Status: DISCONTINUED | OUTPATIENT
Start: 2020-02-19 | End: 2020-02-19 | Stop reason: HOSPADM

## 2020-02-18 RX ORDER — SODIUM CHLORIDE 9 MG/ML
125 INJECTION, SOLUTION INTRAVENOUS CONTINUOUS
Status: DISCONTINUED | OUTPATIENT
Start: 2020-02-18 | End: 2020-02-18

## 2020-02-18 RX ORDER — EPHEDRINE SULFATE 50 MG/ML
INJECTION INTRAVENOUS AS NEEDED
Status: DISCONTINUED | OUTPATIENT
Start: 2020-02-18 | End: 2020-02-18 | Stop reason: SURG

## 2020-02-18 RX ADMIN — PROPOFOL 80 MG: 10 INJECTION, EMULSION INTRAVENOUS at 11:41

## 2020-02-18 RX ADMIN — PROTAMINE SULFATE 40 MG: 10 INJECTION, SOLUTION INTRAVENOUS at 13:41

## 2020-02-18 RX ADMIN — CLOPIDOGREL BISULFATE 75 MG: 75 TABLET ORAL at 14:40

## 2020-02-18 RX ADMIN — FENTANYL CITRATE 25 MCG: 50 INJECTION, SOLUTION INTRAMUSCULAR; INTRAVENOUS at 13:59

## 2020-02-18 RX ADMIN — PRAVASTATIN SODIUM 40 MG: 40 TABLET ORAL at 16:16

## 2020-02-18 RX ADMIN — PHENYLEPHRINE HYDROCHLORIDE 20 MCG/MIN: 10 INJECTION INTRAVENOUS at 13:32

## 2020-02-18 RX ADMIN — ROCURONIUM BROMIDE 20 MG: 50 INJECTION, SOLUTION INTRAVENOUS at 12:57

## 2020-02-18 RX ADMIN — LIDOCAINE HYDROCHLORIDE 50 MG: 10 INJECTION, SOLUTION EPIDURAL; INFILTRATION; INTRACAUDAL; PERINEURAL at 11:41

## 2020-02-18 RX ADMIN — SUGAMMADEX 169 MG: 100 INJECTION, SOLUTION INTRAVENOUS at 13:52

## 2020-02-18 RX ADMIN — SODIUM CHLORIDE 125 ML/HR: 0.9 INJECTION, SOLUTION INTRAVENOUS at 09:13

## 2020-02-18 RX ADMIN — EPHEDRINE SULFATE 5 MG: 50 INJECTION, SOLUTION INTRAVENOUS at 13:33

## 2020-02-18 RX ADMIN — FENTANYL CITRATE 25 MCG: 50 INJECTION, SOLUTION INTRAMUSCULAR; INTRAVENOUS at 14:03

## 2020-02-18 RX ADMIN — FENTANYL CITRATE 50 MCG: 50 INJECTION, SOLUTION INTRAMUSCULAR; INTRAVENOUS at 11:41

## 2020-02-18 RX ADMIN — FENTANYL CITRATE 50 MCG: 50 INJECTION, SOLUTION INTRAMUSCULAR; INTRAVENOUS at 11:49

## 2020-02-18 RX ADMIN — FENTANYL CITRATE 25 MCG: 50 INJECTION, SOLUTION INTRAMUSCULAR; INTRAVENOUS at 13:28

## 2020-02-18 RX ADMIN — CHLORHEXIDINE GLUCONATE 0.12% ORAL RINSE 15 ML: 1.2 LIQUID ORAL at 08:25

## 2020-02-18 RX ADMIN — HEPARIN SODIUM 1000 UNITS: 1000 INJECTION INTRAVENOUS; SUBCUTANEOUS at 13:10

## 2020-02-18 RX ADMIN — ROCURONIUM BROMIDE 50 MG: 50 INJECTION, SOLUTION INTRAVENOUS at 11:41

## 2020-02-18 RX ADMIN — FENTANYL CITRATE 25 MCG: 50 INJECTION, SOLUTION INTRAMUSCULAR; INTRAVENOUS at 13:00

## 2020-02-18 RX ADMIN — HEPARIN SODIUM 7000 UNITS: 1000 INJECTION INTRAVENOUS; SUBCUTANEOUS at 12:16

## 2020-02-18 RX ADMIN — ONDANSETRON 4 MG: 2 INJECTION INTRAMUSCULAR; INTRAVENOUS at 13:53

## 2020-02-18 RX ADMIN — Medication 2000 MG: at 11:32

## 2020-02-18 NOTE — ANESTHESIA PREPROCEDURE EVALUATION
Review of Systems/Medical History  Patient summary reviewed  Chart reviewed  No history of anesthetic complications     Cardiovascular  Hyperlipidemia, Hypertension controlled, Valvular heart disease , aortic valve stenosis, CAD ,   Comment: SUMMARY:  1  Sinus rhythm  2  Good technical quality  3  Mild calcific aortic stenosis with trivial aortic regurgitation  4  Minimal aortic root enlargement  5  Mild mitral regurgitation with severe mitral annulus calcification  No evidence of mitral stenosis  6  Mild left atrial enlargement  7  Mild left ventricular systolic dysfunction, EF 37%  8  Mild left ventricular cavity enlargement  9  Grade 1 left ventricular diastolic dysfunction with mildly elevated left ventricular filling pressures  ,  Pulmonary  COPD moderate- medication dependent , Asthma , Shortness of breath,        GI/Hepatic    GERD well controlled,        Kidney stones, Kidney disease ,        Endo/Other  Diabetes type 2 , History of thyroid disease , hypothyroidism,      GYN       Hematology   Musculoskeletal  Back pain , lumbar pain, Sciatica,   Arthritis     Neurology    CVA (2018) , residual symptoms,   Comment: Benign tremor  DBS implantation Psychology   Negative psychology ROS              Physical Exam    Airway    Mallampati score: II  TM Distance: >3 FB  Neck ROM: full     Dental   upper dentures and lower dentures,     Cardiovascular  Rhythm: regular, Rate: normal, Murmur,     Pulmonary  Rhonchi, Decreased breath sounds,     Other Findings        Anesthesia Plan  ASA Score- 3     Anesthesia Type- general with ASA Monitors  Additional Monitors: arterial line  Airway Plan:         Plan Factors-    Induction- intravenous  Postoperative Plan-     Informed Consent- Anesthetic plan and risks discussed with patient

## 2020-02-18 NOTE — ASSESSMENT & PLAN NOTE
POD#0  Plavix, no statin due to intolerance  Monitor SBP for goal <170  Frequent neuro checks  Management per primary team

## 2020-02-18 NOTE — PLAN OF CARE
Problem: Potential for Falls  Goal: Patient will remain free of falls  Description  INTERVENTIONS:  - Assess patient frequently for physical needs  -  Identify cognitive and physical deficits and behaviors that affect risk of falls    -  Lead fall precautions as indicated by assessment   - Educate patient/family on patient safety including physical limitations  - Instruct patient to call for assistance with activity based on assessment  - Modify environment to reduce risk of injury  - Consider OT/PT consult to assist with strengthening/mobility  Outcome: Progressing     Problem: PAIN - ADULT  Goal: Verbalizes/displays adequate comfort level or baseline comfort level  Description  Interventions:  - Encourage patient to monitor pain and request assistance  - Assess pain using appropriate pain scale  - Administer analgesics based on type and severity of pain and evaluate response  - Implement non-pharmacological measures as appropriate and evaluate response  - Consider cultural and social influences on pain and pain management  - Notify physician/advanced practitioner if interventions unsuccessful or patient reports new pain  Outcome: Progressing     Problem: INFECTION - ADULT  Goal: Absence or prevention of progression during hospitalization  Description  INTERVENTIONS:  - Assess and monitor for signs and symptoms of infection  - Monitor lab/diagnostic results  - Monitor all insertion sites, i e  indwelling lines, tubes, and drains  - Monitor endotracheal if appropriate and nasal secretions for changes in amount and color  - Lead appropriate cooling/warming therapies per order  - Administer medications as ordered  - Instruct and encourage patient and family to use good hand hygiene technique  - Identify and instruct in appropriate isolation precautions for identified infection/condition  Outcome: Progressing  Goal: Absence of fever/infection during neutropenic period  Description  INTERVENTIONS:  - Monitor WBC    Outcome: Progressing     Problem: DISCHARGE PLANNING  Goal: Discharge to home or other facility with appropriate resources  Description  INTERVENTIONS:  - Identify barriers to discharge w/patient and caregiver  - Arrange for needed discharge resources and transportation as appropriate  - Identify discharge learning needs (meds, wound care, etc )  - Arrange for interpretive services to assist at discharge as needed  - Refer to Case Management Department for coordinating discharge planning if the patient needs post-hospital services based on physician/advanced practitioner order or complex needs related to functional status, cognitive ability, or social support system  Outcome: Progressing     Problem: Knowledge Deficit  Goal: Patient/family/caregiver demonstrates understanding of disease process, treatment plan, medications, and discharge instructions  Description  Complete learning assessment and assess knowledge base  Interventions:  - Provide teaching at level of understanding  - Provide teaching via preferred learning methods  Outcome: Progressing     Problem: NEUROSENSORY - ADULT  Goal: Achieves stable or improved neurological status  Description  INTERVENTIONS  - Monitor and report changes in neurological status  - Monitor vital signs such as temperature, blood pressure, glucose, and any other labs ordered   - Initiate measures to prevent increased intracranial pressure  - Monitor for seizure activity and implement precautions if appropriate      Outcome: Progressing  Goal: Achieves maximal functionality and self care  Description  INTERVENTIONS  - Monitor swallowing and airway patency with patient fatigue and changes in neurological status  - Encourage and assist patient to increase activity and self care     - Encourage visually impaired, hearing impaired and aphasic patients to use assistive/communication devices  Outcome: Progressing     Problem: CARDIOVASCULAR - ADULT  Goal: Maintains optimal cardiac output and hemodynamic stability  Description  INTERVENTIONS:  - Monitor I/O, vital signs and rhythm  - Monitor for S/S and trends of decreased cardiac output  - Administer and titrate ordered vasoactive medications to optimize hemodynamic stability  - Assess quality of pulses, skin color and temperature  - Assess for signs of decreased coronary artery perfusion  - Instruct patient to report change in severity of symptoms  Outcome: Progressing  Goal: Absence of cardiac dysrhythmias or at baseline rhythm  Description  INTERVENTIONS:  - Continuous cardiac monitoring, vital signs, obtain 12 lead EKG if ordered  - Administer antiarrhythmic and heart rate control medications as ordered  - Monitor electrolytes and administer replacement therapy as ordered  Outcome: Progressing     Problem: HEMATOLOGIC - ADULT  Goal: Maintains hematologic stability  Description  INTERVENTIONS  - Assess for signs and symptoms of bleeding or hemorrhage  - Monitor labs  - Administer supportive blood products/factors as ordered and appropriate  Outcome: Progressing

## 2020-02-18 NOTE — ANESTHESIA PROCEDURE NOTES
Arterial Line Insertion  Performed by: Selam Crawford CRNA  Authorized by: Olaf Wilkerson DO   Preparation: Patient was prepped and draped in the usual sterile fashion    Indications: multiple ABGs and hemodynamic monitoring  Indications comments: ACTCs  Orientation:  Left  Location: radial artery  Sedation:  Patient sedated: yes  Sedation type: (General anes)

## 2020-02-18 NOTE — OP NOTE
OPERATIVE REPORT  PATIENT NAME: Evelio Potter    :  1940  MRN: [id number]  Pt Location: [place] [address]    SURGERY DATE: 2020    Surgeon(s) and Role:     * Lary Hutchins MD - Primary     * Anabella Barrientos PA-C - Assisting    Carotid stenosis, symptomatic, with infarction ([facility] [address] ) [I63 239]    Post-Op Diagnosis Codes:     * Carotid stenosis, symptomatic, with infarction ([facility] [address] ) [I63 239]      Procedure(s):  CAROTID ENDARTERECTOMY WITH BOVINE PATCH    Specimen(s):  * No specimens in log *    Estimated Blood Loss:   Minimal    Drains:  none    Anesthesia Type:   General    Operative Indications:  Carotid stenosis, symptomatic, with infarction [facility]) [[id number]]  78-year-old with history of right hemispheric CVA now presents for right carotid endarterectomy  Of note she was initially scheduled for endarterectomy approximately 3 months ago but suffered from a fall with rib fractures delaying surgery  Operative Findings:  Highly calcified eccentric plaque of the proximal internal carotid artery  Following endarterectomy and patch angioplasty duplex shows wide patency of the common, internal and external carotid arteries without evidence of intraluminal pathology  Complications:   None    Procedure and Technique:    A qualified surgical resident was not available for this case  An assistant was required to aid in dissection, retraction and our true reconstruction  The patient was brought to the operating room and placed on the operating table in the supine position  The patient was identified by verbal confirmation and armband identification  An ultrasound was performed to identify the carotid bifurcation which was marked  The patient was prepped and draped in usual sterile fashion with chlorhexidine  An [place] drape was placed       A transverse incision was made in a skin fold overlying the previously marked right carotid bifurcation   Dissection was carried through the subcutaneous tissue and platysma to the anterior border the sternocleidomastoid muscle  The [place]er retractor was inserted exposing the facial vein  The vein was doubly ligated and divided exposing the carotid artery  IV heparin was administered and an ACT obtained  Further heparin was administered as need to obtain a therapeutic ACT  With careful mobilization the internal carotid artery beyond the plaque, common carotid artery proximal to the plaque, external carotid artery and superior thyroidal arteries were dissected and encircled with Vesseloops  During this dissection the vagus and hypoglossal nerves were visualized and protected  Traction was placed on the vessel loops and an anterolateral arteriotomy was created in the common carotid artery extending it into the internal carotid artery with the Seals scissors  The [address] [place] shunt was easily inserted first into the distal internal carotid artery with good backbleeding noted  The proximal end was placed in the common carotid artery with restoration of shunted flow  Standard endarterectomy was then performed  Plaque was removed from the common external and internal carotid artery to a good endpoint  The arteriotomy was then closed with a bovine pericardial patch secured with a 6-0 Prolene suture  When the suture line was nearly completed the shunt was removed the vessels were flushed vigorously in both directions and the suture line was secured  Flow was restored first into the external and then the internal carotid artery  No significant bleeding points were encounter  Duplex ultrasound was then brought to the field and insonation of the external/internal and common carotid arteries showed excellent waveforms and no technical defects  Fibrillar coagulant was placed in the operative field for added hemostasis  40 mg  protamine was administered    Once hemostasis was obtained the platysma was closed in a running fashion with 3 0 Monocryl suture and the  wound edges were infiltrated with 0 5% Marcaine with epinephrine mixed with 1% lidocaine  A 4-0 Monocryl subcuticular skin closure was performed with placement of a Histoacryl bandage  The patient awoke moving all 4 extremities and following commands       I was present for the entire procedure    Patient Disposition:  PACU     Vascular Quality Initiative - Carotid Endarterectomy     Urgency:  Elective    Anesthesia: General Type: Conventional    Side: right    Patch Type: Bovine Pericardial     If Prosthetic, Patch : Bin    Shunt: Yes- Routine    Skin Prep: Chlorhexidine    Drain: no  Heparin: yes    Protamine: yes      Dextran: no     Re-explore artery after closure: no    Total Procedure time: 120min    Monitoring:   EEG: no   Stump Pressure: no   Other: no    Completion Study:   Doppler: no   Duplex: yes   Arteriogram: no    Concomitant Procedure:   Proximal Endovascular: no   Distal Endovascular: no   CABG: no   Other Arterial Op: no      SIGNATURE: Maribell Grossman MD  DATE: February 18, 2020  TIME: 1:58 PM

## 2020-02-18 NOTE — INTERVAL H&P NOTE
H&P reviewed  After examining the patient I find no changes in the patients condition since the H&P had been written      Vitals:    02/18/20 0854   BP: 161/72   Pulse: 75   Resp: 16   Temp: 98 1 °F (36 7 °C)   SpO2: 97%

## 2020-02-18 NOTE — ANESTHESIA POSTPROCEDURE EVALUATION
Post-Op Assessment Note    CV Status:  Stable    Pain management: adequate     Mental Status:  Alert and awake   Hydration Status:  Euvolemic   PONV Controlled:  Controlled   Airway Patency:  Patent   Post Op Vitals Reviewed: Yes      Staff: Anesthesiologist           BP      Temp      Pulse 68 (02/18/20 1550)   Resp 18 (02/18/20 1550)    SpO2 92 % (02/18/20 1550)

## 2020-02-18 NOTE — CONSULTS
Consult- Deion Antonio 1940, 78 y o  female MRN: [id number]    Unit/Bed#: ICU 06 Encounter: [id number]    Primary Care Provider: Berhane Perez DO   Date and time admitted to hospital: 2/18/2020  7:27 AM      Consults    Acquired hypothyroidism  Assessment & Plan  Continue synthroid    Dyslipidemia  Assessment & Plan  Pravastatin     Gait disturbance  Assessment & Plan  PT/OT    Esophageal reflux  Assessment & Plan  Continue home PPI    COPD (chronic obstructive pulmonary disease) ([facility] )  Assessment & Plan  No evidence of acute exacerbation, on PRN nebs at home  Respiratory protocol       Benign essential hypertension  Assessment & Plan  Maintain SBP < 170  Utilize PRN adjuncts on POD 0    * Carotid stenosis right,  symptomatic, with infarction ([facility] )  Assessment & Plan  POD#0  Plavix, no statin due to intolerance  Monitor SBP for goal <170  Frequent neuro checks  Management per primary team     -------------------------------------------------------------------------------------------------------------  Chief Complaint: "I feel fine"    History of Present Illness   HX and PE limited by: Jaqui Sarabia is a 78 y o  female who presents post operatively from a right carotid endarterectomy  She was found to have cardotid stenosis after having a stroke, she has had 2 right hemispheric strokes over the past year  She had been scheduled for this procedure for the last three months, but suffered from a fall with rib fractures that delayed surgery  Patient feels sleepy, currently without complaints    History obtained from chart review and the patient   -------------------------------------------------------------------------------------------------------------  Dispo:  Admit to Critical Care     Code Status: Prior  --------------------------------------------------------------------------------------------------------------  Review of Systems    A 12-point, complete review of systems was reviewed and negative except as stated above     Physical Exam   Constitutional: She appears well-developed and well-nourished  No distress  HENT:   Head: Normocephalic and atraumatic  Eyes: Pupils are equal, round, and reactive to light  Neck: No JVD present  No tracheal deviation present  Right incision C/D/I   Cardiovascular: Normal rate, regular rhythm and intact distal pulses  Murmur heard  Pulmonary/Chest: Effort normal and breath sounds normal  No respiratory distress  She has no wheezes  She has no rales  She exhibits no tenderness  Abdominal: Soft  Bowel sounds are normal  She exhibits no distension  There is no tenderness  Musculoskeletal: She exhibits no edema  Neurological: She is alert  Nonfocal exam  Follows 2 step commands briskly     Skin: Skin is warm and dry  Vitals reviewed  --------------------------------------------------------------------------------------------------------------  Vitals:   Vitals:    02/18/20 1446 02/18/20 1449 02/18/20 1452 02/18/20 1531   BP:   131/60    Pulse: 76 76 76 72   Resp: 16 15 17    Temp: 99 °F (37 2 °C)      TempSrc:       SpO2: 91% 93% 92% 90%   Weight:    87 9 kg (193 lb 12 6 oz)   Height:    5' 4" (1 626 m)     Temp  Min: 98 1 °F (36 7 °C)  Max: 99 1 °F (37 3 °C)  IBW: 54 7 kg  Height: 5' 4" (162 6 cm)  Body mass index is 33 26 kg/m²      Laboratory and Diagnostics:        Invalid input(s):  EOSPCT                        ABG:    VBG:          Micro:        EKG: No new  Imaging: No new      Historical Information   Past Medical History:   Diagnosis Date    Arthritis     Asthma     At risk for falls     Benign essential tremor 10/15/2018    Added automatically from request for surgery [id number]    COPD [facility]ronic obstructive pulmonary disease) [facility] )     Diabetes mellitus ([facility] )     borderline    Difficulty waking     post anesthesia    Edema     bles    GERD (gastroesophageal reflux disease)     High cholesterol     History of recent fall     broke several ribs    Hypertension     Hypothyroid     Kidney stone     Macular degeneration     Osteopenia     Osteoporosis     S/P deep brain stimulator placement     Shortness of breath     on exertion    Slurred speech     Stroke (HCC)     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     Urinary leakage     Uses roller walker     Vitamin D deficiency     Wears dentures     full upper    Wears glasses     reading     Past Surgical History:   Procedure Laterality Date    CATARACT EXTRACTION W/  INTRAOCULAR LENS IMPLANT Bilateral     COLONOSCOPY      DEEP BRAIN STIMULATOR PLACEMENT      FL RETROGRADE PYELOGRAM  2019    IR PCN TO PCNU CONVERSION  1/10/2020    IR TUBE PLACEMENT  2019    KNEE ARTHROSCOPY      KY CYSTOURETHROSCOPY,URETER CATHETER Left 2019    Procedure: CYSTO RETROGRADE PYELOGRAM, URETEROSCOPY;  Surgeon: Nguyen Rayo MD;  [facility];  Service: Urology    KY IMP STIM,CRANIAL,SUBQ,1 ARRAY Left 2019    Procedure: REPLACEMENT IMPLANTABLE PULSE GENERATOR (IPG) DEEP BRAIN STIMULATION (DBS), LEFT;  Surgeon: Rene Buenrostro MD;  Location: [facility];  Service: Neurosurgery    KY IMP STIM,CRANIAL,SUBQ,>1 ARRAY Right 2018    Procedure: REPLACEMENT RIGHT DBS IMPLANTABLE PULSE GENERATOR;  Surgeon: Rene Buenrostro MD;  [facility];  Service: Neurosurgery    KY TOTAL HIP ARTHROPLASTY Left 2016    Procedure: ARTHROPLASTY HIP TOTAL ANTERIOR;  Surgeon: Ezequiel Guadarrama MD;  [facility];  Service: Orthopedics    ROTATOR CUFF REPAIR       Social History   Social History     Substance and Sexual Activity   Alcohol Use Never    Frequency: Never     Social History     Substance and Sexual Activity   Drug Use No     Social History     Tobacco Use   Smoking Status Former Smoker    Types: Cigarettes    Last attempt to quit:     Years since quittin 1   Smokeless Tobacco Never Used     Exercise History: N/A  Family History:   Family History Problem Relation Age of Onset    Breast cancer Mother     Throat cancer Family      I have reviewed this patient's family history and commented on sigificant items within the HPI      Medications:  Current Facility-Administered Medications   Medication Dose Route Frequency    clopidogrel (PLAVIX) tablet 75 mg  75 mg Oral Daily    [START ON 2/19/2020] heparin (porcine) subcutaneous injection 5,000 Units  5,000 Units Subcutaneous Q8H [address]    [START ON 2/19/2020] levothyroxine tablet 125 mcg  125 mcg Oral Early Morning    ondansetron (ZOFRAN) injection 4 mg  4 mg Intravenous Q6H PRN    [START ON 2/19/2020] pantoprazole (PROTONIX) EC tablet 40 mg  40 mg Oral Early Morning     Home medications:  Prior to Admission Medications   Prescriptions Last Dose Informant Patient Reported? Taking? Cholecalciferol (VITAMIN D-3 PO) 2/17/2020 at Unknown time Self Yes Yes   Sig: Take 1,000 Units by mouth daily     Fluticasone Propionate, Inhal, (FLOVENT DISKUS) 250 MCG/BLIST AEPB 2/11/2020 Self No Yes   Sig: Inhale 1 puff 2 (two) times a day   Patient taking differently: Inhale 1 puff 2 (two) times a day as needed    Multiple Vitamins-Minerals (PRESERVISION AREDS PO) 2/17/2020 at Unknown time Self Yes Yes   Sig: Take 1 Cap by Mouth daily   acetaminophen (TYLENOL) 500 mg tablet 2/17/2020 at Unknown time  Yes Yes   Sig: Take 1,000 mg by mouth every 6 (six) hours as needed for mild pain    albuterol (2 5 mg/3 mL) 0 083 % nebulizer solution 2/17/2020 at Unknown time Self No Yes   Sig: Take 1 vial (2 5 mg total) by nebulization 2 (two) times a day   Patient taking differently: Take 2 5 mg by nebulization every 6 (six) hours as needed    cephalexin (KEFLEX) 250 mg capsule Not Taking at Unknown time  Yes No   Sig: Take 250 mg by mouth every 12 (twelve) hours Finished   clopidogrel (PLAVIX) 75 mg tablet 2/17/2020 at Unknown time  No Yes   Sig: Take 1 tablet (75 mg total) by mouth daily HOLD FOR NEPHROSTOMY TUBE PLACEMENT   diltiazem (CARDIZEM CD) 240 mg 24 hr capsule 2/18/2020 at 0500  No Yes   Sig: Take 1 capsule (240 mg total) by mouth daily   docusate sodium (COLACE) 100 mg capsule   No Yes   Sig: Take 1 capsule (100 mg total) by mouth daily as needed for constipation for up to 7 days   furosemide (LASIX) 20 mg tablet 2/17/2020 at Unknown time Self No Yes   Sig: Take 1 tablet (20 mg total) by mouth daily   levothyroxine 125 mcg tablet 2/18/2020 at 0500  No Yes   Sig: Take 1 tablet (125 mcg total) by mouth daily   multivitamin (THERAGRAN) TABS 2/17/2020 at Unknown time Self Yes Yes   Sig: Take 1 tablet by mouth daily   omeprazole (PriLOSEC) 20 mg delayed release capsule More than a month at Unknown time Self Yes No   Sig: Take 1 capsule by mouth daily as needed    rosuvastatin (CRESTOR) 5 mg tablet 2/17/2020 at Unknown time  No Yes   Sig: Take 1 tablet (5 mg total) by mouth daily      Facility-Administered Medications: None     Allergies: Allergies   Allergen Reactions    Ciprofloxacin Diarrhea    Simvastatin Myalgia    Advair Diskus [Fluticasone-Salmeterol] Palpitations     Ok w flovent    Tetracycline Rash     Reaction Date: [id number];      ------------------------------------------------------------------------------------------------------------  Advance Directive and Living Will:      Power of :    POLST:    ------------------------------------------------------------------------------------------------------------  Care Time Delivered:   No Critical Care time spent       Valentín Mendoza PA-C        Portions of the record may have been created with voice recognition software  Occasional wrong word or "sound a like" substitutions may have occurred due to the inherent limitations of voice recognition software    Read the chart carefully and recognize, using context, where substitutions have occurred

## 2020-02-19 VITALS
HEART RATE: 88 BPM | TEMPERATURE: 99 F | BODY MASS INDEX: 32.93 KG/M2 | RESPIRATION RATE: 18 BRPM | SYSTOLIC BLOOD PRESSURE: 156 MMHG | DIASTOLIC BLOOD PRESSURE: 65 MMHG | HEIGHT: 64 IN | OXYGEN SATURATION: 95 % | WEIGHT: 192.9 LBS

## 2020-02-19 PROBLEM — Z98.890 STATUS POST CAROTID ENDARTERECTOMY: Status: ACTIVE | Noted: 2020-02-19

## 2020-02-19 LAB
ANION GAP SERPL CALCULATED.3IONS-SCNC: 6 MMOL/L (ref 4–13)
APTT PPP: 30 SECONDS (ref 23–37)
BUN SERPL-MCNC: 13 MG/DL (ref 5–25)
CALCIUM SERPL-MCNC: 8.5 MG/DL (ref 8.3–10.1)
CHLORIDE SERPL-SCNC: 105 MMOL/L (ref 100–108)
CO2 SERPL-SCNC: 30 MMOL/L (ref 21–32)
CREAT SERPL-MCNC: 0.79 MG/DL (ref 0.6–1.3)
ERYTHROCYTE [DISTWIDTH] IN BLOOD BY AUTOMATED COUNT: 15.3 % (ref 11.6–15.1)
GFR SERPL CREATININE-BSD FRML MDRD: 70 ML/MIN/1.73SQ M
GLUCOSE SERPL-MCNC: 111 MG/DL (ref 65–140)
HCT VFR BLD AUTO: 35.2 % (ref 34.8–46.1)
HGB BLD-MCNC: 10.6 G/DL (ref 11.5–15.4)
INR PPP: 1.11 (ref 0.84–1.19)
MCH RBC QN AUTO: 26.8 PG (ref 26.8–34.3)
MCHC RBC AUTO-ENTMCNC: 30.1 G/DL (ref 31.4–37.4)
MCV RBC AUTO: 89 FL (ref 82–98)
PLATELET # BLD AUTO: 219 THOUSANDS/UL (ref 149–390)
PMV BLD AUTO: 9.2 FL (ref 8.9–12.7)
POTASSIUM SERPL-SCNC: 4.5 MMOL/L (ref 3.5–5.3)
PROTHROMBIN TIME: 14.4 SECONDS (ref 11.6–14.5)
RBC # BLD AUTO: 3.96 MILLION/UL (ref 3.81–5.12)
SODIUM SERPL-SCNC: 141 MMOL/L (ref 136–145)
WBC # BLD AUTO: 8.2 THOUSAND/UL (ref 4.31–10.16)

## 2020-02-19 PROCEDURE — 85610 PROTHROMBIN TIME: CPT | Performed by: PHYSICIAN ASSISTANT

## 2020-02-19 PROCEDURE — 99024 POSTOP FOLLOW-UP VISIT: CPT | Performed by: PHYSICIAN ASSISTANT

## 2020-02-19 PROCEDURE — 80048 BASIC METABOLIC PNL TOTAL CA: CPT | Performed by: PHYSICIAN ASSISTANT

## 2020-02-19 PROCEDURE — 85027 COMPLETE CBC AUTOMATED: CPT | Performed by: PHYSICIAN ASSISTANT

## 2020-02-19 PROCEDURE — 85730 THROMBOPLASTIN TIME PARTIAL: CPT | Performed by: PHYSICIAN ASSISTANT

## 2020-02-19 PROCEDURE — 99231 SBSQ HOSP IP/OBS SF/LOW 25: CPT | Performed by: NURSE PRACTITIONER

## 2020-02-19 RX ORDER — ASPIRIN 81 MG/1
81 TABLET ORAL DAILY
Qty: 30 TABLET | Refills: 0
Start: 2020-02-20 | End: 2021-05-19 | Stop reason: ALTCHOICE

## 2020-02-19 RX ORDER — FENTANYL CITRATE 50 UG/ML
INJECTION, SOLUTION INTRAMUSCULAR; INTRAVENOUS
Status: DISPENSED
Start: 2020-02-19 | End: 2020-02-19

## 2020-02-19 RX ORDER — ALBUTEROL SULFATE 2.5 MG/3ML
2.5 SOLUTION RESPIRATORY (INHALATION) EVERY 6 HOURS PRN
Start: 2020-02-19 | End: 2020-06-11 | Stop reason: SDUPTHER

## 2020-02-19 RX ADMIN — PANTOPRAZOLE SODIUM 40 MG: 40 TABLET, DELAYED RELEASE ORAL at 06:06

## 2020-02-19 RX ADMIN — ASPIRIN 81 MG: 81 TABLET, COATED ORAL at 08:26

## 2020-02-19 RX ADMIN — CLOPIDOGREL BISULFATE 75 MG: 75 TABLET ORAL at 08:26

## 2020-02-19 RX ADMIN — PRAVASTATIN SODIUM 40 MG: 40 TABLET ORAL at 16:43

## 2020-02-19 RX ADMIN — HEPARIN SODIUM 5000 UNITS: 5000 INJECTION INTRAVENOUS; SUBCUTANEOUS at 06:06

## 2020-02-19 RX ADMIN — LEVOTHYROXINE SODIUM 125 MCG: 125 TABLET ORAL at 06:06

## 2020-02-19 NOTE — PROGRESS NOTES
Progress Note - Duc Hoff 1940, 78 y o  female MRN: [id number]    Unit/Bed#: ICU 06 Encounter: [id number]    Primary Care Provider: Yael Webster DO   Date and time admitted to hospital: 2/18/2020  7:27 AM        Status post RIGHT carotid endarterectomy  Assessment & Plan  POD #1 RIGHT CEA with patch angioplastty ([facility], 2/18/2020)    77 y/o F Htn, DM, AoS, CAD, COPD, prior strokes who presented for elective RIGHT carotid endarterectomy with patch angioplasty  The internal carotid artery showed highly calcified, eccentric plaque  The artery was cleaned out and surgery was without apparent complication  Following surgery, the patient was monitored in the ICU overnight  She remains on supplemental oxygen at 2 L with Pox at 92%  She denies any shortness of breath, chest pain or new neurologic symptoms  Patient tells me that she has COPD  She also states that she has a pulse oximeter at home and POX normally ranges 93-95 on room air  Patient has some R ear pain this am which is resolving  No facial weakness or numbness  No arm or leg weakness  No visual, speech or eating difficulties  The incision is tender as expected but no pain  She still needs to get out of bed to ambulate  Exam:  Smiling  NAD  She is on 2L O2  NSR  BP/HR's have been stable  Visual fields intact  PERRLE EOMI  Smile intact  Tongue midline  Normal facial movement and sensation  Normal shoulder strength  Good, grossly equal strength bilaterally  Decreased BS 1/3 lung fields but no w/r/r  RRR 2/6 CLAUDIA, no edema  Abd soft/NT        Incision closed with Histoacryl  Tenderness to palp   Mild swelling; no bleed or drainage  No facial/each or neck numbness comparing L and R sides    Plan:  -OOB, advance activity  -IS, try to wean off O2  -Continue with ASA, Plavix, statin  -We reviewed discharge instructions as they relate to carotid incision and post op instructions    -Case reviewed with critical care team  -Follow up appointment with Ms Brenda Mcclellan, 3/5/2020 @ 8:30 am [facility]  -Plan for possible discharge to home later today if she remains hemodynamically stable after ambulation and she is able to wean off supplemental oxygen  Subjective:    Hx : Vincent Officer 77 y/o F Htn, DM, AoS, CAD, COPD, prior strokes who presented for elective RIGHT carotid endarterectomy with patch angioplasty  The internal carotid artery showed highly calcified, eccentric plaque  The artery was cleaned out and surgery was without apparent complication  POD # 1 2/19/20:  She remains on supplemental oxygen at 2 L with Pox at 92%  She denies any shortness of breath, chest pain or new neurologic symptoms  Patient tells me that she has COPD  She also states that she has a pulse oximeter at home and POX normally ranges 93-95 on room air  Patient has some R ear pain this am which is resolving  No facial weakness or numbness  No arm or leg weakness  No visual, speech or eating difficulties  The incision is tender as expected but no pain  She still needs to get out of bed to ambulate  Vitals:  /70   Pulse 82   Temp 98 2 °F (36 8 °C) (Temporal)   Resp 20   Ht 5' 4" (1 626 m)   Wt 87 5 kg (192 lb 14 4 oz)   SpO2 94%   Breastfeeding No   BMI 33 11 kg/m²     I/Os:  I/O last 3 completed shifts: In: 1112 [P O :240; I V :1600]  Out: 732 [Urine:732]  No intake/output data recorded      Lab Results and Cultures:   Lab Results   Component Value Date    WBC 8 20 02/19/2020    HGB 10 6 (L) 02/19/2020    HCT 35 2 02/19/2020    MCV 89 02/19/2020     02/19/2020     Lab Results   Component Value Date    GLUCOSE 108 10/29/2014    CALCIUM 8 5 02/19/2020     10/29/2014    K 4 5 02/19/2020    CO2 30 02/19/2020     02/19/2020    BUN 13 02/19/2020    CREATININE 0 79 02/19/2020     Lab Results   Component Value Date    INR 1 11 02/19/2020    INR 1 18 01/18/2020    INR 1 00 11/06/2019    PROTIME 14 4 02/19/2020    PROTIME 15  2 (H) 01/18/2020    PROTIME 13 3 11/06/2019       Urinalysis:   Lab Results   Component Value Date    COLORU Yellow 02/01/2020    CLARITYU Cloudy 02/01/2020    SPECGRAV 1 015 02/01/2020    PHUR 6 0 02/01/2020    LEUKOCYTESUR Large (A) 02/01/2020    NITRITE Negative 02/01/2020    GLUCOSEU Negative 02/01/2020    KETONESU Negative 02/01/2020    BILIRUBINUR Negative 02/01/2020    BLOODU Moderate (A) 02/01/2020   ,   Urine Culture:   Lab Results   Component Value Date    URINECX 10,000-19,000 cfu/ml Klebsiella pneumoniae ESBL (A) 02/01/2020   ,   Wound Culure: No results found for: WOUNDCULT    Medications:  Current Facility-Administered Medications   Medication Dose Route Frequency    acetaminophen (TYLENOL) tablet 650 mg  650 mg Oral Q6H PRN    aspirin (ECOTRIN LOW STRENGTH) EC tablet 81 mg  81 mg Oral Daily    clopidogrel (PLAVIX) tablet 75 mg  75 mg Oral Daily    fentanyl citrate (PF) 100 MCG/2ML **ADS Override Pull**        heparin (porcine) subcutaneous injection 5,000 Units  5,000 Units Subcutaneous Q8H [address]    Labetalol HCl (NORMODYNE) injection 5 mg  5 mg Intravenous Q15 Min PRN    And    hydrALAZINE (APRESOLINE) injection 15 mg  15 mg Intravenous Q15 Min PRN    And    niCARdipine (CARDENE) 25 mg (STANDARD CONCENTRATION) in sodium chloride 0 9% 250 mL  1-15 mg/hr Intravenous Continuous PRN    HYDROcodone-acetaminophen (NORCO) 5-325 mg per tablet 1 tablet  1 tablet Oral Q6H PRN    levothyroxine tablet 125 mcg  125 mcg Oral Early Morning    ondansetron (ZOFRAN) injection 4 mg  4 mg Intravenous Q6H PRN    pantoprazole (PROTONIX) EC tablet 40 mg  40 mg Oral Early Morning    pravastatin (PRAVACHOL) tablet 40 mg  40 mg Oral Daily With Dinner       Imaging:    Intraop carotid du 2/18/20  FINDINGS:     Right        PSV  EDV (cm/s)  Ratio    Prox   ICA     39          13   0 90    Mid CCA       43          10   0 90    Ext Carotid   45           0   1 05             CONCLUSION:        Impression  Intra-op evaluation shows no evidence of mural thrombus, intimal flap or  significant residual disease in the endarterectomized carotid arteries  Physical Exam:      Smiling  NAD  She is on 2L O2  NSR  BP/HR's have been stable  Visual fields intact  PERRLE EOMI  Smile intact  Tongue midline  Normal facial movement and sensation  Normal shoulder strength  Good, grossly equal strength bilaterally  R neck incision: closed with Histacryl; mild swelling, no bleeding or drainage  Decreased BS 1/3 lung fields but no w/r/r  RRR 2/6 CLAUDIA, no edema  Abd soft/NT  R 2+ DP/ L non-palpable pulse; feet warm and well perfused           Celine Koch PA-C  2/19/2020  [facility]

## 2020-02-19 NOTE — PLAN OF CARE
Problem: Potential for Falls  Goal: Patient will remain free of falls  Description  INTERVENTIONS:  - Assess patient frequently for physical needs  -  Identify cognitive and physical deficits and behaviors that affect risk of falls    -  [place] fall precautions as indicated by assessment   - Educate patient/family on patient safety including physical limitations  - Instruct patient to call for assistance with activity based on assessment  - Modify environment to reduce risk of injury  - Consider OT/PT consult to assist with strengthening/mobility  Outcome: Progressing     Problem: PAIN - ADULT  Goal: Verbalizes/displays adequate comfort level or baseline comfort level  Description  Interventions:  - Encourage patient to monitor pain and request assistance  - Assess pain using appropriate pain scale  - Administer analgesics based on type and severity of pain and evaluate response  - Implement non-pharmacological measures as appropriate and evaluate response  - Consider cultural and social influences on pain and pain management  - Notify physician/advanced practitioner if interventions unsuccessful or patient reports new pain  Outcome: Progressing     Problem: INFECTION - ADULT  Goal: Absence or prevention of progression during hospitalization  Description  INTERVENTIONS:  - Assess and monitor for signs and symptoms of infection  - Monitor lab/diagnostic results  - Monitor all insertion sites, i e  indwelling lines, tubes, and drains  - Monitor endotracheal if appropriate and nasal secretions for changes in amount and color  - [place] appropriate cooling/warming therapies per order  - Administer medications as ordered  - Instruct and encourage patient and family to use good hand hygiene technique  - Identify and instruct in appropriate isolation precautions for identified infection/condition  Outcome: Progressing  Goal: Absence of fever/infection during neutropenic period  Description  INTERVENTIONS:  - Monitor WBC    Outcome: Progressing     Problem: DISCHARGE PLANNING  Goal: Discharge to home or other facility with appropriate resources  Description  INTERVENTIONS:  - Identify barriers to discharge w/patient and caregiver  - Arrange for needed discharge resources and transportation as appropriate  - Identify discharge learning needs (meds, wound care, etc )  - Arrange for interpretive services to assist at discharge as needed  - Refer to Case Management Department for coordinating discharge planning if the patient needs post-hospital services based on physician/advanced practitioner order or complex needs related to functional status, cognitive ability, or social support system  Outcome: Progressing     Problem: Knowledge Deficit  Goal: Patient/family/caregiver demonstrates understanding of disease process, treatment plan, medications, and discharge instructions  Description  Complete learning assessment and assess knowledge base  Interventions:  - Provide teaching at level of understanding  - Provide teaching via preferred learning methods  Outcome: Progressing     Problem: NEUROSENSORY - ADULT  Goal: Achieves stable or improved neurological status  Description  INTERVENTIONS  - Monitor and report changes in neurological status  - Monitor vital signs such as temperature, blood pressure, glucose, and any other labs ordered   - Initiate measures to prevent increased intracranial pressure  - Monitor for seizure activity and implement precautions if appropriate      Outcome: Progressing  Goal: Achieves maximal functionality and self care  Description  INTERVENTIONS  - Monitor swallowing and airway patency with patient fatigue and changes in neurological status  - Encourage and assist patient to increase activity and self care     - Encourage visually impaired, hearing impaired and aphasic patients to use assistive/communication devices  Outcome: Progressing     Problem: CARDIOVASCULAR - ADULT  Goal: Maintains optimal cardiac output and hemodynamic stability  Description  INTERVENTIONS:  - Monitor I/O, vital signs and rhythm  - Monitor for S/S and trends of decreased cardiac output  - Administer and titrate ordered vasoactive medications to optimize hemodynamic stability  - Assess quality of pulses, skin color and temperature  - Assess for signs of decreased coronary artery perfusion  - Instruct patient to report change in severity of symptoms  Outcome: Progressing  Goal: Absence of cardiac dysrhythmias or at baseline rhythm  Description  INTERVENTIONS:  - Continuous cardiac monitoring, vital signs, obtain 12 lead EKG if ordered  - Administer antiarrhythmic and heart rate control medications as ordered  - Monitor electrolytes and administer replacement therapy as ordered  Outcome: Progressing     Problem: HEMATOLOGIC - ADULT  Goal: Maintains hematologic stability  Description  INTERVENTIONS  - Assess for signs and symptoms of bleeding or hemorrhage  - Monitor labs  - Administer supportive blood products/factors as ordered and appropriate  Outcome: Progressing

## 2020-02-19 NOTE — ASSESSMENT & PLAN NOTE
POD #1 RIGHT CEA with patch angioplastty ([facility], 2/18/2020)    77 y/o F Htn, DM, AoS, CAD, COPD, prior strokes who presented for elective RIGHT carotid endarterectomy with patch angioplasty  The internal carotid artery showed highly calcified, eccentric plaque  The artery was cleaned out and surgery was without apparent complication  Following surgery, the patient was monitored in the ICU overnight  She remains on supplemental oxygen at 2 L with Pox at 92%  She denies any shortness of breath, chest pain or new neurologic symptoms  Patient tells me that she has COPD  She also states that she has a pulse oximeter at home and POX normally ranges 93-95 on room air  Patient has some R ear pain this am which is resolving  No facial weakness or numbness  No arm or leg weakness  No visual, speech or eating difficulties  The incision is tender as expected but no pain  She still needs to get out of bed to ambulate  Exam:  Smiling  NAD  She is on 2L O2  NSR  BP/HR's have been stable  Visual fields intact  PERRLE EOMI  Smile intact  Tongue midline  Normal facial movement and sensation  Normal shoulder strength  Good, grossly equal strength bilaterally  Decreased BS 1/3 lung fields but no w/r/r  RRR 2/6 CLAUDIA, no edema  Abd soft/NT        Incision closed with Histoacryl  Tenderness to palp  Mild swelling; no bleed or drainage  No facial/each or neck numbness comparing L and R sides    Plan:  -OOB, advance activity  -IS, try to wean off O2  -Continue with ASA, Plavix, statin  -We reviewed discharge instructions as they relate to carotid incision and post op instructions    -Case reviewed with critical care team  -Follow up appointment with Ms Jacob Barba, 3/5/2020 @ 8:30 am [facility]  -Plan for possible discharge to home later today if she remains hemodynamically stable after ambulation and she is able to wean off supplemental oxygen

## 2020-02-19 NOTE — PROGRESS NOTES
Progress Note - Misty Martinez 1940, 78 y o  female MRN: [id number]    Unit/Bed#: ICU 06 Encounter: [id number]    Primary Care Provider: Zohra eGorges DO   Date and time admitted to hospital: 2020  7:27 AM        * Carotid stenosis right,  symptomatic, with infarction [facility])  Assessment & Plan  · POD#1  · Plavix, no statin due to intolerance  · Monitor SBP for goal <170  · Frequent neuro checks  · Management per vascular surgery team    COPD (chronic obstructive pulmonary disease) ([facility] [address] )  Assessment & Plan  · No evidence of acute exacerbation, on PRN nebs at home  · Respiratory protocol       Benign essential hypertension  Assessment & Plan  · Maintain SBP < 170  · Utilize PRN adjuncts on POD 1    Esophageal reflux  Assessment & Plan  · Continue home PPI    Gait disturbance  Assessment & Plan  · PT/OT    Acquired hypothyroidism  Assessment & Plan  · Continue synthroid    Dyslipidemia  Assessment & Plan  · Pravastatin     ----------------------------------------------------------------------------------------  HPI/24hr events: no events overnight, complains of some right ear pain but no loss of hearing    Disposition: Transfer to Med-Surg   Code Status: Prior  ---------------------------------------------------------------------------------------  SUBJECTIVE  Feels improved, denies weakness    Review of Systems  Review of systems was reviewed and negative unless stated above in HPI/24-hour events   ---------------------------------------------------------------------------------------  OBJECTIVE    Vitals   Vitals:    20 0400 20 0414 20 0422 20 0500   BP:   127/61    Pulse: 68 90     Resp:       Temp:    99 5 °F (37 5 °C)   TempSrc:       SpO2: 95% (!) 88%     Weight:       Height:         Temp (24hrs), Av 4 °F (36 9 °C), Min:97 5 °F (36 4 °C), Max:99 5 °F (37 5 °C)  Current: Temperature: 99 5 °F (37 5 °C)  Arterial Line BP: 142/56  Arterial Line MAP (mmHg): 90 mmHg  SpO2 Device: O2 Device: Nasal cannula  O2 Flow Rate (L/min): 3 L/min    Physical Exam   Constitutional: She is oriented to person, place, and time  She appears well-developed and well-nourished  HENT:   Head: Normocephalic  Eyes: Pupils are equal, round, and reactive to light  Neck: Neck supple  Right CEA site is clean, dry and intact   Cardiovascular: Normal rate and regular rhythm  Pulmonary/Chest: Effort normal  She has wheezes  Abdominal: Soft  Bowel sounds are normal  She exhibits no distension  Neurological: She is alert and oriented to person, place, and time  No cranial nerve deficit  Skin: Skin is warm and dry  Psychiatric: She has a normal mood and affect  Invasive/non-invasive ventilation settings   Respiratory    Lab Data (Last 4 hours)    None         O2/Vent Data (Last 4 hours)    None                Laboratory and Diagnostics:  Results from last 7 days   Lab Units 02/19/20  0424   WBC Thousand/uL 8 20   HEMOGLOBIN g/dL 10 6*   HEMATOCRIT % 35 2   PLATELETS Thousands/uL 219     Results from last 7 days   Lab Units 02/19/20  0424   SODIUM mmol/L 141   POTASSIUM mmol/L 4 5   CHLORIDE mmol/L 105   CO2 mmol/L 30   ANION GAP mmol/L 6   BUN mg/dL 13   CREATININE mg/dL 0 79   CALCIUM mg/dL 8 5   GLUCOSE RANDOM mg/dL 111          Results from last 7 days   Lab Units 02/19/20  0424   INR  1 11   PTT seconds 30              ABG:    VBG:          Micro        EKG:   Imaging: I have personally reviewed pertinent reports  and I have personally reviewed pertinent films in PACS    Intake and Output  I/O       02/17 0701 - 02/18 0700 02/18 0701 - 02/19 0700    P  O   240    I V  (mL/kg)  1600 (18 2)    Total Intake(mL/kg)  1840 (20 9)    Urine (mL/kg/hr)  405    Total Output  405    Net  +1435          Unmeasured Urine Occurrence  2 x          Height and Weights   Height: 5' 4" (162 6 cm)  IBW: 54 7 kg  Body mass index is 33 26 kg/m²    Weight (last 2 days)     Date/Time   Weight 02/18/20 1531   87 9 (193 78)    02/18/20 0854   84 4 (186)                Nutrition       Diet Orders   (From admission, onward)             Start     Ordered    02/18/20 1555  Diet Cardiovascular; Cardiac  Diet effective now     Question Answer Comment   Diet Type Cardiovascular    Cardiac Cardiac    RD to adjust diet per protocol? Yes        02/18/20 1555                  Active Medications  Scheduled Meds:    Current Facility-Administered Medications:  acetaminophen 650 mg Oral Q6H PRN Samira Pop PA-C   aspirin 81 mg Oral Daily Samira Pop PA-C   clopidogrel 75 mg Oral Daily Romina Richard PA-C   fentanyl citrate (PF)       heparin (porcine) 5,000 Units Subcutaneous [facility] Samira Pop, MERLIN   Labetalol HCl 5 mg Intravenous Q15 Min PRN Samira Pop PA-C   And       hydrALAZINE 15 mg Intravenous Q15 Min PRN Samira Credeyanira, MERLIN   And       niCARdipine 1-15 mg/hr Intravenous Continuous PRN Samira Credeyanira, MERLIN   HYDROcodone-acetaminophen 1 tablet Oral Q6H PRN Samira Credeyanira, MERLIN   levothyroxine 125 mcg Oral Early Morning Prem Rodrigez PA-C   ondansetron 4 mg Intravenous Q6H PRN Samira Credeyanira, MERLIN   pantoprazole 40 mg Oral Early Morning Welfkirsten Sensing, MERLIN   pravastatin 40 mg Oral Daily With Dinner Romina Richard PA-C     Continuous Infusions:    niCARdipine 1-15 mg/hr     PRN Meds:     acetaminophen 650 mg Q6H PRN   Labetalol HCl 5 mg Q15 Min PRN   And     hydrALAZINE 15 mg Q15 Min PRN   And     niCARdipine 1-15 mg/hr Continuous PRN   HYDROcodone-acetaminophen 1 tablet Q6H PRN   ondansetron 4 mg Q6H PRN     ---------------------------------------------------------------------------------------  Advance Directive and Living Will:      Power of :    POLST:    ---------------------------------------------------------------------------------------  Care Time Delivered:         MAGNOLIA Hair      Portions of the record may have been created with voice recognition software    Occasional wrong word or "sound a like" substitutions may have occurred due to the inherent limitations of voice recognition software    Read the chart carefully and recognize, using context, where substitutions have occurred

## 2020-02-19 NOTE — DISCHARGE INSTRUCTIONS
DISCHARGE INSTRUCTIONS                       CAROTID ENDARTERECTOMY  Following discharge from the hospital, you may have some questions about your operation, your activities or your general condition  These instructions may answer some of your questions and help you adjust during the first few weeks following your operation  ACTIVITY: Resume your normal activities and exercise schedule as tolerated  If you were driving prior to surgery, you may resume driving one week following discharge from the hospital  You may ride in a car upon discharge  DIET: Resume your normal diet  Try to eat low fat and low cholesterol foods  RECURRENT SYMPTOMS: If you develop any new numbness, weakness, blindness or difficulty with speech after discharge call 911 or go to an emergency department immediately  INCISION: Your surgeon may have chosen to use a type of adhesive glue to close your incision  The glue is used to cover the incision, assist in closure, and prevent contamination  This adhesive will darken and peel away on its own within one to two weeks  You may shower the day after your surgery, but do not scrub the incision  If you do not have this adhesive glue, you may include the operated area in a shower on the third day following surgery  It is normal to have swelling or discoloration around the incision  If increasing redness or pain develops, call our office immediately  Numbness in the region of the incision may occur following the surgery  This normally resolves in six to twelve months  FOLLOW UP STUDIES: Doppler ultrasound studies are very important to your post-operative care  Your surgeon will arrange them at your first postoperative visit  The first study is due 3 months after surgery  [phone] [facility] FREE [phone]  [address] [place] [address] , [address], [place], [address] [place] [address] 20, [address] [place] [address], [address] [place] [address] [facility], [address]    [address]st  Floor, [address]  [address], [address] [facility], [address] [facility], [facility] [address], [address]   [address] [place] [address], [address] [facility], [place], [address] [place] [address]  [facility], [address] [facility], [facility], [address], [address]

## 2020-02-19 NOTE — ASSESSMENT & PLAN NOTE
· POD#1  · Plavix, no statin due to intolerance  · Monitor SBP for goal <170  · Frequent neuro checks  · Management per vascular surgery team

## 2020-02-19 NOTE — DISCHARGE INSTR - AVS FIRST PAGE
- You have a follow-up appointment with Sharmaine Cabello NP on 3/5 at 8:15 a m   In the [facility] office    -continue all medications as prior to admission including: aspirin, Plavix and pravastatin 40    -call office if you have any problems or pain at the incision site     -see instructions, as we discussed

## 2020-03-04 NOTE — PATIENT INSTRUCTIONS
PLAN:  -Continue aspirin 81mg daily  -Continue statin therapy daily  -Continue Plavix 75mg daily for 2 months  -if you have any signs or symptoms of TIA/CVA including vision changes, unilateral extremity weaknesses, speech difficulties, please call 911 and go to the ER for evaluation  -Carotid Duplex to be done at 3 month and 9 month post-op  -F/u with Vascular Surgeon in 4-6 weeks      Carotid Artery Disease   AMBULATORY CARE:   Carotid artery disease  is a condition that causes narrow or blocked carotid arteries  Your carotid arteries are the blood vessels that supply your brain with most of the blood it needs to work  You have 2 carotid arteries, one on each side of your neck  Call 911 for any of the following:   · You have any of the following signs of a stroke:      ¨ Numbness or drooping on one side of your face     ¨ Weakness in an arm or leg    ¨ Confusion or difficulty speaking    ¨ Dizziness, a severe headache, or vision loss    · You have any of the following signs of a heart attack:      ¨ Squeezing, pressure, or pain in your chest that lasts longer than 5 minutes or returns    ¨ Discomfort or pain in your back, neck, jaw, stomach, or arm     ¨ Trouble breathing    ¨ Nausea or vomiting    ¨ Lightheadedness or a sudden cold sweat, especially with chest pain or trouble breathing  Contact your healthcare provider if:   · You have questions or concerns about your condition or care  Signs and symptoms of carotid artery disease: You may have no signs or symptoms  Most commonly, carotid artery disease causes transient ischemic attacks (TIAs), or mini-strokes  You may have numbness, weakness, lack of movement, or vision or speech problems  A TIA goes away quickly and does not cause permanent damage  A TIA may be a warning sign that you are about to have a stroke  If you have any symptoms of a TIA or stroke, seek care immediately  Warning signs of a stroke:   The word F A S T  can help you remember and recognize warning signs of a stroke  · F = Face:  One side of the face droops  · A = Arms:  One arm starts to drop when both arms are raised  · S = Speech:  Speech is slurred or sounds different than usual     · T = Time:  A person who is having a stroke needs to be seen immediately  A stroke is a medical emergency that needs immediate treatment  Some medicines and treatments work best if given within a few hours of a stroke  Treatment  for carotid artery disease depends on how narrow your arteries have become, your symptoms, and your general health  The goal of treatment is to lower your risk for a stroke  You may need any of the following:  · Take aspirin if directed  Your healthcare provider may suggest that you take an aspirin a day to prevent blood clots from forming in the carotid arteries  If your healthcare provider wants you to take aspirin daily, do not take acetaminophen or ibuprofen instead  · Control risk factors  High blood pressure, high cholesterol, heart disease, diabetes, and being overweight increase your risk for atherosclerosis  · Procedures can help open blocked arteries  A carotid endarterectomy is used to cut plaque out of the artery  An angioplasty is used to push the plaque against the artery wall with a balloon device  Sometimes a stent is placed during an angioplasty  A stent is a metal mesh tube that is placed in the artery to keep it open  Manage carotid artery disease:   · Eat a variety of healthy foods  Healthy foods include fruit, vegetables, whole-grain breads, low-fat dairy products, lean meat, and fish  Choose fish that are high in omega-3 fatty acids, such as salmon and fresh tuna  Ask your healthcare provider for more information on a heart healthy diet and the DASH eating plan  · Limit sodium (salt)  Sodium may increase your blood pressure  Add less table salt to your foods  Read food labels and choose foods that are low in sodium   Your healthcare provider may suggest you follow a low sodium diet  · Reach or maintain a healthy weight  Extra weight makes your heart work harder  Ask your healthcare provider how much you should weight  He can help you create a safe weight loss plan  Even a weight loss of 10% of your body weight can help your heart function better  · Exercise as directed  Exercise helps improve heart function and can help you manage your weight  Exercise can also help lower your cholesterol and blood sugar levels  Try to get at least 30 minutes of exercise at least 5 times each week  Try to be active every day  Your healthcare provider can help you create an exercise plan that works best for you  · Limit alcohol  Alcohol can increase your blood pressure and triglyceride levels  Men should limit alcohol to 2 drinks per day  Women should limit alcohol to 1 drink per day  A drink of alcohol is 12 ounces of beer, 5 ounces of wine, or 1½ ounces of liquor  · Do not smoke  Nicotine and other chemicals in cigarettes and cigars can cause heart and lung damage  Ask your healthcare provider for information if you currently smoke and need help to quit  E-cigarettes or smokeless tobacco still contain nicotine  Talk to your healthcare provider before you use these products  Follow up with your healthcare provider as directed:  Write down your questions so you remember to ask them during your visits  © 2017 [address]  Information is for End User's use only and may not be sold, redistributed or otherwise used for commercial purposes  All illustrations and images included in CareNotes® are the copyrighted property of [employer] , [employer]  or Louis Walker  The above information is an  only  It is not intended as medical advice for individual conditions or treatments  Talk to your doctor, nurse or pharmacist before following any medical regimen to see if it is safe and effective for you

## 2020-03-04 NOTE — ASSESSMENT & PLAN NOTE
79 y/o F HTN, DM, AoS, CAD, COPD, prior strokes who is s/p elective RIGHT carotid endarterectomy with patch angioplasty by Dr Bisi Varghese 2/18/2020 who presents to the Vascular office for post-op follow-up  Right neck incision healing, some surgical glue remains  She has right ear pain which is similar to the pain she experienced after surgery, minimal to no improvement at this time  She denies any new neurological symptoms including amaurosis fugax, upper or lower extremity weakness, speech or swallowing difficulties  She did have some speech changes and LLE weakness after her CVA in Sept 2019  She is participating in therapy and has seen improvement in her symptoms      -We discussed symptoms to right ear  She understands it may take up to a year for resolution      PLAN:  -Continue aspirin 81mg daily  -Continue statin therapy daily  -Continue Plavix 75mg daily for 2 months can discontinue 4/18/2020  -if you have any signs or symptoms of TIA/CVA including vision changes, unilateral extremity weaknesses, speech difficulties, please call 911 and go to the ER for evaluation  -continue with washing incision daily, pat dry and no lotion or ointment to area  -Carotid Duplex to be done at 3 month and 9 month post-op  -F/u with Vascular Surgeon in 4-6 weeks

## 2020-03-05 ENCOUNTER — OFFICE VISIT (OUTPATIENT)
Dept: VASCULAR SURGERY | Facility: CLINIC | Age: 80
End: 2020-03-05

## 2020-03-05 VITALS
RESPIRATION RATE: 16 BRPM | HEART RATE: 72 BPM | BODY MASS INDEX: 32.1 KG/M2 | SYSTOLIC BLOOD PRESSURE: 138 MMHG | HEIGHT: 64 IN | TEMPERATURE: 97.6 F | DIASTOLIC BLOOD PRESSURE: 84 MMHG | WEIGHT: 188 LBS

## 2020-03-05 DIAGNOSIS — I65.23 BILATERAL CAROTID ARTERY STENOSIS: ICD-10-CM

## 2020-03-05 DIAGNOSIS — Z98.890 STATUS POST CAROTID ENDARTERECTOMY: ICD-10-CM

## 2020-03-05 DIAGNOSIS — I63.9 CEREBROVASCULAR ACCIDENT (CVA), UNSPECIFIED MECHANISM (HCC): ICD-10-CM

## 2020-03-05 DIAGNOSIS — I63.239 CAROTID STENOSIS, SYMPTOMATIC, WITH INFARCTION (HCC): Primary | ICD-10-CM

## 2020-03-05 PROCEDURE — 1160F RVW MEDS BY RX/DR IN RCRD: CPT | Performed by: NURSE PRACTITIONER

## 2020-03-05 PROCEDURE — 2022F DILAT RTA XM EVC RTNOPTHY: CPT | Performed by: NURSE PRACTITIONER

## 2020-03-05 PROCEDURE — 3079F DIAST BP 80-89 MM HG: CPT | Performed by: NURSE PRACTITIONER

## 2020-03-05 PROCEDURE — 1111F DSCHRG MED/CURRENT MED MERGE: CPT | Performed by: NURSE PRACTITIONER

## 2020-03-05 PROCEDURE — 3066F NEPHROPATHY DOC TX: CPT | Performed by: NURSE PRACTITIONER

## 2020-03-05 PROCEDURE — 4040F PNEUMOC VAC/ADMIN/RCVD: CPT | Performed by: NURSE PRACTITIONER

## 2020-03-05 PROCEDURE — 3075F SYST BP GE 130 - 139MM HG: CPT | Performed by: NURSE PRACTITIONER

## 2020-03-05 PROCEDURE — 99024 POSTOP FOLLOW-UP VISIT: CPT | Performed by: NURSE PRACTITIONER

## 2020-03-05 PROCEDURE — 3008F BODY MASS INDEX DOCD: CPT | Performed by: NURSE PRACTITIONER

## 2020-03-05 NOTE — PROGRESS NOTES
Assessment/Plan:    Carotid stenosis right,  symptomatic, with infarction [facility])  79 y/o F HTN, DM, AoS, CAD, COPD, prior strokes who is s/p elective RIGHT carotid endarterectomy with patch angioplasty by Dr Laura Dunbar 2/18/2020 who presents to the Vascular office for post-op follow-up  Right neck incision healing, some surgical glue remains  She has right ear pain which is similar to the pain she experienced after surgery, minimal to no improvement at this time  She denies any new neurological symptoms including amaurosis fugax, upper or lower extremity weakness, speech or swallowing difficulties  She did have some speech changes and LLE weakness after her CVA in Sept 2019  She is participating in therapy and has seen improvement in her symptoms      -We discussed symptoms to right ear  She understands it may take up to a year for resolution  PLAN:  -Continue aspirin 81mg daily  -Continue statin therapy daily  -Continue Plavix 75mg daily for 2 months can discontinue 4/18/2020  -if you have any signs or symptoms of TIA/CVA including vision changes, unilateral extremity weaknesses, speech difficulties, please call 911 and go to the ER for evaluation  -continue with washing incision daily, pat dry and no lotion or ointment to area  -Carotid Duplex to be done at 3 month and 9 month post-op  -F/u with Vascular Surgeon in 4-6 weeks         Diagnoses and all orders for this visit:    Carotid stenosis right,  symptomatic, with infarction ([address] )  -     VAS carotid complete study; Future  -     VAS carotid complete study; Future    Cerebrovascular accident (CVA), unspecified mechanism ([address] )  -     VAS carotid complete study; Future  -     VAS carotid complete study; Future    Status post RIGHT carotid endarterectomy  -     VAS carotid complete study; Future  -     VAS carotid complete study; Future    Bilateral carotid artery stenosis  -     VAS carotid complete study; Future  -     VAS carotid complete study;  Future Subjective:      Patient ID: Hardeep Majano is a 78 y o  female  Pt is here for her post-op visit for her right CEA on 2/18/2020 by Dr Jarrod Araujo  Pt denies any pain at the incision site, but does have pain to her right ear  Pt does have minor swelling to the incision site  Incision is clean and dry with no signs of infection  Pt denies any symptoms of CVA or TIA  Pt has had 2 CVA's in the past with the last being 9/22/2019  Pt has a Hx of CVA, CAD, Carotid Artery Stenosis, COPD, HTN and DM Type 2  Pt is currently taking ASA, Plavix and Rosuvastatin  Trudy Mendes is a 79 y/o F HTN, DM, AoS, CAD, COPD, prior strokes who is s/p elective RIGHT carotid endarterectomy with patch angioplasty by Dr Jarrod Araujo 2/18/2020 who presents to the Vascular office for post-op follow-up  Right neck incision healing, some surgical glue remains  She has right ear pain which is similar to the pain she experienced after surgery, minimal to no improvement at this time  She denies any new neurological symptoms including amaurosis fugax, upper or lower extremity weakness, speech or swallowing difficulties  She did have some speech changes and LLE weakness after her CVA in Sept 2019  She is participating in therapy and has seen improvement in her symptoms  She is not using any pain meds at this time and has full ROM to the neck  She has quit smoking (Oct 2019)  She is maintained on ASA/Plavix and statin  The following portions of the patient's history were reviewed and updated as appropriate: allergies, current medications, past family history, past medical history, past social history, past surgical history and problem list     Review of Systems   Constitutional: Negative  HENT: Positive for ear pain  Eyes: Negative  Respiratory: Negative  Cardiovascular: Negative  Gastrointestinal: Negative  Endocrine: Negative  Genitourinary: Negative  Musculoskeletal: Positive for gait problem  Skin: Negative  Allergic/Immunologic: Negative  Neurological: Negative for dizziness, light-headedness and headaches  Hematological: Bruises/bleeds easily  Psychiatric/Behavioral: Negative  Objective:      /84 (BP Location: Left arm, Patient Position: Sitting, Cuff Size: Adult)   Pulse 72   Temp 97 6 °F (36 4 °C) (Tympanic)   Resp 16   Ht 5' 4" (1 626 m)   Wt 85 3 kg (188 lb)   BMI 32 27 kg/m²          Physical Exam   Constitutional: She is oriented to person, place, and time  She appears well-developed and well-nourished  HENT:   Head: Normocephalic and atraumatic  Eyes: Pupils are equal, round, and reactive to light  EOM are normal  No scleral icterus  Neck: Normal range of motion  Carotid bruit is not present  Cardiovascular: Normal rate, regular rhythm and normal heart sounds  Pulses:       Carotid pulses are 2+ on the right side, and 2+ on the left side  Radial pulses are 2+ on the right side, and 2+ on the left side  Pulmonary/Chest: Effort normal and breath sounds normal    Abdominal: Soft  Bowel sounds are normal    Musculoskeletal: Normal range of motion  Neurological: She is alert and oriented to person, place, and time  She has normal strength  Slight slur to speech which patient reports is improving w/therapy  RUELAS 5/5 x 4, smile symmetrical   Skin: Skin is warm and dry  Capillary refill takes less than 2 seconds  Psychiatric: She has a normal mood and affect  Her behavior is normal  Judgment and thought content normal  Cognition and memory are normal    Slight slur to the speech   Nursing note and vitals reviewed  I have reviewed and made appropriate changes to the review of systems input by the medical assistant      Vitals:    03/05/20 0818 03/05/20 0823   BP: 136/78 138/84   BP Location: Right arm Left arm   Patient Position: Sitting Sitting   Cuff Size: Adult Adult   Pulse: 72    Resp: 16    Temp: 97 6 °F (36 4 °C)    TempSrc: Tympanic    Weight: 85 3 kg (188 lb)    Height: 5' 4" (1 626 m)        Patient Active Problem List   Diagnosis    Benign essential hypertension    Benign familial tremor    History of CVA (cerebrovascular accident)    COPD (chronic obstructive pulmonary disease) (HCC)    Esophageal reflux    Gait disturbance    Dyslipidemia    Sleep disorder    Sciatica    Right shoulder pain    Osteoarthritis of hip    OA (osteoarthritis)    Multiple joint pain    Acquired hypothyroidism    Lumbar degenerative disc disease    Back pain    History of pancreatitis    Aortic valve stenosis - Mild -Moderate    S/P deep brain stimulator placement    Microscopic hematuria    Dysarthria related to Nov 2018 CVA, worsened Sept 2019    Swallowing dysfunction    Weakness of left leg    Carotid stenosis right,  symptomatic, with infarction ([address] )    Hydronephrosis of left kidney    Abnormal CT scan    Cerebrovascular accident (CVA) ([address] )    Nephrostomy tube Left    Coronary artery disease involving native coronary artery of native heart without angina pectoris    Diabetes mellitus type 2, diet-controlled ([address] )    Calculus of kidney    Status post RIGHT carotid endarterectomy    Bilateral carotid artery stenosis       Past Surgical History:   Procedure Laterality Date    CATARACT EXTRACTION W/  INTRAOCULAR LENS IMPLANT Bilateral     COLONOSCOPY      DEEP BRAIN STIMULATOR PLACEMENT      FL RETROGRADE PYELOGRAM  11/7/2019    IR PCN TO PCNU CONVERSION  1/10/2020    IR TUBE PLACEMENT  11/12/2019    KNEE ARTHROSCOPY      NE CYSTOURETHROSCOPY,URETER CATHETER Left 11/7/2019    Procedure: CYSTO RETROGRADE PYELOGRAM, URETEROSCOPY;  Surgeon: Ming Hernadez MD;  [facility];  Service: Urology    NE IMP STIM,CRANIAL,SUBQ,1 ARRAY Left 1/22/2019    Procedure: REPLACEMENT IMPLANTABLE PULSE GENERATOR (IPG) DEEP BRAIN STIMULATION (DBS), LEFT;  Surgeon: Michele Browne MD;  [facility];  Service: Neurosurgery    AL IMP STIM,CRANIAL,SUBQ,>1 ARRAY Right 2018    Procedure: REPLACEMENT RIGHT DBS IMPLANTABLE PULSE GENERATOR;  Surgeon: Kam Lowry MD;  [facility];  Service: Neurosurgery    AL THROMBOENDARTECTMY Lawanda Valencia Right 2020    Procedure: CAROTID ENDARTERECTOMY WITH BOVINE PATCH;  Surgeon: Christian Jin MD;  [facility];  Service: Vascular    AL TOTAL HIP ARTHROPLASTY Left 2016    Procedure: ARTHROPLASTY HIP TOTAL ANTERIOR;  Surgeon: Brock Keyes MD;  [facility];  Service: Orthopedics    ROTATOR CUFF REPAIR         Family History   Problem Relation Age of Onset    Breast cancer Mother     Throat cancer Family        Social History     Socioeconomic History    Marital status:       Spouse name: Not on file    Number of children: Not on file    Years of education: Not on file    Highest education level: Not on file   Occupational History    Not on file   Social Needs    Financial resource strain: Not on file    Food insecurity:     Worry: Not on file     Inability: Not on file    Transportation needs:     Medical: Not on file     Non-medical: Not on file   Tobacco Use    Smoking status: Former Smoker     Types: Cigarettes     Last attempt to quit: 2013     Years since quittin 1    Smokeless tobacco: Never Used   Substance and Sexual Activity    Alcohol use: Never     Frequency: Never    Drug use: No    Sexual activity: Not Currently     Birth control/protection: Post-menopausal   Lifestyle    Physical activity:     Days per week: Not on file     Minutes per session: Not on file    Stress: Not on file   Relationships    Social connections:     Talks on phone: Not on file     Gets together: Not on file     Attends Baptism service: Not on file     Active member of club or organization: Not on file     Attends meetings of clubs or organizations: Not on file     Relationship status: Not on file    Intimate partner violence: Fear of current or ex partner: Not on file     Emotionally abused: Not on file     Physically abused: Not on file     Forced sexual activity: Not on file   Other Topics Concern    Not on file   Social History Narrative    Caffeine use    Living independently           Allergies   Allergen Reactions    Ciprofloxacin Diarrhea    Simvastatin Myalgia    Advair Diskus [Fluticasone-Salmeterol] Palpitations     Ok w flovent    Tetracycline Rash     Reaction Date: [id number];          Current Outpatient Medications:     acetaminophen (TYLENOL) 500 mg tablet, Take 1,000 mg by mouth every 6 (six) hours as needed for mild pain , Disp: , Rfl:     albuterol (2 5 mg/3 mL) 0 083 % nebulizer solution, Take 1 vial (2 5 mg total) by nebulization every 6 (six) hours as needed for shortness of breath, Disp: , Rfl:     aspirin (ECOTRIN LOW STRENGTH) 81 mg EC tablet, Take 1 tablet (81 mg total) by mouth daily, Disp: 30 tablet, Rfl: 0    cephalexin (KEFLEX) 250 mg capsule, Take 250 mg by mouth every 12 (twelve) hours Finished, Disp: , Rfl:     Cholecalciferol (VITAMIN D-3 PO), Take 1,000 Units by mouth daily  , Disp: , Rfl:     clopidogrel (PLAVIX) 75 mg tablet, Take 1 tablet (75 mg total) by mouth daily HOLD FOR NEPHROSTOMY TUBE PLACEMENT, Disp: 30 tablet, Rfl: 5    diltiazem (CARDIZEM CD) 240 mg 24 hr capsule, Take 1 capsule (240 mg total) by mouth daily, Disp: 90 capsule, Rfl: 3    docusate sodium (COLACE) 100 mg capsule, Take 1 capsule (100 mg total) by mouth daily as needed for constipation for up to 7 days, Disp: 21 capsule, Rfl: 0    Fluticasone Propionate, Inhal, (FLOVENT DISKUS) 250 MCG/BLIST AEPB, Inhale 1 puff 2 (two) times a day (Patient taking differently: Inhale 1 puff 2 (two) times a day as needed ), Disp: , Rfl:     furosemide (LASIX) 20 mg tablet, Take 1 tablet (20 mg total) by mouth daily, Disp: 90 tablet, Rfl: 1    levothyroxine 125 mcg tablet, Take 1 tablet (125 mcg total) by mouth daily, Disp: 90 tablet, Rfl: 3    Multiple Vitamins-Minerals (PRESERVISION AREDS PO), Take 1 Cap by Mouth daily, Disp: , Rfl:     multivitamin (THERAGRAN) TABS, Take 1 tablet by mouth daily, Disp: , Rfl:     omeprazole (PriLOSEC) 20 mg delayed release capsule, Take 1 capsule by mouth daily as needed , Disp: , Rfl:     rosuvastatin (CRESTOR) 5 mg tablet, Take 1 tablet (5 mg total) by mouth daily, Disp: 90 tablet, Rfl: 3

## 2020-04-02 ENCOUNTER — TELEPHONE (OUTPATIENT)
Dept: UROLOGY | Facility: MEDICAL CENTER | Age: 80
End: 2020-04-02

## 2020-04-02 ENCOUNTER — PREP FOR PROCEDURE (OUTPATIENT)
Dept: UROLOGY | Facility: MEDICAL CENTER | Age: 80
End: 2020-04-02

## 2020-04-02 ENCOUNTER — TELEMEDICINE (OUTPATIENT)
Dept: UROLOGY | Facility: MEDICAL CENTER | Age: 80
End: 2020-04-02

## 2020-04-02 DIAGNOSIS — N13.30 HYDRONEPHROSIS OF LEFT KIDNEY: Primary | ICD-10-CM

## 2020-04-02 DIAGNOSIS — N13.39 OTHER HYDRONEPHROSIS: Primary | ICD-10-CM

## 2020-04-02 PROCEDURE — 99443 PR PHYS/QHP TELEPHONE EVALUATION 21-30 MIN: CPT | Performed by: UROLOGY

## 2020-04-06 ENCOUNTER — TELEPHONE (OUTPATIENT)
Dept: RADIOLOGY | Facility: HOSPITAL | Age: 80
End: 2020-04-06

## 2020-04-15 ENCOUNTER — APPOINTMENT (OUTPATIENT)
Dept: LAB | Facility: MEDICAL CENTER | Age: 80
End: 2020-04-15
Attending: UROLOGY
Payer: MEDICARE

## 2020-04-15 DIAGNOSIS — N13.39 OTHER HYDRONEPHROSIS: ICD-10-CM

## 2020-04-15 DIAGNOSIS — R39.89 SUSPECTED UTI: ICD-10-CM

## 2020-04-15 DIAGNOSIS — Z79.01 LONG TERM (CURRENT) USE OF ANTICOAGULANTS: ICD-10-CM

## 2020-04-15 DIAGNOSIS — Z01.812 PRE-OPERATIVE LABORATORY EXAMINATION: ICD-10-CM

## 2020-04-15 LAB
ALBUMIN SERPL BCP-MCNC: 3.6 G/DL (ref 3.5–5)
ALP SERPL-CCNC: 96 U/L (ref 46–116)
ALT SERPL W P-5'-P-CCNC: 26 U/L (ref 12–78)
ANION GAP SERPL CALCULATED.3IONS-SCNC: 6 MMOL/L (ref 4–13)
AST SERPL W P-5'-P-CCNC: 18 U/L (ref 5–45)
BASOPHILS # BLD AUTO: 0.05 THOUSANDS/ΜL (ref 0–0.1)
BASOPHILS NFR BLD AUTO: 1 % (ref 0–1)
BILIRUB SERPL-MCNC: 0.3 MG/DL (ref 0.2–1)
BUN SERPL-MCNC: 19 MG/DL (ref 5–25)
CALCIUM SERPL-MCNC: 9.4 MG/DL (ref 8.3–10.1)
CHLORIDE SERPL-SCNC: 107 MMOL/L (ref 100–108)
CHOLEST SERPL-MCNC: 153 MG/DL (ref 50–200)
CO2 SERPL-SCNC: 29 MMOL/L (ref 21–32)
CREAT SERPL-MCNC: 0.9 MG/DL (ref 0.6–1.3)
EOSINOPHIL # BLD AUTO: 0.28 THOUSAND/ΜL (ref 0–0.61)
EOSINOPHIL NFR BLD AUTO: 4 % (ref 0–6)
ERYTHROCYTE [DISTWIDTH] IN BLOOD BY AUTOMATED COUNT: 15.6 % (ref 11.6–15.1)
EST. AVERAGE GLUCOSE BLD GHB EST-MCNC: 137 MG/DL
GFR SERPL CREATININE-BSD FRML MDRD: 60 ML/MIN/1.73SQ M
GLUCOSE P FAST SERPL-MCNC: 110 MG/DL (ref 65–99)
HBA1C MFR BLD: 6.4 %
HCT VFR BLD AUTO: 43.1 % (ref 34.8–46.1)
HDLC SERPL-MCNC: 49 MG/DL
HGB BLD-MCNC: 12.8 G/DL (ref 11.5–15.4)
IMM GRANULOCYTES # BLD AUTO: 0.02 THOUSAND/UL (ref 0–0.2)
IMM GRANULOCYTES NFR BLD AUTO: 0 % (ref 0–2)
INR PPP: 1.11 (ref 0.84–1.19)
LDLC SERPL CALC-MCNC: 74 MG/DL (ref 0–100)
LYMPHOCYTES # BLD AUTO: 1.87 THOUSANDS/ΜL (ref 0.6–4.47)
LYMPHOCYTES NFR BLD AUTO: 24 % (ref 14–44)
MCH RBC QN AUTO: 25.2 PG (ref 26.8–34.3)
MCHC RBC AUTO-ENTMCNC: 29.7 G/DL (ref 31.4–37.4)
MCV RBC AUTO: 85 FL (ref 82–98)
MONOCYTES # BLD AUTO: 0.5 THOUSAND/ΜL (ref 0.17–1.22)
MONOCYTES NFR BLD AUTO: 6 % (ref 4–12)
NEUTROPHILS # BLD AUTO: 5.15 THOUSANDS/ΜL (ref 1.85–7.62)
NEUTS SEG NFR BLD AUTO: 65 % (ref 43–75)
NONHDLC SERPL-MCNC: 104 MG/DL
NRBC BLD AUTO-RTO: 0 /100 WBCS
PLATELET # BLD AUTO: 287 THOUSANDS/UL (ref 149–390)
PMV BLD AUTO: 10 FL (ref 8.9–12.7)
POTASSIUM SERPL-SCNC: 4.3 MMOL/L (ref 3.5–5.3)
PROT SERPL-MCNC: 8.1 G/DL (ref 6.4–8.2)
PROTHROMBIN TIME: 13.9 SECONDS (ref 11.6–14.5)
RBC # BLD AUTO: 5.07 MILLION/UL (ref 3.81–5.12)
SODIUM SERPL-SCNC: 142 MMOL/L (ref 136–145)
TRIGL SERPL-MCNC: 150 MG/DL
TSH SERPL DL<=0.05 MIU/L-ACNC: 2.81 UIU/ML (ref 0.36–3.74)
WBC # BLD AUTO: 7.87 THOUSAND/UL (ref 4.31–10.16)

## 2020-04-15 PROCEDURE — 85610 PROTHROMBIN TIME: CPT

## 2020-04-15 PROCEDURE — 87086 URINE CULTURE/COLONY COUNT: CPT

## 2020-04-15 PROCEDURE — 84443 ASSAY THYROID STIM HORMONE: CPT | Performed by: FAMILY MEDICINE

## 2020-04-15 PROCEDURE — 80061 LIPID PANEL: CPT | Performed by: FAMILY MEDICINE

## 2020-04-15 PROCEDURE — 80053 COMPREHEN METABOLIC PANEL: CPT | Performed by: FAMILY MEDICINE

## 2020-04-15 PROCEDURE — 87186 SC STD MICRODIL/AGAR DIL: CPT

## 2020-04-15 PROCEDURE — 36415 COLL VENOUS BLD VENIPUNCTURE: CPT | Performed by: FAMILY MEDICINE

## 2020-04-15 PROCEDURE — 85025 COMPLETE CBC W/AUTO DIFF WBC: CPT

## 2020-04-15 PROCEDURE — 83036 HEMOGLOBIN GLYCOSYLATED A1C: CPT | Performed by: FAMILY MEDICINE

## 2020-04-16 ENCOUNTER — TELEPHONE (OUTPATIENT)
Dept: UROLOGY | Facility: MEDICAL CENTER | Age: 80
End: 2020-04-16

## 2020-04-16 DIAGNOSIS — N13.30 HYDRONEPHROSIS OF LEFT KIDNEY: Primary | ICD-10-CM

## 2020-04-16 DIAGNOSIS — N39.0 RECURRENT UTI: ICD-10-CM

## 2020-04-16 RX ORDER — SULFAMETHOXAZOLE AND TRIMETHOPRIM 800; 160 MG/1; MG/1
1 TABLET ORAL EVERY 12 HOURS SCHEDULED
Qty: 14 TABLET | Refills: 0 | Status: SHIPPED | OUTPATIENT
Start: 2020-04-16 | End: 2020-04-23

## 2020-04-17 LAB
BACTERIA UR CULT: ABNORMAL
BACTERIA UR CULT: ABNORMAL

## 2020-04-19 ENCOUNTER — TELEPHONE (OUTPATIENT)
Dept: UROLOGY | Facility: MEDICAL CENTER | Age: 80
End: 2020-04-19

## 2020-04-20 ENCOUNTER — TELEPHONE (OUTPATIENT)
Dept: UROLOGY | Facility: MEDICAL CENTER | Age: 80
End: 2020-04-20

## 2020-04-24 ENCOUNTER — TELEMEDICINE (OUTPATIENT)
Dept: FAMILY MEDICINE CLINIC | Facility: CLINIC | Age: 80
End: 2020-04-24
Payer: MEDICARE

## 2020-04-24 DIAGNOSIS — I63.9 CEREBROVASCULAR ACCIDENT (CVA), UNSPECIFIED MECHANISM (HCC): ICD-10-CM

## 2020-04-24 DIAGNOSIS — H11.31 SUBCONJUNCTIVAL HEMORRHAGE OF RIGHT EYE: Primary | ICD-10-CM

## 2020-04-24 DIAGNOSIS — Z93.6 NEPHROSTOMY STATUS (HCC): ICD-10-CM

## 2020-04-24 PROCEDURE — 99214 OFFICE O/P EST MOD 30 MIN: CPT | Performed by: FAMILY MEDICINE

## 2020-05-19 DIAGNOSIS — Z11.59 SCREENING FOR VIRAL DISEASE: ICD-10-CM

## 2020-05-19 PROCEDURE — U0003 INFECTIOUS AGENT DETECTION BY NUCLEIC ACID (DNA OR RNA); SEVERE ACUTE RESPIRATORY SYNDROME CORONAVIRUS 2 (SARS-COV-2) (CORONAVIRUS DISEASE [COVID-19]), AMPLIFIED PROBE TECHNIQUE, MAKING USE OF HIGH THROUGHPUT TECHNOLOGIES AS DESCRIBED BY CMS-2020-01-R: HCPCS

## 2020-05-21 ENCOUNTER — TELEPHONE (OUTPATIENT)
Dept: UROLOGY | Facility: MEDICAL CENTER | Age: 80
End: 2020-05-21

## 2020-05-21 ENCOUNTER — HOSPITAL ENCOUNTER (OUTPATIENT)
Dept: NON INVASIVE DIAGNOSTICS | Facility: CLINIC | Age: 80
Discharge: HOME | End: 2020-05-21
Payer: MEDICARE

## 2020-05-21 DIAGNOSIS — I63.9 CEREBROVASCULAR ACCIDENT (CVA), UNSPECIFIED MECHANISM (HCC): ICD-10-CM

## 2020-05-21 DIAGNOSIS — I63.239 CAROTID STENOSIS, SYMPTOMATIC, WITH INFARCTION (HCC): ICD-10-CM

## 2020-05-21 DIAGNOSIS — Z98.890 STATUS POST CAROTID ENDARTERECTOMY: ICD-10-CM

## 2020-05-21 DIAGNOSIS — I65.23 BILATERAL CAROTID ARTERY STENOSIS: ICD-10-CM

## 2020-05-21 LAB — SARS-COV-2 RNA SPEC QL NAA+PROBE: NOT DETECTED

## 2020-05-21 PROCEDURE — 93880 EXTRACRANIAL BILAT STUDY: CPT

## 2020-05-21 PROCEDURE — 93880 EXTRACRANIAL BILAT STUDY: CPT | Performed by: SURGERY

## 2020-05-22 ENCOUNTER — TELEPHONE (OUTPATIENT)
Dept: UROLOGY | Facility: MEDICAL CENTER | Age: 80
End: 2020-05-22

## 2020-05-22 ENCOUNTER — ANESTHESIA EVENT (OUTPATIENT)
Dept: PERIOP | Facility: HOSPITAL | Age: 80
End: 2020-05-22
Payer: MEDICARE

## 2020-05-22 DIAGNOSIS — N13.30 HYDRONEPHROSIS OF LEFT KIDNEY: Primary | ICD-10-CM

## 2020-05-22 RX ORDER — CEPHALEXIN 500 MG/1
500 CAPSULE ORAL EVERY 12 HOURS SCHEDULED
Qty: 14 CAPSULE | Refills: 0 | Status: SHIPPED | OUTPATIENT
Start: 2020-05-22 | End: 2020-05-29

## 2020-05-25 PROCEDURE — NC001 PR NO CHARGE: Performed by: UROLOGY

## 2020-05-26 ENCOUNTER — HOSPITAL ENCOUNTER (OUTPATIENT)
Facility: HOSPITAL | Age: 80
Setting detail: OUTPATIENT SURGERY
Discharge: HOME | End: 2020-05-26
Attending: UROLOGY | Admitting: UROLOGY
Payer: MEDICARE

## 2020-05-26 ENCOUNTER — APPOINTMENT (OUTPATIENT)
Dept: RADIOLOGY | Facility: HOSPITAL | Age: 80
End: 2020-05-26
Payer: MEDICARE

## 2020-05-26 ENCOUNTER — ANESTHESIA (OUTPATIENT)
Dept: PERIOP | Facility: HOSPITAL | Age: 80
End: 2020-05-26
Payer: MEDICARE

## 2020-05-26 VITALS
SYSTOLIC BLOOD PRESSURE: 153 MMHG | DIASTOLIC BLOOD PRESSURE: 68 MMHG | OXYGEN SATURATION: 95 % | HEART RATE: 71 BPM | RESPIRATION RATE: 16 BRPM | BODY MASS INDEX: 32.1 KG/M2 | WEIGHT: 188 LBS | HEIGHT: 64 IN | TEMPERATURE: 97.5 F

## 2020-05-26 DIAGNOSIS — Z11.59 SCREENING FOR VIRAL DISEASE: Primary | ICD-10-CM

## 2020-05-26 DIAGNOSIS — N13.39 OTHER HYDRONEPHROSIS: ICD-10-CM

## 2020-05-26 PROCEDURE — C2617 STENT, NON-COR, TEM W/O DEL: HCPCS | Performed by: UROLOGY

## 2020-05-26 PROCEDURE — 87086 URINE CULTURE/COLONY COUNT: CPT | Performed by: UROLOGY

## 2020-05-26 PROCEDURE — 74420 UROGRAPHY RTRGR +-KUB: CPT

## 2020-05-26 PROCEDURE — NC001 PR NO CHARGE: Performed by: UROLOGY

## 2020-05-26 PROCEDURE — C1769 GUIDE WIRE: HCPCS | Performed by: UROLOGY

## 2020-05-26 PROCEDURE — 52332 CYSTOSCOPY AND TREATMENT: CPT | Performed by: UROLOGY

## 2020-05-26 PROCEDURE — 87186 SC STD MICRODIL/AGAR DIL: CPT | Performed by: UROLOGY

## 2020-05-26 DEVICE — VARIABLE LENGTH INJECTION STENT SET
Type: IMPLANTABLE DEVICE | Site: URETER | Status: NON-FUNCTIONAL
Brand: CONTOUR VL™ INJECTION STENT SET
Removed: 2020-10-27

## 2020-05-26 RX ORDER — OXYCODONE HYDROCHLORIDE AND ACETAMINOPHEN 5; 325 MG/1; MG/1
2 TABLET ORAL EVERY 4 HOURS PRN
Status: DISCONTINUED | OUTPATIENT
Start: 2020-05-26 | End: 2020-05-26 | Stop reason: HOSPADM

## 2020-05-26 RX ORDER — OXYCODONE HYDROCHLORIDE AND ACETAMINOPHEN 5; 325 MG/1; MG/1
1 TABLET ORAL EVERY 4 HOURS PRN
Status: DISCONTINUED | OUTPATIENT
Start: 2020-05-26 | End: 2020-05-26 | Stop reason: HOSPADM

## 2020-05-26 RX ORDER — CEFAZOLIN SODIUM 2 G/50ML
2000 SOLUTION INTRAVENOUS ONCE
Status: COMPLETED | OUTPATIENT
Start: 2020-05-26 | End: 2020-05-26

## 2020-05-26 RX ORDER — FENTANYL CITRATE/PF 50 MCG/ML
50 SYRINGE (ML) INJECTION
Status: DISCONTINUED | OUTPATIENT
Start: 2020-05-26 | End: 2020-05-26 | Stop reason: HOSPADM

## 2020-05-26 RX ORDER — LIDOCAINE HYDROCHLORIDE 20 MG/ML
INJECTION, SOLUTION INFILTRATION; PERINEURAL AS NEEDED
Status: DISCONTINUED | OUTPATIENT
Start: 2020-05-26 | End: 2020-05-26 | Stop reason: SURG

## 2020-05-26 RX ORDER — ONDANSETRON 2 MG/ML
INJECTION INTRAMUSCULAR; INTRAVENOUS AS NEEDED
Status: DISCONTINUED | OUTPATIENT
Start: 2020-05-26 | End: 2020-05-26 | Stop reason: SURG

## 2020-05-26 RX ORDER — HYDROCODONE BITARTRATE AND ACETAMINOPHEN 5; 325 MG/1; MG/1
TABLET ORAL
Qty: 10 TABLET | Refills: 0 | Status: SHIPPED | OUTPATIENT
Start: 2020-05-26 | End: 2020-06-18 | Stop reason: ALTCHOICE

## 2020-05-26 RX ORDER — SODIUM CHLORIDE 9 MG/ML
125 INJECTION, SOLUTION INTRAVENOUS CONTINUOUS
Status: DISCONTINUED | OUTPATIENT
Start: 2020-05-26 | End: 2020-05-26 | Stop reason: HOSPADM

## 2020-05-26 RX ORDER — EPHEDRINE SULFATE 50 MG/ML
INJECTION INTRAVENOUS AS NEEDED
Status: DISCONTINUED | OUTPATIENT
Start: 2020-05-26 | End: 2020-05-26 | Stop reason: SURG

## 2020-05-26 RX ORDER — FENTANYL CITRATE 50 UG/ML
INJECTION, SOLUTION INTRAMUSCULAR; INTRAVENOUS AS NEEDED
Status: DISCONTINUED | OUTPATIENT
Start: 2020-05-26 | End: 2020-05-26 | Stop reason: SURG

## 2020-05-26 RX ORDER — HYDROMORPHONE HCL/PF 1 MG/ML
0.5 SYRINGE (ML) INJECTION
Status: DISCONTINUED | OUTPATIENT
Start: 2020-05-26 | End: 2020-05-26 | Stop reason: HOSPADM

## 2020-05-26 RX ORDER — MEPERIDINE HYDROCHLORIDE 50 MG/ML
12.5 INJECTION INTRAMUSCULAR; INTRAVENOUS; SUBCUTANEOUS ONCE AS NEEDED
Status: DISCONTINUED | OUTPATIENT
Start: 2020-05-26 | End: 2020-05-26 | Stop reason: HOSPADM

## 2020-05-26 RX ORDER — PROPOFOL 10 MG/ML
INJECTION, EMULSION INTRAVENOUS AS NEEDED
Status: DISCONTINUED | OUTPATIENT
Start: 2020-05-26 | End: 2020-05-26 | Stop reason: SURG

## 2020-05-26 RX ORDER — ONDANSETRON 2 MG/ML
4 INJECTION INTRAMUSCULAR; INTRAVENOUS ONCE AS NEEDED
Status: DISCONTINUED | OUTPATIENT
Start: 2020-05-26 | End: 2020-05-26 | Stop reason: HOSPADM

## 2020-05-26 RX ORDER — DEXAMETHASONE SODIUM PHOSPHATE 10 MG/ML
INJECTION, SOLUTION INTRAMUSCULAR; INTRAVENOUS AS NEEDED
Status: DISCONTINUED | OUTPATIENT
Start: 2020-05-26 | End: 2020-05-26 | Stop reason: SURG

## 2020-05-26 RX ADMIN — LIDOCAINE HYDROCHLORIDE 5 ML: 20 INJECTION, SOLUTION INFILTRATION; PERINEURAL at 09:14

## 2020-05-26 RX ADMIN — DEXAMETHASONE SODIUM PHOSPHATE 4 MG: 10 INJECTION, SOLUTION INTRAMUSCULAR; INTRAVENOUS at 09:28

## 2020-05-26 RX ADMIN — PROPOFOL 150 MG: 10 INJECTION, EMULSION INTRAVENOUS at 09:14

## 2020-05-26 RX ADMIN — EPHEDRINE SULFATE 10 MG: 50 INJECTION, SOLUTION INTRAVENOUS at 09:32

## 2020-05-26 RX ADMIN — ONDANSETRON 4 MG: 2 INJECTION INTRAMUSCULAR; INTRAVENOUS at 09:28

## 2020-05-26 RX ADMIN — SODIUM CHLORIDE 125 ML/HR: 0.9 INJECTION, SOLUTION INTRAVENOUS at 07:56

## 2020-05-26 RX ADMIN — CEFAZOLIN SODIUM 2000 MG: 2 SOLUTION INTRAVENOUS at 09:05

## 2020-05-26 RX ADMIN — FENTANYL CITRATE 25 MCG: 50 INJECTION, SOLUTION INTRAMUSCULAR; INTRAVENOUS at 09:21

## 2020-05-26 RX ADMIN — SODIUM CHLORIDE 125 ML/HR: 0.9 INJECTION, SOLUTION INTRAVENOUS at 09:58

## 2020-05-28 LAB — BACTERIA UR CULT: ABNORMAL

## 2020-06-11 DIAGNOSIS — J45.41 MODERATE PERSISTENT ASTHMATIC BRONCHITIS WITH ACUTE EXACERBATION: ICD-10-CM

## 2020-06-11 RX ORDER — ALBUTEROL SULFATE 2.5 MG/3ML
2.5 SOLUTION RESPIRATORY (INHALATION) EVERY 6 HOURS PRN
Qty: 120 VIAL | Refills: 3 | Status: SHIPPED | OUTPATIENT
Start: 2020-06-11 | End: 2020-06-29 | Stop reason: SDUPTHER

## 2020-06-15 ENCOUNTER — TELEPHONE (OUTPATIENT)
Dept: FAMILY MEDICINE CLINIC | Facility: CLINIC | Age: 80
End: 2020-06-15

## 2020-06-15 NOTE — TELEPHONE ENCOUNTER
Patient left few messages regarding her Albuterol Rx , Dr Radha Johnston send Rx last week , she never receive medication  I call pharmacy and find out [facility] pharmacy billing department need Nebulizer model number and number of vials left  For medication to be covered and send to the patient  After several phone calls I was able to call back  pharmacybilling department Toan Goff) and provided with this information  Patient was informed

## 2020-06-18 ENCOUNTER — TELEPHONE (OUTPATIENT)
Dept: FAMILY MEDICINE CLINIC | Facility: CLINIC | Age: 80
End: 2020-06-18

## 2020-06-18 ENCOUNTER — OFFICE VISIT (OUTPATIENT)
Dept: FAMILY MEDICINE CLINIC | Facility: CLINIC | Age: 80
End: 2020-06-18
Payer: MEDICARE

## 2020-06-18 VITALS
HEIGHT: 64 IN | HEART RATE: 80 BPM | WEIGHT: 190.4 LBS | BODY MASS INDEX: 32.5 KG/M2 | SYSTOLIC BLOOD PRESSURE: 144 MMHG | TEMPERATURE: 98 F | OXYGEN SATURATION: 90 % | RESPIRATION RATE: 18 BRPM | DIASTOLIC BLOOD PRESSURE: 72 MMHG

## 2020-06-18 DIAGNOSIS — E11.9 DIABETES MELLITUS TYPE 2, DIET-CONTROLLED (HCC): ICD-10-CM

## 2020-06-18 DIAGNOSIS — Z96.89 S/P DEEP BRAIN STIMULATOR PLACEMENT: ICD-10-CM

## 2020-06-18 DIAGNOSIS — J44.9 CHRONIC OBSTRUCTIVE PULMONARY DISEASE, UNSPECIFIED COPD TYPE (HCC): ICD-10-CM

## 2020-06-18 DIAGNOSIS — E03.9 ACQUIRED HYPOTHYROIDISM: ICD-10-CM

## 2020-06-18 DIAGNOSIS — I63.9 CEREBROVASCULAR ACCIDENT (CVA), UNSPECIFIED MECHANISM (HCC): ICD-10-CM

## 2020-06-18 DIAGNOSIS — R47.1 DYSARTHRIA: ICD-10-CM

## 2020-06-18 DIAGNOSIS — I10 BENIGN ESSENTIAL HYPERTENSION: Primary | ICD-10-CM

## 2020-06-18 DIAGNOSIS — I25.10 CORONARY ARTERY DISEASE INVOLVING NATIVE CORONARY ARTERY OF NATIVE HEART WITHOUT ANGINA PECTORIS: ICD-10-CM

## 2020-06-18 PROCEDURE — 2022F DILAT RTA XM EVC RTNOPTHY: CPT | Performed by: FAMILY MEDICINE

## 2020-06-18 PROCEDURE — 99214 OFFICE O/P EST MOD 30 MIN: CPT | Performed by: FAMILY MEDICINE

## 2020-06-18 PROCEDURE — 3044F HG A1C LEVEL LT 7.0%: CPT | Performed by: FAMILY MEDICINE

## 2020-06-18 PROCEDURE — 3077F SYST BP >= 140 MM HG: CPT | Performed by: FAMILY MEDICINE

## 2020-06-18 PROCEDURE — 3078F DIAST BP <80 MM HG: CPT | Performed by: FAMILY MEDICINE

## 2020-06-18 PROCEDURE — 4040F PNEUMOC VAC/ADMIN/RCVD: CPT | Performed by: FAMILY MEDICINE

## 2020-06-18 PROCEDURE — 1160F RVW MEDS BY RX/DR IN RCRD: CPT | Performed by: FAMILY MEDICINE

## 2020-06-18 PROCEDURE — 3066F NEPHROPATHY DOC TX: CPT | Performed by: FAMILY MEDICINE

## 2020-06-18 PROCEDURE — 1036F TOBACCO NON-USER: CPT | Performed by: FAMILY MEDICINE

## 2020-06-18 PROCEDURE — 3008F BODY MASS INDEX DOCD: CPT | Performed by: FAMILY MEDICINE

## 2020-06-18 RX ORDER — ROSUVASTATIN CALCIUM 5 MG/1
TABLET, COATED ORAL
Qty: 40 TABLET | Refills: 3
Start: 2020-06-18 | End: 2021-10-04

## 2020-06-29 DIAGNOSIS — R60.9 EDEMA, UNSPECIFIED TYPE: ICD-10-CM

## 2020-06-29 DIAGNOSIS — J45.41 MODERATE PERSISTENT ASTHMATIC BRONCHITIS WITH ACUTE EXACERBATION: ICD-10-CM

## 2020-06-29 DIAGNOSIS — J41.1 MUCOPURULENT CHRONIC BRONCHITIS (HCC): Primary | ICD-10-CM

## 2020-06-29 RX ORDER — ALBUTEROL SULFATE 2.5 MG/3ML
2.5 SOLUTION RESPIRATORY (INHALATION) EVERY 6 HOURS PRN
Qty: 120 VIAL | Refills: 5 | Status: ON HOLD | OUTPATIENT
Start: 2020-06-29 | End: 2021-10-01 | Stop reason: SDUPTHER

## 2020-06-29 RX ORDER — FUROSEMIDE 20 MG/1
20 TABLET ORAL DAILY
Qty: 90 TABLET | Refills: 1 | Status: SHIPPED | OUTPATIENT
Start: 2020-06-29 | End: 2020-12-29

## 2020-07-13 ENCOUNTER — OFFICE VISIT (OUTPATIENT)
Dept: FAMILY MEDICINE CLINIC | Facility: CLINIC | Age: 80
End: 2020-07-13
Payer: MEDICARE

## 2020-07-13 VITALS
HEART RATE: 86 BPM | HEIGHT: 64 IN | BODY MASS INDEX: 32.68 KG/M2 | WEIGHT: 191.4 LBS | OXYGEN SATURATION: 92 % | DIASTOLIC BLOOD PRESSURE: 78 MMHG | RESPIRATION RATE: 18 BRPM | TEMPERATURE: 98.4 F | SYSTOLIC BLOOD PRESSURE: 146 MMHG

## 2020-07-13 DIAGNOSIS — R51.9 LEFT FACIAL PAIN: Primary | ICD-10-CM

## 2020-07-13 DIAGNOSIS — M26.622 ARTHRALGIA OF LEFT TEMPOROMANDIBULAR JOINT: ICD-10-CM

## 2020-07-13 DIAGNOSIS — I10 BENIGN ESSENTIAL HYPERTENSION: ICD-10-CM

## 2020-07-13 PROCEDURE — 1160F RVW MEDS BY RX/DR IN RCRD: CPT | Performed by: FAMILY MEDICINE

## 2020-07-13 PROCEDURE — 99214 OFFICE O/P EST MOD 30 MIN: CPT | Performed by: FAMILY MEDICINE

## 2020-07-13 PROCEDURE — 2022F DILAT RTA XM EVC RTNOPTHY: CPT | Performed by: FAMILY MEDICINE

## 2020-07-13 PROCEDURE — 3044F HG A1C LEVEL LT 7.0%: CPT | Performed by: FAMILY MEDICINE

## 2020-07-13 PROCEDURE — 1036F TOBACCO NON-USER: CPT | Performed by: FAMILY MEDICINE

## 2020-07-13 PROCEDURE — 3008F BODY MASS INDEX DOCD: CPT | Performed by: FAMILY MEDICINE

## 2020-07-13 PROCEDURE — 3066F NEPHROPATHY DOC TX: CPT | Performed by: FAMILY MEDICINE

## 2020-07-13 PROCEDURE — 4040F PNEUMOC VAC/ADMIN/RCVD: CPT | Performed by: FAMILY MEDICINE

## 2020-07-13 PROCEDURE — 3078F DIAST BP <80 MM HG: CPT | Performed by: FAMILY MEDICINE

## 2020-07-13 PROCEDURE — 3077F SYST BP >= 140 MM HG: CPT | Performed by: FAMILY MEDICINE

## 2020-07-13 NOTE — PATIENT INSTRUCTIONS
Left facial pain/neuralgia/TMJ tenderness for 2 days, no trauma  Non involving mandibular/frontal area  Otherwise appears as at baseline  She wishes to avoid other medications at present, she can use Tylenol 2 to 3 times daily, she will call if pain intensifies, develops fever, rash, oral lesions etcetera  She is on aspirin/Plavix, could use Ibuprofen once or twice daily also if needed, use with food  Use other meds as is

## 2020-07-13 NOTE — PROGRESS NOTES
FAMILY PRACTICE OFFICE VISIT  Taw[facility]  [facility]  [address]  [address] [place], [place], [address]      NAME: Tiffanie Perales  AGE: 78 y o  SEX: female  : 1940   MRN: [id number]    DATE: 2020  TIME: 4:37 PM    Assessment and Plan     Problem List Items Addressed This Visit        Cardiovascular and Mediastinum    Benign essential hypertension      Other Visit Diagnoses     Left facial pain    -  Primary    Arthralgia of left temporomandibular joint              Patient Instructions   Left facial pain/neuralgia/TMJ tenderness for 2 days, no trauma  Non involving mandibular/frontal area  Otherwise appears as at baseline  She wishes to avoid other medications at present, she can use Tylenol 2 to 3 times daily, she will call if pain intensifies, develops fever, rash, oral lesions etcetera  She is on aspirin/Plavix, could use Ibuprofen once or twice daily also if needed, use with food  Use other meds as is  Chief Complaint     Chief Complaint   Patient presents with    Jaw Pain       History of Present Illness   Ileana Powers is a 78y o -year-old female who developed pain left cheek/TMJ area yesterday, no hearing loss, no headache or dizziness, no rash  Pain localized anterior to TMJ, does not involve frontal, mandibular area  No increased neck pain  She does not have teeth, no oral lesions, no difficulty swallowing, sore throat  No fevers or chills  No trauma    Otherwise has been feeling okay      Review of Systems   Review of Systems   Constitutional: Negative for appetite change, fatigue, fever and unexpected weight change  HENT: Negative for sore throat and trouble swallowing  Respiratory: Negative for cough, chest tightness and shortness of breath (Baseline)  Cardiovascular: Negative for chest pain, palpitations and leg swelling     Gastrointestinal: Negative for abdominal pain, blood in stool, nausea and vomiting  No acid reflux     No change in bowel   Genitourinary: Negative for dysuria and hematuria  Neurological: Positive for weakness (No new weakness, history stroke, no change in speech)  Negative for dizziness, syncope, light-headedness and headaches  Psychiatric/Behavioral: Negative for behavioral problems and confusion  Active Problem List     Patient Active Problem List   Diagnosis    Benign essential hypertension    Benign familial tremor    History of CVA (cerebrovascular accident)    COPD (chronic obstructive pulmonary disease) ([address] )    Esophageal reflux    Gait disturbance    Dyslipidemia    Sleep disorder    Sciatica    Right shoulder pain    Osteoarthritis of hip    OA (osteoarthritis)    Multiple joint pain    Acquired hypothyroidism    Lumbar degenerative disc disease    Back pain    History of pancreatitis    Aortic valve stenosis - ( Mild -Moderate )    S/P deep brain stimulator placement    Microscopic hematuria    Dysarthria related to Nov 2018 CVA,improved    Swallowing dysfunction    Weakness of left leg    Carotid stenosis right,  symptomatic, with infarction -  s/p CEA 2/20    Hydronephrosis of left kidney    Abnormal CT scan    Cerebrovascular accident (CVA) ([address] )    Nephrostomy tube Left    Coronary artery disease involving native coronary artery of native heart without angina pectoris    Diabetes mellitus type 2, diet-controlled ([address] )    Calculus of kidney    Status post RIGHT carotid endarterectomy    Bilateral carotid artery stenosis       Past Medical History:  Reviewed    Past Surgical History:  Reviewed    Family History:  Reviewed    Social History:  Reviewed    Objective     Vitals:    07/13/20 1526   BP: 146/78   Pulse: 86   Resp: 18   Temp: 98 4 °F (36 9 °C)   SpO2: 92%   Weight: 86 8 kg (191 lb 6 4 oz)   Height: 5' 4" (1 626 m)     Body mass index is 32 85 kg/m²      BP Readings from Last 3 Encounters:   07/13/20 146/78 06/18/20 144/72   05/26/20 153/68       Wt Readings from Last 3 Encounters:   07/13/20 86 8 kg (191 lb 6 4 oz)   06/18/20 86 4 kg (190 lb 6 4 oz)   05/26/20 85 3 kg (188 lb)       Physical Exam   Constitutional: She is oriented to person, place, and time  Pleasant 78year-old, comfortable at rest other than complaining of left TMJ area pain  No neck mass, no swollen glands in neck  Tender TMJ left, no click  No oral lesion, no facial weakness  Speech as at baseline    Nontender cervical spine,    HENT:   Right Ear: External ear normal    Left Ear: External ear normal    Eyes: Pupils are equal, round, and reactive to light  Conjunctivae and EOM are normal  No scleral icterus  Cardiovascular: Normal rate, regular rhythm and normal heart sounds  Pulmonary/Chest: Effort normal and breath sounds normal  No respiratory distress  Abdominal: Soft  There is no tenderness  Musculoskeletal: She exhibits no edema  Neurological: She is alert and oriented to person, place, and time  Psychiatric: She has a normal mood and affect  ALLERGIES:  Allergies   Allergen Reactions    Ciprofloxacin Diarrhea    Simvastatin Myalgia    Advair Diskus [Fluticasone-Salmeterol] Palpitations     Ok w flovent    Tetracycline Rash     Reaction Date: [id number];        Current Medications     Current Outpatient Medications   Medication Sig Dispense Refill    acetaminophen (TYLENOL) 500 mg tablet Take 1,000 mg by mouth every 6 (six) hours as needed for mild pain       albuterol (2 5 mg/3 mL) 0 083 % nebulizer solution Take 1 vial (2 5 mg total) by nebulization every 6 (six) hours as needed for shortness of breath 120 vial 5    aspirin (ECOTRIN LOW STRENGTH) 81 mg EC tablet Take 1 tablet (81 mg total) by mouth daily 30 tablet 0    Cholecalciferol (VITAMIN D-3 PO) Take 1,000 Units by mouth daily        clopidogrel (PLAVIX) 75 mg tablet Take 1 tablet (75 mg total) by mouth daily HOLD FOR NEPHROSTOMY TUBE PLACEMENT 30 tablet 5    diltiazem (CARDIZEM CD) 240 mg 24 hr capsule Take 1 capsule (240 mg total) by mouth daily 90 capsule 3    docusate sodium (COLACE) 100 mg capsule Take 1 capsule (100 mg total) by mouth daily as needed for constipation for up to 7 days 21 capsule 0    Fluticasone Propionate, Inhal, (FLOVENT DISKUS) 250 MCG/BLIST AEPB Inhale 1 puff 2 (two) times a day      furosemide (LASIX) 20 mg tablet Take 1 tablet (20 mg total) by mouth daily 90 tablet 1    levothyroxine 125 mcg tablet Take 1 tablet (125 mcg total) by mouth daily 90 tablet 3    Multiple Vitamins-Minerals (PRESERVISION AREDS PO) Take 1 Cap by Mouth daily      multivitamin (THERAGRAN) TABS Take 1 tablet by mouth daily      omeprazole (PriLOSEC) 20 mg delayed release capsule Take 1 capsule by mouth daily as needed       rosuvastatin (CRESTOR) 5 mg tablet Uses twice weekly 40 tablet 3     No current facility-administered medications for this visit  No orders of the defined types were placed in this encounter          Heidy Beard DO

## 2020-08-11 DIAGNOSIS — I63.9 CEREBROVASCULAR ACCIDENT (CVA), UNSPECIFIED MECHANISM (HCC): ICD-10-CM

## 2020-08-12 RX ORDER — CLOPIDOGREL BISULFATE 75 MG/1
75 TABLET ORAL DAILY
Qty: 30 TABLET | Refills: 5 | Status: SHIPPED | OUTPATIENT
Start: 2020-08-12 | End: 2021-02-09

## 2020-08-26 ENCOUNTER — TELEPHONE (OUTPATIENT)
Dept: UROLOGY | Facility: MEDICAL CENTER | Age: 80
End: 2020-08-26

## 2020-08-26 NOTE — TELEPHONE ENCOUNTER
DEVIOM to call back to schedule appt for H&P with  [facility]  She is due for 3 mo  stent exchange  Last stent insertion 5/26/20  Office number provided

## 2020-09-09 NOTE — TELEPHONE ENCOUNTER
Tried to call pt, but no answer  LM to call back  Called pt's daughter, Tyler [address]  She asked me to call her son, Cristina Christianson to make the appt with him because he brings her to her appts    Edwige Glover, [phone] appt made 9/25/20 with Dr Jesus Flood for H&P

## 2020-09-18 ENCOUNTER — OFFICE VISIT (OUTPATIENT)
Dept: FAMILY MEDICINE CLINIC | Facility: CLINIC | Age: 80
End: 2020-09-18
Payer: MEDICARE

## 2020-09-18 ENCOUNTER — APPOINTMENT (OUTPATIENT)
Dept: LAB | Facility: CLINIC | Age: 80
End: 2020-09-18
Payer: MEDICARE

## 2020-09-18 VITALS
OXYGEN SATURATION: 98 % | WEIGHT: 193 LBS | SYSTOLIC BLOOD PRESSURE: 140 MMHG | HEART RATE: 82 BPM | TEMPERATURE: 98 F | BODY MASS INDEX: 33.13 KG/M2 | DIASTOLIC BLOOD PRESSURE: 72 MMHG

## 2020-09-18 DIAGNOSIS — E03.9 ACQUIRED HYPOTHYROIDISM: ICD-10-CM

## 2020-09-18 DIAGNOSIS — Z23 NEED FOR INFLUENZA VACCINATION: ICD-10-CM

## 2020-09-18 DIAGNOSIS — R47.1 DYSARTHRIA: ICD-10-CM

## 2020-09-18 DIAGNOSIS — Z00.00 MEDICARE ANNUAL WELLNESS VISIT, SUBSEQUENT: Primary | ICD-10-CM

## 2020-09-18 DIAGNOSIS — R13.10 SWALLOWING DYSFUNCTION: ICD-10-CM

## 2020-09-18 DIAGNOSIS — I25.10 CORONARY ARTERY DISEASE INVOLVING NATIVE CORONARY ARTERY OF NATIVE HEART WITHOUT ANGINA PECTORIS: ICD-10-CM

## 2020-09-18 DIAGNOSIS — E11.9 DIABETES MELLITUS TYPE 2, DIET-CONTROLLED (HCC): ICD-10-CM

## 2020-09-18 DIAGNOSIS — J41.1 MUCOPURULENT CHRONIC BRONCHITIS (HCC): ICD-10-CM

## 2020-09-18 DIAGNOSIS — G25.0 BENIGN FAMILIAL TREMOR: ICD-10-CM

## 2020-09-18 DIAGNOSIS — I63.9 CEREBROVASCULAR ACCIDENT (CVA), UNSPECIFIED MECHANISM (HCC): ICD-10-CM

## 2020-09-18 DIAGNOSIS — Z96.89 S/P DEEP BRAIN STIMULATOR PLACEMENT: ICD-10-CM

## 2020-09-18 DIAGNOSIS — I10 BENIGN ESSENTIAL HYPERTENSION: ICD-10-CM

## 2020-09-18 PROBLEM — R93.89 ABNORMAL CT SCAN: Status: RESOLVED | Noted: 2019-11-06 | Resolved: 2020-09-18

## 2020-09-18 PROBLEM — I65.23 BILATERAL CAROTID ARTERY STENOSIS: Status: RESOLVED | Noted: 2020-03-05 | Resolved: 2020-09-18

## 2020-09-18 LAB
ALBUMIN SERPL BCP-MCNC: 4.1 G/DL (ref 3.5–5)
ALP SERPL-CCNC: 88 U/L (ref 46–116)
ALT SERPL W P-5'-P-CCNC: 23 U/L (ref 12–78)
ANION GAP SERPL CALCULATED.3IONS-SCNC: 5 MMOL/L (ref 4–13)
AST SERPL W P-5'-P-CCNC: 20 U/L (ref 5–45)
BILIRUB SERPL-MCNC: 0.39 MG/DL (ref 0.2–1)
BUN SERPL-MCNC: 15 MG/DL (ref 5–25)
CALCIUM SERPL-MCNC: 9.3 MG/DL (ref 8.3–10.1)
CHLORIDE SERPL-SCNC: 104 MMOL/L (ref 100–108)
CO2 SERPL-SCNC: 32 MMOL/L (ref 21–32)
CREAT SERPL-MCNC: 0.84 MG/DL (ref 0.6–1.3)
ERYTHROCYTE [DISTWIDTH] IN BLOOD BY AUTOMATED COUNT: 14.9 % (ref 11.6–15.1)
EST. AVERAGE GLUCOSE BLD GHB EST-MCNC: 134 MG/DL
GFR SERPL CREATININE-BSD FRML MDRD: 65 ML/MIN/1.73SQ M
GLUCOSE P FAST SERPL-MCNC: 98 MG/DL (ref 65–99)
HBA1C MFR BLD: 6.3 %
HCT VFR BLD AUTO: 42.5 % (ref 34.8–46.1)
HGB BLD-MCNC: 13.1 G/DL (ref 11.5–15.4)
MCH RBC QN AUTO: 26.5 PG (ref 26.8–34.3)
MCHC RBC AUTO-ENTMCNC: 30.8 G/DL (ref 31.4–37.4)
MCV RBC AUTO: 86 FL (ref 82–98)
PLATELET # BLD AUTO: 239 THOUSANDS/UL (ref 149–390)
PMV BLD AUTO: 10.7 FL (ref 8.9–12.7)
POTASSIUM SERPL-SCNC: 4 MMOL/L (ref 3.5–5.3)
PROT SERPL-MCNC: 7.5 G/DL (ref 6.4–8.2)
RBC # BLD AUTO: 4.95 MILLION/UL (ref 3.81–5.12)
SODIUM SERPL-SCNC: 141 MMOL/L (ref 136–145)
TSH SERPL DL<=0.05 MIU/L-ACNC: 3.38 UIU/ML (ref 0.36–3.74)
WBC # BLD AUTO: 8.31 THOUSAND/UL (ref 4.31–10.16)

## 2020-09-18 PROCEDURE — 99213 OFFICE O/P EST LOW 20 MIN: CPT | Performed by: FAMILY MEDICINE

## 2020-09-18 PROCEDURE — 83036 HEMOGLOBIN GLYCOSYLATED A1C: CPT | Performed by: FAMILY MEDICINE

## 2020-09-18 PROCEDURE — 84443 ASSAY THYROID STIM HORMONE: CPT | Performed by: FAMILY MEDICINE

## 2020-09-18 PROCEDURE — G0438 PPPS, INITIAL VISIT: HCPCS | Performed by: FAMILY MEDICINE

## 2020-09-18 PROCEDURE — G0008 ADMIN INFLUENZA VIRUS VAC: HCPCS

## 2020-09-18 PROCEDURE — 36415 COLL VENOUS BLD VENIPUNCTURE: CPT | Performed by: FAMILY MEDICINE

## 2020-09-18 PROCEDURE — 80053 COMPREHEN METABOLIC PANEL: CPT | Performed by: FAMILY MEDICINE

## 2020-09-18 PROCEDURE — 90662 IIV NO PRSV INCREASED AG IM: CPT

## 2020-09-18 PROCEDURE — 85027 COMPLETE CBC AUTOMATED: CPT | Performed by: FAMILY MEDICINE

## 2020-09-18 NOTE — PROGRESS NOTES
BMI Counseling: Body mass index is 33 13 kg/m²  The BMI is above normal  Nutrition recommendations include decreasing portion sizes, encouraging healthy choices of fruits and vegetables and moderation in carbohydrate intake  Depression Screening and Follow-up Plan: Patient's depression screening was positive with a PHQ-2 score of 0  Clincally patient does not have depression  No treatment is required  Falls Plan of Care: balance, strength, and gait training instructions were provided  Lucho A  [facility] Physician [facility]  [address]  [address] [place], [place], [address]     MEDICARE SUBSEQUENT VISIT NOTE ( part 1 )      NAME: Aaron Perales  AGE: 78 y o   SEX: female  : 1940   MRN: [id number]    DATE: 2020  TIME: 11:13 AM    Assessment and Plan     Problem List Items Addressed This Visit        Endocrine    Acquired hypothyroidism    Relevant Orders    TSH, 3rd generation    Diabetes mellitus type 2, diet-controlled ([address] )    Relevant Orders    Comprehensive metabolic panel    Hemoglobin A1C       Respiratory    COPD (chronic obstructive pulmonary disease) ([address] )    Relevant Orders    CBC       Cardiovascular and Mediastinum    Benign essential hypertension    Cerebrovascular accident (CVA) ([address] )    Relevant Orders    CBC    Comprehensive metabolic panel    Coronary artery disease involving native coronary artery of native heart without angina pectoris    Relevant Orders    CBC    Comprehensive metabolic panel    Hemoglobin A1C       Nervous and Auditory    Benign familial tremor       Other    S/P deep brain stimulator placement    Dysarthria related to 2018 CVA,improved    Swallowing dysfunction      Other Visit Diagnoses     Medicare annual wellness visit, subsequent    -  Primary    Need for influenza vaccination        Relevant Orders    influenza vaccine, high-dose, PF 0 7 mL (FLUZONE HIGH-DOSE) (Completed) Medicare Wellness Counseling/ Discussion    Patient Instructions     Reviewed health history along with medications  Blood pressure here today is 140/72, acceptable, she will continue diltiazem 240 mg daily, furosemide 20 mg daily  We did review previous blood work from back in April, hemoglobin 12 8, CMP okay with glucose 110  TSH was 2 8, continue levothyroxine as is -  125 mcg daily  A1c was 6 4, continue to watch healthy diet, limit carbohydrates, snacks  Cholesterol 153 with HDL 49, LDL 74  Relates only able to tolerate Crestor two times weekly ( leg cramps)  I did give slip to redo CBC, CMP, TSH, A1c ( non-fasting ok)    Continue to control cardiovascular risk as can, she will continue to see vascular in follow-up  History CVA, has done speech, OT, PT  Watch for increased difficulty swallowing  Continues on aspirin, Plavix  About 8 yrs out from Implant re tremor-  Doing very well -  Sees Neurology yrly  She will continue to see Urology regarding stent exchange  Immunization History   Administered Date(s) Administered    INFLUENZA 10/13/2016, 10/23/2017    Influenza Split High Dose Preservative Free IM 10/15/2013, 10/16/2014, 10/28/2015, 10/13/2016, 10/23/2017    Influenza TIV (IM) 10/31/2007, 01/05/2011, 10/01/2011, 10/05/2011, 10/18/2018    Influenza, high dose seasonal 0 7 mL 10/12/2018, 11/06/2019    Pneumococcal Conjugate 13-Valent 02/24/2015    Pneumococcal Polysaccharide PPV23 04/13/2007    Tuberculin Skin Test-PPD Intradermal 10/26/2004       Discussed Vaccines,    Prevnar  is up to date   Pneumovax  is up to date  She does do yearly Flu shot  Flu shot today  Tdap/tetanus shot is up to date, will be done at a future date  (done every 10 yrs for superficial cuts, every 5 yrs for deep wounds)   Can also look into coverage for new shingles shot, Shingrix  Can do that at pharmacy  Is a former smoker, quit 8 years ago  Call if increased cough or wheezing    Stay on Flovent, use nebulizer/rescue treatment as needed ( recently using daily - at baseline)     Regarding Colon Cancer screening,  routine screening not indicated  Mammogram screening was discussed, is  not indicated routinely at her age  Hold off on one  We discussed end of life planning, she does have a  "LIVING WILL"    Glaucoma screening is up-to-date -  Sees again soon  We will see her back in 4 months, sooner as needed  Chief Complaint     Chief Complaint   Patient presents with   [facility] Wellness Visit   Regular follow-up    History of Present Illness     Gage Cordoba is a 78y o -year-old female who is in today for a routine follow-up/annual wellness check  She relates her breathing has been at baseline, uses rescue treatment/nebulizer daily, is on Flovent  Is using her medication as directed, has had no new weakness, speech has been okay recently, has done therapy in the past regarding history CVA  Tremor remains stable  Did gain some weight over the summer    Review of Systems     Review of Systems   Constitutional: Negative for appetite change, fatigue, fever and unexpected weight change  HENT: Negative for sore throat and trouble swallowing (Stable, no issues with choking, watch his regular diet)  Respiratory: Positive for cough and shortness of breath  Negative for chest tightness  Respiratory status at baseline, no hemoptysis   Cardiovascular: Negative for chest pain, palpitations and leg swelling  Gastrointestinal: Negative for abdominal pain, blood in stool, nausea and vomiting  No acid reflux     No change in bowel   Genitourinary: Negative for dysuria and hematuria  Skin: Negative for wound  Neurological: Positive for tremors  Negative for dizziness, syncope, light-headedness and headaches  Hematological: Bruises/bleeds easily (No bleeding)  Psychiatric/Behavioral: Negative for behavioral problems and confusion         Active Problem List     Patient Active Problem List   Diagnosis    Benign essential hypertension    Benign familial tremor    History of CVA (cerebrovascular accident)    COPD (chronic obstructive pulmonary disease) ([address] )    Esophageal reflux    Gait disturbance    Dyslipidemia    Sleep disorder    Sciatica    Right shoulder pain    Osteoarthritis of hip    OA (osteoarthritis)    Multiple joint pain    Acquired hypothyroidism    Lumbar degenerative disc disease    Back pain    History of pancreatitis    Aortic valve stenosis - ( Mild -Moderate )    S/P deep brain stimulator placement    Microscopic hematuria    Dysarthria related to Nov 2018 CVA,improved    Swallowing dysfunction    Weakness of left leg    Carotid stenosis right,  symptomatic, with infarction -  s/p CEA 2/20    Hydronephrosis of left kidney    C[facility]brovascular accident ([facility]A) [facility]    Nephrostomy tube Left    Coronary artery disease involving native coronary artery of native heart without angina pectoris    Diabetes mellitus type 2, diet-controlled ([address] )    Calculus of kidney    Status post RIGHT carotid endarterectomy       Past Medical History:  Past Medical History:   Diagnosis Date    Arthritis     Asthma     At risk for falls     Benign essential tremor 10/15/2018    Added automatically from request for surgery [id number]    COPD (chronic obstructive pulmonary disease) ([address] )     Diabetes mellitus ([address] )     borderline    Difficulty waking     post anesthesia    Edema     bles    GERD (gastroesophageal reflux disease)     High cholesterol     History of recent fall     broke several ribs    Hypertension     Hypothyroid     Kidney stone     Macular degeneration     Osteopenia     Osteoporosis     S/P deep brain stimulator placement     Shortness of breath     on exertion    Slurred speech     Stroke (HCC)     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     Urinary leakage     Uses roller walker     Vitamin D deficiency  Wears dentures     full upper    Wears glasses     reading       Past Surgical History:  Past Surgical History:   Procedure Laterality Date    CATARACT EXTRACTION W/  INTRAOCULAR LENS IMPLANT Bilateral     COLONOSCOPY      DEEP BRAIN STIMULATOR PLACEMENT      FL RETROGRADE PYELOGRAM  2019    FL RETROGRADE PYELOGRAM  2020    IR NEPHROSTOMY TO NEPHROURETERAL STENT  1/10/2020    IR TUBE PLACEMENT  2019    KNEE ARTHROSCOPY      FL CYSTOURETHROSCOPY,URETER CATHETER Left 2019    Procedure: CYSTO RETROGRADE PYELOGRAM, URETEROSCOPY;  Surgeon: Niraj Sharpe MD;  [facility];  Service: Urology    FL IMP STIM,CRANIAL,SUBQ,1 ARRAY Left 2019    Procedure: REPLACEMENT IMPLANTABLE PULSE GENERATOR (IPG) DEEP BRAIN STIMULATION (DBS), LEFT;  Surgeon: Kristine Meraz MD;  [facility];  Service: Neurosurgery    FL IMP STIM,CRANIAL,SUBQ,>1 ARRAY Right 2018    Procedure: REPLACEMENT RIGHT DBS IMPLANTABLE PULSE GENERATOR;  Surgeon: Kristine Meraz MD;  [facility];  Service: Neurosurgery    FL RMVL & RPLCMT INTLY DWELLING URETERAL STENT PRQ Left 2020    Procedure: Edna Dotson, STENT;  Surgeon: Rupali Izaguirre MD;  [facility];  Service: Urology    FL THROMBOENDARTECTMY Marvetta Puffer Right 2020    Procedure: CAROTID ENDARTERECTOMY WITH BOVINE PATCH;  Surgeon: Varinder Wells MD;  [facility];  Service: Vascular    FL TOTAL HIP ARTHROPLASTY Left 2016    Procedure: ARTHROPLASTY HIP TOTAL ANTERIOR;  Surgeon: Meseret Bermudez MD;  [facility];  Service: Orthopedics    ROTATOR CUFF REPAIR         Family History:  Family History   Problem Relation Age of Onset    Breast cancer Mother     Throat cancer Family        Social History:  Social History     Tobacco Use    Smoking status: Former Smoker     Types: Cigarettes     Last attempt to quit:      Years since quittin 7    Smokeless tobacco: Never Used Substance Use Topics    Alcohol use: Never     Frequency: Never       Objective     Vitals:    09/18/20 1009   BP: 140/72   BP Location: Left arm   Patient Position: Sitting   Cuff Size: Standard   Pulse: 82   Temp: 98 °F (36 7 °C)   SpO2: 98%   Weight: 87 5 kg (193 lb)     Body mass index is 33 13 kg/m²  BP Readings from Last 3 Encounters:   09/18/20 140/72   07/13/20 146/78   06/18/20 144/72       Wt Readings from Last 3 Encounters:   09/18/20 87 5 kg (193 lb)   07/13/20 86 8 kg (191 lb 6 4 oz)   06/18/20 86 4 kg (190 lb 6 4 oz)       Physical Exam  Constitutional:       General: She is not in acute distress  Appearance: She is well-developed  She is obese  She is not ill-appearing  Eyes:      General: No scleral icterus  Cardiovascular:      Rate and Rhythm: Normal rate and regular rhythm  Heart sounds: Murmur present  Pulmonary:      Effort: Pulmonary effort is normal  No respiratory distress  Comments: Very slightly coarse rhonchi, no wheezing, no rales  Abdominal:      Palpations: Abdomen is soft  Tenderness: There is no abdominal tenderness  There is no guarding  Musculoskeletal:      Comments: Very slight trace pitting bilateral anterior tibial, minimal   Lymphadenopathy:      Cervical: No cervical adenopathy  Neurological:      Mental Status: She is alert and oriented to person, place, and time     Psychiatric:         Mood and Affect: Mood normal          Behavior: Behavior normal          ALLERGIES:  Allergies   Allergen Reactions    Ciprofloxacin Diarrhea    Simvastatin Myalgia    Advair Diskus [Fluticasone-Salmeterol] Palpitations     Ok w flovent    Tetracycline Rash     Reaction Date: [id number];        Current Medications     Current Outpatient Medications   Medication Sig Dispense Refill    acetaminophen (TYLENOL) 500 mg tablet Take 1,000 mg by mouth every 6 (six) hours as needed for mild pain       albuterol (2 5 mg/3 mL) 0 083 % nebulizer solution Take 1 vial (2 5 mg total) by nebulization every 6 (six) hours as needed for shortness of breath 120 vial 5    aspirin (ECOTRIN LOW STRENGTH) 81 mg EC tablet Take 1 tablet (81 mg total) by mouth daily 30 tablet 0    Cholecalciferol (VITAMIN D-3 PO) Take 1,000 Units by mouth daily   clopidogrel (PLAVIX) 75 mg tablet TAKE 1 TABLET (75 MG TOTAL) BY MOUTH DAILY HOLD FOR NEPHROSTOMY TUBE PLACEMENT 30 tablet 5    diltiazem (CARDIZEM CD) 240 mg 24 hr capsule Take 1 capsule (240 mg total) by mouth daily 90 capsule 3    Fluticasone Propionate, Inhal, (FLOVENT DISKUS) 250 MCG/BLIST AEPB Inhale 1 puff 2 (two) times a day      furosemide (LASIX) 20 mg tablet Take 1 tablet (20 mg total) by mouth daily 90 tablet 1    levothyroxine 125 mcg tablet Take 1 tablet (125 mcg total) by mouth daily 90 tablet 3    Multiple Vitamins-Minerals (PRESERVISION AREDS PO) Take 1 Cap by Mouth daily      multivitamin (THERAGRAN) TABS Take 1 tablet by mouth daily      omeprazole (PriLOSEC) 20 mg delayed release capsule Take 1 capsule by mouth daily as needed       rosuvastatin (CRESTOR) 5 mg tablet Uses twice weekly 40 tablet 3    docusate sodium (COLACE) 100 mg capsule Take 1 capsule (100 mg total) by mouth daily as needed for constipation for up to 7 days 21 capsule 0     No current facility-administered medications for this visit            Health Maintenance     See other note today re clinical AWV info             Most recent labs available from [address] [place] [address]   ( others may be available in Care Everywhere / Media sections)  Lab Results   Component Value Date    WBC 7 87 04/15/2020    HGB 12 8 04/15/2020    HCT 43 1 04/15/2020     04/15/2020    CHOL 176 10/29/2014    TRIG 150 04/15/2020    HDL 49 04/15/2020    ALT 26 04/15/2020    AST 18 04/15/2020     10/29/2014    K 4 3 04/15/2020     04/15/2020    CREATININE 0 90 04/15/2020    BUN 19 04/15/2020    CO2 29 04/15/2020    INR 1 11 04/15/2020    GLUF 110 (H) 04/15/2020 HGBA1C 6 4 (H) 04/15/2020       Orders Placed This Encounter   Procedures    influenza vaccine, high-dose, PF 0 7 mL (FLUZONE HIGH-DOSE)    CBC    Comprehensive metabolic panel    Hemoglobin A1C    TSH, 3rd generation             Rudy Plasencia DO

## 2020-09-18 NOTE — PATIENT INSTRUCTIONS
Reviewed health history along with medications  Blood pressure here today is 140/72, acceptable, she will continue diltiazem 240 mg daily, furosemide 20 mg daily  We did review previous blood work from back in April, hemoglobin 12 8, CMP okay with glucose 110  TSH was 2 8, continue levothyroxine as is -  125 mcg daily  A1c was 6 4, continue to watch healthy diet, limit carbohydrates, snacks  Cholesterol 153 with HDL 49, LDL 74  Relates only able to tolerate Crestor two times weekly ( leg cramps)  I did give slip to redo CBC, CMP, TSH, A1c ( non-fasting ok)    Continue to control cardiovascular risk as can, she will continue to see vascular in follow-up  History CVA, has done speech, OT, PT  Watch for increased difficulty swallowing  Continues on aspirin, Plavix  About 8 yrs out from Implant re tremor-  Doing very well -  Sees Neurology yrly  She will continue to see Urology regarding stent exchange  Immunization History   Administered Date(s) Administered    INFLUENZA 10/13/2016, 10/23/2017    Influenza Split High Dose Preservative Free IM 10/15/2013, 10/16/2014, 10/28/2015, 10/13/2016, 10/23/2017    Influenza TIV (IM) 10/31/2007, 01/05/2011, 10/01/2011, 10/05/2011, 10/18/2018    Influenza, high dose seasonal 0 7 mL 10/12/2018, 11/06/2019    Pneumococcal Conjugate 13-Valent 02/24/2015    Pneumococcal Polysaccharide PPV23 04/13/2007    Tuberculin Skin Test-PPD Intradermal 10/26/2004       Discussed Vaccines,    Prevnar  is up to date   Pneumovax  is up to date  She does do yearly Flu shot  Flu shot today  Tdap/tetanus shot is up to date, will be done at a future date  (done every 10 yrs for superficial cuts, every 5 yrs for deep wounds)   Can also look into coverage for new shingles shot, Shingrix  Can do that at pharmacy  Is a former smoker, quit 8 years ago  Call if increased cough or wheezing    Stay on Flovent, use nebulizer/rescue treatment as needed ( recently using daily - at baseline)     Regarding Colon Cancer screening,  routine screening not indicated  Mammogram screening was discussed, is  not indicated routinely at her age  Hold off on one  We discussed end of life planning, she does have a  "LIVING WILL"    Glaucoma screening is up-to-date -  Sees again soon  We will see her back in 4 months, sooner as needed

## 2020-09-18 NOTE — PROGRESS NOTES
Diabetic Foot Exam    Patient's shoes and socks removed  Right Foot/Ankle   Right Foot Inspection  Skin Exam: skin normal, skin intact and dry skin no warmth, no callus, no erythema, no maceration, no abnormal color, no pre-ulcer, no ulcer and no callus                          Toe Exam: ROM and strength within normal limits and swelling  Sensory       Monofilament testing: diminished  Vascular    The right DP pulse is 2+  The right PT pulse is 1+  Right Toe  - Comprehensive Exam  Ecchymosis: none  Arch: normal  Hammertoes: absent  Claw Toes: absent  Tenderness: none         Left Foot/Ankle  Left Foot Inspection  Skin Exam: skin normal, skin intact and dry skinno warmth, no erythema, no maceration, normal color, no pre-ulcer, no ulcer and no callus                         Toe Exam: ROM and strength within normal limits and swelling                   Sensory       Monofilament: diminished  Vascular    The left DP pulse is 2+  The left PT pulse is 1+  Left Toe  - Comprehensive Exam  Ecchymosis: none  Arch: normal  Hammertoes: absent  Claw toes: absent  Tenderness: none       Assign Risk Category:  No deformity present;  Loss of protective sensation; Weak pulses       Risk: 0     Assessment and Plan:     Problem List Items Addressed This Visit        Endocrine    Acquired hypothyroidism    Relevant Orders    TSH, 3rd generation    Diabetes mellitus type 2, diet-controlled ([facility] [address] )    Relevant Orders    Comprehensive metabolic panel    Hemoglobin A1C       Respiratory    COPD (chronic obstructive pulmonary disease) ([facility])    Relevant Orders    CBC       Cardiovascular and Mediastinum    Benign essential hypertension    Cerebrovascular accident (CVA) ([address] )    Relevant Orders    CBC    Comprehensive metabolic panel    Coronary artery disease involving native coronary artery of native heart without angina pectoris    Relevant Orders    CBC    Comprehensive metabolic panel    Hemoglobin A1C       Nervous and Auditory Benign familial tremor       Other    S/P deep brain stimulator placement    Dysarthria related to Nov 2018 CVA,improved    Swallowing dysfunction      Other Visit Diagnoses     Medicare annual wellness visit, subsequent    -  Primary    Need for influenza vaccination        Relevant Orders    influenza vaccine, high-dose, PF 0 7 mL (FLUZONE HIGH-DOSE) (Completed)           Preventive health issues were discussed with patient, and age appropriate screening tests were ordered as noted in patient's After Visit Summary  Personalized health advice and appropriate referrals for health education or preventive services given if needed, as noted in patient's After Visit Summary      See other note today regarding provider information     History of Present Illness:     Patient presents for Medicare Annual Wellness visit    Patient Care Team:  Mahnaz Smith DO as PCP - General  J Carlos Vee MD (Ophthalmology)     Problem List:     Patient Active Problem List   Diagnosis    Benign essential hypertension    Benign familial tremor    History of CVA (cerebrovascular accident)    COPD (chronic obstructive pulmonary disease) ([address] )    Esophageal reflux    Gait disturbance    Dyslipidemia    Sleep disorder    Sciatica    Right shoulder pain    Osteoarthritis of hip    OA (osteoarthritis)    Multiple joint pain    Acquired hypothyroidism    Lumbar degenerative disc disease    Back pain    History of pancreatitis    Aortic valve stenosis - ( Mild -Moderate )    S/P deep brain stimulator placement    Microscopic hematuria    Dysarthria related to Nov 2018 CVA,improved    Swallowing dysfunction    Weakness of left leg    Carotid stenosis right,  symptomatic, with infarction -  s/p CEA 2/20    Hydronephrosis of left kidney    Cerebrovascular accident (CVA) ([address] )    Nephrostomy tube Left    Coronary artery disease involving native coronary artery of native heart without angina pectoris    Diabetes mellitus type 2, diet-controlled ([facility] [address] )    Calculus of kidney    Status post RIGHT carotid endarterectomy      Past Medical and Surgical History:     Past Medical History:   Diagnosis Date    Arthritis     Asthma     At risk for falls     Benign essential tremor 10/15/2018    Added automatically from request for surgery [id number]    COPD (chronic obstructive pulmonary disease) ([facility] [address] )     Diabetes mellitus (HCC)     borderline    Difficulty waking     post anesthesia    Edema     bles    GERD (gastroesophageal reflux disease)     High cholesterol     History of recent fall     broke several ribs    Hypertension     Hypothyroid     Kidney stone     Macular degeneration     Osteopenia     Osteoporosis     S/P deep brain stimulator placement     Shortness of breath     on exertion    Slurred speech     Stroke (HCC)     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     Urinary leakage     Uses roller walker     Vitamin D deficiency     Wears dentures     full upper    Wears glasses     reading     Past Surgical History:   Procedure Laterality Date    CATARACT EXTRACTION W/  INTRAOCULAR LENS IMPLANT Bilateral     COLONOSCOPY      DEEP BRAIN STIMULATOR PLACEMENT      FL RETROGRADE PYELOGRAM  11/7/2019    FL RETROGRADE PYELOGRAM  5/26/2020    IR NEPHROSTOMY TO NEPHROURETERAL STENT  1/10/2020    IR TUBE PLACEMENT  11/12/2019    KNEE ARTHROSCOPY      VT CYSTOURETHROSCOPY,URETER CATHETER Left 11/7/2019    Procedure: CYSTO RETROGRADE PYELOGRAM, URETEROSCOPY;  Surgeon: Michelle Lombardo MD;  [facility];  Service: Urology    VT IMP STIM,CRANIAL,SUBQ,1 ARRAY Left 1/22/2019    Procedure: REPLACEMENT IMPLANTABLE PULSE GENERATOR (IPG) DEEP BRAIN STIMULATION (DBS), LEFT;  Surgeon: Evasn Leavitt MD;  [facility];  Service: Neurosurgery    VT IMP STIM,CRANIAL,SUBQ,>1 ARRAY Right 12/13/2018    Procedure: REPLACEMENT RIGHT DBS IMPLANTABLE PULSE GENERATOR;  Surgeon: Evans Leavitt MD;  [facility]; Service: Neurosurgery    NC RMVL & RPLCMT INTLY DWELLING URETERAL STENT PRQ Left 2020    Procedure: Kirstin Valdez, STENT;  Surgeon: Nolberto Stewart MD;  [facility];  Service: Urology    NC THROMBOENDARTECTMY Zeus Arthur Right 2020    Procedure: CAROTID ENDARTERECTOMY WITH BOVINE PATCH;  Surgeon: Riya Gao MD;  [facility];  Service: Vascular    NC TOTAL HIP ARTHROPLASTY Left 2016    Procedure: ARTHROPLASTY HIP TOTAL ANTERIOR;  Surgeon: Antoine Rooney MD;  [facility];  Service: Orthopedics    ROTATOR CUFF REPAIR        Family History:     Family History   Problem Relation Age of Onset    Breast cancer Mother     Throat cancer Family       Social History:        Social History     Socioeconomic History    Marital status:       Spouse name: None    Number of children: None    Years of education: None    Highest education level: None   Occupational History    None   Social Needs    Financial resource strain: None    Food insecurity     Worry: None     Inability: None    Transportation needs     Medical: None     Non-medical: None   Tobacco Use    Smoking status: Former Smoker     Types: Cigarettes     Last attempt to quit: 2013     Years since quittin 7    Smokeless tobacco: Never Used   Substance and Sexual Activity    Alcohol use: Never     Frequency: Never    Drug use: No    Sexual activity: Not Currently     Birth control/protection: Post-menopausal   Lifestyle    Physical activity     Days per week: None     Minutes per session: None    Stress: None   Relationships    Social connections     Talks on phone: None     Gets together: None     Attends Congregational service: None     Active member of club or organization: None     Attends meetings of clubs or organizations: None     Relationship status: None    Intimate partner violence     Fear of current or ex partner: None     Emotionally abused: None     Physically abused: None     Forced sexual activity: None   Other Topics Concern    None   Social History Narrative    Caffeine use    Living independently          Medications and Allergies:     Current Outpatient Medications   Medication Sig Dispense Refill    acetaminophen (TYLENOL) 500 mg tablet Take 1,000 mg by mouth every 6 (six) hours as needed for mild pain       albuterol (2 5 mg/3 mL) 0 083 % nebulizer solution Take 1 vial (2 5 mg total) by nebulization every 6 (six) hours as needed for shortness of breath 120 vial 5    aspirin (ECOTRIN LOW STRENGTH) 81 mg EC tablet Take 1 tablet (81 mg total) by mouth daily 30 tablet 0    Cholecalciferol (VITAMIN D-3 PO) Take 1,000 Units by mouth daily   clopidogrel (PLAVIX) 75 mg tablet TAKE 1 TABLET (75 MG TOTAL) BY MOUTH DAILY HOLD FOR NEPHROSTOMY TUBE PLACEMENT 30 tablet 5    diltiazem (CARDIZEM CD) 240 mg 24 hr capsule Take 1 capsule (240 mg total) by mouth daily 90 capsule 3    Fluticasone Propionate, Inhal, (FLOVENT DISKUS) 250 MCG/BLIST AEPB Inhale 1 puff 2 (two) times a day      furosemide (LASIX) 20 mg tablet Take 1 tablet (20 mg total) by mouth daily 90 tablet 1    levothyroxine 125 mcg tablet Take 1 tablet (125 mcg total) by mouth daily 90 tablet 3    Multiple Vitamins-Minerals (PRESERVISION AREDS PO) Take 1 Cap by Mouth daily      multivitamin (THERAGRAN) TABS Take 1 tablet by mouth daily      omeprazole (PriLOSEC) 20 mg delayed release capsule Take 1 capsule by mouth daily as needed       rosuvastatin (CRESTOR) 5 mg tablet Uses twice weekly 40 tablet 3    docusate sodium (COLACE) 100 mg capsule Take 1 capsule (100 mg total) by mouth daily as needed for constipation for up to 7 days 21 capsule 0     No current facility-administered medications for this visit        Allergies   Allergen Reactions    Ciprofloxacin Diarrhea    Simvastatin Myalgia    Advair Diskus [Fluticasone-Salmeterol] Palpitations     Ok w flovent    Tetracycline Rash     Reaction Date: [id number];       Immunizations:     Immunization History   Administered Date(s) Administered    INFLUENZA 10/13/2016, 10/23/2017    Influenza Split High Dose Preservative Free IM 10/15/2013, 10/16/2014, 10/28/2015, 10/13/2016, 10/23/2017    Influenza TIV (IM) 10/31/2007, 01/05/2011, 10/01/2011, 10/05/2011, 10/18/2018    Influenza, high dose seasonal 0 7 mL 10/12/2018, 11/06/2019, 09/18/2020    Pneumococcal Conjugate 13-Valent 02/24/2015    Pneumococcal Polysaccharide PPV23 04/13/2007    Tuberculin Skin Test-PPD Intradermal 10/26/2004      Health Maintenance:         Topic Date Due    DXA SCAN  1940         Topic Date Due    DTaP,Tdap,and Td Vaccines (1 - Tdap) 07/22/1959    Influenza Vaccine  07/01/2020      Medicare Health Risk Assessment:     /72 (BP Location: Left arm, Patient Position: Sitting, Cuff Size: Standard)   Pulse 82   Temp 98 °F (36 7 °C)   Wt 87 5 kg (193 lb)   SpO2 98%   BMI 33 13 kg/m²      Shira Nichole is here for her Subsequent Wellness visit  Health Risk Assessment:   Patient rates overall health as good  Patient feels that their physical health rating is same  Eyesight was rated as same  Hearing was rated as same  Patient feels that their emotional and mental health rating is same  Pain experienced in the last 7 days has been some  Patient's pain rating has been 6/10  Patient states that she has experienced no weight loss or gain in last 6 months  Depression Screening:   PHQ-2 Score: 0      Fall Risk Screening: In the past year, patient has experienced: history of falling in past year    Number of falls: 2 or more  Injured during fall?: Yes    Feels unsteady when standing or walking?: Yes    Worried about falling?: Yes      Urinary Incontinence Screening:   Patient has leaked urine accidently in the last six months  Home Safety:  Patient has trouble with stairs inside or outside of their home   Patient has working smoke alarms and has working carbon monoxide detector  Home safety hazards include: none  Nutrition:   Current diet is Regular  Medications:   Patient is currently taking over-the-counter supplements  OTC medications include: see medication list  Patient is able to manage medications  Activities of Daily Living (ADLs)/Instrumental Activities of Daily Living (IADLs):   Walk and transfer into and out of bed and chair?: Yes  Dress and groom yourself?: Yes    Bathe or shower yourself?: Yes    Feed yourself? Yes  Do your laundry/housekeeping?: Yes  Manage your money, pay your bills and track your expenses?: Yes  Make your own meals?: Yes    Do your own shopping?: Yes    Previous Hospitalizations:   Any hospitalizations or ED visits within the last 12 months?: Yes      Advance Care Planning:   Living will: Yes    Durable POA for healthcare:  Yes    Advanced directive: Yes      PREVENTIVE SCREENINGS      Cardiovascular Screening:    General: Screening Current      Diabetes Screening:     General: Screening Not Indicated and History Diabetes      Cervical Cancer Screening:    General: Screening Not Indicated      Lung Cancer Screening:     General: Screening Not Indicated      Violetta Akins, DO

## 2020-09-25 ENCOUNTER — OFFICE VISIT (OUTPATIENT)
Dept: UROLOGY | Facility: MEDICAL CENTER | Age: 80
End: 2020-09-25
Payer: MEDICARE

## 2020-09-25 VITALS
BODY MASS INDEX: 32.95 KG/M2 | SYSTOLIC BLOOD PRESSURE: 136 MMHG | DIASTOLIC BLOOD PRESSURE: 72 MMHG | WEIGHT: 193 LBS | TEMPERATURE: 98.4 F | HEIGHT: 64 IN

## 2020-09-25 DIAGNOSIS — N30.00 ACUTE CYSTITIS WITHOUT HEMATURIA: ICD-10-CM

## 2020-09-25 DIAGNOSIS — N13.30 HYDRONEPHROSIS OF LEFT KIDNEY: Primary | ICD-10-CM

## 2020-09-25 LAB
SL AMB  POCT GLUCOSE, UA: ABNORMAL
SL AMB LEUKOCYTE ESTERASE,UA: ABNORMAL
SL AMB POCT BILIRUBIN,UA: ABNORMAL
SL AMB POCT BLOOD,UA: ABNORMAL
SL AMB POCT CLARITY,UA: ABNORMAL
SL AMB POCT COLOR,UA: YELLOW
SL AMB POCT KETONES,UA: ABNORMAL
SL AMB POCT NITRITE,UA: ABNORMAL
SL AMB POCT PH,UA: 8.5
SL AMB POCT SPECIFIC GRAVITY,UA: 1.02
SL AMB POCT URINE PROTEIN: ABNORMAL
SL AMB POCT UROBILINOGEN: 0.2

## 2020-09-25 PROCEDURE — 81003 URINALYSIS AUTO W/O SCOPE: CPT | Performed by: UROLOGY

## 2020-09-25 PROCEDURE — 87077 CULTURE AEROBIC IDENTIFY: CPT | Performed by: UROLOGY

## 2020-09-25 PROCEDURE — 87086 URINE CULTURE/COLONY COUNT: CPT | Performed by: UROLOGY

## 2020-09-25 PROCEDURE — 87186 SC STD MICRODIL/AGAR DIL: CPT | Performed by: UROLOGY

## 2020-09-25 PROCEDURE — 99214 OFFICE O/P EST MOD 30 MIN: CPT | Performed by: UROLOGY

## 2020-09-25 RX ORDER — SULFAMETHOXAZOLE AND TRIMETHOPRIM 800; 160 MG/1; MG/1
1 TABLET ORAL EVERY 12 HOURS SCHEDULED
Qty: 14 TABLET | Refills: 0 | Status: SHIPPED | OUTPATIENT
Start: 2020-09-25 | End: 2021-03-23 | Stop reason: SDUPTHER

## 2020-09-25 NOTE — PROGRESS NOTES
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, managed by indwelling stent which has worked well for her  Denies any stent discomfort, no urgency frequency burning    Urine is chronically cloudy and likely colonized  ASSESSMENT / PLAN:    When cysto stent exchange next month, we will then determine what interval is appropriate going forward  Pre treat with Bactrim, culture taken today  She had Klebsiella on prior cultures before  The following portions of the patient's history were reviewed and updated as appropriate: allergies, current medications, past family history, past medical history, past social history, past surgical history and problem list     Review of Systems   All other systems reviewed and are negative  Objective:     Physical Exam  Constitutional:       General: She is not in acute distress  Appearance: She is well-developed  She is not diaphoretic  Comments: Limited mobility, uses a cane   HENT:      Head: Normocephalic and atraumatic  Eyes:      General: No scleral icterus  Pulmonary:      Effort: Pulmonary effort is normal    Skin:     Coloration: Skin is not pale  Neurological:      Mental Status: She is alert and oriented to person, place, and time  Psychiatric:         Behavior: Behavior normal          Thought Content:  Thought content normal          Judgment: Judgment normal            No results found for: PSA]  BUN   Date Value Ref Range Status   09/18/2020 15 5 - 25 mg/dL Final   10/29/2014 18 5 - 25 mg/dL Final     Creatinine   Date Value Ref Range Status   09/18/2020 0 84 0 60 - 1 30 mg/dL Final     Comment:     Standardized to IDMS reference method   10/29/2014 0 95 0 60 - 1 30 mg/dL Final     Comment:     Standardized to IDMS reference method     No components found for: CBC      Patient Active Problem List   Diagnosis    Benign essential hypertension    Benign familial tremor    History of CVA (cerebrovascular accident)    COPD (chronic obstructive pulmonary disease) (HCC)    Esophageal reflux    Gait disturbance    Dyslipidemia    Sleep disorder    Sciatica    Right shoulder pain    Osteoarthritis of hip    OA (osteoarthritis)    Multiple joint pain    Acquired hypothyroidism    Lumbar degenerative disc disease    Back pain    History of pancreatitis    Aortic valve stenosis - ( Mild -Moderate )    S/P deep brain stimulator placement    Microscopic hematuria    Dysarthria related to Nov 2018 CVA,improved    Swallowing dysfunction    Weakness of left leg    Carotid stenosis right,  symptomatic, with infarction -  s/p CEA 2/20    Hydronephrosis of left kidney    Cerebrovascular accident (CVA) ([facility] [address] )    Nephrostomy tube Left    Coronary artery disease involving native coronary artery of native heart without angina pectoris    Diabetes mellitus type 2, diet-controlled ([facility] [address] )    Calculus of kidney    Status post RIGHT carotid endarterectomy    Acute cystitis without hematuria        Diagnoses and all orders for this visit:    Hydronephrosis of left kidney  -     POCT urine dip auto non-scope  -     sulfamethoxazole-trimethoprim (BACTRIM DS) 800-160 mg per tablet; Take 1 tablet by mouth every 12 (twelve) hours for 7 days Start 3 days before procedure    Acute cystitis without hematuria  -     Urine culture           Patient ID: Amber Barrios is a 78 y o  female  Current Outpatient Medications:     acetaminophen (TYLENOL) 500 mg tablet, Take 1,000 mg by mouth every 6 (six) hours as needed for mild pain , Disp: , Rfl:     albuterol (2 5 mg/3 mL) 0 083 % nebulizer solution, Take 1 vial (2 5 mg total) by nebulization every 6 (six) hours as needed for shortness of breath, Disp: 120 vial, Rfl: 5    aspirin (ECOTRIN LOW STRENGTH) 81 mg EC tablet, Take 1 tablet (81 mg total) by mouth daily, Disp: 30 tablet, Rfl: 0    Cholecalciferol (VITAMIN D-3 PO), Take 1,000 Units by mouth daily  , Disp: , Rfl:     clopidogrel (PLAVIX) 75 mg tablet, TAKE 1 TABLET (75 MG TOTAL) BY MOUTH DAILY HOLD FOR NEPHROSTOMY TUBE PLACEMENT, Disp: 30 tablet, Rfl: 5    diltiazem (CARDIZEM CD) 240 mg 24 hr capsule, Take 1 capsule (240 mg total) by mouth daily, Disp: 90 capsule, Rfl: 3    Fluticasone Propionate, Inhal, (FLOVENT DISKUS) 250 MCG/BLIST AEPB, Inhale 1 puff 2 (two) times a day, Disp: , Rfl:     furosemide (LASIX) 20 mg tablet, Take 1 tablet (20 mg total) by mouth daily, Disp: 90 tablet, Rfl: 1    levothyroxine 125 mcg tablet, Take 1 tablet (125 mcg total) by mouth daily, Disp: 90 tablet, Rfl: 3    Multiple Vitamins-Minerals (PRESERVISION AREDS PO), Take 1 Cap by Mouth daily, Disp: , Rfl:     multivitamin (THERAGRAN) TABS, Take 1 tablet by mouth daily, Disp: , Rfl:     omeprazole (PriLOSEC) 20 mg delayed release capsule, Take 1 capsule by mouth daily as needed , Disp: , Rfl:     rosuvastatin (CRESTOR) 5 mg tablet, Uses twice weekly, Disp: 40 tablet, Rfl: 3    docusate sodium (COLACE) 100 mg capsule, Take 1 capsule (100 mg total) by mouth daily as needed for constipation for up to 7 days, Disp: 21 capsule, Rfl: 0    sulfamethoxazole-trimethoprim (BACTRIM DS) 800-160 mg per tablet, Take 1 tablet by mouth every 12 (twelve) hours for 7 days Start 3 days before procedure, Disp: 14 tablet, Rfl: 0    Past Medical History:   Diagnosis Date    Arthritis     Asthma     At risk for falls     Benign essential tremor 10/15/2018    Added automatically from request for surgery [id number]    COPD [facility]ch[facility] obstructive [facility] disease) ([facility] [address] [facility]     Diabetes mellitus (HCC)     borderline    Difficulty waking     post anesthesia    Edema     bles    GERD (gastroesophageal reflux disease)     High cholesterol     History of recent fall     broke several ribs    Hypertension     Hypothyroid     Kidney stone     Macular degeneration     Osteopenia     Osteoporosis     S/P deep brain stimulator placement     Shortness of breath     on exertion  Slurred speech     Stroke ([address] )     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     Urinary leakage     Uses roller walker     Vitamin D deficiency     Wears dentures     full upper    Wears glasses     reading       Past Surgical History:   Procedure Laterality Date    CATARACT EXTRACTION W/  INTRAOCULAR LENS IMPLANT Bilateral     COLONOSCOPY      DEEP BRAIN STIMULATOR PLACEMENT      FL RETROGRADE PYELOGRAM  11/7/2019    FL RETROGRADE PYELOGRAM  5/26/2020    IR NEPHROSTOMY TO NEPHROURETERAL STENT  1/10/2020    IR TUBE PLACEMENT  11/12/2019    KNEE ARTHROSCOPY      MS CYSTOURETHROSCOPY,URETER CATHETER Left 11/7/2019    Procedure: CYSTO RETROGRADE PYELOGRAM, URETEROSCOPY;  Surgeon: Laura Jeronimo MD;  [facility];  Service: Urology    MS IMP STIM,CRANIAL,SUBQ,1 ARRAY Left 1/22/2019    Procedure: REPLACEMENT IMPLANTABLE PULSE GENERATOR (IPG) DEEP BRAIN STIMULATION (DBS), LEFT;  Surgeon: Kevin Martinez MD;  [facility];  Service: Neurosurgery    MS IMP STIM,CRANIAL,SUBQ,>1 ARRAY Right 12/13/2018    Procedure: REPLACEMENT RIGHT DBS IMPLANTABLE PULSE GENERATOR;  Surgeon: Kevin Martinez MD;  [facility];  Service: Neurosurgery    MS RMVL & RPLCMT INTLY DWELLING URETERAL STENT PRQ Left 5/26/2020    Procedure: Clydia Sports, STENT;  Surgeon: Ellen Song MD;  [facility];  Service: Urology    MS Jeaneth [place] Right 2/18/2020    Procedure: CAROTID ENDARTERECTOMY WITH BOVINE PATCH;  Surgeon: Lacey Moncada MD;  [facility];  Service: Vascular    MS TOTAL HIP ARTHROPLASTY Left 5/20/2016    Procedure: ARTHROPLASTY HIP TOTAL ANTERIOR;  Surgeon: Champ Smith MD;  [facility];  Service: Orthopedics    ROTATOR CUFF REPAIR         Social History

## 2020-09-28 ENCOUNTER — TELEPHONE (OUTPATIENT)
Dept: UROLOGY | Facility: MEDICAL CENTER | Age: 80
End: 2020-09-28

## 2020-09-28 LAB
BACTERIA UR CULT: ABNORMAL
BACTERIA UR CULT: ABNORMAL

## 2020-09-28 NOTE — TELEPHONE ENCOUNTER
I spoke to the patient and scheduled her Cysto, Left Stent Exchange with Dr Gabino Gamboa for 10/27/2020 at [facility]      -instructions given verbally and mailed  -CBC, CMP, Urine C&S and EKG 7 days prior  -patient does not need to stop any of her usual medications  -MCR/[employer] - no auth required

## 2020-10-09 ENCOUNTER — TELEPHONE (OUTPATIENT)
Dept: VASCULAR SURGERY | Facility: CLINIC | Age: 80
End: 2020-10-09

## 2020-10-21 ENCOUNTER — TELEPHONE (OUTPATIENT)
Dept: FAMILY MEDICINE CLINIC | Facility: CLINIC | Age: 80
End: 2020-10-21

## 2020-10-23 ENCOUNTER — HOSPITAL ENCOUNTER (OUTPATIENT)
Dept: NON INVASIVE DIAGNOSTICS | Facility: HOSPITAL | Age: 80
Discharge: HOME | End: 2020-10-23
Attending: UROLOGY
Payer: MEDICARE

## 2020-10-23 ENCOUNTER — LAB (OUTPATIENT)
Dept: LAB | Facility: HOSPITAL | Age: 80
End: 2020-10-23
Attending: UROLOGY
Payer: MEDICARE

## 2020-10-23 DIAGNOSIS — R39.89 SUSPECTED UTI: ICD-10-CM

## 2020-10-23 DIAGNOSIS — Z01.812 PRE-OPERATIVE LABORATORY EXAMINATION: ICD-10-CM

## 2020-10-23 DIAGNOSIS — N13.30 HYDRONEPHROSIS, UNSPECIFIED HYDRONEPHROSIS TYPE: ICD-10-CM

## 2020-10-23 DIAGNOSIS — Z01.810 PRE-OPERATIVE CARDIOVASCULAR EXAMINATION: ICD-10-CM

## 2020-10-23 LAB
ALBUMIN SERPL BCP-MCNC: 3.5 G/DL (ref 3.5–5)
ALP SERPL-CCNC: 76 U/L (ref 46–116)
ALT SERPL W P-5'-P-CCNC: 25 U/L (ref 12–78)
ANION GAP SERPL CALCULATED.3IONS-SCNC: 8 MMOL/L (ref 4–13)
AST SERPL W P-5'-P-CCNC: 25 U/L (ref 5–45)
BASOPHILS # BLD AUTO: 0.06 THOUSANDS/ΜL (ref 0–0.1)
BASOPHILS NFR BLD AUTO: 1 % (ref 0–1)
BILIRUB SERPL-MCNC: 0.28 MG/DL (ref 0.2–1)
BUN SERPL-MCNC: 21 MG/DL (ref 5–25)
CALCIUM SERPL-MCNC: 8.8 MG/DL (ref 8.3–10.1)
CHLORIDE SERPL-SCNC: 104 MMOL/L (ref 100–108)
CO2 SERPL-SCNC: 28 MMOL/L (ref 21–32)
CREAT SERPL-MCNC: 1.06 MG/DL (ref 0.6–1.3)
EOSINOPHIL # BLD AUTO: 0.38 THOUSAND/ΜL (ref 0–0.61)
EOSINOPHIL NFR BLD AUTO: 5 % (ref 0–6)
ERYTHROCYTE [DISTWIDTH] IN BLOOD BY AUTOMATED COUNT: 14.4 % (ref 11.6–15.1)
GFR SERPL CREATININE-BSD FRML MDRD: 49 ML/MIN/1.73SQ M
GLUCOSE P FAST SERPL-MCNC: 105 MG/DL (ref 65–99)
HCT VFR BLD AUTO: 40.1 % (ref 34.8–46.1)
HGB BLD-MCNC: 12.1 G/DL (ref 11.5–15.4)
IMM GRANULOCYTES # BLD AUTO: 0.02 THOUSAND/UL (ref 0–0.2)
IMM GRANULOCYTES NFR BLD AUTO: 0 % (ref 0–2)
LYMPHOCYTES # BLD AUTO: 1.62 THOUSANDS/ΜL (ref 0.6–4.47)
LYMPHOCYTES NFR BLD AUTO: 21 % (ref 14–44)
MCH RBC QN AUTO: 26.6 PG (ref 26.8–34.3)
MCHC RBC AUTO-ENTMCNC: 30.2 G/DL (ref 31.4–37.4)
MCV RBC AUTO: 88 FL (ref 82–98)
MONOCYTES # BLD AUTO: 0.6 THOUSAND/ΜL (ref 0.17–1.22)
MONOCYTES NFR BLD AUTO: 8 % (ref 4–12)
NEUTROPHILS # BLD AUTO: 5.02 THOUSANDS/ΜL (ref 1.85–7.62)
NEUTS SEG NFR BLD AUTO: 65 % (ref 43–75)
NRBC BLD AUTO-RTO: 0 /100 WBCS
PLATELET # BLD AUTO: 239 THOUSANDS/UL (ref 149–390)
PMV BLD AUTO: 10.3 FL (ref 8.9–12.7)
POTASSIUM SERPL-SCNC: 4.6 MMOL/L (ref 3.5–5.3)
PROT SERPL-MCNC: 7.4 G/DL (ref 6.4–8.2)
RBC # BLD AUTO: 4.55 MILLION/UL (ref 3.81–5.12)
SODIUM SERPL-SCNC: 140 MMOL/L (ref 136–145)
WBC # BLD AUTO: 7.7 THOUSAND/UL (ref 4.31–10.16)

## 2020-10-23 PROCEDURE — 87086 URINE CULTURE/COLONY COUNT: CPT

## 2020-10-23 PROCEDURE — 85025 COMPLETE CBC W/AUTO DIFF WBC: CPT

## 2020-10-23 PROCEDURE — 87186 SC STD MICRODIL/AGAR DIL: CPT

## 2020-10-23 PROCEDURE — 87077 CULTURE AEROBIC IDENTIFY: CPT

## 2020-10-23 PROCEDURE — NC001 PR NO CHARGE: Performed by: UROLOGY

## 2020-10-23 PROCEDURE — 36415 COLL VENOUS BLD VENIPUNCTURE: CPT

## 2020-10-23 PROCEDURE — 93005 ELECTROCARDIOGRAM TRACING: CPT

## 2020-10-23 PROCEDURE — 80053 COMPREHEN METABOLIC PANEL: CPT

## 2020-10-24 LAB
ATRIAL RATE: 40 BPM
QRS AXIS: -35 DEGREES
QRSD INTERVAL: 152 MS
QT INTERVAL: 412 MS
QTC INTERVAL: 463 MS
T WAVE AXIS: 86 DEGREES
VENTRICULAR RATE: 76 BPM

## 2020-10-24 PROCEDURE — 93010 ELECTROCARDIOGRAM REPORT: CPT | Performed by: INTERNAL MEDICINE

## 2020-10-25 LAB
BACTERIA UR CULT: ABNORMAL
BACTERIA UR CULT: ABNORMAL

## 2020-10-26 ENCOUNTER — ANESTHESIA EVENT (OUTPATIENT)
Dept: PERIOP | Facility: HOSPITAL | Age: 80
End: 2020-10-26
Payer: MEDICARE

## 2020-10-27 ENCOUNTER — HOSPITAL ENCOUNTER (OUTPATIENT)
Facility: HOSPITAL | Age: 80
Setting detail: OUTPATIENT SURGERY
Discharge: HOME | End: 2020-10-27
Attending: UROLOGY | Admitting: UROLOGY
Payer: MEDICARE

## 2020-10-27 ENCOUNTER — APPOINTMENT (OUTPATIENT)
Dept: RADIOLOGY | Facility: HOSPITAL | Age: 80
End: 2020-10-27
Payer: MEDICARE

## 2020-10-27 ENCOUNTER — ANESTHESIA (OUTPATIENT)
Dept: PERIOP | Facility: HOSPITAL | Age: 80
End: 2020-10-27
Payer: MEDICARE

## 2020-10-27 VITALS
SYSTOLIC BLOOD PRESSURE: 158 MMHG | TEMPERATURE: 98.1 F | DIASTOLIC BLOOD PRESSURE: 70 MMHG | HEART RATE: 68 BPM | WEIGHT: 193 LBS | RESPIRATION RATE: 18 BRPM | BODY MASS INDEX: 32.95 KG/M2 | OXYGEN SATURATION: 94 % | HEIGHT: 64 IN

## 2020-10-27 VITALS — HEART RATE: 81 BPM

## 2020-10-27 DIAGNOSIS — N13.30 HYDRONEPHROSIS, UNSPECIFIED HYDRONEPHROSIS TYPE: ICD-10-CM

## 2020-10-27 LAB — GLUCOSE SERPL-MCNC: 136 MG/DL (ref 65–140)

## 2020-10-27 PROCEDURE — 87077 CULTURE AEROBIC IDENTIFY: CPT | Performed by: UROLOGY

## 2020-10-27 PROCEDURE — C2617 STENT, NON-COR, TEM W/O DEL: HCPCS | Performed by: UROLOGY

## 2020-10-27 PROCEDURE — 74420 UROGRAPHY RTRGR +-KUB: CPT

## 2020-10-27 PROCEDURE — 87086 URINE CULTURE/COLONY COUNT: CPT | Performed by: UROLOGY

## 2020-10-27 PROCEDURE — 87186 SC STD MICRODIL/AGAR DIL: CPT | Performed by: UROLOGY

## 2020-10-27 PROCEDURE — 52351 CYSTOURETERO & OR PYELOSCOPE: CPT | Performed by: UROLOGY

## 2020-10-27 PROCEDURE — 87147 CULTURE TYPE IMMUNOLOGIC: CPT | Performed by: UROLOGY

## 2020-10-27 PROCEDURE — 52332 CYSTOSCOPY AND TREATMENT: CPT | Performed by: UROLOGY

## 2020-10-27 PROCEDURE — NC001 PR NO CHARGE: Performed by: UROLOGY

## 2020-10-27 PROCEDURE — 82948 REAGENT STRIP/BLOOD GLUCOSE: CPT

## 2020-10-27 PROCEDURE — C1769 GUIDE WIRE: HCPCS | Performed by: UROLOGY

## 2020-10-27 DEVICE — STENT CONTOUR INJ 7FR 30CM
Type: IMPLANTABLE DEVICE | Site: URETER | Status: NON-FUNCTIONAL
Removed: 2021-02-16

## 2020-10-27 RX ORDER — CEFAZOLIN SODIUM 2 G/50ML
2000 SOLUTION INTRAVENOUS ONCE
Status: COMPLETED | OUTPATIENT
Start: 2020-10-27 | End: 2020-10-27

## 2020-10-27 RX ORDER — OXYCODONE HYDROCHLORIDE AND ACETAMINOPHEN 5; 325 MG/1; MG/1
2 TABLET ORAL EVERY 4 HOURS PRN
Status: DISCONTINUED | OUTPATIENT
Start: 2020-10-27 | End: 2020-10-27 | Stop reason: HOSPADM

## 2020-10-27 RX ORDER — DEXAMETHASONE SODIUM PHOSPHATE 10 MG/ML
INJECTION, SOLUTION INTRAMUSCULAR; INTRAVENOUS AS NEEDED
Status: DISCONTINUED | OUTPATIENT
Start: 2020-10-27 | End: 2020-10-27

## 2020-10-27 RX ORDER — SODIUM CHLORIDE, SODIUM LACTATE, POTASSIUM CHLORIDE, CALCIUM CHLORIDE 600; 310; 30; 20 MG/100ML; MG/100ML; MG/100ML; MG/100ML
INJECTION, SOLUTION INTRAVENOUS CONTINUOUS PRN
Status: DISCONTINUED | OUTPATIENT
Start: 2020-10-27 | End: 2020-10-27

## 2020-10-27 RX ORDER — SODIUM CHLORIDE, SODIUM LACTATE, POTASSIUM CHLORIDE, CALCIUM CHLORIDE 600; 310; 30; 20 MG/100ML; MG/100ML; MG/100ML; MG/100ML
50 INJECTION, SOLUTION INTRAVENOUS CONTINUOUS
Status: DISCONTINUED | OUTPATIENT
Start: 2020-10-27 | End: 2020-10-27 | Stop reason: HOSPADM

## 2020-10-27 RX ORDER — HYDROMORPHONE HCL/PF 1 MG/ML
0.5 SYRINGE (ML) INJECTION
Status: DISCONTINUED | OUTPATIENT
Start: 2020-10-27 | End: 2020-10-27 | Stop reason: HOSPADM

## 2020-10-27 RX ORDER — LIDOCAINE HYDROCHLORIDE 10 MG/ML
INJECTION, SOLUTION EPIDURAL; INFILTRATION; INTRACAUDAL; PERINEURAL AS NEEDED
Status: DISCONTINUED | OUTPATIENT
Start: 2020-10-27 | End: 2020-10-27

## 2020-10-27 RX ORDER — CEFUROXIME AXETIL 250 MG/1
250 TABLET ORAL EVERY 12 HOURS SCHEDULED
Qty: 10 TABLET | Refills: 0 | Status: SHIPPED | OUTPATIENT
Start: 2020-10-27 | End: 2020-11-01

## 2020-10-27 RX ORDER — PROPOFOL 10 MG/ML
INJECTION, EMULSION INTRAVENOUS AS NEEDED
Status: DISCONTINUED | OUTPATIENT
Start: 2020-10-27 | End: 2020-10-27

## 2020-10-27 RX ORDER — FENTANYL CITRATE 50 UG/ML
INJECTION, SOLUTION INTRAMUSCULAR; INTRAVENOUS AS NEEDED
Status: DISCONTINUED | OUTPATIENT
Start: 2020-10-27 | End: 2020-10-27

## 2020-10-27 RX ORDER — ONDANSETRON 2 MG/ML
4 INJECTION INTRAMUSCULAR; INTRAVENOUS ONCE AS NEEDED
Status: DISCONTINUED | OUTPATIENT
Start: 2020-10-27 | End: 2020-10-27 | Stop reason: HOSPADM

## 2020-10-27 RX ORDER — OXYCODONE HYDROCHLORIDE AND ACETAMINOPHEN 5; 325 MG/1; MG/1
1 TABLET ORAL EVERY 4 HOURS PRN
Status: DISCONTINUED | OUTPATIENT
Start: 2020-10-27 | End: 2020-10-27 | Stop reason: HOSPADM

## 2020-10-27 RX ORDER — ONDANSETRON 2 MG/ML
INJECTION INTRAMUSCULAR; INTRAVENOUS AS NEEDED
Status: DISCONTINUED | OUTPATIENT
Start: 2020-10-27 | End: 2020-10-27

## 2020-10-27 RX ORDER — MAGNESIUM HYDROXIDE 1200 MG/15ML
LIQUID ORAL AS NEEDED
Status: DISCONTINUED | OUTPATIENT
Start: 2020-10-27 | End: 2020-10-27 | Stop reason: HOSPADM

## 2020-10-27 RX ORDER — SODIUM CHLORIDE 9 MG/ML
INJECTION, SOLUTION INTRAVENOUS AS NEEDED
Status: DISCONTINUED | OUTPATIENT
Start: 2020-10-27 | End: 2020-10-27 | Stop reason: HOSPADM

## 2020-10-27 RX ORDER — HYDROCODONE BITARTRATE AND ACETAMINOPHEN 5; 325 MG/1; MG/1
TABLET ORAL
Qty: 10 TABLET | Refills: 0 | Status: SHIPPED | OUTPATIENT
Start: 2020-10-27 | End: 2020-12-09 | Stop reason: ALTCHOICE

## 2020-10-27 RX ADMIN — PROPOFOL 100 MG: 10 INJECTION, EMULSION INTRAVENOUS at 07:44

## 2020-10-27 RX ADMIN — LIDOCAINE HYDROCHLORIDE 30 MG: 10 INJECTION, SOLUTION EPIDURAL; INFILTRATION; INTRACAUDAL; PERINEURAL at 07:44

## 2020-10-27 RX ADMIN — ONDANSETRON HYDROCHLORIDE 4 MG: 2 INJECTION, SOLUTION INTRAMUSCULAR; INTRAVENOUS at 07:47

## 2020-10-27 RX ADMIN — SODIUM CHLORIDE, SODIUM LACTATE, POTASSIUM CHLORIDE, AND CALCIUM CHLORIDE: .6; .31; .03; .02 INJECTION, SOLUTION INTRAVENOUS at 07:25

## 2020-10-27 RX ADMIN — FENTANYL CITRATE 50 MCG: 50 INJECTION INTRAMUSCULAR; INTRAVENOUS at 07:36

## 2020-10-27 RX ADMIN — DEXAMETHASONE SODIUM PHOSPHATE 4 MG: 10 INJECTION, SOLUTION INTRAMUSCULAR; INTRAVENOUS at 07:47

## 2020-10-27 RX ADMIN — FENTANYL CITRATE 50 MCG: 50 INJECTION INTRAMUSCULAR; INTRAVENOUS at 08:07

## 2020-10-27 RX ADMIN — CEFAZOLIN SODIUM 2000 MG: 2 SOLUTION INTRAVENOUS at 07:30

## 2020-10-30 LAB
BACTERIA UR CULT: ABNORMAL

## 2020-11-04 ENCOUNTER — TELEPHONE (OUTPATIENT)
Dept: VASCULAR SURGERY | Facility: CLINIC | Age: 80
End: 2020-11-04

## 2020-11-07 ENCOUNTER — NURSE TRIAGE (OUTPATIENT)
Dept: OTHER | Facility: OTHER | Age: 80
End: 2020-11-07

## 2020-11-07 DIAGNOSIS — N39.0 RECURRENT UTI: Primary | ICD-10-CM

## 2020-11-07 RX ORDER — NITROFURANTOIN 25; 75 MG/1; MG/1
100 CAPSULE ORAL 2 TIMES DAILY
Qty: 10 CAPSULE | Refills: 0 | Status: SHIPPED | OUTPATIENT
Start: 2020-11-07 | End: 2020-11-12

## 2020-11-23 ENCOUNTER — HOSPITAL ENCOUNTER (OUTPATIENT)
Dept: NON INVASIVE DIAGNOSTICS | Facility: CLINIC | Age: 80
Discharge: HOME | End: 2020-11-23
Payer: MEDICARE

## 2020-11-23 DIAGNOSIS — I65.23 BILATERAL CAROTID ARTERY STENOSIS: ICD-10-CM

## 2020-11-23 DIAGNOSIS — I63.239 CAROTID STENOSIS, SYMPTOMATIC, WITH INFARCTION (HCC): ICD-10-CM

## 2020-11-23 DIAGNOSIS — Z98.890 STATUS POST CAROTID ENDARTERECTOMY: ICD-10-CM

## 2020-11-23 DIAGNOSIS — I63.9 CEREBROVASCULAR ACCIDENT (CVA), UNSPECIFIED MECHANISM (HCC): ICD-10-CM

## 2020-11-23 PROCEDURE — 93880 EXTRACRANIAL BILAT STUDY: CPT | Performed by: SURGERY

## 2020-11-23 PROCEDURE — 93880 EXTRACRANIAL BILAT STUDY: CPT

## 2020-12-02 ENCOUNTER — TELEPHONE (OUTPATIENT)
Dept: UROLOGY | Facility: MEDICAL CENTER | Age: 80
End: 2020-12-02

## 2020-12-08 PROBLEM — I63.239 CAROTID STENOSIS, SYMPTOMATIC, WITH INFARCTION (HCC): Status: RESOLVED | Noted: 2019-10-08 | Resolved: 2020-12-08

## 2020-12-09 ENCOUNTER — OFFICE VISIT (OUTPATIENT)
Dept: VASCULAR SURGERY | Facility: CLINIC | Age: 80
End: 2020-12-09
Payer: MEDICARE

## 2020-12-09 VITALS
HEIGHT: 64 IN | WEIGHT: 198 LBS | TEMPERATURE: 97.8 F | SYSTOLIC BLOOD PRESSURE: 144 MMHG | RESPIRATION RATE: 18 BRPM | BODY MASS INDEX: 33.8 KG/M2 | DIASTOLIC BLOOD PRESSURE: 82 MMHG | HEART RATE: 70 BPM

## 2020-12-09 DIAGNOSIS — E78.5 DYSLIPIDEMIA: ICD-10-CM

## 2020-12-09 DIAGNOSIS — Z86.73 HISTORY OF CVA (CEREBROVASCULAR ACCIDENT): ICD-10-CM

## 2020-12-09 DIAGNOSIS — E11.9 DIABETES MELLITUS TYPE 2, DIET-CONTROLLED (HCC): ICD-10-CM

## 2020-12-09 DIAGNOSIS — I10 BENIGN ESSENTIAL HYPERTENSION: ICD-10-CM

## 2020-12-09 DIAGNOSIS — I65.23 BILATERAL CAROTID ARTERY STENOSIS: Primary | ICD-10-CM

## 2020-12-09 DIAGNOSIS — Z98.890 STATUS POST CAROTID ENDARTERECTOMY: ICD-10-CM

## 2020-12-09 PROCEDURE — 99214 OFFICE O/P EST MOD 30 MIN: CPT | Performed by: PHYSICIAN ASSISTANT

## 2020-12-29 DIAGNOSIS — R60.9 EDEMA, UNSPECIFIED TYPE: ICD-10-CM

## 2020-12-29 RX ORDER — FUROSEMIDE 20 MG/1
20 TABLET ORAL DAILY
Qty: 90 TABLET | Refills: 1 | Status: SHIPPED | OUTPATIENT
Start: 2020-12-29 | End: 2021-07-06

## 2021-01-13 ENCOUNTER — OFFICE VISIT (OUTPATIENT)
Dept: UROLOGY | Facility: MEDICAL CENTER | Age: 81
End: 2021-01-13
Payer: MEDICARE

## 2021-01-13 VITALS
BODY MASS INDEX: 33.8 KG/M2 | DIASTOLIC BLOOD PRESSURE: 80 MMHG | SYSTOLIC BLOOD PRESSURE: 150 MMHG | HEIGHT: 64 IN | WEIGHT: 198 LBS | HEART RATE: 73 BPM

## 2021-01-13 DIAGNOSIS — R82.71 BACTERIURIA: ICD-10-CM

## 2021-01-13 DIAGNOSIS — Q62.11 HYDRONEPHROSIS WITH URETEROPELVIC JUNCTION (UPJ) OBSTRUCTION: Primary | ICD-10-CM

## 2021-01-13 DIAGNOSIS — N20.0 CALCULUS OF KIDNEY: ICD-10-CM

## 2021-01-13 PROCEDURE — 99214 OFFICE O/P EST MOD 30 MIN: CPT | Performed by: UROLOGY

## 2021-01-13 RX ORDER — NITROFURANTOIN 25; 75 MG/1; MG/1
100 CAPSULE ORAL 2 TIMES DAILY
Qty: 14 CAPSULE | Refills: 0 | Status: SHIPPED | OUTPATIENT
Start: 2021-01-13 | End: 2021-01-30

## 2021-01-13 NOTE — PROGRESS NOTES
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, unresponsive to balloon dilation two different occasions  Also small stone in left lower collecting system  Has done quite well stent exchanges  Chronic bacteriuria, no symptoms  Also, no real symptoms from the stent  ASSESSMENT / PLAN:    Stent exchange  Pre treat with Macrobid    The following portions of the patient's history were reviewed and updated as appropriate: allergies, current medications, past family history, past medical history, past social history, past surgical history and problem list     Review of Systems   All other systems reviewed and are negative  Objective:     Physical Exam  Constitutional:       General: She is not in acute distress  Appearance: She is well-developed  She is not diaphoretic  HENT:      Head: Normocephalic and atraumatic  Eyes:      General: No scleral icterus  Pulmonary:      Effort: Pulmonary effort is normal    Skin:     Coloration: Skin is not pale  Neurological:      Mental Status: She is alert and oriented to person, place, and time  Psychiatric:         Behavior: Behavior normal          Thought Content:  Thought content normal          Judgment: Judgment normal            No results found for: PSA]  BUN   Date Value Ref Range Status   10/23/2020 21 5 - 25 mg/dL Final   10/29/2014 18 5 - 25 mg/dL Final     Creatinine   Date Value Ref Range Status   10/23/2020 1 06 0 60 - 1 30 mg/dL Final     Comment:     Standardized to IDMS reference method   10/29/2014 0 95 0 60 - 1 30 mg/dL Final     Comment:     Standardized to IDMS reference method     No components found for: CBC      Patient Active Problem List   Diagnosis    Benign essential hypertension    Benign familial tremor    History of CVA (cerebrovascular accident)    [facility] disease) [facility] [address] [facility]    Esophageal reflux    Gait disturbance    Dyslipidemia    Sleep disorder  Sciatica    Right shoulder pain    Osteoarthritis of hip    OA (osteoarthritis)    Multiple joint pain    Acquired hypothyroidism    Lumbar degenerative disc disease    Back pain    History of pancreatitis    Aortic valve stenosis - ( Mild -Moderate )    S/P deep brain stimulator placement    Microscopic hematuria    Dysarthria related to Nov 2018 CVA,improved    Swallowing dysfunction    Weakness of left leg    Hydronephrosis of left kidney    C[facility]brovascular accident (CVA) [facility] [address] [facility]    Nephrostomy tube Left    Coronary artery disease involving native coronary artery of native heart without angina pectoris    Diabetes mellitus type 2, diet-controlled ([facility] [address] )    Calculus of kidney    Status post RIGHT carotid endarterectomy    Bilateral carotid artery stenosis    Acute cystitis without hematuria        Diagnoses and all orders for this visit:    Hydronephrosis of left kidney    Calculus of kidney    Bacteriuria           Patient ID: Fahad Grier is a 78 y o  female  Current Outpatient Medications:     acetaminophen (TYLENOL) 500 mg tablet, Take 1,000 mg by mouth every 6 (six) hours as needed for mild pain , Disp: , Rfl:     albuterol (2 5 mg/3 mL) 0 083 % nebulizer solution, Take 1 vial (2 5 mg total) by nebulization every 6 (six) hours as needed for shortness of breath, Disp: 120 vial, Rfl: 5    aspirin (ECOTRIN LOW STRENGTH) 81 mg EC tablet, Take 1 tablet (81 mg total) by mouth daily, Disp: 30 tablet, Rfl: 0    Cholecalciferol (VITAMIN D-3 PO), Take 1,000 Units by mouth daily  , Disp: , Rfl:     clopidogrel (PLAVIX) 75 mg tablet, TAKE 1 TABLET (75 MG TOTAL) BY MOUTH DAILY HOLD FOR NEPHROSTOMY TUBE PLACEMENT, Disp: 30 tablet, Rfl: 5    diltiazem (CARDIZEM CD) 240 mg 24 hr capsule, Take 1 capsule (240 mg total) by mouth daily, Disp: 90 capsule, Rfl: 3    docusate sodium (COLACE) 100 mg capsule, Take 1 capsule (100 mg total) by mouth daily as needed for constipation for up to 7 days, Disp: 21 capsule, Rfl: 0    Fluticasone Propionate, Inhal, (FLOVENT DISKUS) 250 MCG/BLIST AEPB, Inhale 1 puff 2 (two) times a day (Patient taking differently: Inhale 1 puff as needed ), Disp: , Rfl:     furosemide (LASIX) 20 mg tablet, TAKE 1 TABLET (20 MG TOTAL) BY MOUTH DAILY, Disp: 90 tablet, Rfl: 1    levothyroxine 125 mcg tablet, Take 1 tablet (125 mcg total) by mouth daily, Disp: 90 tablet, Rfl: 3    Multiple Vitamins-Minerals (PRESERVISION AREDS PO), Take 1 Cap by Mouth daily, Disp: , Rfl:     multivitamin (THERAGRAN) TABS, Take 1 tablet by mouth daily, Disp: , Rfl:     omeprazole (PriLOSEC) 20 mg delayed release capsule, Take 1 capsule by mouth daily as needed , Disp: , Rfl:     rosuvastatin (CRESTOR) 5 mg tablet, Uses twice weekly, Disp: 40 tablet, Rfl: 3    Past Medical History:   Diagnosis Date    Arthritis     Asthma     At risk for falls     rib frac    Benign essential tremor 10/15/2018    Added automatically from request for surgery [id number] has stimulator    COPD [facility]chronic obstructive pulmonary disease) [facility] [address] [facility]     Diabetes mellitus (HCC)     borderline    Difficulty waking     post anesthesia    Edema     bles    GERD (gastroesophageal reflux disease)     High cholesterol     History of recent fall     broke several ribs    Hypertension     Hypothyroid     Kidney stone     Macular degeneration     Osteopenia     Osteoporosis     S/P deep brain stimulator placement     Shortness of breath     on exertion    Slurred speech     Stroke (HCC)     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     Urinary leakage     Uses roller walker     Vitamin D deficiency     Wears dentures     full upper    Wears glasses     reading       Past Surgical History:   Procedure Laterality Date    CATARACT EXTRACTION W/  INTRAOCULAR LENS IMPLANT Bilateral     COLONOSCOPY      DEEP BRAIN STIMULATOR PLACEMENT      FL RETROGRADE PYELOGRAM  11/7/2019    FL RETROGRADE PYELOGRAM  5/26/2020    IR NEPHROSTOMY TO NEPHROURETERAL STENT  1/10/2020    IR TUBE PLACEMENT  11/12/2019    KNEE ARTHROSCOPY      ME CYSTOURETHROSCOPY,URETER CATHETER Left 11/7/2019    Procedure: CYSTO RETROGRADE PYELOGRAM, URETEROSCOPY;  Surgeon: Aj Kumari MD;  [facility];  Service: Urology    ME IMP STIM,CRANIAL,SUBQ,1 ARRAY Left 1/22/2019    Procedure: REPLACEMENT IMPLANTABLE PULSE GENERATOR (IPG) DEEP BRAIN STIMULATION (DBS), LEFT;  Surgeon: Bernardo Cortes MD;  Location: [facility];  Service: Neurosurgery    ME IMP STIM,CRANIAL,SUBQ,>1 ARRAY Right 12/13/2018    Procedure: REPLACEMENT RIGHT DBS IMPLANTABLE PULSE GENERATOR;  Surgeon: Bernardo Cortes MD;  [facility];  Service: Neurosurgery    [address] Left 10/27/2020    Procedure: Marcus John, EXCHANGE STENT URETERA L, LEFT URETEROSCOPY;  Surgeon: Boznea Coates MD;  Location: [address] [place] [address] MAIN OR;  Service: Urology    ME RMVL & RPLCMT INTLY DWELLING URETERAL STENT PRQ Left 5/26/2020    Procedure: Donna Robbins, STENT;  Surgeon: Bozena Coates MD;  [facility];  Service: Urology    ME [facility] Right 2/18/2020    Procedure: CAROTID ENDARTERECTOMY WITH BOVINE PATCH;  Surgeon: Gabby Ball MD;  [facility];  Service: Vascular    ME TOTAL HIP ARTHROPLASTY Left 5/20/2016    Procedure: ARTHROPLASTY HIP TOTAL ANTERIOR;  Surgeon: Ravi Cisneros MD;  [facility];  Service: Orthopedics    ROTATOR CUFF REPAIR         Social History

## 2021-01-14 ENCOUNTER — TELEPHONE (OUTPATIENT)
Dept: UROLOGY | Facility: MEDICAL CENTER | Age: 81
End: 2021-01-14

## 2021-01-14 NOTE — TELEPHONE ENCOUNTER
I spoke to the patient and scheduled her Cysto, Left Stent Exchange with Dr Radha Cole for 2/16/2021 at [facility]       -instructions given verbally and mailed  -CBC, CMP, Urine C&S 7 days prior  -patient does not need to stop any of her usual medications  -MCR/AARP - no auth required

## 2021-01-29 ENCOUNTER — OFFICE VISIT (OUTPATIENT)
Dept: FAMILY MEDICINE CLINIC | Facility: CLINIC | Age: 81
End: 2021-01-29
Payer: MEDICARE

## 2021-01-29 ENCOUNTER — APPOINTMENT (OUTPATIENT)
Dept: LAB | Facility: CLINIC | Age: 81
End: 2021-01-29
Payer: MEDICARE

## 2021-01-29 VITALS
TEMPERATURE: 97.1 F | SYSTOLIC BLOOD PRESSURE: 130 MMHG | HEIGHT: 64 IN | HEART RATE: 77 BPM | DIASTOLIC BLOOD PRESSURE: 80 MMHG | BODY MASS INDEX: 33.97 KG/M2 | OXYGEN SATURATION: 94 % | WEIGHT: 199 LBS

## 2021-01-29 DIAGNOSIS — I35.0 NONRHEUMATIC AORTIC VALVE STENOSIS: ICD-10-CM

## 2021-01-29 DIAGNOSIS — R39.89 SUSPECTED UTI: ICD-10-CM

## 2021-01-29 DIAGNOSIS — Z01.812 PRE-OPERATIVE LABORATORY EXAMINATION: ICD-10-CM

## 2021-01-29 DIAGNOSIS — Q62.11 HYDRONEPHROSIS WITH URETEROPELVIC JUNCTION (UPJ) OBSTRUCTION: ICD-10-CM

## 2021-01-29 DIAGNOSIS — I63.9 CEREBROVASCULAR ACCIDENT (CVA), UNSPECIFIED MECHANISM (HCC): ICD-10-CM

## 2021-01-29 DIAGNOSIS — J41.1 MUCOPURULENT CHRONIC BRONCHITIS (HCC): Primary | ICD-10-CM

## 2021-01-29 DIAGNOSIS — E11.9 DIABETES MELLITUS TYPE 2, DIET-CONTROLLED (HCC): ICD-10-CM

## 2021-01-29 DIAGNOSIS — E03.9 ACQUIRED HYPOTHYROIDISM: ICD-10-CM

## 2021-01-29 DIAGNOSIS — I25.10 CORONARY ARTERY DISEASE INVOLVING NATIVE CORONARY ARTERY OF NATIVE HEART WITHOUT ANGINA PECTORIS: ICD-10-CM

## 2021-01-29 DIAGNOSIS — E78.5 DYSLIPIDEMIA: ICD-10-CM

## 2021-01-29 DIAGNOSIS — I10 BENIGN ESSENTIAL HYPERTENSION: ICD-10-CM

## 2021-01-29 LAB
CHOLEST SERPL-MCNC: 148 MG/DL (ref 50–200)
EST. AVERAGE GLUCOSE BLD GHB EST-MCNC: 137 MG/DL
HBA1C MFR BLD: 6.4 %
HDLC SERPL-MCNC: 46 MG/DL
LDLC SERPL CALC-MCNC: 82 MG/DL (ref 0–100)
NONHDLC SERPL-MCNC: 102 MG/DL
TRIGL SERPL-MCNC: 99 MG/DL
TSH SERPL DL<=0.05 MIU/L-ACNC: 3.46 UIU/ML (ref 0.36–3.74)

## 2021-01-29 PROCEDURE — 87086 URINE CULTURE/COLONY COUNT: CPT

## 2021-01-29 PROCEDURE — 84443 ASSAY THYROID STIM HORMONE: CPT | Performed by: FAMILY MEDICINE

## 2021-01-29 PROCEDURE — 80061 LIPID PANEL: CPT | Performed by: FAMILY MEDICINE

## 2021-01-29 PROCEDURE — 83036 HEMOGLOBIN GLYCOSYLATED A1C: CPT | Performed by: FAMILY MEDICINE

## 2021-01-29 PROCEDURE — 99214 OFFICE O/P EST MOD 30 MIN: CPT | Performed by: FAMILY MEDICINE

## 2021-01-29 PROCEDURE — 36415 COLL VENOUS BLD VENIPUNCTURE: CPT | Performed by: FAMILY MEDICINE

## 2021-01-29 RX ORDER — FLUTICASONE PROPIONATE 250 UG/1
1 POWDER, METERED RESPIRATORY (INHALATION) 2 TIMES DAILY
Qty: 1 EACH | Refills: 5 | Status: SHIPPED | OUTPATIENT
Start: 2021-01-29 | End: 2021-10-05

## 2021-01-29 NOTE — PATIENT INSTRUCTIONS
Blood pressure is acceptable here today, 130/80  She has noted some increased ankle edema past month or so, she is up a few lb verses September/October  Currently on furosemide 20 mg daily, I would like her to take extra 20 mg daily for 3 days  Creatinine back in October was 1 06 with potassium 4 6  She will call if she notes increased swelling  Stay on diltiazem 240 mg daily for blood pressure control  History CVA, speech is fluent here today, appears to be doing well, continue to control cardiovascular risk as can  Continues on aspirin 81 mg daily along with Plavix 75 mg daily  She did see vascular in December, history right CEA 1 year ago  Sees them yearly  Stable    Respiratory status appears to be at her baseline, she is using her albuterol nebulizer twice daily, occasionally 3 times  She has not been on Flovent, I would like her to restart fluticasone/Flovent disc 1 puff twice every day, rinse after use  Call if that is not covered by insurance  She will be undergoing stent exchange February 16th, she is medically stable to undergo procedure  She was just treated with Macrobid for 1 week by Urology, she is doing urine culture along with CBC, CMP today for them  I would like her to include A1c, TSH, fasting lipids with other blood work today  A1c back in October was 6 3  TSH 3 4  Continue to work at [employer], limit portions, limit carbohydrates  Continue on levothyroxine 125 mcg daily  She also continues on rosuvastatin 5 mg twice weekly  Doing well with tremor, history deep brain stimulator as before, sees Neurology yearly  Discussed covid vaccine  We will see her again in 4 months, call sooner if needed  Consider DEXA scan at follow-up, was brought up at visit but I did not order

## 2021-01-29 NOTE — PROGRESS NOTES
BMI Counseling: Body mass index is 34 16 kg/m²  The BMI is above normal  Nutrition recommendations include encouraging healthy choices of fruits and vegetables and moderation in carbohydrate intake  FAMILY PRACTICE OFFICE VISIT  [facility]  [address]  [address]  [place], [place], [address]      NAME: Preeti Perales  AGE: 78 y o  SEX: female  : 1940   MRN: [id number]    DATE: 2021  TIME: 5:34 PM    Assessment and Plan     Problem List Items Addressed This Visit        Endocrine    Acquired hypothyroidism    Relevant Orders    TSH, 3rd generation (Completed)    Diabetes mellitus type 2, diet-controlled ([facility] [address] )    Relevant Orders    Hemoglobin A1C (Completed)       Respiratory    COPD (chronic obstructive pulmonary disease) ([facility] - Primary    Relevant Medications    Fluticasone Propionate, Inhal, (Flovent Diskus) 250 MCG/BLIST AEPB       Cardiovascular and Mediastinum    Benign essential hypertension    Aortic valve stenosis - ( Mild -Moderate )    Cerebrovascular accident (CVA) ([facility] [address] )    Coronary artery disease involving native coronary artery of native heart without angina pectoris       Other    Dyslipidemia    Relevant Orders    Lipid panel (Completed)      Other Visit Diagnoses     BMI 34 0-34 9,adult              Patient Instructions   Blood pressure is acceptable here today, 130/80  She has noted some increased ankle edema past month or so, she is up a few lb verses September/October  Currently on furosemide 20 mg daily, I would like her to take extra 20 mg daily for 3 days  Creatinine back in October was 1 06 with potassium 4 6  She will call if she notes increased swelling  Stay on diltiazem 240 mg daily for blood pressure control  History CVA, speech is fluent here today, appears to be doing well, continue to control cardiovascular risk as can    Continues on aspirin 81 mg daily along with Plavix 75 mg daily   She did see vascular in December, history right CEA 1 year ago  Sees them yearly  Stable    Respiratory status appears to be at her baseline, she is using her albuterol nebulizer twice daily, occasionally 3 times  She has not been on Flovent, I would like her to restart fluticasone/Flovent disc 1 puff twice every day, rinse after use  Call if that is not covered by insurance  She will be undergoing stent exchange February 16th, she is medically stable to undergo procedure  She was just treated with Macrobid for 1 week by Urology, she is doing urine culture along with CBC, CMP today for them  I would like her to include A1c, TSH, fasting lipids with other blood work today  A1c back in October was 6 3  TSH 3 4  Continue to work at [employer], limit portions, limit carbohydrates  Continue on levothyroxine 125 mcg daily  She also continues on rosuvastatin 5 mg twice weekly  Doing well with tremor, history deep brain stimulator as before, sees Neurology yearly  Discussed covid vaccine  We will see her again in 4 months, call sooner if needed  Consider DEXA scan at follow-up, was brought up at visit but I did not order  Chief Complaint     Chief Complaint   Patient presents with    Pre-op Exam     Left stent exchange 2/16/21       History of Present Illness   Arthur Hammond is a 78y o -year-old female who   Is in for a routine follow-up, also needs clearance regarding upcoming stent exchange  Overall she is feeling fairly well, was treated for UTI recently by Urology  Has no chest pain, no increased shortness of breath, does use nebulizer twice daily, has not been using Flovent  Is using other medication as directed  Review of Systems   Review of Systems   Constitutional: Negative for appetite change, fatigue, fever and unexpected weight change (gained wt from Sept/ Oct)  HENT: Negative for sore throat and trouble swallowing (stable)      Respiratory: Positive for cough (chronic) and wheezing (baseline)  Negative for chest tightness and shortness of breath  Cardiovascular: Positive for leg swelling  Negative for chest pain and palpitations  Gastrointestinal: Negative for abdominal pain, blood in stool, nausea and vomiting  No acid reflux     No change in bowel   Genitourinary: Negative for dysuria and hematuria  Sees Urol   Neurological: Negative for dizziness, syncope, light-headedness and headaches  Hematological: Bruises/bleeds easily (no bleeding)  Psychiatric/Behavioral: Negative for behavioral problems and confusion         Active Problem List     Patient Active Problem List   Diagnosis    Benign essential hypertension    Benign familial tremor    History of CVA (cerebrovascular accident)    COPD (chronic obstructive pulmonary disease) ([address]    Esophageal reflux    Gait disturbance    Dyslipidemia    Sleep disorder    Sciatica    Right shoulder pain    Osteoarthritis of hip    OA (osteoarthritis)    Multiple joint pain    Acquired hypothyroidism    Lumbar degenerative disc disease    Back pain    History of pancreatitis    Aortic valve stenosis - ( Mild -Moderate )    S/P deep brain stimulator placement    Microscopic hematuria    Dysarthria related to Nov 2018 CVA,improved    Swallowing dysfunction    Weakness of left leg    Hydronephrosis of left kidney    Cerebrovascular accident (CVA) ([address]    Nephrostomy tube Left    Coronary artery disease involving native coronary artery of native heart without angina pectoris    Diabetes mellitus type 2, diet-controlled ([address] )    Calculus of kidney    Status post RIGHT carotid endarterectomy    Bilateral carotid artery stenosis    Acute cystitis without hematuria       Past Medical History:  Reviewed    Past Surgical History:  Reviewed    Family History:  Reviewed    Social History:  Reviewed    Objective     Vitals:    01/29/21 0921   BP: 130/80   BP Location: Left arm Patient Position: Sitting   Cuff Size: Standard   Pulse: 77   Temp: (!) 97 1 °F (36 2 °C)   SpO2: 94%   Weight: 90 3 kg (199 lb)   Height: 5' 4" (1 626 m)     Body mass index is 34 16 kg/m²  BP Readings from Last 3 Encounters:   01/29/21 130/80   01/13/21 150/80   12/09/20 144/82       Wt Readings from Last 3 Encounters:   01/29/21 90 3 kg (199 lb)   01/13/21 89 8 kg (198 lb)   12/09/20 89 8 kg (198 lb)       Physical Exam  Constitutional:       General: She is not in acute distress  Appearance: She is well-developed  She is obese  She is not ill-appearing  Comments: Speech is fluent here today, history dysphagia/ dysarthria after stroke much improved   Eyes:      General: No scleral icterus  Cardiovascular:      Rate and Rhythm: Normal rate and regular rhythm  Heart sounds: Murmur (2/6 systolic ejection murmur) present  Pulmonary:      Effort: Pulmonary effort is normal  No respiratory distress  Breath sounds: Rhonchi (mild b/l ( didn't use neb yet today)) present  No rales  Abdominal:      Palpations: Abdomen is soft  Tenderness: There is no abdominal tenderness  Musculoskeletal:      Right lower leg: Edema (sl pitting ankle) present  Left lower leg: Edema (sl pitting ankle) present  Lymphadenopathy:      Cervical: No cervical adenopathy  Skin:     Coloration: Skin is not jaundiced  Neurological:      Mental Status: She is alert and oriented to person, place, and time     Psychiatric:         Mood and Affect: Mood normal          Behavior: Behavior normal          ALLERGIES:  Allergies   Allergen Reactions    Ciprofloxacin Diarrhea    Simvastatin Myalgia    Advair Diskus [Fluticasone-Salmeterol] Palpitations     Ok w flovent    Tetracycline Rash     Reaction Date: [id number];        Current Medications     Current Outpatient Medications   Medication Sig Dispense Refill    albuterol (2 5 mg/3 mL) 0 083 % nebulizer solution Take 1 vial (2 5 mg total) by nebulization every 6 (six) hours as needed for shortness of breath 120 vial 5    aspirin (ECOTRIN LOW STRENGTH) 81 mg EC tablet Take 1 tablet (81 mg total) by mouth daily 30 tablet 0    Cholecalciferol (VITAMIN D-3 PO) Take 1,000 Units by mouth daily   clopidogrel (PLAVIX) 75 mg tablet TAKE 1 TABLET (75 MG TOTAL) BY MOUTH DAILY HOLD FOR NEPHROSTOMY TUBE PLACEMENT 30 tablet 5    diltiazem (CARDIZEM CD) 240 mg 24 hr capsule Take 1 capsule (240 mg total) by mouth daily 90 capsule 3    Fluticasone Propionate, Inhal, (Flovent Diskus) 250 MCG/BLIST AEPB Inhale 1 puff 2 (two) times a day 1 each 5    furosemide (LASIX) 20 mg tablet TAKE 1 TABLET (20 MG TOTAL) BY MOUTH DAILY 90 tablet 1    levothyroxine 125 mcg tablet Take 1 tablet (125 mcg total) by mouth daily 90 tablet 3    Multiple Vitamins-Minerals (PRESERVISION AREDS PO) Take 1 Cap by Mouth daily      multivitamin (THERAGRAN) TABS Take 1 tablet by mouth daily      omeprazole (PriLOSEC) 20 mg delayed release capsule Take 1 capsule by mouth daily as needed       rosuvastatin (CRESTOR) 5 mg tablet Uses twice weekly 40 tablet 3    acetaminophen (TYLENOL) 500 mg tablet Take 1,000 mg by mouth every 6 (six) hours as needed for mild pain       docusate sodium (COLACE) 100 mg capsule Take 1 capsule (100 mg total) by mouth daily as needed for constipation for up to 7 days 21 capsule 0    nitrofurantoin (MACROBID) 100 mg capsule Take 1 capsule (100 mg total) by mouth 2 (two) times a day Start three days before procedure 14 capsule 0     No current facility-administered medications for this visit               Orders Placed This Encounter   Procedures    TSH, 3rd generation    Lipid panel    Hemoglobin A1C         Scarlett Condon DO

## 2021-01-30 LAB — BACTERIA UR CULT: NORMAL

## 2021-02-08 ENCOUNTER — APPOINTMENT (OUTPATIENT)
Dept: LAB | Facility: CLINIC | Age: 81
End: 2021-02-08
Payer: MEDICARE

## 2021-02-08 LAB
ALBUMIN SERPL BCP-MCNC: 3.7 G/DL (ref 3.5–5)
ALP SERPL-CCNC: 86 U/L (ref 46–116)
ALT SERPL W P-5'-P-CCNC: 23 U/L (ref 12–78)
ANION GAP SERPL CALCULATED.3IONS-SCNC: 1 MMOL/L (ref 4–13)
AST SERPL W P-5'-P-CCNC: 19 U/L (ref 5–45)
BASOPHILS # BLD AUTO: 0.06 THOUSANDS/ΜL (ref 0–0.1)
BASOPHILS NFR BLD AUTO: 1 % (ref 0–1)
BILIRUB SERPL-MCNC: 0.27 MG/DL (ref 0.2–1)
BUN SERPL-MCNC: 24 MG/DL (ref 5–25)
CALCIUM SERPL-MCNC: 9 MG/DL (ref 8.3–10.1)
CHLORIDE SERPL-SCNC: 110 MMOL/L (ref 100–108)
CO2 SERPL-SCNC: 34 MMOL/L (ref 21–32)
CREAT SERPL-MCNC: 0.91 MG/DL (ref 0.6–1.3)
EOSINOPHIL # BLD AUTO: 0.35 THOUSAND/ΜL (ref 0–0.61)
EOSINOPHIL NFR BLD AUTO: 4 % (ref 0–6)
ERYTHROCYTE [DISTWIDTH] IN BLOOD BY AUTOMATED COUNT: 14.7 % (ref 11.6–15.1)
GFR SERPL CREATININE-BSD FRML MDRD: 59 ML/MIN/1.73SQ M
GLUCOSE P FAST SERPL-MCNC: 118 MG/DL (ref 65–99)
HCT VFR BLD AUTO: 43.1 % (ref 34.8–46.1)
HGB BLD-MCNC: 12.6 G/DL (ref 11.5–15.4)
IMM GRANULOCYTES # BLD AUTO: 0.03 THOUSAND/UL (ref 0–0.2)
IMM GRANULOCYTES NFR BLD AUTO: 0 % (ref 0–2)
LYMPHOCYTES # BLD AUTO: 2.09 THOUSANDS/ΜL (ref 0.6–4.47)
LYMPHOCYTES NFR BLD AUTO: 25 % (ref 14–44)
MCH RBC QN AUTO: 26.1 PG (ref 26.8–34.3)
MCHC RBC AUTO-ENTMCNC: 29.2 G/DL (ref 31.4–37.4)
MCV RBC AUTO: 89 FL (ref 82–98)
MONOCYTES # BLD AUTO: 0.56 THOUSAND/ΜL (ref 0.17–1.22)
MONOCYTES NFR BLD AUTO: 7 % (ref 4–12)
NEUTROPHILS # BLD AUTO: 5.3 THOUSANDS/ΜL (ref 1.85–7.62)
NEUTS SEG NFR BLD AUTO: 63 % (ref 43–75)
NRBC BLD AUTO-RTO: 0 /100 WBCS
PLATELET # BLD AUTO: 250 THOUSANDS/UL (ref 149–390)
PMV BLD AUTO: 10 FL (ref 8.9–12.7)
POTASSIUM SERPL-SCNC: 5.1 MMOL/L (ref 3.5–5.3)
PROT SERPL-MCNC: 7.5 G/DL (ref 6.4–8.2)
RBC # BLD AUTO: 4.82 MILLION/UL (ref 3.81–5.12)
SODIUM SERPL-SCNC: 145 MMOL/L (ref 136–145)
WBC # BLD AUTO: 8.39 THOUSAND/UL (ref 4.31–10.16)

## 2021-02-08 PROCEDURE — 85025 COMPLETE CBC W/AUTO DIFF WBC: CPT

## 2021-02-08 PROCEDURE — 36415 COLL VENOUS BLD VENIPUNCTURE: CPT

## 2021-02-08 PROCEDURE — 80053 COMPREHEN METABOLIC PANEL: CPT

## 2021-02-09 DIAGNOSIS — I63.9 CEREBROVASCULAR ACCIDENT (CVA), UNSPECIFIED MECHANISM (HCC): ICD-10-CM

## 2021-02-09 DIAGNOSIS — I10 ESSENTIAL HYPERTENSION: ICD-10-CM

## 2021-02-09 DIAGNOSIS — E03.9 ACQUIRED HYPOTHYROIDISM: ICD-10-CM

## 2021-02-09 RX ORDER — LEVOTHYROXINE SODIUM 0.12 MG/1
125 TABLET ORAL DAILY
Qty: 90 TABLET | Refills: 3 | Status: SHIPPED | OUTPATIENT
Start: 2021-02-09 | End: 2022-02-16

## 2021-02-09 RX ORDER — DILTIAZEM HYDROCHLORIDE 240 MG/1
240 CAPSULE, COATED, EXTENDED RELEASE ORAL DAILY
Qty: 90 CAPSULE | Refills: 3 | Status: SHIPPED | OUTPATIENT
Start: 2021-02-09 | End: 2022-02-15 | Stop reason: SDUPTHER

## 2021-02-09 RX ORDER — CLOPIDOGREL BISULFATE 75 MG/1
75 TABLET ORAL DAILY
Qty: 90 TABLET | Refills: 3 | Status: ON HOLD | OUTPATIENT
Start: 2021-02-09 | End: 2021-09-08 | Stop reason: SDUPTHER

## 2021-02-09 NOTE — TELEPHONE ENCOUNTER
Pt called in to the office stating she has been waiting since Sunday for her medication and that [facility] has sent us several faxes to have her meds refilled  Pt stated she is out of her meds since Sunday and needs them  I did let the pt know that we are waitng for the provider to sing off on the medication  Thank you!

## 2021-02-09 NOTE — TELEPHONE ENCOUNTER
I sent in 90 days of her prescriptions to local pharmacy, I have not received any requests otherwise to refill medications, I refill medication within 24 hours of receiving request

## 2021-02-12 ENCOUNTER — ANESTHESIA EVENT (OUTPATIENT)
Dept: PERIOP | Facility: HOSPITAL | Age: 81
End: 2021-02-12
Payer: MEDICARE

## 2021-02-12 DIAGNOSIS — Z23 ENCOUNTER FOR IMMUNIZATION: ICD-10-CM

## 2021-02-12 NOTE — PRE-PROCEDURE INSTRUCTIONS
Pre-Surgery Instructions:   Medication Instructions    acetaminophen (TYLENOL) 500 mg tablet Instructed patient per Anesthesia Guidelines   albuterol (2 5 mg/3 mL) 0 083 % nebulizer solution Instructed patient per Anesthesia Guidelines   aspirin (ECOTRIN LOW STRENGTH) 81 mg EC tablet Instructed patient per Anesthesia Guidelines   Cholecalciferol (VITAMIN D-3 PO) Instructed patient per Anesthesia Guidelines   clopidogrel (PLAVIX) 75 mg tablet Instructed patient per Anesthesia Guidelines   diltiazem (CARDIZEM CD) 240 mg 24 hr capsule Instructed patient per Anesthesia Guidelines   docusate sodium (COLACE) 100 mg capsule Instructed patient per Anesthesia Guidelines   Fluticasone Propionate, Inhal, (Flovent Diskus) 250 MCG/BLIST AEPB Instructed patient per Anesthesia Guidelines   furosemide (LASIX) 20 mg tablet Instructed patient per Anesthesia Guidelines   levothyroxine 125 mcg tablet Instructed patient per Anesthesia Guidelines   Multiple Vitamins-Minerals (PRESERVISION AREDS PO) Instructed patient per Anesthesia Guidelines   multivitamin (THERAGRAN) TABS Instructed patient per Anesthesia Guidelines   nitrofurantoin (MACROBID) 100 mg capsule Instructed patient per Anesthesia Guidelines   omeprazole (PriLOSEC) 20 mg delayed release capsule Instructed patient per Anesthesia Guidelines   rosuvastatin (CRESTOR) 5 mg tablet Instructed patient per Anesthesia Guidelines  Instructed to take Diltiazem, Levothyroxine, and Crestor with sip of water andtto use Advair and Flovent Diskus the morning of surgery  Patient reports does not have to stop her Aspirin or Plavix for her stent exchange per surgeon's order

## 2021-02-15 PROCEDURE — NC001 PR NO CHARGE: Performed by: UROLOGY

## 2021-02-15 NOTE — H&P
HISTORY AND PHYSICAL  ? ? Patient Name: Zofia Givens  Patient MRN: [id number]  Attending Provider: Roberta Hernandez MD  Service: Urology  Chief Complaint    Left hydronephrosis    HPI   Zofia Givens is a 78 y o  female with chronic left hydronephrosis, from UPJ obstruction  Also has a small stone in left lower pole kidney, but that is not the cause of the obstruction  We are maintaining a stent long-term for her  I plan cysto, left stent exchange  Potential risks and complications discussed, and informed consent was given by the patient  Medications  Meds/Allergies   No current facility-administered medications for this encounter  Cannot display prior to admission medications because the patient has not been admitted in this contact  No current facility-administered medications for this encounter  Current Outpatient Medications:     acetaminophen (TYLENOL) 500 mg tablet, Take 1,000 mg by mouth every 6 (six) hours as needed for mild pain , Disp: , Rfl:     albuterol (2 5 mg/3 mL) 0 083 % nebulizer solution, Take 1 vial (2 5 mg total) by nebulization every 6 (six) hours as needed for shortness of breath, Disp: 120 vial, Rfl: 5    aspirin (ECOTRIN LOW STRENGTH) 81 mg EC tablet, Take 1 tablet (81 mg total) by mouth daily, Disp: 30 tablet, Rfl: 0    Cholecalciferol (VITAMIN D-3 PO), Take 1,000 Units by mouth daily  , Disp: , Rfl:     clopidogrel (PLAVIX) 75 mg tablet, Take 1 tablet (75 mg total) by mouth daily, Disp: 90 tablet, Rfl: 3    diltiazem (CARDIZEM CD) 240 mg 24 hr capsule, TAKE 1 CAPSULE (240 MG TOTAL) BY MOUTH DAILY, Disp: 90 capsule, Rfl: 3    docusate sodium (COLACE) 100 mg capsule, Take 1 capsule (100 mg total) by mouth daily as needed for constipation for up to 7 days, Disp: 21 capsule, Rfl: 0    Fluticasone Propionate, Inhal, (Flovent Diskus) 250 MCG/BLIST AEPB, Inhale 1 puff 2 (two) times a day, Disp: 1 each, Rfl: 5    furosemide (LASIX) 20 mg tablet, TAKE 1 TABLET (20 MG TOTAL) BY MOUTH DAILY, Disp: 90 tablet, Rfl: 1    levothyroxine 125 mcg tablet, TAKE 1 TABLET (125 MCG TOTAL) BY MOUTH DAILY, Disp: 90 tablet, Rfl: 3    Multiple Vitamins-Minerals (PRESERVISION AREDS PO), Take 1 Cap by Mouth daily, Disp: , Rfl:     multivitamin (THERAGRAN) TABS, Take 1 tablet by mouth daily, Disp: , Rfl:     nitrofurantoin (MACROBID) 100 mg capsule, Take 1 capsule (100 mg total) by mouth 2 (two) times a day Start three days before procedure, Disp: 14 capsule, Rfl: 0    omeprazole (PriLOSEC) 20 mg delayed release capsule, Take 1 capsule by mouth daily as needed , Disp: , Rfl:     rosuvastatin (CRESTOR) 5 mg tablet, Uses twice weekly (Patient taking differently: Take by mouth Uses twice weekly), Disp: 40 tablet, Rfl: 3  Review of Systems  10 point review of systems negative except as noted in HPI  Allergies  Allergies   Allergen Reactions    Ciprofloxacin Diarrhea    Simvastatin Myalgia    Advair Diskus [Fluticasone-Salmeterol] Palpitations     Ok w flovent    Tetracycline Rash     Reaction Date: [id number];      PMH  Past Medical History:   Diagnosis Date    Arthritis     Asthma     At risk for falls     rib frac    Benign essential tremor 10/15/2018    Added automatically from request for surgery [id number] has stimulator    COPD (chronic obstructive pulmonary disease) (HCC)     Coronary artery disease     Diabetes mellitus (HCC)     borderline    Difficulty waking     post anesthesia    Edema     bles    GERD (gastroesophageal reflux disease)     High cholesterol     History of recent fall     broke several ribs    Hypertension     Hypothyroid     Kidney stone     Macular degeneration     Osteopenia     Osteoporosis     S/P deep brain stimulator placement     Shortness of breath     on exertion    Slurred speech     Stroke (HCC)     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     Urinary leakage     Uses roller walker     Vitamin D deficiency     Wears dentures     full upper    Wears glasses     reading     Past surgical history  Past Surgical History:   Procedure Laterality Date    CATARACT EXTRACTION W/  INTRAOCULAR LENS IMPLANT Bilateral     COLONOSCOPY      DEEP BRAIN STIMULATOR PLACEMENT      FL RETROGRADE PYELOGRAM  11/7/2019    FL RETROGRADE PYELOGRAM  5/26/2020    IR NEPHROSTOMY TO NEPHROURETERAL STENT  1/10/2020    IR TUBE PLACEMENT  11/12/2019    KNEE ARTHROSCOPY      NE CYSTOURETHROSCOPY,URETER CATHETER Left 11/7/2019    Procedure: CYSTO RETROGRADE PYELOGRAM, URETEROSCOPY;  Surgeon: Gertrudis Rivera MD;  [facility];  Service: Urology    NE IMP STIM,CRANIAL,SUBQ,1 ARRAY Left 1/22/2019    Procedure: REPLACEMENT IMPLANTABLE PULSE GENERATOR (IPG) DEEP BRAIN STIMULATION (DBS), LEFT;  Surgeon: Jessica Garcia MD;  [facility];  Service: Neurosurgery    NE IMP STIM,CRANIAL,SUBQ,>1 ARRAY Right 12/13/2018    Procedure: REPLACEMENT RIGHT DBS IMPLANTABLE PULSE GENERATOR;  Surgeon: Jessica Garcia MD;  [facility];  Service: Neurosurgery    [address] Left 10/27/2020    Procedure: CYSTOSCOPY, RETROGRADE PYELOGRAM, EXCHANGE STENT URETERA L, LEFT URETEROSCOPY;  Surgeon: Cosme Membreno MD;  Location: [facility] MAIN OR;  Service: Urology    NE RMVL & RPLCMT INTLY DWELLING URETERAL STENT PRQ Left 5/26/2020    Procedure: Ayesha Haas, STENT;  Surgeon: Cosme Membreno MD;  [facility];  Service: Urology    NE Lorel Police Right 2/18/2020    Procedure: CAROTID ENDARTERECTOMY WITH BOVINE PATCH;  Surgeon: Kristopher Patrick MD;  [facility];  Service: Vascular    NE TOTAL HIP ARTHROPLASTY Left 5/20/2016    Procedure: ARTHROPLASTY HIP TOTAL ANTERIOR;  Surgeon: Chrissy Haskins MD;  [facility];  Service: Orthopedics    ROTATOR CUFF REPAIR       Social history  Social History     Tobacco Use    Smoking status: Former Smoker     Types: Cigarettes Quit date:      Years since quittin 1    Smokeless tobacco: Never Used   Substance Use Topics    Alcohol use: Never     Frequency: Never    Drug use: No     ?  Physical Exam     vs  General appearance: alert and oriented, in no acute distress  Head: Normocephalic, without obvious abnormality, atraumatic  Eyes: conjunctivae/corneas clear  PERRL, EOM's intact  Fundi benign    Throat: lips, mucosa, and tongue normal; teeth and gums normal  Neck: no adenopathy, no carotid bruit, no JVD, supple, symmetrical, trachea midline and thyroid not enlarged, symmetric, no tenderness/mass/nodules  Lungs: clear to auscultation bilaterally  Heart: regular rate and rhythm, S1, S2 normal, no murmur, click, rub or gallop  Abdomen: soft, non-tender; bowel sounds normal; no masses,  no organomegaly  Lymph nodes: Cervical, supraclavicular, and axillary nodes normal   Neurologic: Grossly normal  Fabrice Saenz MD

## 2021-02-16 ENCOUNTER — ANESTHESIA (OUTPATIENT)
Dept: PERIOP | Facility: HOSPITAL | Age: 81
End: 2021-02-16
Payer: MEDICARE

## 2021-02-16 ENCOUNTER — APPOINTMENT (OUTPATIENT)
Dept: RADIOLOGY | Facility: HOSPITAL | Age: 81
End: 2021-02-16
Payer: MEDICARE

## 2021-02-16 ENCOUNTER — HOSPITAL ENCOUNTER (OUTPATIENT)
Facility: HOSPITAL | Age: 81
Setting detail: OUTPATIENT SURGERY
Discharge: HOME | End: 2021-02-16
Attending: UROLOGY | Admitting: UROLOGY
Payer: MEDICARE

## 2021-02-16 VITALS — HEART RATE: 68 BPM

## 2021-02-16 VITALS
SYSTOLIC BLOOD PRESSURE: 170 MMHG | BODY MASS INDEX: 33.97 KG/M2 | RESPIRATION RATE: 18 BRPM | DIASTOLIC BLOOD PRESSURE: 81 MMHG | HEART RATE: 80 BPM | OXYGEN SATURATION: 96 % | HEIGHT: 64 IN | TEMPERATURE: 98.4 F | WEIGHT: 199 LBS

## 2021-02-16 DIAGNOSIS — N13.30 HYDRONEPHROSIS OF LEFT KIDNEY: ICD-10-CM

## 2021-02-16 LAB
GLUCOSE SERPL-MCNC: 103 MG/DL (ref 65–140)
GLUCOSE SERPL-MCNC: 95 MG/DL (ref 65–140)

## 2021-02-16 PROCEDURE — C1769 GUIDE WIRE: HCPCS | Performed by: UROLOGY

## 2021-02-16 PROCEDURE — 99024 POSTOP FOLLOW-UP VISIT: CPT | Performed by: UROLOGY

## 2021-02-16 PROCEDURE — 87077 CULTURE AEROBIC IDENTIFY: CPT | Performed by: UROLOGY

## 2021-02-16 PROCEDURE — 87086 URINE CULTURE/COLONY COUNT: CPT | Performed by: UROLOGY

## 2021-02-16 PROCEDURE — C2617 STENT, NON-COR, TEM W/O DEL: HCPCS | Performed by: UROLOGY

## 2021-02-16 PROCEDURE — 74420 UROGRAPHY RTRGR +-KUB: CPT

## 2021-02-16 PROCEDURE — 82948 REAGENT STRIP/BLOOD GLUCOSE: CPT

## 2021-02-16 PROCEDURE — 52332 CYSTOSCOPY AND TREATMENT: CPT | Performed by: UROLOGY

## 2021-02-16 DEVICE — STENT CONTOUR INJ 7FR 30CM
Type: IMPLANTABLE DEVICE | Site: URETER | Status: NON-FUNCTIONAL
Removed: 2021-08-10

## 2021-02-16 RX ORDER — ONDANSETRON 2 MG/ML
4 INJECTION INTRAMUSCULAR; INTRAVENOUS EVERY 6 HOURS PRN
Status: DISCONTINUED | OUTPATIENT
Start: 2021-02-16 | End: 2021-02-16 | Stop reason: HOSPADM

## 2021-02-16 RX ORDER — LEVOFLOXACIN 5 MG/ML
750 INJECTION, SOLUTION INTRAVENOUS ONCE
Status: COMPLETED | OUTPATIENT
Start: 2021-02-16 | End: 2021-02-16

## 2021-02-16 RX ORDER — HYDROCODONE BITARTRATE AND ACETAMINOPHEN 5; 325 MG/1; MG/1
TABLET ORAL
Qty: 10 TABLET | Refills: 0 | Status: SHIPPED | OUTPATIENT
Start: 2021-02-16 | End: 2021-02-23 | Stop reason: ALTCHOICE

## 2021-02-16 RX ORDER — OXYCODONE HYDROCHLORIDE AND ACETAMINOPHEN 5; 325 MG/1; MG/1
2 TABLET ORAL EVERY 4 HOURS PRN
Status: DISCONTINUED | OUTPATIENT
Start: 2021-02-16 | End: 2021-02-16 | Stop reason: HOSPADM

## 2021-02-16 RX ORDER — PROPOFOL 10 MG/ML
INJECTION, EMULSION INTRAVENOUS AS NEEDED
Status: DISCONTINUED | OUTPATIENT
Start: 2021-02-16 | End: 2021-02-16

## 2021-02-16 RX ORDER — SODIUM CHLORIDE 9 MG/ML
125 INJECTION, SOLUTION INTRAVENOUS CONTINUOUS
Status: DISCONTINUED | OUTPATIENT
Start: 2021-02-16 | End: 2021-02-16 | Stop reason: HOSPADM

## 2021-02-16 RX ORDER — OXYCODONE HYDROCHLORIDE AND ACETAMINOPHEN 5; 325 MG/1; MG/1
1 TABLET ORAL EVERY 4 HOURS PRN
Status: DISCONTINUED | OUTPATIENT
Start: 2021-02-16 | End: 2021-02-16 | Stop reason: HOSPADM

## 2021-02-16 RX ORDER — FENTANYL CITRATE 50 UG/ML
50 INJECTION, SOLUTION INTRAMUSCULAR; INTRAVENOUS
Status: DISCONTINUED | OUTPATIENT
Start: 2021-02-16 | End: 2021-02-16 | Stop reason: HOSPADM

## 2021-02-16 RX ORDER — ONDANSETRON 2 MG/ML
INJECTION INTRAMUSCULAR; INTRAVENOUS AS NEEDED
Status: DISCONTINUED | OUTPATIENT
Start: 2021-02-16 | End: 2021-02-16

## 2021-02-16 RX ADMIN — SODIUM CHLORIDE 125 ML/HR: 0.9 INJECTION, SOLUTION INTRAVENOUS at 16:06

## 2021-02-16 RX ADMIN — PROPOFOL 120 MG: 10 INJECTION, EMULSION INTRAVENOUS at 15:30

## 2021-02-16 RX ADMIN — LIDOCAINE HYDROCHLORIDE 100 MG: 20 INJECTION, SOLUTION INTRAVENOUS at 15:30

## 2021-02-16 RX ADMIN — LEVOFLOXACIN 750 MG: 5 INJECTION, SOLUTION INTRAVENOUS at 14:56

## 2021-02-16 RX ADMIN — SODIUM CHLORIDE 125 ML/HR: 0.9 INJECTION, SOLUTION INTRAVENOUS at 13:25

## 2021-02-16 RX ADMIN — ONDANSETRON 4 MG: 2 INJECTION INTRAMUSCULAR; INTRAVENOUS at 15:30

## 2021-02-16 NOTE — ANESTHESIA PREPROCEDURE EVALUATION
Review of Systems/Medical History  Patient summary reviewed  Chart reviewed  No history of anesthetic complications     Cardiovascular  Hyperlipidemia, Hypertension controlled, Valvular heart disease , aortic valve stenosis, CAD ,   Comment: SUMMARY:  1  Sinus rhythm  2  Good technical quality  3  Mild calcific aortic stenosis with trivial aortic regurgitation  4  Minimal aortic root enlargement  5  Mild mitral regurgitation with severe mitral annulus calcification  No evidence of mitral stenosis  6  Mild left atrial enlargement  7  Mild left ventricular systolic dysfunction, EF 35%  8  Mild left ventricular cavity enlargement  9  Grade 1 left ventricular diastolic dysfunction with mildly elevated left ventricular filling pressures  ,  Pulmonary  COPD moderate- medication dependent , Asthma , Shortness of breath,        GI/Hepatic    GERD well controlled,        Kidney stones, Kidney disease ,        Endo/Other  Diabetes type 2 , History of thyroid disease , hypothyroidism,      GYN       Hematology   Musculoskeletal  Back pain , lumbar pain, Sciatica,   Arthritis     Neurology    CVA (2018) , residual symptoms,   Comment: Benign tremor  DBS implantation Psychology   Negative psychology ROS              Physical Exam    Airway    Mallampati score: II  TM Distance: >3 FB  Neck ROM: full     Dental   upper dentures and lower dentures,     Cardiovascular  Rhythm: regular, Rate: normal, Murmur,     Pulmonary  Decreased breath sounds,     Other Findings        Anesthesia Plan  ASA Score- 3     Anesthesia Type- general with ASA Monitors  Additional Monitors:   Airway Plan:     Comment: S/P R CEA (2/18/2020--[facility])  Plan Factors-    Chart reviewed  Existing labs reviewed  Patient summary reviewed  Patient is not a current smoker  Patient instructed to abstain from smoking on day of procedure  Patient did not smoke on day of surgery  Induction- intravenous      Postoperative Plan- Informed Consent- Anesthetic plan and risks discussed with patient

## 2021-02-16 NOTE — INTERVAL H&P NOTE
H&P reviewed  After examining the patient I find no changes in the patients condition since the H&P had been written      Vitals:    02/16/21 1204   BP: 164/74   Pulse: 70   Resp: 16   Temp: 97 6 °F (36 4 °C)   SpO2: 95%

## 2021-02-16 NOTE — DISCHARGE SUMMARY
Discharge Summary - Tara Reddy 78 y o  female MRN: [id number]    Admission Date: 2/16/2021     Admitting Diagnosis: Hydronephrosis of left kidney [N13 30]    Procedures Performed:  Cysto, left stent exchange    Patient underwent planned outpt surgery and recovered without complication  Discharged in good condition  Medications were prescribed  Pt knows to have office follow-up at the appropriate time

## 2021-02-16 NOTE — DISCHARGE INSTRUCTIONS
ALL YOUR  PREVIOUS MEDS ARE THE SAME  NEW MEDS:    BE CAREFUL NOT TO PULL THE STRING  EXPECT SOME BLOOD IN URINE, BURNING, FREQUENT URINATION  ACTIVITY:  RESUME FULL ACTIVITY Thursday  ALL YOUR  PREVIOUS MEDS ARE THE SAME  NEW MEDS:  Hydrocodone if needed for pain    EXPECT SOME BLOOD IN URINE, BURNING, FREQUENT URINATION

## 2021-02-16 NOTE — OP NOTE
OPERATIVE REPORT  PATIENT NAME: Ewelina Kramer    :  1940  MRN: [id number]  Pt Location: [facility]    SURGERY DATE: 2021    Surgeon(s) and Role:     * Yifan Muse MD - Primary    Preop Diagnosis:  Hydronephrosis of left kidney [N13 30]    Post-Op Diagnosis Codes: * Hydronephrosis of left kidney [N13 30]    Procedure(s) (LRB):  CYSTO, RETRO PYELOGRAM, STENT EXCHANGE (Left)    Specimen(s):  ID Type Source Tests Collected by Time Destination   A :  Urine Urine, Cystoscopic URINE CULTURE Yifan Muse MD 2021 1545        Estimated Blood Loss:   Minimal    Drains:  Ureteral Drain/Stent Left ureter 8 Fr  (Active)   Number of days: 385       Ureteral Drain/Stent Left ureter 7 Fr  (Active)   Number of days: 0       Anesthesia Type:   General/LMA    Operative Indications:  Hydronephrosis of left kidney [N13 30]      Operative Findings: Moderate hydronephrosis    Complications:   None    Procedure and Technique:     After the patient was placed under adequate anesthesia, they were prepped and draped using chlorhexidine solution in lithotomy position  The 25 [place] scope was passed thru the urethra, which showed no strictures  Further findings upon passage of the scope were mild inflammation of the bladder  The prior left  stent was grasped, brought out thru the meatus, and wired  A new  seven [place] contour stent was passed over the wire up to the renal pelvis  Contrast was injected, retrograde pyelogram showing good position and moderate hydronephrosis  The scope was removed, leaving the stent in place  They were taken to RR in good condition     I was present for the entire procedure    Patient Disposition:  PACU     SIGNATURE: Yifan Muse MD  DATE: 2021  TIME: 3:54 PM

## 2021-02-21 LAB
BACTERIA UR CULT: ABNORMAL
BACTERIA UR CULT: ABNORMAL

## 2021-03-01 ENCOUNTER — TELEPHONE (OUTPATIENT)
Dept: OTHER | Facility: OTHER | Age: 81
End: 2021-03-01

## 2021-03-01 NOTE — TELEPHONE ENCOUNTER
Please call her I would clarify this, I had seen her in late January, her respiratory status appeared stable at that point and I am unaware she is still using oxygen, please check on this

## 2021-03-01 NOTE — TELEPHONE ENCOUNTER
Patient would like Dr Monica Alvarez to write a script for her oxygen company  Please give her a call

## 2021-03-11 NOTE — TELEPHONE ENCOUNTER
Called and spoke with Patient  Pt has O2 on hand PRN on exertion  Pt stated needs letter stating this to be fax to [employer] a portable O2 company  She was not at home so she could not provide phone number  Please advise if ok to write letter

## 2021-03-11 NOTE — TELEPHONE ENCOUNTER
I have no recent documentation regarding O2 sats but we can give her a letter stating " she does have oxygen at home to use 2 L as needed regarding COPD/hypoxemia "

## 2021-03-17 ENCOUNTER — TELEPHONE (OUTPATIENT)
Dept: UROLOGY | Facility: AMBULATORY SURGERY CENTER | Age: 81
End: 2021-03-17

## 2021-03-17 NOTE — LETTER
March 18, 2021     Patient: Arthur Hammond   YOB: 1940   Date of Visit: 3/17/2021       To Whom it May Concern:    Arthur Hammond is under my professional care  She was seen in my office on 1/29/2021  She does have oxygen at home to use 2 L as needed regarding COPD/hypoxemia  She would like to be evaluated for portable oxygen  If you have any questions or concerns, please don't hesitate to call           Sincerely,          Ernesto Leal      CC: No Recipients

## 2021-03-17 NOTE — TELEPHONE ENCOUNTER
The patient was last seen on 1/13/21 by Dr Daljit Dubois in the [facility] location  At that time, she was prescribed the [address]  In the past, the patient has been started on the same medication multiple times prior to stent exchange  She take the drug for a total of 7 days and begins taking it three days prior to the stent exchange procedure  No office note states this though  I attempted to reach the patient by phone but needed to leave a message on her home phone voice mail  I left my direct dial number for ease in communication  I await a return phone call before taking further action

## 2021-03-17 NOTE — TELEPHONE ENCOUNTER
I spoke with pt who stated that she would like a letter to be sent Rashi for a portable O2 machine pt would like the letter to be faxed     [address]: [id number] [facility]    fax: [phone] - [phone]

## 2021-03-17 NOTE — TELEPHONE ENCOUNTER
Medication Refill Request Dr Kalli Hearn    Patient calling to request a refill of nitrofurantoin (MACROBID) 100 mg capsule   to be sent to: [facility] [address]  Phone:  [phone]    Patient can be reached at [phone]

## 2021-03-18 NOTE — TELEPHONE ENCOUNTER
Please send letter as she requested in this thread  I am unsure they will qualify her as we have no recent 6 minute walk or documentation of O2sats but we can try -  If denied, she could move up June appt daryn me

## 2021-03-19 NOTE — TELEPHONE ENCOUNTER
Please disregard message below by Dr Steve Wellington as he mistakenly dictated within this encounter in error

## 2021-03-23 ENCOUNTER — OFFICE VISIT (OUTPATIENT)
Dept: FAMILY MEDICINE CLINIC | Facility: CLINIC | Age: 81
End: 2021-03-23
Payer: MEDICARE

## 2021-03-23 ENCOUNTER — IMMUNIZATIONS (OUTPATIENT)
Dept: FAMILY MEDICINE CLINIC | Facility: HOSPITAL | Age: 81
End: 2021-03-23
Payer: MEDICARE

## 2021-03-23 VITALS
DIASTOLIC BLOOD PRESSURE: 82 MMHG | BODY MASS INDEX: 34.49 KG/M2 | OXYGEN SATURATION: 93 % | WEIGHT: 202 LBS | HEIGHT: 64 IN | SYSTOLIC BLOOD PRESSURE: 140 MMHG | RESPIRATION RATE: 14 BRPM | TEMPERATURE: 100.2 F | HEART RATE: 99 BPM

## 2021-03-23 DIAGNOSIS — Z23 ENCOUNTER FOR IMMUNIZATION: Primary | ICD-10-CM

## 2021-03-23 DIAGNOSIS — N39.0 URINARY TRACT INFECTION WITHOUT HEMATURIA, SITE UNSPECIFIED: Primary | ICD-10-CM

## 2021-03-23 DIAGNOSIS — Z93.6 NEPHROSTOMY STATUS (HCC): ICD-10-CM

## 2021-03-23 DIAGNOSIS — J41.1 MUCOPURULENT CHRONIC BRONCHITIS (HCC): ICD-10-CM

## 2021-03-23 DIAGNOSIS — N13.30 HYDRONEPHROSIS OF LEFT KIDNEY: ICD-10-CM

## 2021-03-23 PROCEDURE — 0011A SARS-COV-2 / COVID-19 MRNA VACCINE (MODERNA) 100 MCG: CPT

## 2021-03-23 PROCEDURE — 91301 SARS-COV-2 / COVID-19 MRNA VACCINE (MODERNA) 100 MCG: CPT

## 2021-03-23 PROCEDURE — 99214 OFFICE O/P EST MOD 30 MIN: CPT | Performed by: FAMILY MEDICINE

## 2021-03-23 PROCEDURE — 87086 URINE CULTURE/COLONY COUNT: CPT | Performed by: FAMILY MEDICINE

## 2021-03-23 RX ORDER — SULFAMETHOXAZOLE AND TRIMETHOPRIM 800; 160 MG/1; MG/1
1 TABLET ORAL EVERY 12 HOURS SCHEDULED
Qty: 14 TABLET | Refills: 0 | Status: SHIPPED | OUTPATIENT
Start: 2021-03-23 | End: 2021-03-23 | Stop reason: SDUPTHER

## 2021-03-23 RX ORDER — SULFAMETHOXAZOLE AND TRIMETHOPRIM 800; 160 MG/1; MG/1
1 TABLET ORAL EVERY 12 HOURS SCHEDULED
Qty: 14 TABLET | Refills: 0 | Status: SHIPPED | OUTPATIENT
Start: 2021-03-23 | End: 2021-03-30 | Stop reason: HOSPADM

## 2021-03-23 NOTE — PROGRESS NOTES
FAMILY PRACTICE OFFICE VISIT  Janet SCHRADER O  [facility]  [address]  [address] [place], [place], [address]      NAME: Preeti Perales  AGE: 78 y o  SEX: female  : 1940   MRN: [id number]    DATE: 3/23/2021  TIME: 1:36 PM    Assessment and Plan     Problem List Items Addressed This Visit        Respiratory    COPD (chronic obstructive pulmonary disease) ([facility] [address] )       Genitourinary    Hydronephrosis of left kidney    Relevant Medications    sulfamethoxazole-trimethoprim (BACTRIM DS) 800-160 mg per tablet       Other    Nephrostomy tube Left      Other Visit Diagnoses     Urinary tract infection without hematuria, site unspecified    -  Primary    Relevant Medications    sulfamethoxazole-trimethoprim (BACTRIM DS) 800-160 mg per tablet    Other Relevant Orders    Urine culture          Patient Instructions   She has history hydronephrosis, undergoes stent replacement every 3 months via Urology, last stent exchange  by Dr Radha Cole  Recently with fevers and chills along with mild dysuria, she will do full urine culture  She will start Bactrim twice daily for 1 week  We did discuss being seen at the emergency room, she declines, she will go if any worsening is noted  Note she is eating and drinking okay  Copy of note sent to Urology    She does note some increased dyspnea on exertion past few months, O2 sat here after walking in 93% room air  I would like to re-evaluate her next week  At follow-up we will plan 6 minutes walk test, can be evaluated regarding oxygen need  Continue with nebulizer treatments in the meantime  Otherwise treatment as discussed at visit late January, in February CBC, CMP were unremarkable  Back in January A1c 6 4, cholesterol 148, TSH 3 5  We will plan to do chest x-ray, consider redo echocardiogram at follow-up        Chief Complaint     Chief Complaint   Patient presents with    Urinary Tract Infection History of Present Illness   Olivier Martinez is a 78y o -year-old female who I had seen in late January, she did undergo stent replacement with Dr Caleb Woods February 16th  She has used Macrobid few times past few months  She relates increased chills with some mild dysuria for the past week, slight flank discomfort on left- she had called Urology March 17th, unfortunately they had to leave a message and she did not touch base with them again until today  She is in today to evaluate for possible UTI  She did receive her 1st COVID vaccine today  She does have some chronic dyspnea, she relates she is about the same as when I saw her in January  She does use nebulizer, other medication as directed  Nonproductive cough  She had contacted us regarding getting a portable O2 unit to use when out  She has never required home oxygen, appears she had called oxygen supplier stated she just needed letter  She has gained about 4 lb  Has some mild chronic swelling lower extremities  Last echocardiogram September 2019 at [facility] had shown 50 to 55 % ejection fraction  Review of Systems   Review of Systems   Constitutional: Positive for chills, fatigue and fever  Negative for appetite change and unexpected weight change  HENT: Negative for sore throat and trouble swallowing  Respiratory: Positive for cough and shortness of breath  Negative for chest tightness  Cardiovascular: Positive for leg swelling (Mild)  Negative for chest pain and palpitations  Gastrointestinal: Negative for abdominal pain, blood in stool, nausea and vomiting  No acid reflux     No change in bowel   Genitourinary: Positive for dysuria (Mild) and flank pain (Left-sided)  Negative for hematuria  Neurological: Negative for dizziness, syncope, light-headedness and headaches  Psychiatric/Behavioral: Negative for behavioral problems and confusion         Active Problem List     Patient Active Problem List Diagnosis    Benign essential hypertension    Benign familial tremor    History of CVA (cerebrovascular accident)    COPD (chronic obstructive pulmonary disease) (HCC)    Esophageal reflux    Gait disturbance    Dyslipidemia    Sleep disorder    Sciatica    Right shoulder pain    Osteoarthritis of hip    OA (osteoarthritis)    Multiple joint pain    Acquired hypothyroidism    Lumbar degenerative disc disease    Back pain    History of pancreatitis    Aortic valve stenosis - ( Mild -Moderate )    S/P deep brain stimulator placement    Microscopic hematuria    Dysarthria related to Nov 2018 CVA,improved    Swallowing dysfunction    Weakness of left leg    Hydronephrosis of left kidney    Cerebrovascular accident (CVA) ([address] )    Nephrostomy tube Left    Coronary artery disease involving native coronary artery of native heart without angina pectoris    Diabetes mellitus type 2, diet-controlled ([address] )    Calculus of kidney    Status post RIGHT carotid endarterectomy    Bilateral carotid artery stenosis    Acute cystitis without hematuria       Past Medical History:  Reviewed    Past Surgical History:  Reviewed    Family History:  Reviewed    Social History:  Reviewed    Objective     Vitals:    03/23/21 1254   BP: 140/82   BP Location: Left arm   Patient Position: Sitting   Cuff Size: Standard   Pulse: 99   Resp: 14   Temp: 100 2 °F (37 9 °C)   SpO2: 93%   Weight: 91 6 kg (202 lb)   Height: 5' 4" (1 626 m)     Body mass index is 34 67 kg/m²      BP Readings from Last 3 Encounters:   03/23/21 140/82   02/16/21 170/81   01/29/21 130/80       Wt Readings from Last 3 Encounters:   03/23/21 91 6 kg (202 lb)   02/16/21 90 3 kg (199 lb)   01/29/21 90 3 kg (199 lb)       Physical Exam  Constitutional:       Comments: Low-grade temp, otherwise seated, appears as at baseline   Cardiovascular:      Comments: Regular rate and rhythm  Pulmonary:      Comments: Slightly rhonchitic right mid lower, clears a bit after cough  No rales  Abdominal:      Palpations: Abdomen is soft  Tenderness: There is no abdominal tenderness  There is left CVA tenderness  There is no right CVA tenderness or guarding  Musculoskeletal:      Comments: Trace pitting both lower extremities   Skin:     Coloration: Skin is not jaundiced  Neurological:      Mental Status: She is oriented to person, place, and time  Psychiatric:         Behavior: Behavior normal          ALLERGIES:  Allergies   Allergen Reactions    Ciprofloxacin Diarrhea    Simvastatin Myalgia    Advair Diskus [Fluticasone-Salmeterol] Palpitations     Ok w flovent    Tetracycline Rash     Reaction Date: [id number];        Current Medications     Current Outpatient Medications   Medication Sig Dispense Refill    acetaminophen (TYLENOL) 500 mg tablet Take 1,000 mg by mouth every 6 (six) hours as needed for mild pain       albuterol (2 5 mg/3 mL) 0 083 % nebulizer solution Take 1 vial (2 5 mg total) by nebulization every 6 (six) hours as needed for shortness of breath 120 vial 5    aspirin (ECOTRIN LOW STRENGTH) 81 mg EC tablet Take 1 tablet (81 mg total) by mouth daily 30 tablet 0    Cholecalciferol (VITAMIN D-3 PO) Take 1,000 Units by mouth daily        clopidogrel (PLAVIX) 75 mg tablet Take 1 tablet (75 mg total) by mouth daily 90 tablet 3    diltiazem (CARDIZEM CD) 240 mg 24 hr capsule TAKE 1 CAPSULE (240 MG TOTAL) BY MOUTH DAILY 90 capsule 3    docusate sodium (COLACE) 100 mg capsule Take 1 capsule (100 mg total) by mouth daily as needed for constipation for up to 7 days 21 capsule 0    Fluticasone Propionate, Inhal, (Flovent Diskus) 250 MCG/BLIST AEPB Inhale 1 puff 2 (two) times a day 1 each 5    furosemide (LASIX) 20 mg tablet TAKE 1 TABLET (20 MG TOTAL) BY MOUTH DAILY 90 tablet 1    levothyroxine 125 mcg tablet TAKE 1 TABLET (125 MCG TOTAL) BY MOUTH DAILY 90 tablet 3    Multiple Vitamins-Minerals (PRESERVISION AREDS PO) Take 1 Cap by Mouth daily  multivitamin (THERAGRAN) TABS Take 1 tablet by mouth daily      omeprazole (PriLOSEC) 20 mg delayed release capsule Take 1 capsule by mouth daily as needed       rosuvastatin (CRESTOR) 5 mg tablet Uses twice weekly (Patient taking differently: Take by mouth Uses twice weekly) 40 tablet 3    sulfamethoxazole-trimethoprim (BACTRIM DS) 800-160 mg per tablet Take 1 tablet by mouth every 12 (twelve) hours for 7 days Start 3 days before procedure 14 tablet 0     No current facility-administered medications for this visit               Orders Placed This Encounter   Procedures    Urine culture         Belinda Lemon DO

## 2021-03-23 NOTE — TELEPHONE ENCOUNTER
Spoke to pt's daughter Sigrid Lawler re pt's fever shakes chills dysurea and burning with urination  Pt also states she has left sided pain  Pt stated she always has the same bacteria with recurrent UTI's and gets Macrobid which she states makes her feel better  This is not correct  Pt has had different bacteria with previous 4 UTI's  A prescription was called in to [address] [place] [address] on 3/17 as requested by pt on that date  Pt is s/p cysto/stent sx with Dr Betsey Burgess on 2/16/21    Daughter stated they tried to contact PCP but did not receive an answer  Advised daughter pt would need a UC to determine bacteria present so antibiotic could be ordered accordingly  However, in light of pt's symptoms advised pt should be seen today either in urgent care or ED for evaluation  We can be contacted afterwards as needed  Pt's daughter agreed to this plan

## 2021-03-23 NOTE — TELEPHONE ENCOUNTER
Spoke to pt and pt's daughter about visit to PCP  Advised they should check with PCP in 2 days for results of UC to make sure Bactrim is the correct antibiotic for pt to be on  Also advised pt to hydrate well

## 2021-03-23 NOTE — PATIENT INSTRUCTIONS
She has history hydronephrosis, undergoes stent replacement every 3 months via Urology, last stent exchange February 16th by Dr Giuseppe Smith  Recently with fevers and chills along with mild dysuria, she will do full urine culture  She will start Bactrim twice daily for 1 week  We did discuss being seen at the emergency room, she declines, she will go if any worsening is noted  Note she is eating and drinking okay  Copy of note sent to Urology    She does note some increased dyspnea on exertion past few months, O2 sat here after walking in 93% room air  I would like to re-evaluate her next week  At follow-up we will plan 6 minutes walk test, can be evaluated regarding oxygen need  Continue with nebulizer treatments in the meantime  Otherwise treatment as discussed at visit late January, in February CBC, CMP were unremarkable  Back in January A1c 6 4, cholesterol 148, TSH 3 5  We will plan to do chest x-ray, consider redo echocardiogram at follow-up

## 2021-03-23 NOTE — TELEPHONE ENCOUNTER
Patient and patients daughter Dequan Callaway called in stating her infection is getting worse  Patient stated she is running a fever off and on, sweats and a lot of burning and difficulty urinating  Patient stated she has been having these symptoms for over a week  Patient stated she still has not received her medication   Patient can be reached at

## 2021-03-23 NOTE — TELEPHONE ENCOUNTER
Daughter calling to advise mother does not want to go to Er  Daughter states mother did go and see Dr Jack Laura and he prescribed something  Daughter requesting call back to discuss

## 2021-03-27 ENCOUNTER — APPOINTMENT (EMERGENCY)
Dept: CT IMAGING | Facility: HOSPITAL | Age: 81
DRG: 690 | End: 2021-03-27
Payer: MEDICARE

## 2021-03-27 ENCOUNTER — HOSPITAL ENCOUNTER (INPATIENT)
Facility: HOSPITAL | Age: 81
LOS: 3 days | Discharge: HOME | DRG: 690 | End: 2021-03-30
Attending: EMERGENCY MEDICINE | Admitting: INTERNAL MEDICINE
Payer: MEDICARE

## 2021-03-27 DIAGNOSIS — N39.0 RECURRENT UTI: ICD-10-CM

## 2021-03-27 DIAGNOSIS — N12 PYELONEPHRITIS OF LEFT KIDNEY: Primary | ICD-10-CM

## 2021-03-27 DIAGNOSIS — N13.30 HYDRONEPHROSIS OF LEFT KIDNEY: ICD-10-CM

## 2021-03-27 DIAGNOSIS — N12 PYELONEPHRITIS: ICD-10-CM

## 2021-03-27 DIAGNOSIS — N17.9 AKI (ACUTE KIDNEY INJURY) (HCC): ICD-10-CM

## 2021-03-27 PROBLEM — I73.9 PAD (PERIPHERAL ARTERY DISEASE) (HCC): Status: ACTIVE | Noted: 2021-03-27

## 2021-03-27 LAB
ALBUMIN SERPL BCP-MCNC: 3.3 G/DL (ref 3.5–5)
ALP SERPL-CCNC: 87 U/L (ref 46–116)
ALT SERPL W P-5'-P-CCNC: 20 U/L (ref 12–78)
ANION GAP SERPL CALCULATED.3IONS-SCNC: 8 MMOL/L (ref 4–13)
APTT PPP: 33 SECONDS (ref 23–37)
AST SERPL W P-5'-P-CCNC: 14 U/L (ref 5–45)
BACTERIA UR CULT: ABNORMAL
BACTERIA UR CULT: ABNORMAL
BACTERIA UR QL AUTO: ABNORMAL /HPF
BASOPHILS # BLD AUTO: 0.04 THOUSANDS/ΜL (ref 0–0.1)
BASOPHILS NFR BLD AUTO: 0 % (ref 0–1)
BILIRUB SERPL-MCNC: 0.29 MG/DL (ref 0.2–1)
BILIRUB UR QL STRIP: NEGATIVE
BUN SERPL-MCNC: 21 MG/DL (ref 5–25)
CALCIUM ALBUM COR SERPL-MCNC: 9.4 MG/DL (ref 8.3–10.1)
CALCIUM SERPL-MCNC: 8.8 MG/DL (ref 8.3–10.1)
CHLORIDE SERPL-SCNC: 103 MMOL/L (ref 100–108)
CLARITY UR: ABNORMAL
CO2 SERPL-SCNC: 28 MMOL/L (ref 21–32)
COLOR UR: ABNORMAL
CREAT SERPL-MCNC: 1.45 MG/DL (ref 0.6–1.3)
EOSINOPHIL # BLD AUTO: 0.12 THOUSAND/ΜL (ref 0–0.61)
EOSINOPHIL NFR BLD AUTO: 1 % (ref 0–6)
ERYTHROCYTE [DISTWIDTH] IN BLOOD BY AUTOMATED COUNT: 14.6 % (ref 11.6–15.1)
GFR SERPL CREATININE-BSD FRML MDRD: 34 ML/MIN/1.73SQ M
GLUCOSE SERPL-MCNC: 97 MG/DL (ref 65–140)
GLUCOSE UR STRIP-MCNC: NEGATIVE MG/DL
HCT VFR BLD AUTO: 39.1 % (ref 34.8–46.1)
HGB BLD-MCNC: 12.1 G/DL (ref 11.5–15.4)
HGB UR QL STRIP.AUTO: ABNORMAL
IMM GRANULOCYTES # BLD AUTO: 0.04 THOUSAND/UL (ref 0–0.2)
IMM GRANULOCYTES NFR BLD AUTO: 0 % (ref 0–2)
INR PPP: 1.1 (ref 0.84–1.19)
KETONES UR STRIP-MCNC: NEGATIVE MG/DL
LACTATE SERPL-SCNC: 0.9 MMOL/L (ref 0.5–2)
LEUKOCYTE ESTERASE UR QL STRIP: ABNORMAL
LIPASE SERPL-CCNC: 154 U/L (ref 73–393)
LYMPHOCYTES # BLD AUTO: 1.3 THOUSANDS/ΜL (ref 0.6–4.47)
LYMPHOCYTES NFR BLD AUTO: 14 % (ref 14–44)
MCH RBC QN AUTO: 26.7 PG (ref 26.8–34.3)
MCHC RBC AUTO-ENTMCNC: 30.9 G/DL (ref 31.4–37.4)
MCV RBC AUTO: 86 FL (ref 82–98)
MONOCYTES # BLD AUTO: 0.79 THOUSAND/ΜL (ref 0.17–1.22)
MONOCYTES NFR BLD AUTO: 9 % (ref 4–12)
NEUTROPHILS # BLD AUTO: 7.05 THOUSANDS/ΜL (ref 1.85–7.62)
NEUTS SEG NFR BLD AUTO: 76 % (ref 43–75)
NITRITE UR QL STRIP: NEGATIVE
NON-SQ EPI CELLS URNS QL MICRO: ABNORMAL /HPF
NRBC BLD AUTO-RTO: 0 /100 WBCS
PH UR STRIP.AUTO: 7 [PH]
PLATELET # BLD AUTO: 268 THOUSANDS/UL (ref 149–390)
PMV BLD AUTO: 9.9 FL (ref 8.9–12.7)
POTASSIUM SERPL-SCNC: 4.7 MMOL/L (ref 3.5–5.3)
PROCALCITONIN SERPL-MCNC: <0.05 NG/ML
PROT SERPL-MCNC: 7.5 G/DL (ref 6.4–8.2)
PROT UR STRIP-MCNC: ABNORMAL MG/DL
PROTHROMBIN TIME: 14 SECONDS (ref 11.6–14.5)
RBC # BLD AUTO: 4.54 MILLION/UL (ref 3.81–5.12)
RBC #/AREA URNS AUTO: ABNORMAL /HPF
SODIUM SERPL-SCNC: 139 MMOL/L (ref 136–145)
SP GR UR STRIP.AUTO: 1.02 (ref 1–1.03)
UROBILINOGEN UR QL STRIP.AUTO: 0.2 E.U./DL
WBC # BLD AUTO: 9.34 THOUSAND/UL (ref 4.31–10.16)
WBC #/AREA URNS AUTO: ABNORMAL /HPF

## 2021-03-27 PROCEDURE — 83690 ASSAY OF LIPASE: CPT | Performed by: EMERGENCY MEDICINE

## 2021-03-27 PROCEDURE — 85025 COMPLETE CBC W/AUTO DIFF WBC: CPT | Performed by: EMERGENCY MEDICINE

## 2021-03-27 PROCEDURE — 99223 1ST HOSP IP/OBS HIGH 75: CPT | Performed by: INTERNAL MEDICINE

## 2021-03-27 PROCEDURE — 85730 THROMBOPLASTIN TIME PARTIAL: CPT | Performed by: EMERGENCY MEDICINE

## 2021-03-27 PROCEDURE — 99285 EMERGENCY DEPT VISIT HI MDM: CPT

## 2021-03-27 PROCEDURE — 85610 PROTHROMBIN TIME: CPT | Performed by: EMERGENCY MEDICINE

## 2021-03-27 PROCEDURE — 74176 CT ABD & PELVIS W/O CONTRAST: CPT

## 2021-03-27 PROCEDURE — 1124F ACP DISCUSS-NO DSCNMKR DOCD: CPT | Performed by: EMERGENCY MEDICINE

## 2021-03-27 PROCEDURE — 96365 THER/PROPH/DIAG IV INF INIT: CPT

## 2021-03-27 PROCEDURE — 87040 BLOOD CULTURE FOR BACTERIA: CPT | Performed by: EMERGENCY MEDICINE

## 2021-03-27 PROCEDURE — 80053 COMPREHEN METABOLIC PANEL: CPT | Performed by: EMERGENCY MEDICINE

## 2021-03-27 PROCEDURE — 83605 ASSAY OF LACTIC ACID: CPT | Performed by: EMERGENCY MEDICINE

## 2021-03-27 PROCEDURE — 94640 AIRWAY INHALATION TREATMENT: CPT

## 2021-03-27 PROCEDURE — 96366 THER/PROPH/DIAG IV INF ADDON: CPT

## 2021-03-27 PROCEDURE — 84145 PROCALCITONIN (PCT): CPT | Performed by: EMERGENCY MEDICINE

## 2021-03-27 PROCEDURE — 99285 EMERGENCY DEPT VISIT HI MDM: CPT | Performed by: EMERGENCY MEDICINE

## 2021-03-27 PROCEDURE — 94760 N-INVAS EAR/PLS OXIMETRY 1: CPT

## 2021-03-27 PROCEDURE — 87086 URINE CULTURE/COLONY COUNT: CPT | Performed by: EMERGENCY MEDICINE

## 2021-03-27 PROCEDURE — 36415 COLL VENOUS BLD VENIPUNCTURE: CPT | Performed by: EMERGENCY MEDICINE

## 2021-03-27 PROCEDURE — 81001 URINALYSIS AUTO W/SCOPE: CPT | Performed by: EMERGENCY MEDICINE

## 2021-03-27 RX ORDER — CLOPIDOGREL BISULFATE 75 MG/1
75 TABLET ORAL DAILY
Status: DISCONTINUED | OUTPATIENT
Start: 2021-03-28 | End: 2021-03-30 | Stop reason: HOSPADM

## 2021-03-27 RX ORDER — HEPARIN SODIUM 5000 [USP'U]/ML
5000 INJECTION, SOLUTION INTRAVENOUS; SUBCUTANEOUS EVERY 8 HOURS SCHEDULED
Status: DISCONTINUED | OUTPATIENT
Start: 2021-03-27 | End: 2021-03-30 | Stop reason: HOSPADM

## 2021-03-27 RX ORDER — SODIUM CHLORIDE FOR INHALATION 0.9 %
VIAL, NEBULIZER (ML) INHALATION
Status: COMPLETED
Start: 2021-03-27 | End: 2021-03-27

## 2021-03-27 RX ORDER — LEVOFLOXACIN 5 MG/ML
250 INJECTION, SOLUTION INTRAVENOUS EVERY 24 HOURS
Status: DISCONTINUED | OUTPATIENT
Start: 2021-03-27 | End: 2021-03-28

## 2021-03-27 RX ORDER — ACETAMINOPHEN 325 MG/1
975 TABLET ORAL ONCE
Status: COMPLETED | OUTPATIENT
Start: 2021-03-27 | End: 2021-03-27

## 2021-03-27 RX ORDER — MELATONIN
1000 DAILY
Status: DISCONTINUED | OUTPATIENT
Start: 2021-03-28 | End: 2021-03-30 | Stop reason: HOSPADM

## 2021-03-27 RX ORDER — HYDROCODONE POLISTIREX AND CHLORPHENIRAMINE POLISTIREX 10; 8 MG/5ML; MG/5ML
5 SUSPENSION, EXTENDED RELEASE ORAL EVERY 12 HOURS SCHEDULED
Status: COMPLETED | OUTPATIENT
Start: 2021-03-27 | End: 2021-03-28

## 2021-03-27 RX ORDER — LEVALBUTEROL 1.25 MG/.5ML
1.25 SOLUTION, CONCENTRATE RESPIRATORY (INHALATION)
Status: DISCONTINUED | OUTPATIENT
Start: 2021-03-27 | End: 2021-03-30 | Stop reason: HOSPADM

## 2021-03-27 RX ORDER — FUROSEMIDE 20 MG/1
20 TABLET ORAL DAILY
Status: DISCONTINUED | OUTPATIENT
Start: 2021-03-28 | End: 2021-03-30 | Stop reason: HOSPADM

## 2021-03-27 RX ORDER — FLUTICASONE PROPIONATE 220 UG/1
1 AEROSOL, METERED RESPIRATORY (INHALATION) EVERY 12 HOURS SCHEDULED
Status: DISCONTINUED | OUTPATIENT
Start: 2021-03-27 | End: 2021-03-30 | Stop reason: HOSPADM

## 2021-03-27 RX ORDER — DILTIAZEM HYDROCHLORIDE 240 MG/1
240 CAPSULE, COATED, EXTENDED RELEASE ORAL DAILY
Status: DISCONTINUED | OUTPATIENT
Start: 2021-03-28 | End: 2021-03-30 | Stop reason: HOSPADM

## 2021-03-27 RX ORDER — LEVOTHYROXINE SODIUM 0.12 MG/1
125 TABLET ORAL
Status: DISCONTINUED | OUTPATIENT
Start: 2021-03-28 | End: 2021-03-30 | Stop reason: HOSPADM

## 2021-03-27 RX ORDER — ACETAMINOPHEN 325 MG/1
650 TABLET ORAL EVERY 6 HOURS PRN
Status: DISCONTINUED | OUTPATIENT
Start: 2021-03-27 | End: 2021-03-30 | Stop reason: HOSPADM

## 2021-03-27 RX ORDER — ONDANSETRON 2 MG/ML
4 INJECTION INTRAMUSCULAR; INTRAVENOUS EVERY 4 HOURS PRN
Status: DISCONTINUED | OUTPATIENT
Start: 2021-03-27 | End: 2021-03-30 | Stop reason: HOSPADM

## 2021-03-27 RX ORDER — PRAVASTATIN SODIUM 40 MG
40 TABLET ORAL 2 TIMES WEEKLY
Status: DISCONTINUED | OUTPATIENT
Start: 2021-03-29 | End: 2021-03-30 | Stop reason: HOSPADM

## 2021-03-27 RX ORDER — ASPIRIN 81 MG/1
81 TABLET ORAL DAILY
Status: DISCONTINUED | OUTPATIENT
Start: 2021-03-28 | End: 2021-03-30 | Stop reason: HOSPADM

## 2021-03-27 RX ADMIN — LEVALBUTEROL HYDROCHLORIDE 1.25 MG: 1.25 SOLUTION, CONCENTRATE RESPIRATORY (INHALATION) at 21:36

## 2021-03-27 RX ADMIN — HEPARIN SODIUM 5000 UNITS: 5000 INJECTION INTRAVENOUS; SUBCUTANEOUS at 22:39

## 2021-03-27 RX ADMIN — ACETAMINOPHEN 975 MG: 325 TABLET ORAL at 14:38

## 2021-03-27 RX ADMIN — LEVOFLOXACIN 250 MG: 5 INJECTION, SOLUTION INTRAVENOUS at 22:55

## 2021-03-27 RX ADMIN — FLUTICASONE PROPIONATE 1 PUFF: 220 AEROSOL, METERED RESPIRATORY (INHALATION) at 22:39

## 2021-03-27 RX ADMIN — ISODIUM CHLORIDE 3 ML: 0.03 SOLUTION RESPIRATORY (INHALATION) at 21:36

## 2021-03-27 RX ADMIN — HYDROCODONE POLISTIREX AND CHLORPHENIRAMINE POLISTIREX 5 ML: 10; 8 SUSPENSION, EXTENDED RELEASE ORAL at 22:39

## 2021-03-27 RX ADMIN — SODIUM CHLORIDE 500 ML: 0.9 INJECTION, SOLUTION INTRAVENOUS at 14:53

## 2021-03-27 RX ADMIN — CEFTRIAXONE SODIUM 1000 MG: 10 INJECTION, POWDER, FOR SOLUTION INTRAVENOUS at 14:53

## 2021-03-27 NOTE — ED NOTES
Pts daughter Mark Ramos called regarding update  Pt states that she will call pt and update her herself        Johann Thomson RN  03/27/21 9735

## 2021-03-27 NOTE — ED PROVIDER NOTES
History  Chief Complaint   Patient presents with    Flank Pain     L flank pain today  Taking medication for pyelonephritis since Wednesday  Denies nausea, vomiting, radition of pain  79 yo F h/o chronic L hydronephrosis with stent presenting for evaluation of L flank pain/fevers/chills  Pt reports 4 days of fevers/chills and 1 day of L flank pain  Saw PCP on 3/23 for sx and urine cx grew: Actinomyces europaeus and pt put on Bactrim DS BID and taking as prescribed  Denies any antipyretics PTA and temperature 99 5F on arrival  Denies CP, SOB, cough, abdominal pain, N/V/D/C  Follows with Dr Kadie Sanchez of Urology- 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 Taking? Cholecalciferol (VITAMIN D-3 PO)  Self Yes No   Sig: Take 1,000 Units by mouth daily     Fluticasone Propionate, Inhal, (Flovent Diskus) 250 MCG/BLIST AEPB   No No   Sig: Inhale 1 puff 2 (two) times a day   Multiple Vitamins-Minerals (PRESERVISION AREDS PO)  Self Yes No   Sig: Take 1 Cap by Mouth daily   acetaminophen (TYLENOL) 500 mg tablet  Self Yes No   Sig: Take 1,000 mg by mouth every 6 (six) hours as needed for mild pain    albuterol (2 5 mg/3 mL) 0 083 % nebulizer solution  Self No No   Sig: Take 1 vial (2 5 mg total) by nebulization every 6 (six) hours as needed for shortness of breath   aspirin (ECOTRIN LOW STRENGTH) 81 mg EC tablet  Self No No   Sig: Take 1 tablet (81 mg total) by mouth daily   clopidogrel (PLAVIX) 75 mg tablet   No No   Sig: Take 1 tablet (75 mg total) by mouth daily   diltiazem (CARDIZEM CD) 240 mg 24 hr capsule   No No   Sig: TAKE 1 CAPSULE (240 MG TOTAL) BY MOUTH DAILY   docusate sodium (COLACE) 100 mg capsule  Self No No   Sig: Take 1 capsule (100 mg total) by mouth daily as needed for constipation for up to 7 days   furosemide (LASIX) 20 mg tablet   No No   Sig: TAKE 1 TABLET (20 MG TOTAL) BY MOUTH DAILY   levothyroxine 125 mcg tablet   No No   Sig: TAKE 1 TABLET (125 MCG TOTAL) BY MOUTH DAILY   multivitamin (THERAGRAN) TABS  Self Yes No   Sig: Take 1 tablet by mouth daily   omeprazole (PriLOSEC) 20 mg delayed release capsule  Self Yes No   Sig: Take 1 capsule by mouth daily as needed    rosuvastatin (CRESTOR) 5 mg tablet  Self No No   Sig: Uses twice weekly   Patient taking differently: Take by mouth Uses twice weekly   sulfamethoxazole-trimethoprim (BACTRIM DS) 800-160 mg per tablet   No No   Sig: Take 1 tablet by mouth every 12 (twelve) hours for 7 days Start 3 days before procedure      Facility-Administered Medications: None       Past Medical History:   Diagnosis Date    Arthritis     Asthma     At risk for falls     rib frac    Benign essential tremor 10/15/2018    Added automatically from request for surgery [id number] has stimulator    COPD [facility]chronic obstructive pulmonary disease) ([facility])     Coronary artery disease     Diabetes mellitus ([facility])     borderline    Difficulty waking     post anesthesia    Edema     bles    GERD (gastroesophageal reflux disease)     High cholesterol     History of recent fall     broke several ribs    Hypertension     Hypothyroid     Kidney stone     Macular degeneration     Osteopenia     Osteoporosis     S/P deep brain stimulator placement     Shortness of breath     on exertion    Slurred speech     Stroke ([facility])     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      FL RETROGRADE PYELOGRAM  11/7/2019    FL RETROGRADE PYELOGRAM  5/26/2020    FL RETROGRADE PYELOGRAM  2/16/2021    IR NEPHROSTOMY TO NEPHROURETERAL STENT  1/10/2020    IR TUBE PLACEMENT  11/12/2019    KNEE ARTHROSCOPY      SD CYSTOURETHROSCOPY,URETER CATHETER Left 11/7/2019    Procedure: CYSTO RETROGRADE PYELOGRAM, URETEROSCOPY;  Surgeon: Laura Jeronimo MD;  [facility];  Service: Urology    SD IMP STIM,CRANIAL,SUBQ,1 ARRAY Left 1/22/2019    Procedure: REPLACEMENT IMPLANTABLE PULSE GENERATOR (IPG) DEEP BRAIN STIMULATION (DBS), LEFT;  Surgeon: Kevin Martinez MD;  Location: [facility];  Service: Neurosurgery    SD IMP STIM,CRANIAL,SUBQ,>1 ARRAY Right 12/13/2018    Procedure: REPLACEMENT RIGHT DBS IMPLANTABLE PULSE GENERATOR;  Surgeon: Kevin Martinez MD;  [facility];  Service: Neurosurgery    [address] Left 10/27/2020    Procedure: Lavon Peterson, EXCHANGE STENT URETERA L, LEFT URETEROSCOPY;  Surgeon: Ellen Song MD;  [facility];  Service: Urology    SD RMVL & RPLCMT INTLY DWELLING URETERAL STENT PRQ Left 5/26/2020    Procedure: Clydia Sports, STENT;  Surgeon: Ellen Song MD;  [facility];  Service: Urology    SD Jaclatanyalynne Heritage Right 2/18/2020    Procedure: CAROTID ENDARTERECTOMY WITH BOVINE PATCH;  Surgeon: Lacey Moncada MD;  [facility];  Service: Vascular    SD TOTAL HIP ARTHROPLASTY Left 5/20/2016    Procedure: ARTHROPLASTY HIP TOTAL ANTERIOR;  Surgeon: Champ Smith MD;  [facility];  Service: Orthopedics    ROTATOR CUFF REPAIR         Family History   Problem Relation Age of Onset    Breast cancer Mother     Throat cancer Family      I have reviewed and agree with the history as documented  E-Cigarette/Vaping    E-Cigarette Use Never User      E-Cigarette/Vaping Substances    Nicotine No     THC No     CBD No     Flavoring No     Other No     Unknown No      Social History     Tobacco Use    Smoking status: Former Smoker     Types: Cigarettes     Quit date:      Years since quittin 2    Smokeless tobacco: Never Used   Substance Use Topics    Alcohol use: Never     Frequency: Never    Drug use: No       Review of Systems   Constitutional: Positive for chills and fever  Negative for unexpected weight change  HENT: Negative for ear pain, rhinorrhea and sore throat  Eyes: Negative for pain and visual disturbance  Respiratory: Negative for cough and shortness of breath  Cardiovascular: Negative for chest pain and leg swelling  Gastrointestinal: Negative for abdominal pain, constipation, diarrhea, nausea and vomiting  Endocrine: Negative for polydipsia, polyphagia and polyuria  Genitourinary: Positive for flank pain  Negative for dysuria, frequency, hematuria and urgency  Musculoskeletal: Negative for back pain, myalgias and neck pain  Skin: Negative for color change and rash  Allergic/Immunologic: Negative for environmental allergies and immunocompromised state  Neurological: Negative for dizziness, weakness, light-headedness, numbness and headaches  Hematological: Negative for adenopathy  Does not bruise/bleed easily  Psychiatric/Behavioral: Negative for agitation and confusion  All other systems reviewed and are negative  Physical Exam  Physical Exam  Vitals signs and nursing note reviewed  Constitutional:       Appearance: Normal appearance  She is well-developed  HENT:      Head: Normocephalic and atraumatic  Nose: Nose normal    Eyes:      Conjunctiva/sclera: Conjunctivae normal    Neck:      Musculoskeletal: Normal range of motion and neck supple     Cardiovascular:      Rate and Rhythm: Normal rate and regular rhythm  Heart sounds: Normal heart sounds  Pulmonary:      Effort: Pulmonary effort is normal  No respiratory distress  Breath sounds: Normal breath sounds  No stridor  No wheezing or rales  Chest:      Chest wall: No tenderness  Abdominal:      General: There is no distension  Palpations: Abdomen is soft  Tenderness: There is no abdominal tenderness  There is no guarding or rebound  Comments: L CVA ttp   Musculoskeletal: Normal range of motion  General: No deformity  Skin:     General: Skin is warm and dry  Findings: No rash  Neurological:      General: No focal deficit present  Mental Status: She is alert and oriented to person, place, and time  Motor: No abnormal muscle tone  Coordination: Coordination normal    Psychiatric:         Thought Content:  Thought content normal          Judgment: Judgment normal          Vital Signs  ED Triage Vitals [03/27/21 1414]   Temperature Pulse Respirations Blood Pressure SpO2   99 5 °F (37 5 °C) 89 20 135/72 97 %      Temp Source Heart Rate Source Patient Position - Orthostatic VS BP Location FiO2 (%)   Oral Monitor Lying Left arm --      Pain Score       8           Vitals:    03/27/21 1414   BP: 135/72   Pulse: 89   Patient Position - Orthostatic VS: Lying         Visual Acuity      ED Medications  Medications   ceftriaxone (ROCEPHIN) 1 g/50 mL in dextrose IVPB (1,000 mg Intravenous New Bag 3/27/21 1453)   acetaminophen (TYLENOL) tablet 975 mg (975 mg Oral Given 3/27/21 1438)   sodium chloride 0 9 % bolus 500 mL (500 mL Intravenous New Bag 3/27/21 1453)       Diagnostic Studies  Results Reviewed     Procedure Component Value Units Date/Time    UA w Reflex to Microscopic w Reflex to Culture [[id number]]  (Abnormal) Collected: 03/27/21 1545    Lab Status: Final result Specimen: Urine, Clean Catch Updated: 03/27/21 1611     Color, UA Miriam     Clarity, UA Cloudy     Specific Gravity, UA 1 025     pH, UA 7 0 Leukocytes, UA Large     Nitrite, UA Negative     Protein,  (2+) mg/dl      Glucose, UA Negative mg/dl      Ketones, UA Negative mg/dl      Urobilinogen, UA 0 2 E U /dl      Bilirubin, UA Negative     Blood, UA Large    Urine Microscopic [[id number]]  (Abnormal) Collected: 03/27/21 1545    Lab Status: Final result Specimen: Urine, Clean Catch Updated: 03/27/21 1611     RBC, UA 30-50 /hpf      WBC, UA Innumerable /hpf      Epithelial Cells Occasional /hpf      Bacteria, UA Innumerable /hpf     Urine culture [[id number]] Collected: 03/27/21 1545    Lab Status:  In process Specimen: Urine, Clean Catch Updated: 03/27/21 1611    Comprehensive metabolic panel [[id number]]  (Abnormal) Collected: 03/27/21 1447    Lab Status: Final result Specimen: Blood from Arm, Right Updated: 03/27/21 1514     Sodium 139 mmol/L      Potassium 4 7 mmol/L      Chloride 103 mmol/L      CO2 28 mmol/L      ANION GAP 8 mmol/L      BUN 21 mg/dL      Creatinine 1 45 mg/dL      Glucose 97 mg/dL      Calcium 8 8 mg/dL      Corrected Calcium 9 4 mg/dL      AST 14 U/L      ALT 20 U/L      Alkaline Phosphatase 87 U/L      Total Protein 7 5 g/dL      Albumin 3 3 g/dL      Total Bilirubin 0 29 mg/dL      eGFR 34 ml/min/1 73sq m     Narrative:      Nisha guidelines for Chronic Kidney Disease (CKD):     Stage 1 with normal or high GFR (GFR > 90 mL/min/1 73 square meters)    Stage 2 Mild CKD (GFR = 60-89 mL/min/1 73 square meters)    Stage 3A Moderate CKD (GFR = 45-59 mL/min/1 73 square meters)    Stage 3B Moderate CKD (GFR = 30-44 mL/min/1 73 square meters)    Stage 4 Severe CKD (GFR = 15-29 mL/min/1 73 square meters)    Stage 5 End Stage CKD (GFR <15 mL/min/1 73 square meters)  Note: GFR calculation is accurate only with a steady state creatinine    Lactic Acid [[id number]]  (Normal) Collected: 03/27/21 1440    Lab Status: Final result Specimen: Blood from Arm, Right Updated: 03/27/21 1510     LACTIC ACID 0 9 mmol/L     Narrative:      Result may be elevated if tourniquet was used during collection  Protime-INR [[id number]]  (Normal) Collected: 03/27/21 1440    Lab Status: Final result Specimen: Blood from Arm, Right Updated: 03/27/21 1508     Protime 14 0 seconds      INR 1 10    APTT [[id number]]  (Normal) Collected: 03/27/21 1440    Lab Status: Final result Specimen: Blood from Arm, Right Updated: 03/27/21 1508     PTT 33 seconds     Lipase [[id number]]  (Normal) Collected: 03/27/21 1440    Lab Status: Final result Specimen: Blood from Arm, Right Updated: 03/27/21 1504     Lipase 154 u/L     Blood culture #1 [[id number]] Collected: 03/27/21 1447    Lab Status: In process Specimen: Blood from Arm, Right Updated: 03/27/21 1450    Procalcitonin with AM Reflex [[id number]] Collected: 03/27/21 1447    Lab Status: In process Specimen: Blood from Arm, Right Updated: 03/27/21 1450    CBC and differential [[id number]]  (Abnormal) Collected: 03/27/21 1440    Lab Status: Final result Specimen: Blood from Arm, Right Updated: 03/27/21 1446     WBC 9 34 Thousand/uL      RBC 4 54 Million/uL      Hemoglobin 12 1 g/dL      Hematocrit 39 1 %      MCV 86 fL      MCH 26 7 pg      MCHC 30 9 g/dL      RDW 14 6 %      MPV 9 9 fL      Platelets 698 Thousands/uL      nRBC 0 /100 WBCs      Neutrophils Relative 76 %      Immat GRANS % 0 %      Lymphocytes Relative 14 %      Monocytes Relative 9 %      Eosinophils Relative 1 %      Basophils Relative 0 %      Neutrophils Absolute 7 05 Thousands/µL      Immature Grans Absolute 0 04 Thousand/uL      Lymphocytes Absolute 1 30 Thousands/µL      Monocytes Absolute 0 79 Thousand/µL      Eosinophils Absolute 0 12 Thousand/µL      Basophils Absolute 0 04 Thousands/µL     Blood culture #2 [[id number]] Collected: 03/27/21 1440    Lab Status:  In process Specimen: Blood from Arm, Right Updated: 03/27/21 1443                 CT abdomen pelvis wo contrast   ED Interpretation by Jannet Zhao DO (03/27 1644) IMPRESSION:        1  Left ureteral stent in expected position  Persistent severe left hydronephrosis with decompression of the ureter  No evidence of obstructing calculus at the ureteropelvic junction  Persistent ureteritis and pyelonephritis not excluded  2   Nonobstructing calculi in left renal calyces measuring up to 7 5 mm  Final Result by Luis M Hernández MD (03/27 1643)         1  Left ureteral stent in expected position  Persistent severe left hydronephrosis with decompression of the ureter  No evidence of obstructing calculus at the ureteropelvic junction  Persistent ureteritis and pyelonephritis not excluded  2   Nonobstructing calculi in left renal calyces measuring up to 7 5 mm  Workstation performed: [id number]                    Procedures  Procedures         ED Course  ED Course as of Mar 27 1715   Sat Mar 27, 2021   1419 Temperature: 99 5 °F (37 5 °C)   1527 0 91 1 month ago   Creatinine(!): 1 45   1611 Bacteria, UA(!): Innumerable   1612 WBC, UA(!): Innumerable   1612 RBC, UA(!): 30-50                         Initial Sepsis Screening     Row Name 03/27/21 1430                Is the patient's history suggestive of a new or worsening infection? (!) Yes (Proceed)  -[facility]        Suspected source of infection  urinary tract infection  -KH        Are two or more of the following signs & symptoms of infection both present and new to the patient? No  -KH        Indicate SIRS criteria  --        If the answer is yes to both questions, suspicion of sepsis is present  --        If severe sepsis is present AND tissue hypoperfusion perists in the hour after fluid resuscitation or lactate > 4, the patient meets criteria for SEPTIC SHOCK  --        Are any of the following organ dysfunction criteria present within 6 hours of suspected infection and SIRS criteria that are NOT considered to be chronic conditions?   --        Organ dysfunction  --        Date of presentation of severe sepsis  --        Time of presentation of severe sepsis  --        Tissue hypoperfusion persists in the hour after crystalloid fluid administration, evidenced, by either:  --        Was hypotension present within one hour of the conclusion of crystalloid fluid administration?  --        Date of presentation of septic shock  --        Time of presentation of septic shock  --          User Key  (r) = Recorded By, (t) = Taken By, (c) = Cosigned By    [address] Name Provider Type    [address] [place] [address],  Physician                        [facility]  Number of Diagnoses or Management Options  CHAR (acute kidney injury) ([facility] ):   Hydronephrosis of left kidney:   Pyelonephritis of left kidney:   Diagnosis management comments: 77 yo F presenting with fevers/chills/L flank pain, treated outpt with Bactrim but worsening sx   Persistent urine infection and CHAR on workup, IVF/abx and admitted to [facility]       Amount and/or Complexity of Data Reviewed  Clinical lab tests: ordered and reviewed  Tests in the radiology section of CPT®: ordered and reviewed  Tests in the medicine section of CPT®: ordered and reviewed  Review and summarize past medical records: yes  Independent visualization of images, tracings, or specimens: yes        Disposition  Final diagnoses:   Pyelonephritis of left kidney   Hydronephrosis of left kidney   CHAR (acute kidney injury) ([facility] )     Time reflects when diagnosis was documented in both MDM as applicable and the Disposition within this note     Time User Action Codes Description Comment    3/27/2021  4:46 PM Ty Chiu Add [N12] Pyelonephritis of left kidney     3/27/2021  4:47 PM  Sacks A Add [N13 30] Hydronephrosis of left kidney     3/27/2021  4:47 PM  Sacks A Add [N17 9] CAHR (acute kidney injury) [facility])       ED Disposition     ED Disposition Condition Date/Time Comment    Admit Stable Sat Mar 27, 2021  4:46 PM Case was discussed with CUONG and the patient's admission status was agreed to be Admission Status: inpatient status to the service of Dr Roberta Parada   Follow-up Information    None         Patient's Medications   Discharge Prescriptions    No medications on file     No discharge procedures on file      PDMP Review       Value Time User    PDMP Reviewed  Yes 11/19/2019  9:49 AM Kevin Cobos PA-C          ED Provider  Electronically Signed by           Jannet Zhao DO  03/27/21 5290

## 2021-03-28 LAB
ALBUMIN SERPL BCP-MCNC: 2.9 G/DL (ref 3.5–5)
ALP SERPL-CCNC: 79 U/L (ref 46–116)
ALT SERPL W P-5'-P-CCNC: 15 U/L (ref 12–78)
ANION GAP SERPL CALCULATED.3IONS-SCNC: 10 MMOL/L (ref 4–13)
AST SERPL W P-5'-P-CCNC: 15 U/L (ref 5–45)
BILIRUB SERPL-MCNC: 0.22 MG/DL (ref 0.2–1)
BUN SERPL-MCNC: 17 MG/DL (ref 5–25)
CALCIUM ALBUM COR SERPL-MCNC: 9.6 MG/DL (ref 8.3–10.1)
CALCIUM SERPL-MCNC: 8.7 MG/DL (ref 8.3–10.1)
CHLORIDE SERPL-SCNC: 103 MMOL/L (ref 100–108)
CO2 SERPL-SCNC: 24 MMOL/L (ref 21–32)
CREAT SERPL-MCNC: 1.08 MG/DL (ref 0.6–1.3)
ERYTHROCYTE [DISTWIDTH] IN BLOOD BY AUTOMATED COUNT: 14.5 % (ref 11.6–15.1)
GFR SERPL CREATININE-BSD FRML MDRD: 48 ML/MIN/1.73SQ M
GLUCOSE SERPL-MCNC: 109 MG/DL (ref 65–140)
HCT VFR BLD AUTO: 36.3 % (ref 34.8–46.1)
HGB BLD-MCNC: 11.1 G/DL (ref 11.5–15.4)
MCH RBC QN AUTO: 26.7 PG (ref 26.8–34.3)
MCHC RBC AUTO-ENTMCNC: 30.6 G/DL (ref 31.4–37.4)
MCV RBC AUTO: 88 FL (ref 82–98)
PLATELET # BLD AUTO: 237 THOUSANDS/UL (ref 149–390)
PMV BLD AUTO: 9.7 FL (ref 8.9–12.7)
POTASSIUM SERPL-SCNC: 4.6 MMOL/L (ref 3.5–5.3)
PROCALCITONIN SERPL-MCNC: <0.05 NG/ML
PROT SERPL-MCNC: 6.9 G/DL (ref 6.4–8.2)
RBC # BLD AUTO: 4.15 MILLION/UL (ref 3.81–5.12)
SODIUM SERPL-SCNC: 137 MMOL/L (ref 136–145)
WBC # BLD AUTO: 6.63 THOUSAND/UL (ref 4.31–10.16)

## 2021-03-28 PROCEDURE — 94640 AIRWAY INHALATION TREATMENT: CPT

## 2021-03-28 PROCEDURE — 85027 COMPLETE CBC AUTOMATED: CPT | Performed by: INTERNAL MEDICINE

## 2021-03-28 PROCEDURE — 99232 SBSQ HOSP IP/OBS MODERATE 35: CPT | Performed by: INTERNAL MEDICINE

## 2021-03-28 PROCEDURE — 84145 PROCALCITONIN (PCT): CPT | Performed by: EMERGENCY MEDICINE

## 2021-03-28 PROCEDURE — 94760 N-INVAS EAR/PLS OXIMETRY 1: CPT

## 2021-03-28 PROCEDURE — 99222 1ST HOSP IP/OBS MODERATE 55: CPT | Performed by: NURSE PRACTITIONER

## 2021-03-28 PROCEDURE — 99223 1ST HOSP IP/OBS HIGH 75: CPT | Performed by: INTERNAL MEDICINE

## 2021-03-28 PROCEDURE — 80053 COMPREHEN METABOLIC PANEL: CPT | Performed by: INTERNAL MEDICINE

## 2021-03-28 RX ORDER — SODIUM CHLORIDE FOR INHALATION 0.9 %
VIAL, NEBULIZER (ML) INHALATION
Status: DISPENSED
Start: 2021-03-28 | End: 2021-03-28

## 2021-03-28 RX ORDER — SODIUM CHLORIDE FOR INHALATION 0.9 %
3 VIAL, NEBULIZER (ML) INHALATION
Status: DISCONTINUED | OUTPATIENT
Start: 2021-03-28 | End: 2021-03-30 | Stop reason: HOSPADM

## 2021-03-28 RX ADMIN — Medication 1000 UNITS: at 08:53

## 2021-03-28 RX ADMIN — FUROSEMIDE 20 MG: 20 TABLET ORAL at 08:54

## 2021-03-28 RX ADMIN — CLOPIDOGREL BISULFATE 75 MG: 75 TABLET ORAL at 08:53

## 2021-03-28 RX ADMIN — LEVALBUTEROL HYDROCHLORIDE 1.25 MG: 1.25 SOLUTION, CONCENTRATE RESPIRATORY (INHALATION) at 20:03

## 2021-03-28 RX ADMIN — AMPICILLIN SODIUM 2000 MG: 2 INJECTION, POWDER, FOR SOLUTION INTRAMUSCULAR; INTRAVENOUS at 22:39

## 2021-03-28 RX ADMIN — ISODIUM CHLORIDE 3 ML: 0.03 SOLUTION RESPIRATORY (INHALATION) at 13:14

## 2021-03-28 RX ADMIN — LEVALBUTEROL HYDROCHLORIDE 1.25 MG: 1.25 SOLUTION, CONCENTRATE RESPIRATORY (INHALATION) at 07:31

## 2021-03-28 RX ADMIN — HEPARIN SODIUM 5000 UNITS: 5000 INJECTION INTRAVENOUS; SUBCUTANEOUS at 22:42

## 2021-03-28 RX ADMIN — LEVALBUTEROL HYDROCHLORIDE 1.25 MG: 1.25 SOLUTION, CONCENTRATE RESPIRATORY (INHALATION) at 13:14

## 2021-03-28 RX ADMIN — HEPARIN SODIUM 5000 UNITS: 5000 INJECTION INTRAVENOUS; SUBCUTANEOUS at 16:32

## 2021-03-28 RX ADMIN — ASPIRIN 81 MG: 81 TABLET, COATED ORAL at 08:53

## 2021-03-28 RX ADMIN — HYDROCODONE POLISTIREX AND CHLORPHENIRAMINE POLISTIREX 5 ML: 10; 8 SUSPENSION, EXTENDED RELEASE ORAL at 08:54

## 2021-03-28 RX ADMIN — LEVOTHYROXINE SODIUM 125 MCG: 125 TABLET ORAL at 05:48

## 2021-03-28 RX ADMIN — HEPARIN SODIUM 5000 UNITS: 5000 INJECTION INTRAVENOUS; SUBCUTANEOUS at 05:48

## 2021-03-28 RX ADMIN — ISODIUM CHLORIDE 3 ML: 0.03 SOLUTION RESPIRATORY (INHALATION) at 20:02

## 2021-03-28 RX ADMIN — ISODIUM CHLORIDE 3 ML: 0.03 SOLUTION RESPIRATORY (INHALATION) at 07:32

## 2021-03-28 RX ADMIN — AMPICILLIN SODIUM 2000 MG: 2 INJECTION, POWDER, FOR SOLUTION INTRAMUSCULAR; INTRAVENOUS at 10:46

## 2021-03-28 RX ADMIN — FLUTICASONE PROPIONATE 1 PUFF: 220 AEROSOL, METERED RESPIRATORY (INHALATION) at 08:54

## 2021-03-28 RX ADMIN — AMPICILLIN SODIUM 2000 MG: 2 INJECTION, POWDER, FOR SOLUTION INTRAMUSCULAR; INTRAVENOUS at 16:32

## 2021-03-28 RX ADMIN — DILTIAZEM HYDROCHLORIDE 240 MG: 240 CAPSULE, COATED, EXTENDED RELEASE ORAL at 08:53

## 2021-03-28 RX ADMIN — FLUTICASONE PROPIONATE 1 PUFF: 220 AEROSOL, METERED RESPIRATORY (INHALATION) at 22:42

## 2021-03-28 NOTE — H&P
Lester Leums 83 1940, 78 y o  female MRN: [id number]  Unit/Bed#: E5 -01 Encounter: [id number]  Primary Care Provider: Janie Wright DO   Date and time admitted to hospital: 3/27/2021  2:10 PM    Assessment and Plan  * Pyelonephritis  Assessment & Plan  · Pyelonephritis with extensive history of UTI and ureteral stenting  · Reviewed previous cultures with wide variety of pathogens ranging from eSBL to enterococcus  Will start levofloxacin and follow-up on urine cultures  · Consult urology as patient known to Dr Cinthya Connolly    PAD (peripheral artery disease) [facility])  Assessment & Plan  · PA D with history of carotid stenosis status post endarterectomy    Acquired hypothyroidism  Assessment & Plan  · Continue levothyroxine 125 mcg daily    Dyslipidemia  Assessment & Plan  · On rosuvastatin twice weekly  Will substitute with pravastatin    COPD (chronic obstructive pulmonary disease) ([facility])  Assessment & Plan  · Without exacerbation  Continue Flovent and add Xopenex  · Is complaining of cough, will trial Tussionex    History of CVA (cerebrovascular accident)  Assessment & Plan  · History of CVA with right-sided facial droop and dysphagia  · Continue DAPT given history of carotid stenosis    Benign familial tremor  Assessment & Plan  · Status post DBS    Benign essential hypertension  Assessment & Plan  · Stable continue diltiazem        VTE Prophylaxis: Heparin  Code Status: Level 1 - Full Code  Anticipated Length of Stay:  Patient will be admitted on an Inpatient basis with an anticipated length of stay of  greater than 2 midnights  Justification for Hospital Stay: Pyelonephritis  Total Time for Visit, including Counseling / Coordination of Care: x mins  Greater than 50% of this total time spent on direct patient counseling and coordination of care  Chief Complaint:     Chief Complaint   Patient presents with    Flank Pain     L flank pain today  Taking medication for pyelonephritis since Wednesday  Denies nausea, vomiting, radition of pain  History of Present Illness:    Tyree Feng is a 78 y o  female with a past medical history of stroke, hypertension, COPD, hypothyroidism, and chronic left hydronephrosis status post stenting with exchanges known to Dr Shea Mac who presents with worsening left flank pain and fever/chills  Patient saw her PCP on 03/23 and was started on Bactrim DS for similar symptoms  Unfortunately her symptoms persisted so EMS was summoned and the patient was brought here where imaging demonstrates severe left hydronephrosis and concerns for pyelonephritis  She denies any nausea vomiting or diarrhea but continues to have left flank pain without radiation  Review of Systems:  Review of Systems   Constitutional: Positive for chills, fatigue and fever  Negative for diaphoresis  HENT: Negative for dental problem and facial swelling  Eyes: Negative for photophobia, pain and visual disturbance  Respiratory: Negative for shortness of breath, wheezing and stridor  Cardiovascular: Negative for chest pain and palpitations  Gastrointestinal: Positive for abdominal pain  Negative for abdominal distention, diarrhea, nausea and vomiting  Genitourinary: Positive for dysuria and flank pain  Negative for hematuria and urgency  Musculoskeletal: Positive for back pain and myalgias  Skin: Negative for rash  Neurological: Positive for weakness  Negative for dizziness, seizures, speech difficulty, numbness and headaches  Psychiatric/Behavioral: Negative for agitation and suicidal ideas  The patient is not nervous/anxious  All other systems reviewed and are negative          Past Medical and Surgical History:   Past Medical History:   Diagnosis Date    Arthritis     Asthma     At risk for falls     rib frac    Benign essential tremor 10/15/2018    Added automatically from request for surgery [id number] has stimulator    COPD (chronic obstructive pulmonary disease) (HCC)     Coronary artery disease     Diabetes mellitus (HCC)     borderline    Difficulty waking     post anesthesia    Edema     bles    GERD (gastroesophageal reflux disease)     High cholesterol     History of recent fall     broke several ribs    Hypertension     Hypothyroid     Kidney stone     Macular degeneration     Osteopenia     Osteoporosis     S/P deep brain stimulator placement     Shortness of breath     on exertion    Slurred speech     Stroke (HCC)     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     Urinary leakage     Uses roller walker     Vitamin D deficiency     Wears dentures     full upper    Wears glasses     reading     Past Surgical History:   Procedure Laterality Date    CATARACT EXTRACTION W/  INTRAOCULAR LENS IMPLANT Bilateral     COLONOSCOPY      CYSTOSCOPY W/ RETROGRADES Left 2/16/2021    Procedure: CYSTO, RETRO PYELOGRAM, STENT EXCHANGE;  Surgeon: Ronnie Mclaughlin MD;  [facility];  Service: Urology    DEEP BRAIN STIMULATOR PLACEMENT      FL RETROGRADE PYELOGRAM  11/7/2019    FL RETROGRADE PYELOGRAM  5/26/2020    FL RETROGRADE PYELOGRAM  2/16/2021    IR NEPHROSTOMY TO NEPHROURETERAL STENT  1/10/2020    IR TUBE PLACEMENT  11/12/2019    KNEE ARTHROSCOPY      VA CYSTOURETHROSCOPY,URETER CATHETER Left 11/7/2019    Procedure: CYSTO RETROGRADE PYELOGRAM, URETEROSCOPY;  Surgeon: Umu Munoz MD;  [facility];  Service: Urology    VA IMP STIM,CRANIAL,SUBQ,1 ARRAY Left 1/22/2019    Procedure: REPLACEMENT IMPLANTABLE PULSE GENERATOR (IPG) DEEP BRAIN STIMULATION (DBS), LEFT;  Surgeon: Lexi Marroquin MD;  [facility];  Service: Neurosurgery    VA IMP STIM,CRANIAL,SUBQ,>1 ARRAY Right 12/13/2018    Procedure: REPLACEMENT RIGHT DBS IMPLANTABLE PULSE GENERATOR;  Surgeon: Lexi Marroquin MD;  [facility];  Service: Neurosurgery    VA REMOVE & REPLACE INDWELL URETERAL STENT TRURTHRL Left 10/27/2020    Procedure: CYSTOSCOPY, RETROGRADE PYELOGRAM, EXCHANGE STENT URETERA L, LEFT URETEROSCOPY;  Surgeon: Nolberto Stewart MD;  [facility];  Service: Urology    ME RMVL & RPLCMT INTLY DWELLING URETERAL STENT PRQ Left 5/26/2020    Procedure: Kirstin José Miguel, STENT;  Surgeon: Nolberto Stewart MD;  [facility];  Service: Urology    ME Pheobe Cambric Right 2/18/2020    Procedure: CAROTID ENDARTERECTOMY WITH BOVINE PATCH;  Surgeon: Riya Gao MD;  [facility];  Service: Vascular    ME TOTAL HIP ARTHROPLASTY Left 5/20/2016    Procedure: ARTHROPLASTY HIP TOTAL ANTERIOR;  Surgeon: Antoine Rooney MD;  [facility];  Service: Orthopedics    ROTATOR CUFF REPAIR       Meds/Allergies: Allergies: Allergies   Allergen Reactions    Ciprofloxacin Diarrhea    Simvastatin Myalgia    Advair Diskus [Fluticasone-Salmeterol] Palpitations     Ok w flovent    Tetracycline Rash     Reaction Date: [id number];      Prior to Admission Medications   Prescriptions Last Dose Informant Patient Reported? Taking? Cholecalciferol (VITAMIN D-3 PO) 3/27/2021 at Unknown time Self Yes Yes   Sig: Take 1,000 Units by mouth daily     Fluticasone Propionate, Inhal, (Flovent Diskus) 250 MCG/BLIST AEPB   No No   Sig: Inhale 1 puff 2 (two) times a day   Multiple Vitamins-Minerals (PRESERVISION AREDS PO) 3/27/2021 at Unknown time Self Yes Yes   Sig: Take 1 Cap by Mouth daily   acetaminophen (TYLENOL) 500 mg tablet 3/27/2021 at Unknown time Self Yes Yes   Sig: Take 1,000 mg by mouth every 6 (six) hours as needed for mild pain    albuterol (2 5 mg/3 mL) 0 083 % nebulizer solution 3/26/2021 at Unknown time Self No Yes   Sig: Take 1 vial (2 5 mg total) by nebulization every 6 (six) hours as needed for shortness of breath   aspirin (ECOTRIN LOW STRENGTH) 81 mg EC tablet 3/26/2021 at Unknown time Self No Yes   Sig: Take 1 tablet (81 mg total) by mouth daily   clopidogrel (PLAVIX) 75 mg tablet 3/27/2021 at Unknown time  No Yes   Sig: Take 1 tablet (75 mg total) by mouth daily   diltiazem (CARDIZEM CD) 240 mg 24 hr capsule 3/27/2021 at Unknown time  No Yes   Sig: TAKE 1 CAPSULE (240 MG TOTAL) BY MOUTH DAILY   furosemide (LASIX) 20 mg tablet More than a month at Unknown time  No No   Sig: TAKE 1 TABLET (20 MG TOTAL) BY MOUTH DAILY   levothyroxine 125 mcg tablet 3/27/2021 at Unknown time  No Yes   Sig: TAKE 1 TABLET (125 MCG TOTAL) BY MOUTH DAILY   multivitamin (THERAGRAN) TABS 3/27/2021 at Unknown time Self Yes Yes   Sig: Take 1 tablet by mouth daily   omeprazole (PriLOSEC) 20 mg delayed release capsule More than a month at Unknown time Self Yes No   Sig: Take 1 capsule by mouth daily as needed    rosuvastatin (CRESTOR) 5 mg tablet 3/27/2021 at Unknown time Self No Yes   Sig: Uses twice weekly   Patient taking differently: Take by mouth Uses twice weekly   sulfamethoxazole-trimethoprim (BACTRIM DS) 800-160 mg per tablet Past Month at Unknown time  No Yes   Sig: Take 1 tablet by mouth every 12 (twelve) hours for 7 days Start 3 days before procedure      Facility-Administered Medications: None     Social History:     Social History     Socioeconomic History    Marital status:       Spouse name: Not on file    Number of children: Not on file    Years of education: Not on file    Highest education level: Not on file   Occupational History    Not on file   Social Needs    Financial resource strain: Not on file    Food insecurity     Worry: Not on file     Inability: Not on file    Transportation needs     Medical: Not on file     Non-medical: Not on file   Tobacco Use    Smoking status: Former Smoker     Types: Cigarettes     Quit date:      Years since quittin 2    Smokeless tobacco: Never Used   Substance and Sexual Activity    Alcohol use: Never     Frequency: Never    Drug use: No    Sexual activity: Not Currently     Birth control/protection: Post-menopausal   Lifestyle    Physical activity     Days per week: Not on file     Minutes per session: Not on file    Stress: Not on file   Relationships    Social connections     Talks on phone: Not on file     Gets together: Not on file     Attends Denominational service: Not on file     Active member of club or organization: Not on file     Attends meetings of clubs or organizations: Not on file     Relationship status: Not on file    Intimate partner violence     Fear of current or ex partner: Not on file     Emotionally abused: Not on file     Physically abused: Not on file     Forced sexual activity: Not on file   Other Topics Concern    Not on file   Social History Narrative    Caffeine use    Living independently         Patient Pre-hospital Living Situation:   Patient Pre-hospital Level of Mobility:   Patient Pre-hospital Diet Restrictions:     Family History:  Family History   Problem Relation Age of Onset    Breast cancer Mother     Throat cancer Family      Physical Exam:   Vitals:   Blood Pressure: (!) 137/102 (03/27/21 1832)  Pulse: 70 (03/27/21 1832)  Temperature: 97 9 °F (36 6 °C) (03/27/21 1832)  Temp Source: Temporal (03/27/21 1832)  Respirations: 18 (03/27/21 1832)  Weight - Scale: 89 8 kg (198 lb) (03/27/21 1414)  SpO2: 94 % (03/27/21 1832)    Physical Exam  Vitals signs reviewed  Constitutional:       General: She is not in acute distress  Appearance: Normal appearance  HENT:      Head: Atraumatic  Mouth/Throat:      Mouth: Mucous membranes are moist    Eyes:      General: No scleral icterus  Extraocular Movements: Extraocular movements intact  Neck:      Musculoskeletal: Neck supple  Cardiovascular:      Rate and Rhythm: Normal rate and regular rhythm  Heart sounds: Murmur present  Pulmonary:      Breath sounds: Decreased breath sounds and wheezing present  Abdominal:      General: Bowel sounds are normal       Palpations: Abdomen is soft  Tenderness: There is no guarding or rebound  Musculoskeletal:         General: Swelling (1+ lower extremities bilaterally) present  Skin:     General: Skin is warm  Neurological:      Mental Status: She is alert and oriented to person, place, and time  Mental status is at baseline  Psychiatric:         Mood and Affect: Mood normal        Lab Results: I have personally reviewed pertinent reports  Results from last 7 days   Lab Units 03/27/21  1440   WBC Thousand/uL 9 34   HEMOGLOBIN g/dL 12 1   HEMATOCRIT % 39 1   PLATELETS Thousands/uL 268   NEUTROS PCT % 76*   LYMPHS PCT % 14   MONOS PCT % 9   EOS PCT % 1     Results from last 7 days   Lab Units 03/27/21  1447   SODIUM mmol/L 139   POTASSIUM mmol/L 4 7   CHLORIDE mmol/L 103   CO2 mmol/L 28   ANION GAP mmol/L 8   BUN mg/dL 21   CREATININE mg/dL 1 45*   CALCIUM mg/dL 8 8   ALBUMIN g/dL 3 3*   TOTAL BILIRUBIN mg/dL 0 29   ALK PHOS U/L 87   ALT U/L 20   AST U/L 14   EGFR ml/min/1 73sq m 34   GLUCOSE RANDOM mg/dL 97     Results from last 7 days   Lab Units 03/27/21  1440   INR  1 10         Results from last 7 days   Lab Units 03/27/21  1440   LACTIC ACID mmol/L 0 9              Results from last 7 days   Lab Units 03/27/21  1545   COLOR UA  Miriam   CLARITY UA  Cloudy   SPEC GRAV UA  1 025   PH UA  7 0   LEUKOCYTES UA  Large*   NITRITE UA  Negative   GLUCOSE UA mg/dl Negative   KETONES UA mg/dl Negative   BILIRUBIN UA  Negative   BLOOD UA  Large*      Results from last 7 days   Lab Units 03/27/21  1545   RBC UA /hpf 30-50*   WBC UA /hpf Innumerable*   EPITHELIAL CELLS WET PREP /hpf Occasional   BACTERIA UA /hpf Innumerable*          Imaging: I have personally reviewed pertinent films in PACS  Ct Abdomen Pelvis Wo Contrast    Result Date: 3/27/2021  Impression: 1  Left ureteral stent in expected position  Persistent severe left hydronephrosis with decompression of the ureter  No evidence of obstructing calculus at the ureteropelvic junction  Persistent ureteritis and pyelonephritis not excluded   2  Nonobstructing calculi in left renal calyces measuring up to 7 5 mm  Workstation performed: [id number]       EKG, Pathology, and Other Studies Reviewed on Admission:       Allscripts/ Epic Records Reviewed: Yes    ** Please Note: This note has been constructed using a voice recognition system   **

## 2021-03-28 NOTE — ASSESSMENT & PLAN NOTE
· Pyelonephritis with extensive history of UTI and ureteral stenting  · Reviewed previous cultures with wide variety of pathogens ranging from eSBL to enterococcus    Will start levofloxacin and follow-up on urine cultures  · Consult urology as patient known to Dr Bud Arenas

## 2021-03-28 NOTE — PLAN OF CARE
Problem: Potential for Falls  Goal: Patient will remain free of falls  Description: INTERVENTIONS:  - Assess patient frequently for physical needs  -  Identify cognitive and physical deficits and behaviors that affect risk of falls    -  [place] fall precautions as indicated by assessment   - Educate patient/family on patient safety including physical limitations  - Instruct patient to call for assistance with activity based on assessment  - Modify environment to reduce risk of injury  - Consider OT/PT consult to assist with strengthening/mobility  Outcome: Progressing     Problem: GENITOURINARY - ADULT  Goal: Maintains or returns to baseline urinary function  Description: INTERVENTIONS:  - Assess urinary function  - Encourage oral fluids to ensure adequate hydration if ordered  - Administer IV fluids as ordered to ensure adequate hydration  - Administer ordered medications as needed  - Offer frequent toileting  - Follow urinary retention protocol if ordered  Outcome: Progressing  Goal: Absence of urinary retention  Description: INTERVENTIONS:  - Assess patients ability to void and empty bladder  - Monitor I/O  - Bladder scan as needed  - Discuss with physician/AP medications to alleviate retention as needed  - Discuss catheterization for long term situations as appropriate  Outcome: Progressing     Problem: METABOLIC, FLUID AND ELECTROLYTES - ADULT  Goal: Electrolytes maintained within normal limits  Description: INTERVENTIONS:  - Monitor labs and assess patient for signs and symptoms of electrolyte imbalances  - Administer electrolyte replacement as ordered  - Monitor response to electrolyte replacements, including repeat lab results as appropriate  - Instruct patient on fluid and nutrition as appropriate  Outcome: Progressing  Goal: Fluid balance maintained  Description: INTERVENTIONS:  - Monitor labs   - Monitor I/O and WT  - Instruct patient on fluid and nutrition as appropriate  - Assess for signs & symptoms of volume excess or deficit  Outcome: Progressing  Goal: Glucose maintained within target range  Description: INTERVENTIONS:  - Monitor Blood Glucose as ordered  - Assess for signs and symptoms of hyperglycemia and hypoglycemia  - Administer ordered medications to maintain glucose within target range  - Assess nutritional intake and initiate nutrition service referral as needed  Outcome: Progressing     Problem: SKIN/TISSUE INTEGRITY - ADULT  Goal: Skin integrity remains intact  Description: INTERVENTIONS  - Identify patients at risk for skin breakdown  - Assess and monitor skin integrity  - Assess and monitor nutrition and hydration status  - Monitor labs (i e  albumin)  - Assess for incontinence   - Turn and reposition patient  - Assist with mobility/ambulation  - Relieve pressure over bony prominences  - Avoid friction and shearing  - Provide appropriate hygiene as needed including keeping skin clean and dry  - Evaluate need for skin moisturizer/barrier cream  - Collaborate with interdisciplinary team (i e  Nutrition, Rehabilitation, etc )   - Patient/family teaching  Outcome: Progressing  Goal: Incision(s), wounds(s) or drain site(s) healing without S/S of infection  Description: INTERVENTIONS  - Assess and document risk factors for skin impairment   - Assess and document dressing, incision, wound bed, drain sites and surrounding tissue  - Consider nutrition services referral as needed  - Oral mucous membranes remain intact  - Provide patient/ family education  Outcome: Progressing  Goal: Oral mucous membranes remain intact  Description: INTERVENTIONS  - Assess oral mucosa and hygiene practices  - Implement preventative oral hygiene regimen  - Implement oral medicated treatments as ordered  - Initiate Nutrition services referral as needed  Outcome: Progressing     Problem: PAIN - ADULT  Goal: Verbalizes/displays adequate comfort level or baseline comfort level  Description: Interventions:  - Encourage patient to monitor pain and request assistance  - Assess pain using appropriate pain scale  - Administer analgesics based on type and severity of pain and evaluate response  - Implement non-pharmacological measures as appropriate and evaluate response  - Consider cultural and social influences on pain and pain management  - Notify physician/advanced practitioner if interventions unsuccessful or patient reports new pain  Outcome: Progressing     Problem: INFECTION - ADULT  Goal: Absence or prevention of progression during hospitalization  Description: INTERVENTIONS:  - Assess and monitor for signs and symptoms of infection  - Monitor lab/diagnostic results  - Monitor all insertion sites, i e  indwelling lines, tubes, and drains  - Monitor endotracheal if appropriate and nasal secretions for changes in amount and color  - [place] appropriate cooling/warming therapies per order  - Administer medications as ordered  - Instruct and encourage patient and family to use good hand hygiene technique  - Identify and instruct in appropriate isolation precautions for identified infection/condition  Outcome: Progressing  Goal: Absence of fever/infection during neutropenic period  Description: INTERVENTIONS:  - Monitor WBC    Outcome: Progressing     Problem: SAFETY ADULT  Goal: Patient will remain free of falls  Description: INTERVENTIONS:  - Assess patient frequently for physical needs  -  Identify cognitive and physical deficits and behaviors that affect risk of falls    -  [place] fall precautions as indicated by assessment   - Educate patient/family on patient safety including physical limitations  - Instruct patient to call for assistance with activity based on assessment  - Modify environment to reduce risk of injury  - Consider OT/PT consult to assist with strengthening/mobility  Outcome: Progressing  Goal: Maintain or return to baseline ADL function  Description: INTERVENTIONS:  -  Assess patient's ability to carry out ADLs; assess patient's baseline for ADL function and identify physical deficits which impact ability to perform ADLs (bathing, care of mouth/teeth, toileting, grooming, dressing, etc )  - Assess/evaluate cause of self-care deficits   - Assess range of motion  - Assess patient's mobility; develop plan if impaired  - Assess patient's need for assistive devices and provide as appropriate  - Encourage maximum independence but intervene and supervise when necessary  - Involve family in performance of ADLs  - Assess for home care needs following discharge   - Consider OT consult to assist with ADL evaluation and planning for discharge  - Provide patient education as appropriate  Outcome: Progressing  Goal: Maintain or return mobility status to optimal level  Description: INTERVENTIONS:  - Assess patient's baseline mobility status (ambulation, transfers, stairs, etc )    - Identify cognitive and physical deficits and behaviors that affect mobility  - Identify mobility aids required to assist with transfers and/or ambulation (gait belt, sit-to-stand, lift, walker, cane, etc )  - [place] fall precautions as indicated by assessment  - Record patient progress and toleration of activity level on Mobility SBAR; progress patient to next Phase/Stage  - Instruct patient to call for assistance with activity based on assessment  - Consider rehabilitation consult to assist with strengthening/weightbearing, etc   Outcome: Progressing     Problem: DISCHARGE PLANNING  Goal: Discharge to home or other facility with appropriate resources  Description: INTERVENTIONS:  - Identify barriers to discharge w/patient and caregiver  - Arrange for needed discharge resources and transportation as appropriate  - Identify discharge learning needs (meds, wound care, etc )  - Arrange for interpretive services to assist at discharge as needed  - Refer to Case Management Department for coordinating discharge planning if the patient needs post-hospital services based on physician/advanced practitioner order or complex needs related to functional status, cognitive ability, or social support system  Outcome: Progressing     Problem: Knowledge Deficit  Goal: Patient/family/caregiver demonstrates understanding of disease process, treatment plan, medications, and discharge instructions  Description: Complete learning assessment and assess knowledge base    Interventions:  - Provide teaching at level of understanding  - Provide teaching via preferred learning methods  Outcome: Progressing

## 2021-03-28 NOTE — ASSESSMENT & PLAN NOTE
· History of CVA with right-sided facial droop and dysphagia  · Continue DAPT and statin given history of carotid stenosis status post endarterectomy

## 2021-03-28 NOTE — CONSULTS
UROLOGY CONSULTATION NOTE     Patient Identifiers: Karen Florentino (MRN [id number])  Service Requesting Consultation: Internal Medicine   Service Providing Consultation:  Urology, Brian Zhou    Date of Service: 3/28/2021  Inpatient consult to Urology  Consult performed by: MAGNOLIA Barboza  Consult ordered by: Arnoldo Becmkan DO        Reason for Consultation: UTI    ASSESSMENT/PLAN:     Left pyelonephritis  · Pain improving since starting antibiotics  · WBC normalized  · ID following  · Blood cultures pending  · Urine culture pending  · Antibiotics changed today to Ampicillin  With continued improvement will transition to PO in next 24-48 days for total course of 10 days       Chronic left hydronephrosis  · Stable on CT, in good position  · Stent changed 2/16 with Dr Ambar Martin  · Creatinine improved 1 45/1 08      History of Present Illness:     Karen Florentino is a 78 y o  old with a history of history of chronic 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  Patient recently underwent stent exchanged on 2/16/21 with Dr Ambar Martin  Patient started 5 days ago with complaints of chills and dysuria  Her PCP started her on bactrim and unfortunately culture grew mixed contaminants and Actinomyces europaues  She developed left-sided flank pain and presented to ED  CT abd/pelvis revealed good stent position and likely pyelonephritis  Today patient states she feels much improved as pain as lessened from a 10 to 2  No nausea or vomiting, eating well       Past Medical, Past Surgical History:     Past Medical History:   Diagnosis Date    Arthritis     Asthma     At risk for falls     rib frac    Benign essential tremor 10/15/2018    Added automatically from request for surgery [id number] has stimulator    [facility]     Coronary artery disease     Diabetes mellitus ([facility]     borderline    Difficulty waking     post anesthesia    Edema     bles    GERD (gastroesophageal reflux disease)     High cholesterol     History of recent fall     broke several ribs    Hypertension     Hypothyroid     Kidney stone     Macular degeneration     Osteopenia     Osteoporosis     S/P deep brain stimulator placement     Shortness of breath     on exertion    Slurred speech     Stroke (HCC)     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     Urinary leakage     Uses roller walker     Vitamin D deficiency     Wears dentures     full upper    Wears glasses     reading   :    Past Surgical History:   Procedure Laterality Date    CATARACT EXTRACTION W/  INTRAOCULAR LENS IMPLANT Bilateral     COLONOSCOPY      CYSTOSCOPY W/ RETROGRADES Left 2/16/2021    Procedure: CYSTO, RETRO PYELOGRAM, STENT EXCHANGE;  Surgeon: Adrain Phalen, MD;  [facility];  Service: Urology    DEEP BRAIN STIMULATOR PLACEMENT      FL RETROGRADE PYELOGRAM  11/7/2019    FL RETROGRADE PYELOGRAM  5/26/2020    FL RETROGRADE PYELOGRAM  2/16/2021    IR NEPHROSTOMY TO NEPHROURETERAL STENT  1/10/2020    IR TUBE PLACEMENT  11/12/2019    KNEE ARTHROSCOPY      WA CYSTOURETHROSCOPY,URETER CATHETER Left 11/7/2019    Procedure: CYSTO RETROGRADE PYELOGRAM, URETEROSCOPY;  Surgeon: Jack Dan MD;  [facility];  Service: Urology    WA IMP STIM,CRANIAL,SUBQ,1 ARRAY Left 1/22/2019    Procedure: REPLACEMENT IMPLANTABLE PULSE GENERATOR (IPG) DEEP BRAIN STIMULATION (DBS), LEFT;  Surgeon: Jignesh Campa MD;  [facility];  Service: Neurosurgery    WA IMP STIM,CRANIAL,SUBQ,>1 ARRAY Right 12/13/2018    Procedure: REPLACEMENT RIGHT DBS IMPLANTABLE PULSE GENERATOR;  Surgeon: Jignesh Campa MD;  [facility];  Service: Neurosurgery    [place] [address] Left 10/27/2020    Procedure: CYSTOSCOPY, RETROGRADE PYELOGRAM, EXCHANGE STENT URETERA L, LEFT URETEROSCOPY;  Surgeon: Adrain Phalen, MD;  Location:  MAIN OR;  Service: Urology    WA RMVL & RPLCMT INTLY DWELLING URETERAL STENT PRQ Left 5/26/2020    Procedure: CYSTO, RETROGRADE PYELOGRAM, STENT;  Surgeon: Elissa Arreaga MD;  [facility];  Service: Urology    SD Karla Ang Right 2/18/2020    Procedure: CAROTID ENDARTERECTOMY WITH BOVINE PATCH;  Surgeon: Min Mcqueen MD;  [facility];  Service: Vascular    SD TOTAL HIP ARTHROPLASTY Left 5/20/2016    Procedure: ARTHROPLASTY HIP TOTAL ANTERIOR;  Surgeon: Yue Arboleda MD;  [facility];  Service: Orthopedics    ROTATOR CUFF REPAIR     :    Medications, Allergies:     Current Facility-Administered Medications   Medication Dose Route Frequency    acetaminophen (TYLENOL) tablet 650 mg  650 mg Oral Q6H PRN    ampicillin (OMNIPEN) 2,000 mg in sodium chloride 0 9 % 100 mL IVPB  2,000 mg Intravenous Q6H    aspirin (ECOTRIN LOW STRENGTH) EC tablet 81 mg  81 mg Oral Daily    cholecalciferol (VITAMIN D3) tablet 1,000 Units  1,000 Units Oral Daily    clopidogrel (PLAVIX) tablet 75 mg  75 mg Oral Daily    diltiazem (CARDIZEM CD) 24 hr capsule 240 mg  240 mg Oral Daily    fluticasone (FLOVENT HFA) 220 mcg/act inhaler 1 puff  1 puff Inhalation Q12H [facility]    furosemide (LASIX) tablet 20 mg  20 mg Oral Daily    heparin (porcine) subcutaneous injection 5,000 Units  5,000 Units Subcutaneous Q8H [facility]    levalbuterol (XOPENEX) inhalation solution 1 25 mg  1 25 mg Nebulization TID    levothyroxine tablet 125 mcg  125 mcg Oral Early Morning    ondansetron (ZOFRAN) injection 4 mg  4 mg Intravenous Q4H PRN    [START ON 3/29/2021] pravastatin (PRAVACHOL) tablet 40 mg  40 mg Oral Once per day on Mon Thu    sodium chloride 0 9 % inhalation solution **ADS Override Pull**        sodium chloride 0 9 % inhalation solution 3 mL  3 mL Nebulization TID       Allergies:   Allergies   Allergen Reactions    Ciprofloxacin Diarrhea    Simvastatin Myalgia    Advair Diskus [Fluticasone-Salmeterol] Palpitations     Ok w flovent    Tetracycline Rash     Reaction Date: 2012;    :    Social and Family History:   Social History:   Social History     Tobacco Use    Smoking status: Former Smoker     Types: Cigarettes     Quit date:      Years since quittin 2    Smokeless tobacco: Never Used   Substance Use Topics    Alcohol use: Never     Frequency: Never    Drug use: No        Social History     Tobacco Use   Smoking Status Former Smoker    Types: Cigarettes    Quit date:     Years since quittin 2   Smokeless Tobacco Never Used       Family History:  Family History   Problem Relation Age of Onset    Breast cancer Mother     Throat cancer Family    :     Review of Systems:     Review of Systems - As noted in HPI       Physical Exam:   General: Patient is pleasant and in NAD  Awake and alert  /63 (BP Location: Left arm)   Pulse 79   Temp 97 9 °F (36 6 °C) (Temporal)   Resp 18   Wt 89 8 kg (198 lb)   SpO2 91%   BMI 33 99 kg/m² Temp (24hrs), Av 9 °F (36 6 °C), Min:97 8 °F (36 6 °C), Max:97 9 °F (36 6 °C)  current; Temperature: 97 9 °F (36 6 °C)  I/O last 24 hours: In: 12 [P O :960; IV Piggyback:550]  Out: -     /63 (BP Location: Left arm)   Pulse 79   Temp 97 9 °F (36 6 °C) (Temporal)   Resp 18   Wt 89 8 kg (198 lb)   SpO2 91%   BMI 33 99 kg/m²   General appearance: alert and oriented, in no acute distress  Back: symmetric, no curvature  ROM normal  No CVA tenderness    Lungs: clear to auscultation bilaterally  Heart: regular rate and rhythm, S1, S2 normal, no murmur, click, rub or gallop  Abdomen: soft, non-tender; bowel sounds normal; no masses,  no organomegaly  Skin: Skin color, texture, turgor normal  No rashes or lesions    Labs:     Lab Results   Component Value Date    HGB 11 1 (L) 2021    HCT 36 3 2021    WBC 6 63 2021     2021   ]    Lab Results   Component Value Date     10/29/2014    K 4 6 2021     2021    CO2 24 2021    BUN 17 03/28/2021    CREATININE 1 08 03/28/2021    CALCIUM 8 7 03/28/2021    GLUCOSE 108 10/29/2014   ]    Imaging:   I personally reviewed the images and report of the following studies, and reviewed them with the patient:    CT A/P- Left ureteral stent in expected position  Persistent severe left hydronephrosis with decompression of the ureter  No evidence of obstructing calculus at the ureteropelvic junction  Persistent ureteritis and pyelonephritis not excluded  Nonobstructing calculi in left renal calyces measuring up to 7 5 mm  Thank you for allowing me to participate in this patients care  Please do not hesitate to call with any additional questions    Lady Tran

## 2021-03-28 NOTE — ASSESSMENT & PLAN NOTE
· Without exacerbation    Continue Flovent and add Xopenex  · Is complaining of cough, will trial Tussionex

## 2021-03-28 NOTE — ASSESSMENT & PLAN NOTE
· History of CVA with right-sided facial droop and dysphagia  · Continue DAPT given history of carotid stenosis

## 2021-03-28 NOTE — PLAN OF CARE
Problem: Potential for Falls  Goal: Patient will remain free of falls  Description: INTERVENTIONS:  - Assess patient frequently for physical needs  -  Identify cognitive and physical deficits and behaviors that affect risk of falls    -  [place] fall precautions as indicated by assessment   - Educate patient/family on patient safety including physical limitations  - Instruct patient to call for assistance with activity based on assessment  - Modify environment to reduce risk of injury  - Consider OT/PT consult to assist with strengthening/mobility  Outcome: Progressing     Problem: GENITOURINARY - ADULT  Goal: Maintains or returns to baseline urinary function  Description: INTERVENTIONS:  - Assess urinary function  - Encourage oral fluids to ensure adequate hydration if ordered  - Administer IV fluids as ordered to ensure adequate hydration  - Administer ordered medications as needed  - Offer frequent toileting  - Follow urinary retention protocol if ordered  Outcome: Progressing  Goal: Absence of urinary retention  Description: INTERVENTIONS:  - Assess patients ability to void and empty bladder  - Monitor I/O  - Bladder scan as needed  - Discuss with physician/AP medications to alleviate retention as needed  - Discuss catheterization for long term situations as appropriate  Outcome: Progressing     Problem: METABOLIC, FLUID AND ELECTROLYTES - ADULT  Goal: Electrolytes maintained within normal limits  Description: INTERVENTIONS:  - Monitor labs and assess patient for signs and symptoms of electrolyte imbalances  - Administer electrolyte replacement as ordered  - Monitor response to electrolyte replacements, including repeat lab results as appropriate  - Instruct patient on fluid and nutrition as appropriate  Outcome: Progressing  Goal: Fluid balance maintained  Description: INTERVENTIONS:  - Monitor labs   - Monitor I/O and WT  - Instruct patient on fluid and nutrition as appropriate  - Assess for signs & symptoms of volume excess or deficit  Outcome: Progressing  Goal: Glucose maintained within target range  Description: INTERVENTIONS:  - Monitor Blood Glucose as ordered  - Assess for signs and symptoms of hyperglycemia and hypoglycemia  - Administer ordered medications to maintain glucose within target range  - Assess nutritional intake and initiate nutrition service referral as needed  Outcome: Progressing     Problem: SKIN/TISSUE INTEGRITY - ADULT  Goal: Skin integrity remains intact  Description: INTERVENTIONS  - Identify patients at risk for skin breakdown  - Assess and monitor skin integrity  - Assess and monitor nutrition and hydration status  - Monitor labs (i e  albumin)  - Assess for incontinence   - Turn and reposition patient  - Assist with mobility/ambulation  - Relieve pressure over bony prominences  - Avoid friction and shearing  - Provide appropriate hygiene as needed including keeping skin clean and dry  - Evaluate need for skin moisturizer/barrier cream  - Collaborate with interdisciplinary team (i e  Nutrition, Rehabilitation, etc )   - Patient/family teaching  Outcome: Progressing  Goal: Incision(s), wounds(s) or drain site(s) healing without S/S of infection  Description: INTERVENTIONS  - Assess and document risk factors for skin impairment   - Assess and document dressing, incision, wound bed, drain sites and surrounding tissue  - Consider nutrition services referral as needed  - Oral mucous membranes remain intact  - Provide patient/ family education  Outcome: Progressing  Goal: Oral mucous membranes remain intact  Description: INTERVENTIONS  - Assess oral mucosa and hygiene practices  - Implement preventative oral hygiene regimen  - Implement oral medicated treatments as ordered  - Initiate Nutrition services referral as needed  Outcome: Progressing     Problem: PAIN - ADULT  Goal: Verbalizes/displays adequate comfort level or baseline comfort level  Description: Interventions:  - Encourage patient to monitor pain and request assistance  - Assess pain using appropriate pain scale  - Administer analgesics based on type and severity of pain and evaluate response  - Implement non-pharmacological measures as appropriate and evaluate response  - Consider cultural and social influences on pain and pain management  - Notify physician/advanced practitioner if interventions unsuccessful or patient reports new pain  Outcome: Progressing     Problem: INFECTION - ADULT  Goal: Absence or prevention of progression during hospitalization  Description: INTERVENTIONS:  - Assess and monitor for signs and symptoms of infection  - Monitor lab/diagnostic results  - Monitor all insertion sites, i e  indwelling lines, tubes, and drains  - Monitor endotracheal if appropriate and nasal secretions for changes in amount and color  - [place] appropriate cooling/warming therapies per order  - Administer medications as ordered  - Instruct and encourage patient and family to use good hand hygiene technique  - Identify and instruct in appropriate isolation precautions for identified infection/condition  Outcome: Progressing  Goal: Absence of fever/infection during neutropenic period  Description: INTERVENTIONS:  - Monitor WBC    Outcome: Progressing     Problem: SAFETY ADULT  Goal: Patient will remain free of falls  Description: INTERVENTIONS:  - Assess patient frequently for physical needs  -  Identify cognitive and physical deficits and behaviors that affect risk of falls    -  [place] fall precautions as indicated by assessment   - Educate patient/family on patient safety including physical limitations  - Instruct patient to call for assistance with activity based on assessment  - Modify environment to reduce risk of injury  - Consider OT/PT consult to assist with strengthening/mobility  Outcome: Progressing  Goal: Maintain or return to baseline ADL function  Description: INTERVENTIONS:  -  Assess patient's ability to carry out ADLs; assess patient's baseline for ADL function and identify physical deficits which impact ability to perform ADLs (bathing, care of mouth/teeth, toileting, grooming, dressing, etc )  - Assess/evaluate cause of self-care deficits   - Assess range of motion  - Assess patient's mobility; develop plan if impaired  - Assess patient's need for assistive devices and provide as appropriate  - Encourage maximum independence but intervene and supervise when necessary  - Involve family in performance of ADLs  - Assess for home care needs following discharge   - Consider OT consult to assist with ADL evaluation and planning for discharge  - Provide patient education as appropriate  Outcome: Progressing  Goal: Maintain or return mobility status to optimal level  Description: INTERVENTIONS:  - Assess patient's baseline mobility status (ambulation, transfers, stairs, etc )    - Identify cognitive and physical deficits and behaviors that affect mobility  - Identify mobility aids required to assist with transfers and/or ambulation (gait belt, sit-to-stand, lift, walker, cane, etc )  - [place] fall precautions as indicated by assessment  - Record patient progress and toleration of activity level on Mobility SBAR; progress patient to next Phase/Stage  - Instruct patient to call for assistance with activity based on assessment  - Consider rehabilitation consult to assist with strengthening/weightbearing, etc   Outcome: Progressing     Problem: DISCHARGE PLANNING  Goal: Discharge to home or other facility with appropriate resources  Description: INTERVENTIONS:  - Identify barriers to discharge w/patient and caregiver  - Arrange for needed discharge resources and transportation as appropriate  - Identify discharge learning needs (meds, wound care, etc )  - Arrange for interpretive services to assist at discharge as needed  - Refer to Case Management Department for coordinating discharge planning if the patient needs post-hospital services based on physician/advanced practitioner order or complex needs related to functional status, cognitive ability, or social support system  Outcome: Progressing     Problem: Knowledge Deficit  Goal: Patient/family/caregiver demonstrates understanding of disease process, treatment plan, medications, and discharge instructions  Description: Complete learning assessment and assess knowledge base    Interventions:  - Provide teaching at level of understanding  - Provide teaching via preferred learning methods  Outcome: Progressing

## 2021-03-28 NOTE — PROGRESS NOTES
[facility]  Progress Note - Mirtha Barnes 1940, 78 y o  female MRN: [id number]  Unit/Bed#: E5 -01 Encounter: [id number]  Primary Care Provider: Kirstie Corey DO   Date and time admitted to hospital: 3/27/2021  2:10 PM    * Pyelonephritis  Assessment & Plan  · Pyelonephritis with extensive history of UTI and ureteral stenting  Reviewed previous cultures with wide variety of pathogens ranging from eSBL to enterococcus  Cultures from 03/23 appears to be contamination with 80-89K CFU of Actinomyces  · Currently on levofloxacin  Appreciate ID recommendations - change to IV ampicillin  · Follow-up urine cultures and blood cultures  · Monitor fever curve and WBC count  Fortunately patient is stable from an infection standpoint  · Consult urology - patient with history of left ureteral stent, follows with Dr Eduardo Meckel, imaging shows persistent left severe hydro nephrosis, stent appears to be within proper position    PAD (peripheral artery disease) ([facility])  Assessment & Plan  · Continue ASA, Plavix and statin    Acquired hypothyroidism  Assessment & Plan  · Continue levothyroxine 125 mcg daily    Dyslipidemia  Assessment & Plan  · On rosuvastatin twice weekly  Will substitute with pravastatin    COPD (chronic obstructive pulmonary disease) ([facility])  Assessment & Plan  · Without exacerbation  Continue Flovent and Xopenex    History of CVA (cerebrovascular accident)  Assessment & Plan  · History of CVA with right-sided facial droop and dysphagia  · Continue DAPT and statin given history of carotid stenosis status post endarterectomy    Benign essential hypertension  Assessment & Plan  · Stable continue diltiazem and Lasix        VTE Pharmacologic Prophylaxis:   Pharmacologic: Heparin  Mechanical VTE Prophylaxis in Place: Yes    Patient Centered Rounds: I have performed bedside rounds with nursing staff today      Discussions with Specialists or Other Care Team Provider: Infectious disease and Urology    Education and Discussions with Family / Patient:  Discussed with patient    Time Spent for Care: 30 minutes  More than 50% of total time spent on counseling and coordination of care as described above  Current Length of Stay: 1 day(s)    Current Patient Status: Inpatient   Certification Statement: The patient will continue to require additional inpatient hospital stay due to Medical management as outlined above  IV Antibiotics for UTI and pyelonephritis    Discharge Plan:  Pending above hospital course  Anticipate can transition to oral antibiotics within the next 24-48 hours  Code Status: Level 1 - Full Code      Subjective:   Patient seen examined this morning at bedside  No acute events overnight  Patient remains afebrile  Patient is seen sitting up at a bed in the chair  Pain is well controlled at this time  Objective:     Vitals:   Temp (24hrs), Av 3 °F (36 8 °C), Min:97 8 °F (36 6 °C), Max:99 5 °F (37 5 °C)    Temp:  [97 8 °F (36 6 °C)-99 5 °F (37 5 °C)] 97 9 °F (36 6 °C)  HR:  [67-89] 79  Resp:  [18-20] 18  BP: (130-141)/() 141/63  SpO2:  [91 %-98 %] 93 %  Body mass index is 33 99 kg/m²  Input and Output Summary (last 24 hours): Intake/Output Summary (Last 24 hours) at 3/28/2021 1224  Last data filed at 3/28/2021 0501  Gross per 24 hour   Intake 1510 ml   Output --   Net 1510 ml       Physical Exam:     Physical Exam  Vitals signs and nursing note reviewed  Constitutional:       General: She is not in acute distress  Appearance: She is obese  She is not ill-appearing, toxic-appearing or diaphoretic  Cardiovascular:      Rate and Rhythm: Normal rate and regular rhythm  Pulmonary:      Effort: No respiratory distress  Breath sounds: No wheezing, rhonchi or rales  Abdominal:      General: There is no distension  Palpations: Abdomen is soft  There is no mass  Tenderness: There is no abdominal tenderness   There is no guarding  Hernia: A hernia is present  Musculoskeletal:         General: No swelling  Skin:     General: Skin is warm and dry  Neurological:      Mental Status: She is oriented to person, place, and time  Mental status is at baseline  Additional Data:     Labs:    Results from last 7 days   Lab Units 03/28/21  0539 03/27/21  1440   WBC Thousand/uL 6 63 9 34   HEMOGLOBIN g/dL 11 1* 12 1   HEMATOCRIT % 36 3 39 1   PLATELETS Thousands/uL 237 268   NEUTROS PCT %  --  76*   LYMPHS PCT %  --  14   MONOS PCT %  --  9   EOS PCT %  --  1     Results from last 7 days   Lab Units 03/28/21  0539   SODIUM mmol/L 137   POTASSIUM mmol/L 4 6   CHLORIDE mmol/L 103   CO2 mmol/L 24   BUN mg/dL 17   CREATININE mg/dL 1 08   ANION GAP mmol/L 10   CALCIUM mg/dL 8 7   ALBUMIN g/dL 2 9*   TOTAL BILIRUBIN mg/dL 0 22   ALK PHOS U/L 79   ALT U/L 15   AST U/L 15   GLUCOSE RANDOM mg/dL 109     Results from last 7 days   Lab Units 03/27/21  1440   INR  1 10             Results from last 7 days   Lab Units 03/27/21  1447 03/27/21  1440   LACTIC ACID mmol/L  --  0 9   PROCALCITONIN ng/ml <0 05  --            * I Have Reviewed All Lab Data Listed Above  * Additional Pertinent Lab Tests Reviewed: All Labs Within Last 24 Hours Reviewed      Recent Cultures (last 7 days):     Results from last 7 days   Lab Units 03/27/21  1447 03/27/21  1440 03/23/21   BLOOD CULTURE  Received in Microbiology Lab  Culture in Progress  Received in Microbiology Lab  Culture in Progress    --    URINE CULTURE   --   --  20,000-29,000 cfu/ml   80,000-89,000 cfu/ml - Presumptive Actinomyces europaeus -  Gram-positive raphael*       Last 24 Hours Medication List:   Current Facility-Administered Medications   Medication Dose Route Frequency Provider Last Rate    acetaminophen  650 mg Oral Q6H PRN Jonesneftali German DO      ampicillin  2,000 mg Intravenous Q6H Norah Romano MD 2,000 mg (03/28/21 1046)    aspirin  81 mg Oral Daily Jones Monserrat DO      cholecalciferol  1,000 Units Oral Daily Hershell Mickey, DO      clopidogrel  75 mg Oral Daily Hershell Mickey, DO      diltiazem  240 mg Oral Daily Hershell Mickey, DO      fluticasone  1 puff Inhalation Q12H [address] Hershell Mickey, DO      furosemide  20 mg Oral Daily Hershell Mickey, DO      heparin (porcine)  5,000 Units Subcutaneous [facility] Hershell Mickey, DO      levalbuterol  1 25 mg Nebulization TID Hershell Mickey, DO      levothyroxine  125 mcg Oral Early Morning Hershell Mickey, DO      ondansetron  4 mg Intravenous Q4H PRN Hershell Mickey, DO      [START ON 3/29/2021] pravastatin  40 mg Oral Once per day on Mon Thu Tonio Lucas, DO      sodium chloride           sodium chloride  3 mL Nebulization TID Antonio Montoya MD          Today, Patient Was Seen By: Antonio Montoya MD    ** Please Note: Dictation voice to text software may have been used in the creation of this document   **

## 2021-03-28 NOTE — ASSESSMENT & PLAN NOTE
· Pyelonephritis with extensive history of UTI and ureteral stenting  Reviewed previous cultures with wide variety of pathogens ranging from eSBL to enterococcus  Cultures from 03/23 appears to be contamination with 80-89K CFU of Actinomyces  · Currently on levofloxacin  Appreciate ID recommendations - change to IV ampicillin  · Follow-up urine cultures and blood cultures  · Monitor fever curve and WBC count  Fortunately patient is stable from an infection standpoint    · Consult urology - patient with history of left ureteral stent, follows with Dr Chikis Parra, imaging shows persistent left severe hydro nephrosis, stent appears to be within proper position

## 2021-03-28 NOTE — CONSULTS
Consultation - Infectious Disease   Mirtha Barnes 78 y o  female MRN: [id number]  Unit/Bed#: E5 -01 Encounter: [id number]      IMPRESSION & RECOMMENDATIONS:   Impression/Recommendations:  1  Left pyelonephritis  In setting of # 2  Recent urine culture with mixed contaminants, Actinomyces  Clinically stable without sepsis  Improving after dose of ceftriaxone  Rec:  · Change antibiotics to ampicillin  · Follow up repeat urine culture  · Follow up blood cultures  · Follow temperatures closely  · Recheck CBC in AM  · Supportive care as per the primary service  · If continues to improve anticipate transition to oral antibiotics within the next 24-48 hours with plan to complete 10 days total of treatment  2   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  Chronic indwelling stent  Status post recent routine exchange 2/16  Appears stable on current CT  Rec:  · Outpatient urology follow-up ongoing    3  DM  Recent A1c 6 4  The above impression and plan was discussed in detail with the patient  Antibiotics:  Levofloxacin # 1  Antibiotics # 2    Thank you for this consultation  We will follow along with you  HISTORY OF PRESENT ILLNESS:  Reason for Consult: Recurrent UTI    HPI: Mirtha Barnes is a 78 y o  female with chronic 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 unresponsive to balloon dilation with chronic indwelling stent  She recently underwent routine stent exchange on 02/16  She is known to have chronic bacteriuria and normally takes nitrofurantoin around the time of stent exchanges  She was seen by her PCP on 03/23 complaining of 1 week of chills and dysuria  She was treated with Bactrim  Her urine culture grew mixed contaminants and Actinomyces europaeus  She presented to the emergency department on 03/27 complaining of left flank pain  Upon presentation she was noted to be afebrile with a normal heart rate    Her labs revealed a normal WBC  She underwent a non contrast CT A/P which showed persistent chronic left hydronephrosis with stent in appropriate position  She was given a dose of ceftriaxone and started on levofloxacin  We are asked to comment on further evaluation and management  In performing this consult, I have reviewed prior admission and outpatient visit records in detail  REVIEW OF SYSTEMS:  Feels much better since admission  Denies current fevers, chills, sweats, nausea, vomiting, or diarrhea  A complete system-based review of systems is otherwise negative      PAST MEDICAL HISTORY:  Past Medical History:   Diagnosis Date    Arthritis     Asthma     At risk for falls     rib frac    Benign essential tremor 10/15/2018    Added automatically from request for surgery [id number] has stimulator    COPD [facility]chronic obstructive pulmonary disease) [facility] [address] [facility]     Coronary artery disease     Diabetes mellitus (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     Urinary leakage     Uses roller walker     Vitamin D deficiency     Wears dentures     full upper    Wears glasses     reading     Past Surgical History:   Procedure Laterality Date    CATARACT EXTRACTION W/  INTRAOCULAR LENS IMPLANT Bilateral     COLONOSCOPY      CYSTOSCOPY W/ RETROGRADES Left 2/16/2021    Procedure: CYSTO, RETRO PYELOGRAM, STENT EXCHANGE;  Surgeon: Laura Solomon MD;  [facility];  Service: Urology    DEEP BRAIN STIMULATOR PLACEMENT      FL RETROGRADE PYELOGRAM  11/7/2019    FL RETROGRADE PYELOGRAM  5/26/2020    FL RETROGRADE PYELOGRAM  2021    IR NEPHROSTOMY TO NEPHROURETERAL STENT  1/10/2020    IR TUBE PLACEMENT  2019    KNEE ARTHROSCOPY      TN CYSTOURETHROSCOPY,URETER CATHETER Left 2019    Procedure: CYSTO RETROGRADE PYELOGRAM, URETEROSCOPY;  Surgeon: Tonny Rdz MD;  [facility];  Service: Urology    TN IMP STIM,CRANIAL,SUBQ,1 ARRAY Left 2019    Procedure: REPLACEMENT IMPLANTABLE PULSE GENERATOR (IPG) DEEP BRAIN STIMULATION (DBS), LEFT;  Surgeon: Lia Garrison MD;  [facility];  Service: Neurosurgery    TN IMP STIM,CRANIAL,SUBQ,>1 ARRAY Right 2018    Procedure: REPLACEMENT RIGHT DBS IMPLANTABLE PULSE GENERATOR;  Surgeon: Lia Garrison MD;  [facility];  Service: Neurosurgery    [address] Left 10/27/2020    Procedure: CYSTOSCOPY, RETROGRADE PYELOGRAM, EXCHANGE STENT URETERA L, LEFT URETEROSCOPY;  Surgeon: Sophy Nogueira MD;  Location: [facility];  Service: Urology    TN RMVL & RPLCMT INTLY DWELLING URETERAL STENT PRQ Left 2020    Procedure: Adi Paget, STENT;  Surgeon: Sophy Nogueira MD;  [facility];  Service: Urology    TN THROMBOENDARTECTMY Zach Liz Right 2020    Procedure: CAROTID ENDARTERECTOMY WITH BOVINE PATCH;  Surgeon: Mendel Angel, MD;  [facility];  Service: Vascular    TN TOTAL HIP ARTHROPLASTY Left 2016    Procedure: ARTHROPLASTY HIP TOTAL ANTERIOR;  Surgeon: Jayna Block MD;  [facility];  Service: Orthopedics    ROTATOR CUFF REPAIR         FAMILY HISTORY:  Non-contributory    SOCIAL HISTORY:  Social History     Substance and Sexual Activity   Alcohol Use Never    Frequency: Never     Social History     Substance and Sexual Activity   Drug Use No     Social History     Tobacco Use   Smoking Status Former Smoker    Types: Cigarettes    Quit date:     Years since quittin 2   Smokeless Tobacco Never Used ALLERGIES:  Allergies   Allergen Reactions    Ciprofloxacin Diarrhea    Simvastatin Myalgia    Advair Diskus [Fluticasone-Salmeterol] Palpitations     Ok w flovent    Tetracycline Rash     Reaction Date: ;        MEDICATIONS:  All current active medications have been reviewed  PHYSICAL EXAM:  Vitals:  Temp:  [97 8 °F (36 6 °C)-99 5 °F (37 5 °C)] 97 9 °F (36 6 °C)  HR:  [67-89] 79  Resp:  [18-20] 18  BP: (130-141)/() 141/63  SpO2:  [91 %-98 %] 93 %  Temp (24hrs), Av 3 °F (36 8 °C), Min:97 8 °F (36 6 °C), Max:99 5 °F (37 5 °C)  Current: Temperature: 97 9 °F (36 6 °C)     Physical Exam:  General:  Well-nourished, well-developed, in no acute distress  Eyes:  Conjunctive clear with no hemorrhages or effusions  Oropharynx:  No ulcers, no lesions  Neck:  Supple, no lymphadenopathy  Lungs:  Normal respiratory excursion, no accessory muscle use  Cardiac:  Regular rate, extremities well perfused  Abdomen:  Soft, non-tender, non-distended  Extremities:  No peripheral cyanosis, clubbing, or edema  Skin:  No rashes, no ulcers  Neurological:  Moves all four extremities spontaneously, sensation grossly intact  :  Left CVA tenderness    LABS, IMAGING, & OTHER STUDIES:  Lab Results:  I have personally reviewed pertinent labs  Results from last 7 days   Lab Units 21  0539 21  1447   POTASSIUM mmol/L 4 6 4 7   CHLORIDE mmol/L 103 103   CO2 mmol/L 24 28   BUN mg/dL 17 21   CREATININE mg/dL 1 08 1 45*   EGFR ml/min/1 73sq m 48 34   CALCIUM mg/dL 8 7 8 8   AST U/L 15 14   ALT U/L 15 20   ALK PHOS U/L 79 87     Results from last 7 days   Lab Units 21  0539 21  1440   WBC Thousand/uL 6 63 9 34   HEMOGLOBIN g/dL 11 1* 12 1   PLATELETS Thousands/uL 237 268     Results from last 7 days   Lab Units 21  1447 21  1440 21   BLOOD CULTURE  Received in Microbiology Lab  Culture in Progress  Received in Microbiology Lab  Culture in Progress    --    URINE CULTURE   --   -- 20,000-29,000 cfu/ml   80,000-89,000 cfu/ml - Presumptive Actinomyces europaeus -  Gram-positive raphael*       Imaging Studies:   I have personally reviewed pertinent imaging study reports and images in PACS  CT A/P noncontrast reviewed personally persistent chronic left hydronephrosis with stent in appropriate position    EKG, Pathology, and Other Studies:   I have personally reviewed pertinent reports

## 2021-03-29 LAB
ANION GAP SERPL CALCULATED.3IONS-SCNC: 11 MMOL/L (ref 4–13)
BACTERIA UR CULT: NORMAL
BASOPHILS # BLD AUTO: 0.04 THOUSANDS/ΜL (ref 0–0.1)
BASOPHILS NFR BLD AUTO: 1 % (ref 0–1)
BUN SERPL-MCNC: 20 MG/DL (ref 5–25)
CALCIUM SERPL-MCNC: 8.6 MG/DL (ref 8.3–10.1)
CHLORIDE SERPL-SCNC: 105 MMOL/L (ref 100–108)
CO2 SERPL-SCNC: 25 MMOL/L (ref 21–32)
CREAT SERPL-MCNC: 1.2 MG/DL (ref 0.6–1.3)
EOSINOPHIL # BLD AUTO: 0.27 THOUSAND/ΜL (ref 0–0.61)
EOSINOPHIL NFR BLD AUTO: 4 % (ref 0–6)
ERYTHROCYTE [DISTWIDTH] IN BLOOD BY AUTOMATED COUNT: 14.4 % (ref 11.6–15.1)
GFR SERPL CREATININE-BSD FRML MDRD: 42 ML/MIN/1.73SQ M
GLUCOSE SERPL-MCNC: 122 MG/DL (ref 65–140)
HCT VFR BLD AUTO: 34.5 % (ref 34.8–46.1)
HGB BLD-MCNC: 10.6 G/DL (ref 11.5–15.4)
IMM GRANULOCYTES # BLD AUTO: 0.03 THOUSAND/UL (ref 0–0.2)
IMM GRANULOCYTES NFR BLD AUTO: 1 % (ref 0–2)
LYMPHOCYTES # BLD AUTO: 1.29 THOUSANDS/ΜL (ref 0.6–4.47)
LYMPHOCYTES NFR BLD AUTO: 21 % (ref 14–44)
MCH RBC QN AUTO: 27 PG (ref 26.8–34.3)
MCHC RBC AUTO-ENTMCNC: 30.7 G/DL (ref 31.4–37.4)
MCV RBC AUTO: 88 FL (ref 82–98)
MONOCYTES # BLD AUTO: 0.54 THOUSAND/ΜL (ref 0.17–1.22)
MONOCYTES NFR BLD AUTO: 9 % (ref 4–12)
NEUTROPHILS # BLD AUTO: 4.02 THOUSANDS/ΜL (ref 1.85–7.62)
NEUTS SEG NFR BLD AUTO: 64 % (ref 43–75)
NRBC BLD AUTO-RTO: 0 /100 WBCS
PLATELET # BLD AUTO: 247 THOUSANDS/UL (ref 149–390)
PMV BLD AUTO: 9.7 FL (ref 8.9–12.7)
POTASSIUM SERPL-SCNC: 4.6 MMOL/L (ref 3.5–5.3)
RBC # BLD AUTO: 3.92 MILLION/UL (ref 3.81–5.12)
SODIUM SERPL-SCNC: 141 MMOL/L (ref 136–145)
WBC # BLD AUTO: 6.19 THOUSAND/UL (ref 4.31–10.16)

## 2021-03-29 PROCEDURE — 99232 SBSQ HOSP IP/OBS MODERATE 35: CPT | Performed by: PHYSICIAN ASSISTANT

## 2021-03-29 PROCEDURE — 97163 PT EVAL HIGH COMPLEX 45 MIN: CPT

## 2021-03-29 PROCEDURE — 94760 N-INVAS EAR/PLS OXIMETRY 1: CPT

## 2021-03-29 PROCEDURE — 94640 AIRWAY INHALATION TREATMENT: CPT

## 2021-03-29 PROCEDURE — 99232 SBSQ HOSP IP/OBS MODERATE 35: CPT | Performed by: INTERNAL MEDICINE

## 2021-03-29 PROCEDURE — 99233 SBSQ HOSP IP/OBS HIGH 50: CPT | Performed by: INTERNAL MEDICINE

## 2021-03-29 PROCEDURE — 85025 COMPLETE CBC W/AUTO DIFF WBC: CPT | Performed by: INTERNAL MEDICINE

## 2021-03-29 PROCEDURE — 80048 BASIC METABOLIC PNL TOTAL CA: CPT | Performed by: INTERNAL MEDICINE

## 2021-03-29 RX ORDER — LEVALBUTEROL INHALATION SOLUTION 0.63 MG/3ML
0.63 SOLUTION RESPIRATORY (INHALATION) ONCE
Status: DISCONTINUED | OUTPATIENT
Start: 2021-03-29 | End: 2021-03-30 | Stop reason: HOSPADM

## 2021-03-29 RX ORDER — GUAIFENESIN 600 MG
600 TABLET, EXTENDED RELEASE 12 HR ORAL EVERY 12 HOURS SCHEDULED
Status: DISCONTINUED | OUTPATIENT
Start: 2021-03-29 | End: 2021-03-30 | Stop reason: HOSPADM

## 2021-03-29 RX ADMIN — ISODIUM CHLORIDE 3 ML: 0.03 SOLUTION RESPIRATORY (INHALATION) at 07:11

## 2021-03-29 RX ADMIN — ISODIUM CHLORIDE 3 ML: 0.03 SOLUTION RESPIRATORY (INHALATION) at 13:08

## 2021-03-29 RX ADMIN — HEPARIN SODIUM 5000 UNITS: 5000 INJECTION INTRAVENOUS; SUBCUTANEOUS at 21:33

## 2021-03-29 RX ADMIN — LEVALBUTEROL HYDROCHLORIDE 1.25 MG: 1.25 SOLUTION, CONCENTRATE RESPIRATORY (INHALATION) at 13:07

## 2021-03-29 RX ADMIN — HEPARIN SODIUM 5000 UNITS: 5000 INJECTION INTRAVENOUS; SUBCUTANEOUS at 14:34

## 2021-03-29 RX ADMIN — PRAVASTATIN SODIUM 40 MG: 40 TABLET ORAL at 17:20

## 2021-03-29 RX ADMIN — AMPICILLIN SODIUM 2000 MG: 2 INJECTION, POWDER, FOR SOLUTION INTRAMUSCULAR; INTRAVENOUS at 21:34

## 2021-03-29 RX ADMIN — AMPICILLIN SODIUM 2000 MG: 2 INJECTION, POWDER, FOR SOLUTION INTRAMUSCULAR; INTRAVENOUS at 09:45

## 2021-03-29 RX ADMIN — FLUTICASONE PROPIONATE 1 PUFF: 220 AEROSOL, METERED RESPIRATORY (INHALATION) at 09:40

## 2021-03-29 RX ADMIN — AMPICILLIN SODIUM 2000 MG: 2 INJECTION, POWDER, FOR SOLUTION INTRAMUSCULAR; INTRAVENOUS at 03:55

## 2021-03-29 RX ADMIN — LEVOTHYROXINE SODIUM 125 MCG: 125 TABLET ORAL at 05:10

## 2021-03-29 RX ADMIN — AMPICILLIN SODIUM 2000 MG: 2 INJECTION, POWDER, FOR SOLUTION INTRAMUSCULAR; INTRAVENOUS at 17:20

## 2021-03-29 RX ADMIN — GUAIFENESIN 600 MG: 600 TABLET ORAL at 21:33

## 2021-03-29 RX ADMIN — CLOPIDOGREL BISULFATE 75 MG: 75 TABLET ORAL at 09:41

## 2021-03-29 RX ADMIN — LEVALBUTEROL HYDROCHLORIDE 1.25 MG: 1.25 SOLUTION, CONCENTRATE RESPIRATORY (INHALATION) at 19:24

## 2021-03-29 RX ADMIN — ASPIRIN 81 MG: 81 TABLET, COATED ORAL at 09:40

## 2021-03-29 RX ADMIN — HEPARIN SODIUM 5000 UNITS: 5000 INJECTION INTRAVENOUS; SUBCUTANEOUS at 05:10

## 2021-03-29 RX ADMIN — ISODIUM CHLORIDE 3 ML: 0.03 SOLUTION RESPIRATORY (INHALATION) at 19:24

## 2021-03-29 RX ADMIN — DILTIAZEM HYDROCHLORIDE 240 MG: 240 CAPSULE, COATED, EXTENDED RELEASE ORAL at 09:41

## 2021-03-29 RX ADMIN — FLUTICASONE PROPIONATE 1 PUFF: 220 AEROSOL, METERED RESPIRATORY (INHALATION) at 21:33

## 2021-03-29 RX ADMIN — LEVALBUTEROL HYDROCHLORIDE 1.25 MG: 1.25 SOLUTION, CONCENTRATE RESPIRATORY (INHALATION) at 07:11

## 2021-03-29 RX ADMIN — GUAIFENESIN 600 MG: 600 TABLET ORAL at 14:29

## 2021-03-29 RX ADMIN — Medication 1000 UNITS: at 09:40

## 2021-03-29 RX ADMIN — FUROSEMIDE 20 MG: 20 TABLET ORAL at 09:41

## 2021-03-29 NOTE — PLAN OF CARE
Problem: Potential for Falls  Goal: Patient will remain free of falls  Description: INTERVENTIONS:  - Assess patient frequently for physical needs  -  Identify cognitive and physical deficits and behaviors that affect risk of falls    -  [place] fall precautions as indicated by assessment   - Educate patient/family on patient safety including physical limitations  - Instruct patient to call for assistance with activity based on assessment  - Modify environment to reduce risk of injury  - Consider OT/PT consult to assist with strengthening/mobility  Outcome: Progressing     Problem: GENITOURINARY - ADULT  Goal: Maintains or returns to baseline urinary function  Description: INTERVENTIONS:  - Assess urinary function  - Encourage oral fluids to ensure adequate hydration if ordered  - Administer IV fluids as ordered to ensure adequate hydration  - Administer ordered medications as needed  - Offer frequent toileting  - Follow urinary retention protocol if ordered  Outcome: Progressing  Goal: Absence of urinary retention  Description: INTERVENTIONS:  - Assess patients ability to void and empty bladder  - Monitor I/O  - Bladder scan as needed  - Discuss with physician/AP medications to alleviate retention as needed  - Discuss catheterization for long term situations as appropriate  Outcome: Progressing     Problem: METABOLIC, FLUID AND ELECTROLYTES - ADULT  Goal: Electrolytes maintained within normal limits  Description: INTERVENTIONS:  - Monitor labs and assess patient for signs and symptoms of electrolyte imbalances  - Administer electrolyte replacement as ordered  - Monitor response to electrolyte replacements, including repeat lab results as appropriate  - Instruct patient on fluid and nutrition as appropriate  Outcome: Progressing  Goal: Fluid balance maintained  Description: INTERVENTIONS:  - Monitor labs   - Monitor I/O and WT  - Instruct patient on fluid and nutrition as appropriate  - Assess for signs & symptoms of volume excess or deficit  Outcome: Progressing  Goal: Glucose maintained within target range  Description: INTERVENTIONS:  - Monitor Blood Glucose as ordered  - Assess for signs and symptoms of hyperglycemia and hypoglycemia  - Administer ordered medications to maintain glucose within target range  - Assess nutritional intake and initiate nutrition service referral as needed  Outcome: Progressing     Problem: SKIN/TISSUE INTEGRITY - ADULT  Goal: Skin integrity remains intact  Description: INTERVENTIONS  - Identify patients at risk for skin breakdown  - Assess and monitor skin integrity  - Assess and monitor nutrition and hydration status  - Monitor labs (i e  albumin)  - Assess for incontinence   - Turn and reposition patient  - Assist with mobility/ambulation  - Relieve pressure over bony prominences  - Avoid friction and shearing  - Provide appropriate hygiene as needed including keeping skin clean and dry  - Evaluate need for skin moisturizer/barrier cream  - Collaborate with interdisciplinary team (i e  Nutrition, Rehabilitation, etc )   - Patient/family teaching  Outcome: Progressing  Goal: Incision(s), wounds(s) or drain site(s) healing without S/S of infection  Description: INTERVENTIONS  - Assess and document risk factors for skin impairment   - Assess and document dressing, incision, wound bed, drain sites and surrounding tissue  - Consider nutrition services referral as needed  - Oral mucous membranes remain intact  - Provide patient/ family education  Outcome: Progressing  Goal: Oral mucous membranes remain intact  Description: INTERVENTIONS  - Assess oral mucosa and hygiene practices  - Implement preventative oral hygiene regimen  - Implement oral medicated treatments as ordered  - Initiate Nutrition services referral as needed  Outcome: Progressing     Problem: PAIN - ADULT  Goal: Verbalizes/displays adequate comfort level or baseline comfort level  Description: Interventions:  - Encourage patient to monitor pain and request assistance  - Assess pain using appropriate pain scale  - Administer analgesics based on type and severity of pain and evaluate response  - Implement non-pharmacological measures as appropriate and evaluate response  - Consider cultural and social influences on pain and pain management  - Notify physician/advanced practitioner if interventions unsuccessful or patient reports new pain  Outcome: Progressing     Problem: INFECTION - ADULT  Goal: Absence or prevention of progression during hospitalization  Description: INTERVENTIONS:  - Assess and monitor for signs and symptoms of infection  - Monitor lab/diagnostic results  - Monitor all insertion sites, i e  indwelling lines, tubes, and drains  - Monitor endotracheal if appropriate and nasal secretions for changes in amount and color  - [place] appropriate cooling/warming therapies per order  - Administer medications as ordered  - Instruct and encourage patient and family to use good hand hygiene technique  - Identify and instruct in appropriate isolation precautions for identified infection/condition  Outcome: Progressing  Goal: Absence of fever/infection during neutropenic period  Description: INTERVENTIONS:  - Monitor WBC    Outcome: Progressing     Problem: SAFETY ADULT  Goal: Patient will remain free of falls  Description: INTERVENTIONS:  - Assess patient frequently for physical needs  -  Identify cognitive and physical deficits and behaviors that affect risk of falls    -  [place] fall precautions as indicated by assessment   - Educate patient/family on patient safety including physical limitations  - Instruct patient to call for assistance with activity based on assessment  - Modify environment to reduce risk of injury  - Consider OT/PT consult to assist with strengthening/mobility  Outcome: Progressing  Goal: Maintain or return to baseline ADL function  Description: INTERVENTIONS:  -  Assess patient's ability to carry out ADLs; assess patient's baseline for ADL function and identify physical deficits which impact ability to perform ADLs (bathing, care of mouth/teeth, toileting, grooming, dressing, etc )  - Assess/evaluate cause of self-care deficits   - Assess range of motion  - Assess patient's mobility; develop plan if impaired  - Assess patient's need for assistive devices and provide as appropriate  - Encourage maximum independence but intervene and supervise when necessary  - Involve family in performance of ADLs  - Assess for home care needs following discharge   - Consider OT consult to assist with ADL evaluation and planning for discharge  - Provide patient education as appropriate  Outcome: Progressing  Goal: Maintain or return mobility status to optimal level  Description: INTERVENTIONS:  - Assess patient's baseline mobility status (ambulation, transfers, stairs, etc )    - Identify cognitive and physical deficits and behaviors that affect mobility  - Identify mobility aids required to assist with transfers and/or ambulation (gait belt, sit-to-stand, lift, walker, cane, etc )  - [place] fall precautions as indicated by assessment  - Record patient progress and toleration of activity level on Mobility SBAR; progress patient to next Phase/Stage  - Instruct patient to call for assistance with activity based on assessment  - Consider rehabilitation consult to assist with strengthening/weightbearing, etc   Outcome: Progressing     Problem: DISCHARGE PLANNING  Goal: Discharge to home or other facility with appropriate resources  Description: INTERVENTIONS:  - Identify barriers to discharge w/patient and caregiver  - Arrange for needed discharge resources and transportation as appropriate  - Identify discharge learning needs (meds, wound care, etc )  - Arrange for interpretive services to assist at discharge as needed  - Refer to Case Management Department for coordinating discharge planning if the patient needs post-hospital services based on physician/advanced practitioner order or complex needs related to functional status, cognitive ability, or social support system  Outcome: Progressing     Problem: Knowledge Deficit  Goal: Patient/family/caregiver demonstrates understanding of disease process, treatment plan, medications, and discharge instructions  Description: Complete learning assessment and assess knowledge base    Interventions:  - Provide teaching at level of understanding  - Provide teaching via preferred learning methods  Outcome: Progressing

## 2021-03-29 NOTE — ASSESSMENT & PLAN NOTE
· Pyelonephritis with extensive history of UTI and ureteral stenting  Reviewed previous cultures with wide variety of pathogens ranging from eSBL to enterococcus  · Urine Culture from 03/23 - 80,000-89,000 cfu/ml - Presumptive Actinomyces europaeus -  Gram-positive rodAbnormal    · Initially on levofloxacin  Appreciate ID recommendations - antibiotics changed to IV ampicillin 3/28  Recommending continue IV antibiotics for another 24 hours before transition to PO antibiotics  · Monitor fever curve and WBC count - stable  · Urology consult appreciated - patient with history of left ureteral stent, follows with Dr Carito Cervantes, imaging shows persistent left severe hydro nephrosis, stent appears to be within proper position, creatinine stable    Agree with IV antibiotics

## 2021-03-29 NOTE — PROGRESS NOTES
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 as well as some renal calculi  Managed with chronic indwelling left JJ ureteral stent, last exchanged 2/16/2021, q3-4 months  Acute flank pain and LUTS last week -->  concern for ascending UTI- improving on IV ampicillin per ID recommendations and urine c&s  Now pain free  She has been afebrile throughout her course  WBC and creatinine have improved as well  She can likely transition to PO abx and discharge home tomorrow and follow with us in the office as planned prior to next stent change    Urology will sign off but remain available for any further inpatient needs  Please feel free to contact the provider currently [facility] role for [facility] with questions or concerns  Subjective: Pain is resolved  Took 1 dose of tylenol yesterday  No fevers or chills  Eating full diet  Urinating OK  No hematuria  Only c/o this morning is a raspy cough  Taking her regular nebulizers here  Reports is walking regularly in her room and to the restroom and feels gait and strength is at baseline for her  Review of Systems   Constitutional: Negative for activity change, appetite change, chills, fever and unexpected weight change  HENT: Negative  Respiratory: Positive for cough and wheezing  Negative for shortness of breath and stridor  Cardiovascular: Negative  Negative for chest pain  Gastrointestinal: Negative for abdominal pain, diarrhea, nausea and vomiting  Endocrine: Negative  Genitourinary: Negative for decreased urine volume, difficulty urinating, dysuria, flank pain, frequency, hematuria and urgency  Musculoskeletal: Negative for back pain and gait problem  Skin: Negative  Allergic/Immunologic: Negative      Neurological: Negative  Hematological: Negative for adenopathy  Does not bruise/bleed easily  Objective:  Vitals: Blood pressure 122/64, pulse 84, temperature 98 1 °F (36 7 °C), temperature source Temporal, resp  rate 18, weight 89 8 kg (198 lb), SpO2 95 %, not currently breastfeeding  ,Body mass index is 33 99 kg/m²  Intake/Output Summary (Last 24 hours) at 3/29/2021 1412  Last data filed at 3/29/2021 1347  Gross per 24 hour   Intake 1843 ml   Output --   Net 1843 ml     Invasive Devices     Peripheral Intravenous Line            Peripheral IV 03/28/21 Dorsal (posterior); Left Forearm less than 1 day          Drain            Ureteral Drain/Stent Left ureter 8 Fr  426 days    Ureteral Drain/Stent Left ureter 7 Fr  40 days                Physical Exam  Vitals signs and nursing note reviewed  Constitutional:       General: She is not in acute distress  Appearance: She is well-developed  She is obese  She is not diaphoretic  Comments: Older white female seated in chair at bedside, alert conversive and very pleasant lady   HENT:      Head: Normocephalic and atraumatic  Cardiovascular:      Rate and Rhythm: Normal rate and regular rhythm  Pulmonary:      Effort: Pulmonary effort is normal       Breath sounds: Normal breath sounds  Comments: Coarse BS bilateral bases, clears with cough  Abdominal:      General: Bowel sounds are normal       Palpations: Abdomen is soft  Tenderness: There is no abdominal tenderness  There is no right CVA tenderness or left CVA tenderness  Musculoskeletal:      Right lower leg: No edema  Left lower leg: No edema  Comments: SCDs in place   Skin:     General: Skin is warm  Capillary Refill: Capillary refill takes less than 2 seconds  Neurological:      General: No focal deficit present  Mental Status: She is alert and oriented to person, place, and time        Gait: Gait normal    Psychiatric:         Speech: Speech normal          Behavior: Behavior normal          Labs:  Recent Labs     03/27/21  1440 03/28/21  0539 03/29/21  0520   WBC 9 34 6 63 6 19     Recent Labs     03/27/21  1440 03/28/21  0539 03/29/21  0520   HGB 12 1 11 1* 10 6*       Recent Labs     03/27/21  1447 03/28/21  0539 03/29/21  0520   CREATININE 1 45* 1 08 1 20       History:    Past Medical History:   Diagnosis Date    Arthritis     Asthma     At risk for falls     rib frac    Benign essential tremor 10/15/2018    Added automatically from request for surgery [id number] has stimulator    [facility]) [facility]     Coronary artery disease     Diabetes mellitus ([facility] )     borderline    Difficulty waking     post anesthesia    Edema     bles    GERD (gastroesophageal reflux disease)     High cholesterol     History of recent fall     broke several ribs    Hypertension     Hypothyroid     Kidney stone     Macular degeneration     Osteopenia     Osteoporosis     S/P deep brain stimulator placement     Shortness of breath     on exertion    Slurred speech     Stroke (HCC)     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     Urinary leakage     Uses roller walker     Vitamin D deficiency     Wears dentures     full upper    Wears glasses     reading     Past Surgical History:   Procedure Laterality Date    CATARACT EXTRACTION W/  INTRAOCULAR LENS IMPLANT Bilateral     COLONOSCOPY      CYSTOSCOPY W/ RETROGRADES Left 2/16/2021    Procedure: CYSTO, RETRO PYELOGRAM, STENT EXCHANGE;  Surgeon: Alejandra White MD;  [facility];  Service: Urology    DEEP BRAIN STIMULATOR PLACEMENT      FL RETROGRADE PYELOGRAM  11/7/2019    FL RETROGRADE PYELOGRAM  5/26/2020    FL RETROGRADE PYELOGRAM  2/16/2021    IR NEPHROSTOMY TO NEPHROURETERAL STENT  1/10/2020    IR TUBE PLACEMENT  11/12/2019    KNEE ARTHROSCOPY      CA CYSTOURETHROSCOPY,URETER CATHETER Left 11/7/2019    Procedure: CYSTO RETROGRADE PYELOGRAM, URETEROSCOPY;  Surgeon: Bridget Pike MD; [facility];  Service: Urology    WY IMP STIM,CRANIAL,SUBQ,1 ARRAY Left 2019    Procedure: REPLACEMENT IMPLANTABLE PULSE GENERATOR (IPG) DEEP BRAIN STIMULATION (DBS), LEFT;  Surgeon: Jignesh Campa MD;  [facility];  Service: Neurosurgery    WY IMP STIM,CRANIAL,SUBQ,>1 ARRAY Right 2018    Procedure: REPLACEMENT RIGHT DBS IMPLANTABLE PULSE GENERATOR;  Surgeon: Jignesh Campa MD;  [facility];  Service: Neurosurgery    [address] Left 10/27/2020    Procedure: Jaquelin Son PYELOGRAM, EXCHANGE STENT URETERA L, LEFT URETEROSCOPY;  Surgeon: Adrain Phalen, MD;  Location: [address] MAIN OR;  Service: Urology    WY RMVL & RPLCMT INTLY DWELLING URETERAL STENT PRQ Left 2020    Procedure: Eusebio Eisenmenger, STENT;  Surgeon: Adrain Phalen, MD;  [facility];  Service: Urology    WY THROMBOENDARTECTMY Kiesha Dach Right 2020    Procedure: CAROTID ENDARTERECTOMY WITH BOVINE PATCH;  Surgeon: Yvette Chavez MD;  [facility];  Service: Vascular    WY TOTAL HIP ARTHROPLASTY Left 2016    Procedure: ARTHROPLASTY HIP TOTAL ANTERIOR;  Surgeon: Kurt Parra MD;  [facility];  Service: Orthopedics    ROTATOR CUFF REPAIR       Family History   Problem Relation Age of Onset    Breast cancer Mother     Throat cancer Family      Social History     Socioeconomic History    Marital status:       Spouse name: None    Number of children: None    Years of education: None    Highest education level: None   Occupational History    None   Social Needs    Financial resource strain: None    Food insecurity     Worry: None     Inability: None    Transportation needs     Medical: None     Non-medical: None   Tobacco Use    Smoking status: Former Smoker     Types: Cigarettes     Quit date:      Years since quittin 2    Smokeless tobacco: Never Used   Substance and Sexual Activity    Alcohol use: Never     Frequency: Never    Drug use: No    Sexual activity: Not Currently     Birth control/protection: Post-menopausal   Lifestyle    Physical activity     Days per week: None     Minutes per session: None    Stress: None   Relationships    Social connections     Talks on phone: None     Gets together: None     Attends [employer] service: None     Active member of club or organization: None     Attends meetings of clubs or organizations: None     Relationship status: None    Intimate partner violence     Fear of current or ex partner: None     Emotionally abused: None     Physically abused: None     Forced sexual activity: None   Other Topics Concern    None   Social History Narrative    Caffeine use    Living independently             [place], [place]  Date: 3/29/2021 Time: 2:12 PM

## 2021-03-29 NOTE — CASE MANAGEMENT
LOS: 2; GMLOS: 0  Bundle:   Unplanned Readmission Score:   30 Day Readmission:      Pt presented to [facility] with Pyelonephritis  CM completed pt's assessment,  Explained the role of CM  Obtained the following information from patient  Pt Emergency contact is pt's Shamir Ramsey (Daughter)Ph: [phone] (M)  Home: Pt resides at [facility] ([address] [place] [address]): Address: [address] [place] [address], Pt has elevator access to fourth floor   Lives With: Residents/friends   ADL's: Pt's is able to complete her household chores by herself also her Assisted living staff manages pt's care  DME: Ruthine Prime   Ambulation: Minimum assist   Transportation: Do not drive   Ph[facility]: [facility]/ Mail delivery   PCP: Bao Rivera   [facility] Hx: None, PT recommendation was home with social support, CM discussed FOC, pt refused [place] [id number] services as she lives in [address]  Rehab Hx: None  Mental Health Hx: No  Substance Abuse Hx: No  Employment:Retired   POA/LW/AD: No  Transport at D/C: Friend from assisted living     CM reviewed d/c planning process including the following: identifying help at home, patient preferences for d/c planning needs, [facility] to [facility], availability of treatment team to discuss questions or concerns patient and/or family may have regarding understanding medications and recognizing signs and symptoms once discharged       CM department will continue to follow through pt's D/C

## 2021-03-29 NOTE — PROGRESS NOTES
[address] [facility]  Progress Note - Pepe Huddleston 1940, 78 y o  female MRN: [id number]  Unit/Bed#: E5 -01 Encounter: [id number]  Primary Care Provider: Giovanny Reddy DO   Date and time admitted to hospital: 3/27/2021  2:10 PM    * Pyelonephritis  Assessment & Plan  · Pyelonephritis with extensive history of UTI and ureteral stenting  Reviewed previous cultures with wide variety of pathogens ranging from eSBL to enterococcus  · Urine Culture from 03/23 - 80,000-89,000 cfu/ml - Presumptive Actinomyces europaeus -  Gram-positive rodAbnormal    · Initially on levofloxacin  Appreciate ID recommendations - antibiotics changed to IV ampicillin 3/28  Recommending continue IV antibiotics for another 24 hours before transition to PO antibiotics  · Monitor fever curve and WBC count - stable  · Urology consult appreciated - patient with history of left ureteral stent, follows with Dr Rae Moreno, imaging shows persistent left severe hydro nephrosis, stent appears to be within proper position, creatinine stable    Agree with IV antibiotics  PAD (peripheral artery disease) ([facility])  Assessment & Plan  · Continue ASA, Plavix and statin    Acquired hypothyroidism  Assessment & Plan  · Continue levothyroxine 125 mcg daily    Dyslipidemia  Assessment & Plan  · On rosuvastatin twice weekly  Will substitute with pravastatin    COPD (chronic obstructive pulmonary disease) ([facility] [address] )  Assessment & Plan  · Notes increased cough overnight, With possible mild exacerbation  · Continue Flovent and Xopenex RTC  · respiratory protocol  · Give 1 time nebulizer treatment now  · Encourage pulmonary toilet, airway clearance protocol, mucinex  · Afebrile, no leukocytosis, on abx for pyelonephritis    · Hold off on steroids for now, lungs clear on exam     History of CVA (cerebrovascular accident)  Assessment & Plan  · History of CVA with right-sided facial droop and dysphagia  · Continue DAPT and statin given history of carotid stenosis status post endarterectomy    Benign essential hypertension  Assessment & Plan  · Stable continue diltiazem and Lasix      VTE Pharmacologic Prophylaxis:   Pharmacologic: Heparin  Mechanical VTE Prophylaxis in Place: Yes    Patient Centered Rounds: I have performed bedside rounds with nursing staff today  Education and Discussions with Family / Patient: discussed with patient's daughter Kenna Escobedo via telephone  Time Spent for Care: 30 minutes  More than 50% of total time spent on counseling and coordination of care as described above  Current Length of Stay: 2 day(s)    Current Patient Status: Inpatient   Certification Statement: The patient will continue to require additional inpatient hospital stay due to need for IV antibiotics  Discharge Plan / Estimated Discharge Date: TBD      Code Status: Level 1 - Full Code      Subjective:   Patient is feeling well today other than a development of a nonproductive cough with chest congestion  She denies any development of fevers or chills  She has been taking nebulizer treatments every 8 hours while here  She denies any flank pain  No dysuria or difficulty passing urine  Objective:     Vitals:   Temp (24hrs), Av 9 °F (36 6 °C), Min:97 8 °F (36 6 °C), Max:98 1 °F (36 7 °C)    Temp:  [97 8 °F (36 6 °C)-98 1 °F (36 7 °C)] 98 1 °F (36 7 °C)  HR:  [72-84] 84  Resp:  [18] 18  BP: (122-139)/(61-64) 122/64  SpO2:  [92 %-95 %] 94 %  Body mass index is 33 99 kg/m²  Input and Output Summary (last 24 hours): Intake/Output Summary (Last 24 hours) at 3/29/2021 1324  Last data filed at 3/29/2021 1138  Gross per 24 hour   Intake 1663 ml   Output --   Net 1663 ml       Physical Exam:     Physical Exam  Vitals signs and nursing note reviewed  Constitutional:       General: She is not in acute distress  Appearance: She is not ill-appearing  HENT:      Head: Normocephalic        Mouth/Throat:      Mouth: Mucous membranes are moist    Cardiovascular:      Rate and Rhythm: Normal rate  Heart sounds: No murmur  Pulmonary:      Effort: No respiratory distress  Breath sounds: No wheezing  Musculoskeletal:      Right lower leg: No edema  Left lower leg: No edema  Skin:     General: Skin is warm and dry  Neurological:      Mental Status: She is alert and oriented to person, place, and time  Psychiatric:         Mood and Affect: Mood normal            Additional Data:   Labs:    Results from last 7 days   Lab Units 03/29/21  0520   WBC Thousand/uL 6 19   HEMOGLOBIN g/dL 10 6*   HEMATOCRIT % 34 5*   PLATELETS Thousands/uL 247   NEUTROS PCT % 64   LYMPHS PCT % 21   MONOS PCT % 9   EOS PCT % 4     Results from last 7 days   Lab Units 03/29/21  0520 03/28/21  0539   POTASSIUM mmol/L 4 6 4 6   CHLORIDE mmol/L 105 103   CO2 mmol/L 25 24   BUN mg/dL 20 17   CREATININE mg/dL 1 20 1 08   CALCIUM mg/dL 8 6 8 7   ALK PHOS U/L  --  79   ALT U/L  --  15   AST U/L  --  15     Results from last 7 days   Lab Units 03/27/21  1440   INR  1 10       * I Have Reviewed All Lab Data Listed Above  * Additional Pertinent Lab Tests Reviewed: All Labs Within Last 24 Hours Reviewed    Imaging:  Imaging Reports Reviewed Today Include: no new imaging to review  Recent Cultures (last 7 days):     Results from last 7 days   Lab Units 03/27/21  1545 03/27/21  1447 03/27/21  1440 03/23/21   BLOOD CULTURE   --  No Growth at 24 hrs   No Growth at 24 hrs   --    URINE CULTURE  Culture too young- will reincubate  --   --  20,000-29,000 cfu/ml   80,000-89,000 cfu/ml - Presumptive Actinomyces europaeus -  Gram-positive raphael*       Last 24 Hours Medication List:   Current Facility-Administered Medications   Medication Dose Route Frequency Provider Last Rate    acetaminophen  650 mg Oral Q6H PRN Wang DO Samira      ampicillin  2,000 mg Intravenous Q6H Maria D Marino MD Stopped (03/29/21 1138)    aspirin  81 mg Oral Daily Tonio Lance, DO      cholecalciferol  1,000 Units Oral Daily Vivia Mary Ann, DO      clopidogrel  75 mg Oral Daily Vivia Mary Ann, DO      diltiazem  240 mg Oral Daily Vivia Mary Ann, DO      fluticasone  1 puff Inhalation Q12H [facility] Vivia Mary Ann, DO      furosemide  20 mg Oral Daily Vivia Mary Ann, DO      guaiFENesin  600 mg Oral Q12H [facility] Janelle Lee PA-C      heparin (porcine)  5,000 Units Subcutaneous [facility] Vivia Mary Ann, DO      levalbuterol  0 63 mg Nebulization Once Janelle Lee PA-C      levalbuterol  1 25 mg Nebulization TID Vivia Mary Ann, DO      levothyroxine  125 mcg Oral Early Morning Tonio Lucas, DO      ondansetron  4 mg Intravenous Q4H PRN Vivia Mary Ann, DO      pravastatin  40 mg Oral Once per day on Mon Thu Tonio Lucas, DO      sodium chloride  3 mL Nebulization TID Verónica Jovel MD          Today, Patient Was Seen By: Janelle Lee PA-C    ** Please Note: Dragon 360 Dictation voice to text software may have been used in the creation of this document   **

## 2021-03-29 NOTE — PLAN OF CARE
Problem: Potential for Falls  Goal: Patient will remain free of falls  Description: INTERVENTIONS:  - Assess patient frequently for physical needs  -  Identify cognitive and physical deficits and behaviors that affect risk of falls    -  [place] fall precautions as indicated by assessment   - Educate patient/family on patient safety including physical limitations  - Instruct patient to call for assistance with activity based on assessment  - Modify environment to reduce risk of injury  - Consider OT/PT consult to assist with strengthening/mobility  Outcome: Progressing     Problem: GENITOURINARY - ADULT  Goal: Maintains or returns to baseline urinary function  Description: INTERVENTIONS:  - Assess urinary function  - Encourage oral fluids to ensure adequate hydration if ordered  - Administer IV fluids as ordered to ensure adequate hydration  - Administer ordered medications as needed  - Offer frequent toileting  - Follow urinary retention protocol if ordered  Outcome: Progressing  Goal: Absence of urinary retention  Description: INTERVENTIONS:  - Assess patients ability to void and empty bladder  - Monitor I/O  - Bladder scan as needed  - Discuss with physician/AP medications to alleviate retention as needed  - Discuss catheterization for long term situations as appropriate  Outcome: Progressing     Problem: METABOLIC, FLUID AND ELECTROLYTES - ADULT  Goal: Electrolytes maintained within normal limits  Description: INTERVENTIONS:  - Monitor labs and assess patient for signs and symptoms of electrolyte imbalances  - Administer electrolyte replacement as ordered  - Monitor response to electrolyte replacements, including repeat lab results as appropriate  - Instruct patient on fluid and nutrition as appropriate  Outcome: Progressing  Goal: Fluid balance maintained  Description: INTERVENTIONS:  - Monitor labs   - Monitor I/O and WT  - Instruct patient on fluid and nutrition as appropriate  - Assess for signs & symptoms of volume excess or deficit  Outcome: Progressing  Goal: Glucose maintained within target range  Description: INTERVENTIONS:  - Monitor Blood Glucose as ordered  - Assess for signs and symptoms of hyperglycemia and hypoglycemia  - Administer ordered medications to maintain glucose within target range  - Assess nutritional intake and initiate nutrition service referral as needed  Outcome: Progressing     Problem: SKIN/TISSUE INTEGRITY - ADULT  Goal: Skin integrity remains intact  Description: INTERVENTIONS  - Identify patients at risk for skin breakdown  - Assess and monitor skin integrity  - Assess and monitor nutrition and hydration status  - Monitor labs (i e  albumin)  - Assess for incontinence   - Turn and reposition patient  - Assist with mobility/ambulation  - Relieve pressure over bony prominences  - Avoid friction and shearing  - Provide appropriate hygiene as needed including keeping skin clean and dry  - Evaluate need for skin moisturizer/barrier cream  - Collaborate with interdisciplinary team (i e  Nutrition, Rehabilitation, etc )   - Patient/family teaching  Outcome: Progressing  Goal: Incision(s), wounds(s) or drain site(s) healing without S/S of infection  Description: INTERVENTIONS  - Assess and document risk factors for skin impairment   - Assess and document dressing, incision, wound bed, drain sites and surrounding tissue  - Consider nutrition services referral as needed  - Oral mucous membranes remain intact  - Provide patient/ family education  Outcome: Progressing  Goal: Oral mucous membranes remain intact  Description: INTERVENTIONS  - Assess oral mucosa and hygiene practices  - Implement preventative oral hygiene regimen  - Implement oral medicated treatments as ordered  - Initiate Nutrition services referral as needed  Outcome: Progressing     Problem: PAIN - ADULT  Goal: Verbalizes/displays adequate comfort level or baseline comfort level  Description: Interventions:  - Encourage patient to monitor pain and request assistance  - Assess pain using appropriate pain scale  - Administer analgesics based on type and severity of pain and evaluate response  - Implement non-pharmacological measures as appropriate and evaluate response  - Consider cultural and social influences on pain and pain management  - Notify physician/advanced practitioner if interventions unsuccessful or patient reports new pain  Outcome: Progressing     Problem: INFECTION - ADULT  Goal: Absence or prevention of progression during hospitalization  Description: INTERVENTIONS:  - Assess and monitor for signs and symptoms of infection  - Monitor lab/diagnostic results  - Monitor all insertion sites, i e  indwelling lines, tubes, and drains  - Monitor endotracheal if appropriate and nasal secretions for changes in amount and color  - [place] appropriate cooling/warming therapies per order  - Administer medications as ordered  - Instruct and encourage patient and family to use good hand hygiene technique  - Identify and instruct in appropriate isolation precautions for identified infection/condition  Outcome: Progressing  Goal: Absence of fever/infection during neutropenic period  Description: INTERVENTIONS:  - Monitor WBC    Outcome: Progressing     Problem: SAFETY ADULT  Goal: Patient will remain free of falls  Description: INTERVENTIONS:  - Assess patient frequently for physical needs  -  Identify cognitive and physical deficits and behaviors that affect risk of falls    -  [place] fall precautions as indicated by assessment   - Educate patient/family on patient safety including physical limitations  - Instruct patient to call for assistance with activity based on assessment  - Modify environment to reduce risk of injury  - Consider OT/PT consult to assist with strengthening/mobility  Outcome: Progressing  Goal: Maintain or return to baseline ADL function  Description: INTERVENTIONS:  -  Assess patient's ability to carry out ADLs; assess patient's baseline for ADL function and identify physical deficits which impact ability to perform ADLs (bathing, care of mouth/teeth, toileting, grooming, dressing, etc )  - Assess/evaluate cause of self-care deficits   - Assess range of motion  - Assess patient's mobility; develop plan if impaired  - Assess patient's need for assistive devices and provide as appropriate  - Encourage maximum independence but intervene and supervise when necessary  - Involve family in performance of ADLs  - Assess for home care needs following discharge   - Consider OT consult to assist with ADL evaluation and planning for discharge  - Provide patient education as appropriate  Outcome: Progressing  Goal: Maintain or return mobility status to optimal level  Description: INTERVENTIONS:  - Assess patient's baseline mobility status (ambulation, transfers, stairs, etc )    - Identify cognitive and physical deficits and behaviors that affect mobility  - Identify mobility aids required to assist with transfers and/or ambulation (gait belt, sit-to-stand, lift, walker, cane, etc )  - [place] fall precautions as indicated by assessment  - Record patient progress and toleration of activity level on Mobility SBAR; progress patient to next Phase/Stage  - Instruct patient to call for assistance with activity based on assessment  - Consider rehabilitation consult to assist with strengthening/weightbearing, etc   Outcome: Progressing     Problem: DISCHARGE PLANNING  Goal: Discharge to home or other facility with appropriate resources  Description: INTERVENTIONS:  - Identify barriers to discharge w/patient and caregiver  - Arrange for needed discharge resources and transportation as appropriate  - Identify discharge learning needs (meds, wound care, etc )  - Arrange for interpretive services to assist at discharge as needed  - Refer to Case Management Department for coordinating discharge planning if the patient needs post-hospital services based on physician/advanced practitioner order or complex needs related to functional status, cognitive ability, or social support system  Outcome: Progressing     Problem: Knowledge Deficit  Goal: Patient/family/caregiver demonstrates understanding of disease process, treatment plan, medications, and discharge instructions  Description: Complete learning assessment and assess knowledge base    Interventions:  - Provide teaching at level of understanding  - Provide teaching via preferred learning methods  Outcome: Progressing

## 2021-03-29 NOTE — ASSESSMENT & PLAN NOTE
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

## 2021-03-29 NOTE — ASSESSMENT & PLAN NOTE
· Notes increased cough overnight, With possible mild exacerbation  · Continue Flovent and Xopenex RTC  · respiratory protocol  · Give 1 time nebulizer treatment now  · Encourage pulmonary toilet, airway clearance protocol, mucinex  · Afebrile, no leukocytosis, on abx for pyelonephritis    · Hold off on steroids for now, lungs clear on exam

## 2021-03-29 NOTE — PROGRESS NOTES
Progress Note - Infectious Disease   Weston Spain 78 y o  female MRN: [id number]  Unit/Bed#: E5 -01 Encounter: [id number]    Impression/Plan:  1  Left pyelonephritis  In the setting of chronic left hydronephrosis from UPJ obstruction  Recent urine culture showed mixed contaminants and Actinomyces  A new urine culture is pending  Fortunately the patient remains clinically stable without sepsis  Blood cultures are negative after 24 hours  She has been receiving IV ampicillin which she is tolerating without difficulty  -continue IV ampicillin  -anticipate transition to oral antibiotic regimen within the next 24 hours if patient continues to clinically improve  -anticipate 10 total days of antibiotic treatment through 04/05/2021  -monitor CBC and BMP  -follow-up blood cultures  -follow up repeat urine culture  -monitor vitals  -monitor urine output  -monitor  symptoms    2  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  Patient has chronic indwelling stent which was most recently exchanged on 02/16/2021  CT of the abdomen/pelvis showed no acute findings  She is following closely with Urology  -monitor urine output  -monitor  symptoms  -continue follow-up with Urology    3  Type 2 diabetes mellitus without long-term insulin use  Patient's last hemoglobin A1c was 6 4% on 01/29/2021  Elevated blood glucose is risk factor for infection  Recommend tight glycemic control   -blood glucose management per primary service    Above plan was discussed in detail with patient at the bedside  Antibiotics:  Ampicillin 2  Antibiotics 3    Subjective:  Patient reports she is feeling well today  She has no abdominal or pelvic pain  Denies flank pain  Does report some mild pain in her left lower back  She is thirsty and requests some cranberry juice (I notified the PCA)    She denies chills, sweats, shakes, nausea, vomiting, abdominal pain, diarrhea, dysuria, cough, shortness of breath, and chest pain  She offers no new symptoms  Objective:  Vitals:  Temp:  [97 8 °F (36 6 °C)-97 9 °F (36 6 °C)] 97 9 °F (36 6 °C)  HR:  [72-77] 72  Resp:  [18] 18  BP: (135-139)/(61-64) 135/64  SpO2:  [91 %-95 %] 92 %  Temp (24hrs), Av 9 °F (36 6 °C), Min:97 8 °F (36 6 °C), Max:97 9 °F (36 6 °C)  Current: Temperature: 97 9 °F (36 6 °C)    Physical Exam:   General Appearance:  Alert, interactive, nontoxic, no acute distress  She appears comfortable sitting out of bed in her chair  Throat: Oropharynx moist without lesions  Lungs:   Clear to auscultation bilaterally; no wheezes, rhonchi or rales; respirations unlabored; she is on room air  Heart:  RRR; no murmur, rub or gallop  Abdomen:   Soft, non-tender, non-distended, positive bowel sounds  Back: Mild left lower back tenderness with palpation, does not wrap around to the flank region  Extremities: No clubbing or cyanosis, no edema  Skin: No new rashes or lesions noted on exposed skin  She is warm and dry  Labs, Imaging, & Other studies:   All pertinent labs and imaging studies were personally reviewed  Results from last 7 days   Lab Units 21  0520 21  0539 21  1440   WBC Thousand/uL 6 19 6 63 9 34   HEMOGLOBIN g/dL 10 6* 11 1* 12 1   PLATELETS Thousands/uL 247 237 268     Results from last 7 days   Lab Units 21  0520 21  0539 21  1447   POTASSIUM mmol/L 4 6 4 6 4 7   CHLORIDE mmol/L 105 103 103   CO2 mmol/L 25 24 28   BUN mg/dL 20 17 21   CREATININE mg/dL 1 20 1 08 1 45*   EGFR ml/min/1 73sq m 42 48 34   CALCIUM mg/dL 8 6 8 7 8 8   AST U/L  --  15 14   ALT U/L  --  15 20   ALK PHOS U/L  --  79 87     Results from last 7 days   Lab Units 21  1545 21  1447 21  1440 21   BLOOD CULTURE   --  No Growth at 24 hrs   No Growth at 24 hrs   --    URINE CULTURE  Culture too young- will reincubate  --   --  20,000-29,000 cfu/ml   80,000-89,000 cfu/ml - Presumptive Actinomyces europaeus -  Gram-positive raphael*     Results from last 7 days   Lab Units 03/28/21  0539 03/27/21  1447   PROCALCITONIN ng/ml <0 05 <0 05

## 2021-03-29 NOTE — PHYSICAL THERAPY NOTE
PT EVALUATION    78 y o     [id number]    Flank pain [R10 9]  Hydronephrosis of left kidney [N13 30]  CHAR (acute kidney injury) ([facility] [address] ) [N17 9]  Pyelonephritis of left kidney [N12]    Past Medical History:   Diagnosis Date    Arthritis     Asthma     At risk for falls     rib frac    Benign essential tremor 10/15/2018    Added automatically from request for surgery [id number] has stimulator    COPD (chronic obstructive pulmonary disease) ([facility] [address] )     Coronary artery disease     Diabetes mellitus ([facility] [address] )     borderline    Difficulty waking     post anesthesia    Edema     bles    GERD (gastroesophageal reflux disease)     High cholesterol     History of recent fall     broke several ribs    Hypertension     Hypothyroid     Kidney stone     Macular degeneration     Osteopenia     Osteoporosis     S/P deep brain stimulator placement     Shortness of breath     on exertion    Slurred speech     Stroke (HCC)     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     Urinary leakage     Uses roller walker     Vitamin D deficiency     Wears dentures     full upper    Wears glasses     reading         Past Surgical History:   Procedure Laterality Date    CATARACT EXTRACTION W/  INTRAOCULAR LENS IMPLANT Bilateral     COLONOSCOPY      CYSTOSCOPY W/ RETROGRADES Left 2/16/2021    Procedure: CYSTO, RETRO PYELOGRAM, STENT EXCHANGE;  Surgeon: Ciarra Membreno MD;  [facility];  Service: Urology    DEEP BRAIN STIMULATOR PLACEMENT      FL RETROGRADE PYELOGRAM  11/7/2019    FL RETROGRADE PYELOGRAM  5/26/2020    FL RETROGRADE PYELOGRAM  2/16/2021    IR NEPHROSTOMY TO NEPHROURETERAL STENT  1/10/2020    IR TUBE PLACEMENT  11/12/2019    KNEE ARTHROSCOPY      MI CYSTOURETHROSCOPY,URETER CATHETER Left 11/7/2019    Procedure: CYSTO RETROGRADE PYELOGRAM, URETEROSCOPY;  Surgeon: Monica Conn MD;  [facility];  Service: Urology    MI IMP STIM,CRANIAL,SUBQ,1 ARRAY Left 1/22/2019    Procedure: REPLACEMENT IMPLANTABLE PULSE GENERATOR (IPG) DEEP BRAIN STIMULATION (DBS), LEFT;  Surgeon: Allison Kehr, MD;  [facility];  Service: Neurosurgery    MD IMP STIM,CRANIAL,SUBQ,>1 ARRAY Right 12/13/2018    Procedure: REPLACEMENT RIGHT DBS IMPLANTABLE PULSE GENERATOR;  Surgeon: Allison Kehr, MD;  [facility];  Service: Neurosurgery    [address] Left 10/27/2020    Procedure: Darlin Evans, EXCHANGE STENT URETERA L, LEFT URETEROSCOPY;  Surgeon: Elissa Arreaga MD;  Location: [facility] MA[facility] OR;  Service: Urology    MD RMVL & RPLCMT INTLY DWELLING URETERAL STENT PRQ Left 5/26/2020    Procedure: Smita Emery, STENT;  Surgeon: Elissa Arreaga MD;  [facility];  Service: Urology    MD Karla Ang Right 2/18/2020    Procedure: CAROTID ENDARTERECTOMY WITH BOVINE PATCH;  Surgeon: Min Mcqueen MD;  [facility];  Service: Vascular    MD TOTAL HIP ARTHROPLASTY Left 5/20/2016    Procedure: ARTHROPLASTY HIP TOTAL ANTERIOR;  Surgeon: Yue Arboleda MD;  [facility];  Service: Orthopedics    ROTATOR CUFF REPAIR          03/29/21 1412   PT Last Visit   PT Visit Date 03/29/21   Note Type   Note type Evaluation   Pain Assessment   Pain Score 1   Pain Location/Orientation Orientation: Left; Location: Back  (flank)   Home Living   Type of Home Apartment   Home Layout One level;Elevator   Bathroom Shower/Tub Walk-in shower   Bathroom Toilet Standard   Bathroom Equipment Grab bars in shower;Grab bars around toilet   [address] Other (Comment); Walker;Cane  (rollator  sleeps in recliner lift chair )   Additional Comments resides alone in [facility] in [address]  Daughter supportive and sees patient 3 times per week  Prior Function   Level of [place] Independent with ADLs and functional mobility   Lives With Alone   Receives Help From Family  (provides transportation   sees 3x/wk)   ADL Assistance Independent   IADLs Independent   Falls in the last 6 months 0   Vocational Retired   Comments Ambulates with cane in apt, use of rollator in community  Restrictions/Precautions   Weight Bearing Precautions Per Order No   Other Precautions Fall Risk;Pain   General   Additional Pertinent History Pt is 77 y/o female admitted wt L flank pain  Pyelonephritis with persistent L hydronephrosis  PT consulted  OOB as tolerated  Family/Caregiver Present No   Cognition   Overall Cognitive Status Impaired   Arousal/Participation Alert   Orientation Level Oriented X4   Following Commands Follows all commands and directions without difficulty   Comments Pleasant  RUE Assessment   RUE Assessment WFL  (grossly 4/5)   LUE Assessment   LUE Assessment WFL  (grossly 4/5)   RLE Assessment   RLE Assessment WFL  (groslsy 4/5)   LLE Assessment   LLE Assessment WFL  (grossly 4/5)   Light Touch   RLE Light Touch Grossly intact   LLE Light Touch Grossly intact   Bed Mobility   Supine to Sit   (received sitting OOB in recliner chair )   Additional Comments reports sleeps in lift recliner chair  Transfers   Sit to Stand 6  Modified independent   Additional items Armrests   Stand to Sit 6  Modified independent   Additional items Armrests   Ambulation/Elevation   Gait pattern Decreased foot clearance; Inconsistent suzanne   Gait Assistance 6  Modified independent   Additional items Verbal cues   Assistive Device Rolling walker   Distance Amb with RW 75'x2  Balance   Static Sitting Normal   Dynamic Sitting Good   Static Standing Good   Dynamic Standing Fair +   Ambulatory Fair +  ([facility] walker )   Endurance Deficit   Endurance Deficit No   Activity Tolerance   Activity Tolerance Patient tolerated treatment well   Medical Staff Made Aware Nurse Gosia Loya   Nurse Made Aware yes   Assessment   Prognosis Good   Problem List Decreased endurance; Impaired balance;Decreased mobility   Assessment Kanwal Jean is a 78 y o  female with a past medical history of stroke, hypertension, COPD, hypothyroidism, and chronic left hydronephrosis status post stenting with exchanges  who presents with worsening left flank pain and fever/chills  Admitted with pyelonephritis, persistent L hydronephrosis-stent in proper positioning on imaging  PT consulted  Up and OOB as tolerated  Prior to admission resides alone in apt  Independent with cane in apt, rollator in community  No recent falls and supportive daughter who she sees 3 times per week  Currently presents without significant functional limitations related to hospitalization  Slight decrease in strength, activity tolerance and balance requiring RW for ambulation  Is modified independent with for all mobility at this time  Gait without LOB using RW  Despite mild impairments from hospitalization is likely functioning close to or at baseline and no skilled  IPPT needs apparent  Will benefit from continued mobilization with nursing staff for remainder of hospitalization to prevent deconditoining  Will d/c PT  Barriers to Discharge Decreased caregiver support   Barriers to Discharge Comments lives alone   Goals   Patient Goals go home tomorrow  Plan   Treatment/Interventions Functional transfer training; Endurance training;Patient/family training;Equipment eval/education;Gait training; Compensatory technique education;Spoke to nursing   PT Frequency One time visit  (PT eval and discharge )   Recommendation   PT Discharge Recommendation Return to previous environment with social support   PT - OK to Discharge Yes   Mae [address]   Turning in Bed Without Bedrails 4   Lying on Back to Sitting on Edge of Flat Bed 3   Moving Bed to Chair 4   Standing Up From Chair 4   Walk in Room 4   Climb 3-5 Stairs 4   Basic Mobility Inpatient Raw Score 23   Basic Mobility Standardized Score 50 88     History: co - morbidities, fall risk, use of assistive device, alone, advanced age  Exam:mild impairments in locomotion, musculoskeletal, balance, activity tolerance  Clinical: unstable/unpredictable ( ongoing medical management of current conditions, fall risk, decreased activity tolerance compared to baseline)  Complexity:high    Jerilyn Salamanca, PT

## 2021-03-30 VITALS
HEART RATE: 74 BPM | SYSTOLIC BLOOD PRESSURE: 153 MMHG | RESPIRATION RATE: 18 BRPM | WEIGHT: 198 LBS | TEMPERATURE: 97.6 F | DIASTOLIC BLOOD PRESSURE: 72 MMHG | OXYGEN SATURATION: 100 % | BODY MASS INDEX: 33.99 KG/M2

## 2021-03-30 PROCEDURE — 94760 N-INVAS EAR/PLS OXIMETRY 1: CPT

## 2021-03-30 PROCEDURE — 99233 SBSQ HOSP IP/OBS HIGH 50: CPT | Performed by: INTERNAL MEDICINE

## 2021-03-30 PROCEDURE — 99239 HOSP IP/OBS DSCHRG MGMT >30: CPT | Performed by: INTERNAL MEDICINE

## 2021-03-30 PROCEDURE — 94640 AIRWAY INHALATION TREATMENT: CPT

## 2021-03-30 RX ORDER — AMOXICILLIN 500 MG/1
500 CAPSULE ORAL EVERY 8 HOURS SCHEDULED
Qty: 21 CAPSULE | Refills: 0 | Status: SHIPPED | OUTPATIENT
Start: 2021-03-30 | End: 2021-04-06

## 2021-03-30 RX ORDER — AMOXICILLIN 500 MG/1
500 CAPSULE ORAL EVERY 8 HOURS SCHEDULED
Status: DISCONTINUED | OUTPATIENT
Start: 2021-03-30 | End: 2021-03-30 | Stop reason: HOSPADM

## 2021-03-30 RX ADMIN — LEVOTHYROXINE SODIUM 125 MCG: 125 TABLET ORAL at 05:28

## 2021-03-30 RX ADMIN — LEVALBUTEROL HYDROCHLORIDE 1.25 MG: 1.25 SOLUTION, CONCENTRATE RESPIRATORY (INHALATION) at 07:40

## 2021-03-30 RX ADMIN — ISODIUM CHLORIDE 3 ML: 0.03 SOLUTION RESPIRATORY (INHALATION) at 13:09

## 2021-03-30 RX ADMIN — AMPICILLIN SODIUM 2000 MG: 2 INJECTION, POWDER, FOR SOLUTION INTRAMUSCULAR; INTRAVENOUS at 05:27

## 2021-03-30 RX ADMIN — GUAIFENESIN 600 MG: 600 TABLET ORAL at 09:06

## 2021-03-30 RX ADMIN — FUROSEMIDE 20 MG: 20 TABLET ORAL at 09:06

## 2021-03-30 RX ADMIN — AMOXICILLIN 500 MG: 500 CAPSULE ORAL at 09:06

## 2021-03-30 RX ADMIN — CLOPIDOGREL BISULFATE 75 MG: 75 TABLET ORAL at 09:06

## 2021-03-30 RX ADMIN — HEPARIN SODIUM 5000 UNITS: 5000 INJECTION INTRAVENOUS; SUBCUTANEOUS at 05:28

## 2021-03-30 RX ADMIN — Medication 1000 UNITS: at 09:06

## 2021-03-30 RX ADMIN — FLUTICASONE PROPIONATE 1 PUFF: 220 AEROSOL, METERED RESPIRATORY (INHALATION) at 09:06

## 2021-03-30 RX ADMIN — ASPIRIN 81 MG: 81 TABLET, COATED ORAL at 09:05

## 2021-03-30 RX ADMIN — ISODIUM CHLORIDE 3 ML: 0.03 SOLUTION RESPIRATORY (INHALATION) at 07:41

## 2021-03-30 RX ADMIN — DILTIAZEM HYDROCHLORIDE 240 MG: 240 CAPSULE, COATED, EXTENDED RELEASE ORAL at 09:06

## 2021-03-30 RX ADMIN — LEVALBUTEROL HYDROCHLORIDE 1.25 MG: 1.25 SOLUTION, CONCENTRATE RESPIRATORY (INHALATION) at 13:09

## 2021-03-30 NOTE — ASSESSMENT & PLAN NOTE
· Pyelonephritis with extensive history of UTI and ureteral stenting  Reviewed previous cultures with wide variety of pathogens ranging from eSBL to enterococcus  · Urine Culture from 03/23 - 80,000-89,000 cfu/ml - Presumptive Actinomyces europaeus -  Gram-positive raphael  · Initially on levofloxacin  Appreciate ID recommendations - antibiotics changed to IV ampicillin 3/28  Will transition to amoxicliin 500 mg q 8 hours through 4/5 upon discharge to complete 10 day abx course       · Urology consult appreciated - patient with history of left ureteral stent, follows with Dr Kevin Golden, imaging shows persistent left severe hydro nephrosis, stent appears to be within proper position, creatinine stable  Waqar Larry  outpatient follow up

## 2021-03-30 NOTE — ASSESSMENT & PLAN NOTE
Addended by: KIT HARPER on: 5/7/2020 02:27 PM     Modules accepted: Orders     · Continue levothyroxine 125 mcg daily

## 2021-03-30 NOTE — DISCHARGE SUMMARY
[address] [facility] 1940, 78 y o  female MRN: [id number]  Unit/Bed#: E5 -01 Encounter: [id number]  Primary Care Provider: Akiko Keenan DO   Date and time admitted to hospital: 3/27/2021  2:10 PM    * Pyelonephritis  Assessment & Plan  · Pyelonephritis with extensive history of UTI and ureteral stenting  Reviewed previous cultures with wide variety of pathogens ranging from eSBL to enterococcus  · Urine Culture from 03/23 - 80,000-89,000 cfu/ml - Presumptive Actinomyces europaeus -  Gram-positive raphael  · Initially on levofloxacin  Appreciate ID recommendations - antibiotics changed to IV ampicillin 3/28  Will transition to amoxicliin 500 mg q 8 hours through 4/5 upon discharge to complete 10 day abx course       · Urology consult appreciated - patient with history of left ureteral stent, follows with Dr Batsheva Flynn, imaging shows persistent left severe hydro nephrosis, stent appears to be within proper position, creatinine stable  Nica Kaiser outpatient follow up     Benign essential hypertension  Assessment & Plan  · Stable continue diltiazem and Lasix    History of CVA (cerebrovascular accident)  Assessment & Plan  · History of CVA with right-sided facial droop and dysphagia  · Continue DAPT and statin given history of carotid stenosis status post endarterectomy    COPD (chronic obstructive pulmonary disease) ([facility])  Assessment & Plan  · No wheezing today   · On room air   · Continue home regimen       Dyslipidemia  Assessment & Plan  · On rosuvastatin twice weekly        Acquired hypothyroidism  Assessment & Plan  · Continue levothyroxine 125 mcg daily    PAD (peripheral artery disease) ([facility] [address] )  Assessment & Plan  · Continue ASA, Plavix and statin      Discharging Physician / Practitioner: Manju Marina PA-C  PCP: Akiko Keenan DO  Admission Date:   Admission Orders (From admission, onward)     Ordered        03/27/21 1648  Inpatient Admission  Once Discharge Date: 03/30/21    Resolved Problems  Date Reviewed: 3/30/2021    None          Consultations During Hospital Stay:  · Urology   · Infectious Disease     Procedures Performed:   · none    Significant Findings / Test Results:   · CT abdomen pelvis:  FINDINGS:     ABDOMEN     LOWER CHEST:  No clinically significant abnormality identified in the visualized lower chest      LIVER/BILIARY TREE:  Unremarkable      GALLBLADDER:  No calcified gallstones  No pericholecystic inflammatory change      SPLEEN:  Unremarkable      PANCREAS:  Unremarkable      ADRENAL GLANDS:  Unremarkable      KIDNEYS/URETERS:  Interval placement of left ureteral stent in expected position  Persistent severe left hydronephrosis  Decompression of the ureter  Several nonobstructing calculi in left renal calyces measuring up to 7 5 mm  Minimal perinephric fat   stranding     Right renal vascular calcifications      STOMACH AND BOWEL:  Diverticulosis without evidence of diverticulitis or colitis  No bowel obstruction      APPENDIX:  No findings to suggest appendicitis      ABDOMINOPELVIC CAVITY:  Mildly enlarged left periaortic lymph nodes, likely reactive        VESSELS:  Extensive calcified plaque along the abdominal aorta and common iliac arteries      PELVIS     REPRODUCTIVE ORGANS:  No pelvic mass or fluid      URINARY BLADDER:  Unremarkable      ABDOMINAL WALL/INGUINAL REGIONS:  Unremarkable      OSSEOUS STRUCTURES:  Normal alignment of left hip arthroplasty  No acute fracture or destructive osseous lesion      IMPRESSION:        1  Left ureteral stent in expected position  Persistent severe left hydronephrosis with decompression of the ureter  No evidence of obstructing calculus at the ureteropelvic junction  Persistent ureteritis and pyelonephritis not excluded  2   Nonobstructing calculi in left renal calyces measuring up to 7 5 mm      Urine culture: 80-89,000 cfu/mL presumptive actinomyces, gram positive raphael   Blood cultures negative x 48 hours   ·     Test Results Pending at Discharge (will require follow up): · Echocardiogram      Outpatient Tests Requested:  · Urology   · PCP     Complications:  none    Reason for Admission: pyelonephritis     Hospital Course: Sabina Solares is a 78 y o  female patient who originally presented to the hospital on 3/27/2021 due to worsening left flank pain and fevers/chills  Patient saw her PCP on 03/23 and was started on Bactrim DS for similar symptoms  Her symptoms persisted so she presented to the ED  She was admitted for treatment of pyelonephritis with extensive histoyr of UTI with previous ureteral stenting  She was seen in consultation with Urology and ID  She was treated with IV antibiotics with ampicillin with recent urine culture growing mixed contaminants and Actinomyces  She will be discharged with amoxicillin 500 mg q 8 hours to completed 10 day course through 4/5/21  She will need ongoing follow up with urology outpatient for management of left ureteral stent  Of note, patient is noted to have murmur and I recommend outpatient echocardiogram with PCP  Please see above list of diagnoses and related plan for additional information  Condition at Discharge: stable     Discharge Day Visit / Exam:     Subjective:  No pain  No fevers  No chills  No chest pain  Vitals: Blood Pressure: 153/72 (03/30/21 0745)  Pulse: 74 (03/30/21 0745)  Temperature: 97 6 °F (36 4 °C) (03/30/21 0745)  Temp Source: Temporal (03/30/21 0745)  Respirations: 18 (03/30/21 0745)  Weight - Scale: 89 8 kg (198 lb) (03/27/21 1414)  SpO2: 100 % (03/30/21 0745)  Exam:   Physical Exam  Vitals signs and nursing note reviewed  Constitutional:       Appearance: Normal appearance  Eyes:      Conjunctiva/sclera: Conjunctivae normal    Cardiovascular:      Rate and Rhythm: Normal rate and regular rhythm  Heart sounds: Murmur present     Pulmonary:      Effort: Pulmonary effort is normal       Breath sounds: Normal breath sounds  Abdominal:      General: Bowel sounds are normal       Palpations: Abdomen is soft  Tenderness: There is no abdominal tenderness  There is no guarding or rebound  Skin:     General: Skin is warm  Neurological:      Mental Status: She is alert  Mental status is at baseline  Psychiatric:         Mood and Affect: Mood normal            Discharge instructions/Information to patient and family:   See after visit summary for information provided to patient and family  Provisions for Follow-Up Care:  See after visit summary for information related to follow-up care and any pertinent home health orders  Disposition:     Home    For Discharges to [facility]:   · Not Applicable to this Patient - Not Applicable to this Patient    Planned Readmission: none     Discharge Statement:  I spent 30 minutes discharging the patient  This time was spent on the day of discharge  I had direct contact with the patient on the day of discharge  Greater than 50% of the total time was spent examining patient, answering all patient questions, arranging and discussing plan of care with patient as well as directly providing post-discharge instructions  Additional time then spent on discharge activities  Discharge Medications:  See after visit summary for reconciled discharge medications provided to patient and family        ** Please Note: This note has been constructed using a voice recognition system **

## 2021-03-30 NOTE — PLAN OF CARE
Problem: Potential for Falls  Goal: Patient will remain free of falls  Description: INTERVENTIONS:  - Assess patient frequently for physical needs  -  Identify cognitive and physical deficits and behaviors that affect risk of falls    -  [place] fall precautions as indicated by assessment   - Educate patient/family on patient safety including physical limitations  - Instruct patient to call for assistance with activity based on assessment  - Modify environment to reduce risk of injury  - Consider OT/PT consult to assist with strengthening/mobility  Outcome: Progressing

## 2021-03-30 NOTE — PROGRESS NOTES
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  Recent urine culture showed mixed contaminants and Actinomyces  A repeat urine culture was completed but only grew mixed contaminents  Fortunately the patient remains clinically stable without sepsis  Blood cultures are negative after 48 hours  She has been receiving IV ampicillin which she is tolerating without difficulty  Given her clinical improvement I will transition her to oral amoxicillin today to complete 10 days of total antibiotic treatment on 04/05/2021   -stop IV ampicillin  -transition to oral amoxicillin 500mg q8 through 04/05/2021 to complete 10 days of total antibiotic treatment  -monitor CBC and BMP  -follow-up blood cultures  -monitor vitals  -monitor urine output  -monitor  symptoms     2  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  Patient has chronic indwelling stent which was most recently exchanged on 02/16/2021  CT of the abdomen/pelvis showed no acute findings  She is following closely with Urology  -monitor urine output  -monitor  symptoms  -continue follow-up with Urology     3  Type 2 diabetes mellitus without long-term insulin use  Patient's last hemoglobin A1c was 6 4% on 01/29/2021  Elevated blood glucose is risk factor for infection  Recommend tight glycemic control   -blood glucose management per primary service    4  Obesity  BMI = 33 99  Patient is stable for discharge from ID standpoint  Above plan was discussed in detail with patient at the bedside  Above plan was discussed in detail with Tamar HUTCHINS PA-C Kidney  Antibiotics:  Ampicillin 3  Antibiotics 4    Subjective:  Patient reports she was told this morning that she has a new heart murmur    She tells me that she thinks her PCP found in the past but that he never mentioned it again  She is worried that she will need to stay in the hospital longer because of this finding  She tells me that her left back pain is much better today  She is not having any dysuria  She denies abdominal and pelvic pain  She has no fever, chills, sweats, shakes, nausea, vomiting, cough, shortness of breath, or chest pain  No new symptoms  Objective:  Vitals:  Temp:  [97 5 °F (36 4 °C)-98 1 °F (36 7 °C)] 97 5 °F (36 4 °C)  HR:  [74-94] 94  Resp:  [18-19] 19  BP: (122-139)/(62-65) 139/62  SpO2:  [94 %-95 %] 94 %  Temp (24hrs), Av 8 °F (36 6 °C), Min:97 5 °F (36 4 °C), Max:98 1 °F (36 7 °C)  Current: Temperature: 97 5 °F (36 4 °C)    Physical Exam:   General Appearance:  Alert, interactive, nontoxic, no acute distress  She appears comfortable sitting out of bed in her chair  Throat: Oropharynx moist without lesions  Lungs:   Clear to auscultation bilaterally; no wheezes, rhonchi or rales; respirations unlabored; she is on room air  Heart:  RRR; +murmur, no rub or gallop  Abdomen:   Soft, non-tender, non-distended, positive bowel sounds  Back: No left-sided CVA tenderness  Extremities: No clubbing or cyanosis, no edema  Skin: No new rashes, lesions, or draining wounds noted on exposed skin       Labs, Imaging, & Other studies:   All pertinent labs and imaging studies were personally reviewed  Results from last 7 days   Lab Units 21  0521  0539 21  1440   WBC Thousand/uL 6 19 6 63 9 34   HEMOGLOBIN g/dL 10 6* 11 1* 12 1   PLATELETS Thousands/uL 247 237 268     Results from last 7 days   Lab Units 21  0521  0539 21  1447   POTASSIUM mmol/L 4 6 4 6 4 7   CHLORIDE mmol/L 105 103 103   CO2 mmol/L 25 24 28   BUN mg/dL 20 17 21   CREATININE mg/dL 1 20 1 08 1 45*   EGFR ml/min/1 73sq m 42 48 34   CALCIUM mg/dL 8 6 8 7 8 8   AST U/L  --  15 14   ALT U/L  --  15 20   ALK PHOS U/L  --  79 87     Results from last 7 days   Lab Units 03/27/21  1545 03/27/21  1447 03/27/21  1440   BLOOD CULTURE   --  No Growth at 48 hrs  No Growth at 48 hrs     URINE CULTURE  80,000-89,000 cfu/ml   --   --      Results from last 7 days   Lab Units 03/28/21  0539 03/27/21  1447   PROCALCITONIN ng/ml <0 05 <0 05

## 2021-03-31 ENCOUNTER — TRANSITIONAL CARE MANAGEMENT (OUTPATIENT)
Dept: FAMILY MEDICINE CLINIC | Facility: CLINIC | Age: 81
End: 2021-03-31

## 2021-04-01 LAB — BACTERIA BLD CULT: NORMAL

## 2021-04-02 LAB — BACTERIA BLD CULT: NORMAL

## 2021-04-08 ENCOUNTER — OFFICE VISIT (OUTPATIENT)
Dept: FAMILY MEDICINE CLINIC | Facility: CLINIC | Age: 81
End: 2021-04-08
Payer: MEDICARE

## 2021-04-08 VITALS
DIASTOLIC BLOOD PRESSURE: 70 MMHG | HEIGHT: 64 IN | BODY MASS INDEX: 33.63 KG/M2 | SYSTOLIC BLOOD PRESSURE: 132 MMHG | HEART RATE: 75 BPM | WEIGHT: 197 LBS | OXYGEN SATURATION: 96 % | TEMPERATURE: 98 F

## 2021-04-08 DIAGNOSIS — Z96.89 S/P DEEP BRAIN STIMULATOR PLACEMENT: ICD-10-CM

## 2021-04-08 DIAGNOSIS — G25.0 BENIGN FAMILIAL TREMOR: ICD-10-CM

## 2021-04-08 DIAGNOSIS — N13.30 HYDRONEPHROSIS OF LEFT KIDNEY: ICD-10-CM

## 2021-04-08 DIAGNOSIS — I10 BENIGN ESSENTIAL HYPERTENSION: ICD-10-CM

## 2021-04-08 DIAGNOSIS — N12 PYELONEPHRITIS: Primary | ICD-10-CM

## 2021-04-08 DIAGNOSIS — E11.9 DIABETES MELLITUS TYPE 2, DIET-CONTROLLED (HCC): ICD-10-CM

## 2021-04-08 DIAGNOSIS — I63.9 CEREBROVASCULAR ACCIDENT (CVA), UNSPECIFIED MECHANISM (HCC): ICD-10-CM

## 2021-04-08 DIAGNOSIS — N30.00 ACUTE CYSTITIS WITHOUT HEMATURIA: ICD-10-CM

## 2021-04-08 DIAGNOSIS — I25.10 CORONARY ARTERY DISEASE INVOLVING NATIVE CORONARY ARTERY OF NATIVE HEART WITHOUT ANGINA PECTORIS: ICD-10-CM

## 2021-04-08 DIAGNOSIS — Z93.6 NEPHROSTOMY STATUS (HCC): ICD-10-CM

## 2021-04-08 DIAGNOSIS — E03.9 ACQUIRED HYPOTHYROIDISM: ICD-10-CM

## 2021-04-08 DIAGNOSIS — I35.0 NONRHEUMATIC AORTIC VALVE STENOSIS: ICD-10-CM

## 2021-04-08 DIAGNOSIS — J41.1 MUCOPURULENT CHRONIC BRONCHITIS (HCC): ICD-10-CM

## 2021-04-08 PROCEDURE — 99495 TRANSJ CARE MGMT MOD F2F 14D: CPT | Performed by: FAMILY MEDICINE

## 2021-04-08 NOTE — PATIENT INSTRUCTIONS
Reviewed hospital stay regarding UTI/hydronephrosis on left with history of stent, was discharged home March 30th  She is completing Amoxil 500 every 8 hours  She has had no fevers or chills, she feels back at her baseline  She will continue to see Dr Kadie Sanchez, Urology, last stent replacement was February 16th  Inpatient she did have CT scanning abdomen/pelvis, creatinine was stable 1 2 with potassium 4 6, hemoglobin 10 6, WBC 6  Breathing is at baseline today, after long walk into office she was at 96% on room air, watch for low readings at home, she does monitor oximetry, may require 6 minutes walk test   Imaging of lower chest seen on CT scan was clear  She will continue to use her nebulizer twice daily  Also on Flovent twice daily  I would like her to redo echocardiogram, last was in December 2018, ejection fraction 45%, mild AS/valvular changes    As before, continue to control cardiovascular risk as can, is on Aspirin/Plavix  Blood pressure acceptable here today, 132/70, stay on diltiazem 240 mg daily, furosemide 20 mg daily  She does use Crestor twice weekly  She relates her eye doctor saw changes diabetic retinopathy, A1c has been less than 7 for the past few years, most recent A1c in January 6 4, inpatient glucometer readings/glucose 97/109  Currently off diabetic medication due to age, risk hypoglycemia  TSH 3 46 back in January, stay on levothyroxine 125 mcg daily  She uses omeprazole less than weekly  I will see her again in 3 months, call sooner if needed     She also should have a routine follow-up with Neurology, doing well regarding tremor, history implant

## 2021-04-08 NOTE — PROGRESS NOTES
FAMILY PRACTICE OFFICE VISIT  Edward SCHRADER O  [facility]  [address]  [address] [place], [place], [address]      NAME: Daylin Perales  AGE: 78 y o  SEX: female  : 1940   MRN: [id number]    DATE: 2021  TIME: 4:21 PM    Assessment and Plan     Problem List Items Addressed This Visit        Endocrine    Acquired hypothyroidism    Diabetes mellitus type 2, diet-controlled ([facility] [address] )       Respiratory    COPD (chronic obstructive pulmonary disease) ([facility] )       Cardiovascular and Mediastinum    Benign essential hypertension    Aortic valve stenosis - ( Mild -Moderate )    Relevant Orders    Echo complete with contrast if indicated    Cerebrovascular accident (CVA) ([facility] [address] )    Relevant Orders    Ambulatory referral to Neurology    Coronary artery disease involving native coronary artery of native heart without angina pectoris       Nervous and Auditory    Benign familial tremor    Relevant Orders    Ambulatory referral to Neurology       Genitourinary    Hydronephrosis of left kidney    Pyelonephritis - Primary    Acute cystitis without hematuria       Other    S/P deep brain stimulator placement    Relevant Orders    Ambulatory referral to Neurology    Nephrostomy tube Left          Patient Instructions   Reviewed hospital stay regarding UTI/hydronephrosis on left with history of stent, was discharged home   She is completing Amoxil 500 every 8 hours  She has had no fevers or chills, she feels back at her baseline  She will continue to see Dr Drea Vale, Urology, last stent replacement was   Inpatient she did have CT scanning abdomen/pelvis, creatinine was stable 1 2 with potassium 4 6, hemoglobin 10 6, WBC 6      Breathing is at baseline today, after long walk into office she was at 96% on room air, watch for low readings at home, she does monitor oximetry, may require 6 minutes walk test   Imaging of lower chest seen on CT scan was clear  She will continue to use her nebulizer twice daily  Also on Flovent twice daily  I would like her to redo echocardiogram, last was in December 2018, ejection fraction 45%, mild AS/valvular changes    As before, continue to control cardiovascular risk as can, is on Aspirin/Plavix  Blood pressure acceptable here today, 132/70, stay on diltiazem 240 mg daily, furosemide 20 mg daily  She does use Crestor twice weekly  She relates her eye doctor saw changes diabetic retinopathy, A1c has been less than 7 for the past few years, most recent A1c in January 6 4, inpatient glucometer readings/glucose 97/109  Currently off diabetic medication due to age, risk hypoglycemia  TSH 3 46 back in January, stay on levothyroxine 125 mcg daily  She uses omeprazole less than weekly  I will see her again in 3 months, call sooner if needed  She also should have a routine follow-up with Neurology, doing well regarding tremor, history implant      Chief Complaint     Chief Complaint   Patient presents with    Transition of Care Management     TCM Call (since 3/8/2021)     Date and time call was made  3/31/2021  4:17 PM    Hospital care reviewed  Records reviewed    Patient was hospitialized at  [facility] [place] [facility]        Date of Admission  03/27/21    Date of discharge  03/30/21    Diagnosis  pyelonephritis of left kidney    Disposition  Home    Were the patients medications reviewed and updated  Yes    Current Symptoms  None      TCM Call (since 3/8/2021)     Post hospital issues  None    Should patient be enrolled in anticoag monitoring? Yes    Scheduled for follow up?   Yes    Did you obtain your prescribed medications  Yes    Do you need help managing your prescriptions or medications  No    Is transportation to your appointment needed  No    I have advised the patient to call PCP with any new or worsening symptoms  Lenin Jordan, [address]  Family    The type of support provided  Emotional; Financial; Physical    Do you have social support  Yes, as much as I need          History of Present Illness   Efren Aguilar is a 78y o -year-old female who is in today for a post hospital check, I had seen her March 23rd, she had declined hospital admission at that time, she subsequently worsened with flank pain, chills, malaise, was admitted with pyelonephritis March 27th through March 30th  She did see Infectious Disease, in the past has had multiple organisms on urine culture, does have indwelling stent  Subsequently discharged on Amoxil 500 every 8 hours for 10 day total, she relates there was a bit of a delay getting that, she will be completing that tomorrow  She feels much better, she is back at baseline, no flank pain, no fevers or chills  She does have mild chronic shortness of breath with cough, no hemoptysis, she ambulated long hallway into office without issue  Past history CVA, continues on aspirin, Plavix, also uses Crestor 2 times weekly  No new neurological changes  No increased palpitations or chest discomfort  She is using all medication as directed  Review of Systems   Review of Systems   Constitutional: Negative for appetite change, fatigue, fever and unexpected weight change  HENT: Negative for sore throat and trouble swallowing  Respiratory: Positive for cough and shortness of breath  Negative for chest tightness  See HPI   Cardiovascular: Negative for chest pain, palpitations and leg swelling  Gastrointestinal: Negative for abdominal pain, blood in stool, nausea and vomiting  No acid reflux     No change in bowel   Genitourinary: Negative for dysuria and hematuria  Neurological: Positive for tremors (Stable regarding chronic tremor, history deep brain stimulator implant)  Negative for dizziness, syncope, light-headedness and headaches  Psychiatric/Behavioral: Negative for behavioral problems and confusion         Active Problem List Patient Active Problem List   Diagnosis    Benign essential hypertension    Benign familial tremor    History of CVA (cerebrovascular accident)    COPD (chronic obstructive pulmonary disease) ([address] )    Esophageal reflux    Gait disturbance    Dyslipidemia    Sleep disorder    Sciatica    Right shoulder pain    Osteoarthritis of hip    OA (osteoarthritis)    Multiple joint pain    Acquired hypothyroidism    Lumbar degenerative disc disease    Back pain    History of pancreatitis    Aortic valve stenosis - ( Mild -Moderate )    S/P deep brain stimulator placement    Microscopic hematuria    Dysarthria related to Nov 2018 CVA,improved    Swallowing dysfunction    Weakness of left leg    Hydronephrosis of left kidney    Pyelonephritis    [facility] (CVA) ([facility] [address] )    Nephrostomy tube Left    Coronary artery disease involving native coronary artery of native heart without angina pectoris    Diabetes mellitus type 2, diet-controlled ([facility] [address] )    Calculus of kidney    Status post RIGHT carotid endarterectomy    Bilateral carotid artery stenosis    Acute cystitis without hematuria    PAD (peripheral artery disease) ([facility])       Past Medical History:  Reviewed    Past Surgical History:  Reviewed    Family History:  Reviewed    Social History:  Reviewed    Objective     Vitals:    04/08/21 1323   BP: 132/70   BP Location: Left arm   Patient Position: Sitting   Cuff Size: Standard   Pulse: 75   Temp: 98 °F (36 7 °C)   SpO2: 96%   Weight: 89 4 kg (197 lb)   Height: 5' 4" (1 626 m)     Body mass index is 33 81 kg/m²  BP Readings from Last 3 Encounters:   04/08/21 132/70   03/30/21 153/72   03/23/21 140/82       Wt Readings from Last 3 Encounters:   04/08/21 89 4 kg (197 lb)   03/27/21 89 8 kg (198 lb)   03/23/21 91 6 kg (202 lb)       Physical Exam  Constitutional:       General: She is not in acute distress  Appearance: Normal appearance  She is well-developed   She is not ill-appearing  Comments: She looks as at baseline, oxygenating well on room air   Eyes:      General: No scleral icterus  Cardiovascular:      Rate and Rhythm: Normal rate and regular rhythm  Heart sounds: Murmur (2/6 systolic ejection murmur right 2nd intercostal space) present  Pulmonary:      Effort: Pulmonary effort is normal  No respiratory distress  Comments: No rales, mild scattered rhonchi, chronic  Abdominal:      Palpations: Abdomen is soft  Tenderness: There is no abdominal tenderness  There is no right CVA tenderness, left CVA tenderness or guarding  Musculoskeletal:      Right lower leg: No edema  Left lower leg: No edema  Skin:     Coloration: Skin is not jaundiced  Neurological:      Mental Status: She is alert and oriented to person, place, and time  Psychiatric:         Mood and Affect: Mood normal          Behavior: Behavior normal          ALLERGIES:  Allergies   Allergen Reactions    Ciprofloxacin Diarrhea    Simvastatin Myalgia    Advair Diskus [Fluticasone-Salmeterol] Palpitations     Ok w flovent    Tetracycline Rash     Reaction Date: [id number];        Current Medications     Current Outpatient Medications   Medication Sig Dispense Refill    albuterol (2 5 mg/3 mL) 0 083 % nebulizer solution Take 1 vial (2 5 mg total) by nebulization every 6 (six) hours as needed for shortness of breath 120 vial 5    aspirin (ECOTRIN LOW STRENGTH) 81 mg EC tablet Take 1 tablet (81 mg total) by mouth daily 30 tablet 0    Cholecalciferol (VITAMIN D-3 PO) Take 1,000 Units by mouth daily        clopidogrel (PLAVIX) 75 mg tablet Take 1 tablet (75 mg total) by mouth daily 90 tablet 3    diltiazem (CARDIZEM CD) 240 mg 24 hr capsule TAKE 1 CAPSULE (240 MG TOTAL) BY MOUTH DAILY 90 capsule 3    Fluticasone Propionate, Inhal, (Flovent Diskus) 250 MCG/BLIST AEPB Inhale 1 puff 2 (two) times a day 1 each 5    furosemide (LASIX) 20 mg tablet TAKE 1 TABLET (20 MG TOTAL) BY MOUTH DAILY 90 tablet 1    levothyroxine 125 mcg tablet TAKE 1 TABLET (125 MCG TOTAL) BY MOUTH DAILY 90 tablet 3    Multiple Vitamins-Minerals (PRESERVISION AREDS PO) Take 1 Cap by Mouth daily      multivitamin (THERAGRAN) TABS Take 1 tablet by mouth daily      omeprazole (PriLOSEC) 20 mg delayed release capsule Take 1 capsule by mouth daily as needed       rosuvastatin (CRESTOR) 5 mg tablet Uses twice weekly (Patient taking differently: Take by mouth Uses twice weekly) 40 tablet 3    acetaminophen (TYLENOL) 500 mg tablet Take 1,000 mg by mouth every 6 (six) hours as needed for mild pain        No current facility-administered medications for this visit               Orders Placed This Encounter   Procedures    Ambulatory referral to Neurology    Echo complete with contrast if indicated         Keshia Laws DO

## 2021-04-21 ENCOUNTER — IMMUNIZATIONS (OUTPATIENT)
Dept: FAMILY MEDICINE CLINIC | Facility: HOSPITAL | Age: 81
End: 2021-04-21

## 2021-04-21 DIAGNOSIS — Z23 ENCOUNTER FOR IMMUNIZATION: Primary | ICD-10-CM

## 2021-04-21 PROCEDURE — 91301 SARS-COV-2 / COVID-19 MRNA VACCINE (MODERNA) 100 MCG: CPT

## 2021-04-21 PROCEDURE — 0012A SARS-COV-2 / COVID-19 MRNA VACCINE (MODERNA) 100 MCG: CPT

## 2021-04-25 ENCOUNTER — APPOINTMENT (EMERGENCY)
Dept: RADIOLOGY | Facility: HOSPITAL | Age: 81
DRG: 377 | End: 2021-04-25
Payer: MEDICARE

## 2021-04-25 ENCOUNTER — HOSPITAL ENCOUNTER (INPATIENT)
Facility: HOSPITAL | Age: 81
LOS: 4 days | Discharge: HOME | DRG: 377 | End: 2021-04-29
Attending: EMERGENCY MEDICINE | Admitting: INTERNAL MEDICINE
Payer: MEDICARE

## 2021-04-25 ENCOUNTER — APPOINTMENT (EMERGENCY)
Dept: CT IMAGING | Facility: HOSPITAL | Age: 81
DRG: 377 | End: 2021-04-25
Payer: MEDICARE

## 2021-04-25 DIAGNOSIS — N39.0 UTI (URINARY TRACT INFECTION): Primary | ICD-10-CM

## 2021-04-25 DIAGNOSIS — R19.5 HEME POSITIVE STOOL: ICD-10-CM

## 2021-04-25 DIAGNOSIS — J44.9 COPD (CHRONIC OBSTRUCTIVE PULMONARY DISEASE) (HCC): ICD-10-CM

## 2021-04-25 DIAGNOSIS — K92.1 MELENA: ICD-10-CM

## 2021-04-25 DIAGNOSIS — D64.9 ANEMIA: ICD-10-CM

## 2021-04-25 DIAGNOSIS — K21.9 GASTROESOPHAGEAL REFLUX DISEASE, UNSPECIFIED WHETHER ESOPHAGITIS PRESENT: ICD-10-CM

## 2021-04-25 PROBLEM — R07.9 CHEST PAIN: Status: ACTIVE | Noted: 2021-04-25

## 2021-04-25 PROBLEM — J96.01 ACUTE RESPIRATORY FAILURE WITH HYPOXIA (HCC): Status: ACTIVE | Noted: 2021-04-25

## 2021-04-25 LAB
ABO GROUP BLD: NORMAL
ALBUMIN SERPL BCP-MCNC: 3.2 G/DL (ref 3.5–5)
ALP SERPL-CCNC: 71 U/L (ref 46–116)
ALT SERPL W P-5'-P-CCNC: 23 U/L (ref 12–78)
ANION GAP SERPL CALCULATED.3IONS-SCNC: 8 MMOL/L (ref 4–13)
AST SERPL W P-5'-P-CCNC: 17 U/L (ref 5–45)
BACTERIA UR QL AUTO: ABNORMAL /HPF
BASOPHILS # BLD AUTO: 0.04 THOUSANDS/ΜL (ref 0–0.1)
BASOPHILS NFR BLD AUTO: 0 % (ref 0–1)
BILIRUB SERPL-MCNC: 0.22 MG/DL (ref 0.2–1)
BILIRUB UR QL STRIP: NEGATIVE
BLD GP AB SCN SERPL QL: NEGATIVE
BUN SERPL-MCNC: 19 MG/DL (ref 5–25)
CALCIUM ALBUM COR SERPL-MCNC: 9.1 MG/DL (ref 8.3–10.1)
CALCIUM SERPL-MCNC: 8.5 MG/DL (ref 8.3–10.1)
CHLORIDE SERPL-SCNC: 107 MMOL/L (ref 100–108)
CLARITY UR: ABNORMAL
CO2 SERPL-SCNC: 28 MMOL/L (ref 21–32)
COLOR UR: YELLOW
CREAT SERPL-MCNC: 0.99 MG/DL (ref 0.6–1.3)
EOSINOPHIL # BLD AUTO: 0.22 THOUSAND/ΜL (ref 0–0.61)
EOSINOPHIL NFR BLD AUTO: 2 % (ref 0–6)
ERYTHROCYTE [DISTWIDTH] IN BLOOD BY AUTOMATED COUNT: 15.1 % (ref 11.6–15.1)
GFR SERPL CREATININE-BSD FRML MDRD: 53 ML/MIN/1.73SQ M
GLUCOSE SERPL-MCNC: 112 MG/DL (ref 65–140)
GLUCOSE UR STRIP-MCNC: NEGATIVE MG/DL
HCT VFR BLD AUTO: 24.8 % (ref 34.8–46.1)
HCT VFR BLD AUTO: 25.9 % (ref 34.8–46.1)
HGB BLD-MCNC: 7.4 G/DL (ref 11.5–15.4)
HGB BLD-MCNC: 7.9 G/DL (ref 11.5–15.4)
HGB UR QL STRIP.AUTO: ABNORMAL
IMM GRANULOCYTES # BLD AUTO: 0.05 THOUSAND/UL (ref 0–0.2)
IMM GRANULOCYTES NFR BLD AUTO: 1 % (ref 0–2)
KETONES UR STRIP-MCNC: NEGATIVE MG/DL
LACTATE SERPL-SCNC: 1.6 MMOL/L (ref 0.5–2)
LEUKOCYTE ESTERASE UR QL STRIP: ABNORMAL
LYMPHOCYTES # BLD AUTO: 1.9 THOUSANDS/ΜL (ref 0.6–4.47)
LYMPHOCYTES NFR BLD AUTO: 18 % (ref 14–44)
MCH RBC QN AUTO: 26.8 PG (ref 26.8–34.3)
MCHC RBC AUTO-ENTMCNC: 30.5 G/DL (ref 31.4–37.4)
MCV RBC AUTO: 88 FL (ref 82–98)
MONOCYTES # BLD AUTO: 0.63 THOUSAND/ΜL (ref 0.17–1.22)
MONOCYTES NFR BLD AUTO: 6 % (ref 4–12)
NEUTROPHILS # BLD AUTO: 8 THOUSANDS/ΜL (ref 1.85–7.62)
NEUTS SEG NFR BLD AUTO: 73 % (ref 43–75)
NITRITE UR QL STRIP: POSITIVE
NON-SQ EPI CELLS URNS QL MICRO: ABNORMAL /HPF
NRBC BLD AUTO-RTO: 0 /100 WBCS
NT-PROBNP SERPL-MCNC: 395 PG/ML
PH UR STRIP.AUTO: 6 [PH]
PLATELET # BLD AUTO: 230 THOUSANDS/UL (ref 149–390)
PMV BLD AUTO: 9.7 FL (ref 8.9–12.7)
POTASSIUM SERPL-SCNC: 3.6 MMOL/L (ref 3.5–5.3)
PROT SERPL-MCNC: 6.7 G/DL (ref 6.4–8.2)
PROT UR STRIP-MCNC: ABNORMAL MG/DL
RBC # BLD AUTO: 2.95 MILLION/UL (ref 3.81–5.12)
RBC #/AREA URNS AUTO: ABNORMAL /HPF
RH BLD: NEGATIVE
SARS-COV-2 RNA RESP QL NAA+PROBE: NEGATIVE
SODIUM SERPL-SCNC: 143 MMOL/L (ref 136–145)
SP GR UR STRIP.AUTO: 1.02 (ref 1–1.03)
SPECIMEN EXPIRATION DATE: NORMAL
TROPONIN I SERPL-MCNC: <0.02 NG/ML
TROPONIN I SERPL-MCNC: <0.02 NG/ML
UROBILINOGEN UR QL STRIP.AUTO: 0.2 E.U./DL
WBC # BLD AUTO: 10.84 THOUSAND/UL (ref 4.31–10.16)
WBC #/AREA URNS AUTO: ABNORMAL /HPF

## 2021-04-25 PROCEDURE — 93005 ELECTROCARDIOGRAM TRACING: CPT

## 2021-04-25 PROCEDURE — 74177 CT ABD & PELVIS W/CONTRAST: CPT

## 2021-04-25 PROCEDURE — 85025 COMPLETE CBC W/AUTO DIFF WBC: CPT | Performed by: EMERGENCY MEDICINE

## 2021-04-25 PROCEDURE — 82272 OCCULT BLD FECES 1-3 TESTS: CPT

## 2021-04-25 PROCEDURE — 87186 SC STD MICRODIL/AGAR DIL: CPT | Performed by: EMERGENCY MEDICINE

## 2021-04-25 PROCEDURE — 99223 1ST HOSP IP/OBS HIGH 75: CPT | Performed by: INTERNAL MEDICINE

## 2021-04-25 PROCEDURE — 71260 CT THORAX DX C+: CPT

## 2021-04-25 PROCEDURE — 80053 COMPREHEN METABOLIC PANEL: CPT | Performed by: EMERGENCY MEDICINE

## 2021-04-25 PROCEDURE — U0003 INFECTIOUS AGENT DETECTION BY NUCLEIC ACID (DNA OR RNA); SEVERE ACUTE RESPIRATORY SYNDROME CORONAVIRUS 2 (SARS-COV-2) (CORONAVIRUS DISEASE [COVID-19]), AMPLIFIED PROBE TECHNIQUE, MAKING USE OF HIGH THROUGHPUT TECHNOLOGIES AS DESCRIBED BY CMS-2020-01-R: HCPCS | Performed by: EMERGENCY MEDICINE

## 2021-04-25 PROCEDURE — U0005 INFEC AGEN DETEC AMPLI PROBE: HCPCS | Performed by: EMERGENCY MEDICINE

## 2021-04-25 PROCEDURE — 99285 EMERGENCY DEPT VISIT HI MDM: CPT

## 2021-04-25 PROCEDURE — 85018 HEMOGLOBIN: CPT | Performed by: INTERNAL MEDICINE

## 2021-04-25 PROCEDURE — 83605 ASSAY OF LACTIC ACID: CPT | Performed by: EMERGENCY MEDICINE

## 2021-04-25 PROCEDURE — 99285 EMERGENCY DEPT VISIT HI MDM: CPT | Performed by: EMERGENCY MEDICINE

## 2021-04-25 PROCEDURE — 81001 URINALYSIS AUTO W/SCOPE: CPT | Performed by: EMERGENCY MEDICINE

## 2021-04-25 PROCEDURE — 83880 ASSAY OF NATRIURETIC PEPTIDE: CPT | Performed by: EMERGENCY MEDICINE

## 2021-04-25 PROCEDURE — 96374 THER/PROPH/DIAG INJ IV PUSH: CPT

## 2021-04-25 PROCEDURE — 71045 X-RAY EXAM CHEST 1 VIEW: CPT

## 2021-04-25 PROCEDURE — 86901 BLOOD TYPING SEROLOGIC RH(D): CPT | Performed by: EMERGENCY MEDICINE

## 2021-04-25 PROCEDURE — 84484 ASSAY OF TROPONIN QUANT: CPT | Performed by: EMERGENCY MEDICINE

## 2021-04-25 PROCEDURE — 87077 CULTURE AEROBIC IDENTIFY: CPT | Performed by: EMERGENCY MEDICINE

## 2021-04-25 PROCEDURE — 87086 URINE CULTURE/COLONY COUNT: CPT | Performed by: EMERGENCY MEDICINE

## 2021-04-25 PROCEDURE — C9113 INJ PANTOPRAZOLE SODIUM, VIA: HCPCS | Performed by: INTERNAL MEDICINE

## 2021-04-25 PROCEDURE — 86850 RBC ANTIBODY SCREEN: CPT | Performed by: EMERGENCY MEDICINE

## 2021-04-25 PROCEDURE — 85014 HEMATOCRIT: CPT | Performed by: INTERNAL MEDICINE

## 2021-04-25 PROCEDURE — 87040 BLOOD CULTURE FOR BACTERIA: CPT | Performed by: EMERGENCY MEDICINE

## 2021-04-25 PROCEDURE — 36415 COLL VENOUS BLD VENIPUNCTURE: CPT | Performed by: EMERGENCY MEDICINE

## 2021-04-25 PROCEDURE — 86900 BLOOD TYPING SEROLOGIC ABO: CPT | Performed by: EMERGENCY MEDICINE

## 2021-04-25 PROCEDURE — 84484 ASSAY OF TROPONIN QUANT: CPT | Performed by: INTERNAL MEDICINE

## 2021-04-25 PROCEDURE — 86923 COMPATIBILITY TEST ELECTRIC: CPT

## 2021-04-25 RX ORDER — LEVOTHYROXINE SODIUM 0.12 MG/1
125 TABLET ORAL DAILY
Status: DISCONTINUED | OUTPATIENT
Start: 2021-04-26 | End: 2021-04-29 | Stop reason: HOSPADM

## 2021-04-25 RX ORDER — IPRATROPIUM BROMIDE AND ALBUTEROL SULFATE 2.5; .5 MG/3ML; MG/3ML
3 SOLUTION RESPIRATORY (INHALATION) EVERY 6 HOURS PRN
Status: DISCONTINUED | OUTPATIENT
Start: 2021-04-25 | End: 2021-04-25

## 2021-04-25 RX ORDER — ONDANSETRON 2 MG/ML
4 INJECTION INTRAMUSCULAR; INTRAVENOUS ONCE
Status: COMPLETED | OUTPATIENT
Start: 2021-04-25 | End: 2021-04-25

## 2021-04-25 RX ORDER — IPRATROPIUM BROMIDE AND ALBUTEROL SULFATE 2.5; .5 MG/3ML; MG/3ML
3 SOLUTION RESPIRATORY (INHALATION)
Status: DISCONTINUED | OUTPATIENT
Start: 2021-04-25 | End: 2021-04-25

## 2021-04-25 RX ORDER — ALBUTEROL SULFATE 2.5 MG/3ML
2.5 SOLUTION RESPIRATORY (INHALATION) EVERY 6 HOURS PRN
Status: DISCONTINUED | OUTPATIENT
Start: 2021-04-25 | End: 2021-04-27

## 2021-04-25 RX ORDER — SIMETHICONE 80 MG
80 TABLET,CHEWABLE ORAL ONCE
Status: COMPLETED | OUTPATIENT
Start: 2021-04-25 | End: 2021-04-25

## 2021-04-25 RX ORDER — FUROSEMIDE 20 MG/1
20 TABLET ORAL DAILY
Status: DISCONTINUED | OUTPATIENT
Start: 2021-04-26 | End: 2021-04-29 | Stop reason: HOSPADM

## 2021-04-25 RX ORDER — PRAVASTATIN SODIUM 40 MG
40 TABLET ORAL 2 TIMES WEEKLY
Status: DISCONTINUED | OUTPATIENT
Start: 2021-04-26 | End: 2021-04-29 | Stop reason: HOSPADM

## 2021-04-25 RX ORDER — LEVOFLOXACIN 5 MG/ML
750 INJECTION, SOLUTION INTRAVENOUS ONCE
Status: DISCONTINUED | OUTPATIENT
Start: 2021-04-25 | End: 2021-04-29 | Stop reason: HOSPADM

## 2021-04-25 RX ORDER — PANTOPRAZOLE SODIUM 40 MG/1
40 INJECTION, POWDER, FOR SOLUTION INTRAVENOUS EVERY 12 HOURS SCHEDULED
Status: DISCONTINUED | OUTPATIENT
Start: 2021-04-25 | End: 2021-04-29 | Stop reason: HOSPADM

## 2021-04-25 RX ORDER — DILTIAZEM HYDROCHLORIDE 240 MG/1
240 CAPSULE, COATED, EXTENDED RELEASE ORAL DAILY
Status: DISCONTINUED | OUTPATIENT
Start: 2021-04-26 | End: 2021-04-29 | Stop reason: HOSPADM

## 2021-04-25 RX ORDER — LEVOFLOXACIN 5 MG/ML
750 INJECTION, SOLUTION INTRAVENOUS EVERY 24 HOURS
Status: DISCONTINUED | OUTPATIENT
Start: 2021-04-26 | End: 2021-04-29 | Stop reason: HOSPADM

## 2021-04-25 RX ADMIN — IOHEXOL 100 ML: 350 INJECTION, SOLUTION INTRAVENOUS at 17:55

## 2021-04-25 RX ADMIN — ONDANSETRON 4 MG: 2 INJECTION INTRAMUSCULAR; INTRAVENOUS at 15:42

## 2021-04-25 RX ADMIN — LEVOFLOXACIN 750 MG: 5 INJECTION, SOLUTION INTRAVENOUS at 23:50

## 2021-04-25 RX ADMIN — SIMETHICONE 80 MG: 80 TABLET, CHEWABLE ORAL at 15:28

## 2021-04-25 RX ADMIN — PANTOPRAZOLE SODIUM 40 MG: 40 INJECTION, POWDER, FOR SOLUTION INTRAVENOUS at 23:49

## 2021-04-25 NOTE — ED PROVIDER NOTES
Pt Name: Judy Orozco  MRN: [id number]  [place] 1940  Age/Sex: 78 y o  female  Date of evaluation: 4/25/2021  PCP: Dawna Masters, [address] [place] [address]    Chief Complaint   Patient presents with    Shortness of Breath     Patient started with SOB yesterday  COPD history  Patient received duoneb pre-hospital and reports feeling slightly better  Patient still tachypenic with increased work of breathing  Patient belching alot during triage  Reports mild chest pain  HPI    Kali Leon presents to the Emergency Department complaining of SOB  She was using her nebs at home  No fever  No other complaints but has been belching and nausea         HPI      Past Medical and Surgical History    Past Medical History:   Diagnosis Date    Arthritis     Asthma     At risk for falls     rib frac    Benign essential tremor 10/15/2018    Added automatically from request for surgery [id number] has stimulator    [facility]     Coronary artery disease     Diabetes mellitus ([facility] )     borderline    Difficulty waking     post anesthesia    Edema     bles    GERD (gastroesophageal reflux disease)     High cholesterol     History of recent fall     broke several ribs    Hypertension     Hypothyroid     Kidney stone     Macular degeneration     Osteopenia     Osteoporosis     S/P deep brain stimulator placement     Shortness of breath     on exertion    Slurred speech     Stroke (HCC)     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     Urinary leakage     Uses roller walker     Vitamin D deficiency     Wears dentures     full upper    Wears glasses     reading       Past Surgical History:   Procedure Laterality Date    CATARACT EXTRACTION W/  INTRAOCULAR LENS IMPLANT Bilateral     COLONOSCOPY      CYSTOSCOPY W/ RETROGRADES Left 2/16/2021    Procedure: CYSTO, RETRO PYELOGRAM, STENT EXCHANGE;  Surgeon: Ton Coffey MD;  [facility];  Service: Urology    DEEP BRAIN STIMULATOR PLACEMENT      FL RETROGRADE PYELOGRAM  2019    FL RETROGRADE PYELOGRAM  2020    FL RETROGRADE PYELOGRAM  2021    IR NEPHROSTOMY TO NEPHROURETERAL STENT  1/10/2020    IR TUBE PLACEMENT  2019    KNEE ARTHROSCOPY      FL CYSTOURETHROSCOPY,URETER CATHETER Left 2019    Procedure: CYSTO RETROGRADE PYELOGRAM, URETEROSCOPY;  Surgeon: Dixon Brand MD;  [facility];  Service: Urology    FL IMP STIM,CRANIAL,SUBQ,1 ARRAY Left 2019    Procedure: REPLACEMENT IMPLANTABLE PULSE GENERATOR (IPG) DEEP BRAIN STIMULATION (DBS), LEFT;  Surgeon: Allison Kehr, MD;  [facility];  Service: Neurosurgery    FL IMP STIM,CRANIAL,SUBQ,>1 ARRAY Right 2018    Procedure: REPLACEMENT RIGHT DBS IMPLANTABLE PULSE GENERATOR;  Surgeon: Allison Kehr, MD;  [facility];  Service: Neurosurgery    [address] Left 10/27/2020    Procedure: CYSTOSCOPY, RETROGRADE PYELOGRAM, EXCHANGE STENT URETERA L, LEFT URETEROSCOPY;  Surgeon: Elissa Arreaga MD;  Location:  [facility];  Service: Urology    FL RMVL & RPLCMT INTLY DWELLING URETERAL STENT PRQ Left 2020    Procedure: Smita Emery, STENT;  Surgeon: Elissa Arreaga MD;  [facility];  Service: Urology    FL THROMBOENDARTECTMY Danish Pean Right 2020    Procedure: CAROTID ENDARTERECTOMY WITH BOVINE PATCH;  Surgeon: Min Mcqueen MD;  [facility];  Service: Vascular    FL TOTAL HIP ARTHROPLASTY Left 2016    Procedure: ARTHROPLASTY HIP TOTAL ANTERIOR;  Surgeon: Yue Arboleda MD;  [facility];  Service: Orthopedics    ROTATOR CUFF REPAIR         Family History   Problem Relation Age of Onset    Breast cancer Mother     Throat cancer Family        Social History     Tobacco Use    Smoking status: Former Smoker     Types: Cigarettes     Quit date:      Years since quittin 3    Smokeless tobacco: Never Used   Substance Use Topics    Alcohol use: Never     Frequency: Never    Drug use: No              Allergies    Allergies   Allergen Reactions    Ciprofloxacin Diarrhea    Simvastatin Myalgia    Advair Diskus [Fluticasone-Salmeterol] Palpitations     Ok w flovent    Tetracycline Rash     Reaction Date: 10Jan2012;        Home Medications    Prior to Admission medications    Medication Sig Start Date End Date Taking? Authorizing Provider   acetaminophen (TYLENOL) 500 mg tablet Take 1,000 mg by mouth every 6 (six) hours as needed for mild pain     Historical Provider, MD   albuterol (2 5 mg/3 mL) 0 083 % nebulizer solution Take 1 vial (2 5 mg total) by nebulization every 6 (six) hours as needed for shortness of breath 6/29/20   Xavier Wu, DO   aspirin (ECOTRIN LOW STRENGTH) 81 mg EC tablet Take 1 tablet (81 mg total) by mouth daily 2/20/20   Trish Palmer PA-C   Cholecalciferol (VITAMIN D-3 PO) Take 1,000 Units by mouth daily      Historical Provider, MD   clopidogrel (PLAVIX) 75 mg tablet Take 1 tablet (75 mg total) by mouth daily 2/9/21   Xavier Wu, DO   diltiazem (CARDIZEM CD) 240 mg 24 hr capsule TAKE 1 CAPSULE (240 MG TOTAL) BY MOUTH DAILY 2/9/21   Xavier Wu, DO   Fluticasone Propionate, Inhal, (Flovent Diskus) 250 MCG/BLIST AEPB Inhale 1 puff 2 (two) times a day 1/29/21   Xavier Wu, DO   furosemide (LASIX) 20 mg tablet TAKE 1 TABLET (20 MG TOTAL) BY MOUTH DAILY 12/29/20   Xavier Wu, DO   levothyroxine 125 mcg tablet TAKE 1 TABLET (125 MCG TOTAL) BY MOUTH DAILY 2/9/21   Xavier Wu, DO   Multiple Vitamins-Minerals (PRESERVISION AREDS PO) Take 1 Cap by Mouth daily    Historical Provider, MD   multivitamin (THERAGRAN) TABS Take 1 tablet by mouth daily    Historical Provider, MD   omeprazole (PriLOSEC) 20 mg delayed release capsule Take 1 capsule by mouth daily as needed     Historical Provider, MD   rosuvastatin (CRESTOR) 5 mg tablet Uses twice weekly  Patient taking differently: Take by mouth Uses twice weekly 6/18/20   Giovanny Reddy DO           Review of Systems    Review of Systems   Constitutional: Negative for chills and fever  HENT: Negative for ear pain and sore throat  Eyes: Negative for pain and visual disturbance  Respiratory: Positive for shortness of breath  Negative for cough  Cardiovascular: Negative for chest pain and palpitations  Gastrointestinal: Positive for nausea  Negative for abdominal pain, anal bleeding, blood in stool and vomiting  Genitourinary: Negative for dysuria and hematuria  Musculoskeletal: Negative for arthralgias and back pain  Skin: Negative for color change and rash  Neurological: Negative for seizures and syncope  All other systems reviewed and are negative  Physical Exam      ED Triage Vitals   Temperature Pulse Respirations Blood Pressure SpO2   04/25/21 2134 04/25/21 1430 04/25/21 1430 04/25/21 1430 04/25/21 1430   97 7 °F (36 5 °C) 87 (!) 26 164/69 95 %      Temp Source Heart Rate Source Patient Position - Orthostatic VS BP Location FiO2 (%)   04/25/21 2134 04/25/21 1430 04/25/21 1430 04/25/21 1430 --   Oral Monitor Sitting Right arm       Pain Score       04/25/21 1430       5               Physical Exam  Vitals signs and nursing note reviewed  Constitutional:       General: She is not in acute distress  Appearance: She is well-developed  HENT:      Head: Normocephalic and atraumatic  Eyes:      Conjunctiva/sclera: Conjunctivae normal    Neck:      Musculoskeletal: Neck supple  Cardiovascular:      Rate and Rhythm: Normal rate and regular rhythm  Heart sounds: No murmur  Pulmonary:      Effort: Pulmonary effort is normal  No respiratory distress  Breath sounds: Decreased breath sounds and wheezing present  Abdominal:      Palpations: Abdomen is soft  Tenderness: There is no abdominal tenderness  Genitourinary:     Rectum: Normal  Guaiac result positive  No mass, tenderness or external hemorrhoid  Normal anal tone  Skin:     General: Skin is warm and dry  Coloration: Skin is pale  Neurological:      Mental Status: She is alert  Assessment and Plan    Bekah Ellison is a 78 y o  female who presents with SOB  Physical examination remarkable for tachypnea  Differential diagnosis (not completely inclusive) includes cardiopulmonary/ infectious/ symptomatic anemia  Plan will be to perform diagnostic testing and treat symptomatically  MDM    Diagnostic Results        EKG INTERPRETATION  EKG Interpretation      EKG interpreted by me  Interpretation by Yadi Fried DO  EKG reviewed and interpreted independently  Labs:    Results for orders placed or performed during the hospital encounter of 04/25/21   Novel Coronavirus (Covid-19),PCR [facility]    Specimen: Nose; Nares   Result Value Ref Range    SARS-CoV-2 Negative Negative   Blood culture #1    Specimen: Arm, Left; Blood   Result Value Ref Range    Blood Culture No Growth at 24 hrs  Blood culture #2    Specimen: Hand, Left; Blood   Result Value Ref Range    Blood Culture No Growth at 24 hrs      Urine culture    Specimen: Urine, Clean Catch   Result Value Ref Range    Urine Culture >100,000 cfu/ml Gram Negative Gabe Enteric Like (A)    CBC and differential   Result Value Ref Range    WBC 10 84 (H) 4 31 - 10 16 Thousand/uL    RBC 2 95 (L) 3 81 - 5 12 Million/uL    Hemoglobin 7 9 (L) 11 5 - 15 4 g/dL    Hematocrit 25 9 (L) 34 8 - 46 1 %    MCV 88 82 - 98 fL    MCH 26 8 26 8 - 34 3 pg    MCHC 30 5 (L) 31 4 - 37 4 g/dL    RDW 15 1 11 6 - 15 1 %    MPV 9 7 8 9 - 12 7 fL    Platelets 190 226 - 227 Thousands/uL    nRBC 0 /100 WBCs    Neutrophils Relative 73 43 - 75 %    Immat GRANS % 1 0 - 2 %    Lymphocytes Relative 18 14 - 44 %    Monocytes Relative 6 4 - 12 %    Eosinophils Relative 2 0 - 6 %    Basophils Relative 0 0 - 1 %    Neutrophils Absolute 8 00 (H) 1 85 - 7 62 Thousands/µL    Immature Grans Absolute 0 05 0 00 - 0 20 Thousand/uL Lymphocytes Absolute 1 90 0 60 - 4 47 Thousands/µL    Monocytes Absolute 0 63 0 17 - 1 22 Thousand/µL    Eosinophils Absolute 0 22 0 00 - 0 61 Thousand/µL    Basophils Absolute 0 04 0 00 - 0 10 Thousands/µL   Comprehensive metabolic panel   Result Value Ref Range    Sodium 143 136 - 145 mmol/L    Potassium 3 6 3 5 - 5 3 mmol/L    Chloride 107 100 - 108 mmol/L    CO2 28 21 - 32 mmol/L    ANION GAP 8 4 - 13 mmol/L    BUN 19 5 - 25 mg/dL    Creatinine 0 99 0 60 - 1 30 mg/dL    Glucose 112 65 - 140 mg/dL    Calcium 8 5 8 3 - 10 1 mg/dL    Corrected Calcium 9 1 8 3 - 10 1 mg/dL    AST 17 5 - 45 U/L    ALT 23 12 - 78 U/L    Alkaline Phosphatase 71 46 - 116 U/L    Total Protein 6 7 6 4 - 8 2 g/dL    Albumin 3 2 (L) 3 5 - 5 0 g/dL    Total Bilirubin 0 22 0 20 - 1 00 mg/dL    eGFR 53 ml/min/1 73sq m   Troponin I   Result Value Ref Range    Troponin I <0 02 <=0 04 ng/mL   NT-BNP PRO   Result Value Ref Range    NT-proBNP 395 <450 pg/mL   UA w Reflex to Microscopic w Reflex to Culture    Specimen: Urine, Clean Catch   Result Value Ref Range    Color, UA Yellow     Clarity, UA Cloudy     Specific [place], UA 1 020 1 003 - 1 030    pH, UA 6 0 4 5, 5 0, 5 5, 6 0, 6 5, 7 0, 7 5, 8 0    Leukocytes, UA Moderate (A) Negative    Nitrite, UA Positive (A) Negative    Protein,  (2+) (A) Negative mg/dl    Glucose, UA Negative Negative mg/dl    Ketones, UA Negative Negative mg/dl    Urobilinogen, UA 0 2 0 2, 1 0 E U /dl E U /dl    Bilirubin, UA Negative Negative    Blood, UA Small (A) Negative   Lactic acid, plasma   Result Value Ref Range    LACTIC ACID 1 6 0 5 - 2 0 mmol/L   Urine Microscopic   Result Value Ref Range    RBC, UA 0-1 (A) None Seen, 2-4 /hpf    WBC, UA Innumerable (A) None Seen, 2-4 /hpf    Epithelial Cells Occasional None Seen, Occasional /hpf    Bacteria, UA Innumerable (A) None Seen, Occasional /hpf   Troponin I   Result Value Ref Range    Troponin I <0 02 <=0 04 ng/mL   Hemoglobin and hematocrit, blood   Result Value Ref Range    Hemoglobin 7 4 (L) 11 5 - 15 4 g/dL    Hematocrit 24 8 (L) 34 8 - 46 1 %   Troponin I   Result Value Ref Range    Troponin I <0 02 <=0 04 ng/mL   Comprehensive metabolic panel   Result Value Ref Range    Sodium 143 136 - 145 mmol/L    Potassium 4 6 3 5 - 5 3 mmol/L    Chloride 106 100 - 108 mmol/L    CO2 32 21 - 32 mmol/L    ANION GAP 5 4 - 13 mmol/L    BUN 19 5 - 25 mg/dL    Creatinine 0 93 0 60 - 1 30 mg/dL    Glucose 120 65 - 140 mg/dL    Calcium 8 3 8 3 - 10 1 mg/dL    Corrected Calcium 9 2 8 3 - 10 1 mg/dL    AST 17 5 - 45 U/L    ALT 24 12 - 78 U/L    Alkaline Phosphatase 66 46 - 116 U/L    Total Protein 6 4 6 4 - 8 2 g/dL    Albumin 2 9 (L) 3 5 - 5 0 g/dL    Total Bilirubin 0 25 0 20 - 1 00 mg/dL    eGFR 57 ml/min/1 73sq m   CBC and differential   Result Value Ref Range    WBC 7 22 4 31 - 10 16 Thousand/uL    RBC 2 76 (L) 3 81 - 5 12 Million/uL    Hemoglobin 7 3 (L) 11 5 - 15 4 g/dL    Hematocrit 24 8 (L) 34 8 - 46 1 %    MCV 90 82 - 98 fL    MCH 26 4 (L) 26 8 - 34 3 pg    MCHC 29 4 (L) 31 4 - 37 4 g/dL    RDW 15 3 (H) 11 6 - 15 1 %    MPV 9 7 8 9 - 12 7 fL    Platelets 658 327 - 902 Thousands/uL    nRBC 0 /100 WBCs    Neutrophils Relative 70 43 - 75 %    Immat GRANS % 0 0 - 2 %    Lymphocytes Relative 17 14 - 44 %    Monocytes Relative 7 4 - 12 %    Eosinophils Relative 6 0 - 6 %    Basophils Relative 0 0 - 1 %    Neutrophils Absolute 4 98 1 85 - 7 62 Thousands/µL    Immature Grans Absolute 0 03 0 00 - 0 20 Thousand/uL    Lymphocytes Absolute 1 24 0 60 - 4 47 Thousands/µL    Monocytes Absolute 0 50 0 17 - 1 22 Thousand/µL    Eosinophils Absolute 0 44 0 00 - 0 61 Thousand/µL    Basophils Absolute 0 03 0 00 - 0 10 Thousands/µL   CBC   Result Value Ref Range    WBC 8 35 4 31 - 10 16 Thousand/uL    RBC 3 33 (L) 3 81 - 5 12 Million/uL    Hemoglobin 9 0 (L) 11 5 - 15 4 g/dL    Hematocrit 29 9 (L) 34 8 - 46 1 %    MCV 90 82 - 98 fL    MCH 27 0 26 8 - 34 3 pg    MCHC 30 1 (L) 31 4 - 37 4 g/dL RDW 15 0 11 6 - 15 1 %    Platelets 442 077 - 564 Thousands/uL    MPV 9 2 8 9 - 12 7 fL   ECG 12 lead   Result Value Ref Range    Ventricular Rate 84 BPM    Atrial Rate 84 BPM    WI Interval  ms    QRSD Interval 82 ms    QT Interval 348 ms    QTC Interval 411 ms    P [place] 97 degrees    QRS Axis -40 degrees    T Wave Axis 75 degrees   Type and screen   Result Value Ref Range    ABO Grouping B     Rh Factor Negative     Antibody Screen Negative     Specimen Expiration Date [id number]    Prepare Leukoreduced RBC: 1 Units   Result Value Ref Range    Unit Product Code I9138F14     Unit Number [id number]     Unit ABO B     Unit RH NEG     Crossmatch Compatible     Unit Dispense Status Presumed Trans        All labs reviewed and utilized in the medical decision making process    Radiology:    CT chest abdomen pelvis w contrast   Final Result      Moderate emphysema  Lungs are otherwise unremarkable  There is a left ureteral stent in place, with mild left hydronephrosis and urothelial thickening and hyperenhancement in the renal calyces and pelvises (series 601 images 113- 138 )  These findings were present on the 3/27/2021 CT, but have improved  Again seen are multiple small left renal calyceal stones, largest in the lower pole measuring 8 mm  Workstation performed: [id number]         XR chest 1 view portable   Final Result      Limited but unremarkable chest radiograph  Portions of lungs are obscured by generator packs for stimulators                    Workstation performed: [id number]             All radiology studies independently viewed by me and interpreted by the radiologist     Procedure    Procedures      ED Course of Care and Re-Assessments        Medications   levofloxacin (LEVAQUIN) IVPB (premix in dextrose) 750 mg 150 mL (750 mg Intravenous Not Given 4/25/21 4280)   diltiazem (CARDIZEM CD) 24 hr capsule 240 mg (240 mg Oral Given 4/27/21 0835)   furosemide (LASIX) tablet 20 mg (20 mg Oral Given 4/27/21 0836)   levothyroxine tablet 125 mcg (125 mcg Oral Given 4/27/21 0835)   pantoprazole (PROTONIX) injection 40 mg (40 mg Intravenous Given 4/27/21 0831)   pravastatin (PRAVACHOL) tablet 40 mg (40 mg Oral Given 4/26/21 0856)   levofloxacin (LEVAQUIN) IVPB (premix in dextrose) 750 mg 150 mL (750 mg Intravenous New Bag 4/27/21 0046)   levalbuterol (XOPENEX) inhalation solution 1 25 mg (1 25 mg Nebulization Given 4/27/21 1352)   sodium chloride 0 9 % inhalation solution 3 mL (3 mL Nebulization Given 4/27/21 1352)   ondansetron (ZOFRAN) injection 4 mg (4 mg Intravenous Given 4/25/21 1542)   simethicone (MYLICON) chewable tablet 80 mg (80 mg Oral Given 4/25/21 1528)   iohexol (OMNIPAQUE) 350 MG/ML injection (MULTI-DOSE) 100 mL (100 mL Intravenous Given 4/25/21 2395)       Discussed with SLIM  Admitted for further evaluation and treatment  FINAL IMPRESSION    Final diagnoses:   UTI (urinary tract infection)   Anemia   COPD (chronic obstructive pulmonary disease) ([facility])   Heme positive stool         DISPOSITION/PLAN    Time reflects when diagnosis was documented in both MDM as applicable and the Disposition within this note     Time User Action Codes Description Comment    4/25/2021  7:24 PM Chrissy Bustle L Add [N39 0] UTI (urinary tract infection)     4/25/2021  7:25 PM Chrissy Bustle L Add [D64 9] Anemia     4/25/2021  7:25 PM Chrissy Bustle L Add [J44 9] COPD (chronic obstructive pulmonary disease) ([facility] )     4/25/2021  7:31 PM Chrissy Bustle L Add [R19 5] Heme positive stool     4/26/2021  3:48 PM Ellis Rust Add [K92 1] Melena       ED Disposition     ED Disposition Condition Date/Time Comment    Admit Stable Sun Apr 25, 2021  7:24 PM Case was discussed with CUONG and the patient's admission status was agreed to be Admission Status: inpatient status to the service of Dr Ayesha Ca           Follow-up Information    None           PATIENT REFERRED TO:    No follow-up provider specified  DISCHARGE MEDICATIONS:    Current Discharge Medication List      CONTINUE these medications which have NOT CHANGED    Details   acetaminophen (TYLENOL) 500 mg tablet Take 1,000 mg by mouth every 6 (six) hours as needed for mild pain       albuterol (2 5 mg/3 mL) 0 083 % nebulizer solution Take 1 vial (2 5 mg total) by nebulization every 6 (six) hours as needed for shortness of breath  Qty: 120 vial, Refills: 5    Associated Diagnoses: Moderate persistent asthmatic bronchitis with acute exacerbation; Mucopurulent chronic bronchitis (HCC)      aspirin (ECOTRIN LOW STRENGTH) 81 mg EC tablet Take 1 tablet (81 mg total) by mouth daily  Qty: 30 tablet, Refills: 0    Associated Diagnoses: History of CVA (cerebrovascular accident); Benign essential hypertension; Dyslipidemia; Acquired hypothyroidism; Carotid stenosis, symptomatic, with infarction (HCC)      Cholecalciferol (VITAMIN D-3 PO) Take 1,000 Units by mouth daily        clopidogrel (PLAVIX) 75 mg tablet Take 1 tablet (75 mg total) by mouth daily  Qty: 90 tablet, Refills: 3    Associated Diagnoses: Cerebrovascular accident (CVA), unspecified mechanism (HCC)      diltiazem (CARDIZEM CD) 240 mg 24 hr capsule TAKE 1 CAPSULE (240 MG TOTAL) BY MOUTH DAILY  Qty: 90 capsule, Refills: 3    Associated Diagnoses: Essential hypertension      Fluticasone Propionate, Inhal, (Flovent Diskus) 250 MCG/BLIST AEPB Inhale 1 puff 2 (two) times a day  Qty: 1 each, Refills: 5    Associated Diagnoses: Mucopurulent chronic bronchitis (HCC)      furosemide (LASIX) 20 mg tablet TAKE 1 TABLET (20 MG TOTAL) BY MOUTH DAILY  Qty: 90 tablet, Refills: 1    Associated Diagnoses: Edema, unspecified type      levothyroxine 125 mcg tablet TAKE 1 TABLET (125 MCG TOTAL) BY MOUTH DAILY  Qty: 90 tablet, Refills: 3    Associated Diagnoses: Acquired hypothyroidism      Multiple Vitamins-Minerals (PRESERVISION AREDS PO) Take 1 Cap by Mouth daily      multivitamin [facility]) TABS Take 1 tablet by mouth daily      omeprazole (PriLOSEC) 20 mg delayed release capsule Take 1 capsule by mouth daily as needed       rosuvastatin (CRESTOR) 5 mg tablet Uses twice weekly  Qty: 40 tablet, Refills: 3    Associated Diagnoses: Cerebrovascular accident (CVA), unspecified mechanism ([facility] ); Diabetes mellitus type 2, diet-controlled ([facility] ); Coronary artery disease involving native coronary artery of native heart without angina pectoris             No discharge procedures on file           Domi Dominguez, DO Domi Dominguez, DO  04/27/21 [address] [place] [address],   04/27/21 [facility]

## 2021-04-26 LAB
ALBUMIN SERPL BCP-MCNC: 2.9 G/DL (ref 3.5–5)
ALP SERPL-CCNC: 66 U/L (ref 46–116)
ALT SERPL W P-5'-P-CCNC: 24 U/L (ref 12–78)
ANION GAP SERPL CALCULATED.3IONS-SCNC: 5 MMOL/L (ref 4–13)
AST SERPL W P-5'-P-CCNC: 17 U/L (ref 5–45)
ATRIAL RATE: 84 BPM
BASOPHILS # BLD AUTO: 0.03 THOUSANDS/ΜL (ref 0–0.1)
BASOPHILS NFR BLD AUTO: 0 % (ref 0–1)
BILIRUB SERPL-MCNC: 0.25 MG/DL (ref 0.2–1)
BUN SERPL-MCNC: 19 MG/DL (ref 5–25)
CALCIUM ALBUM COR SERPL-MCNC: 9.2 MG/DL (ref 8.3–10.1)
CALCIUM SERPL-MCNC: 8.3 MG/DL (ref 8.3–10.1)
CHLORIDE SERPL-SCNC: 106 MMOL/L (ref 100–108)
CO2 SERPL-SCNC: 32 MMOL/L (ref 21–32)
CREAT SERPL-MCNC: 0.93 MG/DL (ref 0.6–1.3)
EOSINOPHIL # BLD AUTO: 0.44 THOUSAND/ΜL (ref 0–0.61)
EOSINOPHIL NFR BLD AUTO: 6 % (ref 0–6)
ERYTHROCYTE [DISTWIDTH] IN BLOOD BY AUTOMATED COUNT: 15.3 % (ref 11.6–15.1)
GFR SERPL CREATININE-BSD FRML MDRD: 57 ML/MIN/1.73SQ M
GLUCOSE SERPL-MCNC: 120 MG/DL (ref 65–140)
HCT VFR BLD AUTO: 24.8 % (ref 34.8–46.1)
HGB BLD-MCNC: 7.3 G/DL (ref 11.5–15.4)
IMM GRANULOCYTES # BLD AUTO: 0.03 THOUSAND/UL (ref 0–0.2)
IMM GRANULOCYTES NFR BLD AUTO: 0 % (ref 0–2)
LYMPHOCYTES # BLD AUTO: 1.24 THOUSANDS/ΜL (ref 0.6–4.47)
LYMPHOCYTES NFR BLD AUTO: 17 % (ref 14–44)
MCH RBC QN AUTO: 26.4 PG (ref 26.8–34.3)
MCHC RBC AUTO-ENTMCNC: 29.4 G/DL (ref 31.4–37.4)
MCV RBC AUTO: 90 FL (ref 82–98)
MONOCYTES # BLD AUTO: 0.5 THOUSAND/ΜL (ref 0.17–1.22)
MONOCYTES NFR BLD AUTO: 7 % (ref 4–12)
NEUTROPHILS # BLD AUTO: 4.98 THOUSANDS/ΜL (ref 1.85–7.62)
NEUTS SEG NFR BLD AUTO: 70 % (ref 43–75)
NRBC BLD AUTO-RTO: 0 /100 WBCS
P AXIS: 97 DEGREES
PLATELET # BLD AUTO: 213 THOUSANDS/UL (ref 149–390)
PMV BLD AUTO: 9.7 FL (ref 8.9–12.7)
POTASSIUM SERPL-SCNC: 4.6 MMOL/L (ref 3.5–5.3)
PROT SERPL-MCNC: 6.4 G/DL (ref 6.4–8.2)
QRS AXIS: -40 DEGREES
QRSD INTERVAL: 82 MS
QT INTERVAL: 348 MS
QTC INTERVAL: 411 MS
RBC # BLD AUTO: 2.76 MILLION/UL (ref 3.81–5.12)
SODIUM SERPL-SCNC: 143 MMOL/L (ref 136–145)
T WAVE AXIS: 75 DEGREES
TROPONIN I SERPL-MCNC: <0.02 NG/ML
VENTRICULAR RATE: 84 BPM
WBC # BLD AUTO: 7.22 THOUSAND/UL (ref 4.31–10.16)

## 2021-04-26 PROCEDURE — C9113 INJ PANTOPRAZOLE SODIUM, VIA: HCPCS | Performed by: INTERNAL MEDICINE

## 2021-04-26 PROCEDURE — 93010 ELECTROCARDIOGRAM REPORT: CPT | Performed by: INTERNAL MEDICINE

## 2021-04-26 PROCEDURE — 99223 1ST HOSP IP/OBS HIGH 75: CPT | Performed by: INTERNAL MEDICINE

## 2021-04-26 PROCEDURE — 85025 COMPLETE CBC W/AUTO DIFF WBC: CPT | Performed by: INTERNAL MEDICINE

## 2021-04-26 PROCEDURE — P9016 RBC LEUKOCYTES REDUCED: HCPCS

## 2021-04-26 PROCEDURE — 99232 SBSQ HOSP IP/OBS MODERATE 35: CPT | Performed by: PHYSICIAN ASSISTANT

## 2021-04-26 PROCEDURE — 80053 COMPREHEN METABOLIC PANEL: CPT | Performed by: INTERNAL MEDICINE

## 2021-04-26 PROCEDURE — 30233N1 TRANSFUSION OF NONAUTOLOGOUS RED BLOOD CELLS INTO PERIPHERAL VEIN, PERCUTANEOUS APPROACH: ICD-10-PCS | Performed by: INTERNAL MEDICINE

## 2021-04-26 RX ORDER — LEVALBUTEROL 1.25 MG/.5ML
1.25 SOLUTION, CONCENTRATE RESPIRATORY (INHALATION) EVERY 8 HOURS PRN
Status: CANCELLED | OUTPATIENT
Start: 2021-04-26

## 2021-04-26 RX ADMIN — PRAVASTATIN SODIUM 40 MG: 40 TABLET ORAL at 08:56

## 2021-04-26 RX ADMIN — PANTOPRAZOLE SODIUM 40 MG: 40 INJECTION, POWDER, FOR SOLUTION INTRAVENOUS at 08:54

## 2021-04-26 RX ADMIN — LEVOTHYROXINE SODIUM 125 MCG: 125 TABLET ORAL at 08:52

## 2021-04-26 RX ADMIN — PANTOPRAZOLE SODIUM 40 MG: 40 INJECTION, POWDER, FOR SOLUTION INTRAVENOUS at 21:53

## 2021-04-26 RX ADMIN — DILTIAZEM HYDROCHLORIDE 240 MG: 240 CAPSULE, COATED, EXTENDED RELEASE ORAL at 08:52

## 2021-04-26 RX ADMIN — FUROSEMIDE 20 MG: 20 TABLET ORAL at 08:53

## 2021-04-26 NOTE — ASSESSMENT & PLAN NOTE
This is a 64-year-old female with history of hypertension, hyperlipidemia, COPD, CVA, chronic left hydronephrosis status post stent exchanges with Urology presenting with dyspnea  Patient states she does not use oxygen at home, was using her nebulizer without relief    States for the past 2 days she has been having dyspnea and exertional chest discomfort relieved with rest   Denies any cough, congestion, fever, chills    · CT chest commented on a moderate emphysema otherwise unremarkable lungs  · Troponins negative, BNP normal  · Believe patient's symptoms may be secondary to symptomatic anemia  · No wheezing on auscultation there for will defer steroids  · Continue nebulizers

## 2021-04-26 NOTE — PROGRESS NOTES
[facility]  Progress Note - Chata Page 1940, 78 y o  female MRN: [id number]  Unit/Bed#: E5 -01 Encounter: [id number]  Primary Care Provider: Judy Coronado DO   Date and time admitted to hospital: 4/25/2021  2:24 PM    * Melena  Assessment & Plan  · Patient reports having melanotic stools for the past 4 days  · On aspirin and Plavix at home with hx of CVA  · Hemoglobin 7 9 on admission  · Hemoccult-positive in the ED  · Ordered for 1 unit PRBC for today   · IV PPI b i d  · GI consultation - plan for EGD tomorrow once breathing and hgb stablized  · Okay for clears today, NPO after midnight    Acute respiratory failure with hypoxia [facility])  Assessment & Plan  This is a 75-year-old female with history of hypertension, hyperlipidemia, COPD, CVA, chronic left hydronephrosis status post stent exchanges with Urology presenting with dyspnea  Patient states she does not use oxygen at home, was using her nebulizer without relief    States for the past 2 days she has been having dyspnea and exertional chest discomfort relieved with rest   Denies any cough, congestion, fever, chills    · CT chest commented on a moderate emphysema otherwise unremarkable lungs  · Troponins negative, BNP normal  · Believe patient's symptoms may be secondary to symptomatic anemia  · No wheezing on auscultation there for will defer steroids  · Continue nebulizers    UTI (urinary tract infection)  Assessment & Plan  · Patient has chronically obstructed left renal pelvis with chronic indwelling left ureteral stent requiring exchange every 3-4 months (last exchange was 02/16/21)  · CT abdomen pelvis reveals moderate emphysema, you left ureteral stent in place with mild left hydronephrosis and urothelial thickening  · Urinalysis reveals moderate leukocytes, positive nitrite, innumerable WBC and bacteria  · Has had resistant organisms in urine cultures in the past including MDR  · Given Levaquin in the ED, will continue  · Urine culture pending    Chest pain  Assessment & Plan  · Exertional chest pain with dyspnea - likely due to anemia  · Troponin negative    Acquired hypothyroidism  Assessment & Plan  · Continue levothyroxine    Dyslipidemia  Assessment & Plan  · Continue statin twice weekly Monday and Thursday    COPD (chronic obstructive pulmonary disease) ([facility] [address] )  Assessment & Plan  · Possible mild exacerbation, lungs clear to auscultation  · Encourage pulmonary toilet, airway clearance protocol  · Continue nebulizers as needed    History of CVA (cerebrovascular accident)  Assessment & Plan  · History of CVA 9/2019 with right-sided facial droop and dysphagia  · History of carotid stenosis status post endarterectomy 2/18/20  · Maintained on aspirin, Plavix, statin   · ASA and plavix on hold in the setting of GI bleed    Benign essential hypertension  Assessment & Plan  · Continue with diltiazem 240 mg daily      VTE Pharmacologic Prophylaxis:   Pharmacologic: Pharmacologic VTE Prophylaxis contraindicated due to GI bleeding  Mechanical VTE Prophylaxis in Place: Yes    Discharge Plan: With need for continued inpatient GI workup    Discussions with Specialists or Other Care Team Provider:  Nursing    Education and Discussions with Family / Patient:  Patient, daughter via telephoneSathya mayorga    Time Spent for Care: 30 minutes  More than 50% of total time spent on counseling and coordination of care as described above  Current Length of Stay: 1 day(s)  Current Patient Status: Inpatient   Code Status: Level 1 - Full Code    Subjective:   Patient resting in bed  She is on 1 5 L nasal cannula  No home oxygen  She is quite tearful and is upset that she is here in the hospital   Discussed need for EGD which will most likely be planned for tomorrow  GI at bedside upon discussion  Plan for 1 unit of PRBCs today  Case and plan discussed with daughter via telephone      Objective:     Vitals:   Temp (24hrs), Av 7 °F (33 7 °C), Min:81 9 °F (27 7 °C), Max:98 6 °F (37 °C)    Temp:  [81 9 °F (27 7 °C)-98 6 °F (37 °C)] 98 6 °F (37 °C)  HR:  [70-87] 81  Resp:  [17-26] 17  BP: (104-164)/(55-69) 125/59  SpO2:  [87 %-99 %] 98 %  Body mass index is 34 44 kg/m²  Input and Output Summary (last 24 hours):     No intake or output data in the 24 hours ending 21 1107    Physical Exam:     Physical Exam  Vitals signs and nursing note reviewed  Constitutional:       General: She is not in acute distress  Appearance: Normal appearance  She is normal weight  She is not ill-appearing, toxic-appearing or diaphoretic  HENT:      Head: Normocephalic and atraumatic  Eyes:      General: No scleral icterus  Cardiovascular:      Rate and Rhythm: Normal rate and regular rhythm  Pulmonary:      Effort: Pulmonary effort is normal  No respiratory distress  Breath sounds: Normal breath sounds  No stridor  No wheezing or rhonchi  Comments: On 1 5 L nasal cannula  Abdominal:      General: Bowel sounds are normal  There is no distension  Palpations: Abdomen is soft  There is no mass  Tenderness: There is no abdominal tenderness  Hernia: No hernia is present  Musculoskeletal:         General: No swelling  Skin:     General: Skin is warm and dry  Neurological:      Mental Status: She is oriented to person, place, and time  Mental status is at baseline     Psychiatric:         Mood and Affect: Mood normal          Behavior: Behavior normal       Comments: Patient is tearful         Additional Data:     Labs:    Results from last 7 days   Lab Units 21  0506   WBC Thousand/uL 7 22   HEMOGLOBIN g/dL 7 3*   HEMATOCRIT % 24 8*   PLATELETS Thousands/uL 213   NEUTROS PCT % 70   LYMPHS PCT % 17   MONOS PCT % 7   EOS PCT % 6     Results from last 7 days   Lab Units 21  0506   POTASSIUM mmol/L 4 6   CHLORIDE mmol/L 106   CO2 mmol/L 32   BUN mg/dL 19   CREATININE mg/dL 0 93   CALCIUM mg/dL 8 3   ALK PHOS U/L 66   ALT U/L 24   AST U/L 17           * I Have Reviewed All Lab Data Listed Above  * Additional Pertinent Lab Tests Reviewed: [place] Admission Reviewed    Imaging:    Imaging Reports Reviewed Today Include:   Imaging Personally Reviewed by Myself Includes:      Recent Cultures (last 7 days):     Results from last 7 days   Lab Units 04/25/21  1730 04/25/21  1700   BLOOD CULTURE  Received in Microbiology Lab  Culture in Progress  Received in Microbiology Lab  Culture in Progress  Last 24 Hours Medication List:   Current Facility-Administered Medications   Medication Dose Route Frequency Provider Last Rate    albuterol  2 5 mg Nebulization Q6H PRN Lázaro Whittaker MD      diltiazem  240 mg Oral Daily Lázaro Whittaker MD      furosemide  20 mg Oral Daily Lázaro Whittaker MD      levofloxacin  750 mg Intravenous Once Yadi Fried DO      levofloxacin  750 mg Intravenous Q24H Lázaro Whittaker  mg (04/25/21 2350)    levothyroxine  125 mcg Oral Daily Lázaro Whittaker MD      pantoprazole  40 mg Intravenous Q12H Kath Arboleda MD      pravastatin  40 mg Oral Once per day on Mon Thu Lázaro Whittaker MD          Today, Patient Was Seen By: Destiny Jackson PA-C    ** Please Note: This note has been constructed using a voice recognition system   **

## 2021-04-26 NOTE — ASSESSMENT & PLAN NOTE
· Patient reports having melanotic stools for the past 4 days  · On aspirin and Plavix at home with hx of CVA  · Hemoglobin 7 9 on admission  · Hemoccult-positive in the ED  · Ordered for 1 unit PRBC for today   · IV PPI b i d  · GI consultation - plan for EGD tomorrow once breathing and hgb stablized  · Okay for clears today, NPO after midnight

## 2021-04-26 NOTE — CONSULTS
Consultation - [facility]  Wagner Dinh 78 y o  female MRN: [id number]  Unit/Bed#: E5 -01 Encounter: [id number]        Inpatient consult to gastroenterology  Consult performed by: Leo Levi PA-C  Consult ordered by: Swati Cooper MD          Reason for Consult / Principal Problem: melena    HPI:  42-year-old female with COPD, CVA on aspirin and Plavix, hypertension, hyperlipidemia admitted with acute respiratory failure and melena  Patient reports worsening shortness of breath and chest pain over the weekend  She also reported dry heaving and progressively black stools  She had a black bowel movement this morning  She denies any vomiting or abdominal pain  She was eating and drinking OK at home  She has never experienced this before  She does not take NSAIDs  Her last dose of aspirin and Plavix was yesterday  She has never had EGD or colonoscopy  REVIEW OF SYSTEMS:    CONSTITUTIONAL: Denies any fever, chills  Good appetite, and no recent weight loss  HEENT: No earache or tinnitus  Denies hearing loss or visual disturbances  CARDIOVASCULAR: No chest pain or palpitations  RESPIRATORY: Denies any cough, hemoptysis  + Dyspnea  GASTROINTESTINAL: As noted in the History of Present Illness  GENITOURINARY: No problems with urination  Denies any hematuria or dysuria  NEUROLOGIC: No dizziness or vertigo, denies headaches  MUSCULOSKELETAL: Denies any muscle or joint pain  SKIN: Denies skin rashes or itching  ENDOCRINE: Denies excessive thirst  Denies intolerance to heat or cold  PSYCHOSOCIAL: Denies depression or anxiety  Denies any recent memory loss         Historical Information   Past Medical History:   Diagnosis Date    Arthritis     Asthma     At risk for falls     rib frac    Benign essential tremor 10/15/2018    Added automatically from request for surgery [id number] has stimulator    COPD [facility]chronic obstructive [facility] disease) ([facility] [address] )     Coronary artery disease     Diabetes mellitus ([facility] [address] )     borderline    Difficulty waking     post anesthesia    Edema     bles    GERD (gastroesophageal reflux disease)     High cholesterol     History of recent fall     broke several ribs    Hypertension     Hypothyroid     Kidney stone     Macular degeneration     Osteopenia     Osteoporosis     S/P deep brain stimulator placement     Shortness of breath     on exertion    Slurred speech     Stroke (HCC)     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     Urinary leakage     Uses roller walker     Vitamin D deficiency     Wears dentures     full upper    Wears glasses     reading     Past Surgical History:   Procedure Laterality Date    CATARACT EXTRACTION W/  INTRAOCULAR LENS IMPLANT Bilateral     COLONOSCOPY      CYSTOSCOPY W/ RETROGRADES Left 2/16/2021    Procedure: CYSTO, RETRO PYELOGRAM, STENT EXCHANGE;  Surgeon: Phil Dove MD;  [facility];  Service: Urology    DEEP BRAIN STIMULATOR PLACEMENT      FL RETROGRADE PYELOGRAM  11/7/2019    FL RETROGRADE PYELOGRAM  5/26/2020    FL RETROGRADE PYELOGRAM  2/16/2021    IR NEPHROSTOMY TO NEPHROURETERAL STENT  1/10/2020    IR TUBE PLACEMENT  11/12/2019    KNEE ARTHROSCOPY      WI CYSTOURETHROSCOPY,URETER CATHETER Left 11/7/2019    Procedure: CYSTO RETROGRADE PYELOGRAM, URETEROSCOPY;  Surgeon: Michelle Lombardo MD;  [facility];  Service: Urology    WI IMP STIM,CRANIAL,SUBQ,1 ARRAY Left 1/22/2019    Procedure: REPLACEMENT IMPLANTABLE PULSE GENERATOR (IPG) DEEP BRAIN STIMULATION (DBS), LEFT;  Surgeon: Evans Leavitt MD;  [facility];  Service: Neurosurgery    WI IMP STIM,CRANIAL,SUBQ,>1 ARRAY Right 12/13/2018    Procedure: REPLACEMENT RIGHT DBS IMPLANTABLE PULSE GENERATOR;  Surgeon: Evans Leavitt MD;  [facility];  Service: Neurosurgery    WI REMOVE & REPLACE INDWELL [facility]RETERAL STENT [facility] 10/27/2020    Procedure: CYSTOSCOPY, RETROGRADE PYELOGRAM, EXCHANGE STENT URETERA L, LEFT URETEROSCOPY;  Surgeon: Phil Dove MD;  Location: [facility] MAIN OR;  Service: Urology    OK RMVL & RPLCMT INTLY DWELLING URETERAL STENT PRQ Left 2020    Procedure: Jimi Flores, STENT;  Surgeon: Phil Dove MD;  [facility];  Service: Urology    OK THROMBOENDARTECTMY Emeterio Meek Right 2020    Procedure: CAROTID ENDARTERECTOMY WITH BOVINE PATCH;  Surgeon: Adrienne Law MD;  [facility];  Service: Vascular    OK TOTAL HIP ARTHROPLASTY Left 2016    Procedure: ARTHROPLASTY HIP TOTAL ANTERIOR;  Surgeon: Bijan Saez MD;  [facility];  Service: Orthopedics    ROTATOR CUFF REPAIR       Social History   Social History     Substance and Sexual Activity   Alcohol Use Never    Frequency: Never     Social History     Substance and Sexual Activity   Drug Use No     Social History     Tobacco Use   Smoking Status Former Smoker    Types: Cigarettes    Quit date:     Years since quittin 3   Smokeless Tobacco Never Used     Family History   Problem Relation Age of Onset    Breast cancer Mother     Throat cancer Family        Meds/Allergies     Medications Prior to Admission   Medication    acetaminophen (TYLENOL) 500 mg tablet    albuterol (2 5 mg/3 mL) 0 083 % nebulizer solution    aspirin (ECOTRIN LOW STRENGTH) 81 mg EC tablet    Cholecalciferol (VITAMIN D-3 PO)    clopidogrel (PLAVIX) 75 mg tablet    diltiazem (CARDIZEM CD) 240 mg 24 hr capsule    Fluticasone Propionate, Inhal, (Flovent Diskus) 250 MCG/BLIST AEPB    furosemide (LASIX) 20 mg tablet    levothyroxine 125 mcg tablet    Multiple Vitamins-Minerals (PRESERVISION AREDS PO)    multivitamin (THERAGRAN) TABS    omeprazole (PriLOSEC) 20 mg delayed release capsule    rosuvastatin (CRESTOR) 5 mg tablet     Current Facility-Administered Medications   Medication Dose Route Frequency    albuterol inhalation solution 2 5 mg  2 5 mg Nebulization Q6H PRN    diltiazem (CARDIZEM CD) 24 hr capsule 240 mg  240 mg Oral Daily    furosemide (LASIX) tablet 20 mg  20 mg Oral Daily    levofloxacin (LEVAQUIN) IVPB (premix in dextrose) 750 mg 150 mL  750 mg Intravenous Once    levofloxacin (LEVAQUIN) IVPB (premix in dextrose) 750 mg 150 mL  750 mg Intravenous Q24H    levothyroxine tablet 125 mcg  125 mcg Oral Daily    pantoprazole (PROTONIX) injection 40 mg  40 mg Intravenous Q12H [address]    pravastatin (PRAVACHOL) tablet 40 mg  40 mg Oral Once per day on Mon Thu       Allergies   Allergen Reactions    Ciprofloxacin Diarrhea    Simvastatin Myalgia    Advair Diskus [Fluticasone-Salmeterol] Palpitations     Ok w flovent    Tetracycline Rash     Reaction Date: [id number];            Objective     Blood pressure 125/59, pulse 81, temperature 98 6 °F (37 °C), resp  rate 17, weight 91 kg (200 lb 9 9 oz), SpO2 98 %, not currently breastfeeding  No intake or output data in the 24 hours ending 04/26/21 1119      PHYSICAL EXAM:      General Appearance:   Alert, pleasant elderly female, cooperative, no distress, appears stated age    HEENT:   Normocephalic, atraumatic, anicteric  + Nasal canula     Neck:  Supple, symmetrical, trachea midline, no adenopathy;    thyroid: no enlargement/tenderness/nodules; no carotid  bruit or JVD    Lungs:   Wheezing bilaterally   Heart[de-identified]   S1 and S2 normal; regular rate and rhythm   Abdomen:   Soft, non-tender, non-distended; normal bowel sounds   Genitalia:   Deferred    Rectal:   Deferred    Extremities:  No cyanosis, clubbing or edema    Pulses:  2+ and symmetric all extremities    Skin:  No jaundice   Lymph nodes:  No palpable cervical lymphadenopathy        Lab Results:   Results from last 7 days   Lab Units 04/26/21  0506   WBC Thousand/uL 7 22   HEMOGLOBIN g/dL 7 3*   HEMATOCRIT % 24 8*   PLATELETS Thousands/uL 213   NEUTROS PCT % 70   LYMPHS PCT % 17   MONOS PCT % 7   EOS PCT % 6     Results from last 7 days   Lab Units 04/26/21  0506   POTASSIUM mmol/L 4 6   CHLORIDE mmol/L 106   CO2 mmol/L 32   BUN mg/dL 19   CREATININE mg/dL 0 93   CALCIUM mg/dL 8 3   ALK PHOS U/L 66   ALT U/L 24   AST U/L 17               Imaging Studies: I have personally reviewed pertinent imaging studies  Xr Chest 1 View Portable    Result Date: 4/26/2021  Impression: Limited but unremarkable chest radiograph  Portions of lungs are obscured by generator packs for stimulators  Workstation performed: [id number]     Ct Chest Abdomen Pelvis W Contrast    Result Date: 4/25/2021  Impression: Moderate emphysema  Lungs are otherwise unremarkable  There is a left ureteral stent in place, with mild left hydronephrosis and urothelial thickening and hyperenhancement in the renal calyces and pelvises (series 601 images 113- 138 )  These findings were present on the 3/27/2021 CT, but have improved  Again seen are multiple small left renal calyceal stones, largest in the lower pole measuring 8 mm  Workstation performed: [id number]       ASSESSMENT and PLAN:      Discussed plan with Jimmy Larose PA-C with [facility]    22-year-old female with COPD, CVA on aspirin and Plavix, hypertension, hyperlipidemia admitted with acute respiratory failure and melena  1) Melena and acute blood loss anemia: She reports progressively dark stools for the past 3-4 days  Hemoglobin on admission 7 9 down from baseline 12-13  Repeat hemoglobin 7 3  MCV and BUN normal  Suspect UGIB in setting of antiplatelet therapy   Differential diagnosis includes peptic ulcer disease, erosive esophagitis/gastritis, AVM, malignancy     -she will be given blood transfusion and nebulizer treatments today to optimize her breathing   -we will plan for EGD tomorrow, or sooner if continued melena and drop in hemoglobin  -monitor hemoglobin and stool color  -continue Protonix 40 mg twice daily  -clear liquid diet then NPO after midnight in anticipation of EGD    2) History of CVA: on aspirin and Plavix    -continue to hold Plavix due to concern for GI bleeding  -okay to continue aspirin    3) Acute respiratory failure: Suspect secondary to blood loss anemia  She has history of COPD although no clear evidence of exacerbation  She is not on oxygen support at home     -transfuse PRBCs  -nebulizer treatments as per [facility]    Patient was seen and examined by Dr Neymar Bolaños  All key medical decisions were made by Dr Neymar Bolaños  Thank you for allowing us to participate in the care of this present patient  We will follow-up with you closely

## 2021-04-26 NOTE — ASSESSMENT & PLAN NOTE
· Possible mild exacerbation, lungs clear to auscultation  · Encourage pulmonary toilet, airway clearance protocol  · Continue nebulizers as needed

## 2021-04-26 NOTE — NURSING NOTE
Blood bank called stating they don't have B- blood available on hand for the patient until tomorrow morning  They only have O- blood available, which is being help for emergent, code Paul case  Spoke to Dr Villa Davis, and she said it was okay to hold off on the blood transfusion tonight as long as the patient's vital signs are stable, she is free from bloody stools, and symptoms don't worsen  Will continue to closely monitor patient's vitals and symptoms

## 2021-04-26 NOTE — ASSESSMENT & PLAN NOTE
· Patient has chronically obstructed left renal pelvis with chronic indwelling left ureteral stent requiring exchange every 3-4 months (last exchange was 02/16/21)  · CT abdomen pelvis reveals moderate emphysema, you left ureteral stent in place with mild left hydronephrosis and urothelial thickening  · Urinalysis reveals moderate leukocytes, positive nitrite, innumerable WBC and bacteria  · Has had resistant organisms in urine cultures in the past including MDR  · Given Levaquin in the ED, will continue  · Follow-up culture

## 2021-04-26 NOTE — ASSESSMENT & PLAN NOTE
· Patient has chronically obstructed left renal pelvis with chronic indwelling left ureteral stent requiring exchange every 3-4 months (last exchange was 02/16/21)  · CT abdomen pelvis reveals moderate emphysema, you left ureteral stent in place with mild left hydronephrosis and urothelial thickening  · Urinalysis reveals moderate leukocytes, positive nitrite, innumerable WBC and bacteria  · Has had resistant organisms in urine cultures in the past including MDR  · Given Levaquin in the ED, will continue  · Urine culture pending

## 2021-04-26 NOTE — ASSESSMENT & PLAN NOTE
· History of CVA 9/2019 with right-sided facial droop and dysphagia  · History of carotid stenosis status post endarterectomy 2/18/20  · Maintained on aspirin, Plavix, statin   · ASA and plavix on hold in the setting of GI bleed

## 2021-04-26 NOTE — ASSESSMENT & PLAN NOTE
This is a 78-year-old female with history of hypertension, hyperlipidemia, COPD, CVA, chronic left hydronephrosis status post stent exchanges with Urology presenting with dyspnea  Patient states she does not use oxygen at home, was using her nebulizer without relief    States for the past 2 days she has been having dyspnea and exertional chest discomfort relieved with rest   Denies any cough, congestion, fever, chills    · CT chest commented on a moderate emphysema otherwise unremarkable lungs  · First set of troponin negative, BNP normal  · Believe patient's symptoms may be secondary to symptomatic anemia  · No wheezing on auscultation there for will defer steroids  · Continue nebulizers [Yes - full time] : 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

## 2021-04-26 NOTE — PLAN OF CARE
Problem: Potential for Falls  Goal: Patient will remain free of falls  Description: INTERVENTIONS:  - Assess patient frequently for physical needs  -  Identify cognitive and physical deficits and behaviors that affect risk of falls    -  [place] fall precautions as indicated by assessment   - Educate patient/family on patient safety including physical limitations  - Instruct patient to call for assistance with activity based on assessment  - Modify environment to reduce risk of injury  - Consider OT/PT consult to assist with strengthening/mobility  Outcome: Progressing     Problem: PAIN - ADULT  Goal: Verbalizes/displays adequate comfort level or baseline comfort level  Description: Interventions:  - Encourage patient to monitor pain and request assistance  - Assess pain using appropriate pain scale  - Administer analgesics based on type and severity of pain and evaluate response  - Implement non-pharmacological measures as appropriate and evaluate response  - Consider cultural and social influences on pain and pain management  - Notify physician/advanced practitioner if interventions unsuccessful or patient reports new pain  Outcome: Progressing     Problem: INFECTION - ADULT  Goal: Absence or prevention of progression during hospitalization  Description: INTERVENTIONS:  - Assess and monitor for signs and symptoms of infection  - Monitor lab/diagnostic results  - Monitor all insertion sites, i e  indwelling lines, tubes, and drains  - Monitor endotracheal if appropriate and nasal secretions for changes in amount and color  - [place] appropriate cooling/warming therapies per order  - Administer medications as ordered  - Instruct and encourage patient and family to use good hand hygiene technique  - Identify and instruct in appropriate isolation precautions for identified infection/condition  Outcome: Progressing     Problem: SAFETY ADULT  Goal: Patient will remain free of falls  Description: INTERVENTIONS:  - Assess patient frequently for physical needs  -  Identify cognitive and physical deficits and behaviors that affect risk of falls    -  [place] fall precautions as indicated by assessment   - Educate patient/family on patient safety including physical limitations  - Instruct patient to call for assistance with activity based on assessment  - Modify environment to reduce risk of injury  - Consider OT/PT consult to assist with strengthening/mobility  Outcome: Progressing  Goal: Maintain or return to baseline ADL function  Description: INTERVENTIONS:  -  Assess patient's ability to carry out ADLs; assess patient's baseline for ADL function and identify physical deficits which impact ability to perform ADLs (bathing, care of mouth/teeth, toileting, grooming, dressing, etc )  - Assess/evaluate cause of self-care deficits   - Assess range of motion  - Assess patient's mobility; develop plan if impaired  - Assess patient's need for assistive devices and provide as appropriate  - Encourage maximum independence but intervene and supervise when necessary  - Involve family in performance of ADLs  - Assess for home care needs following discharge   - Consider OT consult to assist with ADL evaluation and planning for discharge  - Provide patient education as appropriate  Outcome: Progressing  Goal: Maintain or return mobility status to optimal level  Description: INTERVENTIONS:  - Assess patient's baseline mobility status (ambulation, transfers, stairs, etc )    - Identify cognitive and physical deficits and behaviors that affect mobility  - Identify mobility aids required to assist with transfers and/or ambulation (gait belt, sit-to-stand, lift, walker, cane, etc )  - [place] fall precautions as indicated by assessment  - Record patient progress and toleration of activity level on Mobility SBAR; progress patient to next Phase/Stage  - Instruct patient to call for assistance with activity based on assessment  - Consider rehabilitation consult to assist with strengthening/weightbearing, etc   Outcome: Progressing     Problem: DISCHARGE PLANNING  Goal: Discharge to home or other facility with appropriate resources  Description: INTERVENTIONS:  - Identify barriers to discharge w/patient and caregiver  - Arrange for needed discharge resources and transportation as appropriate  - Identify discharge learning needs (meds, wound care, etc )  - Arrange for interpretive services to assist at discharge as needed  - Refer to Case Management Department for coordinating discharge planning if the patient needs post-hospital services based on physician/advanced practitioner order or complex needs related to functional status, cognitive ability, or social support system  Outcome: Progressing     Problem: Knowledge Deficit  Goal: Patient/family/caregiver demonstrates understanding of disease process, treatment plan, medications, and discharge instructions  Description: Complete learning assessment and assess knowledge base    Interventions:  - Provide teaching at level of understanding  - Provide teaching via preferred learning methods  Outcome: Progressing     Problem: CARDIOVASCULAR - ADULT  Goal: Maintains optimal cardiac output and hemodynamic stability  Description: INTERVENTIONS:  - Monitor I/O, vital signs and rhythm  - Monitor for S/S and trends of decreased cardiac output  - Administer and titrate ordered vasoactive medications to optimize hemodynamic stability  - Assess quality of pulses, skin color and temperature  - Assess for signs of decreased coronary artery perfusion  - Instruct patient to report change in severity of symptoms  Outcome: Progressing  Goal: Absence of cardiac dysrhythmias or at baseline rhythm  Description: INTERVENTIONS:  - Continuous cardiac monitoring, vital signs, obtain 12 lead EKG if ordered  - Administer antiarrhythmic and heart rate control medications as ordered  - Monitor electrolytes and administer replacement therapy as ordered  Outcome: Progressing     Problem: RESPIRATORY - ADULT  Goal: Achieves optimal ventilation and oxygenation  Description: INTERVENTIONS:  - Assess for changes in respiratory status  - Assess for changes in mentation and behavior  - Position to facilitate oxygenation and minimize respiratory effort  - Oxygen administered by appropriate delivery if ordered  - Initiate smoking cessation education as indicated  - Encourage broncho-pulmonary hygiene including cough, deep breathe, Incentive Spirometry  - Assess the need for suctioning and aspirate as needed  - Assess and instruct to report SOB or any respiratory difficulty  - Respiratory Therapy support as indicated  Outcome: Progressing     Problem: GASTROINTESTINAL - ADULT  Goal: Minimal or absence of nausea and/or vomiting  Description: INTERVENTIONS:  - Administer IV fluids if ordered to ensure adequate hydration  - Maintain NPO status until nausea and vomiting are resolved  - Nasogastric tube if ordered  - Administer ordered antiemetic medications as needed  - Provide nonpharmacologic comfort measures as appropriate  - Advance diet as tolerated, if ordered  - Consider nutrition services referral to assist patient with adequate nutrition and appropriate food choices  Outcome: Progressing  Goal: Maintains or returns to baseline bowel function  Description: INTERVENTIONS:  - Assess bowel function  - Encourage oral fluids to ensure adequate hydration  - Administer IV fluids if ordered to ensure adequate hydration  - Administer ordered medications as needed  - Encourage mobilization and activity  - Consider nutritional services referral to assist patient with adequate nutrition and appropriate food choices  Outcome: Progressing  Goal: Maintains adequate nutritional intake  Description: INTERVENTIONS:  - Monitor percentage of each meal consumed  - Identify factors contributing to decreased intake, treat as appropriate  - Assist with meals as needed  - Monitor I&O, weight, and lab values if indicated  - Obtain nutrition services referral as needed  Outcome: Progressing     Problem: METABOLIC, FLUID AND ELECTROLYTES - ADULT  Goal: Electrolytes maintained within normal limits  Description: INTERVENTIONS:  - Monitor labs and assess patient for signs and symptoms of electrolyte imbalances  - Administer electrolyte replacement as ordered  - Monitor response to electrolyte replacements, including repeat lab results as appropriate  - Instruct patient on fluid and nutrition as appropriate  Outcome: Progressing  Goal: Fluid balance maintained  Description: INTERVENTIONS:  - Monitor labs   - Monitor I/O and WT  - Instruct patient on fluid and nutrition as appropriate  - Assess for signs & symptoms of volume excess or deficit  Outcome: Progressing     Problem: Nutrition/Hydration-ADULT  Goal: Nutrient/Hydration intake appropriate for improving, restoring or maintaining nutritional needs  Description: Monitor and assess patient's nutrition/hydration status for malnutrition  Collaborate with interdisciplinary team and initiate plan and interventions as ordered  Monitor patient's weight and dietary intake as ordered or per policy  Utilize nutrition screening tool and intervene as necessary  Determine patient's food preferences and provide high-protein, high-caloric foods as appropriate       INTERVENTIONS:  - Monitor oral intake, urinary output, labs, and treatment plans  - Assess nutrition and hydration status and recommend course of action  - Evaluate amount of meals eaten  - Assist patient with eating if necessary   - Allow adequate time for meals  - Recommend/ encourage appropriate diets, oral nutritional supplements, and vitamin/mineral supplements  - Order, calculate, and assess calorie counts as needed  - Recommend, monitor, and adjust tube feedings and TPN/PPN based on assessed needs  - Assess need for intravenous fluids  - Provide specific nutrition/hydration education as appropriate  - Include patient/family/caregiver in decisions related to nutrition  Outcome: Progressing

## 2021-04-26 NOTE — ASSESSMENT & PLAN NOTE
· Patient reports having melanotic stools for the past 4 days is on aspirin and Plavix at home  · Hemoglobin 7 9  · Hemoccult-positive in the ED  · Will monitor H&H,Transfuse as needed  · IV PPI b i d , NPO past midnight  · GI consultation

## 2021-04-26 NOTE — ASSESSMENT & PLAN NOTE
· Exertional chest pain with dyspnea  · First set of troponin negative  · Will trend troponin, monitor on telemetry  · Aspirin Plavix on hold in setting of GI bleed

## 2021-04-26 NOTE — H&P
Lester Lemus 83 1940, 78 y o  female MRN: [id number]  Unit/Bed#: E5 -01 Encounter: [id number]  Primary Care Provider: Kobi Mancia DO   Date and time admitted to hospital: 4/25/2021  2:24 PM    * Acute respiratory failure with hypoxia [facility])  Assessment & Plan  This is a 78-year-old female with history of hypertension, hyperlipidemia, COPD, CVA, chronic left hydronephrosis status post stent exchanges with Urology presenting with dyspnea  Patient states she does not use oxygen at home, was using her nebulizer without relief    States for the past 2 days she has been having dyspnea and exertional chest discomfort relieved with rest   Denies any cough, congestion, fever, chills    · CT chest commented on a moderate emphysema otherwise unremarkable lungs  · First set of troponin negative, BNP normal  · Believe patient's symptoms may be secondary to symptomatic anemia  · No wheezing on auscultation there for will defer steroids  · Continue nebulizers    UTI (urinary tract infection)  Assessment & Plan  · Patient has chronically obstructed left renal pelvis with chronic indwelling left ureteral stent requiring exchange every 3-4 months (last exchange was 02/16/21)  · CT abdomen pelvis reveals moderate emphysema, you left ureteral stent in place with mild left hydronephrosis and urothelial thickening  · Urinalysis reveals moderate leukocytes, positive nitrite, innumerable WBC and bacteria  · Has had resistant organisms in urine cultures in the past including MDR  · Given Levaquin in the ED, will continue  · Follow-up culture    Melena  Assessment & Plan  · Patient reports having melanotic stools for the past 4 days is on aspirin and Plavix at home  · Hemoglobin 7 9  · Hemoccult-positive in the ED  · Will monitor H&H,Transfuse as needed  · IV PPI b i d , NPO past midnight  · GI consultation    Chest pain  Assessment & Plan  · Exertional chest pain with dyspnea  · First set of troponin negative  · Will trend troponin, monitor on telemetry  · Aspirin Plavix on hold in setting of GI bleed    Acquired hypothyroidism  Assessment & Plan  · Continue levothyroxine    Dyslipidemia  Assessment & Plan  Continue statin twice weekly Monday and Thursday    COPD (chronic obstructive pulmonary disease) ([facility] [address] )  Assessment & Plan  · Possible mild exacerbation, lungs clear to auscultation  · Encourage pulmonary toilet, airway clearance protocol  · Continue nebulizers as needed    History of CVA (cerebrovascular accident)  Assessment & Plan  · History of CVA with right-sided facial droop and dysphagia  · History of carotid stenosis status post endarterectomy  · Maintained on aspirin, Plavix, statin    Benign essential hypertension  Assessment & Plan  · Continue with diltiazem, Lasix        VTE Prophylaxis: Contraindicated due to GI bleed  / sequential compression device   Code Status: FULL  POLST: There is no POLST form on file for this patient (pre-hospital)    Anticipated Length of Stay:  Patient will be admitted on an Inpatient basis with an anticipated length of stay of  greater than 2 midnights  Justification for Hospital Stay:  Acute hypoxic respiratory distress, anemia, GI bleed    Total Time for Visit, including Counseling / Coordination of Care: 30 minutes  Greater than 50% of this total time spent on direct patient counseling and coordination of care  Chief Complaint:   Dyspnea    History of Present Illness:    Toni Farias is a 78 y o  female who presents with dyspnea  Patient hashistory of hypertension, hyperlipidemia, COPD, CVA, chronic left hydronephrosis status post stent exchanges with Urology presenting with dyspnea  Patient states she does not use oxygen at home, was using her nebulizer without relief    States for the past 2 days she has been having dyspnea and exertional chest discomfort relieved with rest   Denies any cough, congestion, fever, chills    Upon arrival to the ED patient's vitals noted for hypoxia placed on supplemental oxygen, treated with nebulizers, Zofran, simethicone  Blood work noted for anemia, urinalysis revealed UTI patient given Levaquin  She will be admitted for further management and evaluation    Review of Systems:    Review of Systems   Constitutional: Negative  HENT: Negative  Eyes: Negative  Respiratory: Positive for shortness of breath  Cardiovascular: Positive for chest pain  Gastrointestinal: Positive for blood in stool (Melena)  Endocrine: Negative  Genitourinary: Negative  Musculoskeletal: Negative  Skin: Negative  Allergic/Immunologic: Negative  Neurological: Negative  Hematological: Negative  Psychiatric/Behavioral: Negative          Past Medical and Surgical History:     Past Medical History:   Diagnosis Date    Arthritis     Asthma     At risk for falls     rib frac    Benign essential tremor 10/15/2018    Added automatically from request for surgery [id number] has stimulator    COPD (chronic obstructive pulmonary disease) ([facility] [address] )     Coronary artery disease     Diabetes mellitus (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     Urinary leakage     Uses roller walker     Vitamin D deficiency     Wears dentures     full upper    Wears glasses     reading       Past Surgical History:   Procedure Laterality Date    CATARACT EXTRACTION W/  INTRAOCULAR LENS IMPLANT Bilateral     COLONOSCOPY      CYSTOSCOPY W/ RETROGRADES Left 2/16/2021    Procedure: CYSTO, RETRO PYELOGRAM, STENT EXCHANGE;  Surgeon: Brandon Devine MD;  [facility];  Service: Urology    [address] [facility] [address]      FL RETROGRADE PYELOGRAM  11/7/2019    FL RETROGRADE PYELOGRAM  5/26/2020    FL RETROGRADE PYELOGRAM  2/16/2021    IR NEPHROSTOMY TO NEPHROURETERAL STENT  1/10/2020    IR TUBE PLACEMENT  11/12/2019    KNEE ARTHROSCOPY      ID CYSTOURETHROSCOPY,URETER CATHETER Left 11/7/2019    Procedure: CYSTO RETROGRADE PYELOGRAM, URETEROSCOPY;  Surgeon: Aleah Carr MD;  [facility];  Service: Urology    ID IMP STIM,CRANIAL,SUBQ,1 ARRAY Left 1/22/2019    Procedure: REPLACEMENT IMPLANTABLE PULSE GENERATOR (IPG) DEEP BRAIN STIMULATION (DBS), LEFT;  Surgeon: Elizabeth Martínez MD;  [facility]: [facility];  Service: Neurosurgery    ID IMP STIM,CRANIAL,SUBQ,>1 ARRAY Right 12/13/2018    Procedure: REPLACEMENT RIGHT DBS IMPLANTABLE PULSE GENERATOR;  Surgeon: Elizabeth Martínez MD;  [facility];  Service: Neurosurgery    [address] Left 10/27/2020    Procedure: No Velasco, EXCHANGE STENT URETERA L, LEFT URETEROSCOPY;  Surgeon: Brandon Devine MD;  Location: [facility] MAIN OR;  Service: Urology    ID RMVL & RPLCMT INTLY DWELLING URETERAL STENT PRQ Left 5/26/2020    Procedure: Xochitl Ratliff, STENT;  Surgeon: Brandon Devine MD;  [facility];  Service: Urology    ID Monua Forte Right 2/18/2020    Procedure: CAROTID ENDARTERECTOMY WITH BOVINE PATCH;  Surgeon: Bard Cathryn MD;  [facility];  Service: Vascular    ID TOTAL HIP ARTHROPLASTY Left 5/20/2016    Procedure: ARTHROPLASTY HIP TOTAL ANTERIOR;  Surgeon: Jose Yanez MD;  [facility];  Service: Orthopedics    ROTATOR CUFF REPAIR         Meds/Allergies:    Prior to Admission medications    Medication Sig Start Date End Date Taking?  Authorizing Provider   acetaminophen (TYLENOL) 500 mg tablet Take 1,000 mg by mouth every 6 (six) hours as needed for mild pain    Yes Historical Provider, MD   albuterol (2 5 mg/3 mL) 0 083 % nebulizer solution Take 1 vial (2 5 mg total) by nebulization every 6 (six) hours as needed for shortness of breath 6/29/20  Yes Shelia Childs, DO   aspirin (ECOTRIN LOW STRENGTH) 81 mg EC tablet Take 1 tablet (81 mg total) by mouth daily 2/20/20  Yes Karon Avery PA-C   Cholecalciferol (VITAMIN D-3 PO) Take 1,000 Units by mouth daily  Yes Historical Provider, MD   clopidogrel (PLAVIX) 75 mg tablet Take 1 tablet (75 mg total) by mouth daily 2/9/21  Yes Shelia Childs, DO   diltiazem (CARDIZEM CD) 240 mg 24 hr capsule TAKE 1 CAPSULE (240 MG TOTAL) BY MOUTH DAILY 2/9/21  Yes Shelia Childs, DO   Fluticasone Propionate, Inhal, (Flovent Diskus) 250 MCG/BLIST AEPB Inhale 1 puff 2 (two) times a day 1/29/21  Yes Shelia Childs, DO   furosemide (LASIX) 20 mg tablet TAKE 1 TABLET (20 MG TOTAL) BY MOUTH DAILY 12/29/20  Yes Shelia Childs DO   levothyroxine 125 mcg tablet TAKE 1 TABLET (125 MCG TOTAL) BY MOUTH DAILY 2/9/21  Yes Shelia Childs, DO   Multiple Vitamins-Minerals (PRESERVISION AREDS PO) Take 1 Cap by Mouth daily   Yes Historical Provider, MD   multivitamin (THERAGRAN) TABS Take 1 tablet by mouth daily   Yes Historical Provider, MD   omeprazole (PriLOSEC) 20 mg delayed release capsule Take 1 capsule by mouth daily as needed    Yes Historical Provider, MD   rosuvastatin (CRESTOR) 5 mg tablet Uses twice weekly  Patient taking differently: Take by mouth Uses twice weekly 6/18/20  Yes Shelia Childs, DO     I have reviewed home medications with patient personally  Allergies:    Allergies   Allergen Reactions    Ciprofloxacin Diarrhea    Simvastatin Myalgia    Advair Diskus [Fluticasone-Salmeterol] Palpitations     Ok w flovent    Tetracycline Rash     Reaction Date: 10Jan2012;        Social History:     Social History     Substance and Sexual Activity   Alcohol Use Never    Frequency: Never     Social History Tobacco Use   Smoking Status Former Smoker    Types: Cigarettes    Quit date:     Years since quittin 3   Smokeless Tobacco Never Used     Social History     Substance and Sexual Activity   Drug Use No       Family History:    Family History   Problem Relation Age of Onset    Breast cancer Mother     Throat cancer Family        Physical Exam:     Vitals:   Blood Pressure: 112/58 (21)  Pulse: 79 (21)  Temperature: 97 7 °F (36 5 °C) (21)  Temp Source: Oral (21)  Respirations: 19 (21)  Weight - Scale: 91 kg (200 lb 9 9 oz) (21 1430)  SpO2: 97 % (21)    Constitutional: Patient is oriented to person, place and time, no acute distress  HEENT:  Normocephalic, atraumatic  Cardiovascular: Normal S1S2, RRR, No murmurs/rubs/gallops appreciated  Pulmonary:  Bilateral air entry, No rhonchi/rales/wheezing appreciated  Abdominal: Soft, Bowel sounds present, Non-tender, Non-distended  Extremities:  No cyanosis, clubbing or edema  Neurological: Cranial nerves II-XII grossly intact, sensation intact, otherwise no focal neurological symptoms  Additional Data:     Lab Results: I have personally reviewed pertinent reports  Results from last 7 days   Lab Units 21  1541   WBC Thousand/uL 10 84*   HEMOGLOBIN g/dL 7 9*   HEMATOCRIT % 25 9*   PLATELETS Thousands/uL 230   NEUTROS PCT % 73   LYMPHS PCT % 18   MONOS PCT % 6   EOS PCT % 2     Results from last 7 days   Lab Units 21  1541   POTASSIUM mmol/L 3 6   CHLORIDE mmol/L 107   CO2 mmol/L 28   BUN mg/dL 19   CREATININE mg/dL 0 99   CALCIUM mg/dL 8 5   ALK PHOS U/L 71   ALT U/L 23   AST U/L 17           Imaging: I have personally reviewed pertinent reports  Ct Abdomen Pelvis Wo Contrast    Result Date: 3/27/2021  Narrative: CT ABDOMEN AND PELVIS WITHOUT IV CONTRAST INDICATION: Left flank pain and fever  COMPARISON:  2019   TECHNIQUE:  CT examination of the abdomen and pelvis was performed without intravenous contrast   Axial, sagittal, and coronal 2D reformatted images were created from the source data and submitted for interpretation  Radiation dose length product (DLP) for this visit:  550 mGy-cm   This examination, like all CT scans performed in the [facility], was performed utilizing techniques to minimize radiation dose exposure, including the use of iterative reconstruction and automated exposure control  Enteric contrast was not administered  FINDINGS: ABDOMEN LOWER CHEST:  No clinically significant abnormality identified in the visualized lower chest  LIVER/BILIARY TREE:  Unremarkable  GALLBLADDER:  No calcified gallstones  No pericholecystic inflammatory change  SPLEEN:  Unremarkable  PANCREAS:  Unremarkable  ADRENAL GLANDS:  Unremarkable  KIDNEYS/URETERS:  Interval placement of left ureteral stent in expected position  Persistent severe left hydronephrosis  Decompression of the ureter  Several nonobstructing calculi in left renal calyces measuring up to 7 5 mm  Minimal perinephric fat  stranding Right renal vascular calcifications  STOMACH AND BOWEL:  Diverticulosis without evidence of diverticulitis or colitis  No bowel obstruction  APPENDIX:  No findings to suggest appendicitis  ABDOMINOPELVIC CAVITY:  Mildly enlarged left periaortic lymph nodes, likely reactive    VESSELS:  Extensive calcified plaque along the abdominal aorta and common iliac arteries  PELVIS REPRODUCTIVE ORGANS:  No pelvic mass or fluid  URINARY BLADDER:  Unremarkable  ABDOMINAL WALL/INGUINAL REGIONS:  Unremarkable  OSSEOUS STRUCTURES:  Normal alignment of left hip arthroplasty  No acute fracture or destructive osseous lesion  Impression: 1  Left ureteral stent in expected position  Persistent severe left hydronephrosis with decompression of the ureter  No evidence of obstructing calculus at the ureteropelvic junction    Persistent ureteritis and pyelonephritis not excluded  2   Nonobstructing calculi in left renal calyces measuring up to 7 5 mm  Workstation performed: [id number]     Ct Chest Abdomen Pelvis W Contrast    Result Date: 4/25/2021  Narrative: CT CHEST, ABDOMEN AND PELVIS WITH IV CONTRAST INDICATION:   dry heaving and SOB  History of COPD  COMPARISON:  Abdomen and pelvic CT from 3/27/2021  Chest, abdomen, and pelvic CT from 11/6/2019 TECHNIQUE: CT examination of the chest, abdomen and pelvis was performed  Axial, sagittal, and coronal 2D reformatted images were created from the source data and submitted for interpretation  Radiation dose length product (DLP) for this visit:  957 mGy-cm   This examination, like all CT scans performed in the [facility], was performed utilizing techniques to minimize radiation dose exposure, including the use of iterative reconstruction and automated exposure control  IV Contrast:  100 mL of iohexol (OMNIPAQUE) Enteric Contrast: Enteric contrast was administered  FINDINGS: CHEST LUNGS:  Moderate emphysema  Lungs are otherwise unremarkable  There is no tracheal or endobronchial lesion  PLEURA:  Unremarkable  HEART/GREAT VESSELS:  Unremarkable for patient's age  MEDIASTINUM AND SACHA:  Unremarkable  CHEST WALL AND LOWER NECK:   Bilateral neurostimulator generators in the upper chest wall  ABDOMEN LIVER/BILIARY TREE:  Unremarkable  GALLBLADDER:  No calcified gallstones  No pericholecystic inflammatory change  SPLEEN:  Calcified granulomata are noted in the spleen  No suspicious splenic mass  PANCREAS:  Unremarkable  ADRENAL GLANDS:  Unremarkable  KIDNEYS/URETERS:  There is a left ureteral stent in place, with mild left hydronephrosis and urothelial thickening and hyperenhancement in the renal calyces and pelvises (series 601 images 113- 138 )  These findings were present on the 3/27/2021 CT, but have improved  Again seen are multiple small left renal calyceal stones, largest in the lower pole measuring 8 mm   There is mild left renal atrophy  There is no evidence of right hydronephrosis or ureteral stone  STOMACH AND BOWEL:  Unremarkable  APPENDIX:  No findings to suggest appendicitis  ABDOMINOPELVIC CAVITY:  No ascites  No pneumoperitoneum  No lymphadenopathy  VESSELS:  Unremarkable for patient's age  PELVIS REPRODUCTIVE ORGANS:  Unremarkable for patient's age  URINARY BLADDER:  Unremarkable  ABDOMINAL WALL/INGUINAL REGIONS:  Unremarkable  OSSEOUS STRUCTURES:  No acute fracture or destructive osseous lesion  Left hip replacement noted  Impression: Moderate emphysema  Lungs are otherwise unremarkable  There is a left ureteral stent in place, with mild left hydronephrosis and urothelial thickening and hyperenhancement in the renal calyces and pelvises (series 601 images 113- 138 )  These findings were present on the 3/27/2021 CT, but have improved  Again seen are multiple small left renal calyceal stones, largest in the lower pole measuring 8 mm  Workstation performed: [id number]     Allscripts / Epic Records Reviewed: Yes     ** Please Note: This note has been constructed using a voice recognition system   **

## 2021-04-26 NOTE — ASSESSMENT & PLAN NOTE
· History of CVA with right-sided facial droop and dysphagia  · History of carotid stenosis status post endarterectomy  · Maintained on aspirin, Plavix, statin

## 2021-04-27 ENCOUNTER — APPOINTMENT (INPATIENT)
Dept: GASTROENTEROLOGY | Facility: HOSPITAL | Age: 81
DRG: 377 | End: 2021-04-27
Payer: MEDICARE

## 2021-04-27 ENCOUNTER — ANESTHESIA EVENT (INPATIENT)
Dept: GASTROENTEROLOGY | Facility: HOSPITAL | Age: 81
DRG: 377 | End: 2021-04-27
Payer: MEDICARE

## 2021-04-27 ENCOUNTER — ANESTHESIA (INPATIENT)
Dept: GASTROENTEROLOGY | Facility: HOSPITAL | Age: 81
DRG: 377 | End: 2021-04-27
Payer: MEDICARE

## 2021-04-27 LAB
ABO GROUP BLD BPU: NORMAL
BPU ID: NORMAL
CROSSMATCH: NORMAL
ERYTHROCYTE [DISTWIDTH] IN BLOOD BY AUTOMATED COUNT: 15 % (ref 11.6–15.1)
HCT VFR BLD AUTO: 29.9 % (ref 34.8–46.1)
HGB BLD-MCNC: 9 G/DL (ref 11.5–15.4)
MCH RBC QN AUTO: 27 PG (ref 26.8–34.3)
MCHC RBC AUTO-ENTMCNC: 30.1 G/DL (ref 31.4–37.4)
MCV RBC AUTO: 90 FL (ref 82–98)
PLATELET # BLD AUTO: 227 THOUSANDS/UL (ref 149–390)
PMV BLD AUTO: 9.2 FL (ref 8.9–12.7)
RBC # BLD AUTO: 3.33 MILLION/UL (ref 3.81–5.12)
UNIT DISPENSE STATUS: NORMAL
UNIT PRODUCT CODE: NORMAL
UNIT RH: NORMAL
WBC # BLD AUTO: 8.35 THOUSAND/UL (ref 4.31–10.16)

## 2021-04-27 PROCEDURE — 0DBP8ZX EXCISION OF RECTUM, VIA NATURAL OR ARTIFICIAL OPENING ENDOSCOPIC, DIAGNOSTIC: ICD-10-PCS | Performed by: INTERNAL MEDICINE

## 2021-04-27 PROCEDURE — 99232 SBSQ HOSP IP/OBS MODERATE 35: CPT | Performed by: PHYSICIAN ASSISTANT

## 2021-04-27 PROCEDURE — 94640 AIRWAY INHALATION TREATMENT: CPT

## 2021-04-27 PROCEDURE — 0DBC8ZX EXCISION OF ILEOCECAL VALVE, VIA NATURAL OR ARTIFICIAL OPENING ENDOSCOPIC, DIAGNOSTIC: ICD-10-PCS | Performed by: INTERNAL MEDICINE

## 2021-04-27 PROCEDURE — 85027 COMPLETE CBC AUTOMATED: CPT | Performed by: PHYSICIAN ASSISTANT

## 2021-04-27 PROCEDURE — 0DB38ZX EXCISION OF LOWER ESOPHAGUS, VIA NATURAL OR ARTIFICIAL OPENING ENDOSCOPIC, DIAGNOSTIC: ICD-10-PCS | Performed by: INTERNAL MEDICINE

## 2021-04-27 PROCEDURE — 43239 EGD BIOPSY SINGLE/MULTIPLE: CPT | Performed by: INTERNAL MEDICINE

## 2021-04-27 PROCEDURE — C9113 INJ PANTOPRAZOLE SODIUM, VIA: HCPCS | Performed by: INTERNAL MEDICINE

## 2021-04-27 PROCEDURE — 88305 TISSUE EXAM BY PATHOLOGIST: CPT | Performed by: PATHOLOGY

## 2021-04-27 RX ORDER — LEVALBUTEROL 1.25 MG/.5ML
1.25 SOLUTION, CONCENTRATE RESPIRATORY (INHALATION)
Status: DISCONTINUED | OUTPATIENT
Start: 2021-04-27 | End: 2021-04-29 | Stop reason: HOSPADM

## 2021-04-27 RX ORDER — SODIUM CHLORIDE FOR INHALATION 0.9 %
3 VIAL, NEBULIZER (ML) INHALATION
Status: DISCONTINUED | OUTPATIENT
Start: 2021-04-27 | End: 2021-04-29 | Stop reason: HOSPADM

## 2021-04-27 RX ORDER — LIDOCAINE HYDROCHLORIDE 20 MG/ML
INJECTION, SOLUTION EPIDURAL; INFILTRATION; INTRACAUDAL; PERINEURAL AS NEEDED
Status: DISCONTINUED | OUTPATIENT
Start: 2021-04-27 | End: 2021-04-27

## 2021-04-27 RX ORDER — SODIUM CHLORIDE 9 MG/ML
100 INJECTION, SOLUTION INTRAVENOUS CONTINUOUS
Status: DISCONTINUED | OUTPATIENT
Start: 2021-04-27 | End: 2021-04-28

## 2021-04-27 RX ORDER — POLYETHYLENE GLYCOL 3350, SODIUM CHLORIDE, SODIUM BICARBONATE, POTASSIUM CHLORIDE 420; 11.2; 5.72; 1.48 G/4L; G/4L; G/4L; G/4L
4000 POWDER, FOR SOLUTION ORAL ONCE
Status: COMPLETED | OUTPATIENT
Start: 2021-04-27 | End: 2021-04-27

## 2021-04-27 RX ORDER — PROPOFOL 10 MG/ML
INJECTION, EMULSION INTRAVENOUS AS NEEDED
Status: DISCONTINUED | OUTPATIENT
Start: 2021-04-27 | End: 2021-04-27

## 2021-04-27 RX ADMIN — LEVOFLOXACIN 750 MG: 5 INJECTION, SOLUTION INTRAVENOUS at 00:46

## 2021-04-27 RX ADMIN — LEVOTHYROXINE SODIUM 125 MCG: 125 TABLET ORAL at 08:35

## 2021-04-27 RX ADMIN — LEVALBUTEROL HYDROCHLORIDE 1.25 MG: 1.25 SOLUTION, CONCENTRATE RESPIRATORY (INHALATION) at 13:52

## 2021-04-27 RX ADMIN — PROPOFOL 100 MG: 10 INJECTION, EMULSION INTRAVENOUS at 15:29

## 2021-04-27 RX ADMIN — DILTIAZEM HYDROCHLORIDE 240 MG: 240 CAPSULE, COATED, EXTENDED RELEASE ORAL at 08:35

## 2021-04-27 RX ADMIN — PANTOPRAZOLE SODIUM 40 MG: 40 INJECTION, POWDER, FOR SOLUTION INTRAVENOUS at 21:43

## 2021-04-27 RX ADMIN — PROPOFOL 30 MG: 10 INJECTION, EMULSION INTRAVENOUS at 15:32

## 2021-04-27 RX ADMIN — FUROSEMIDE 20 MG: 20 TABLET ORAL at 08:36

## 2021-04-27 RX ADMIN — POLYETHYLENE GLYCOL 3350, SODIUM CHLORIDE, SODIUM BICARBONATE AND POTASSIUM CHLORIDE WITH LEMON FLAVOR 4000 ML: 420; 11.2; 5.72; 1.48 POWDER, FOR SOLUTION ORAL at 17:18

## 2021-04-27 RX ADMIN — PANTOPRAZOLE SODIUM 40 MG: 40 INJECTION, POWDER, FOR SOLUTION INTRAVENOUS at 08:31

## 2021-04-27 RX ADMIN — LEVALBUTEROL HYDROCHLORIDE 1.25 MG: 1.25 SOLUTION, CONCENTRATE RESPIRATORY (INHALATION) at 20:58

## 2021-04-27 RX ADMIN — ISODIUM CHLORIDE 3 ML: 0.03 SOLUTION RESPIRATORY (INHALATION) at 20:58

## 2021-04-27 RX ADMIN — LIDOCAINE HYDROCHLORIDE 50 MG: 20 INJECTION, SOLUTION EPIDURAL; INFILTRATION; INTRACAUDAL; PERINEURAL at 15:29

## 2021-04-27 RX ADMIN — ISODIUM CHLORIDE 3 ML: 0.03 SOLUTION RESPIRATORY (INHALATION) at 13:52

## 2021-04-27 RX ADMIN — SODIUM CHLORIDE 100 ML/HR: 0.9 INJECTION, SOLUTION INTRAVENOUS at 14:33

## 2021-04-27 RX ADMIN — PROPOFOL 20 MG: 10 INJECTION, EMULSION INTRAVENOUS at 15:34

## 2021-04-27 NOTE — ASSESSMENT & PLAN NOTE
· Patient has chronically obstructed left renal pelvis with chronic indwelling left ureteral stent requiring exchange every 3-4 months (last exchange was 02/16/21)  · CT abdomen pelvis reveals moderate emphysema, you left ureteral stent in place with mild left hydronephrosis and urothelial thickening  · Urinalysis reveals moderate leukocytes, positive nitrite, innumerable WBC and bacteria  · Has had resistant organisms in urine cultures in the past including MDR  · Given Levaquin in the ED, will continue  · Urine culture- preliminary growing Gram-negative rods  · Await urinary culture sensitivities

## 2021-04-27 NOTE — ASSESSMENT & PLAN NOTE
· Patient reports having melanotic stools for the past 4 days  · On aspirin and Plavix at home with hx of CVA  · Hemoglobin 7 9 on admission  · Hemoccult-positive in the ED  · Received 1 unit PRBC 4/26/21 - repeat hgb 9 0 today  · IV PPI b i d  · GI consultation - plan for EGD today- currently NPO

## 2021-04-27 NOTE — ASSESSMENT & PLAN NOTE
· History of CVA 9/2019 with right-sided facial droop and dysphagia  · History of carotid stenosis status post endarterectomy 2/18/20  · Maintained on aspirin, Plavix, statin   · Plavix on hold in the setting of GI bleed  · Okay to continue ASA per GI

## 2021-04-27 NOTE — ASSESSMENT & PLAN NOTE
This is a 78-year-old female with history of hypertension, hyperlipidemia, COPD, CVA, chronic left hydronephrosis status post stent exchanges with Urology presenting with dyspnea  Patient states she does not use oxygen at home, was using her nebulizer without relief    States for the past 2 days she has been having dyspnea and exertional chest discomfort relieved with rest   Denies any cough, congestion, fever, chills    · CT chest commented on a moderate emphysema otherwise unremarkable lungs  · Troponins negative, BNP normal  · Believe patient's symptoms may be secondary to symptomatic anemia- better after 1 unit  · No wheezing on auscultation there for will defer steroids  · Continue nebulizers

## 2021-04-27 NOTE — PROGRESS NOTES
[address] [facility]  Progress Note - Alfredo Lui 1940, 78 y o  female MRN: [id number]  Unit/Bed#: E5 -01 Encounter: [id number]  Primary Care Provider: Sabrina Monreal DO   Date and time admitted to hospital: 4/25/2021  2:24 PM    * Melena  Assessment & Plan  · Patient reports having melanotic stools for the past 4 days  · On aspirin and Plavix at home with hx of CVA  · Hemoglobin 7 9 on admission  · Hemoccult-positive in the ED  · Received 1 unit PRBC 4/26/21 - repeat hgb 9 0 today  · IV PPI b i d  · GI consultation - plan for EGD today- currently NPO    Acute respiratory failure with hypoxia [facility])  Assessment & Plan  This is a 78-year-old female with history of hypertension, hyperlipidemia, COPD, CVA, chronic left hydronephrosis status post stent exchanges with Urology presenting with dyspnea  Patient states she does not use oxygen at home, was using her nebulizer without relief    States for the past 2 days she has been having dyspnea and exertional chest discomfort relieved with rest   Denies any cough, congestion, fever, chills    · CT chest commented on a moderate emphysema otherwise unremarkable lungs  · Troponins negative, BNP normal  · Believe patient's symptoms may be secondary to symptomatic anemia- better after 1 unit  · No wheezing on auscultation there for will defer steroids  · Continue nebulizers    UTI (urinary tract infection)  Assessment & Plan  · Patient has chronically obstructed left renal pelvis with chronic indwelling left ureteral stent requiring exchange every 3-4 months (last exchange was 02/16/21)  · CT abdomen pelvis reveals moderate emphysema, you left ureteral stent in place with mild left hydronephrosis and urothelial thickening  · Urinalysis reveals moderate leukocytes, positive nitrite, innumerable WBC and bacteria  · Has had resistant organisms in urine cultures in the past including MDR  · Given Levaquin in the ED, will continue  · Urine culture- preliminary growing Gram-negative rods  · Await urinary culture sensitivities    Chest pain  Assessment & Plan  · Exertional chest pain with dyspnea - likely due to anemia  · Troponin negative    Acquired hypothyroidism  Assessment & Plan  · Continue levothyroxine    Dyslipidemia  Assessment & Plan  · Continue statin twice weekly Monday and Thursday    COPD (chronic obstructive pulmonary disease) ([facility] [address] [address] )  Assessment & Plan  · Possible mild exacerbation, lungs clear to auscultation  · Encourage pulmonary toilet, airway clearance protocol  · Continue nebulizers as needed    History of CVA (cerebrovascular accident)  Assessment & Plan  · History of CVA 9/2019 with right-sided facial droop and dysphagia  · History of carotid stenosis status post endarterectomy 2/18/20  · Maintained on aspirin, Plavix, statin   · Plavix on hold in the setting of GI bleed  · Okay to continue ASA per GI    Benign essential hypertension  Assessment & Plan  · Continue with diltiazem 240 mg daily      VTE Pharmacologic Prophylaxis:   Pharmacologic: Pharmacologic VTE Prophylaxis contraindicated due to Anemia  Mechanical VTE Prophylaxis in Place: Yes    Discharge Plan: With need for continued inpatient stay for EGD today    Discussions with Specialists or Other Care Team Provider:  nursing    Education and Discussions with Family / Patient:  Patient, daughter via telephone - [facility] voicemail    Time Spent for Care: 30 minutes  More than 50% of total time spent on counseling and coordination of care as described above  Current Length of Stay: 2 day(s)  Current Patient Status: Inpatient   Code Status: Level 1 - Full Code    Subjective:   Patient resting comfortably in chair at bedside  Reports her breathing is better today  She has been tapered off oxygen and is saturating well on room air  She is asking for food and is hungry  Scheduled for scope this afternoon    Left voicemail with daughter with update    Objective: Vitals:   Temp (24hrs), Av 6 °F (37 °C), Min:98 3 °F (36 8 °C), Max:98 9 °F (37 2 °C)    Temp:  [98 3 °F (36 8 °C)-98 9 °F (37 2 °C)] 98 9 °F (37 2 °C)  HR:  [74-88] 85  Resp:  [16-18] 16  BP: ()/(51-80) 128/61  SpO2:  [91 %-98 %] 91 %  Body mass index is 34 44 kg/m²  Input and Output Summary (last 24 hours): Intake/Output Summary (Last 24 hours) at 2021 1316  Last data filed at 2021 1733  Gross per 24 hour   Intake 350 ml   Output 300 ml   Net 50 ml       Physical Exam:     Physical Exam  Vitals signs and nursing note reviewed  Constitutional:       General: She is not in acute distress  Appearance: Normal appearance  She is normal weight  She is not ill-appearing, toxic-appearing or diaphoretic  HENT:      Head: Normocephalic and atraumatic  Cardiovascular:      Rate and Rhythm: Normal rate and regular rhythm  Pulmonary:      Effort: Pulmonary effort is normal  No respiratory distress  Breath sounds: Normal breath sounds  No stridor  No wheezing or rhonchi  Abdominal:      General: Bowel sounds are normal  There is no distension  Palpations: Abdomen is soft  There is no mass  Tenderness: There is no abdominal tenderness  Hernia: No hernia is present  Psychiatric:         Mood and Affect: Mood normal          Behavior: Behavior normal          Additional Data:     Labs:    Results from last 7 days   Lab Units 21  0918 21  0506   WBC Thousand/uL 8 35 7 22   HEMOGLOBIN g/dL 9 0* 7 3*   HEMATOCRIT % 29 9* 24 8*   PLATELETS Thousands/uL 227 213   NEUTROS PCT %  --  70   LYMPHS PCT %  --  17   MONOS PCT %  --  7   EOS PCT %  --  6     Results from last 7 days   Lab Units 21  0506   POTASSIUM mmol/L 4 6   CHLORIDE mmol/L 106   CO2 mmol/L 32   BUN mg/dL 19   CREATININE mg/dL 0 93   CALCIUM mg/dL 8 3   ALK PHOS U/L 66   ALT U/L 24   AST U/L 17           * I Have Reviewed All Lab Data Listed Above    * Additional Pertinent Lab Tests Reviewed: [facility]    Imaging:    Imaging Reports Reviewed Today Include:   Imaging Personally Reviewed by Myself Includes:      Recent Cultures (last 7 days):     Results from last 7 days   Lab Units 04/25/21  1822 04/25/21  1730 04/25/21  1700   BLOOD CULTURE   --  No Growth at 24 hrs  No Growth at 24 hrs  URINE CULTURE  >100,000 cfu/ml Gram Negative Gabe Enteric Like*  --   --        Last 24 Hours Medication List:   Current Facility-Administered Medications   Medication Dose Route Frequency Provider Last Rate    diltiazem  240 mg Oral Daily Al Bynum MD      furosemide  20 mg Oral Daily Al Bynum MD      levalbuterol  1 25 mg Nebulization TID Carla Gale PA-C      levofloxacin  750 mg Intravenous Once Rachel Do DO      levofloxacin  750 mg Intravenous Q24H Al Bynum  mg (04/27/21 0046)    levothyroxine  125 mcg Oral Daily Al Bynum MD      pantoprazole  40 mg Intravenous Q12H Will Jones MD      pravastatin  40 mg Oral Once per day on Mon Thu Al Bynum MD          Today, Patient Was Seen By: Carla Gale PA-C    ** Please Note: This note has been constructed using a voice recognition system   **

## 2021-04-27 NOTE — ANESTHESIA PREPROCEDURE EVALUATION
Procedure:  EGD    Relevant Problems   CARDIO   (+) Aortic valve stenosis - ( Mild -Moderate )   (+) Benign essential hypertension   (+) Bilateral carotid artery stenosis   (+) Chest pain   (+) Coronary artery disease involving native coronary artery of native heart without angina pectoris   (+) PAD (peripheral artery disease) (HCC)      ENDO   (+) Acquired hypothyroidism      GI/HEPATIC   (+) Esophageal reflux      /RENAL   (+) Calculus of kidney   (+) Hydronephrosis of left kidney      MUSCULOSKELETAL   (+) Back pain   (+) Lumbar degenerative disc disease   (+) OA (osteoarthritis)   (+) Osteoarthritis of hip   (+) Sciatica      NEURO/PSYCH   (+) Cerebrovascular accident (CVA) ([address] )   (+) History of CVA (cerebrovascular accident)   (+) History of pancreatitis      PULMONARY   (+) Acute respiratory failure with hypoxia (HCC)   (+) Asthma   (+) COPD (chronic obstructive pulmonary disease) (HCC)      Other   (+) Diabetes mellitus type 2, diet-controlled (HCC)        Physical Exam    Airway    Mallampati score: II  TM Distance: >3 FB  Neck ROM: full     Dental       Cardiovascular  Rhythm: regular, Rate: normal,     Pulmonary  Pulmonary exam normal     Other Findings        Anesthesia Plan  ASA Score- 4 Emergent    Anesthesia Type- general with ASA Monitors  Additional Monitors:   Airway Plan:           Plan Factors-Exercise tolerance (METS): >4 METS  Chart reviewed  Patient summary reviewed  Patient is not a current smoker  Induction- intravenous  Postoperative Plan-     Informed Consent- Anesthetic plan and risks discussed with patient

## 2021-04-27 NOTE — CASE MANAGEMENT
GMLOS:   2 day           LOS: 2  BUNDLED? No  UNPLANNED READMISSION LEVEL: 7 low  30 DAY READMISSION? Yes, pt reports she was having a lot of bleeding    Pt was admitted on 4/25/21 with Gillian  SW met with pt at bedside to conduct a CM assessment and discuss discharge planning  Pt reports that she lives in [facility]  She reports that she is independent with all ADL's and owns a walker, rollator and cane as DME  She is not currently open with any home services or rehab facilities  She uses [employer] delivery for RX needs  Her PCP is Sanjeev Bryant  She denies any hx of MH or D&A  She reports that her dtr [facility] is her POA  At discharge she reports that her dtr or friends at the independent facility can transport her home  SW/CM dept will continue to follow

## 2021-04-28 ENCOUNTER — APPOINTMENT (INPATIENT)
Dept: GASTROENTEROLOGY | Facility: HOSPITAL | Age: 81
DRG: 377 | End: 2021-04-28
Payer: MEDICARE

## 2021-04-28 ENCOUNTER — ANESTHESIA EVENT (INPATIENT)
Dept: GASTROENTEROLOGY | Facility: HOSPITAL | Age: 81
DRG: 377 | End: 2021-04-28
Payer: MEDICARE

## 2021-04-28 ENCOUNTER — ANESTHESIA (INPATIENT)
Dept: GASTROENTEROLOGY | Facility: HOSPITAL | Age: 81
DRG: 377 | End: 2021-04-28
Payer: MEDICARE

## 2021-04-28 PROBLEM — D64.9 ANEMIA: Status: ACTIVE | Noted: 2021-04-28

## 2021-04-28 LAB
ANION GAP SERPL CALCULATED.3IONS-SCNC: 10 MMOL/L (ref 4–13)
BUN SERPL-MCNC: 14 MG/DL (ref 5–25)
CALCIUM SERPL-MCNC: 8.5 MG/DL (ref 8.3–10.1)
CHLORIDE SERPL-SCNC: 104 MMOL/L (ref 100–108)
CO2 SERPL-SCNC: 28 MMOL/L (ref 21–32)
CREAT SERPL-MCNC: 1.01 MG/DL (ref 0.6–1.3)
ERYTHROCYTE [DISTWIDTH] IN BLOOD BY AUTOMATED COUNT: 14.8 % (ref 11.6–15.1)
GFR SERPL CREATININE-BSD FRML MDRD: 52 ML/MIN/1.73SQ M
GLUCOSE SERPL-MCNC: 106 MG/DL (ref 65–140)
HCT VFR BLD AUTO: 29.2 % (ref 34.8–46.1)
HGB BLD-MCNC: 8.9 G/DL (ref 11.5–15.4)
MCH RBC QN AUTO: 26.8 PG (ref 26.8–34.3)
MCHC RBC AUTO-ENTMCNC: 30.5 G/DL (ref 31.4–37.4)
MCV RBC AUTO: 88 FL (ref 82–98)
PLATELET # BLD AUTO: 272 THOUSANDS/UL (ref 149–390)
PMV BLD AUTO: 9.7 FL (ref 8.9–12.7)
POTASSIUM SERPL-SCNC: 3.9 MMOL/L (ref 3.5–5.3)
RBC # BLD AUTO: 3.32 MILLION/UL (ref 3.81–5.12)
SODIUM SERPL-SCNC: 142 MMOL/L (ref 136–145)
WBC # BLD AUTO: 8.59 THOUSAND/UL (ref 4.31–10.16)

## 2021-04-28 PROCEDURE — C9113 INJ PANTOPRAZOLE SODIUM, VIA: HCPCS | Performed by: INTERNAL MEDICINE

## 2021-04-28 PROCEDURE — 88305 TISSUE EXAM BY PATHOLOGIST: CPT | Performed by: PATHOLOGY

## 2021-04-28 PROCEDURE — 80048 BASIC METABOLIC PNL TOTAL CA: CPT | Performed by: PHYSICIAN ASSISTANT

## 2021-04-28 PROCEDURE — 45385 COLONOSCOPY W/LESION REMOVAL: CPT | Performed by: INTERNAL MEDICINE

## 2021-04-28 PROCEDURE — 45380 COLONOSCOPY AND BIOPSY: CPT | Performed by: INTERNAL MEDICINE

## 2021-04-28 PROCEDURE — 99232 SBSQ HOSP IP/OBS MODERATE 35: CPT | Performed by: PHYSICIAN ASSISTANT

## 2021-04-28 PROCEDURE — 85027 COMPLETE CBC AUTOMATED: CPT | Performed by: PHYSICIAN ASSISTANT

## 2021-04-28 PROCEDURE — 94640 AIRWAY INHALATION TREATMENT: CPT

## 2021-04-28 RX ORDER — PROPOFOL 10 MG/ML
INJECTION, EMULSION INTRAVENOUS AS NEEDED
Status: DISCONTINUED | OUTPATIENT
Start: 2021-04-28 | End: 2021-04-28

## 2021-04-28 RX ORDER — ASPIRIN 81 MG/1
81 TABLET ORAL DAILY
Status: DISCONTINUED | OUTPATIENT
Start: 2021-04-28 | End: 2021-04-29 | Stop reason: HOSPADM

## 2021-04-28 RX ORDER — LIDOCAINE HYDROCHLORIDE 20 MG/ML
INJECTION, SOLUTION EPIDURAL; INFILTRATION; INTRACAUDAL; PERINEURAL AS NEEDED
Status: DISCONTINUED | OUTPATIENT
Start: 2021-04-28 | End: 2021-04-28

## 2021-04-28 RX ADMIN — PROPOFOL 30 MG: 10 INJECTION, EMULSION INTRAVENOUS at 14:42

## 2021-04-28 RX ADMIN — ISODIUM CHLORIDE 3 ML: 0.03 SOLUTION RESPIRATORY (INHALATION) at 19:00

## 2021-04-28 RX ADMIN — PROPOFOL 40 MG: 10 INJECTION, EMULSION INTRAVENOUS at 14:43

## 2021-04-28 RX ADMIN — PROPOFOL 30 MG: 10 INJECTION, EMULSION INTRAVENOUS at 14:46

## 2021-04-28 RX ADMIN — PANTOPRAZOLE SODIUM 40 MG: 40 INJECTION, POWDER, FOR SOLUTION INTRAVENOUS at 21:19

## 2021-04-28 RX ADMIN — PROPOFOL 70 MG: 10 INJECTION, EMULSION INTRAVENOUS at 14:38

## 2021-04-28 RX ADMIN — PROPOFOL 30 MG: 10 INJECTION, EMULSION INTRAVENOUS at 14:58

## 2021-04-28 RX ADMIN — PROPOFOL 30 MG: 10 INJECTION, EMULSION INTRAVENOUS at 14:54

## 2021-04-28 RX ADMIN — PROPOFOL 30 MG: 10 INJECTION, EMULSION INTRAVENOUS at 14:40

## 2021-04-28 RX ADMIN — LIDOCAINE HYDROCHLORIDE 80 MG: 20 INJECTION, SOLUTION EPIDURAL; INFILTRATION; INTRACAUDAL; PERINEURAL at 14:38

## 2021-04-28 RX ADMIN — LEVALBUTEROL HYDROCHLORIDE 1.25 MG: 1.25 SOLUTION, CONCENTRATE RESPIRATORY (INHALATION) at 07:49

## 2021-04-28 RX ADMIN — ISODIUM CHLORIDE 3 ML: 0.03 SOLUTION RESPIRATORY (INHALATION) at 07:49

## 2021-04-28 RX ADMIN — LEVALBUTEROL HYDROCHLORIDE 1.25 MG: 1.25 SOLUTION, CONCENTRATE RESPIRATORY (INHALATION) at 19:00

## 2021-04-28 RX ADMIN — FUROSEMIDE 20 MG: 20 TABLET ORAL at 08:32

## 2021-04-28 RX ADMIN — LEVOTHYROXINE SODIUM 125 MCG: 125 TABLET ORAL at 08:33

## 2021-04-28 RX ADMIN — LEVOFLOXACIN 750 MG: 5 INJECTION, SOLUTION INTRAVENOUS at 00:47

## 2021-04-28 RX ADMIN — DILTIAZEM HYDROCHLORIDE 240 MG: 240 CAPSULE, COATED, EXTENDED RELEASE ORAL at 08:32

## 2021-04-28 RX ADMIN — ASPIRIN 81 MG: 81 TABLET ORAL at 16:19

## 2021-04-28 RX ADMIN — PROPOFOL 40 MG: 10 INJECTION, EMULSION INTRAVENOUS at 14:50

## 2021-04-28 RX ADMIN — PANTOPRAZOLE SODIUM 40 MG: 40 INJECTION, POWDER, FOR SOLUTION INTRAVENOUS at 08:32

## 2021-04-28 NOTE — ASSESSMENT & PLAN NOTE
· Present on admission as evidenced by hemoglobin of 7 9, down from 10-12 late last month  · In the setting of melena as above  · EGD report pending   · Going for colonoscopy today  · Per GI OK to resume aspirin  · S/p 1 U PRBCs on 4/26/21  · Hemoglobin monitoring

## 2021-04-28 NOTE — ASSESSMENT & PLAN NOTE
· Per record review, patient reported having melanotic stools for the past 4 days prior to admission  · On aspirin and Plavix at home with history of CVA  · Hemoglobin 7 9 on admission (down from 10-12 late last month)  · Hemoccult-positive in the ED  · Received 1 unit PRBC 4/26/21  · IV PPI b i d  · GI following, appreciate their ongoing recommendations  Patient underwent EGD yesterday, the report for which is still pending   Undergoing colonoscopy today

## 2021-04-28 NOTE — ASSESSMENT & PLAN NOTE
· History of CVA 9/2019 with right-sided facial droop and dysphagia  · History of carotid stenosis status post endarterectomy 2/18/20  · Maintained on aspirin, Plavix, statin as outpatient   · Plavix on hold in the setting of GI bleed  · Okay to continue ASA per GI - resumed today

## 2021-04-28 NOTE — ANESTHESIA PREPROCEDURE EVALUATION
Procedure:  COLONOSCOPY    Relevant Problems   CARDIO   (+) Aortic valve stenosis - ( Mild -Moderate )   (+) Benign essential hypertension   (+) Chest pain   (+) Coronary artery disease involving native coronary artery of native heart without angina pectoris      ENDO   (+) Acquired hypothyroidism      GI/HEPATIC   (+) Esophageal reflux      /RENAL   (+) Calculus of kidney   (+) Hydronephrosis of left kidney      MUSCULOSKELETAL   (+) Back pain   (+) Lumbar degenerative disc disease   (+) OA (osteoarthritis)   (+) Osteoarthritis of hip   (+) Sciatica      NEURO/PSYCH   (+) Cerebrovascular accident (CVA) ([address] )   (+) History of CVA (cerebrovascular accident)   (+) History of pancreatitis      PULMONARY   (+) Acute respiratory failure with hypoxia (HCC)   (+) Asthma   (+) COPD (chronic obstructive pulmonary disease) (HCC)        Physical Exam    Airway    Mallampati score: II  TM Distance: >3 FB  Neck ROM: full     Dental       Cardiovascular  Rhythm: regular, Rate: normal,     Pulmonary  Pulmonary exam normal     Other Findings        Anesthesia Plan  ASA Score- 4 Emergent    Anesthesia Type- general and IV sedation with anesthesia with ASA Monitors  Additional Monitors:   Airway Plan:     Comment: S/P EGD yesterday  Plan Factors-Exercise tolerance (METS): >4 METS  Chart reviewed  Patient summary reviewed  Patient is not a current smoker  Induction- intravenous  Postoperative Plan-     Informed Consent- Anesthetic plan and risks discussed with patient  I personally reviewed this patient with the CRNA  Discussed and agreed on the Anesthesia Plan with the CRNA  Charles Reeder

## 2021-04-28 NOTE — ASSESSMENT & PLAN NOTE
· Patient has chronically obstructed left renal pelvis with chronic indwelling left ureteral stent requiring exchange every 3-4 months (last exchange was 02/16/21)  · CT abdomen pelvis reveals moderate emphysema, you left ureteral stent in place with mild left hydronephrosis and urothelial thickening  · Urine culture growing >100,000 Klebsiella  · Continue antibiotics and follow up final susceptibility report

## 2021-04-28 NOTE — ASSESSMENT & PLAN NOTE
· CT chest commented on a moderate emphysema otherwise unremarkable lungs  · Troponins negative, BNP WNL  · COVID-19 negative  · Possibly 2/2 symptomatic anemia, breathing reportedly improved s/p blood transfusion   · Wean oxygen as able

## 2021-04-28 NOTE — ASSESSMENT & PLAN NOTE
· Doubt acute exacerbation at this time  Breathing reportedly improved s/p blood transfusion   Not on steroids

## 2021-04-28 NOTE — PROGRESS NOTES
[address] [facility]  Progress Note - Alfredo Lui 1940, 78 y o  female MRN: [id number]  Unit/Bed#: E5 -01 Encounter: [id number]  Primary Care Provider: Sabrina Monreal DO   Date and time admitted to hospital: 4/25/2021  2:24 PM    * Melena  Assessment & Plan  · Per record review, patient reported having melanotic stools for the past 4 days prior to admission  · On aspirin and Plavix at home with history of CVA  · Hemoglobin 7 9 on admission (down from 10-12 late last month)  · Hemoccult-positive in the ED  · Received 1 unit PRBC 4/26/21  · IV PPI b i d  · GI following, appreciate their ongoing recommendations  Patient underwent EGD yesterday, the report for which is still pending   Undergoing colonoscopy today    Anemia  Assessment & Plan  · Present on admission as evidenced by hemoglobin of 7 9, down from 10-12 late last month  · In the setting of melena as above  · EGD report pending   · Going for colonoscopy today  · Per GI OK to resume aspirin  · S/p 1 U PRBCs on 4/26/21  · Hemoglobin monitoring     History of CVA (cerebrovascular accident)  Assessment & Plan  · History of CVA 9/2019 with right-sided facial droop and dysphagia  · History of carotid stenosis status post endarterectomy 2/18/20  · Maintained on aspirin, Plavix, statin as outpatient   · Plavix on hold in the setting of GI bleed  · Okay to continue ASA per GI - resumed today    Acute respiratory failure with hypoxia (HCC)  Assessment & Plan  · CT chest commented on a moderate emphysema otherwise unremarkable lungs  · Troponins negative, BNP WNL  · COVID-19 negative  · Possibly 2/2 symptomatic anemia, breathing reportedly improved s/p blood transfusion   · Wean oxygen as able    UTI (urinary tract infection)  Assessment & Plan  · Patient has chronically obstructed left renal pelvis with chronic indwelling left ureteral stent requiring exchange every 3-4 months (last exchange was 02/16/21)  · CT abdomen pelvis reveals moderate emphysema, you left ureteral stent in place with mild left hydronephrosis and urothelial thickening  · Urine culture growing >100,000 Klebsiella  · Continue antibiotics and follow up final susceptibility report     Benign essential hypertension  Assessment & Plan  · Continue with diltiazem 240 mg daily    Chest pain  Assessment & Plan  · Noted exertional chest pain with dyspnea on admission, ? due to anemia  · Troponin negative    COPD (chronic obstructive pulmonary disease) ([address] )  Assessment & Plan  · Doubt acute exacerbation at this time  Breathing reportedly improved s/p blood transfusion  Not on steroids     Dyslipidemia  Assessment & Plan  · Continue statin     Acquired hypothyroidism  Assessment & Plan  · Continue levothyroxine      VTE Pharmacologic Prophylaxis:   Pharmacologic: Pharmacologic VTE Prophylaxis contraindicated due to melena, anemia  Mechanical VTE Prophylaxis in Place: Yes    Patient Centered Rounds: I have performed bedside rounds with nursing staff today  Discussions with Specialists or Other Care Team Provider: DANI BOYER    Education and Discussions with Family / Patient: patient; when asked if there was anyone she would like me to call today with an update the patient kindly declined     Time Spent for Care: 30 minutes  More than 50% of total time spent on counseling and coordination of care as described above      Current Length of Stay: 3 day(s)    Current Patient Status: Inpatient   Certification Statement: The patient will continue to require additional inpatient hospital stay due to for colonoscopy today    Discharge Plan: pending GI clearance and colonoscopy results     Code Status: Level 1 - Full Code      Subjective:   Ms Samantha Plascencia denies complaint and states she going for a colonoscopy today    Objective:     Vitals:   Temp (24hrs), Av 4 °F (36 9 °C), Min:97 9 °F (36 6 °C), Max:99 2 °F (37 3 °C)    Temp:  [97 9 °F (36 6 °C)-99 2 °F (37 3 °C)] 97 9 °F (36 6 °C)  HR:  [68-90] 75  Resp:  [17-20] 20  BP: (115-141)/(51-71) 134/61  SpO2:  [88 %-98 %] 98 %  Body mass index is 34 44 kg/m²  Input and Output Summary (last 24 hours): Intake/Output Summary (Last 24 hours) at 4/28/2021 1603  Last data filed at 4/28/2021 1541  Gross per 24 hour   Intake 700 ml   Output --   Net 700 ml       Physical Exam:     Physical Exam  Vitals signs and nursing note reviewed  Constitutional:       Comments: Patient seen sitting upright comfortably resting in bedside chair, NAD   Cardiovascular:      Rate and Rhythm: Normal rate and regular rhythm  Pulmonary:      Effort: Pulmonary effort is normal  No respiratory distress  Breath sounds: Normal breath sounds  Abdominal:      General: Bowel sounds are normal       Palpations: Abdomen is soft  Tenderness: There is no abdominal tenderness  Musculoskeletal:      Right lower leg: Edema present  Left lower leg: Edema present  Skin:     General: Skin is warm  Neurological:      Mental Status: She is alert and oriented to person, place, and time  Psychiatric:         Mood and Affect: Mood normal          Behavior: Behavior normal          Additional Data:     Labs:    Results from last 7 days   Lab Units 04/28/21  0530  04/26/21  0506   WBC Thousand/uL 8 59   < > 7 22   HEMOGLOBIN g/dL 8 9*   < > 7 3*   HEMATOCRIT % 29 2*   < > 24 8*   PLATELETS Thousands/uL 272   < > 213   NEUTROS PCT %  --   --  70   LYMPHS PCT %  --   --  17   MONOS PCT %  --   --  7   EOS PCT %  --   --  6    < > = values in this interval not displayed       Results from last 7 days   Lab Units 04/28/21  0530 04/26/21  0506   SODIUM mmol/L 142 143   POTASSIUM mmol/L 3 9 4 6   CHLORIDE mmol/L 104 106   CO2 mmol/L 28 32   BUN mg/dL 14 19   CREATININE mg/dL 1 01 0 93   ANION GAP mmol/L 10 5   CALCIUM mg/dL 8 5 8 3   ALBUMIN g/dL  --  2 9*   TOTAL BILIRUBIN mg/dL  --  0 25   ALK PHOS U/L  --  66   ALT U/L  --  24   AST U/L  --  17   GLUCOSE RANDOM mg/dL 106 120                 Results from last 7 days   Lab Units 04/25/21  1700   LACTIC ACID mmol/L 1 6           * I Have Reviewed All Lab Data Listed Above  * Additional Pertinent Lab Tests Reviewed: All Labs Within Last 24 Hours Reviewed    Imaging:    Imaging Reports Reviewed Today Include: CT as above  Imaging Personally Reviewed by Myself Includes:  none    Recent Cultures (last 7 days):     Results from last 7 days   Lab Units 04/25/21  1822 04/25/21  1730 04/25/21  1700   BLOOD CULTURE   --  No Growth at 48 hrs  No Growth at 48 hrs  URINE CULTURE  >100,000 cfu/ml Klebsiella pneumoniae*  --   --        Last 24 Hours Medication List:   Current Facility-Administered Medications   Medication Dose Route Frequency Provider Last Rate    aspirin  81 mg Oral Daily Daren Anaya PA-C      diltiazem  240 mg Oral Daily Home Chang MD      furosemide  20 mg Oral Daily Home Chang MD      levalbuterol  1 25 mg Nebulization TID Home Chang MD      levofloxacin  750 mg Intravenous Once Home Chang MD      levofloxacin  750 mg Intravenous Q24H Home Chang  mg (04/28/21 0047)    levothyroxine  125 mcg Oral Daily Home Chang MD      pantoprazole  40 mg Intravenous Q12H [address] Home Chang MD      pravastatin  40 mg Oral Once per day on Mon Thu Home Chang MD      sodium chloride  100 mL/hr Intravenous Continuous Home Chang MD Stopped (04/28/21 1541)    sodium chloride  3 mL Nebulization TID Home Chang MD          Today, Patient Was Seen By: Daren Anaya PA-C    ** Please Note: Dictation voice to text software may have been used in the creation of this document   **

## 2021-04-29 VITALS
SYSTOLIC BLOOD PRESSURE: 115 MMHG | TEMPERATURE: 98.3 F | DIASTOLIC BLOOD PRESSURE: 59 MMHG | HEART RATE: 93 BPM | OXYGEN SATURATION: 95 % | RESPIRATION RATE: 18 BRPM | BODY MASS INDEX: 34.44 KG/M2 | WEIGHT: 200.62 LBS

## 2021-04-29 LAB
ANION GAP SERPL CALCULATED.3IONS-SCNC: 7 MMOL/L (ref 4–13)
BACTERIA UR CULT: ABNORMAL
BUN SERPL-MCNC: 14 MG/DL (ref 5–25)
CALCIUM SERPL-MCNC: 8.6 MG/DL (ref 8.3–10.1)
CHLORIDE SERPL-SCNC: 107 MMOL/L (ref 100–108)
CO2 SERPL-SCNC: 29 MMOL/L (ref 21–32)
CREAT SERPL-MCNC: 0.98 MG/DL (ref 0.6–1.3)
ERYTHROCYTE [DISTWIDTH] IN BLOOD BY AUTOMATED COUNT: 15 % (ref 11.6–15.1)
GFR SERPL CREATININE-BSD FRML MDRD: 54 ML/MIN/1.73SQ M
GLUCOSE SERPL-MCNC: 108 MG/DL (ref 65–140)
HCT VFR BLD AUTO: 27.7 % (ref 34.8–46.1)
HGB BLD-MCNC: 8.4 G/DL (ref 11.5–15.4)
MCH RBC QN AUTO: 27.1 PG (ref 26.8–34.3)
MCHC RBC AUTO-ENTMCNC: 30.3 G/DL (ref 31.4–37.4)
MCV RBC AUTO: 89 FL (ref 82–98)
PLATELET # BLD AUTO: 257 THOUSANDS/UL (ref 149–390)
PMV BLD AUTO: 9.4 FL (ref 8.9–12.7)
POTASSIUM SERPL-SCNC: 4 MMOL/L (ref 3.5–5.3)
RBC # BLD AUTO: 3.1 MILLION/UL (ref 3.81–5.12)
SODIUM SERPL-SCNC: 143 MMOL/L (ref 136–145)
WBC # BLD AUTO: 8.11 THOUSAND/UL (ref 4.31–10.16)

## 2021-04-29 PROCEDURE — 85027 COMPLETE CBC AUTOMATED: CPT | Performed by: PHYSICIAN ASSISTANT

## 2021-04-29 PROCEDURE — C9113 INJ PANTOPRAZOLE SODIUM, VIA: HCPCS | Performed by: INTERNAL MEDICINE

## 2021-04-29 PROCEDURE — 94640 AIRWAY INHALATION TREATMENT: CPT

## 2021-04-29 PROCEDURE — 99239 HOSP IP/OBS DSCHRG MGMT >30: CPT | Performed by: PHYSICIAN ASSISTANT

## 2021-04-29 PROCEDURE — 80048 BASIC METABOLIC PNL TOTAL CA: CPT | Performed by: PHYSICIAN ASSISTANT

## 2021-04-29 PROCEDURE — 99232 SBSQ HOSP IP/OBS MODERATE 35: CPT | Performed by: INTERNAL MEDICINE

## 2021-04-29 RX ORDER — PANTOPRAZOLE SODIUM 40 MG/1
40 TABLET, DELAYED RELEASE ORAL DAILY
Qty: 30 TABLET | Refills: 0 | Status: SHIPPED | OUTPATIENT
Start: 2021-04-29 | End: 2021-09-01

## 2021-04-29 RX ORDER — LEVOFLOXACIN 750 MG/1
750 TABLET ORAL EVERY 24 HOURS
Qty: 1 TABLET | Refills: 0 | Status: SHIPPED | OUTPATIENT
Start: 2021-04-29 | End: 2021-04-30

## 2021-04-29 RX ADMIN — FUROSEMIDE 20 MG: 20 TABLET ORAL at 08:31

## 2021-04-29 RX ADMIN — ISODIUM CHLORIDE 3 ML: 0.03 SOLUTION RESPIRATORY (INHALATION) at 07:50

## 2021-04-29 RX ADMIN — LEVOTHYROXINE SODIUM 125 MCG: 125 TABLET ORAL at 08:30

## 2021-04-29 RX ADMIN — LEVOFLOXACIN 750 MG: 5 INJECTION, SOLUTION INTRAVENOUS at 00:19

## 2021-04-29 RX ADMIN — DILTIAZEM HYDROCHLORIDE 240 MG: 240 CAPSULE, COATED, EXTENDED RELEASE ORAL at 08:31

## 2021-04-29 RX ADMIN — ASPIRIN 81 MG: 81 TABLET ORAL at 08:30

## 2021-04-29 RX ADMIN — PRAVASTATIN SODIUM 40 MG: 40 TABLET ORAL at 08:30

## 2021-04-29 RX ADMIN — LEVALBUTEROL HYDROCHLORIDE 1.25 MG: 1.25 SOLUTION, CONCENTRATE RESPIRATORY (INHALATION) at 13:41

## 2021-04-29 RX ADMIN — LEVALBUTEROL HYDROCHLORIDE 1.25 MG: 1.25 SOLUTION, CONCENTRATE RESPIRATORY (INHALATION) at 07:50

## 2021-04-29 RX ADMIN — PANTOPRAZOLE SODIUM 40 MG: 40 INJECTION, POWDER, FOR SOLUTION INTRAVENOUS at 08:30

## 2021-04-29 RX ADMIN — ISODIUM CHLORIDE 3 ML: 0.03 SOLUTION RESPIRATORY (INHALATION) at 13:41

## 2021-04-29 NOTE — ASSESSMENT & PLAN NOTE
· CT chest commented on a moderate emphysema otherwise unremarkable lungs  · Troponins negative, BNP WNL  · COVID-19 negative  · Possibly 2/2 symptomatic anemia, breathing reportedly improved s/p blood transfusion   · Stable on room air at this point

## 2021-04-29 NOTE — DISCHARGE SUMMARY
[address] [facility] [place] 1940, 78 y o  female MRN: [id number]  Unit/Bed#: E5 -01 Encounter: [id number]  Primary Care Provider: Chrissy Fuller DO   Date and time admitted to hospital: 4/25/2021  2:24 PM    * Melena  Assessment & Plan  · Patient reported having melanotic stools for the past 4 days prior to admission  · On aspirin and Plavix at home with history of CVA 2019  · Hemoglobin 7 9 on admission (down from 10-12 late last month)  · Hemoccult-positive in the ED  · Received 1 unit PRBC 4/26/21  · Started on IV PPI b i d  · Seen by Gastroenterology  · Underwent EGD 4/27/21  · Underwent colonoscopy 4/28/21-with large single ulcer in ileocecal valve, biopsies were performed, mild diverticuli in the sigmoid colon, 6 mm sessile polyp in rectum removed by cold snare, internal hemorrhoids  · Recheck hemoglobin this morning holding steady in the 8s  · Cleared to resume Plavix by GI  · Started on Protonix 40 mg daily  · Follow-up with GI in the office to discuss biopsies-GI will set up-daughter requesting follow-up goes through her    Acute respiratory failure with hypoxia ([address] )  Assessment & Plan  · CT chest commented on a moderate emphysema otherwise unremarkable lungs  · Troponins negative, BNP WNL  · COVID-19 negative  · Possibly 2/2 symptomatic anemia, breathing reportedly improved s/p blood transfusion   · Stable on room air at this point    UTI (urinary tract infection)  Assessment & Plan  · Patient has chronically obstructed left renal pelvis with chronic indwelling left ureteral stent requiring exchange every 3-4 months (last exchange was 02/16/21)  · CT abdomen pelvis reveals moderate emphysema, left ureteral stent in place with mild left hydronephrosis and urothelial thickening  · Urine culture growing >100,000 Klebsiella  · Receiving IV Levaquin-x4 days      · Prescribed an additional 1 day to complete a 5 day course    Anemia  Assessment & Plan  · Present on admission as evidenced by hemoglobin of 7 9, down from 10-12 late last month  · In the setting of melena as above  · S/p 1 U PRBCs on 4/26/21  · Underwent workup here  · EGD performed here- small hiatal hernia, possible short segment of Barretts   · Colonoscopy performed here with ulcer which was likely the source of bleeding  · Hemoglobin remains stable  · Cleared to resume Plavix and aspirin by GI  · Follow-up with PCP in 1 week for repeat CBC    Chest pain  Assessment & Plan  · Noted exertional chest pain with dyspnea on admission, ? due to anemia  · Troponin negative    Acquired hypothyroidism  Assessment & Plan  · Continue levothyroxine    Dyslipidemia  Assessment & Plan  · Continue statin     COPD (chronic obstructive pulmonary disease) ([facility] [address] )  Assessment & Plan  · Doubt acute exacerbation at this time  Breathing reportedly improved s/p blood transfusion  Not on steroids     History of CVA (cerebrovascular accident)  Assessment & Plan  · History of CVA 9/2019 with right-sided facial droop and dysphagia  · History of carotid stenosis status post endarterectomy 2/18/20  · Maintained on aspirin, Plavix, statin as outpatient   · Plavix initially held in the setting of GI bleed  · Okay to resume Plavix at this time    · Continue aspirin    Benign essential hypertension  Assessment & Plan  · Continue with diltiazem 240 mg daily      Consultations During Hospital Stay:  · Gastroenterology    Procedures Performed:   4/27/21: EGD:  FINDINGS:  · Small sliding hiatal hernia (type I hiatal hernia)  · The duodenum appeared normal   · Walker-colored mucosa in the lower third of the esophagus; performed 4 cold forceps biopsies  SPECIMENS:  ID Type Source Tests Collected by Time Destination   1 : distal bx, r/o emiliets Tissue Esophagus TISSUE EXAM Gaetano Silveira MD 4/27/2021  3:32 PM     IMPRESSION:  Small sliding hiatal hernia  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  Normal duodenum   RECOMMENDATION: Await pathology results   Since the source of bleeding was not found, will prep for colonoscopy tomorrow        · 4/28/21: Colonoscopy:  FINDINGS:  · Single large ulcer in the ileocecal valve; performed 8 cold forceps biopsies  · Mild diverticula in the sigmoid colon  · 6 mm sessile polyp in the rectum; removed by cold snare  · Internal hemorrhoids  EVENTS:      Procedure Events   Event Event Time   ENDO CECUM REACHED 4/28/2021  2:44 PM   ENDO SCOPE OUT TIME 4/28/2021  3:04 PM   SPECIMENS:  ID Type Source Tests Collected by Time Destination   1 : ulcer bx Tissue Ileocecal valve TISSUE EXAM Jelly Ferraro MD 4/28/2021  2:47 PM     2 : polyp Tissue Rectum TISSUE EXAM Jelly Ferraro MD 4/28/2021  3:05 PM     IMPRESSION:  Large ulcer on ileocecal valve likely the cause of bleeding  Rectal polyp removed  Mild sigmoid diverticulosis  Internal hemorrhoids   RECOMMENDATION:  Repeat colonoscopy is not recommended due to age (age = 77 or greater)  Return to floor and resume diet  If no bleeding overnight, can restart anticoagulation in the morning       Significant Findings / Test Results:   · Symptomatic anemia likely secondary to ileocecal valve ulcer status post EGD and colonoscopy  · Required 1 unit of PRBCs  · Hemoglobin now stable in 8s    Incidental Findings:   · Acute UTI    Test Results Pending at Discharge (will require follow up): · None     Outpatient follow-up Requested:  · Gastroenterology-GI will schedule appointment  · PCP-1 week for repeat CBC    Complications:  none    Hospital Course: Sabina Solares is a 78 y o  female patient who originally presented to the hospital on 4/25/2021 due to 4 days of melanotic stool  Patient underwent GI workup  She required 1 unit of PRBCs for symptomatic anemia  Had EGD performed without evidence of acute bleeding  Therefore underwent further workup with colonoscopy and a large ileocecal ulcer was found   Multiple biopsies were taken during EGD and colonoscopy  Patient will need follow-up with the office with gastroenterology in regards to results  She will also require CBC in 1 weeks time to ensure hemoglobin stabilization  She was cleared to resume her Plavix and aspirin by GI  Patient was also treated for urinary tract infection while she was here  She received 4 days of IV Levaquin and will receive an additional 1 day of oral Levaquin to complete a 5 day course  In conclusion, patient is stable for discharge at this time with recommended follow-up as listed above  Condition at Discharge: stable     Discharge Day Visit / Exam:     Subjective: Patient resting in chair at bedside  She feels well  No evidence of any dark stool since colonoscopy  She is saturating well on room air and denies any difficulty with breathing  She is eager to go home  Reinforced importance of follow-up with GI and PCP for repeat CBC in 1 week  Scripts sent to pharmacy requested by patient  Vitals: Blood Pressure: 115/59 (04/29/21 0829)  Pulse: 93 (04/29/21 0829)  Temperature: 98 3 °F (36 8 °C) (04/29/21 0754)  Temp Source: Oral (04/29/21 0754)  Respirations: 18 (04/29/21 0754)  Weight - Scale: 91 kg (200 lb 9 9 oz) (04/25/21 1430)  SpO2: 95 % (04/29/21 1341)     Exam:   Physical Exam  Vitals signs and nursing note reviewed  Constitutional:       General: She is not in acute distress  Appearance: Normal appearance  She is normal weight  She is not ill-appearing, toxic-appearing or diaphoretic  HENT:      Head: Normocephalic and atraumatic  Eyes:      General: No scleral icterus  Cardiovascular:      Rate and Rhythm: Normal rate and regular rhythm  Pulmonary:      Effort: Pulmonary effort is normal  No respiratory distress  Breath sounds: Normal breath sounds  No stridor  No wheezing or rhonchi  Abdominal:      General: Bowel sounds are normal  There is no distension  Palpations: Abdomen is soft  There is no mass  Tenderness:  There is no abdominal tenderness  Hernia: No hernia is present  Skin:     General: Skin is warm and dry  Neurological:      Mental Status: She is oriented to person, place, and time  Mental status is at baseline  Psychiatric:         Mood and Affect: Mood normal          Behavior: Behavior normal        Discussion with Family: Daughter via telephone-updated-in agreement with plan    Discharge instructions/Information to patient and family:   See after visit summary for information provided to patient and family  Provisions for Follow-Up Care:  See after visit summary for information related to follow-up care and any pertinent home health orders  Planned Readmission: no     Discharge Statement:  I spent 45 minutes discharging the patient  This time was spent on the day of discharge  I had direct contact with the patient on the day of discharge  Greater than 50% of the total time was spent examining patient, answering all patient questions, arranging and discussing plan of care with patient as well as directly providing post-discharge instructions  Additional time then spent on discharge activities  Discharge Medications:  See after visit summary for reconciled discharge medications provided to patient and family        ** Please Note: This note has been constructed using a voice recognition system **

## 2021-04-29 NOTE — PROGRESS NOTES
Progress Note - [place] 78 y o  female MRN: [id number]    Unit/Bed#: E5 -01 Encounter: [id number]    Subjective:     Patient seen and examined at bedside  She is doing well today  She is eager to go home  She denies any bowel movements or melena since Monday  She denies abdominal pain  She is eating well  Objective:     Vitals: Blood pressure 115/59, pulse 93, temperature 98 3 °F (36 8 °C), temperature source Oral, resp  rate 18, weight 91 kg (200 lb 9 9 oz), SpO2 93 %, not currently breastfeeding  ,Body mass index is 34 44 kg/m²  Intake/Output Summary (Last 24 hours) at 4/29/2021 1157  Last data filed at 4/29/2021 0801  Gross per 24 hour   Intake 1420 ml   Output --   Net 1420 ml       Physical Exam:     General Appearance: Alert, pleasant elderly female, appears stated age and cooperative  Lungs: Clear to auscultation bilaterally  Heart: Regular rate and rhythm  Abdomen: Soft, non-tender, non-distended; bowel sounds normal  Extremities: No cyanosis, edema    Invasive Devices     Peripheral Intravenous Line            Peripheral IV 04/25/21 Left Antecubital 3 days          Drain            Ureteral Drain/Stent Left ureter 8 Fr  456 days    Ureteral Drain/Stent Left ureter 7 Fr  71 days                Lab Results:  Results from last 7 days   Lab Units 04/29/21  0535  04/26/21  0506   WBC Thousand/uL 8 11   < > 7 22   HEMOGLOBIN g/dL 8 4*   < > 7 3*   HEMATOCRIT % 27 7*   < > 24 8*   PLATELETS Thousands/uL 257   < > 213   NEUTROS PCT %  --   --  70   LYMPHS PCT %  --   --  17   MONOS PCT %  --   --  7   EOS PCT %  --   --  6    < > = values in this interval not displayed       Results from last 7 days   Lab Units 04/29/21  0535  04/26/21  0506   POTASSIUM mmol/L 4 0   < > 4 6   CHLORIDE mmol/L 107   < > 106   CO2 mmol/L 29   < > 32   BUN mg/dL 14   < > 19   CREATININE mg/dL 0 98   < > 0 93   CALCIUM mg/dL 8 6   < > 8 3   ALK PHOS U/L  --   --  66   ALT U/L  --   --  24   AST U/L  --   -- 17    < > = values in this interval not displayed  Imaging Studies: I have personally reviewed pertinent imaging studies  Xr Chest 1 View Portable    Result Date: 4/26/2021  Impression: Limited but unremarkable chest radiograph  Portions of lungs are obscured by generator packs for stimulators  Workstation performed: [id number]     Ct Chest Abdomen Pelvis W Contrast    Result Date: 4/25/2021  Impression: Moderate emphysema  Lungs are otherwise unremarkable  There is a left ureteral stent in place, with mild left hydronephrosis and urothelial thickening and hyperenhancement in the renal calyces and pelvises (series 601 images 113- 138 )  These findings were present on the 3/27/2021 CT, but have improved  Again seen are multiple small left renal calyceal stones, largest in the lower pole measuring 8 mm  Workstation performed: [id number]       Assessment and Plan:    Discussed plan with Remberto Lisa PA-C with [facility]     42-year-old female with COPD, CVA on aspirin and Plavix, hypertension, hyperlipidemia admitted with acute respiratory failure and melena     1) Melena and acute blood loss anemia secondary to IC valve ulcer: She underwent EGD and colonoscopy which showed large ulcer on IC valve likely the cause of bleeding - ischemic ulcer? Her hemoglobin is relatively stable today (8 4) following transfusion 3 days ago   She has no further evidence of bleeding      -okay to resume Plavix  -continue aspirin  -monitor for recurrent bleeding  -recommend CBC in 1 week  -follow-up with GI as needed

## 2021-04-29 NOTE — ASSESSMENT & PLAN NOTE
· Patient reported having melanotic stools for the past 4 days prior to admission  · On aspirin and Plavix at home with history of CVA 2019  · Hemoglobin 7 9 on admission (down from 10-12 late last month)  · Hemoccult-positive in the ED  · Received 1 unit PRBC 4/26/21  · Started on IV PPI b i d  · Seen by Gastroenterology  · Underwent EGD 4/27/21  · Underwent colonoscopy 4/28/21-with large single ulcer in ileocecal valve, biopsies were performed, mild diverticuli in the sigmoid colon, 6 mm sessile polyp in rectum removed by cold snare, internal hemorrhoids      · Recheck hemoglobin this morning holding steady in the 8s  · Cleared to resume Plavix by GI  · Started on Protonix 40 mg daily  · Follow-up with GI in the office to discuss biopsies-GI will set up-daughter requesting follow-up goes through her

## 2021-04-29 NOTE — ASSESSMENT & PLAN NOTE
· Patient has chronically obstructed left renal pelvis with chronic indwelling left ureteral stent requiring exchange every 3-4 months (last exchange was 02/16/21)  · CT abdomen pelvis reveals moderate emphysema, left ureteral stent in place with mild left hydronephrosis and urothelial thickening  · Urine culture growing >100,000 Klebsiella  · Receiving IV Levaquin-x4 days      · Prescribed an additional 1 day to complete a 5 day course

## 2021-04-29 NOTE — CASE MANAGEMENT
Pt being discharged home  SW spoke with pt to discuss discharge  Pt reports that her grandson will transport her back to her [facility] today  She denies any needs  IMM reviewed with patient  patient agree with discharge determination  IMM placed in scan bin  At this time there are no other CM needs

## 2021-04-29 NOTE — DISCHARGE INSTRUCTIONS
You had an EGD and a colonoscopy performed  You were found to have an abnormal lining your esophagus and are waiting on biopsy results  You were found to have an ulcer which was likely the cause of your bleeding  You will need to have your blood counts recheck in 1 weeks time to ensure your counts are stable  You will need to follow-up with gastroenterologist in the office to discuss biopsy results and further follow-up  Your started on a new medication called Protonix to help with acid reflux

## 2021-04-29 NOTE — ASSESSMENT & PLAN NOTE
· History of CVA 9/2019 with right-sided facial droop and dysphagia  · History of carotid stenosis status post endarterectomy 2/18/20  · Maintained on aspirin, Plavix, statin as outpatient   · Plavix initially held in the setting of GI bleed  · Okay to resume Plavix at this time    · Continue aspirin

## 2021-04-29 NOTE — ASSESSMENT & PLAN NOTE
· Present on admission as evidenced by hemoglobin of 7 9, down from 10-12 late last month  · In the setting of melena as above  · S/p 1 U PRBCs on 4/26/21  · Underwent workup here  · EGD performed here- small hiatal hernia, possible short segment of Barretts   · Colonoscopy performed here with ulcer which was likely the source of bleeding  · Hemoglobin remains stable  · Cleared to resume Plavix and aspirin by GI  · Follow-up with PCP in 1 week for repeat CBC

## 2021-04-29 NOTE — PLAN OF CARE
Problem: Potential for Falls  Goal: Patient will remain free of falls  Description: INTERVENTIONS:  - Assess patient frequently for physical needs  -  Identify cognitive and physical deficits and behaviors that affect risk of falls    -  [place] fall precautions as indicated by assessment   - Educate patient/family on patient safety including physical limitations  - Instruct patient to call for assistance with activity based on assessment  - Modify environment to reduce risk of injury  - Consider OT/PT consult to assist with strengthening/mobility  Outcome: Progressing     Problem: PAIN - ADULT  Goal: Verbalizes/displays adequate comfort level or baseline comfort level  Description: Interventions:  - Encourage patient to monitor pain and request assistance  - Assess pain using appropriate pain scale  - Administer analgesics based on type and severity of pain and evaluate response  - Implement non-pharmacological measures as appropriate and evaluate response  - Consider cultural and social influences on pain and pain management  - Notify physician/advanced practitioner if interventions unsuccessful or patient reports new pain  Outcome: Progressing     Problem: INFECTION - ADULT  Goal: Absence or prevention of progression during hospitalization  Description: INTERVENTIONS:  - Assess and monitor for signs and symptoms of infection  - Monitor lab/diagnostic results  - Monitor all insertion sites, i e  indwelling lines, tubes, and drains  - Monitor endotracheal if appropriate and nasal secretions for changes in amount and color  - [place] appropriate cooling/warming therapies per order  - Administer medications as ordered  - Instruct and encourage patient and family to use good hand hygiene technique  - Identify and instruct in appropriate isolation precautions for identified infection/condition  Outcome: Progressing     Problem: SAFETY ADULT  Goal: Patient will remain free of falls  Description: INTERVENTIONS:  - Assess patient frequently for physical needs  -  Identify cognitive and physical deficits and behaviors that affect risk of falls    -  [place] fall precautions as indicated by assessment   - Educate patient/family on patient safety including physical limitations  - Instruct patient to call for assistance with activity based on assessment  - Modify environment to reduce risk of injury  - Consider OT/PT consult to assist with strengthening/mobility  Outcome: Progressing  Goal: Maintain or return to baseline ADL function  Description: INTERVENTIONS:  -  Assess patient's ability to carry out ADLs; assess patient's baseline for ADL function and identify physical deficits which impact ability to perform ADLs (bathing, care of mouth/teeth, toileting, grooming, dressing, etc )  - Assess/evaluate cause of self-care deficits   - Assess range of motion  - Assess patient's mobility; develop plan if impaired  - Assess patient's need for assistive devices and provide as appropriate  - Encourage maximum independence but intervene and supervise when necessary  - Involve family in performance of ADLs  - Assess for home care needs following discharge   - Consider OT consult to assist with ADL evaluation and planning for discharge  - Provide patient education as appropriate  Outcome: Progressing  Goal: Maintain or return mobility status to optimal level  Description: INTERVENTIONS:  - Assess patient's baseline mobility status (ambulation, transfers, stairs, etc )    - Identify cognitive and physical deficits and behaviors that affect mobility  - Identify mobility aids required to assist with transfers and/or ambulation (gait belt, sit-to-stand, lift, walker, cane, etc )  - [place] fall precautions as indicated by assessment  - Record patient progress and toleration of activity level on Mobility SBAR; progress patient to next Phase/Stage  - Instruct patient to call for assistance with activity based on assessment  - Consider rehabilitation consult to assist with strengthening/weightbearing, etc   Outcome: Progressing     Problem: DISCHARGE PLANNING  Goal: Discharge to home or other facility with appropriate resources  Description: INTERVENTIONS:  - Identify barriers to discharge w/patient and caregiver  - Arrange for needed discharge resources and transportation as appropriate  - Identify discharge learning needs (meds, wound care, etc )  - Arrange for interpretive services to assist at discharge as needed  - Refer to Case Management Department for coordinating discharge planning if the patient needs post-hospital services based on physician/advanced practitioner order or complex needs related to functional status, cognitive ability, or social support system  Outcome: Progressing     Problem: Knowledge Deficit  Goal: Patient/family/caregiver demonstrates understanding of disease process, treatment plan, medications, and discharge instructions  Description: Complete learning assessment and assess knowledge base    Interventions:  - Provide teaching at level of understanding  - Provide teaching via preferred learning methods  Outcome: Progressing     Problem: CARDIOVASCULAR - ADULT  Goal: Maintains optimal cardiac output and hemodynamic stability  Description: INTERVENTIONS:  - Monitor I/O, vital signs and rhythm  - Monitor for S/S and trends of decreased cardiac output  - Administer and titrate ordered vasoactive medications to optimize hemodynamic stability  - Assess quality of pulses, skin color and temperature  - Assess for signs of decreased coronary artery perfusion  - Instruct patient to report change in severity of symptoms  Outcome: Progressing  Goal: Absence of cardiac dysrhythmias or at baseline rhythm  Description: INTERVENTIONS:  - Continuous cardiac monitoring, vital signs, obtain 12 lead EKG if ordered  - Administer antiarrhythmic and heart rate control medications as ordered  - Monitor electrolytes and administer replacement therapy as ordered  Outcome: Progressing     Problem: RESPIRATORY - ADULT  Goal: Achieves optimal ventilation and oxygenation  Description: INTERVENTIONS:  - Assess for changes in respiratory status  - Assess for changes in mentation and behavior  - Position to facilitate oxygenation and minimize respiratory effort  - Oxygen administered by appropriate delivery if ordered  - Initiate smoking cessation education as indicated  - Encourage broncho-pulmonary hygiene including cough, deep breathe, Incentive Spirometry  - Assess the need for suctioning and aspirate as needed  - Assess and instruct to report SOB or any respiratory difficulty  - Respiratory Therapy support as indicated  Outcome: Progressing     Problem: GASTROINTESTINAL - ADULT  Goal: Minimal or absence of nausea and/or vomiting  Description: INTERVENTIONS:  - Administer IV fluids if ordered to ensure adequate hydration  - Maintain NPO status until nausea and vomiting are resolved  - Nasogastric tube if ordered  - Administer ordered antiemetic medications as needed  - Provide nonpharmacologic comfort measures as appropriate  - Advance diet as tolerated, if ordered  - Consider nutrition services referral to assist patient with adequate nutrition and appropriate food choices  Outcome: Progressing  Goal: Maintains or returns to baseline bowel function  Description: INTERVENTIONS:  - Assess bowel function  - Encourage oral fluids to ensure adequate hydration  - Administer IV fluids if ordered to ensure adequate hydration  - Administer ordered medications as needed  - Encourage mobilization and activity  - Consider nutritional services referral to assist patient with adequate nutrition and appropriate food choices  Outcome: Progressing  Goal: Maintains adequate nutritional intake  Description: INTERVENTIONS:  - Monitor percentage of each meal consumed  - Identify factors contributing to decreased intake, treat as appropriate  - Assist with meals as needed  - Monitor I&O, weight, and lab values if indicated  - Obtain nutrition services referral as needed  Outcome: Progressing     Problem: METABOLIC, FLUID AND ELECTROLYTES - ADULT  Goal: Electrolytes maintained within normal limits  Description: INTERVENTIONS:  - Monitor labs and assess patient for signs and symptoms of electrolyte imbalances  - Administer electrolyte replacement as ordered  - Monitor response to electrolyte replacements, including repeat lab results as appropriate  - Instruct patient on fluid and nutrition as appropriate  Outcome: Progressing  Goal: Fluid balance maintained  Description: INTERVENTIONS:  - Monitor labs   - Monitor I/O and WT  - Instruct patient on fluid and nutrition as appropriate  - Assess for signs & symptoms of volume excess or deficit  Outcome: Progressing     Problem: Nutrition/Hydration-ADULT  Goal: Nutrient/Hydration intake appropriate for improving, restoring or maintaining nutritional needs  Description: Monitor and assess patient's nutrition/hydration status for malnutrition  Collaborate with interdisciplinary team and initiate plan and interventions as ordered  Monitor patient's weight and dietary intake as ordered or per policy  Utilize nutrition screening tool and intervene as necessary  Determine patient's food preferences and provide high-protein, high-caloric foods as appropriate       INTERVENTIONS:  - Monitor oral intake, urinary output, labs, and treatment plans  - Assess nutrition and hydration status and recommend course of action  - Evaluate amount of meals eaten  - Assist patient with eating if necessary   - Allow adequate time for meals  - Recommend/ encourage appropriate diets, oral nutritional supplements, and vitamin/mineral supplements  - Order, calculate, and assess calorie counts as needed  - Recommend, monitor, and adjust tube feedings and TPN/PPN based on assessed needs  - Assess need for intravenous fluids  - Provide specific nutrition/hydration education as appropriate  - Include patient/family/caregiver in decisions related to nutrition  Outcome: Progressing

## 2021-04-30 ENCOUNTER — TRANSITIONAL CARE MANAGEMENT (OUTPATIENT)
Dept: FAMILY MEDICINE CLINIC | Facility: CLINIC | Age: 81
End: 2021-04-30

## 2021-05-01 LAB
BACTERIA BLD CULT: NORMAL
BACTERIA BLD CULT: NORMAL

## 2021-05-03 ENCOUNTER — PREP FOR PROCEDURE (OUTPATIENT)
Dept: GASTROENTEROLOGY | Facility: CLINIC | Age: 81
End: 2021-05-03

## 2021-05-03 DIAGNOSIS — K63.3 ILEOCECAL ULCER: Primary | ICD-10-CM

## 2021-05-03 DIAGNOSIS — K92.1 MELENA: ICD-10-CM

## 2021-05-03 RX ORDER — SODIUM, POTASSIUM,MAG SULFATES 17.5-3.13G
177 SOLUTION, RECONSTITUTED, ORAL ORAL ONCE
Qty: 177 ML | Refills: 0 | Status: SHIPPED | OUTPATIENT
Start: 2021-05-03 | End: 2021-10-05 | Stop reason: ALTCHOICE

## 2021-05-03 NOTE — RESULT ENCOUNTER NOTE
I called her with her results  IC valve ulcer biopsies were inconclusive so repeat colonoscopy in 3-4 months to document healing and take additional biopsies if ulcer is still present

## 2021-05-04 ENCOUNTER — TELEPHONE (OUTPATIENT)
Dept: GASTROENTEROLOGY | Facility: MEDICAL CENTER | Age: 81
End: 2021-05-04

## 2021-05-04 ENCOUNTER — OFFICE VISIT (OUTPATIENT)
Dept: FAMILY MEDICINE CLINIC | Facility: CLINIC | Age: 81
End: 2021-05-04
Payer: MEDICARE

## 2021-05-04 VITALS
OXYGEN SATURATION: 96 % | SYSTOLIC BLOOD PRESSURE: 130 MMHG | BODY MASS INDEX: 33.63 KG/M2 | TEMPERATURE: 98.4 F | HEIGHT: 64 IN | HEART RATE: 83 BPM | WEIGHT: 197 LBS | DIASTOLIC BLOOD PRESSURE: 68 MMHG

## 2021-05-04 DIAGNOSIS — G47.9 SLEEP DISTURBANCE: ICD-10-CM

## 2021-05-04 DIAGNOSIS — N13.30 HYDRONEPHROSIS OF LEFT KIDNEY: ICD-10-CM

## 2021-05-04 DIAGNOSIS — D50.8 OTHER IRON DEFICIENCY ANEMIA: ICD-10-CM

## 2021-05-04 DIAGNOSIS — K92.2 GASTROINTESTINAL HEMORRHAGE, UNSPECIFIED GASTROINTESTINAL HEMORRHAGE TYPE: Primary | ICD-10-CM

## 2021-05-04 DIAGNOSIS — J41.1 MUCOPURULENT CHRONIC BRONCHITIS (HCC): ICD-10-CM

## 2021-05-04 DIAGNOSIS — Z93.6 NEPHROSTOMY STATUS (HCC): ICD-10-CM

## 2021-05-04 DIAGNOSIS — E11.9 DIABETES MELLITUS TYPE 2, DIET-CONTROLLED (HCC): ICD-10-CM

## 2021-05-04 DIAGNOSIS — Z92.89 TRANSFUSION HISTORY: ICD-10-CM

## 2021-05-04 DIAGNOSIS — I25.10 CORONARY ARTERY DISEASE INVOLVING NATIVE CORONARY ARTERY OF NATIVE HEART WITHOUT ANGINA PECTORIS: ICD-10-CM

## 2021-05-04 DIAGNOSIS — I63.9 CEREBROVASCULAR ACCIDENT (CVA), UNSPECIFIED MECHANISM (HCC): ICD-10-CM

## 2021-05-04 DIAGNOSIS — I10 BENIGN ESSENTIAL HYPERTENSION: ICD-10-CM

## 2021-05-04 DIAGNOSIS — K63.3 ILEOCECAL ULCER: ICD-10-CM

## 2021-05-04 DIAGNOSIS — K21.9 GASTROESOPHAGEAL REFLUX DISEASE, UNSPECIFIED WHETHER ESOPHAGITIS PRESENT: ICD-10-CM

## 2021-05-04 DIAGNOSIS — E03.9 ACQUIRED HYPOTHYROIDISM: ICD-10-CM

## 2021-05-04 DIAGNOSIS — R01.1 HEART MURMUR: ICD-10-CM

## 2021-05-04 PROBLEM — J96.01 ACUTE RESPIRATORY FAILURE WITH HYPOXIA (HCC): Status: RESOLVED | Noted: 2021-04-25 | Resolved: 2021-05-04

## 2021-05-04 PROBLEM — N30.00 ACUTE CYSTITIS WITHOUT HEMATURIA: Status: RESOLVED | Noted: 2020-09-25 | Resolved: 2021-05-04

## 2021-05-04 PROBLEM — R07.9 CHEST PAIN: Status: RESOLVED | Noted: 2021-04-25 | Resolved: 2021-05-04

## 2021-05-04 PROBLEM — K92.1 MELENA: Status: RESOLVED | Noted: 2021-04-25 | Resolved: 2021-05-04

## 2021-05-04 PROCEDURE — 99214 OFFICE O/P EST MOD 30 MIN: CPT | Performed by: FAMILY MEDICINE

## 2021-05-04 RX ORDER — FERROUS SULFATE 325(65) MG
325 TABLET ORAL 3 TIMES WEEKLY
Qty: 30 TABLET | Refills: 1 | Status: SHIPPED | OUTPATIENT
Start: 2021-05-04 | End: 2021-07-13 | Stop reason: SINTOL

## 2021-05-04 NOTE — PATIENT INSTRUCTIONS
Reviewed most recent hospital stay April 25th through 29th, had melena, GI bleed  Hemoglobin had been 7 9 at admit, she did require 1 unit packed red blood cell  Had undergone EGD along with colonoscopy, had ileocecal ulcer  Currently on Protonix 40 mg daily  I would like her to start iron every Monday, Wednesday, Friday/3 times weekly  We did discuss this can dark in her stools  She should stop this 1 week before her follow-up colonoscopy, that is planned for Jun 14, 2021  I would like her to redo blood work again in 3 weeks  Discharge hemoglobin 8 4, creatinine 0 98 with potassium 4 0  Redo CT scanning chest, abdomen, pelvis so showed left kidney area to be improved, history hydronephrosis with stent, see previous hospital discharge March 30th  No current urinary complaints or renal colic  Respiratory status is stable, can continue with twice daily nebulizer along with inhaler  Stay on levothyroxine 125 daily, TSH back in January 3 0 46  She will be seeing Neurology May 19th  She will be undergoing echocardiogram again in June  Continue to control cardiovascular risk as can      She will keep appointment July 8th, call us sooner if needed

## 2021-05-04 NOTE — TELEPHONE ENCOUNTER
Spoke to patients daughter Annel Henriquez  Patient is scheduled for 6/14/21 in the [place] office

## 2021-05-04 NOTE — PROGRESS NOTES
FAMILY PRACTICE OFFICE VISIT  Sneha SCHRADER O  [facility]  [address] [facility]  [address] [place], [place], [address]      NAME: Iván Perales  AGE: 78 y o  SEX: female  : 1940   MRN: [id number]    DATE: 2021  TIME: 3:54 PM    Assessment and Plan     Problem List Items Addressed This Visit        Digestive    Esophageal reflux    Ileocecal ulcer       Endocrine    Acquired hypothyroidism    Diabetes mellitus type 2, diet-controlled ([facility] )       Respiratory    COPD (chronic obstructive pulmonary disease) ([facility] )       Cardiovascular and Mediastinum    Benign essential hypertension    Cerebrovascular accident (CVA) ([facility] )    Coronary artery disease involving native coronary artery of native heart without angina pectoris       Genitourinary    Hydronephrosis of left kidney       Other    Nephrostomy tube Left    Anemia     Relevant Medications    ferrous sulfate (Feosol) 325 (65 Fe) mg tablet    Other Relevant Orders    Hemoglobin    Iron, TIBC and Ferritin Panel    Transfusion history    Heart murmur      Other Visit Diagnoses     Gastrointestinal Bleed    -  Primary    Relevant Orders    Hemoglobin    Sleep disturbance              Patient Instructions   Reviewed most recent hospital stay  through , had melena, GI bleed  Hemoglobin had been 7 9 at admit, she did require 1 unit packed red blood cell  Had undergone EGD along with colonoscopy, had ileocecal ulcer  Currently on Protonix 40 mg daily  I would like her to start iron every Monday, Wednesday, Friday/3 times weekly  We did discuss this can dark in her stools  She should stop this 1 week before her follow-up colonoscopy, that is planned for 2021  I would like her to redo blood work again in 3 weeks  Discharge hemoglobin 8 4, creatinine 0 98 with potassium 4 0      Redo CT scanning chest, abdomen, pelvis so showed left kidney area to be improved, history hydronephrosis with stent, see previous hospital discharge March 30th  No current urinary complaints or renal colic  Respiratory status is stable, can continue with twice daily nebulizer along with inhaler  Stay on levothyroxine 125 daily, TSH back in January 3 0 46  She will be seeing Neurology May 19th  She will be undergoing echocardiogram again in June  Continue to control cardiovascular risk as can  She will keep appointment July 8th, call us sooner if needed      Chief Complaint     Chief Complaint   Patient presents with    Transition of Care Management     TCM Call (since 4/3/2021)     Date and time call was made  4/30/2021 11:02 AM    Hospital care reviewed  Records reviewed    Patient was hospitialized at  [facility]        Date of Admission  04/25/21    Date of discharge  04/29/21    Diagnosis  UTI    Disposition  Home    Were the patients medications reviewed and updated  Yes    Current Symptoms  None      TCM Call (since 4/3/2021)     Post hospital issues  None    Should patient be enrolled in anticoag monitoring? No    Scheduled for follow up? Yes    Did you obtain your prescribed medications  Yes    Do you need help managing your prescriptions or medications  No    Is transportation to your appointment needed  No    I have advised the patient to call PCP with any new or worsening symptoms  Karen Lee, [address] [place] [address]  Family    The type of support provided  Emotional; Financial; Physical    Do you have social support  Yes, as much as I need            History of Present Illness   Jonny Keene is a 78y o -year-old female who I had seen April 8th after hospital stay, she had another admission April 25th through 29th, presented with melena/GI bleed, hemoglobin dropped, required 1 unit packed red blood cells  Underwent EGD along with colonoscopy, found to have significant ulceration ileocecal   She is on Protonix 40 mg daily    She relates she has been feeling better since she has home, she has had no further melena or blood in stool, no abdominal pain, no lightheadedness, fevers or chills  She continues on nebulizer twice daily, no increased shortness of breath  She is using other medication as directed  Review of Systems   Review of Systems   Constitutional: Positive for fatigue (Improving)  Negative for appetite change, fever and unexpected weight change  HENT: Negative for sore throat and trouble swallowing  Respiratory: Positive for cough (Mild chronic) and shortness of breath (Back to baseline)  Negative for chest tightness  Cardiovascular: Negative for chest pain, palpitations and leg swelling  Gastrointestinal: Negative for abdominal pain, blood in stool, nausea and vomiting  No acid reflux     She is moving her bowels routinely  See HPI   Genitourinary: Negative for dysuria, flank pain and hematuria  Neurological: Negative for dizziness, syncope, light-headedness and headaches  Psychiatric/Behavioral: Negative for behavioral problems and confusion         Active Problem List     Patient Active Problem List   Diagnosis    Benign essential hypertension    Benign familial tremor    History of CVA (cerebrovascular accident)    COPD (chronic obstructive pulmonary disease) ([facility] )    Esophageal reflux    Gait disturbance    Dyslipidemia    Sleep disorder    Sciatica    Right shoulder pain    Osteoarthritis of hip    OA (osteoarthritis)    Multiple joint pain    Acquired hypothyroidism    Lumbar degenerative disc disease    Back pain    History of pancreatitis    Aortic valve stenosis - ( Mild -Moderate )    S/P deep brain stimulator placement    Microscopic hematuria    Dysarthria related to Nov 2018 CVA,improved    Swallowing dysfunction    Weakness of left leg    Hydronephrosis of left kidney    Pyelonephritis    Cerebrovascular accident (CVA) ([address] )    Nephrostomy tube Left    Coronary artery disease involving native coronary artery of native heart without angina pectoris    Diabetes mellitus type 2, diet-controlled (HCC)    Calculus of kidney    Status post RIGHT carotid endarterectomy    Bilateral carotid artery stenosis    [facility]) [facility]    UTI (urinary tract infection)    Asthma    Anemia     Transfusion history    Ileocecal ulcer    Heart murmur       Past Medical History:  Reviewed    Past Surgical History:  Reviewed    Family History:  Reviewed    Social History:  Reviewed    Objective     Vitals:    05/04/21 1442   BP: 130/68   BP Location: Right arm   Patient Position: Sitting   Cuff Size: Standard   Pulse: 83   Temp: 98 4 °F (36 9 °C)   SpO2: 96%   Weight: 89 4 kg (197 lb)   Height: 5' 4" (1 626 m)     Body mass index is 33 81 kg/m²  BP Readings from Last 3 Encounters:   05/04/21 130/68   04/29/21 115/59   04/08/21 132/70       Wt Readings from Last 3 Encounters:   05/04/21 89 4 kg (197 lb)   04/25/21 91 kg (200 lb 9 9 oz)   04/08/21 89 4 kg (197 lb)       Physical Exam  Constitutional:       Appearance: She is well-developed  Comments: Very pleasant 78year-old who appears to be back at her baseline, speech slightly thickened but intelligible, unchanged  Alert, oriented, no acute distress  She is not orthostatic here today  She has no respiratory distress, she is oxygenating well on room air at 96%   Eyes:      General: No scleral icterus  Cardiovascular:      Rate and Rhythm: Normal rate and regular rhythm  Heart sounds: Murmur (2/6 systolic ejection murmur right 2nd intercostal space, 1/6 left sternal border) present  Comments: No carotid bruit  Pulmonary:      Effort: Pulmonary effort is normal  No respiratory distress  Breath sounds: Normal breath sounds  No wheezing, rhonchi or rales  Abdominal:      General: There is no distension  Palpations: Abdomen is soft  Tenderness:  There is no abdominal tenderness  There is no right CVA tenderness, left CVA tenderness, guarding or rebound  Musculoskeletal:      Right lower leg: Edema present  Left lower leg: Edema present  Comments: Slightly pitting ankle edema, she relates swelling is down in the morning when she arises, appears dependent  No calf tenderness   Lymphadenopathy:      Cervical: No cervical adenopathy  Skin:     Coloration: Skin is not jaundiced  Neurological:      Mental Status: She is oriented to person, place, and time  Psychiatric:         Behavior: Behavior normal          ALLERGIES:  Allergies   Allergen Reactions    Ciprofloxacin Diarrhea    Simvastatin Myalgia    Advair Diskus [Fluticasone-Salmeterol] Palpitations     Ok w flovent    Tetracycline Rash     Reaction Date: [id number];        Current Medications     Current Outpatient Medications   Medication Sig Dispense Refill    acetaminophen (TYLENOL) 500 mg tablet Take 1,000 mg by mouth every 6 (six) hours as needed for mild pain       albuterol (2 5 mg/3 mL) 0 083 % nebulizer solution Take 1 vial (2 5 mg total) by nebulization every 6 (six) hours as needed for shortness of breath 120 vial 5    aspirin (ECOTRIN LOW STRENGTH) 81 mg EC tablet Take 1 tablet (81 mg total) by mouth daily 30 tablet 0    Cholecalciferol (VITAMIN D-3 PO) Take 1,000 Units by mouth daily        clopidogrel (PLAVIX) 75 mg tablet Take 1 tablet (75 mg total) by mouth daily 90 tablet 3    diltiazem (CARDIZEM CD) 240 mg 24 hr capsule TAKE 1 CAPSULE (240 MG TOTAL) BY MOUTH DAILY 90 capsule 3    Fluticasone Propionate, Inhal, (Flovent Diskus) 250 MCG/BLIST AEPB Inhale 1 puff 2 (two) times a day 1 each 5    furosemide (LASIX) 20 mg tablet TAKE 1 TABLET (20 MG TOTAL) BY MOUTH DAILY 90 tablet 1    levothyroxine 125 mcg tablet TAKE 1 TABLET (125 MCG TOTAL) BY MOUTH DAILY 90 tablet 3    Multiple Vitamins-Minerals (PRESERVISION AREDS PO) Take 1 Cap by Mouth daily      multivitamin (THERAGRAN) TABS Take 1 tablet by mouth daily      pantoprazole (PROTONIX) 40 mg tablet Take 1 tablet (40 mg total) by mouth daily 30 tablet 0    rosuvastatin (CRESTOR) 5 mg tablet Uses twice weekly (Patient taking differently: Take by mouth Uses twice weekly) 40 tablet 3    ferrous sulfate (Feosol) 325 (65 Fe) mg tablet Take 1 tablet (325 mg total) by mouth 3 (three) times a week 30 tablet 1    Na Sulfate-K Sulfate-Mg Sulf (Suprep Bowel Prep Kit) 17 5-3 13-1 6 GM/177ML SOLN Take 177 mL by mouth once for 1 dose 177 mL 0     No current facility-administered medications for this visit               Orders Placed This Encounter   Procedures    Hemoglobin    Iron, TIBC and Ferritin Panel         Kim Jesus DO

## 2021-05-04 NOTE — TELEPHONE ENCOUNTER
----- Message from Jak Lui PA-C sent at 4/29/2021  3:55 PM EDT -----  Please schedule follow-up visit in 2-4 weeks  Please call daughter Sienna Bishop to arrange the appointment, her name is listed in the chart

## 2021-05-11 ENCOUNTER — TELEPHONE (OUTPATIENT)
Dept: GASTROENTEROLOGY | Facility: CLINIC | Age: 81
End: 2021-05-11

## 2021-05-18 ENCOUNTER — TELEPHONE (OUTPATIENT)
Dept: GASTROENTEROLOGY | Facility: CLINIC | Age: 81
End: 2021-05-18

## 2021-05-18 NOTE — TELEPHONE ENCOUNTER
----- Message from Roselyn Worley MD sent at 5/3/2021  3:10 PM EDT -----  Regarding: Colonoscopy  Please schedule her for a colonoscopy with me or any of our providers in approx 3-4 months  Her colon biopsies were unremarkable, so I want to repeat colonoscopy with biopsies of her IC valve ulcer if still present  I placed orders 
Colon scheduled on 8/5/21 with Dr Norman at [facility]  I gave pt verbal instructions/mailed   Prep-Miralax/Dulcolax  [employer] message sent to [address] [place] [address] 
LVM to contact the office to schedule colon 
No

## 2021-05-18 NOTE — TELEPHONE ENCOUNTER
Our mutual patient is scheduled for procedure:  EGD/COLON    On: 8/5/2021    With: Dr Andrea Navarro  She is taking the following blood thinner:   Plavix     Can this be stopped _5_____ days prior to the procedure?       Physician Approving clearance: ________________________ Kristie Sanchez MD  Signing Provider: MD ANDREY Vitale/maricel ([id number])  DD: 01/31/2021 23:03:43 TD: 02/01/2021 11:54:18    Copy Sent To:

## 2021-05-19 ENCOUNTER — OFFICE VISIT (OUTPATIENT)
Dept: NEUROLOGY | Facility: CLINIC | Age: 81
End: 2021-05-19
Payer: MEDICARE

## 2021-05-19 VITALS
SYSTOLIC BLOOD PRESSURE: 136 MMHG | HEART RATE: 79 BPM | DIASTOLIC BLOOD PRESSURE: 66 MMHG | BODY MASS INDEX: 33.32 KG/M2 | WEIGHT: 195.2 LBS | HEIGHT: 64 IN

## 2021-05-19 DIAGNOSIS — G25.0 BENIGN FAMILIAL TREMOR: ICD-10-CM

## 2021-05-19 DIAGNOSIS — Z98.890 STATUS POST CAROTID ENDARTERECTOMY: ICD-10-CM

## 2021-05-19 DIAGNOSIS — Z96.89 S/P DEEP BRAIN STIMULATOR PLACEMENT: ICD-10-CM

## 2021-05-19 DIAGNOSIS — Z86.73 HISTORY OF CVA (CEREBROVASCULAR ACCIDENT): Primary | ICD-10-CM

## 2021-05-19 PROBLEM — I63.9 CEREBROVASCULAR ACCIDENT (CVA) (HCC): Status: RESOLVED | Noted: 2019-11-22 | Resolved: 2021-05-19

## 2021-05-19 PROCEDURE — 99214 OFFICE O/P EST MOD 30 MIN: CPT | Performed by: PHYSICIAN ASSISTANT

## 2021-05-19 NOTE — PATIENT INSTRUCTIONS
STOP aspirin at this time  You will continue Plavix 75mg daily and Crestor for secondary stroke prevention  Continue to work with your primary care doc for management of your blood pressure, cholesterol, and blood sugar  Follow up with vascular surgery for monitoring of the carotid arteries as scheduled  Heart healthy diet and routine exercise as tolerated  Follow up in 6 months    If you experience any facial droop, weakness on one side of the body, speech or swallowing difficulty, painless loss of vision in one eye, double vision, vertigo that does not resolve quickly/imbalance, go to the ER/call 911

## 2021-05-19 NOTE — PROGRESS NOTES
Patient ID: Ileana Powers is a 78 y o  female  Assessment:  Patient with history of right sided CVA in November 2018 and then a subsequent right-sided vascular event (TIA vs CVA--no MRI done at the time) in Sept 2019  She had been on ASA at time of the Sept 2019 event and switched to Plavix afterwards  Due to concern for symptomatic R ICA stenosis causing her events, she saw vascular surgery and underwent R CEA with patch angioplasty on 2/18/2020  She was put back on ASA at that time, but at post-op visit was told to only take the ASA in addition to her Plavix x 2 months, then reduce back to Plavix monotherapy  She has never reduced back to single antiplatelet agent and continues on both ASA and Plavix  She has not had any new symptoms to indicate recurrent TIA/CVA  Discussed with patient today that there is no indication for long-term DAPT in her case and she should be on single AP agent  At this time I will have her discontinue ASA and continue Plavix monotherapy, along with statin  She had questions regarding her DBS and when battery needs to be changed  She last saw Dr Murtaza Ivey over 2 years ago  Essential tremor well controlled with DBS  Discussed with patient she will need to see the movement disorder team to further discuss, as this is not something I am familiar with  Message sent to movement MA to facilitate the appt  Plan:  -stop Aspirin 81mg daily at this time  -for ongoing stroke prevention patient will continue Plavix 75 mg daily and Crestor 5 mg daily along with appropriate blood pressure and glycemic control  -will defer management of blood pressure, lipids and blood sugar to her PCP   -continue to follow with vascular surgery for monitoring of her carotid arteries status post R CEA  -heart healthy diet and routine exercise as tolerated    I suggested some physical therapy due to her balance difficulty but she declines at this time   -will have patient reconnect with the [facility] for ongoing management of her DBS  -follow up in 6 months or sooner if needed     Signs and symptoms of stroke were reviewed with the patient and included in her AVS today  Diagnoses and all orders for this visit:    History of CVA (cerebrovascular accident)    Status post RIGHT carotid endarterectomy    Benign familial tremor  -     Ambulatory referral to Neurology    S/P deep brain stimulator placement  -     Ambulatory referral to Neurology           Subjective:    HPI    Patient is a 77 y/o female who is here for CVA follow up  She was last seen in November 2019  She is also known to our practice as she follows with Dr Kimberly Herrera for DBS due to essential tremor, which has been quite stable  Patient had a stroke in November 2018- R posterior lateral frontal lobe infarct  Patient was then evaluated at [facility] on September 22, 2019 with witnessed aphasia, left facial droop, left-sided weakness  After the prior stroke patient did have some residual left-sided weakness and dysarthria for which he was receiving PT and ST  Stroke alert was cancelled and patient actually wanted to leave  She had a CT head and CTA head and neck which were both unremarkable  She did not want to get an MRI brain because she does have severe anxiety and claustrophobia, as well as having DBS  Neurology at [facility] start her on Plavix in addition to her aspirin to treat empirically for Cerebrovascular accident/TIA  She had a carotid ultrasound which showed 50-69% stenosis of ANURADHA  She had echocardiogram which showed EF of 55% without any wall motion abnormalities, or atrial size abnormalities  At [facility] with Dr Janet Keith in October 2019, she was told to cont DAPT x 1 month and then stop ASA and continue on Plavix and statin  She stopped ASA 10/22/19  She saw Dr Tylor Waite from vascular who suggested right CEA pending cardiac clearance    She eventually had R CEA with patch angioplasty on 2/18/2020  At that time, ASA was added back to her Plavix  At her post-op visit with vascular surgery on 3/5/2020, she was told to continue DAPT with ASA and Plavix x 2 months, then reduce back to Plavix monotherapy  Today, patient reports she is ok  She denies any new neurologic symptoms to indicate recurrent TIA/CVA  She has had several medical issues and hospitalizations since she was last seen here 1 5 years ago  In reviewing her chart extensively, it appears her ASA was never stopped 2 months post-op after the CEA  At her next vascular follow up on 12/9/2020 it actually said to continue ASA and statin, although she was on both ASA and Plavix at the time, but ASA should have already been discontinued  Very recently, she was admitted to the hospital 4/25/21-4/29/21 due to melena, UTI, anemia  After clearance by GI, she resumed both ASA and Plavix  The following portions of the patient's history were reviewed and updated as appropriate: current medications, past family history, past medical history, past social history, past surgical history and problem list          Objective:    Blood pressure 136/66, pulse 79, height 5' 4" (1 626 m), weight 88 5 kg (195 lb 3 2 oz)    Physical Exam  Constitutional:       Appearance: Normal appearance  HENT:      Head: Normocephalic and atraumatic  Eyes:      Extraocular Movements: EOM normal       Pupils: Pupils are equal, round, and reactive to light  Skin:     General: Skin is warm and dry  Neurological:      Mental Status: She is alert  Coordination: Coordination is intact  Deep Tendon Reflexes: Strength normal and reflexes are normal and symmetric  Psychiatric:         Mood and Affect: Mood normal          Speech: Speech normal          Behavior: Behavior normal          Neurological Exam  Mental Status  Alert  Oriented to person, place, time and situation  Speech is normal  Language is fluent with no aphasia   Attention and concentration are normal     Cranial Nerves  CN II: Visual fields full to confrontation  CN III, IV, VI: Extraocular movements intact bilaterally  Pupils equal round and reactive to light bilaterally  CN V: Facial sensation is normal   CN VII: Full and symmetric facial movement  CN VIII: Hearing is normal   CN IX, X: Palate elevates symmetrically  CN XI: Shoulder shrug strength is normal   CN XII: Tongue midline without atrophy or fasciculations  Motor   Normal muscle tone  Strength is 5/5 throughout all four extremities  Sensory  Light touch is normal in upper and lower extremities  Reflexes  Deep tendon reflexes are 2+ and symmetric in all four extremities with downgoing toes bilaterally  Coordination  Finger-to-nose, rapid alternating movements and heel-to-shin normal bilaterally without dysmetria  Gait  Unsteady gait, no assistive device  ROS:    Review of Systems   Constitutional: Negative  Negative for appetite change and fever  HENT: Negative  Negative for hearing loss, tinnitus, trouble swallowing and voice change  Eyes: Negative  Negative for photophobia and pain  Respiratory: Negative  Negative for shortness of breath  Cardiovascular: Negative  Negative for palpitations  Gastrointestinal: Negative  Negative for nausea and vomiting  Endocrine: Negative  Negative for cold intolerance  Genitourinary: Negative  Negative for dysuria, frequency and urgency  Musculoskeletal: Positive for gait problem (balance issues )  Negative for myalgias and neck pain  Skin: Negative  Negative for rash  Neurological: Negative for dizziness, tremors, seizures, syncope, facial asymmetry, speech difficulty, weakness, light-headedness, numbness and headaches  Hematological: Negative  Does not bruise/bleed easily  Psychiatric/Behavioral: Negative  Negative for confusion, hallucinations and sleep disturbance  All other systems reviewed and are negative      I personally reviewed and updated the ROS as appropriate

## 2021-05-20 ENCOUNTER — TELEPHONE (OUTPATIENT)
Dept: FAMILY MEDICINE CLINIC | Facility: CLINIC | Age: 81
End: 2021-05-20

## 2021-05-20 DIAGNOSIS — N39.0 URINARY TRACT INFECTION WITHOUT HEMATURIA, SITE UNSPECIFIED: ICD-10-CM

## 2021-05-20 RX ORDER — SULFAMETHOXAZOLE AND TRIMETHOPRIM 800; 160 MG/1; MG/1
1 TABLET ORAL EVERY 12 HOURS SCHEDULED
Qty: 14 TABLET | Refills: 0 | Status: SHIPPED | OUTPATIENT
Start: 2021-05-20 | End: 2021-05-27

## 2021-05-20 NOTE — TELEPHONE ENCOUNTER
I called patient and she is understanding to contact Urology first and then reach out to us if she can not reach them  She will be going to  her mediation and she says thank you!

## 2021-05-20 NOTE — TELEPHONE ENCOUNTER
Patient called in to the office and stated to me that she is having symptoms of UTI burning and pressure  Patient stated she has no way to come in to be evaluated  Patient is requesting medication to be sent to [facility] care  I did advise the patient that you may not be able to just send in a rx and she may have to come in for evaluation  Thank you!

## 2021-05-20 NOTE — TELEPHONE ENCOUNTER
Please call her back, I understand it is difficult to get into the office  She does have chronic stent placement, she does see Urology, Dr Gabino Gamboa  Her last urine culture April 25th showed Klebsiella, that was sensitive to Bactrim, I sent in a prescription for Bactrim to use twice daily for 1 week  ( [facility] in [place])     If her symptoms are not improving, if they worsen she absolutely needs to be seen ASAP    She should continue to see Urology, in the future I would like her to touch base with Urology when she develops urinary symptoms but we are always available if she cannot get ahold of them

## 2021-06-02 ENCOUNTER — TELEPHONE (OUTPATIENT)
Dept: NEUROLOGY | Facility: CLINIC | Age: 81
End: 2021-06-02

## 2021-06-02 NOTE — TELEPHONE ENCOUNTER
Pt left an angry VM saying she had to wait over an hour      Wants to sched a DBS appt w Dr Eusebia Hughes to [id number] to c/b ASAP

## 2021-06-02 NOTE — TELEPHONE ENCOUNTER
Patient calling asking for an appt  She was screaming and belligerent  She requested I schedule a DBS appt with Dr David Herrera for battery change  Scheduled 7/14  She has no other questions or concerns

## 2021-06-04 ENCOUNTER — OFFICE VISIT (OUTPATIENT)
Dept: FAMILY MEDICINE CLINIC | Facility: CLINIC | Age: 81
End: 2021-06-04
Payer: MEDICARE

## 2021-06-04 VITALS
SYSTOLIC BLOOD PRESSURE: 156 MMHG | HEART RATE: 79 BPM | HEIGHT: 64 IN | OXYGEN SATURATION: 96 % | BODY MASS INDEX: 33.8 KG/M2 | DIASTOLIC BLOOD PRESSURE: 64 MMHG | WEIGHT: 198 LBS | TEMPERATURE: 98.2 F

## 2021-06-04 DIAGNOSIS — M79.2 NEURALGIA: Primary | ICD-10-CM

## 2021-06-04 DIAGNOSIS — Z96.89 S/P DEEP BRAIN STIMULATOR PLACEMENT: ICD-10-CM

## 2021-06-04 DIAGNOSIS — M54.2 NECK PAIN, ACUTE: ICD-10-CM

## 2021-06-04 PROCEDURE — 99214 OFFICE O/P EST MOD 30 MIN: CPT | Performed by: FAMILY MEDICINE

## 2021-06-04 RX ORDER — GABAPENTIN 300 MG/1
300 CAPSULE ORAL
Qty: 30 CAPSULE | Refills: 0 | Status: SHIPPED | OUTPATIENT
Start: 2021-06-04 | End: 2021-06-07

## 2021-06-04 RX ORDER — METHOCARBAMOL 500 MG/1
500 TABLET, FILM COATED ORAL 3 TIMES DAILY PRN
Qty: 30 TABLET | Refills: 0 | Status: SHIPPED | OUTPATIENT
Start: 2021-06-04 | End: 2021-07-13 | Stop reason: ALTCHOICE

## 2021-06-04 NOTE — PATIENT INSTRUCTIONS
She has significant left suboccipital pain/neuralgia, no radiation into arms, good range of motion neck  Neurologically at baseline  No trauma, hold off on imaging at present  We did discuss with degree of pain being seen at the emergency room, she declines, wishes to try medication  She does relate she will go to the hospital if she notes any worsening  She has no rash, watch for shingles rash  We need to avoid NSAIDs due to GI bleeding she had in April  She can use Tylenol, can use Methocarbamol 3 times daily as needed, can use Gabapentin 300 mg at bedtime  Can use over-the-counter topical patch such as Aspercreme  If she changes her mind she can present to the emergency room  Blood pressure today 441/73, systolic elevated due to pain, continue other medication  Respiratory status at baseline  Also, she does have upcoming June 14th colonoscopy      She did see Neurology May 19th

## 2021-06-04 NOTE — PROGRESS NOTES
FAMILY PRACTICE OFFICE VISIT  Edward SCH[facility]ER O  [facility]  [address] [facility]  [address] [place], [place], [address]      NAME: Daylin Perales  AGE: 78 y o  SEX: female  : 1940   MRN: [id number]    DATE: 2021  TIME: 12:00 PM    Assessment and Plan     Problem List Items Addressed This Visit        Other    S/P deep brain stimulator placement      Other Visit Diagnoses     Neuralgia    -  Primary    Relevant Medications    gabapentin (NEURONTIN) 300 mg capsule    Neck pain, acute left-sided suboccipital        Relevant Medications    methocarbamol (ROBAXIN) 500 mg tablet    gabapentin (NEURONTIN) 300 mg capsule          Patient Instructions   She has significant left suboccipital pain/neuralgia, no radiation into arms, good range of motion neck  Neurologically at baseline  No trauma, hold off on imaging at present  We did discuss with degree of pain being seen at the emergency room, she declines, wishes to try medication  She does relate she will go to the hospital if she notes any worsening  She has no rash, watch for shingles rash  We need to avoid NSAIDs due to GI bleeding she had in April  She can use Tylenol, can use Methocarbamol 3 times daily as needed, can use Gabapentin 300 mg at bedtime  Can use over-the-counter topical patch such as Aspercreme  If she changes her mind she can present to the emergency room  Blood pressure today 186/15, systolic elevated due to pain, continue other medication  Respiratory status at baseline  Also, she does have upcoming  colonoscopy      She did see Neurology May 19th      Chief Complaint     Chief Complaint   Patient presents with    Neck Pain     since 21 worsening , using BioFrize, tylenol EX nothing is helping, can not sleep at night time becouse of pain        History of Present Illness   Rain Guadarrama is a 78y o -year-old female who had onset left suboccipital pain/left posterior neck pain 4 days ago, onset without trauma  Pain worsened today  Has no headache, no neurological changes, no radiation into arm  No vision changes  She did try Tylenol, Aspercreme patch, Biofreeze  See previous history, is using medications as directed, does feel pressure on area helps  Did see Neurology in May, has deep brain stimulator    Sees Urology as before, no new urinary complaints, fever or chills  No flank pain      Review of Systems   Review of Systems   Constitutional: Negative for appetite change, fatigue, fever and unexpected weight change  HENT: Negative for sore throat and trouble swallowing  Respiratory: Negative for cough, chest tightness and shortness of breath  Cardiovascular: Negative for chest pain, palpitations and leg swelling  Gastrointestinal: Negative for abdominal pain, blood in stool, nausea and vomiting  No acid reflux     No change in bowel   Genitourinary: Negative for dysuria and hematuria  Musculoskeletal: Positive for neck pain  Neurological: Positive for headaches  Negative for dizziness, syncope and light-headedness  Psychiatric/Behavioral: Negative for behavioral problems and confusion         Active Problem List     Patient Active Problem List   Diagnosis    Benign essential hypertension    Benign familial tremor    History of CVA (cerebrovascular accident)    COPD (chronic obstructive pulmonary disease) ([facility] )    Esophageal reflux    Gait disturbance    Dyslipidemia    Sleep disorder    Sciatica    Right shoulder pain    Osteoarthritis of hip    OA (osteoarthritis)    Multiple joint pain    Acquired hypothyroidism    Lumbar degenerative disc disease    Back pain    History of pancreatitis    Aortic valve stenosis - ( Mild -Moderate )    S/P deep brain stimulator placement    Microscopic hematuria    Dysarthria related to Nov 2018 CVA,improved    Swallowing dysfunction    Weakness of left leg    Hydronephrosis of left kidney    Pyelonephritis    Nephrostomy tube Left    Coronary artery disease involving native coronary artery of native heart without angina pectoris    Diabetes mellitus type 2, diet-controlled (HCC)    Calculus of kidney    Status post RIGHT carotid endarterectomy    Bilateral carotid artery stenosis    PAD [facility]peripheral artery disease) ([facility]    UTI (urinary tract infection)    Asthma    Anemia     Transfusion history    Ileocecal ulcer    Heart murmur       Past Medical History:  Reviewed    Past Surgical History:  Reviewed    Family History:  Reviewed    Social History:  Reviewed    Objective     Vitals:    06/04/21 1116   BP: 156/64   BP Location: Left arm   Patient Position: Sitting   Cuff Size: Standard   Pulse: 79   Temp: 98 2 °F (36 8 °C)   SpO2: 96%   Weight: 89 8 kg (198 lb)   Height: 5' 4" (1 626 m)     Body mass index is 33 99 kg/m²  BP Readings from Last 3 Encounters:   06/04/21 156/64   05/19/21 136/66   05/04/21 130/68       Wt Readings from Last 3 Encounters:   06/04/21 89 8 kg (198 lb)   05/19/21 88 5 kg (195 lb 3 2 oz)   05/04/21 89 4 kg (197 lb)       Physical Exam  Constitutional:       Comments: 78-year-old female who appears to be at her baseline other than pain left posterior neck/suboccipital   She has no rash at site, no redness or warmth, no mass  Pressure on area actually gives her some relief  Neurologically she is at baseline, alert, oriented x3, no weakness in arm/leg  Vision intact  Speech at baseline  She does have good range of motion of neck  TM is clear on left, no oral lesions  Cardiovascular:      Rate and Rhythm: Normal rate and regular rhythm  Heart sounds: Normal heart sounds  Pulmonary:      Effort: Pulmonary effort is normal  No respiratory distress  Breath sounds: Normal breath sounds  Abdominal:      Palpations: Abdomen is soft  Tenderness: There is no abdominal tenderness   There is no right CVA tenderness or left CVA tenderness  Musculoskeletal:      Right lower leg: No edema  Left lower leg: No edema  Skin:     Coloration: Skin is not jaundiced  Neurological:      Mental Status: Mental status is at baseline  ALLERGIES:  Allergies   Allergen Reactions    Ciprofloxacin Diarrhea     She did use Levaquin in April of 2021    Simvastatin Myalgia    Advair Diskus [Fluticasone-Salmeterol] Palpitations     Ok w flovent    Tetracycline Rash     Reaction Date: [id number];        Current Medications     Current Outpatient Medications   Medication Sig Dispense Refill    acetaminophen (TYLENOL) 500 mg tablet Take 1,000 mg by mouth every 6 (six) hours as needed for mild pain       albuterol (2 5 mg/3 mL) 0 083 % nebulizer solution Take 1 vial (2 5 mg total) by nebulization every 6 (six) hours as needed for shortness of breath 120 vial 5    Cholecalciferol (VITAMIN D-3 PO) Take 1,000 Units by mouth daily        clopidogrel (PLAVIX) 75 mg tablet Take 1 tablet (75 mg total) by mouth daily 90 tablet 3    diltiazem (CARDIZEM CD) 240 mg 24 hr capsule TAKE 1 CAPSULE (240 MG TOTAL) BY MOUTH DAILY 90 capsule 3    Fluticasone Propionate, Inhal, (Flovent Diskus) 250 MCG/BLIST AEPB Inhale 1 puff 2 (two) times a day 1 each 5    furosemide (LASIX) 20 mg tablet TAKE 1 TABLET (20 MG TOTAL) BY MOUTH DAILY 90 tablet 1    levothyroxine 125 mcg tablet TAKE 1 TABLET (125 MCG TOTAL) BY MOUTH DAILY 90 tablet 3    Multiple Vitamins-Minerals (PRESERVISION AREDS PO) Take 1 Cap by Mouth daily      multivitamin (THERAGRAN) TABS Take 1 tablet by mouth daily      pantoprazole (PROTONIX) 40 mg tablet Take 1 tablet (40 mg total) by mouth daily (Patient taking differently: Take 40 mg by mouth as needed ) 30 tablet 0    ferrous sulfate (Feosol) 325 (65 Fe) mg tablet Take 1 tablet (325 mg total) by mouth 3 (three) times a week (Patient not taking: Reported on 5/19/2021) 30 tablet 1    gabapentin (NEURONTIN) 300 mg capsule Take 1 capsule (300 mg total) by mouth daily at bedtime 30 capsule 0    methocarbamol (ROBAXIN) 500 mg tablet Take 1 tablet (500 mg total) by mouth 3 (three) times a day as needed for muscle spasms (or neck pain) 30 tablet 0    Na Sulfate-K Sulfate-Mg Sulf (Suprep Bowel Prep Kit) 17 5-3 13-1 6 GM/177ML SOLN Take 177 mL by mouth once for 1 dose 177 mL 0    rosuvastatin (CRESTOR) 5 mg tablet Uses twice weekly (Patient not taking: Reported on 6/4/2021) 40 tablet 3     No current facility-administered medications for this visit  No orders of the defined types were placed in this encounter          Lindsay Wesley DO

## 2021-06-04 NOTE — TELEPHONE ENCOUNTER
Patient was called on 06/02/2021 trying to schedule an appt with Dr Lenore Thompson for DBS Battery check and was scheduled on 07/14/2021 w/ Dr Lenore Thompson  About an hour or so later called back with an angry vm trying to get a sooner appt  Battery checks typically should be 1 hour appt  We scheduled her on 07/14/2021 as a 30 min which should be on that day as discussed by doctor

## 2021-06-07 ENCOUNTER — TELEPHONE (OUTPATIENT)
Dept: FAMILY MEDICINE CLINIC | Facility: CLINIC | Age: 81
End: 2021-06-07

## 2021-06-07 DIAGNOSIS — M54.2 NECK PAIN, ACUTE: ICD-10-CM

## 2021-06-07 DIAGNOSIS — M62.838 NECK MUSCLE SPASM: Primary | ICD-10-CM

## 2021-06-07 DIAGNOSIS — M79.2 NEURALGIA: ICD-10-CM

## 2021-06-07 RX ORDER — PREDNISONE 10 MG/1
TABLET ORAL
Qty: 20 TABLET | Refills: 0 | Status: SHIPPED | OUTPATIENT
Start: 2021-06-07 | End: 2021-07-13 | Stop reason: ALTCHOICE

## 2021-06-07 RX ORDER — GABAPENTIN 300 MG/1
300 CAPSULE ORAL 2 TIMES DAILY
Qty: 30 CAPSULE | Refills: 0
Start: 2021-06-07 | End: 2021-07-13 | Stop reason: ALTCHOICE

## 2021-06-07 NOTE — TELEPHONE ENCOUNTER
Please call her back, she can increase gabapentin 300 mg to twice daily, once in the morning, once at bedtime  Watch for grogginess  I can send in a prescription for an oral steroid, prednisone-this can decrease inflammation      If her pain is still severe it may be best to be evaluated at the emergency room where imaging of the neck can be done    Let me know

## 2021-06-07 NOTE — TELEPHONE ENCOUNTER
Patient called reporting continuation of neck pain and no improvements in symptoms with gabapentin and methocarbamol  Patient confirms that she is taking methocarbamol 3 times daily prn  Requesting another medication  Please advise  Thank you!

## 2021-06-07 NOTE — TELEPHONE ENCOUNTER
Patient informed and verbalized understanding  Patient would like prednisone sent to [facility]

## 2021-06-21 ENCOUNTER — HOSPITAL ENCOUNTER (OUTPATIENT)
Dept: NON INVASIVE DIAGNOSTICS | Facility: HOSPITAL | Age: 81
Discharge: HOME | End: 2021-06-21
Payer: MEDICARE

## 2021-06-21 DIAGNOSIS — I35.0 NONRHEUMATIC AORTIC VALVE STENOSIS: ICD-10-CM

## 2021-06-21 PROCEDURE — 93306 TTE W/DOPPLER COMPLETE: CPT

## 2021-07-05 DIAGNOSIS — R60.9 EDEMA, UNSPECIFIED TYPE: ICD-10-CM

## 2021-07-06 RX ORDER — FUROSEMIDE 20 MG/1
20 TABLET ORAL DAILY
Qty: 90 TABLET | Refills: 2 | Status: SHIPPED | OUTPATIENT
Start: 2021-07-06 | End: 2022-03-22 | Stop reason: SDUPTHER

## 2021-07-08 ENCOUNTER — TELEPHONE (OUTPATIENT)
Dept: FAMILY MEDICINE CLINIC | Facility: CLINIC | Age: 81
End: 2021-07-08

## 2021-07-12 ENCOUNTER — TELEPHONE (OUTPATIENT)
Dept: UROLOGY | Facility: MEDICAL CENTER | Age: 81
End: 2021-07-12

## 2021-07-12 NOTE — TELEPHONE ENCOUNTER
LMOM for both pt and her daughter, Jennifer Horvath  Pt is overdue for stent exchange  She needs an H&P appt with Dr Kunal Lowry ASAP  If pt calls back, please offer her 7/15/21 at 10:45am or 3:15pm with Dr Kunal Lowry

## 2021-07-13 ENCOUNTER — OFFICE VISIT (OUTPATIENT)
Dept: FAMILY MEDICINE CLINIC | Facility: CLINIC | Age: 81
End: 2021-07-13
Payer: MEDICARE

## 2021-07-13 VITALS
HEIGHT: 64 IN | BODY MASS INDEX: 34.15 KG/M2 | SYSTOLIC BLOOD PRESSURE: 136 MMHG | DIASTOLIC BLOOD PRESSURE: 70 MMHG | WEIGHT: 200 LBS | HEART RATE: 74 BPM | OXYGEN SATURATION: 95 % | TEMPERATURE: 97.8 F

## 2021-07-13 DIAGNOSIS — J41.1 MUCOPURULENT CHRONIC BRONCHITIS (HCC): ICD-10-CM

## 2021-07-13 DIAGNOSIS — I25.10 CORONARY ARTERY DISEASE INVOLVING NATIVE CORONARY ARTERY OF NATIVE HEART WITHOUT ANGINA PECTORIS: ICD-10-CM

## 2021-07-13 DIAGNOSIS — G25.0 BENIGN FAMILIAL TREMOR: ICD-10-CM

## 2021-07-13 DIAGNOSIS — M16.9 OSTEOARTHRITIS OF HIP, UNSPECIFIED LATERALITY, UNSPECIFIED OSTEOARTHRITIS TYPE: ICD-10-CM

## 2021-07-13 DIAGNOSIS — I35.0 NONRHEUMATIC AORTIC VALVE STENOSIS: ICD-10-CM

## 2021-07-13 DIAGNOSIS — E03.9 ACQUIRED HYPOTHYROIDISM: ICD-10-CM

## 2021-07-13 DIAGNOSIS — M19.90 OSTEOARTHRITIS, UNSPECIFIED OSTEOARTHRITIS TYPE, UNSPECIFIED SITE: ICD-10-CM

## 2021-07-13 DIAGNOSIS — R26.9 GAIT DISTURBANCE: ICD-10-CM

## 2021-07-13 DIAGNOSIS — Z96.89 S/P DEEP BRAIN STIMULATOR PLACEMENT: ICD-10-CM

## 2021-07-13 DIAGNOSIS — M25.561 ACUTE PAIN OF RIGHT KNEE: ICD-10-CM

## 2021-07-13 DIAGNOSIS — I10 BENIGN ESSENTIAL HYPERTENSION: Primary | ICD-10-CM

## 2021-07-13 DIAGNOSIS — E11.9 DIABETES MELLITUS TYPE 2, DIET-CONTROLLED (HCC): ICD-10-CM

## 2021-07-13 DIAGNOSIS — Z86.73 HISTORY OF CVA (CEREBROVASCULAR ACCIDENT): ICD-10-CM

## 2021-07-13 DIAGNOSIS — N13.30 HYDRONEPHROSIS OF LEFT KIDNEY: ICD-10-CM

## 2021-07-13 LAB — SL AMB POCT HEMOGLOBIN AIC: 6.8 (ref ?–6.5)

## 2021-07-13 PROCEDURE — 99214 OFFICE O/P EST MOD 30 MIN: CPT | Performed by: FAMILY MEDICINE

## 2021-07-13 PROCEDURE — 83036 HEMOGLOBIN GLYCOSYLATED A1C: CPT | Performed by: FAMILY MEDICINE

## 2021-07-13 NOTE — PATIENT INSTRUCTIONS
Chart has phone call from Urology to her yesterday, she will call them to set up appointment:                  [facility] for both pt and her daughter, Lauren To  Pt is overdue for stent exchange  She needs an H&P appt with Dr Jaqui FIGUEROA      If pt calls back, please offer her 7/15/21 at 10:45am or 3:15pm with Dr Jaqui Warren  Electronically signed by Astrid Abreu LPN at 7/25/7251  8:15 PM        _________________________________________________________________________________________________________    Overall she is doing well here today, blood pressure is fine at 136/70  Respiratory status is stable  She has had no further GI bleeding  She will continue with medication as is  Cholesterol was excellent back in January, 148 with HDL 46, LDL 82  Is on statin  Recent echocardiogram showed moderate AS, mild MS, ejection fraction 60%  Essentially unchanged  A1c redone here today is 6 8, previously 6 4, that is acceptable  Note she had used prednisone back in June  We will see her again in 4 months with annual wellness check, plan redo CBC, CMP, TSH, POC A1c -  possible other blood work at that time  She did stop iron due to constipation  She will be doing colonoscopy again on August 5th, had colonoscopy late April which showed large ileocecal ulcer  Had CT scanning chest, abdomen, pelvis back in April  At present she remains off aspirin due to prior GI bleed    She will be seeing Neurology tomorrow  Also, she has 1 week of increased right knee pain  She cannot use NSAIDs  She can use Tylenol    We did discuss possible injection, she wishes to hold off, call if pain worsens

## 2021-07-13 NOTE — PROGRESS NOTES
FAMILY PRACTICE OFFICE VISIT  [facility]  [address]  [address] [place], [place], [address]      NAME: Lasha Perales  AGE: 78 y o  SEX: female  : 1940   MRN: [id number]    DATE: 2021  TIME: 5:08 PM    Assessment and Plan     Problem List Items Addressed This Visit        Endocrine    Acquired hypothyroidism    Diabetes mellitus type 2, diet-controlled ([facility] [address] )    Relevant Orders    POCT hemoglobin A1c (Completed)       Respiratory    COPD (chronic obstructive pulmonary disease) ([facility] [address] )       Cardiovascular and Mediastinum    Benign essential hypertension - Primary    Aortic valve stenosis - (Moderate )    Coronary artery disease involving native coronary artery of native heart without angina pectoris       Nervous and Auditory    Benign familial tremor       Musculoskeletal and Integument    Osteoarthritis of hip    OA (osteoarthritis)       Genitourinary    Hydronephrosis with ureteropelvic junction (UPJ) obstruction       Other    History of CVA (cerebrovascular accident)    Gait disturbance    S/P deep brain stimulator placement      Other Visit Diagnoses     Acute pain of right knee              Patient Instructions     Chart has phone call from Urology to her yesterday, she will call them to set up appointment:                  [facility] for both pt and her daughter, Alexy Bhakta  Pt is overdue for stent exchange  She needs an H&P appt with Dr Alvarez Reis ASA      If pt calls back, please offer her 7/15/21 at 10:45am or 3:15pm with Dr Alvarez Reis  Electronically signed by Carmine Hammond LPN at 353  7:65 PM        _________________________________________________________________________________________________________    Overall she is doing well here today, blood pressure is fine at 136/70  Respiratory status is stable  She has had no further GI bleeding  She will continue with medication as is    Cholesterol was excellent back in January, 148 with HDL 46, LDL 82  Is on statin  Recent echocardiogram showed moderate AS, mild MS, ejection fraction 60%  Essentially unchanged  A1c redone here today is 6 8, previously 6 4, that is acceptable  Note she had used prednisone back in June  We will see her again in 4 months with annual wellness check, plan redo CBC, CMP, TSH, POC A1c -  possible other blood work at that time  She did stop iron due to constipation  She will be doing colonoscopy again on August 5th, had colonoscopy late April which showed large ileocecal ulcer  Had CT scanning chest, abdomen, pelvis back in April  At present she remains off aspirin due to prior GI bleed    She will be seeing Neurology tomorrow  Also, she has 1 week of increased right knee pain  She cannot use NSAIDs  She can use Tylenol  We did discuss possible injection, she wishes to hold off, call if pain worsens          ADD - due to multiple medical issues noted above she is in need of medically necessary Rollator walker  This will allow her to maintain independence, prevent falls    Chief Complaint     Chief Complaint   Patient presents with    Follow-up     c/o right knee swelling, some pain for 1 week       History of Present Illness   Kelly Parker is a 78y o -year-old female who is in today for a routine follow-up, fortunately her severe suboccipital pain resolved after her visit with me June 4th  Today she relates she has been feeling okay  She is overdue for stent replacement, Urology has been trying to get hold of her, she relates she will be calling them later today  She has no fevers or chills  She feels her respiratory status has been at baseline, she has had no chest pain or increased shortness of breath  She is scheduled to see Neurology soon    She is using all medication as directed    Her only complaint is 1 week of right knee pain with swelling, no injury      Review of Systems   Review of Systems Constitutional: Negative for appetite change, fatigue, fever and unexpected weight change  HENT: Negative for sore throat and trouble swallowing  Respiratory: Positive for shortness of breath (Baseline)  Negative for cough and chest tightness  Cardiovascular: Negative for chest pain, palpitations and leg swelling  Gastrointestinal: Negative for abdominal pain, blood in stool, nausea and vomiting  No acid reflux     No change in bowel   Genitourinary: Negative for dysuria and hematuria  Musculoskeletal: Positive for arthralgias (Right knee pain)  Neurological: Positive for tremors (Stable)  Negative for dizziness, syncope, light-headedness and headaches  Psychiatric/Behavioral: Negative for behavioral problems and confusion         Active Problem List     Patient Active Problem List   Diagnosis    Benign essential hypertension    Benign familial tremor    History of CVA (cerebrovascular accident)    COPD (chronic obstructive pulmonary disease) [facility] [address] )    Esophageal reflux    Gait disturbance    Dyslipidemia    Sleep disorder    Sciatica    Right shoulder pain    Osteoarthritis of hip    OA (osteoarthritis)    Acquired hypothyroidism    Lumbar degenerative disc disease    History of pancreatitis    Aortic valve stenosis - (Moderate )    S/P deep brain stimulator placement    Microscopic hematuria    Dysarthria related to Nov 2018 CVA,improved    Swallowing dysfunction    Weakness of left leg    Hydronephrosis with ureteropelvic junction (UPJ) obstruction    Pyelonephritis    Nephrostomy tube Left    Coronary artery disease involving native coronary artery of native heart without angina pectoris    Diabetes mellitus type 2, diet-controlled (HCC)    Calculus of kidney    Status post RIGHT carotid endarterectomy    Bilateral carotid artery stenosis    [facility]peripheral artery [facility]) [facility])    UTI (urinary tract infection)    Anemia     Transfusion history    Ileocecal ulcer       Past Medical History:  Reviewed    Past Surgical History:  Reviewed    Family History:  Reviewed    Social History:  Reviewed    Objective     Vitals:    07/13/21 1448   BP: 136/70   BP Location: Right arm   Patient Position: Sitting   Cuff Size: Large   Pulse: 74   Temp: 97 8 °F (36 6 °C)   SpO2: 95%   Weight: 90 7 kg (200 lb)   Height: 5' 4" (1 626 m)     Body mass index is 34 33 kg/m²  BP Readings from Last 3 Encounters:   08/16/21 136/84   08/10/21 139/64   08/05/21 141/67       Wt Readings from Last 3 Encounters:   08/10/21 90 7 kg (200 lb)   08/05/21 90 7 kg (200 lb)   07/15/21 90 7 kg (200 lb)       Physical Exam  Constitutional:       Appearance: She is well-developed  Comments: Very pleasant 69-year-old, she looks well here today, she is favoring her right knee, has some swelling with tenderness right knee, no redness or warmth, no open wound  Minimal swelling both ankles  Eyes:      General: No scleral icterus  Cardiovascular:      Rate and Rhythm: Normal rate and regular rhythm  Heart sounds: Murmur (2/6 systolic ejection murmur right 2nd intercostal space) heard  Pulmonary:      Effort: Pulmonary effort is normal  No respiratory distress  Breath sounds: Normal breath sounds  No wheezing, rhonchi or rales  Abdominal:      Palpations: Abdomen is soft  Tenderness: There is no abdominal tenderness  Musculoskeletal:      Right lower leg: No edema  Left lower leg: No edema  Lymphadenopathy:      Cervical: No cervical adenopathy  Skin:     Coloration: Skin is not jaundiced  Neurological:      Mental Status: She is oriented to person, place, and time     Psychiatric:         Mood and Affect: Mood normal          Behavior: Behavior normal          ALLERGIES:  Allergies   Allergen Reactions    Ciprofloxacin Diarrhea     She did use Levaquin in April of 2021    Simvastatin Myalgia    Advair Diskus [Fluticasone-Salmeterol] Palpitations     Ok w flovent    Tetracycline Rash     Reaction Date: [id number];        Current Medications     Current Outpatient Medications   Medication Sig Dispense Refill    acetaminophen (TYLENOL) 500 mg tablet Take 1,000 mg by mouth every 6 (six) hours as needed for mild pain       albuterol (2 5 mg/3 mL) 0 083 % nebulizer solution Take 1 vial (2 5 mg total) by nebulization every 6 (six) hours as needed for shortness of breath 120 vial 5    Cholecalciferol (VITAMIN D-3 PO) Take 1,000 Units by mouth daily   clopidogrel (PLAVIX) 75 mg tablet Take 1 tablet (75 mg total) by mouth daily (Patient taking differently: Take 75 mg by mouth daily Does not have to stop prior to surgery) 90 tablet 3    diltiazem (CARDIZEM CD) 240 mg 24 hr capsule TAKE 1 CAPSULE (240 MG TOTAL) BY MOUTH DAILY 90 capsule 3    Fluticasone Propionate, Inhal, (Flovent Diskus) 250 MCG/BLIST AEPB Inhale 1 puff 2 (two) times a day 1 each 5    furosemide (LASIX) 20 mg tablet TAKE 1 TABLET (20 MG TOTAL) BY MOUTH DAILY 90 tablet 2    levothyroxine 125 mcg tablet TAKE 1 TABLET (125 MCG TOTAL) BY MOUTH DAILY 90 tablet 3    Multiple Vitamins-Minerals (PRESERVISION AREDS PO) Take 1 Cap by Mouth daily      multivitamin (THERAGRAN) TABS Take 1 tablet by mouth daily      pantoprazole (PROTONIX) 40 mg tablet Take 1 tablet (40 mg total) by mouth daily (Patient not taking: Reported on 8/16/2021) 30 tablet 0    rosuvastatin (CRESTOR) 5 mg tablet Uses twice weekly (Patient taking differently: Uses twice weekly Monday and Friday) 40 tablet 3    cefuroxime (CEFTIN) 250 mg tablet Take 250 mg by mouth every 12 (twelve) hours To start 3 days prior to surgery      HYDROcodone-acetaminophen (NORCO) 5-325 mg per tablet 1-2 tab every 6 hr if needed for pain (Patient not taking: Reported on 8/16/2021) 10 tablet 0    Misc   Devices (Roller Leslie Flash) MISC Needs ROLLATOR WALKER re history CVA, leg weakness, sciatica, osteoarthritis, gait dysfunction 1 each 0    Na Sulfate-K Sulfate-Mg Sulf (Suprep Bowel Prep Kit) 17 5-3 13-1 6 GM/177ML SOLN Take 177 mL by mouth once for 1 dose 177 mL 0     No current facility-administered medications for this visit              Orders Placed This Encounter   Procedures    Ambulatory referral to Urology    POCT hemoglobin A1c         Manav Do DO

## 2021-07-14 ENCOUNTER — PROCEDURE VISIT (OUTPATIENT)
Dept: NEUROLOGY | Facility: CLINIC | Age: 81
End: 2021-07-14
Payer: MEDICARE

## 2021-07-14 VITALS — SYSTOLIC BLOOD PRESSURE: 132 MMHG | DIASTOLIC BLOOD PRESSURE: 58 MMHG | HEART RATE: 77 BPM

## 2021-07-14 DIAGNOSIS — G25.0 BENIGN FAMILIAL TREMOR: Primary | ICD-10-CM

## 2021-07-14 PROCEDURE — 99213 OFFICE O/P EST LOW 20 MIN: CPT | Performed by: PSYCHIATRY & NEUROLOGY

## 2021-07-14 PROCEDURE — 95970 ALYS NPGT W/O PRGRMG: CPT | Performed by: PSYCHIATRY & NEUROLOGY

## 2021-07-14 NOTE — PROGRESS NOTES
Review of Systems   Constitutional: Negative  Negative for appetite change and fever  HENT: Negative  Negative for hearing loss, tinnitus, trouble swallowing and voice change  Eyes: Negative  Negative for photophobia and pain  Respiratory: Negative  Negative for shortness of breath  Cardiovascular: Negative  Negative for palpitations  Gastrointestinal: Negative  Negative for nausea and vomiting  Endocrine: Negative  Negative for cold intolerance  Genitourinary: Negative  Negative for dysuria, frequency and urgency  Musculoskeletal: Negative for myalgias and neck pain  Balance issues   Shaky when walking     Skin: Negative  Negative for rash  Allergic/Immunologic: Negative  Neurological: Positive for speech difficulty (Since Stroke)  Negative for dizziness, tremors, seizures, syncope, facial asymmetry, weakness, light-headedness, numbness and headaches  Hematological: Negative  Does not bruise/bleed easily  Psychiatric/Behavioral: Negative  Negative for confusion, hallucinations and sleep disturbance  All other systems reviewed and are negative

## 2021-07-14 NOTE — ASSESSMENT & PLAN NOTE
Head, vocal, and arm tremor controlled on current stimulation settings  Deep brain stimulators were interrogated  Activa SC  Left VIM   ANTWON 2 23  C+2-, PW90, 130Hz,     Right VIM  battery 2 94  C+3-, PW90, 170Hz, 1 7V  Imp 661    Will refer for left IPG replacement

## 2021-07-14 NOTE — PROGRESS NOTES
Patient ID: Kirkwood Mcardle is a 78 y o  female  Assessment/Plan:    Benign familial tremor  Head, vocal, and arm tremor controlled on current stimulation settings  Deep brain stimulators were interrogated  Activa SC  Left VIM   ANTWON 2 23  C+2-, PW90, 130Hz,     Right VIM  battery 2 94  C+3-, PW90, 170Hz, 1 7V  Imp 661    Will refer for left IPG replacement  Diagnoses and all orders for this visit:    Benign familial tremor  -     Ambulatory referral to Neurosurgery; Future           Subjective: Kirkwood Mcardle is a left handed female with hx of right sided CVA s/p R CEA with patch angioplasty on Plavix and ET with prominent mouth and bilateral hand tremor (L>R) who is s/p bilateral VIM DBS 6/14/12,who presents for follow up  To review, her diagnosis was changed from PD to ET, she was tapered off of Sinemet and started on primidone which was increased to 225mg qhs over time with improved sleep but little effect on tremor  Tapered off after surgery  Previously with moderate head and hand tremors which were prominent, interfering with function and bothersome but have been completely controlled with stimulation  Objective:    Blood pressure 132/58, pulse 77, not currently breastfeeding  Physical Exam  Eyes:      Extraocular Movements: Extraocular movements intact  Pupils: Pupils are equal, round, and reactive to light  Neurological:      Deep Tendon Reflexes: Strength normal    Psychiatric:         Speech: Speech normal          Neurological Exam  Mental Status   Oriented to person, place, time and situation  Recent and remote memory are intact  Speech is normal  Follows complex commands  Attention and concentration are normal     Cranial Nerves  CN II: Right funduscopic exam: not visualized  Left funduscopic exam: not visualized  CN III, IV, VI: Extraocular movements intact bilaterally  Pupils equal round and reactive to light bilaterally    CN V: Facial sensation is normal   CN VII: Full and symmetric facial movement  CN VIII: Hearing is normal   CN IX, X: Palate elevates symmetrically  CN XI: Shoulder shrug strength is normal   CN XII: Tongue midline without atrophy or fasciculations  Motor   Normal muscle tone  Strength is 5/5 throughout all four extremities  Sensory  Light touch is normal in upper and lower extremities  Coordination  Right: Finger-to-nose normal   Left: Finger-to-nose normal     Gait  Casual gait is normal including stance, stride, and arm swing  Able to rise from chair without using arms  ROS:    Review of Systems                 Show:Clear all  [x]Manual[x]Template[]Copied    Added by:  [x]Speedy Paredes MA    []Joan for details  Review of Systems   Constitutional: Negative  Negative for appetite change and fever  HENT: Negative  Negative for hearing loss, tinnitus, trouble swallowing and voice change  Eyes: Negative  Negative for photophobia and pain  Respiratory: Negative  Negative for shortness of breath  Cardiovascular: Negative  Negative for palpitations  Gastrointestinal: Negative  Negative for nausea and vomiting  Endocrine: Negative  Negative for cold intolerance  Genitourinary: Negative  Negative for dysuria, frequency and urgency  Musculoskeletal: Negative for myalgias and neck pain  Balance issues   Shaky when walking     Skin: Negative  Negative for rash  Allergic/Immunologic: Negative  Neurological: Positive for speech difficulty (Since Stroke)  Negative for dizziness, tremors, seizures, syncope, facial asymmetry, weakness, light-headedness, numbness and headaches  Hematological: Negative  Does not bruise/bleed easily  Psychiatric/Behavioral: Negative  Negative for confusion, hallucinations and sleep disturbance  All other systems reviewed and are negative                        Review of system was personally reviewed

## 2021-07-15 ENCOUNTER — OFFICE VISIT (OUTPATIENT)
Dept: UROLOGY | Facility: MEDICAL CENTER | Age: 81
End: 2021-07-15
Payer: MEDICARE

## 2021-07-15 VITALS
SYSTOLIC BLOOD PRESSURE: 128 MMHG | BODY MASS INDEX: 34.15 KG/M2 | WEIGHT: 200 LBS | HEIGHT: 64 IN | DIASTOLIC BLOOD PRESSURE: 70 MMHG

## 2021-07-15 DIAGNOSIS — N13.30 HYDRONEPHROSIS OF LEFT KIDNEY: ICD-10-CM

## 2021-07-15 DIAGNOSIS — Q62.11 HYDRONEPHROSIS WITH URETEROPELVIC JUNCTION (UPJ) OBSTRUCTION: Primary | ICD-10-CM

## 2021-07-15 DIAGNOSIS — R82.71 BACTERIURIA: ICD-10-CM

## 2021-07-15 LAB
SL AMB  POCT GLUCOSE, UA: NORMAL
SL AMB LEUKOCYTE ESTERASE,UA: NORMAL
SL AMB POCT BILIRUBIN,UA: NORMAL
SL AMB POCT BLOOD,UA: NORMAL
SL AMB POCT CLARITY,UA: NORMAL
SL AMB POCT COLOR,UA: YELLOW
SL AMB POCT KETONES,UA: NORMAL
SL AMB POCT NITRITE,UA: NORMAL
SL AMB POCT PH,UA: 7
SL AMB POCT SPECIFIC GRAVITY,UA: 1.02
SL AMB POCT URINE PROTEIN: NORMAL
SL AMB POCT UROBILINOGEN: 0.2

## 2021-07-15 PROCEDURE — 81003 URINALYSIS AUTO W/O SCOPE: CPT | Performed by: UROLOGY

## 2021-07-15 PROCEDURE — 99214 OFFICE O/P EST MOD 30 MIN: CPT | Performed by: UROLOGY

## 2021-07-15 RX ORDER — CEFUROXIME AXETIL 250 MG/1
250 TABLET ORAL EVERY 12 HOURS SCHEDULED
Qty: 10 TABLET | Refills: 0 | Status: SHIPPED | OUTPATIENT
Start: 2021-07-15 | End: 2021-07-20

## 2021-07-15 NOTE — PROGRESS NOTES
HISTORY:    Follow-up for left chronic hydronephrosis related UPJ obstruction  Small intrarenal stone which was not really the cause of the obstruction  Has done well with long-term stenting, last time done was six months ago  No urinary symptoms, minimal urgency, no infections  There is a urine culture from April 2021 showing Klebsiella, she does not remember if she has symptoms at that time  ASSESSMENT / PLAN:    Schedule stent exchange for next month  Pre treat with cefuroxime 250 twice per day starting three days preop    The following portions of the patient's history were reviewed and updated as appropriate: allergies, current medications, past family history, past medical history, past social history, past surgical history and problem list     Review of Systems   All other systems reviewed and are negative  Objective:     Physical Exam  Constitutional:       General: She is not in acute distress  Appearance: She is well-developed  She is not diaphoretic  HENT:      Head: Normocephalic and atraumatic  Eyes:      General: No scleral icterus  Pulmonary:      Effort: Pulmonary effort is normal    Skin:     Coloration: Skin is not pale  Neurological:      Mental Status: She is alert and oriented to person, place, and time  Psychiatric:         Behavior: Behavior normal          Thought Content:  Thought content normal          Judgment: Judgment normal            No results found for: PSA]  BUN   Date Value Ref Range Status   04/29/2021 14 5 - 25 mg/dL Final   10/29/2014 18 5 - 25 mg/dL Final     Creatinine   Date Value Ref Range Status   04/29/2021 0 98 0 60 - 1 30 mg/dL Final     Comment:     Standardized to IDMS reference method   10/29/2014 0 95 0 60 - 1 30 mg/dL Final     Comment:     Standardized to IDMS reference method     No components found for: CBC      Patient Active Problem List   Diagnosis    Benign essential hypertension    Benign familial tremor    History of CVA (cerebrovascular accident)    COPD (chronic obstructive pulmonary disease) (HCC)    Esophageal reflux    Gait disturbance    Dyslipidemia    Sleep disorder    Sciatica    Right shoulder pain    Osteoarthritis of hip    OA (osteoarthritis)    Multiple joint pain    Acquired hypothyroidism    Lumbar degenerative disc disease    Back pain    History of pancreatitis    Aortic valve stenosis - (Moderate )    S/P deep brain stimulator placement    Microscopic hematuria    Dysarthria related to Nov 2018 CVA,improved    Swallowing dysfunction    Weakness of left leg    Hydronephrosis of left kidney    Pyelonephritis    Nephrostomy tube Left    Coronary artery disease involving native coronary artery of native heart without angina pectoris    Diabetes mellitus type 2, diet-controlled ([facility])    Calculus of kidney    Status post RIGHT carotid endarterectomy    Bilateral carotid artery stenosis    PAD (peripheral artery disease) ([facility])    UTI (urinary tract infection)    Anemia     Transfusion history    Ileocecal ulcer    Heart murmur        Diagnoses and all orders for this visit:    Hydronephrosis with ureteropelvic junction (UPJ) obstruction  -     POCT urine dip auto non-scope           Patient ID: Jacinda Baez is a 78 y o  female  Current Outpatient Medications:     acetaminophen (TYLENOL) 500 mg tablet, Take 1,000 mg by mouth every 6 (six) hours as needed for mild pain , Disp: , Rfl:     albuterol (2 5 mg/3 mL) 0 083 % nebulizer solution, Take 1 vial (2 5 mg total) by nebulization every 6 (six) hours as needed for shortness of breath, Disp: 120 vial, Rfl: 5    Cholecalciferol (VITAMIN D-3 PO), Take 1,000 Units by mouth daily  , Disp: , Rfl:     clopidogrel (PLAVIX) 75 mg tablet, Take 1 tablet (75 mg total) by mouth daily, Disp: 90 tablet, Rfl: 3    diltiazem (CARDIZEM CD) 240 mg 24 hr capsule, TAKE 1 CAPSULE (240 MG TOTAL) BY MOUTH DAILY, Disp: 90 capsule, Rfl: 3   Fluticasone Propionate, Inhal, (Flovent Diskus) 250 MCG/BLIST AEPB, Inhale 1 puff 2 (two) times a day, Disp: 1 each, Rfl: 5    furosemide (LASIX) 20 mg tablet, TAKE 1 TABLET (20 MG TOTAL) BY MOUTH DAILY, Disp: 90 tablet, Rfl: 2    levothyroxine 125 mcg tablet, TAKE 1 TABLET (125 MCG TOTAL) BY MOUTH DAILY, Disp: 90 tablet, Rfl: 3    Multiple Vitamins-Minerals (PRESERVISION AREDS PO), Take 1 Cap by Mouth daily, Disp: , Rfl:     multivitamin (THERAGRAN) TABS, Take 1 tablet by mouth daily, Disp: , Rfl:     pantoprazole (PROTONIX) 40 mg tablet, Take 1 tablet (40 mg total) by mouth daily (Patient taking differently: Take 40 mg by mouth as needed ), Disp: 30 tablet, Rfl: 0    rosuvastatin (CRESTOR) 5 mg tablet, Uses twice weekly (Patient taking differently: Uses twice weekly Monday and Friday), Disp: 40 tablet, Rfl: 3    Na Sulfate-K Sulfate-Mg Sulf (Suprep Bowel Prep Kit) 17 5-3 13-1 6 GM/177ML SOLN, Take 177 mL by mouth once for 1 dose, Disp: 177 mL, Rfl: 0    Past Medical History:   Diagnosis Date    Arthritis     Asthma     At risk for falls     rib frac    Benign essential tremor 10/15/2018    Added automatically from request for surgery [id number] has stimulator    COPD (chronic obstructive pulmonary disease) ([facility] [address] [facility]     Coronary artery disease     Diabetes mellitus (HCC)     borderline    Difficulty waking     post anesthesia    Edema     bles    GERD (gastroesophageal reflux disease)     High cholesterol     History of recent fall     broke several ribs    Hypertension     Hypothyroid     Kidney stone     Macular degeneration     Osteopenia     Osteoporosis     S/P deep brain stimulator placement     Shortness of breath     on exertion    Slurred speech     Stroke (HCC)     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     Urinary leakage     Uses roller walker     Vitamin D deficiency     Wears dentures     full upper    Wears glasses     reading       Past Surgical History:   Procedure Laterality Date    CATARACT EXTRACTION W/  INTRAOCULAR LENS IMPLANT Bilateral     COLONOSCOPY      CYSTOSCOPY      CYSTOSCOPY W/ RETROGRADES Left 2/16/2021    Procedure: CYSTO, RETRO PYELOGRAM, STENT EXCHANGE;  Surgeon: Sharyn Baldwin MD;  [facility];  Service: Urology    [address]      FL RETROGRADE PYELOGRAM  11/7/2019    FL RETROGRADE PYELOGRAM  5/26/2020    FL RETROGRADE PYELOGRAM  2/16/2021    IR NEPHROSTOMY TO NEPHROURETERAL STENT  1/10/2020    IR TUBE PLACEMENT  11/12/2019    KNEE ARTHROSCOPY      WA CYSTOURETHROSCOPY,URETER CATHETER Left 11/7/2019    Procedure: CYSTO RETROGRADE PYELOGRAM, URETEROSCOPY;  Surgeon: Ana Lowry MD;  [facility];  Service: Urology    WA IMP STIM,CRANIAL,SUBQ,1 ARRAY Left 1/22/2019    Procedure: REPLACEMENT IMPLANTABLE PULSE GENERATOR (IPG) DEEP BRAIN STIMULATION (DBS), LEFT;  Surgeon: Toan Richey MD;  [facility];  Service: Neurosurgery    WA IMP STIM,CRANIAL,SUBQ,>1 ARRAY Right 12/13/2018    Procedure: REPLACEMENT RIGHT DBS IMPLANTABLE PULSE GENERATOR;  Surgeon: Toan Richey MD;  [facility];  Service: Neurosurgery    [address] Left 10/27/2020    Procedure: CYSTOSCOPY, RETROGRADE PYELOGRAM, EXCHANGE STENT URETERA L, LEFT URETEROSCOPY;  Surgeon: Sharyn Baldwin MD;  [facility] MAIN OR;  Service: Urology    WA RMVL & RPLCMT INTLY DWELLING URETERAL STENT PRQ Left 5/26/2020    Procedure: Kitty Ibarra, STENT;  Surgeon: Sharyn Baldwin MD;  [facility];  Service: Urology    WA Ha [place] Right 2/18/2020    Procedure: CAROTID ENDARTERECTOMY WITH BOVINE PATCH;  Surgeon: Cisco Silveira MD;  [facility];  Service: Vascular    WA TOTAL HIP ARTHROPLASTY Left 5/20/2016    Procedure: ARTHROPLASTY HIP TOTAL ANTERIOR;  Surgeon: Cortney Terrell MD;  [facility];  Service: Orthopedics   [address] [place] [address] History

## 2021-07-20 ENCOUNTER — TELEPHONE (OUTPATIENT)
Dept: UROLOGY | Facility: MEDICAL CENTER | Age: 81
End: 2021-07-20

## 2021-07-20 NOTE — TELEPHONE ENCOUNTER
I spoke to the patient and scheduled her Cysto, Left Stent Exchange with Dr Nano Lewis for 8/10/2021 at [facility]       -instructions given verbally and mailed  -CBC, CMP, Urine C&S 10 days prior  -patient does not need to stop any of her usual medications  -MCR/AARP - no auth required

## 2021-07-23 ENCOUNTER — TELEPHONE (OUTPATIENT)
Dept: FAMILY MEDICINE CLINIC | Facility: CLINIC | Age: 81
End: 2021-07-23

## 2021-07-23 DIAGNOSIS — M54.30 SCIATICA, UNSPECIFIED LATERALITY: ICD-10-CM

## 2021-07-23 DIAGNOSIS — M16.10 PRIMARY OSTEOARTHRITIS OF HIP, UNSPECIFIED LATERALITY: ICD-10-CM

## 2021-07-23 DIAGNOSIS — R26.9 GAIT DISTURBANCE: ICD-10-CM

## 2021-07-23 DIAGNOSIS — R29.898 WEAKNESS OF LEFT LEG: Primary | ICD-10-CM

## 2021-07-23 DIAGNOSIS — Z86.73 HISTORY OF CVA (CEREBROVASCULAR ACCIDENT): ICD-10-CM

## 2021-07-23 NOTE — TELEPHONE ENCOUNTER
Pt needs a new script for a walker with wheels due to pts walker broke  Pt wants paper script sent to her home

## 2021-07-23 NOTE — TELEPHONE ENCOUNTER
I cannot find the previous order, the Epic system has done this w/ supply orders in the past -   I placed a different order for rollator thru " meds"

## 2021-07-29 NOTE — TELEPHONE ENCOUNTER
LVM for patient to let her know this was mailed last week and to call us if she did not receive it yet

## 2021-07-30 ENCOUNTER — TELEPHONE (OUTPATIENT)
Dept: NEUROLOGY | Facility: CLINIC | Age: 81
End: 2021-07-30

## 2021-07-30 NOTE — TELEPHONE ENCOUNTER
Patient stated Dr Emerald Rainey was going to reach out to [facility] regarding her DBS battery, as it's under 50% and she's concerned by this  Provided her DBS rep Emilio's contact info  Dr Emerald Rainey please advise on any further recommendations

## 2021-08-02 NOTE — TELEPHONE ENCOUNTER
Spoke with [employer] rep Myrick Cushing who will be going out to check impedence so that we can answer her question  She was already referred to neurosurgery for IPG replacement but I do not see that she is scheduled yet  PPlease make sure this went through   Thanks

## 2021-08-03 ENCOUNTER — ANESTHESIA EVENT (OUTPATIENT)
Dept: PERIOP | Facility: HOSPITAL | Age: 81
End: 2021-08-03
Payer: MEDICARE

## 2021-08-03 RX ORDER — CEFUROXIME AXETIL 250 MG/1
250 TABLET ORAL EVERY 12 HOURS SCHEDULED
COMMUNITY
End: 2021-09-01

## 2021-08-03 NOTE — PRE-PROCEDURE INSTRUCTIONS
Pre-Surgery Instructions:   Medication Instructions    acetaminophen (TYLENOL) 500 mg tablet Instructed patient per Anesthesia Guidelines   albuterol (2 5 mg/3 mL) 0 083 % nebulizer solution Instructed patient per Anesthesia Guidelines   cefuroxime (CEFTIN) 250 mg tablet Instructed patient per Anesthesia Guidelines   Cholecalciferol (VITAMIN D-3 PO) Instructed patient per Anesthesia Guidelines   clopidogrel (PLAVIX) 75 mg tablet Patient was instructed by Physician and understands   diltiazem (CARDIZEM CD) 240 mg 24 hr capsule Instructed patient per Anesthesia Guidelines   Fluticasone Propionate, Inhal, (Flovent Diskus) 250 MCG/BLIST AEPB Instructed patient per Anesthesia Guidelines   furosemide (LASIX) 20 mg tablet Instructed patient per Anesthesia Guidelines   levothyroxine 125 mcg tablet Instructed patient per Anesthesia Guidelines   multivitamin (THERAGRAN) TABS Instructed patient per Anesthesia Guidelines   pantoprazole (PROTONIX) 40 mg tablet Instructed patient per Anesthesia Guidelines   rosuvastatin (CRESTOR) 5 mg tablet Instructed patient per Anesthesia Guidelines  Instructed to take diltiazem / plavix/ levothyroxine am of surgery per anesthesia guidelines  Use flovent inhaler am of surgery  Use nebulizer if needed am of surgery  Can take pantoprazole if needed am of surgery

## 2021-08-04 ENCOUNTER — TELEPHONE (OUTPATIENT)
Dept: GASTROENTEROLOGY | Facility: HOSPITAL | Age: 81
End: 2021-08-04

## 2021-08-04 PROCEDURE — NC001 PR NO CHARGE: Performed by: UROLOGY

## 2021-08-04 NOTE — H&P
HISTORY AND PHYSICAL  ? ? Patient Name: Jacinda Baez  Patient MRN: [id number]  Attending Provider: James Tellez MD  Service: Urology  Chief Complaint    Chronic left hydronephrosis    HPI   Jacinda Baez is a 78 y o  female with indwelling stent for above, hydro from UPJ obstruction  Also has small stone in the collecting system, but that is not the cause of the obstruction  I plan cysto, left stent exchange  Potential risks and complications discussed, and informed consent was given by the patient  Medications  Meds/Allergies   No current facility-administered medications for this encounter  Cannot display prior to admission medications because the patient has not been admitted in this contact  No current facility-administered medications for this encounter  Current Outpatient Medications:     acetaminophen (TYLENOL) 500 mg tablet, Take 1,000 mg by mouth every 6 (six) hours as needed for mild pain , Disp: , Rfl:     albuterol (2 5 mg/3 mL) 0 083 % nebulizer solution, Take 1 vial (2 5 mg total) by nebulization every 6 (six) hours as needed for shortness of breath, Disp: 120 vial, Rfl: 5    cefuroxime (CEFTIN) 250 mg tablet, Take 250 mg by mouth every 12 (twelve) hours To start 3 days prior to surgery, Disp: , Rfl:     Cholecalciferol (VITAMIN D-3 PO), Take 1,000 Units by mouth daily  , Disp: , Rfl:     clopidogrel (PLAVIX) 75 mg tablet, Take 1 tablet (75 mg total) by mouth daily (Patient taking differently: Take 75 mg by mouth daily Does not have to stop prior to surgery), Disp: 90 tablet, Rfl: 3    diltiazem (CARDIZEM CD) 240 mg 24 hr capsule, TAKE 1 CAPSULE (240 MG TOTAL) BY MOUTH DAILY, Disp: 90 capsule, Rfl: 3    Fluticasone Propionate, Inhal, (Flovent Diskus) 250 MCG/BLIST AEPB, Inhale 1 puff 2 (two) times a day, Disp: 1 each, Rfl: 5    furosemide (LASIX) 20 mg tablet, TAKE 1 TABLET (20 MG TOTAL) BY MOUTH DAILY, Disp: 90 tablet, Rfl: 2    levothyroxine 125 mcg tablet, TAKE 1 TABLET (125 MCG TOTAL) BY MOUTH DAILY, Disp: 90 tablet, Rfl: 3    Multiple Vitamins-Minerals (PRESERVISION AREDS PO), Take 1 Cap by Mouth daily, Disp: , Rfl:     multivitamin (THERAGRAN) TABS, Take 1 tablet by mouth daily, Disp: , Rfl:     pantoprazole (PROTONIX) 40 mg tablet, Take 1 tablet (40 mg total) by mouth daily (Patient taking differently: Take 40 mg by mouth as needed ), Disp: 30 tablet, Rfl: 0    rosuvastatin (CRESTOR) 5 mg tablet, Uses twice weekly (Patient taking differently: Uses twice weekly Monday and Friday), Disp: 40 tablet, Rfl: 3    Misc   Devices (Roller Walker) MISC, Needs ROLLATOR [employer] re history CVA, leg weakness, sciatica, osteoarthritis, gait dysfunction, Disp: 1 each, Rfl: 0    Na Sulfate-K Sulfate-Mg Sulf (Suprep Bowel Prep Kit) 17 5-3 13-1 6 GM/177ML SOLN, Take 177 mL by mouth once for 1 dose, Disp: 177 mL, Rfl: 0  Review of Systems  10 point review of systems negative except as noted in HPI  Allergies  Allergies   Allergen Reactions    Ciprofloxacin Diarrhea     She did use Levaquin in April of 2021    Simvastatin Myalgia    Advair Diskus [Fluticasone-Salmeterol] Palpitations     Ok w flovent    Tetracycline Rash     Reaction Date: 10Jan2012;      [facility]  Past Medical History:   Diagnosis Date    Anesthesia complication     difficulty awakening    Arthritis     Asthma     At risk for falls     rib frac    Benign essential tremor 10/15/2018    Added automatically from request for surgery [id number] has stimulator    COPD [facility]ch[facility] obstructive [facility] disease) ([facility] [address] )     Coronary artery disease     Diabetes mellitus ([facility] [address] )     borderline    Difficulty waking     post anesthesia    Edema     bles    GERD (gastroesophageal reflux disease)     High cholesterol     Hypertension     Hypothyroid     Kidney stone     Low back pain     Macular degeneration     Osteopenia     Osteoporosis     S/P deep brain stimulator placement     Shortness of breath     on exertion    Slurred speech     Stroke ([address] )     x 2- states mini strokes    Urinary leakage     Uses roller walker     Vitamin D deficiency     Wears dentures     full upper    Wears glasses     reading     Past surgical history  Past Surgical History:   Procedure Laterality Date    CATARACT EXTRACTION W/  INTRAOCULAR LENS IMPLANT Bilateral     COLONOSCOPY      CYSTOSCOPY      CYSTOSCOPY W/ RETROGRADES Left 2/16/2021    Procedure: CYSTO, RETRO PYELOGRAM, STENT EXCHANGE;  Surgeon: James Tellez MD;  [facility];  Service: Urology    [address]       FL RETROGRADE PYELOGRAM  11/7/2019    FL RETROGRADE PYELOGRAM  5/26/2020    FL RETROGRADE PYELOGRAM  2/16/2021    IR NEPHROSTOMY TO NEPHROURETERAL STENT  1/10/2020    IR TUBE PLACEMENT  11/12/2019    KNEE ARTHROSCOPY      CT CYSTOURETHROSCOPY,URETER CATHETER Left 11/7/2019    Procedure: CYSTO RETROGRADE PYELOGRAM, URETEROSCOPY;  Surgeon: Nain Cordoba MD;  [facility];  Service: Urology    CT IMP STIM,CRANIAL,SUBQ,1 ARRAY Left 1/22/2019    Procedure: REPLACEMENT IMPLANTABLE PULSE GENERATOR (IPG) DEEP BRAIN STIMULATION (DBS), LEFT;  Surgeon: Dudley Ledesma MD;  [facility];  Service: Neurosurgery    CT IMP STIM,CRANIAL,SUBQ,>1 ARRAY Right 12/13/2018    Procedure: REPLACEMENT RIGHT DBS IMPLANTABLE PULSE GENERATOR;  Surgeon: Dudley Ledesma MD;  [facility];  Service: Neurosurgery    [facility] Left 10/27/2020    Procedure: CYSTOSCOPY, RETROGRADE PYELOGRAM, EXCHANGE STENT URETERA L, LEFT URETEROSCOPY;  Surgeon: James Tellez MD;  Location: [address] [place] [address] MAIN OR;  Service: Urology    CT RMVL & RPLCMT INTLY DWELLING URETERAL STENT PRQ Left 5/26/2020    Procedure: Annetta Dela Cruz, STENT;  Surgeon: James Tellez MD;  [facility];  Service: Urology    CT THROMBOENDARTECTMY Jennifer Garcia Right 2/18/2020    Procedure: CAROTID ENDARTERECTOMY WITH BOVINE PATCH;  Surgeon: Karel Michele MD;  [facility];  Service: Vascular    [place] TOTAL HIP ARTHROPLASTY Left 2016    Procedure: ARTHROPLASTY HIP TOTAL ANTERIOR;  Surgeon: Scott Blevins MD;  [facility];  Service: Orthopedics    ROTATOR CUFF REPAIR       Social history  Social History     Tobacco Use    Smoking status: Former Smoker     Types: Cigarettes     Quit date:      Years since quittin 5    Smokeless tobacco: Never Used   Vaping Use    Vaping Use: Never used   Substance Use Topics    Alcohol use: Yes     Comment: rare    Drug use: No     ?  Physical Exam     vs  General appearance: alert and oriented, in no acute distress  Head: Normocephalic, without obvious abnormality, atraumatic  Nose: Nares normal  Septum midline  Mucosa normal  No drainage or sinus tenderness    Throat: lips, mucosa, and tongue normal; teeth and gums normal  Lungs: clear to auscultation bilaterally  Heart: regular rate and rhythm, S1, S2 normal, no murmur, click, rub or gallop  Abdomen: soft, non-tender; bowel sounds normal; no masses,  no organomegaly  Extremities: extremities normal, warm and well-perfused; no cyanosis, clubbing, or edema  Neurologic: Grossly normal  Kimberley Lemus MD

## 2021-08-05 ENCOUNTER — HOSPITAL ENCOUNTER (OUTPATIENT)
Dept: GASTROENTEROLOGY | Facility: HOSPITAL | Age: 81
Setting detail: OUTPATIENT SURGERY
Discharge: HOME | End: 2021-08-05
Attending: INTERNAL MEDICINE | Admitting: INTERNAL MEDICINE
Payer: MEDICARE

## 2021-08-05 ENCOUNTER — ANESTHESIA (OUTPATIENT)
Dept: GASTROENTEROLOGY | Facility: HOSPITAL | Age: 81
End: 2021-08-05

## 2021-08-05 ENCOUNTER — PREP FOR PROCEDURE (OUTPATIENT)
Dept: GASTROENTEROLOGY | Facility: CLINIC | Age: 81
End: 2021-08-05

## 2021-08-05 ENCOUNTER — ANESTHESIA EVENT (OUTPATIENT)
Dept: GASTROENTEROLOGY | Facility: HOSPITAL | Age: 81
End: 2021-08-05

## 2021-08-05 VITALS
HEIGHT: 64 IN | OXYGEN SATURATION: 95 % | DIASTOLIC BLOOD PRESSURE: 67 MMHG | RESPIRATION RATE: 18 BRPM | SYSTOLIC BLOOD PRESSURE: 141 MMHG | TEMPERATURE: 97.6 F | HEART RATE: 65 BPM | WEIGHT: 200 LBS | BODY MASS INDEX: 34.15 KG/M2

## 2021-08-05 DIAGNOSIS — K92.1 MELENA: ICD-10-CM

## 2021-08-05 DIAGNOSIS — K63.3 ILEOCECAL ULCER: ICD-10-CM

## 2021-08-05 DIAGNOSIS — K63.3 ILEOCECAL ULCER: Primary | ICD-10-CM

## 2021-08-05 PROCEDURE — 45385 COLONOSCOPY W/LESION REMOVAL: CPT | Performed by: INTERNAL MEDICINE

## 2021-08-05 PROCEDURE — 88305 TISSUE EXAM BY PATHOLOGIST: CPT | Performed by: PATHOLOGY

## 2021-08-05 RX ORDER — PROPOFOL 10 MG/ML
INJECTION, EMULSION INTRAVENOUS AS NEEDED
Status: DISCONTINUED | OUTPATIENT
Start: 2021-08-05 | End: 2021-08-05

## 2021-08-05 RX ORDER — SODIUM CHLORIDE 9 MG/ML
125 INJECTION, SOLUTION INTRAVENOUS CONTINUOUS
Status: DISCONTINUED | OUTPATIENT
Start: 2021-08-05 | End: 2021-08-09 | Stop reason: HOSPADM

## 2021-08-05 RX ORDER — PROPOFOL 10 MG/ML
INJECTION, EMULSION INTRAVENOUS CONTINUOUS PRN
Status: DISCONTINUED | OUTPATIENT
Start: 2021-08-05 | End: 2021-08-05

## 2021-08-05 RX ADMIN — PROPOFOL 120 MG: 10 INJECTION, EMULSION INTRAVENOUS at 11:56

## 2021-08-05 RX ADMIN — SODIUM CHLORIDE 125 ML/HR: 0.9 INJECTION, SOLUTION INTRAVENOUS at 11:30

## 2021-08-05 RX ADMIN — PROPOFOL 100 MCG/KG/MIN: 10 INJECTION, EMULSION INTRAVENOUS at 11:56

## 2021-08-05 NOTE — ANESTHESIA POSTPROCEDURE EVALUATION
Post-Op Assessment Note    CV Status:  Stable    Pain management: adequate     Mental Status:  Alert and awake   Hydration Status:  Euvolemic   PONV Controlled:  Controlled   Airway Patency:  Patent      Post Op Vitals Reviewed: Yes      Reason for prolonged intubation > 24 hours:  NaReason for prolonged intubation > 48 hours:  Na      No complications documented      /67 (08/05/21 1238)    Temp      Pulse 65 (08/05/21 1238)   Resp 18 (08/05/21 1238)    SpO2 95 % (08/05/21 1238)

## 2021-08-05 NOTE — DISCHARGE INSTRUCTIONS
Colorectal Polyps   WHAT YOU NEED TO KNOW:   Colorectal polyps are small growths of tissue in the lining of the colon and rectum  Most polyps are hyperplastic polyps and are usually benign (noncancerous)  Certain types of polyps, called adenomatous polyps, may turn into cancer  DISCHARGE INSTRUCTIONS:   Follow up with your healthcare provider or gastroenterologist as directed: You may need to return for more tests, such as another colonoscopy  Write down your questions so you remember to ask them during your visits  Reduce your risk for colorectal polyps:   · Eat a variety of healthy foods:  Healthy foods include fruit, vegetables, whole-grain breads, low-fat dairy products, beans, lean meat, and fish  Ask if you need to be on a special diet  · Maintain a healthy weight:  Ask your healthcare provider if you need to lose weight and how much you need to lose  Ask for help with a weight loss program     · Exercise:  Begin to exercise slowly and do more as you get stronger  Talk with your healthcare provider before you start an exercise program      · Limit alcohol:  Your risk for polyps increases the more you drink  · Do not smoke: If you smoke, it is never too late to quit  Ask for information about how to stop  For support and more information:   · Flavia Dunn ([facility])  [address], [place] [address]  Phone: 8- 225 - [phone]  Web Address: www digestive  niddk nih gov    Contact your healthcare provider or gastroenterologist if:   · You have a fever  · You have chills, a cough, or feel weak and achy  · You have abdominal pain that does not go away or gets worse after you take medicine  · Your abdomen is swollen  · You are losing weight without trying  · You have questions or concerns about your condition or care  Seek care immediately or call 911 if:   · You have sudden shortness of breath       · You have a fast heart rate, fast breathing, or are too dizzy to stand up  · You have severe abdominal pain  · You see blood in your bowel movement  © Copyright [address] Information is for End User's use only and may not be sold, redistributed or otherwise used for commercial purposes  All illustrations and images included in CareNotes® are the copyrighted property of [employer]IE [employer]  or Giacomo Zhu   The above information is an  only  It is not intended as medical advice for individual conditions or treatments  Talk to your doctor, nurse or pharmacist before following any medical regimen to see if it is safe and effective for you  Hemorrhoids   WHAT YOU NEED TO KNOW:   What are hemorrhoids? Hemorrhoids are swollen blood vessels inside your rectum (internal hemorrhoids) or on your anus (external hemorrhoids)  Sometimes a hemorrhoid may prolapse  This means it extends out of your anus  What increases my risk for hemorrhoids? · Pregnancy or obesity    · Straining or sitting for a long time during bowel movements    · Liver disease    · Weak muscles around the anus caused by older age, rectal surgery, or anal intercourse    · A lack of physical activity    · Chronic diarrhea or constipation    · A low-fiber diet    What are the signs and symptoms of hemorrhoids? · Pain or itching around your anus or inside your rectum    · Swelling or bumps around your anus    · Bright red blood in your bowel movement, on the toilet paper, or in the toilet bowl    · Tissue bulging out of your anus (prolapsed hemorrhoids)    · Incontinence (poor control over urine or bowel movements)    How are hemorrhoids diagnosed? Your healthcare provider will ask about your symptoms, the foods you eat, and your bowel movements  He or she will examine your anus for external hemorrhoids  You may need the following:  · A digital rectal exam  is a test to check for hemorrhoids   Your healthcare provider will put a gloved finger inside your anus to feel for the hemorrhoids  · An anoscopy  is a test that uses a scope (small tube with a light and camera on the end) to look at your hemorrhoids  How are hemorrhoids treated? Treatment will depend on your symptoms  You may need any of the following:  · Medicines  can help decrease pain and swelling, and soften your bowel movement  The medicine may be a pill, pad, cream, or ointment  · Procedures  may be used to shrink or remove your hemorrhoid  Examples include rubber-band ligation, sclerotherapy, and photocoagulation  These procedures may be done in your healthcare provider's office  Ask your healthcare provider for more information about these procedures  · Surgery  may be needed to shrink or remove your hemorrhoids  How can I manage my symptoms? · Apply ice on your anus for 15 to 20 minutes every hour or as directed  Use an ice pack, or put crushed ice in a plastic bag  Cover it with a towel before you apply it to your anus  Ice helps prevent tissue damage and decreases swelling and pain  · Take a sitz bath  Fill a bathtub with 4 to 6 inches of warm water  You may also use a sitz bath pan that fits inside a toilet bowl  Sit in the sitz bath for 15 minutes  Do this 3 times a day, and after each bowel movement  The warm water can help decrease pain and swelling  · Keep your anal area clean  Gently wash the area with warm water daily  Soap may irritate the area  After a bowel movement, wipe with moist towelettes or wet toilet paper  Dry toilet paper can irritate the area  How can I help prevent hemorrhoids? · Do not strain to have a bowel movement  Do not sit on the toilet too long  These actions can increase pressure on the tissues in your rectum and anus  · Drink plenty of liquids  Liquids can help prevent constipation  Ask how much liquid to drink each day and which liquids are best for you  · Eat a variety of high-fiber foods    Examples include fruits, vegetables, and whole grains  Ask your healthcare provider how much fiber you need each day  You may need to take a fiber supplement  · Exercise as directed  Exercise, such as walking, may make it easier to have a bowel movement  Ask your healthcare provider to help you create an exercise plan  · Do not have anal sex  Anal sex can weaken the skin around your rectum and anus  · Avoid heavy lifting  This can cause straining and increase your risk for another hemorrhoid  When should I seek immediate care? · You have severe pain in your rectum or around your anus  · You have severe pain in your abdomen and you are vomiting  · You have bleeding from your anus that soaks through your underwear  When should I contact my healthcare provider? · You have frequent and painful bowel movements  · Your hemorrhoid looks or feels more swollen than usual      · You do not have a bowel movement for 2 days or more  · You see or feel tissue coming through your anus  · You have questions or concerns about your condition or care  CARE AGREEMENT:   You have the right to help plan your care  Learn about your health condition and how it may be treated  Discuss treatment options with your healthcare providers to decide what care you want to receive  You always have the right to refuse treatment  The above information is an  only  It is not intended as medical advice for individual conditions or treatments  Talk to your doctor, nurse or pharmacist before following any medical regimen to see if it is safe and effective for you  © Copyright [address] 2021 Information is for End User's use only and may not be sold, redistributed or otherwise used for commercial purposes   All illustrations and images included in CareNotes® are the copyrighted property of A D A M , Inc  or [address] [place] [address]

## 2021-08-05 NOTE — H&P
History and Physical - [facility]  Helio Escobedo 78 y o  female MRN: [id number]                  HPI: Helio Escobedo is a 78y o  year old female who presents for colonoscopy to investigate colon ulcer seen on prior procedure  She has been off of Plavix for 5 days  REVIEW OF SYSTEMS: Per the HPI, and otherwise unremarkable      Historical Information   Past Medical History:   Diagnosis Date    Anesthesia complication     difficulty awakening    Arthritis     Asthma     At risk for falls     rib frac    Benign essential tremor 10/15/2018    Added automatically from request for surgery [id number] has stimulator    [facility] obstructive [facility]) [facility] [address] [address] [facility]     Coronary artery disease     Diabetes mellitus ([facility] [address] )     borderline    Difficulty waking     post anesthesia    Edema     bles    GERD (gastroesophageal reflux disease)     High cholesterol     Hypertension     Hypothyroid     Kidney stone     Low back pain     Macular degeneration     Osteopenia     Osteoporosis     S/P deep brain stimulator placement     Shortness of breath     on exertion    Slurred speech     Stroke (HCC)     x 2- states mini strokes    Urinary leakage     Uses roller walker     Vitamin D deficiency     Wears dentures     full upper    Wears glasses     reading     Past Surgical History:   Procedure Laterality Date    CATARACT EXTRACTION W/  INTRAOCULAR LENS IMPLANT Bilateral     COLONOSCOPY      CYSTOSCOPY      CYSTOSCOPY W/ RETROGRADES Left 2/16/2021    Procedure: CYSTO, RETRO PYELOGRAM, STENT EXCHANGE;  Surgeon: Sophie Patel MD;  [facility];  Service: Urology    DEEP BRAIN STIMULATOR PLACEMENT      FL RETROGRADE PYELOGRAM  11/7/2019    FL RETROGRADE PYELOGRAM  5/26/2020    FL RETROGRADE PYELOGRAM  2/16/2021    IR NEPHROSTOMY TO NEPHROURETERAL STENT  1/10/2020    IR TUBE PLACEMENT  11/12/2019    KNEE ARTHROSCOPY      UT CYSTOURETHROSCOPY,URETER CATHETER Left 2019    Procedure: CYSTO RETROGRADE PYELOGRAM, URETEROSCOPY;  Surgeon: Ana Vasquez MD;  [facility];  Service: Urology    LA IMP STIM,CRANIAL,SUBQ,1 ARRAY Left 2019    Procedure: REPLACEMENT IMPLANTABLE PULSE GENERATOR (IPG) DEEP BRAIN STIMULATION (DBS), LEFT;  Surgeon: Rufino Peterson MD;  [facility];  Service: Neurosurgery    LA IMP STIM,CRANIAL,SUBQ,>1 ARRAY Right 2018    Procedure: REPLACEMENT RIGHT DBS IMPLANTABLE PULSE GENERATOR;  Surgeon: Rufino Peterson MD;  [facility];  Service: Neurosurgery    [address] Left 10/27/2020    Procedure: Dinora Manuel PYELOGRAM, EXCHANGE STENT URETERA L, LEFT URETEROSCOPY;  Surgeon: Meagan Mendez MD;  Location: [facility];  Service: Urology    LA RMVL & RPLCMT INTLY DWELLING URETERAL STENT PRQ Left 2020    Procedure: Tara Mendoza, STENT;  Surgeon: Meagan Mendez MD;  [facility];  Service: Urology    LA THROMBOENDARTECTMY Stephanie Peek Right 2020    Procedure: CAROTID ENDARTERECTOMY WITH BOVINE PATCH;  Surgeon: Suzanne Vaughan MD;  [facility];  Service: Vascular    LA TOTAL HIP ARTHROPLASTY Left 2016    Procedure: ARTHROPLASTY HIP TOTAL ANTERIOR;  Surgeon: Jose Lara MD;  [facility];  Service: Orthopedics    ROTATOR CUFF REPAIR       Social History   Social History     Substance and Sexual Activity   Alcohol Use Yes    Comment: rare     Social History     Substance and Sexual Activity   Drug Use No     Social History     Tobacco Use   Smoking Status Former Smoker    Types: Cigarettes    Quit date:     Years since quittin 5   Smokeless Tobacco Never Used     Family History   Problem Relation Age of Onset    Breast cancer Mother     Throat cancer Family        Meds/Allergies     (Not in a hospital admission)      Allergies   Allergen Reactions    Ciprofloxacin Diarrhea     She did use Levaquin in April of 2021    Simvastatin Myalgia    Advair Diskus [Fluticasone-Salmeterol] Palpitations     Ok w flovent    Tetracycline Rash     Reaction Date: [id number];        Objective     Blood pressure 146/67, pulse 77, temperature 97 6 °F (36 4 °C), temperature source Temporal, resp  rate 18, height 5' 4" (1 626 m), weight 90 7 kg (200 lb), SpO2 98 %, not currently breastfeeding  PHYSICAL EXAM    Gen: NAD  CV: RRR  CHEST: Clear  ABD: soft, NT/ND  EXT: no edema      ASSESSMENT/PLAN:   Hudson Orantes is a 78y o  year old female who presents for colonoscopy to investigate colon ulcer seen on prior procedure  She has been off of Plavix for 5 days  The patient is stable and optimized for the procedure, we reviewed risk and benefits  Risk include but not limited to infection, bleeding, perforation and missing a lesion

## 2021-08-05 NOTE — ANESTHESIA PREPROCEDURE EVALUATION
Procedure:  COLONOSCOPY    Relevant Problems   CARDIO   (+) Aortic valve stenosis - (Moderate ) tolerated recent colonscopy without problems   (+) Benign essential hypertension   (+) Coronary artery disease involving native coronary artery of native heart without angina pectoris   (+) Heart murmur      ENDO   (+) Acquired hypothyroidism      GI/HEPATIC   (+) Esophageal reflux      /RENAL   (+) Calculus of kidney   (+) Hydronephrosis with ureteropelvic junction (UPJ) obstruction      HEMATOLOGY   (+) Anemia       MUSCULOSKELETAL   (+) Back pain   (+) Lumbar degenerative disc disease   (+) OA (osteoarthritis)   (+) Osteoarthritis of hip   (+) Sciatica      NEURO/PSYCH   (+) History of CVA (cerebrovascular accident)   (+) History of pancreatitis      PULMONARY   (+) COPD (chronic obstructive pulmonary disease) (HCC)        Physical Exam    Airway    Mallampati score: II  TM Distance: >3 FB  Neck ROM: full     Dental   upper dentures and lower dentures,     Cardiovascular  Rhythm: regular, Rate: normal,     Pulmonary  Pulmonary exam normal     Other Findings        Anesthesia Plan  ASA Score- 4     Anesthesia Type- general with ASA Monitors  Additional Monitors:   Airway Plan:     Comment: S/P EGD yesterday  Plan Factors-Exercise tolerance (METS): >4 METS  Chart reviewed  Patient summary reviewed  Patient is not a current smoker  Induction- intravenous  Postoperative Plan-     Informed Consent- Anesthetic plan and risks discussed with patient

## 2021-08-10 ENCOUNTER — TELEPHONE (OUTPATIENT)
Dept: GASTROENTEROLOGY | Facility: CLINIC | Age: 81
End: 2021-08-10

## 2021-08-10 ENCOUNTER — ANESTHESIA (OUTPATIENT)
Dept: PERIOP | Facility: HOSPITAL | Age: 81
End: 2021-08-10
Payer: MEDICARE

## 2021-08-10 ENCOUNTER — HOSPITAL ENCOUNTER (OUTPATIENT)
Facility: HOSPITAL | Age: 81
Setting detail: OUTPATIENT SURGERY
Discharge: HOME | End: 2021-08-10
Attending: UROLOGY | Admitting: UROLOGY
Payer: MEDICARE

## 2021-08-10 ENCOUNTER — APPOINTMENT (OUTPATIENT)
Dept: RADIOLOGY | Facility: HOSPITAL | Age: 81
End: 2021-08-10
Payer: MEDICARE

## 2021-08-10 VITALS
RESPIRATION RATE: 16 BRPM | HEART RATE: 78 BPM | SYSTOLIC BLOOD PRESSURE: 139 MMHG | OXYGEN SATURATION: 94 % | TEMPERATURE: 98.4 F | WEIGHT: 200 LBS | BODY MASS INDEX: 34.15 KG/M2 | DIASTOLIC BLOOD PRESSURE: 64 MMHG | HEIGHT: 64 IN

## 2021-08-10 DIAGNOSIS — Q62.11 HYDRONEPHROSIS WITH URETEROPELVIC JUNCTION (UPJ) OBSTRUCTION: ICD-10-CM

## 2021-08-10 LAB — GLUCOSE SERPL-MCNC: 89 MG/DL (ref 65–140)

## 2021-08-10 PROCEDURE — 74018 RADEX ABDOMEN 1 VIEW: CPT

## 2021-08-10 PROCEDURE — 82948 REAGENT STRIP/BLOOD GLUCOSE: CPT

## 2021-08-10 PROCEDURE — 99024 POSTOP FOLLOW-UP VISIT: CPT | Performed by: UROLOGY

## 2021-08-10 PROCEDURE — 52332 CYSTOSCOPY AND TREATMENT: CPT | Performed by: UROLOGY

## 2021-08-10 PROCEDURE — 87086 URINE CULTURE/COLONY COUNT: CPT | Performed by: UROLOGY

## 2021-08-10 PROCEDURE — C2617 STENT, NON-COR, TEM W/O DEL: HCPCS | Performed by: UROLOGY

## 2021-08-10 DEVICE — VARIABLE LENGTH INJECTION STENT SET
Type: IMPLANTABLE DEVICE | Site: URETER | Status: NON-FUNCTIONAL
Brand: CONTOUR VL™ INJECTION STENT SET
Removed: 2022-02-08

## 2021-08-10 RX ORDER — EPHEDRINE SULFATE 50 MG/ML
INJECTION INTRAVENOUS AS NEEDED
Status: DISCONTINUED | OUTPATIENT
Start: 2021-08-10 | End: 2021-08-10

## 2021-08-10 RX ORDER — SODIUM CHLORIDE 9 MG/ML
125 INJECTION, SOLUTION INTRAVENOUS CONTINUOUS
Status: DISCONTINUED | OUTPATIENT
Start: 2021-08-10 | End: 2021-08-10 | Stop reason: HOSPADM

## 2021-08-10 RX ORDER — PROPOFOL 10 MG/ML
INJECTION, EMULSION INTRAVENOUS AS NEEDED
Status: DISCONTINUED | OUTPATIENT
Start: 2021-08-10 | End: 2021-08-10

## 2021-08-10 RX ORDER — HYDROCODONE BITARTRATE AND ACETAMINOPHEN 5; 325 MG/1; MG/1
TABLET ORAL
Qty: 10 TABLET | Refills: 0 | Status: SHIPPED | OUTPATIENT
Start: 2021-08-10 | End: 2021-09-01

## 2021-08-10 RX ORDER — ONDANSETRON 2 MG/ML
4 INJECTION INTRAMUSCULAR; INTRAVENOUS ONCE AS NEEDED
Status: DISCONTINUED | OUTPATIENT
Start: 2021-08-10 | End: 2021-08-10 | Stop reason: HOSPADM

## 2021-08-10 RX ORDER — LIDOCAINE HYDROCHLORIDE 20 MG/ML
INJECTION, SOLUTION EPIDURAL; INFILTRATION; INTRACAUDAL; PERINEURAL AS NEEDED
Status: DISCONTINUED | OUTPATIENT
Start: 2021-08-10 | End: 2021-08-10

## 2021-08-10 RX ORDER — FENTANYL CITRATE/PF 50 MCG/ML
50 SYRINGE (ML) INJECTION
Status: DISCONTINUED | OUTPATIENT
Start: 2021-08-10 | End: 2021-08-10 | Stop reason: HOSPADM

## 2021-08-10 RX ORDER — CEFAZOLIN SODIUM 2 G/50ML
2000 SOLUTION INTRAVENOUS ONCE
Status: COMPLETED | OUTPATIENT
Start: 2021-08-10 | End: 2021-08-10

## 2021-08-10 RX ORDER — ONDANSETRON 2 MG/ML
INJECTION INTRAMUSCULAR; INTRAVENOUS AS NEEDED
Status: DISCONTINUED | OUTPATIENT
Start: 2021-08-10 | End: 2021-08-10

## 2021-08-10 RX ORDER — SODIUM CHLORIDE 9 MG/ML
INJECTION, SOLUTION INTRAVENOUS AS NEEDED
Status: DISCONTINUED | OUTPATIENT
Start: 2021-08-10 | End: 2021-08-10 | Stop reason: HOSPADM

## 2021-08-10 RX ORDER — FENTANYL CITRATE 50 UG/ML
INJECTION, SOLUTION INTRAMUSCULAR; INTRAVENOUS AS NEEDED
Status: DISCONTINUED | OUTPATIENT
Start: 2021-08-10 | End: 2021-08-10

## 2021-08-10 RX ADMIN — EPHEDRINE SULFATE 10 MG: 50 INJECTION, SOLUTION INTRAVENOUS at 17:15

## 2021-08-10 RX ADMIN — FENTANYL CITRATE 100 MCG: 50 INJECTION INTRAMUSCULAR; INTRAVENOUS at 16:58

## 2021-08-10 RX ADMIN — ONDANSETRON 4 MG: 2 INJECTION INTRAMUSCULAR; INTRAVENOUS at 16:58

## 2021-08-10 RX ADMIN — CEFAZOLIN SODIUM 2000 MG: 2 SOLUTION INTRAVENOUS at 16:42

## 2021-08-10 RX ADMIN — EPHEDRINE SULFATE 20 MG: 50 INJECTION, SOLUTION INTRAVENOUS at 17:18

## 2021-08-10 RX ADMIN — SODIUM CHLORIDE 125 ML/HR: 0.9 INJECTION, SOLUTION INTRAVENOUS at 15:18

## 2021-08-10 RX ADMIN — LIDOCAINE HYDROCHLORIDE 100 MG: 20 INJECTION, SOLUTION EPIDURAL; INFILTRATION; INTRACAUDAL; PERINEURAL at 16:55

## 2021-08-10 RX ADMIN — PROPOFOL 100 MG: 10 INJECTION, EMULSION INTRAVENOUS at 16:55

## 2021-08-10 NOTE — OP NOTE
OPERATIVE REPORT  PATIENT NAME: Etienne Green    :  1940  MRN: [id number]  Pt Location: [facility]    SURGERY DATE: 8/10/2021    Surgeon(s) and Role:     Darryle Bleacher, MD - Primary    Preop Diagnosis:  Hydronephrosis with ureteropelvic junction (UPJ) obstruction [Q62 11]    Post-Op Diagnosis Codes: * Hydronephrosis with ureteropelvic junction (UPJ) obstruction [Q62 11]    Procedure(s) (LRB):  CYSTOSCOPY, RETROGRADE PYELOGRAM,EXCHANGE STENT URETERAL (Left)    Specimen(s):  ID Type Source Tests Collected by Time Destination   A : urine culture Urine Urine, Cystoscopic URINE CULTURE Lalo Patton MD 8/10/2021 1718        Estimated Blood Loss:   Minimal    Drains:  Ureteral Drain/Stent Left ureter 8 Fr  (Active)   Number of days: 560       Ureteral Drain/Stent Left ureter 7 Fr  (Active)   Number of days: 175       Anesthesia Type:   General/LMA    Operative Indications:  Hydronephrosis with ureteropelvic junction (UPJ) obstruction [Q62 11]      Operative Findings:  Mild hydronephrosis    Complications:   None    Procedure and Technique:     After the patient was placed under adequate anesthesia, they were prepped and draped using chlorhexidine solution in lithotomy position  The 25 [place] scope was passed thru the urethra, which showed no strictures  Further findings upon passage of the scope were mild bladder inflammation  The prior left  stent was grasped, brought out thru the meatus, and wired  A new  seven [place] contour stent was passed over the wire up to the renal pelvis  Contrast was injected, retrograde pyelogram showing good position and mild hydronephrosis  The scope was removed, leaving the stent in place  They were taken to RR in good condition     I was present for the entire procedure    Patient Disposition:  PACU     SIGNATURE: Lalo Patton MD  DATE: August 10, 2021  TIME: 5:23 PM

## 2021-08-10 NOTE — DISCHARGE INSTRUCTIONS
ALL YOUR  PREVIOUS MEDS ARE THE SAME  NEW MEDS:  Hydrocodone if needed for pain    EXPECT SOME BLOOD IN URINE, BURNING, FREQUENT URINATION

## 2021-08-10 NOTE — ANESTHESIA PREPROCEDURE EVALUATION
Procedure:  CYSTOSCOPY, RETROGRADE PYELOGRAM,EXCHANGE STENT URETERAL (Left Ureter)    Relevant Problems   CARDIO   (+) Aortic valve stenosis - (Moderate ) tolerated recent colonscopy without problems   (+) Benign essential hypertension   (+) Coronary artery disease involving native coronary artery of native heart without angina pectoris   (+) Heart murmur      ENDO   (+) Acquired hypothyroidism      GI/HEPATIC   (+) Esophageal reflux      /RENAL   (+) Calculus of kidney   (+) Hydronephrosis with ureteropelvic junction (UPJ) obstruction      HEMATOLOGY   (+) Anemia       MUSCULOSKELETAL   (+) Back pain   (+) Lumbar degenerative disc disease   (+) OA (osteoarthritis)   (+) Osteoarthritis of hip   (+) Sciatica      NEURO/PSYCH   (+) History of CVA (cerebrovascular accident)   (+) History of pancreatitis      PULMONARY   (+) COPD (chronic obstructive pulmonary disease) (HCC)        Physical Exam    Airway    Mallampati score: II  TM Distance: >3 FB  Neck ROM: full     Dental   upper dentures and lower dentures,     Cardiovascular  Rhythm: regular, Rate: normal,     Pulmonary  Pulmonary exam normal Breath sounds clear to auscultation,     Other Findings        Anesthesia Plan  ASA Score- 4     Anesthesia Type- general and IV sedation with anesthesia with ASA Monitors  Additional Monitors:   Airway Plan:           Plan Factors-Exercise tolerance (METS): >4 METS  Chart reviewed  Patient summary reviewed  Patient is not a current smoker  Induction- intravenous  Postoperative Plan-     Informed Consent- Anesthetic plan and risks discussed with patient

## 2021-08-10 NOTE — DISCHARGE SUMMARY
Discharge Summary - Acacia Aiken 78 y o  female MRN: [id number]    Admission Date: 8/10/2021     Admitting Diagnosis: Hydronephrosis with ureteropelvic junction (UPJ) obstruction [Q62 11]    Procedures Performed:  Cysto, stent exchange    Patient underwent planned outpt surgery and recovered without complication  Discharged in good condition  Medications were prescribed  Pt knows to have office follow-up at the appropriate time

## 2021-08-10 NOTE — TELEPHONE ENCOUNTER
----- Message from Eduardo Spaulding MD sent at 8/5/2021 12:36 PM EDT -----  Repeat colonoscopy in 3 months from today's date due to inadequate bowel prep  Patient will need 2 day prep with GoLYTELY

## 2021-08-10 NOTE — INTERVAL H&P NOTE
H&P reviewed  After examining the patient I find no changes in the patients condition since the H&P had been written      Vitals:    08/10/21 1510   BP: (!) 171/78   Pulse: 73   Resp: 16   Temp: 99 1 °F (37 3 °C)   SpO2: 95%

## 2021-08-11 ENCOUNTER — TELEPHONE (OUTPATIENT)
Dept: FAMILY MEDICINE CLINIC | Facility: CLINIC | Age: 81
End: 2021-08-11

## 2021-08-11 NOTE — TELEPHONE ENCOUNTER
She has done few courses of speech therapy and PT in past re remote CVA -  I can d/w her at next visit -   If John Granger calls and requests therapy /notes changes we can authorize

## 2021-08-11 NOTE — ANESTHESIA POSTPROCEDURE EVALUATION
Post-Op Assessment Note    CV Status:  Stable    Pain management: adequate     Mental Status:  Alert   PONV Controlled:  None   Airway Patency:  Patent      Post Op Vitals Reviewed: Yes      Staff: Anesthesiologist       Blood pressure 139/64, pulse 78, temperature 98 4 °F (36 9 °C), temperature source Temporal, resp  rate 16, height 5' 4" (1 626 m), weight 90 7 kg (200 lb), SpO2 94 %, not currently breastfeeding  No complications documented      BP      Temp      Pulse     Resp      SpO2

## 2021-08-11 NOTE — TELEPHONE ENCOUNTER
[facility]y [facility] where patient resides reports that they recently did some screenings and there are concerns for patient re: balance and speech   They are requesting an order for PT and Speech Therapy be faxed [phone]

## 2021-08-12 ENCOUNTER — TELEPHONE (OUTPATIENT)
Dept: UROLOGY | Facility: MEDICAL CENTER | Age: 81
End: 2021-08-12

## 2021-08-12 LAB — BACTERIA UR CULT: NORMAL

## 2021-08-12 NOTE — TELEPHONE ENCOUNTER
Patient stated she hasn't heard from neurosurgery  Provided her the number for neurosurgery  She stated [employer] Rep [place]mar[place]osa Madden told her that Dr Armond Arredondo is supposed to order some xrays (?)  Patient made aware Dr Armond Arredondo is currently out of the office and we will have to await her return to address this  Dr Armond Arredondo please advise

## 2021-08-12 NOTE — TELEPHONE ENCOUNTER
Patient of Dr Omega Nyhan seen in the [facility] office  Patient called and advised that she had surgery on 08/10/21 and needs a post op appointment  Patient can be reached at [phone]  Please advise

## 2021-08-12 NOTE — TELEPHONE ENCOUNTER
Per D/C notes:    Call Berenice Owen MD (Urology); Call office tomorrow, make appointment to see Jessica Santiago or his PA in early October      Will route to clerical to assist with scheduling follow up appointment

## 2021-08-13 ENCOUNTER — TELEPHONE (OUTPATIENT)
Dept: NEUROLOGY | Facility: CLINIC | Age: 81
End: 2021-08-13

## 2021-08-13 NOTE — TELEPHONE ENCOUNTER
Patient stated she hasn't heard from neurosurgery to schedule her DBS battery replacement  Patient was provided with their number yesterday  Patient was transferred to neurosurgery but it doesn't appear she has been scheduled

## 2021-08-16 ENCOUNTER — OFFICE VISIT (OUTPATIENT)
Dept: NEUROSURGERY | Facility: CLINIC | Age: 81
End: 2021-08-16
Payer: MEDICARE

## 2021-08-16 ENCOUNTER — TELEPHONE (OUTPATIENT)
Dept: NEUROLOGY | Facility: CLINIC | Age: 81
End: 2021-08-16

## 2021-08-16 VITALS
HEART RATE: 75 BPM | BODY MASS INDEX: 34.33 KG/M2 | HEIGHT: 64 IN | SYSTOLIC BLOOD PRESSURE: 136 MMHG | DIASTOLIC BLOOD PRESSURE: 84 MMHG | RESPIRATION RATE: 16 BRPM | TEMPERATURE: 99.2 F

## 2021-08-16 DIAGNOSIS — G25.0 ESSENTIAL TREMOR: Primary | ICD-10-CM

## 2021-08-16 PROCEDURE — 99215 OFFICE O/P EST HI 40 MIN: CPT | Performed by: NEUROLOGICAL SURGERY

## 2021-08-16 NOTE — TELEPHONE ENCOUNTER
Called patient/daughter  Daughter reports that the patient doesn't have any tremors, she's "just wobbly" when she walks  Informed her that Dr Maris Carrizales has ordered xrays for patient to check DBS  Daughter verbalized understanding

## 2021-08-16 NOTE — PROGRESS NOTES
Neurosurgery Office Note  Rowdy Goyal 78 y o  female MRN: [id number]      Assessment/Plan     Patient is a 41-year-old female with h/o right-sided CVA s/p R CEA with patch angioplasty on Plavix (last dose on 8/15/21) and bilateral essential tremors (L>R) s/p DBS placement on 6/14/2012 and more recent battery replacements in 2018 (right) and 2019 (left), now in need of left battery replacement per Neurologist and DBS rep  Patient reports her tremors have significantly improved since DBS placement  No significant resting or intentional tremors currently  She is in a "bad mood" because she was told not to eat anything today for surgery  Denies headaches, change in vision, nausea/vomiting, numbness, weakness, seizure history, recent trauma, or any wound complications  Discussion:  Patient was advised to hold Plavix for at least 7 days before surgery and will need medical clearance preoperatively  At this time, she is refusing to proceed with surgery and to sign consent (despite conversations with myself and her daughter, who does not want to "force surgery on her")  They will give us a call back when she is ready to proceed with the battery replacement, we will tentatively plan for next week after Plavix held and medical clearance  CHIEF COMPLAINT    Chief Complaint   Patient presents with    Follow-up       HISTORY    History of Present Illness     78y o  year old female     HPI    See Discussion    REVIEW OF SYSTEMS    Review of Systems   Constitutional: Negative  HENT: Negative  Eyes: Negative  Respiratory: Negative  Cardiovascular: Negative  Gastrointestinal: Negative  Endocrine: Negative  Genitourinary: Negative  Musculoskeletal: Positive for back pain (when standing for too long) and gait problem (wobbly)  Allergic/Immunologic: Negative  Hematological: Negative  Psychiatric/Behavioral: Negative            Meds/Allergies     Current Outpatient Medications Medication Sig Dispense Refill    acetaminophen (TYLENOL) 500 mg tablet Take 1,000 mg by mouth every 6 (six) hours as needed for mild pain       albuterol (2 5 mg/3 mL) 0 083 % nebulizer solution Take 1 vial (2 5 mg total) by nebulization every 6 (six) hours as needed for shortness of breath 120 vial 5    Cholecalciferol (VITAMIN D-3 PO) Take 1,000 Units by mouth daily   clopidogrel (PLAVIX) 75 mg tablet Take 1 tablet (75 mg total) by mouth daily (Patient taking differently: Take 75 mg by mouth daily Does not have to stop prior to surgery) 90 tablet 3    diltiazem (CARDIZEM CD) 240 mg 24 hr capsule TAKE 1 CAPSULE (240 MG TOTAL) BY MOUTH DAILY 90 capsule 3    Fluticasone Propionate, Inhal, (Flovent Diskus) 250 MCG/BLIST AEPB Inhale 1 puff 2 (two) times a day 1 each 5    furosemide (LASIX) 20 mg tablet TAKE 1 TABLET (20 MG TOTAL) BY MOUTH DAILY 90 tablet 2    levothyroxine 125 mcg tablet TAKE 1 TABLET (125 MCG TOTAL) BY MOUTH DAILY 90 tablet 3    Multiple Vitamins-Minerals (PRESERVISION AREDS PO) Take 1 Cap by Mouth daily      multivitamin (THERAGRAN) TABS Take 1 tablet by mouth daily      rosuvastatin (CRESTOR) 5 mg tablet Uses twice weekly (Patient taking differently: Uses twice weekly Monday and Friday) 40 tablet 3    cefuroxime (CEFTIN) 250 mg tablet Take 250 mg by mouth every 12 (twelve) hours To start 3 days prior to surgery      HYDROcodone-acetaminophen (NORCO) 5-325 mg per tablet 1-2 tab every 6 hr if needed for pain (Patient not taking: Reported on 8/16/2021) 10 tablet 0    Misc   Devices (Roller Walker) MISC Needs ROLLATOR WALKER re history CVA, leg weakness, sciatica, osteoarthritis, gait dysfunction 1 each 0    Na Sulfate-K Sulfate-Mg Sulf (Suprep Bowel Prep Kit) 17 5-3 13-1 6 GM/177ML SOLN Take 177 mL by mouth once for 1 dose 177 mL 0    pantoprazole (PROTONIX) 40 mg tablet Take 1 tablet (40 mg total) by mouth daily (Patient not taking: Reported on 8/16/2021) 30 tablet 0     No current facility-administered medications for this visit         Allergies   Allergen Reactions    Ciprofloxacin Diarrhea     She did use Levaquin in April of 2021    Simvastatin Myalgia    Advair Diskus [Fluticasone-Salmeterol] Palpitations     Ok w flovent    Tetracycline Rash     Reaction Date: 10Jan2012;        PAST HISTORY    Past Medical History:   Diagnosis Date    Anesthesia complication     difficulty awakening    Arthritis     Asthma     At risk for falls     rib frac    Benign essential tremor 10/15/2018    Added automatically from request for surgery [id number] has stimulator    COPD (chronic obstructive [facility] disease) [facility] [address] [facility]     Coronary artery disease     Diabetes mellitus ([facility] [address] )     borderline    Difficulty waking     post anesthesia    Edema     bles    GERD (gastroesophageal reflux disease)     High cholesterol     Hypertension     Hypothyroid     Kidney stone     Low back pain     Macular degeneration     Osteopenia     Osteoporosis     S/P deep brain stimulator placement     Shortness of breath     on exertion    Slurred speech     Stroke (HCC)     x 2- states mini strokes    Urinary leakage     Uses roller walker     Vitamin D deficiency     Wears dentures     full upper    Wears glasses     reading       Past Surgical History:   Procedure Laterality Date    CATARACT EXTRACTION W/  INTRAOCULAR LENS IMPLANT Bilateral     COLONOSCOPY      CYSTOSCOPY      CYSTOSCOPY W/ RETROGRADES Left 2/16/2021    Procedure: CYSTO, RETRO PYELOGRAM, STENT EXCHANGE;  Surgeon: Lore Sahu MD;  [facility];  Service: Urology    DEEP BRAIN STIMULATOR PLACEMENT      FL RETROGRADE PYELOGRAM  11/7/2019    FL RETROGRADE PYELOGRAM  5/26/2020    FL RETROGRADE PYELOGRAM  2/16/2021    IR NEPHROSTOMY TO NEPHROURETERAL STENT  1/10/2020    IR TUBE PLACEMENT  11/12/2019    KNEE ARTHROSCOPY      KS CYSTOSCOPY,INSERT URETERAL STENT Left 8/10/2021    Procedure: Nohemi Larsen PYELOGRAM,EXCHANGE STENT URETERAL;  Surgeon: Lalo Patton MD;  [facility];  Service: Urology    CT CYSTOURETHROSCOPY,URETER CATHETER Left 2019    Procedure: Manav Ahumada, URETEROSCOPY;  Surgeon: Consuelo Shen MD;  [facility];  Service: Urology    CT IMP STIM,CRANIAL,SUBQ,1 ARRAY Left 2019    Procedure: REPLACEMENT IMPLANTABLE PULSE GENERATOR (IPG) DEEP BRAIN STIMULATION (DBS), LEFT;  Surgeon: David Landis MD;  [facility];  Service: Neurosurgery    CT IMP STIM,CRANIAL,SUBQ,>1 ARRAY Right 2018    Procedure: REPLACEMENT RIGHT DBS IMPLANTABLE PULSE GENERATOR;  Surgeon: David Landis MD;  [facility];  Service: Neurosurgery    [address] Left 10/27/2020    Procedure: CYSTOSCOPY, RETROGRADE PYELOGRAM, EXCHANGE STENT URETERA L, LEFT URETEROSCOPY;  Surgeon: Lalo Patton MD;  Location: [address];  Service: Urology    CT RMVL & RPLCMT INTLY DWELLING URETERAL STENT PRQ Left 2020    Procedure: Manav Ahumada, STENT;  Surgeon: Lalo Patton MD;  [facility];  Service: Urology    CT THROMBOENDARTECTMY [address] Bench Right 2020    Procedure: CAROTID ENDARTERECTOMY WITH BOVINE PATCH;  Surgeon: Iman Toure MD;  [facility];  Service: Vascular    CT TOTAL HIP ARTHROPLASTY Left 2016    Procedure: ARTHROPLASTY HIP TOTAL ANTERIOR;  Surgeon: Raisa Calle MD;  [facility];  Service: Orthopedics    ROTATOR CUFF REPAIR         Social History     Tobacco Use    Smoking status: Former Smoker     Types: Cigarettes     Quit date:      Years since quittin 6    Smokeless tobacco: Never Used   Vaping Use    Vaping Use: Never used   Substance Use Topics    Alcohol use: Yes     Comment: rare    Drug use: No       Family History   Problem Relation Age of Onset    Breast cancer Mother     Throat cancer Family          The following portions of the patient's history were reviewed in this encounter and updated as appropriate: Past medical, surgical, family, and social history, as well as medications, allergies, and review of systems  EXAM    Vitals:Blood pressure 136/84, pulse 75, temperature 99 2 °F (37 3 °C), temperature source Temporal, resp  rate 16, height 5' 4" (1 626 m), not currently breastfeeding  ,Body mass index is 34 33 kg/m²  Physical Exam  Constitutional:       Appearance: Normal appearance  HENT:      Head: Normocephalic and atraumatic  Eyes:      Extraocular Movements: Extraocular movements intact and EOM normal       Pupils: Pupils are equal, round, and reactive to light  Cardiovascular:      Rate and Rhythm: Normal rate and regular rhythm  Pulses: Normal pulses  Heart sounds: Normal heart sounds  Pulmonary:      Effort: Pulmonary effort is normal       Breath sounds: Normal breath sounds  Abdominal:      General: Abdomen is flat  Palpations: Abdomen is soft  Musculoskeletal:         General: Normal range of motion  Cervical back: Normal range of motion  Skin:     General: Skin is warm  Neurological:      General: No focal deficit present  Mental Status: She is alert and oriented to person, place, and time  Mental status is at baseline  Gait: Gait is intact  Deep Tendon Reflexes: Strength normal    Psychiatric:         Mood and Affect: Mood normal          Speech: Speech normal          Behavior: Behavior normal          Neurologic Exam     Mental Status   Oriented to person, place, and time  Attention: normal    Speech: speech is normal   Level of consciousness: alert    Cranial Nerves     CN II   Visual fields full to confrontation  Visual acuity: normal  Right visual field deficit: none  Left visual field deficit: none     CN III, IV, VI   Pupils are equal, round, and reactive to light    Extraocular motions are normal    CN III: no CN III palsy  CN VI: no CN VI palsy  Nystagmus: none Diplopia: none  Ophthalmoparesis: none  Upgaze: normal  Downgaze: normal  Conjugate gaze: present    CN V   Facial sensation intact  Right facial sensation deficit: none  Left facial sensation deficit: none    CN VII   Facial expression full, symmetric  Right facial weakness: none  Left facial weakness: none    CN VIII   CN VIII normal      CN IX, X   CN IX normal    CN X normal    Palate: symmetric    CN XI   CN XI normal    Right sternocleidomastoid strength: normal  Left sternocleidomastoid strength: normal  Right trapezius strength: normal  Left trapezius strength: normal    CN XII   CN XII normal    Tongue deviation: none    Motor Exam   Muscle bulk: normal  Overall muscle tone: normal  Right arm pronator drift: absent  Left arm pronator drift: absent    Strength   Strength 5/5 throughout  Sensory Exam   Light touch normal      Gait, Coordination, and Reflexes     Gait  Gait: normal    Reflexes   Reflexes 2+ except as noted       Cranial and chest wounds all healed well  No noticeable tremors in bilateral hands at rest       MEDICAL DECISION MAKING    Imaging Studies: None relevant    I have personally reviewed pertinent films in PACS

## 2021-08-17 ENCOUNTER — TELEPHONE (OUTPATIENT)
Dept: NEUROLOGY | Facility: CLINIC | Age: 81
End: 2021-08-17

## 2021-08-17 ENCOUNTER — TELEPHONE (OUTPATIENT)
Dept: GASTROENTEROLOGY | Facility: CLINIC | Age: 81
End: 2021-08-17

## 2021-08-17 ENCOUNTER — TELEPHONE (OUTPATIENT)
Dept: NEUROSURGERY | Facility: CLINIC | Age: 81
End: 2021-08-17

## 2021-08-17 NOTE — TELEPHONE ENCOUNTER
----- Message -----  From: Lula Rain MA  Sent: 8/16/2021   4:09 PM EDT  To: Sachin Jose MA, Hector Mejía, #  Subject: Please see below                                 Not sure what is going on with this patient or what all happened during her visit today? Pt states she will not be having surgery (DBS Battery replacement)  She said she just got off the phone with Dr Taty Barrera and said she just informed her of this  I informed her that if she changes her mind to call us back promptly because we have an opening on 8/25//21 and in order to proceed with this date the hospital needs an EKG, PT/INR and APTT  (If she schedules into September she may need additional tests, as tests done in July could be over 60 days)      Daughter, Maria Guadalupe Tran who was with patient  in car was very irate stating the doctor said the surgery was necessary and nothing would be needed prior to surgery? ? She said patient and daughter will not talk to anyone except the doctor since this was a communication issue with our office and she was told she was having surgery today so she had nothing to eat and she was left in the waiting room for an hour? ?      Daughter said it is best to let her mom cool off a day or 2 and have the doctor call her back? I did not have a chance to tell her she will need Medical Clearance because daughter cut the call off stating, "Have a great day and have the Dr  call back if she wants to apologize?"      If patient decides to take 8/25, please let me know:  She will need to get the EKG, PT/INR, APTT  Hold her Plavix for 7 days  Get Medical Clearance from Dr Anayeli Montesinos her of morning medication instructions to be taken with sip of water only  Sign consent form day of surgery    & she needs instructions for washing prior to surgery (if she even took the soap and wipes today?)

## 2021-08-17 NOTE — TELEPHONE ENCOUNTER
----- Message from Tony Alfred MD sent at 8/9/2021  3:33 PM EDT -----    I called patient and shared with her that the large polyp removed during colonoscopy was precancerous so could have given rise to cancer later had we not removed it  The other polyp removed during colonoscopy was benign  Due to inadequate prep next colonoscopy recommended in 3 months which was already discussed with patient after the procedure and order was placed as well  Staff will call patient to schedule the procedure

## 2021-08-18 ENCOUNTER — TELEPHONE (OUTPATIENT)
Dept: FAMILY MEDICINE CLINIC | Facility: CLINIC | Age: 81
End: 2021-08-18

## 2021-08-18 PROBLEM — R82.71 BACTERIURIA: Status: RESOLVED | Noted: 2021-07-15 | Resolved: 2021-08-18

## 2021-08-18 PROBLEM — M54.9 BACK PAIN: Status: RESOLVED | Noted: 2018-06-25 | Resolved: 2021-08-18

## 2021-08-18 PROBLEM — R01.1 HEART MURMUR: Status: RESOLVED | Noted: 2021-05-04 | Resolved: 2021-08-18

## 2021-08-18 NOTE — TELEPHONE ENCOUNTER
In separate encounter dated: 8/16, I spoke to the patient/daughter and informed them to get the xrays and that they do not need to schedule this

## 2021-08-18 NOTE — LETTER
August 18, 2021     Patient: Carlitos Ryan   YOB: 1940   Date of Visit: 8/18/2021       To Whom it May Concern:    Carlitos Ryan is under my professional care  She was last seen July 13,2021  Her medical history includes :      Patient Active Problem List   Diagnosis    Benign essential hypertension    Benign familial tremor    History of CVA (cerebrovascular accident)    COPD (chronic obstructive pulmonary disease) ([address] )    Esophageal reflux    Gait disturbance    Dyslipidemia    Sleep disorder    Sciatica    Right shoulder pain    Osteoarthritis of hip    OA (osteoarthritis)    Acquired hypothyroidism    Lumbar degenerative disc disease    History of pancreatitis    Aortic valve stenosis - (Moderate )    S/P deep brain stimulator placement    Microscopic hematuria    Dysarthria related to Nov 2018 CVA,improved    Swallowing dysfunction    Weakness of left leg    Hydronephrosis with ureteropelvic junction (UPJ) obstruction    Pyelonephritis    Nephrostomy tube Left    Coronary artery disease involving native coronary artery of native heart without angina pectoris    Diabetes mellitus type 2, diet-controlled (HCC)    Calculus of kidney    Status post RIGHT carotid endarterectomy    Bilateral carotid artery stenosis    PAD (peripheral artery disease) (HCC)    UTI (urinary tract infection)    Anemia     Transfusion history    Ileocecal ulcer     Due to the above, a walker as previously ordered is medically necessary    If you have any questions or concerns, please don't hesitate to call           Sincerely,          Sylvia Zurita, DO        CC: No Recipients

## 2021-08-18 NOTE — TELEPHONE ENCOUNTER
[facility] needs a med necessary not for the walker she has a script for due to her insurance     Please fax note to   Fax

## 2021-08-18 NOTE — TELEPHONE ENCOUNTER
Spoke with patient  Upset as she thought she was having surgery yd  Left neurosurgical appointment  Upset about need for EKG/labs prior to surgery  This all was explained to the best of my ability  We discussed her current state  She denies any return of tremor so far  I do believe her left IPG was turned off by Myrick Cushing given it was low and tremor has not returned thus far  She understands given this she may opt not to have IPG replaced at all unless tremor returns  If she opts to have this replaced, we can send battery to [facility] for further evaluation  Perhaps the reason for no return of tremor may be that chronic stimulation led to a lesion effect? Question regarding why battery lasted less time  We discuss Xray to see if there is an underlying reason  She is agreeable  She wishes to have this done at [facility]  Patient informed orders are in chart so I believe they can find them when she presents for her Xrays  I was unable to answer questions on procedure  Please call patient on cell and let her know if she needs an appointment for these Xray or can just go at anytime to have them done

## 2021-08-19 ENCOUNTER — DOCUMENTATION (OUTPATIENT)
Dept: NEUROSURGERY | Facility: CLINIC | Age: 81
End: 2021-08-19

## 2021-08-19 ENCOUNTER — TELEPHONE (OUTPATIENT)
Dept: FAMILY MEDICINE CLINIC | Facility: CLINIC | Age: 81
End: 2021-08-19

## 2021-08-19 NOTE — TELEPHONE ENCOUNTER
It looks like Antione's procedure with neurosurgeon is planned for September 8th, it appears it would be helpful for her to be off to help her mother with transportation, any recovery but I see no medical indication to give her daughter a letter to miss work for 3 days this week

## 2021-08-19 NOTE — TELEPHONE ENCOUNTER
Patient daughter Sergo eLa called today asking for work excuse letter for 8/20/21 8/21/21 and 8/22/21  Sergo Lea is asking for this letter because of her mother worsening depression  Odin Rojas miss her surgery (was some misunderstanding with dates) and surgery is going to be reschedule but  is not sure about dates  Sergo Lea need this 3 days off to get her ready for surgery  Sergo Leonora will like call back in next 20 minutes

## 2021-08-20 NOTE — TELEPHONE ENCOUNTER
Spoke with patient's daughter Neida Cruz about scheduling repeat procedure  She will speak with her mom and asked that I call her back in a week

## 2021-08-20 NOTE — TELEPHONE ENCOUNTER
Patient and friend, Mia Kim, spoke to me over speaker phone  Patient originally greeted phone call and then had friend explain situation  Patient's letter written by Dr Kristi Morataya 8/18/2021 is not sufficient to provide patient with a walker  The letter needs to state that patient and PCP had a conversation related to this issue/need for walker  Please let me know if this is okay to edit  Thank you

## 2021-08-22 NOTE — TELEPHONE ENCOUNTER
Medicare often requires a face to face recent visit to document need for walker-  She has an appt on 8/30 -   Keep appt

## 2021-08-30 ENCOUNTER — CONSULT (OUTPATIENT)
Dept: FAMILY MEDICINE CLINIC | Facility: CLINIC | Age: 81
End: 2021-08-30
Payer: MEDICARE

## 2021-08-30 ENCOUNTER — HOSPITAL ENCOUNTER (OUTPATIENT)
Dept: NON INVASIVE DIAGNOSTICS | Facility: HOSPITAL | Age: 81
Discharge: HOME | End: 2021-08-30
Attending: NEUROLOGICAL SURGERY
Payer: MEDICARE

## 2021-08-30 ENCOUNTER — APPOINTMENT (OUTPATIENT)
Dept: LAB | Facility: HOSPITAL | Age: 81
End: 2021-08-30
Attending: NEUROLOGICAL SURGERY
Payer: MEDICARE

## 2021-08-30 VITALS
DIASTOLIC BLOOD PRESSURE: 74 MMHG | WEIGHT: 200 LBS | RESPIRATION RATE: 12 BRPM | HEART RATE: 83 BPM | BODY MASS INDEX: 34.15 KG/M2 | HEIGHT: 64 IN | TEMPERATURE: 97.3 F | SYSTOLIC BLOOD PRESSURE: 130 MMHG | OXYGEN SATURATION: 94 %

## 2021-08-30 DIAGNOSIS — Z98.890 STATUS POST CAROTID ENDARTERECTOMY: ICD-10-CM

## 2021-08-30 DIAGNOSIS — I73.9 PAD (PERIPHERAL ARTERY DISEASE) (HCC): ICD-10-CM

## 2021-08-30 DIAGNOSIS — Z93.6 NEPHROSTOMY STATUS (HCC): ICD-10-CM

## 2021-08-30 DIAGNOSIS — G25.0 ESSENTIAL TREMOR: ICD-10-CM

## 2021-08-30 DIAGNOSIS — I25.10 CORONARY ARTERY DISEASE INVOLVING NATIVE CORONARY ARTERY OF NATIVE HEART WITHOUT ANGINA PECTORIS: ICD-10-CM

## 2021-08-30 DIAGNOSIS — Z86.73 HISTORY OF CVA (CEREBROVASCULAR ACCIDENT): ICD-10-CM

## 2021-08-30 DIAGNOSIS — Z01.818 PRE-PROCEDURAL EXAMINATION: ICD-10-CM

## 2021-08-30 DIAGNOSIS — Q62.11 HYDRONEPHROSIS WITH URETEROPELVIC JUNCTION (UPJ) OBSTRUCTION: ICD-10-CM

## 2021-08-30 DIAGNOSIS — I10 BENIGN ESSENTIAL HYPERTENSION: Primary | ICD-10-CM

## 2021-08-30 DIAGNOSIS — E03.9 ACQUIRED HYPOTHYROIDISM: ICD-10-CM

## 2021-08-30 DIAGNOSIS — I35.0 NONRHEUMATIC AORTIC VALVE STENOSIS: ICD-10-CM

## 2021-08-30 DIAGNOSIS — G25.0 BENIGN FAMILIAL TREMOR: ICD-10-CM

## 2021-08-30 DIAGNOSIS — I65.23 BILATERAL CAROTID ARTERY STENOSIS: ICD-10-CM

## 2021-08-30 DIAGNOSIS — Z96.89 S/P DEEP BRAIN STIMULATOR PLACEMENT: ICD-10-CM

## 2021-08-30 LAB
APTT PPP: 30 SECONDS (ref 23–37)
ATRIAL RATE: 64 BPM
ATRIAL RATE: 69 BPM
ATRIAL RATE: 73 BPM
INR PPP: 1.06 (ref 0.84–1.19)
P AXIS: 85 DEGREES
P AXIS: 98 DEGREES
PR INTERVAL: 184 MS
PR INTERVAL: 184 MS
PR INTERVAL: 208 MS
PROTHROMBIN TIME: 13.6 SECONDS (ref 11.6–14.5)
QRS AXIS: -31 DEGREES
QRS AXIS: -41 DEGREES
QRS AXIS: -50 DEGREES
QRSD INTERVAL: 82 MS
QRSD INTERVAL: 86 MS
QRSD INTERVAL: 88 MS
QT INTERVAL: 418 MS
QT INTERVAL: 422 MS
QT INTERVAL: 426 MS
QTC INTERVAL: 435 MS
QTC INTERVAL: 456 MS
QTC INTERVAL: 460 MS
T WAVE AXIS: 133 DEGREES
T WAVE AXIS: 66 DEGREES
T WAVE AXIS: 71 DEGREES
TSH SERPL DL<=0.05 MIU/L-ACNC: 2.17 UIU/ML (ref 0.36–3.74)
VENTRICULAR RATE: 64 BPM
VENTRICULAR RATE: 69 BPM
VENTRICULAR RATE: 73 BPM

## 2021-08-30 PROCEDURE — 36415 COLL VENOUS BLD VENIPUNCTURE: CPT | Performed by: FAMILY MEDICINE

## 2021-08-30 PROCEDURE — 93010 ELECTROCARDIOGRAM REPORT: CPT | Performed by: INTERNAL MEDICINE

## 2021-08-30 PROCEDURE — 99214 OFFICE O/P EST MOD 30 MIN: CPT | Performed by: FAMILY MEDICINE

## 2021-08-30 PROCEDURE — 84443 ASSAY THYROID STIM HORMONE: CPT | Performed by: FAMILY MEDICINE

## 2021-08-30 PROCEDURE — 85730 THROMBOPLASTIN TIME PARTIAL: CPT

## 2021-08-30 PROCEDURE — 93005 ELECTROCARDIOGRAM TRACING: CPT

## 2021-08-30 PROCEDURE — 85610 PROTHROMBIN TIME: CPT

## 2021-08-30 NOTE — PATIENT INSTRUCTIONS
She is medically stable to undergo generator replacement regarding deep brain stimulator on September 8th  She relates she is going for blood work along with EKG today  Last hemoglobin late July 10 0 8, CBC, CMP acceptable at that time  Her last A1c in July was 6 8  Last TSH in January 3 5, she can do TSH again with today's preop blood work  Last cholesterol in January 1 148 with HDL 46, LDL 82  She will be holding Plavix for 5 days preop  Resume after surgery  Use postop nebulizers as needed, she usually uses twice Daily, respiratory status at baseline  She does have prescription for hydrocodone postop but she relates she does not plan to use that  She will continue to see Urology for routine stent care/intermittent replacement as is      We did review her redo colonoscopy earlier this month, had tubular adenoma, due to inadequate prep GI was recommending another colonoscopy in 3 months, she also had one back in May  She will keep appointment with us November 16th for annual wellness check, call sooner if needed

## 2021-08-30 NOTE — PROGRESS NOTES
FAMILY PRACTICE OFFICE VISIT  [facility]  [address]  [address], [place], [address]      NAME: Kendrick Perales  AGE: 78 y o  SEX: female  : 1940   MRN: [id number]    DATE: 2021  TIME: 1:32 PM    Assessment and Plan     Problem List Items Addressed This Visit        Endocrine    Acquired hypothyroidism    Relevant Orders    TSH, 3rd generation       Cardiovascular and Mediastinum    Benign essential hypertension - Primary    Aortic valve stenosis - (Moderate )    Coronary artery disease involving native coronary artery of native heart without angina pectoris    Bilateral carotid artery stenosis    PAD (peripheral artery disease) (HCC)       Nervous and Auditory    Benign familial tremor       Genitourinary    Hydronephrosis with ureteropelvic junction (UPJ) obstruction       Other    History of CVA (cerebrovascular accident)    S/P deep brain stimulator placement    Nephrostomy tube Left    Status post RIGHT carotid endarterectomy          Patient Instructions   She is medically stable to undergo generator replacement regarding deep brain stimulator on   She relates she is going for blood work along with EKG today  Last hemoglobin late July 10 0 8, CBC, CMP acceptable at that time  Her last A1c in July was 6 8  Last TSH in January 3 5, she can do TSH again with today's preop blood work  Last cholesterol in  148 with HDL 46, LDL 82  She will be holding Plavix for 5 days preop  Resume after surgery  Use postop nebulizers as needed, she usually uses twice Daily, respiratory status at baseline  She does have prescription for hydrocodone postop but she relates she does not plan to use that      She will continue to see Urology for routine stent care/intermittent replacement as is      We did review her redo colonoscopy earlier this month, had tubular adenoma, due to inadequate prep GI was recommending another colonoscopy in 3 months, she also had one back in May  She will keep appointment with us November 16th for annual wellness check, call sooner if needed  Chief Complaint     Chief Complaint   Patient presents with    Pre-op Clearance       History of Present Illness   Juliocesar Eugene is a 78y o -year-old female who is in today for clearance regarding upcoming generator replacement deep brain stimulator left chest, overall she is feeling fairly well  She has been using nebulizer twice daily, respiratory status at baseline  She is using medications as directed  She does continue to see Urology with stent replacement as indicated, no recent fever, chills, increased flank pain, increased abdominal pain or other new urinary symptoms  She did have another colonoscopy in early August, had tubular adenoma, inadequate prep, also had colonoscopy 3 months prior  Has had no new GI complaints  Review of Systems   Review of Systems   Constitutional: Negative for appetite change, fatigue, fever and unexpected weight change  HENT: Negative for sore throat and trouble swallowing  Respiratory:        She has no increased cough or shortness of breath, she uses nebulizer twice daily  No hemoptysis   Cardiovascular: Positive for leg swelling (Mild chronic)  Negative for chest pain and palpitations  Gastrointestinal: Negative for abdominal pain, blood in stool, nausea and vomiting  No acid reflux     No change in bowel   Genitourinary: Negative for dysuria and hematuria  Musculoskeletal: Positive for back pain  Neurological: Positive for tremors (Much improved with the BS)  Negative for dizziness, syncope, light-headedness and headaches  Hematological: Bruises/bleeds easily (On Plavix, no bleeding)  Psychiatric/Behavioral: Negative for behavioral problems and confusion         Active Problem List     Patient Active Problem List   Diagnosis    Benign essential hypertension    Benign familial tremor    History of CVA (cerebrovascular accident)    COPD (chronic obstructive pulmonary disease) (HCC)    Esophageal reflux    Gait disturbance    Dyslipidemia    Sleep disorder    Sciatica    Right shoulder pain    Osteoarthritis of hip    OA (osteoarthritis)    Acquired hypothyroidism    Lumbar degenerative disc disease    History of pancreatitis    Aortic valve stenosis - (Moderate )    S/P deep brain stimulator placement    Microscopic hematuria    Dysarthria related to Nov 2018 CVA,improved    Swallowing dysfunction    Weakness of left leg    Hydronephrosis with ureteropelvic junction (UPJ) obstruction    Pyelonephritis    Nephrostomy tube Left    Coronary artery disease involving native coronary artery of native heart without angina pectoris    Diabetes mellitus type 2, diet-controlled ([facility])    Calculus of kidney    Status post RIGHT carotid endarterectomy    Bilateral carotid artery stenosis    PAD (peripheral artery disease) ([facility])    UTI (urinary tract infection)    Anemia     Transfusion history    Ileocecal ulcer       Past Medical History:  Reviewed    Past Surgical History:  Past Surgical History:   Procedure Laterality Date    CATARACT EXTRACTION W/  INTRAOCULAR LENS IMPLANT Bilateral     COLONOSCOPY      CYSTOSCOPY      CYSTOSCOPY W/ RETROGRADES Left 2/16/2021    Procedure: CYSTO, RETRO PYELOGRAM, STENT EXCHANGE;  Surgeon: Rosaline Quiroz MD;  [facility];  Service: Urology    DEEP BRAIN STIMULATOR PLACEMENT      FL RETROGRADE PYELOGRAM  11/7/2019    FL RETROGRADE PYELOGRAM  5/26/2020    FL RETROGRADE PYELOGRAM  2/16/2021    IR NEPHROSTOMY TO NEPHROURETERAL STENT  1/10/2020    IR TUBE PLACEMENT  11/12/2019    KNEE ARTHROSCOPY      WI CYSTOSCOPY,INSERT URETERAL STENT Left 8/10/2021    Procedure: CYSTOSCOPY, RETROGRADE PYELOGRAM,EXCHANGE STENT URETERAL;  Surgeon: Rosaline Quiroz MD;  [facility];  Service: Urology    IA CYSTOURETHROSCOPY,URETER CATHETER Left 11/7/2019    Procedure: CYSTO RETROGRADE PYELOGRAM, URETEROSCOPY;  Surgeon: Benjamin Kohler MD;  [facility];  Service: Urology    IA IMP STIM,CRANIAL,SUBQ,1 ARRAY Left 1/22/2019    Procedure: REPLACEMENT IMPLANTABLE PULSE GENERATOR (IPG) DEEP BRAIN STIMULATION (DBS), LEFT;  Surgeon: Jana Hernandez MD;  [facility];  Service: Neurosurgery    IA IMP STIM,CRANIAL,SUBQ,>1 ARRAY Right 12/13/2018    Procedure: REPLACEMENT RIGHT DBS IMPLANTABLE PULSE GENERATOR;  Surgeon: Jana Hernandez MD;  [facility];  Service: Neurosurgery    [address] Left 10/27/2020    Procedure: Suezanne Karena PYELOGRAM, EXCHANGE STENT URETERA L, LEFT URETEROSCOPY;  Surgeon: Elieser Emery MD;  Location:  [facility];  Service: Urology    IA RMVL & RPLCMT INTLY DWELLING URETERAL STENT PRQ Left 5/26/2020    Procedure: Alton Overton, STENT;  Surgeon: Elieser Emery MD;  [facility];  Service: Urology    IA THROMBOENDARTECTMY Cleavon So Right 2/18/2020    Procedure: CAROTID ENDARTERECTOMY WITH BOVINE PATCH;  Surgeon: Amrik Mayfield MD;  [facility];  Service: Vascular    IA TOTAL HIP ARTHROPLASTY Left 5/20/2016    Procedure: ARTHROPLASTY HIP TOTAL ANTERIOR;  Surgeon: Reddy Troy MD;  [facility];  Service: Orthopedics    ROTATOR CUFF REPAIR           Family History:  Reviewed    Social History:  Reviewed    Objective     Vitals:    08/30/21 1245   BP: 130/74   BP Location: Left arm   Patient Position: Sitting   Cuff Size: Standard   Pulse: 83   Resp: 12   Temp: (!) 97 3 °F (36 3 °C)   SpO2: 94%   Weight: 90 7 kg (200 lb)   Height: 5' 4" (1 626 m)     Body mass index is 34 33 kg/m²      BP Readings from Last 3 Encounters:   08/30/21 130/74   08/16/21 136/84   08/10/21 139/64       Wt Readings from Last 3 Encounters:   08/30/21 90 7 kg (200 lb)   08/10/21 90 7 kg (200 lb) 08/05/21 90 7 kg (200 lb)       Physical Exam  Constitutional:       Comments: Pleasant 78year-old seated chair, looks as a baseline, does have a Rollator walker with her   Eyes:      General: No scleral icterus  Cardiovascular:      Comments: Regular rate and rhythm, aortic murmur present  Pulmonary:      Comments: Few rhonchi as usual, good breath sounds, no rales  Abdominal:      Palpations: Abdomen is soft  Tenderness: There is no abdominal tenderness  There is no right CVA tenderness, left CVA tenderness or guarding  Musculoskeletal:      Comments: Very slight trace pitting bilateral ankle as a baseline   Lymphadenopathy:      Cervical: No cervical adenopathy  Skin:     Coloration: Skin is not jaundiced  Neurological:      Mental Status: She is oriented to person, place, and time  Psychiatric:         Behavior: Behavior normal          ALLERGIES:  Allergies   Allergen Reactions    Ciprofloxacin Diarrhea     She did use Levaquin in April of 2021    Simvastatin Myalgia    Advair Diskus [Fluticasone-Salmeterol] Palpitations     Ok w flovent    Tetracycline Rash     Reaction Date: [id number];        Current Medications     Current Outpatient Medications   Medication Sig Dispense Refill    acetaminophen (TYLENOL) 500 mg tablet Take 1,000 mg by mouth every 6 (six) hours as needed for mild pain       albuterol (2 5 mg/3 mL) 0 083 % nebulizer solution Take 1 vial (2 5 mg total) by nebulization every 6 (six) hours as needed for shortness of breath 120 vial 5    cefuroxime (CEFTIN) 250 mg tablet Take 250 mg by mouth every 12 (twelve) hours To start 3 days prior to surgery      Cholecalciferol (VITAMIN D-3 PO) Take 1,000 Units by mouth daily        clopidogrel (PLAVIX) 75 mg tablet Take 1 tablet (75 mg total) by mouth daily (Patient taking differently: Take 75 mg by mouth daily Does not have to stop prior to surgery) 90 tablet 3    diltiazem (CARDIZEM CD) 240 mg 24 hr capsule TAKE 1 CAPSULE (240 MG TOTAL) BY MOUTH DAILY 90 capsule 3    Fluticasone Propionate, Inhal, (Flovent Diskus) 250 MCG/BLIST AEPB Inhale 1 puff 2 (two) times a day 1 each 5    furosemide (LASIX) 20 mg tablet TAKE 1 TABLET (20 MG TOTAL) BY MOUTH DAILY 90 tablet 2    levothyroxine 125 mcg tablet TAKE 1 TABLET (125 MCG TOTAL) BY MOUTH DAILY 90 tablet 3    Misc  Devices (Roller Walker) MISC Needs ROLLATOR WALKER re history CVA, leg weakness, sciatica, osteoarthritis, gait dysfunction 1 each 0    Multiple Vitamins-Minerals (PRESERVISION AREDS PO) Take 1 Cap by Mouth daily      multivitamin (THERAGRAN) TABS Take 1 tablet by mouth daily      rosuvastatin (CRESTOR) 5 mg tablet Uses twice weekly (Patient taking differently: Uses twice weekly Monday and Friday) 40 tablet 3    HYDROcodone-acetaminophen (NORCO) 5-325 mg per tablet 1-2 tab every 6 hr if needed for pain (Patient not taking: Reported on 8/16/2021) 10 tablet 0    Na Sulfate-K Sulfate-Mg Sulf (Suprep Bowel Prep Kit) 17 5-3 13-1 6 GM/177ML SOLN Take 177 mL by mouth once for 1 dose 177 mL 0    pantoprazole (PROTONIX) 40 mg tablet Take 1 tablet (40 mg total) by mouth daily (Patient not taking: Reported on 8/16/2021) 30 tablet 0     No current facility-administered medications for this visit              Orders Placed This Encounter   Procedures    TSH, 3rd generation         Evelena Shape, DO

## 2021-09-01 NOTE — PRE-PROCEDURE INSTRUCTIONS
Pre-Surgery Instructions:   Medication Instructions    acetaminophen (TYLENOL) 500 mg tablet Instructed patient per Anesthesia Guidelines   albuterol (2 5 mg/3 mL) 0 083 % nebulizer solution Instructed patient per Anesthesia Guidelines   Cholecalciferol (VITAMIN D-3 PO) Instructed patient per Anesthesia Guidelines   clopidogrel (PLAVIX) 75 mg tablet Instructed patient per Anesthesia Guidelines   diltiazem (CARDIZEM CD) 240 mg 24 hr capsule Instructed patient per Anesthesia Guidelines   Fluticasone Propionate, Inhal, (Flovent Diskus) 250 MCG/BLIST AEPB Instructed patient per Anesthesia Guidelines   furosemide (LASIX) 20 mg tablet Instructed patient per Anesthesia Guidelines   levothyroxine 125 mcg tablet Instructed patient per Anesthesia Guidelines   Multiple Vitamins-Minerals (PRESERVISION AREDS PO) Instructed patient per Anesthesia Guidelines   multivitamin (THERAGRAN) TABS Instructed patient per Anesthesia Guidelines   rosuvastatin (CRESTOR) 5 mg tablet Instructed patient per Anesthesia Guidelines  Instructed to take diltiazem/ levothyroxine am of surgery with sip of water per anesthesia guidelines  To use flovent inhaler am of surgery   Use albuterol nebulizer if needed am of surgery   Can take tylenol if needed am of surgery

## 2021-09-07 ENCOUNTER — ANESTHESIA EVENT (OUTPATIENT)
Dept: PERIOP | Facility: HOSPITAL | Age: 81
End: 2021-09-07
Payer: MEDICARE

## 2021-09-08 ENCOUNTER — ANESTHESIA (OUTPATIENT)
Dept: PERIOP | Facility: HOSPITAL | Age: 81
End: 2021-09-08
Payer: MEDICARE

## 2021-09-08 ENCOUNTER — HOSPITAL ENCOUNTER (OUTPATIENT)
Facility: HOSPITAL | Age: 81
Setting detail: OUTPATIENT SURGERY
Discharge: HOME | End: 2021-09-08
Attending: NEUROLOGICAL SURGERY | Admitting: NEUROLOGICAL SURGERY
Payer: MEDICARE

## 2021-09-08 VITALS
OXYGEN SATURATION: 96 % | RESPIRATION RATE: 21 BRPM | DIASTOLIC BLOOD PRESSURE: 83 MMHG | WEIGHT: 200 LBS | TEMPERATURE: 96 F | HEIGHT: 64 IN | BODY MASS INDEX: 34.15 KG/M2 | HEART RATE: 86 BPM | SYSTOLIC BLOOD PRESSURE: 179 MMHG

## 2021-09-08 DIAGNOSIS — I63.9 CEREBROVASCULAR ACCIDENT (CVA), UNSPECIFIED MECHANISM (HCC): ICD-10-CM

## 2021-09-08 DIAGNOSIS — Z98.890 POST-OPERATIVE STATE: Primary | ICD-10-CM

## 2021-09-08 LAB — GLUCOSE SERPL-MCNC: 139 MG/DL (ref 65–140)

## 2021-09-08 PROCEDURE — 99024 POSTOP FOLLOW-UP VISIT: CPT | Performed by: NEUROLOGICAL SURGERY

## 2021-09-08 PROCEDURE — 61885 INSRT/REDO NEUROSTIM 1 ARRAY: CPT | Performed by: NEUROLOGICAL SURGERY

## 2021-09-08 PROCEDURE — C1767 GENERATOR, NEURO NON-RECHARG: HCPCS | Performed by: NEUROLOGICAL SURGERY

## 2021-09-08 PROCEDURE — C1787 PATIENT PROGR, NEUROSTIM: HCPCS | Performed by: NEUROLOGICAL SURGERY

## 2021-09-08 PROCEDURE — 82948 REAGENT STRIP/BLOOD GLUCOSE: CPT

## 2021-09-08 DEVICE — ENVELOPE TYRX NEURO ABS ANBCTRL LRG: Type: IMPLANTABLE DEVICE | Site: CHEST | Status: FUNCTIONAL

## 2021-09-08 DEVICE — NEUROSTIMULATOR IMPLANTABLE ACTIVA SC MULTIPROGRAM: Type: IMPLANTABLE DEVICE | Site: CHEST | Status: FUNCTIONAL

## 2021-09-08 RX ORDER — HYDROMORPHONE HCL IN WATER/PF 6 MG/30 ML
0.2 PATIENT CONTROLLED ANALGESIA SYRINGE INTRAVENOUS
Status: DISCONTINUED | OUTPATIENT
Start: 2021-09-08 | End: 2021-09-08 | Stop reason: HOSPADM

## 2021-09-08 RX ORDER — SODIUM CHLORIDE, SODIUM LACTATE, POTASSIUM CHLORIDE, CALCIUM CHLORIDE 600; 310; 30; 20 MG/100ML; MG/100ML; MG/100ML; MG/100ML
50 INJECTION, SOLUTION INTRAVENOUS CONTINUOUS
Status: DISCONTINUED | OUTPATIENT
Start: 2021-09-08 | End: 2021-09-08 | Stop reason: HOSPADM

## 2021-09-08 RX ORDER — ONDANSETRON 2 MG/ML
4 INJECTION INTRAMUSCULAR; INTRAVENOUS ONCE AS NEEDED
Status: DISCONTINUED | OUTPATIENT
Start: 2021-09-08 | End: 2021-09-08 | Stop reason: HOSPADM

## 2021-09-08 RX ORDER — ONDANSETRON 2 MG/ML
4 INJECTION INTRAMUSCULAR; INTRAVENOUS ONCE
Status: DISCONTINUED | OUTPATIENT
Start: 2021-09-08 | End: 2021-09-08 | Stop reason: HOSPADM

## 2021-09-08 RX ORDER — SODIUM CHLORIDE, SODIUM LACTATE, POTASSIUM CHLORIDE, CALCIUM CHLORIDE 600; 310; 30; 20 MG/100ML; MG/100ML; MG/100ML; MG/100ML
INJECTION, SOLUTION INTRAVENOUS CONTINUOUS PRN
Status: DISCONTINUED | OUTPATIENT
Start: 2021-09-08 | End: 2021-09-08

## 2021-09-08 RX ORDER — BUPIVACAINE HYDROCHLORIDE AND EPINEPHRINE 5; 5 MG/ML; UG/ML
INJECTION, SOLUTION EPIDURAL; INTRACAUDAL; PERINEURAL AS NEEDED
Status: DISCONTINUED | OUTPATIENT
Start: 2021-09-08 | End: 2021-09-08 | Stop reason: HOSPADM

## 2021-09-08 RX ORDER — PROPOFOL 10 MG/ML
INJECTION, EMULSION INTRAVENOUS AS NEEDED
Status: DISCONTINUED | OUTPATIENT
Start: 2021-09-08 | End: 2021-09-08

## 2021-09-08 RX ORDER — FENTANYL CITRATE 50 UG/ML
INJECTION, SOLUTION INTRAMUSCULAR; INTRAVENOUS AS NEEDED
Status: DISCONTINUED | OUTPATIENT
Start: 2021-09-08 | End: 2021-09-08

## 2021-09-08 RX ORDER — CLOPIDOGREL BISULFATE 75 MG/1
75 TABLET ORAL DAILY
Qty: 90 TABLET | Refills: 0 | Status: SHIPPED | OUTPATIENT
Start: 2021-09-13 | End: 2022-01-02 | Stop reason: SDUPTHER

## 2021-09-08 RX ORDER — PROPOFOL 10 MG/ML
INJECTION, EMULSION INTRAVENOUS CONTINUOUS PRN
Status: DISCONTINUED | OUTPATIENT
Start: 2021-09-08 | End: 2021-09-08

## 2021-09-08 RX ORDER — OXYCODONE HYDROCHLORIDE AND ACETAMINOPHEN 5; 325 MG/1; MG/1
1 TABLET ORAL EVERY 4 HOURS PRN
Status: DISCONTINUED | OUTPATIENT
Start: 2021-09-08 | End: 2021-09-08 | Stop reason: HOSPADM

## 2021-09-08 RX ORDER — KETAMINE HYDROCHLORIDE 50 MG/ML
INJECTION, SOLUTION, CONCENTRATE INTRAMUSCULAR; INTRAVENOUS AS NEEDED
Status: DISCONTINUED | OUTPATIENT
Start: 2021-09-08 | End: 2021-09-08

## 2021-09-08 RX ORDER — FENTANYL CITRATE/PF 50 MCG/ML
25 SYRINGE (ML) INJECTION
Status: DISCONTINUED | OUTPATIENT
Start: 2021-09-08 | End: 2021-09-08 | Stop reason: HOSPADM

## 2021-09-08 RX ORDER — OXYCODONE HYDROCHLORIDE 5 MG/1
2.5 TABLET ORAL EVERY 6 HOURS PRN
Qty: 6 TABLET | Refills: 0 | Status: SHIPPED | OUTPATIENT
Start: 2021-09-08 | End: 2021-09-11

## 2021-09-08 RX ORDER — CEFAZOLIN SODIUM 1 G/3ML
INJECTION, POWDER, FOR SOLUTION INTRAMUSCULAR; INTRAVENOUS AS NEEDED
Status: DISCONTINUED | OUTPATIENT
Start: 2021-09-08 | End: 2021-09-08

## 2021-09-08 RX ADMIN — FENTANYL CITRATE 25 MCG: 50 INJECTION INTRAMUSCULAR; INTRAVENOUS at 08:51

## 2021-09-08 RX ADMIN — CEFAZOLIN 2000 MG: 1 INJECTION, POWDER, FOR SOLUTION INTRAMUSCULAR; INTRAVENOUS at 08:25

## 2021-09-08 RX ADMIN — NOREPINEPHRINE BITARTRATE 2 MCG/KG/MIN: 1 INJECTION, SOLUTION, CONCENTRATE INTRAVENOUS at 08:46

## 2021-09-08 RX ADMIN — PROPOFOL 60 MCG/KG/MIN: 10 INJECTION, EMULSION INTRAVENOUS at 08:20

## 2021-09-08 RX ADMIN — FENTANYL CITRATE 25 MCG: 50 INJECTION INTRAMUSCULAR; INTRAVENOUS at 08:38

## 2021-09-08 RX ADMIN — KETAMINE HYDROCHLORIDE 10 MG: 50 INJECTION, SOLUTION INTRAMUSCULAR; INTRAVENOUS at 08:31

## 2021-09-08 RX ADMIN — KETAMINE HYDROCHLORIDE 10 MG: 50 INJECTION, SOLUTION INTRAMUSCULAR; INTRAVENOUS at 08:42

## 2021-09-08 RX ADMIN — SODIUM CHLORIDE, SODIUM LACTATE, POTASSIUM CHLORIDE, AND CALCIUM CHLORIDE: .6; .31; .03; .02 INJECTION, SOLUTION INTRAVENOUS at 08:12

## 2021-09-08 RX ADMIN — PROPOFOL 20 MG: 10 INJECTION, EMULSION INTRAVENOUS at 09:08

## 2021-09-08 RX ADMIN — PROPOFOL 20 MG: 10 INJECTION, EMULSION INTRAVENOUS at 08:35

## 2021-09-08 RX ADMIN — PROPOFOL 20 MG: 10 INJECTION, EMULSION INTRAVENOUS at 08:48

## 2021-09-08 NOTE — H&P
History & Physical Exam  Ciro Cardenas 78 y o  female MRN: [id number]    Assessment & Plan:   Patient is a 78 y o  female who presented with left IPG EOL, now undergoing IPG replacement after my evaluation and medical clearance  No changes per patient since my last evaluation in clinic on 8/16/21      History:  Past Medical History:   Diagnosis Date    Anemia     Anesthesia complication     difficulty awakening    Anesthesia complication     difficulty awakening    Arthritis     Asthma     At risk for falls     rib frac    Benign essential tremor 10/15/2018    Added automatically from request for surgery [id number] has stimulator    [facility] obstructive [facility]) [facility] [address] [facility]     Coronary artery disease     Diabetes mellitus ([facility] [address] )     borderline    Disease of thyroid gland     hypothyroid    Edema     bles    GERD (gastroesophageal reflux disease)     resolved    Glaucoma     Heart murmur     High cholesterol     Hypertension     Kidney stone     Macular degeneration     Osteopenia     Osteoporosis     S/P deep brain stimulator placement     Shortness of breath     on exertion    Slurred speech     Stroke (HCC)     x 2- states mini strokes    Urinary leakage     Uses roller walker     with seat    Vitamin D deficiency     Wears dentures     full upper    Wears glasses     reading     Past Surgical History:   Procedure Laterality Date    CATARACT EXTRACTION W/  INTRAOCULAR LENS IMPLANT Bilateral     COLONOSCOPY      CYSTOSCOPY      CYSTOSCOPY W/ RETROGRADES Left 2/16/2021    Procedure: CYSTO, RETRO PYELOGRAM, STENT EXCHANGE;  Surgeon: Luis Puri MD;  [facility];  Service: Urology    DEEP BRAIN STIMULATOR PLACEMENT      FL RETROGRADE PYELOGRAM  11/7/2019    FL RETROGRADE PYELOGRAM  5/26/2020    FL RETROGRADE PYELOGRAM  2/16/2021    IR NEPHROSTOMY TO NEPHROURETERAL STENT  1/10/2020    IR TUBE PLACEMENT  11/12/2019    KNEE ARTHROSCOPY      KS CYSTOSCOPY,INSERT URETERAL STENT Left 8/10/2021    Procedure: CYSTOSCOPY, RETROGRADE PYELOGRAM,EXCHANGE STENT URETERAL;  Surgeon: Ivon Cavazos MD;  [facility];  Service: Urology    MN CYSTOURETHROSCOPY,URETER CATHETER Left 11/7/2019    Procedure: Yudy Cervantes, URETEROSCOPY;  Surgeon: Tyler Araiza MD;  [facility];  Service: Urology    MN IMP STIM,CRANIAL,SUBQ,1 ARRAY Left 1/22/2019    Procedure: REPLACEMENT IMPLANTABLE PULSE GENERATOR (IPG) DEEP BRAIN STIMULATION (DBS), LEFT;  Surgeon: Jameson Granger MD;  [facility];  Service: Neurosurgery    MN IMP STIM,CRANIAL,SUBQ,>1 ARRAY Right 12/13/2018    Procedure: REPLACEMENT RIGHT DBS IMPLANTABLE PULSE GENERATOR;  Surgeon: Jameson Granger MD;  [facility];  Service: Neurosurgery    [address] Left 10/27/2020    Procedure: Mirtha Hayes PYELOGRAM, EXCHANGE STENT URETERA L, LEFT URETEROSCOPY;  Surgeon: Ivon Cavazos MD;  Location: [address] [place] [address] MAIN OR;  Service: Urology    MN RMVL & RPLCMT INTLY DWELLING URETERAL STENT PRQ Left 5/26/2020    Procedure: Yudy Cervantes, STENT;  Surgeon: Ivon Cavazos MD;  [facility];  Service: Urology    MN THROMBOENDARTECTMY Alexis [place]haka Right 2/18/2020    Procedure: CAROTID ENDARTERECTOMY WITH BOVINE PATCH;  Surgeon: Jhonatan Cannon MD;  [facility];  Service: Vascular    MN TOTAL HIP ARTHROPLASTY Left 5/20/2016    Procedure: ARTHROPLASTY HIP TOTAL ANTERIOR;  Surgeon: Shira Thornton MD;  [facility];  Service: Orthopedics    ROTATOR CUFF REPAIR       Social History     Current medications:  No current facility-administered medications for this encounter  Allergies:   Allergies   Allergen Reactions    Ciprofloxacin Diarrhea     She did use Levaquin in April of 2021    Simvastatin Myalgia    Advair Diskus [Fluticasone-Salmeterol] Palpitations     Ok w flovent    Tetracycline Rash     Reaction Date: [id number];        Vitals:  Vitals:    09/01/21 0820 09/08/21 0659   BP:  143/80   Pulse:  67   Resp:  18   Temp:  98 2 °F (36 8 °C)   TempSrc:  Temporal   SpO2:  97%   Weight: 90 7 kg (200 lb) 90 7 kg (200 lb)   Height: 5' 4" (1 626 m) 5' 4" (1 626 m)       Physical exam:  Unchanged from my last clinic note on 8/16/21 (please see chart)     Lab Results: All relevant preoperative labs reviewed  Imaging: All relevant preoperative imaging reviewed  EKG, Pathology, and Other Studies: All relevant preoperative studies reviewed    We will proceed with surgery as planned      Alem Meade MD

## 2021-09-08 NOTE — ANESTHESIA POSTPROCEDURE EVALUATION
Post-Op Assessment Note    CV Status:  Stable  Pain Score: 0    Pain management: adequate     Mental Status:  Alert and awake   Hydration Status:  Stable   PONV Controlled:  None   Airway Patency:  Patent and adequate      Post Op Vitals Reviewed: Yes      Staff: Anesthesiologist, CRNA   Comments: Patient to PACU awake, alert, oriented, following commands, equal strength in all extremities  Expresses gratitude for care  No complications documented      BP   119/60   Temp 97 7   Pulse 69   Resp 14   SpO2 97% on RA

## 2021-09-08 NOTE — ANESTHESIA PREPROCEDURE EVALUATION
Procedure:  LEFT IMPLANTABLE PULSE GENERATOR REPLACEMENT/ DEEP BRAIN STIMULATOR GENERATOR,LEFT CHEST (Left Chest)    Relevant Problems   CARDIO   (+) Aortic valve stenosis - (Moderate )   (+) Benign essential hypertension   (+) Coronary artery disease involving native coronary artery of native heart without angina pectoris      ENDO   (+) Acquired hypothyroidism      GI/HEPATIC   (+) Esophageal reflux      /RENAL   (+) Calculus of kidney   (+) Hydronephrosis with ureteropelvic junction (UPJ) obstruction      HEMATOLOGY   (+) Anemia       MUSCULOSKELETAL   (+) Lumbar degenerative disc disease   (+) OA (osteoarthritis)   (+) Osteoarthritis of hip   (+) Sciatica      NEURO/PSYCH   (+) History of CVA (cerebrovascular accident)   (+) History of pancreatitis      PULMONARY   (+) COPD (chronic obstructive pulmonary disease) (HCC)        Physical Exam    Airway    Mallampati score: II  TM Distance: >3 FB  Neck ROM: full     Dental   Comment: Edentulous, upper dentures and lower dentures,     Cardiovascular  Cardiovascular exam normal    Pulmonary  Pulmonary exam normal     Other Findings        Anesthesia Plan  ASA Score- 4     Anesthesia Type- IV sedation with anesthesia with ASA Monitors  Additional Monitors:   Airway Plan:           Plan Factors-Exercise tolerance (METS): >4 METS  Chart reviewed  EKG reviewed  Induction- intravenous  Postoperative Plan-     Informed Consent- Anesthetic plan and risks discussed with patient

## 2021-09-08 NOTE — OP NOTE
Operative Note     Patient name: Alannah Jean-Baptiste  Patient MRN: [id number]    9/8/2021    Pre-op Diagnosis: Essential tremors    Post-op Dignosis: Essential tremors      Procedure:  Left-sided implantable pulse generator (IPG) replacement    Surgeon: Surgeon(s) and Role:     Nakul Hidalgo MD - Primary     Anesthesia: Sedation with IV anesthesia    Estimated Blood Loss: 5 cc    Specimens: None    Drains: None    Findings:  The previous battery in the left chest was found to be non-functional  After discussion with her neurologist, we decided to replace the previous battery with a new battery with unchanged settings  The DBS leads were found to be intact  The previous battery, once removed, was sent for analysis  The new battery was found to be functional upon multiple interrogations  Complications:  None    INDICATIONS  Patient has a history of essential tremors s/p DBS and IPG placement and presented with battery end of life  After discussion with her neurologist, we decided to replace the previous battery with a new battery with unchanged settings  DETAILS OF PROCEDURE  The patient was brought to the OR and adequately sedated by the anesthesia team     The left-sided chest incision over the IPG was marked  The area was prepped and draped in standard fashion  A timeout was performed by the neurosurgery, anesthesia, and nursing teams  The patient received antibiotics per protocol  The incision was infiltrated with local anesthetic, Marcaine with Epinephrine  The previous incision was reopened with a scalpel, and the pocket was dissected bluntly and with Bovie cautery when necessary  Hemostasis was maintained  The IPG was encountered and fully dissected  The pocket was extended adequately to replace the previous IPG with a new IPG (Medtronic, same settings)  The new IPG was covered with antibiotic coating (Tyrx, Medtronic) and secured in place with silk sutures   The distal end of the DBS lead was intact  The new IPG was found to be functional at the desired settings upon multiple interrogations  The new pocket was copiously irrigated with antibiotic-infused irrigation  Hemostasis was confirmed prior to closure  The incision was infiltrated with local anesthetic, Marcaine with Epinephrine  All instrument counts, sponge counts, and needle counts were correct prior to closure of the skin  The dermal layer was closed with 2-0 Vicryl sutures, and the skin was reapproximated with absorbable Monocryl suture  The incision was cleansed, then sealed and dressed with Steri-strips  The drapes were withdrawn, and the area was cleansed  The patient woke up from sedation, was examined in the operative room (found to be at neurologic baseline), transferred to the recovery units, and discharged home on the same day without any active issues or concerns      SIGNATURE: Taty Barrera MD  DATE: 9/8/2021   TIME: 9:36 AM

## 2021-09-08 NOTE — DISCHARGE INSTR - AVS FIRST PAGE
Please do NOT take Plavix until post operative day 5, Monday 9/13/2021  This is the date that you are allowed to resume taking this medication after surgery  If you have any questions please contact the neurosurgical office

## 2021-09-08 NOTE — DISCHARGE INSTRUCTIONS
Discharge Instructions  Implantable Pulse Generator (IPG/battery) placement    Activity:  1  Do not lift more than 10 pounds for 6 weeks  2  Avoid bending, lifting and twisting for 6 weeks  3  No driving for at least 2 weeks or until cleared by Neurosurgery  4  When able to shower, continue to use clean towel and washcloth for 2 weeks post-op  5  Do not use a hair dryer, and avoid hair products such as mousse, oils, and gels  Do not brush your hair away from the incision since this will put strain on the suture line  6  Do not dye or perm hair for 6 weeks or until cleared by MD   7  Continue to change bed linens and pajamas more frequently  Wear clean clothes daily  8  Stimulator programming will occur with your neurologist approximately 2 weeks following this procedure, call to verify your scheduled appointments  9  Continue home medications for Parkinson's or essential tremor  Surgical incision care:  1  Keep dressings in place for 3 days  2  Keep incisions dry for 3 days  3  May allow clean water to flow over incisions after 3 days  4  Do not immerse the incisions in water for 4 weeks  5  After 3 days, incisions may be left open to air, but should remain clean  6  Do not apply any creams or ointments to the incision, unless otherwise instructed by [facility]  7  Contact office if increasing redness, drainage, pain or swelling occurs around the incisions or if you develop a fever greater than 101F  8  Do not dye/perm hair or use any hair products anticipated 6 weeks or until cleared by Neurosurgery  Postoperative medication:  1  Complete course of 3 day postoperative course of antibiotic as directed  2  [facility] will provide pain medication in the postoperative period  All prescriptions must come from a single provider  a  Take medications as prescribed  Call office with any questions/concerns  b  May use over the counter Tylenol   No non steroidal anti-inflammatory drugs (NSAIDs) (ie  Ibuprofen, Aleve, Advil, Naproxen)  3  Please contact office for questions regarding dosage and modifications  4  No antiplatelet or  anticoagulation medication until cleared by [address], unless otherwise instructed  5  Do not operate heavy machinery or vehicles while taking sedating medications  6  Use a bowel regimen while on opioids as they induce constipation  Ie  Senokot-S, Miralax, Colace, etc  Increase fiber and water intake

## 2021-09-09 ENCOUNTER — TELEPHONE (OUTPATIENT)
Dept: NEUROSURGERY | Facility: CLINIC | Age: 81
End: 2021-09-09

## 2021-09-09 NOTE — TELEPHONE ENCOUNTER
Called Desi the morning after she was discharged s/p replacement of left IPG  She is in good spirits, grateful for her care  She is eager to carry on with her day  Denies any significant pain and remains at her baseline  She will call my office with any questions and/or concerns      Rodger Huang MD

## 2021-09-10 ENCOUNTER — TELEPHONE (OUTPATIENT)
Dept: NEUROSURGERY | Facility: CLINIC | Age: 81
End: 2021-09-10

## 2021-09-10 NOTE — TELEPHONE ENCOUNTER
Called patient to provide post op call to see how the patient is doing and review post op instructions  Patient did not answer  In Epic, there is a note in the demographics stating if the patient does not answer the phone, then to call the patient's daughter, Maria Guadalupe Tran

## 2021-09-10 NOTE — TELEPHONE ENCOUNTER
Called Gaynelle Lesches and requested to speak with the patient to review post op instructions  Gaynelle Lesches tried to call the patient, but she did not   Tried to review post op instructions with Gaynelle Lesches, however she requested for the office to call the patient back

## 2021-09-10 NOTE — TELEPHONE ENCOUNTER
Patient returned the call  She reports she is doing well overall and denies any incisional issues or fevers  Patient is able to ambulate around the house and complete ADLs  Educated the patient about the importance of preventing blood clots and provided measures how to prevent them  Patient has moved her bowels since the surgery  Reviewed incision care with the patient  Advised that after three days she may take a shower and gently wash the surgical site with soap and water  Use clean wash cloth, towels, and clothing  Do not submerge in water until cleared by the surgeon  Do not apply any creams, ointments, or lotions to the site  Patient is aware to call the office if any redness, swelling, drainage, dehiscence of incision, or fever >100 F occurs  Patient is aware to call the office if any concerns or questions may arise  Reminded patient of her upcoming appointments with the date/time/location  Patient was appreciative for the call

## 2021-09-21 ENCOUNTER — TELEPHONE (OUTPATIENT)
Dept: NEUROLOGY | Facility: CLINIC | Age: 81
End: 2021-09-21

## 2021-09-21 ENCOUNTER — CLINICAL SUPPORT (OUTPATIENT)
Dept: NEUROSURGERY | Facility: CLINIC | Age: 81
End: 2021-09-21

## 2021-09-21 VITALS
RESPIRATION RATE: 16 BRPM | SYSTOLIC BLOOD PRESSURE: 142 MMHG | TEMPERATURE: 98.4 F | DIASTOLIC BLOOD PRESSURE: 80 MMHG | HEIGHT: 64 IN | WEIGHT: 200 LBS | BODY MASS INDEX: 34.15 KG/M2

## 2021-09-21 DIAGNOSIS — Z98.890 STATUS POST SURGERY: Primary | ICD-10-CM

## 2021-09-21 PROCEDURE — 99024 POSTOP FOLLOW-UP VISIT: CPT

## 2021-09-21 NOTE — TELEPHONE ENCOUNTER
Please contact patient daughter  Italia Campos [phone] she states patient need asap appointment due to issue with DBS Battery

## 2021-09-21 NOTE — PROGRESS NOTES
Post-Op Visit- Neurosurgery    Emilee Solares 78 y o  female MRN: [id number]    Chief Complaint:   Patient presents post: Left Implantable Pulse Generator Replacement/ Deep Brain Stimulator Generator,left Chest - Left    History of Present Illness:  Patient presents for 2 week POV for incision check accompanied by child and ambulating with a wheelchair  Patient and daughter report she is doing well overall and denies any incisional issues or fevers  She denies any new weakness, numbness or tingling since the surgery and is no longer taking anything for pain  Initially she had taken tylenol which was helpful  Current Outpatient Medications:     acetaminophen (TYLENOL) 500 mg tablet, Take 1,000 mg by mouth every 6 (six) hours as needed for mild pain , Disp: , Rfl:     albuterol (2 5 mg/3 mL) 0 083 % nebulizer solution, Take 1 vial (2 5 mg total) by nebulization every 6 (six) hours as needed for shortness of breath, Disp: 120 vial, Rfl: 5    Cholecalciferol (VITAMIN D-3 PO), Take 1,000 Units by mouth daily  , Disp: , Rfl:     clopidogrel (PLAVIX) 75 mg tablet, Take 1 tablet (75 mg total) by mouth daily, Disp: 90 tablet, Rfl: 0    diltiazem (CARDIZEM CD) 240 mg 24 hr capsule, TAKE 1 CAPSULE (240 MG TOTAL) BY MOUTH DAILY, Disp: 90 capsule, Rfl: 3    Fluticasone Propionate, Inhal, (Flovent Diskus) 250 MCG/BLIST AEPB, Inhale 1 puff 2 (two) times a day, Disp: 1 each, Rfl: 5    furosemide (LASIX) 20 mg tablet, TAKE 1 TABLET (20 MG TOTAL) BY MOUTH DAILY, Disp: 90 tablet, Rfl: 2    levothyroxine 125 mcg tablet, TAKE 1 TABLET (125 MCG TOTAL) BY MOUTH DAILY, Disp: 90 tablet, Rfl: 3    Misc   Devices (Roller Walker) MISC, Needs ROLLATOR [employer] re history CVA, leg weakness, sciatica, osteoarthritis, gait dysfunction, Disp: 1 each, Rfl: 0    Multiple Vitamins-Minerals (PRESERVISION AREDS PO), Take 1 Cap by Mouth daily, Disp: , Rfl:     multivitamin (THERAGRAN) TABS, Take 1 tablet by mouth daily, Disp: , Rfl:     rosuvastatin (CRESTOR) 5 mg tablet, Uses twice weekly (Patient taking differently: Uses twice weekly Monday and Friday), Disp: 40 tablet, Rfl: 3    Na Sulfate-K Sulfate-Mg Sulf (Suprep Bowel Prep Kit) 17 5-3 13-1 6 GM/177ML SOLN, Take 177 mL by mouth once for 1 dose, Disp: 177 mL, Rfl: 0     Allergies   Allergen Reactions    Ciprofloxacin Diarrhea     She did use Levaquin in April of 2021    Simvastatin Myalgia    Advair Diskus [Fluticasone-Salmeterol] Palpitations     Ok w flovent    Tetracycline Rash     Reaction Date: [id number];         Assessment:  Vitals:    09/21/21 0951   BP: 142/80   Resp: 16   Temp: 98 4 °F (36 9 °C)       Wound Exam: Incision well approximated  No erythema, edema or drainage present  Location: left chest wall  Complications: None  Incision SIS  Discussion/Summary:  Reviewed incision care with patient and daughter including daily observation for s/s infection including: increased erythema, edema, drainage, dehiscence of incision or fever >101  Should these be observed, she understands that she is to call and/or return immediately for reassessment  Advised patient to continue cleansing area with mild soap and water and pat dry  Not to apply any lotions, creams, or ointments, & not to submerge in any water for two more weeks  Activity levels were also reviewed with the patient in detail, she is to slowly increase her level of activity and ambulation is encouraged as tolerated  Reminded patient and daughter to call the office with any further questions or concerns, or if any incisional issues or fevers would arise  Called [employer] rep Alonzo Bird and confirmed that he will be meeting with her at an upcoming appointment with Dr Emerald Rainey' office (did not see one scheduled)  Patient's daughter and Alonzo Bird will be reaching out to the office to set up an appointment for programming of the device    She is to be seen by our office on a prn basis

## 2021-09-29 ENCOUNTER — HOSPITAL ENCOUNTER (INPATIENT)
Facility: HOSPITAL | Age: 81
LOS: 1 days | Discharge: HOME | DRG: 191 | End: 2021-10-01
Attending: EMERGENCY MEDICINE | Admitting: STUDENT IN AN ORGANIZED HEALTH CARE EDUCATION/TRAINING PROGRAM
Payer: MEDICARE

## 2021-09-29 ENCOUNTER — TELEMEDICINE (OUTPATIENT)
Dept: FAMILY MEDICINE CLINIC | Facility: CLINIC | Age: 81
End: 2021-09-29
Payer: MEDICARE

## 2021-09-29 ENCOUNTER — APPOINTMENT (EMERGENCY)
Dept: RADIOLOGY | Facility: HOSPITAL | Age: 81
DRG: 191 | End: 2021-09-29
Payer: MEDICARE

## 2021-09-29 DIAGNOSIS — J44.1 CHRONIC OBSTRUCTIVE PULMONARY DISEASE WITH ACUTE EXACERBATION (HCC): ICD-10-CM

## 2021-09-29 DIAGNOSIS — J81.1 PULMONARY EDEMA: Primary | ICD-10-CM

## 2021-09-29 DIAGNOSIS — R06.02 SHORTNESS OF BREATH: Primary | ICD-10-CM

## 2021-09-29 DIAGNOSIS — R06.00 DYSPNEA ON EXERTION: ICD-10-CM

## 2021-09-29 DIAGNOSIS — J41.1 MUCOPURULENT CHRONIC BRONCHITIS (HCC): ICD-10-CM

## 2021-09-29 DIAGNOSIS — R60.9 PERIPHERAL EDEMA: ICD-10-CM

## 2021-09-29 DIAGNOSIS — J45.41 MODERATE PERSISTENT ASTHMATIC BRONCHITIS WITH ACUTE EXACERBATION: ICD-10-CM

## 2021-09-29 LAB
ALBUMIN SERPL BCP-MCNC: 3.7 G/DL (ref 3.5–5)
ALP SERPL-CCNC: 99 U/L (ref 46–116)
ALT SERPL W P-5'-P-CCNC: 23 U/L (ref 12–78)
ANION GAP SERPL CALCULATED.3IONS-SCNC: 10 MMOL/L (ref 4–13)
APTT PPP: 30 SECONDS (ref 23–37)
AST SERPL W P-5'-P-CCNC: 19 U/L (ref 5–45)
BASOPHILS # BLD AUTO: 0.07 THOUSANDS/ΜL (ref 0–0.1)
BASOPHILS NFR BLD AUTO: 1 % (ref 0–1)
BILIRUB SERPL-MCNC: 0.19 MG/DL (ref 0.2–1)
BUN SERPL-MCNC: 19 MG/DL (ref 5–25)
CALCIUM SERPL-MCNC: 9.1 MG/DL (ref 8.3–10.1)
CHLORIDE SERPL-SCNC: 104 MMOL/L (ref 100–108)
CO2 SERPL-SCNC: 28 MMOL/L (ref 21–32)
CREAT SERPL-MCNC: 1.04 MG/DL (ref 0.6–1.3)
EOSINOPHIL # BLD AUTO: 0.53 THOUSAND/ΜL (ref 0–0.61)
EOSINOPHIL NFR BLD AUTO: 6 % (ref 0–6)
ERYTHROCYTE [DISTWIDTH] IN BLOOD BY AUTOMATED COUNT: 18.6 % (ref 11.6–15.1)
GFR SERPL CREATININE-BSD FRML MDRD: 50 ML/MIN/1.73SQ M
GLUCOSE SERPL-MCNC: 149 MG/DL (ref 65–140)
GLUCOSE SERPL-MCNC: 150 MG/DL (ref 65–140)
HCT VFR BLD AUTO: 40.2 % (ref 34.8–46.1)
HGB BLD-MCNC: 12 G/DL (ref 11.5–15.4)
IMM GRANULOCYTES # BLD AUTO: 0.03 THOUSAND/UL (ref 0–0.2)
IMM GRANULOCYTES NFR BLD AUTO: 0 % (ref 0–2)
INR PPP: 1.08 (ref 0.84–1.19)
LYMPHOCYTES # BLD AUTO: 2.63 THOUSANDS/ΜL (ref 0.6–4.47)
LYMPHOCYTES NFR BLD AUTO: 28 % (ref 14–44)
MCH RBC QN AUTO: 24 PG (ref 26.8–34.3)
MCHC RBC AUTO-ENTMCNC: 29.9 G/DL (ref 31.4–37.4)
MCV RBC AUTO: 80 FL (ref 82–98)
MONOCYTES # BLD AUTO: 0.84 THOUSAND/ΜL (ref 0.17–1.22)
MONOCYTES NFR BLD AUTO: 9 % (ref 4–12)
NEUTROPHILS # BLD AUTO: 5.39 THOUSANDS/ΜL (ref 1.85–7.62)
NEUTS SEG NFR BLD AUTO: 56 % (ref 43–75)
NRBC BLD AUTO-RTO: 0 /100 WBCS
NT-PROBNP SERPL-MCNC: 700 PG/ML
PLATELET # BLD AUTO: 272 THOUSANDS/UL (ref 149–390)
PMV BLD AUTO: 10 FL (ref 8.9–12.7)
POTASSIUM SERPL-SCNC: 3.7 MMOL/L (ref 3.5–5.3)
PROT SERPL-MCNC: 8 G/DL (ref 6.4–8.2)
PROTHROMBIN TIME: 13.6 SECONDS (ref 11.6–14.5)
RBC # BLD AUTO: 5.01 MILLION/UL (ref 3.81–5.12)
SODIUM SERPL-SCNC: 142 MMOL/L (ref 136–145)
TROPONIN I SERPL-MCNC: <0.02 NG/ML
WBC # BLD AUTO: 9.49 THOUSAND/UL (ref 4.31–10.16)

## 2021-09-29 PROCEDURE — 85025 COMPLETE CBC W/AUTO DIFF WBC: CPT | Performed by: EMERGENCY MEDICINE

## 2021-09-29 PROCEDURE — 93005 ELECTROCARDIOGRAM TRACING: CPT

## 2021-09-29 PROCEDURE — 99285 EMERGENCY DEPT VISIT HI MDM: CPT

## 2021-09-29 PROCEDURE — 84484 ASSAY OF TROPONIN QUANT: CPT | Performed by: EMERGENCY MEDICINE

## 2021-09-29 PROCEDURE — 99285 EMERGENCY DEPT VISIT HI MDM: CPT | Performed by: EMERGENCY MEDICINE

## 2021-09-29 PROCEDURE — 80053 COMPREHEN METABOLIC PANEL: CPT | Performed by: EMERGENCY MEDICINE

## 2021-09-29 PROCEDURE — 36415 COLL VENOUS BLD VENIPUNCTURE: CPT | Performed by: EMERGENCY MEDICINE

## 2021-09-29 PROCEDURE — 96374 THER/PROPH/DIAG INJ IV PUSH: CPT

## 2021-09-29 PROCEDURE — 71045 X-RAY EXAM CHEST 1 VIEW: CPT

## 2021-09-29 PROCEDURE — 85610 PROTHROMBIN TIME: CPT | Performed by: EMERGENCY MEDICINE

## 2021-09-29 PROCEDURE — 82948 REAGENT STRIP/BLOOD GLUCOSE: CPT

## 2021-09-29 PROCEDURE — 85730 THROMBOPLASTIN TIME PARTIAL: CPT | Performed by: EMERGENCY MEDICINE

## 2021-09-29 PROCEDURE — 99443 PR PHYS/QHP TELEPHONE EVALUATION 21-30 MIN: CPT | Performed by: FAMILY MEDICINE

## 2021-09-29 PROCEDURE — 83880 ASSAY OF NATRIURETIC PEPTIDE: CPT | Performed by: EMERGENCY MEDICINE

## 2021-09-29 RX ORDER — METHYLPREDNISOLONE SODIUM SUCCINATE 40 MG/ML
40 INJECTION, POWDER, LYOPHILIZED, FOR SOLUTION INTRAMUSCULAR; INTRAVENOUS EVERY 8 HOURS SCHEDULED
Status: DISCONTINUED | OUTPATIENT
Start: 2021-09-30 | End: 2021-10-01 | Stop reason: HOSPADM

## 2021-09-29 RX ORDER — FLUTICASONE PROPIONATE 220 UG/1
1 AEROSOL, METERED RESPIRATORY (INHALATION) EVERY 12 HOURS SCHEDULED
Status: DISCONTINUED | OUTPATIENT
Start: 2021-09-29 | End: 2021-10-01 | Stop reason: HOSPADM

## 2021-09-29 RX ORDER — DILTIAZEM HYDROCHLORIDE 240 MG/1
240 CAPSULE, COATED, EXTENDED RELEASE ORAL DAILY
Status: DISCONTINUED | OUTPATIENT
Start: 2021-09-30 | End: 2021-10-01 | Stop reason: HOSPADM

## 2021-09-29 RX ORDER — CLOPIDOGREL BISULFATE 75 MG/1
75 TABLET ORAL DAILY
Status: DISCONTINUED | OUTPATIENT
Start: 2021-09-30 | End: 2021-10-01 | Stop reason: HOSPADM

## 2021-09-29 RX ORDER — IPRATROPIUM BROMIDE AND ALBUTEROL SULFATE 2.5; .5 MG/3ML; MG/3ML
3 SOLUTION RESPIRATORY (INHALATION)
Status: DISCONTINUED | OUTPATIENT
Start: 2021-09-29 | End: 2021-10-01 | Stop reason: HOSPADM

## 2021-09-29 RX ORDER — ACETAMINOPHEN 325 MG/1
650 TABLET ORAL EVERY 6 HOURS PRN
Status: DISCONTINUED | OUTPATIENT
Start: 2021-09-29 | End: 2021-10-01 | Stop reason: HOSPADM

## 2021-09-29 RX ORDER — PRAVASTATIN SODIUM 80 MG/1
80 TABLET ORAL 2 TIMES WEEKLY
Status: DISCONTINUED | OUTPATIENT
Start: 2021-10-01 | End: 2021-10-01 | Stop reason: HOSPADM

## 2021-09-29 RX ORDER — FUROSEMIDE 20 MG/1
20 TABLET ORAL DAILY
Status: DISCONTINUED | OUTPATIENT
Start: 2021-09-30 | End: 2021-10-01 | Stop reason: HOSPADM

## 2021-09-29 RX ORDER — FUROSEMIDE 10 MG/ML
40 INJECTION INTRAMUSCULAR; INTRAVENOUS ONCE
Status: COMPLETED | OUTPATIENT
Start: 2021-09-29 | End: 2021-09-29

## 2021-09-29 RX ORDER — HEPARIN SODIUM 5000 [USP'U]/ML
5000 INJECTION, SOLUTION INTRAVENOUS; SUBCUTANEOUS EVERY 8 HOURS SCHEDULED
Status: DISCONTINUED | OUTPATIENT
Start: 2021-09-29 | End: 2021-10-01 | Stop reason: HOSPADM

## 2021-09-29 RX ORDER — HYDRALAZINE HYDROCHLORIDE 20 MG/ML
5 INJECTION INTRAMUSCULAR; INTRAVENOUS EVERY 6 HOURS PRN
Status: DISCONTINUED | OUTPATIENT
Start: 2021-09-29 | End: 2021-10-01 | Stop reason: HOSPADM

## 2021-09-29 RX ORDER — ALBUTEROL SULFATE 2.5 MG/3ML
1 SOLUTION RESPIRATORY (INHALATION) ONCE
Status: COMPLETED | OUTPATIENT
Start: 2021-09-29 | End: 2021-09-29

## 2021-09-29 RX ORDER — METHYLPREDNISOLONE SODIUM SUCCINATE 125 MG/2ML
125 INJECTION, POWDER, LYOPHILIZED, FOR SOLUTION INTRAMUSCULAR; INTRAVENOUS ONCE
Status: COMPLETED | OUTPATIENT
Start: 2021-09-29 | End: 2021-09-29

## 2021-09-29 RX ORDER — GUAIFENESIN 600 MG
600 TABLET, EXTENDED RELEASE 12 HR ORAL EVERY 12 HOURS SCHEDULED
Status: DISCONTINUED | OUTPATIENT
Start: 2021-09-29 | End: 2021-10-01 | Stop reason: HOSPADM

## 2021-09-29 RX ORDER — HYDROCODONE POLISTIREX AND CHLORPHENIRAMINE POLISTIREX 10; 8 MG/5ML; MG/5ML
5 SUSPENSION, EXTENDED RELEASE ORAL EVERY 12 HOURS SCHEDULED
Status: DISCONTINUED | OUTPATIENT
Start: 2021-09-29 | End: 2021-10-01 | Stop reason: HOSPADM

## 2021-09-29 RX ORDER — LEVOTHYROXINE SODIUM 0.12 MG/1
125 TABLET ORAL
Status: DISCONTINUED | OUTPATIENT
Start: 2021-09-30 | End: 2021-10-01 | Stop reason: HOSPADM

## 2021-09-29 RX ADMIN — METHYLPREDNISOLONE SODIUM SUCCINATE 125 MG: 125 INJECTION, POWDER, FOR SOLUTION INTRAMUSCULAR; INTRAVENOUS at 22:54

## 2021-09-29 RX ADMIN — IPRATROPIUM BROMIDE AND ALBUTEROL SULFATE 3 ML: 2.5; .5 SOLUTION RESPIRATORY (INHALATION) at 23:05

## 2021-09-29 RX ADMIN — GUAIFENESIN 600 MG: 600 TABLET ORAL at 22:54

## 2021-09-29 RX ADMIN — FUROSEMIDE 40 MG: 10 INJECTION, SOLUTION INTRAVENOUS at 21:15

## 2021-09-29 RX ADMIN — HEPARIN SODIUM 5000 UNITS: 5000 INJECTION INTRAVENOUS; SUBCUTANEOUS at 22:54

## 2021-09-29 RX ADMIN — FLUTICASONE PROPIONATE 1 PUFF: 220 AEROSOL, METERED RESPIRATORY (INHALATION) at 23:32

## 2021-09-29 RX ADMIN — INSULIN LISPRO 1 UNITS: 100 INJECTION, SOLUTION INTRAVENOUS; SUBCUTANEOUS at 23:03

## 2021-09-29 RX ADMIN — HYDROCODONE POLISTIREX AND CHLORPHENIRAMINE POLISTIREX 5 ML: 10; 8 SUSPENSION, EXTENDED RELEASE ORAL at 22:54

## 2021-09-30 ENCOUNTER — APPOINTMENT (INPATIENT)
Dept: NON INVASIVE DIAGNOSTICS | Facility: HOSPITAL | Age: 81
DRG: 191 | End: 2021-09-30
Payer: MEDICARE

## 2021-09-30 PROBLEM — R60.9 PERIPHERAL EDEMA: Status: ACTIVE | Noted: 2021-09-30

## 2021-09-30 PROBLEM — R60.0 PERIPHERAL EDEMA: Status: ACTIVE | Noted: 2021-09-30

## 2021-09-30 LAB
ATRIAL RATE: 103 BPM
ATRIAL RATE: 241 BPM
GLUCOSE SERPL-MCNC: 196 MG/DL (ref 65–140)
GLUCOSE SERPL-MCNC: 220 MG/DL (ref 65–140)
GLUCOSE SERPL-MCNC: 268 MG/DL (ref 65–140)
GLUCOSE SERPL-MCNC: 364 MG/DL (ref 65–140)
P AXIS: 87 DEGREES
QRS AXIS: -63 DEGREES
QRS AXIS: -63 DEGREES
QRSD INTERVAL: 78 MS
QRSD INTERVAL: 84 MS
QT INTERVAL: 354 MS
QT INTERVAL: 364 MS
QTC INTERVAL: 411 MS
QTC INTERVAL: 435 MS
T WAVE AXIS: 35 DEGREES
T WAVE AXIS: 66 DEGREES
VENTRICULAR RATE: 77 BPM
VENTRICULAR RATE: 91 BPM

## 2021-09-30 PROCEDURE — 99223 1ST HOSP IP/OBS HIGH 75: CPT | Performed by: HOSPITALIST

## 2021-09-30 PROCEDURE — 94668 MNPJ CHEST WALL SBSQ: CPT

## 2021-09-30 PROCEDURE — 94664 DEMO&/EVAL PT USE INHALER: CPT

## 2021-09-30 PROCEDURE — 94640 AIRWAY INHALATION TREATMENT: CPT

## 2021-09-30 PROCEDURE — 93010 ELECTROCARDIOGRAM REPORT: CPT | Performed by: INTERNAL MEDICINE

## 2021-09-30 PROCEDURE — 93971 EXTREMITY STUDY: CPT

## 2021-09-30 PROCEDURE — 94760 N-INVAS EAR/PLS OXIMETRY 1: CPT

## 2021-09-30 PROCEDURE — 93971 EXTREMITY STUDY: CPT | Performed by: SURGERY

## 2021-09-30 PROCEDURE — 82948 REAGENT STRIP/BLOOD GLUCOSE: CPT

## 2021-09-30 RX ORDER — HYDRALAZINE HYDROCHLORIDE 25 MG/1
25 TABLET, FILM COATED ORAL EVERY 8 HOURS PRN
Status: DISCONTINUED | OUTPATIENT
Start: 2021-09-30 | End: 2021-10-01 | Stop reason: HOSPADM

## 2021-09-30 RX ADMIN — INSULIN LISPRO 1 UNITS: 100 INJECTION, SOLUTION INTRAVENOUS; SUBCUTANEOUS at 21:35

## 2021-09-30 RX ADMIN — HYDROCODONE POLISTIREX AND CHLORPHENIRAMINE POLISTIREX 5 ML: 10; 8 SUSPENSION, EXTENDED RELEASE ORAL at 09:49

## 2021-09-30 RX ADMIN — CLOPIDOGREL BISULFATE 75 MG: 75 TABLET ORAL at 09:49

## 2021-09-30 RX ADMIN — DILTIAZEM HYDROCHLORIDE 240 MG: 240 CAPSULE, COATED, EXTENDED RELEASE ORAL at 09:49

## 2021-09-30 RX ADMIN — HYDROCODONE POLISTIREX AND CHLORPHENIRAMINE POLISTIREX 5 ML: 10; 8 SUSPENSION, EXTENDED RELEASE ORAL at 21:33

## 2021-09-30 RX ADMIN — INSULIN LISPRO 2 UNITS: 100 INJECTION, SOLUTION INTRAVENOUS; SUBCUTANEOUS at 12:16

## 2021-09-30 RX ADMIN — INSULIN LISPRO 3 UNITS: 100 INJECTION, SOLUTION INTRAVENOUS; SUBCUTANEOUS at 18:47

## 2021-09-30 RX ADMIN — GUAIFENESIN 600 MG: 600 TABLET ORAL at 21:32

## 2021-09-30 RX ADMIN — IPRATROPIUM BROMIDE AND ALBUTEROL SULFATE 3 ML: 2.5; .5 SOLUTION RESPIRATORY (INHALATION) at 09:50

## 2021-09-30 RX ADMIN — HEPARIN SODIUM 5000 UNITS: 5000 INJECTION INTRAVENOUS; SUBCUTANEOUS at 13:38

## 2021-09-30 RX ADMIN — FLUTICASONE PROPIONATE 1 PUFF: 220 AEROSOL, METERED RESPIRATORY (INHALATION) at 09:50

## 2021-09-30 RX ADMIN — IPRATROPIUM BROMIDE AND ALBUTEROL SULFATE 3 ML: 2.5; .5 SOLUTION RESPIRATORY (INHALATION) at 13:37

## 2021-09-30 RX ADMIN — LEVOTHYROXINE SODIUM 125 MCG: 125 TABLET ORAL at 05:16

## 2021-09-30 RX ADMIN — HEPARIN SODIUM 5000 UNITS: 5000 INJECTION INTRAVENOUS; SUBCUTANEOUS at 21:34

## 2021-09-30 RX ADMIN — INSULIN LISPRO 1 UNITS: 100 INJECTION, SOLUTION INTRAVENOUS; SUBCUTANEOUS at 09:05

## 2021-09-30 RX ADMIN — FLUTICASONE PROPIONATE 1 PUFF: 220 AEROSOL, METERED RESPIRATORY (INHALATION) at 21:35

## 2021-09-30 RX ADMIN — METHYLPREDNISOLONE SODIUM SUCCINATE 40 MG: 40 INJECTION, POWDER, FOR SOLUTION INTRAMUSCULAR; INTRAVENOUS at 21:32

## 2021-09-30 RX ADMIN — METHYLPREDNISOLONE SODIUM SUCCINATE 40 MG: 40 INJECTION, POWDER, FOR SOLUTION INTRAMUSCULAR; INTRAVENOUS at 13:38

## 2021-09-30 RX ADMIN — METHYLPREDNISOLONE SODIUM SUCCINATE 40 MG: 40 INJECTION, POWDER, FOR SOLUTION INTRAMUSCULAR; INTRAVENOUS at 05:16

## 2021-09-30 RX ADMIN — FUROSEMIDE 20 MG: 40 TABLET ORAL at 09:49

## 2021-09-30 RX ADMIN — IPRATROPIUM BROMIDE AND ALBUTEROL SULFATE 3 ML: 2.5; .5 SOLUTION RESPIRATORY (INHALATION) at 03:00

## 2021-09-30 RX ADMIN — IPRATROPIUM BROMIDE AND ALBUTEROL SULFATE 3 ML: 2.5; .5 SOLUTION RESPIRATORY (INHALATION) at 20:25

## 2021-09-30 RX ADMIN — HEPARIN SODIUM 5000 UNITS: 5000 INJECTION INTRAVENOUS; SUBCUTANEOUS at 05:15

## 2021-09-30 RX ADMIN — GUAIFENESIN 600 MG: 600 TABLET ORAL at 09:49

## 2021-10-01 VITALS
SYSTOLIC BLOOD PRESSURE: 161 MMHG | HEART RATE: 86 BPM | OXYGEN SATURATION: 93 % | BODY MASS INDEX: 34.02 KG/M2 | WEIGHT: 199.3 LBS | TEMPERATURE: 97.5 F | RESPIRATION RATE: 18 BRPM | HEIGHT: 64 IN | DIASTOLIC BLOOD PRESSURE: 78 MMHG

## 2021-10-01 LAB
ANION GAP SERPL CALCULATED.3IONS-SCNC: 10 MMOL/L (ref 4–13)
BASOPHILS # BLD AUTO: 0.01 THOUSANDS/ΜL (ref 0–0.1)
BASOPHILS NFR BLD AUTO: 0 % (ref 0–1)
BUN SERPL-MCNC: 26 MG/DL (ref 5–25)
CALCIUM SERPL-MCNC: 8.9 MG/DL (ref 8.3–10.1)
CHLORIDE SERPL-SCNC: 102 MMOL/L (ref 100–108)
CO2 SERPL-SCNC: 28 MMOL/L (ref 21–32)
CREAT SERPL-MCNC: 1.06 MG/DL (ref 0.6–1.3)
EOSINOPHIL # BLD AUTO: 0 THOUSAND/ΜL (ref 0–0.61)
EOSINOPHIL NFR BLD AUTO: 0 % (ref 0–6)
ERYTHROCYTE [DISTWIDTH] IN BLOOD BY AUTOMATED COUNT: 18.3 % (ref 11.6–15.1)
GFR SERPL CREATININE-BSD FRML MDRD: 49 ML/MIN/1.73SQ M
GLUCOSE SERPL-MCNC: 184 MG/DL (ref 65–140)
GLUCOSE SERPL-MCNC: 199 MG/DL (ref 65–140)
GLUCOSE SERPL-MCNC: 316 MG/DL (ref 65–140)
HCT VFR BLD AUTO: 36.6 % (ref 34.8–46.1)
HGB BLD-MCNC: 11.1 G/DL (ref 11.5–15.4)
IMM GRANULOCYTES # BLD AUTO: 0.07 THOUSAND/UL (ref 0–0.2)
IMM GRANULOCYTES NFR BLD AUTO: 1 % (ref 0–2)
LYMPHOCYTES # BLD AUTO: 0.92 THOUSANDS/ΜL (ref 0.6–4.47)
LYMPHOCYTES NFR BLD AUTO: 9 % (ref 14–44)
MAGNESIUM SERPL-MCNC: 2.5 MG/DL (ref 1.6–2.6)
MCH RBC QN AUTO: 23.9 PG (ref 26.8–34.3)
MCHC RBC AUTO-ENTMCNC: 30.3 G/DL (ref 31.4–37.4)
MCV RBC AUTO: 79 FL (ref 82–98)
MONOCYTES # BLD AUTO: 0.15 THOUSAND/ΜL (ref 0.17–1.22)
MONOCYTES NFR BLD AUTO: 2 % (ref 4–12)
NEUTROPHILS # BLD AUTO: 8.76 THOUSANDS/ΜL (ref 1.85–7.62)
NEUTS SEG NFR BLD AUTO: 88 % (ref 43–75)
NRBC BLD AUTO-RTO: 0 /100 WBCS
PLATELET # BLD AUTO: 255 THOUSANDS/UL (ref 149–390)
PMV BLD AUTO: 10.4 FL (ref 8.9–12.7)
POTASSIUM SERPL-SCNC: 4.3 MMOL/L (ref 3.5–5.3)
RBC # BLD AUTO: 4.64 MILLION/UL (ref 3.81–5.12)
SODIUM SERPL-SCNC: 140 MMOL/L (ref 136–145)
WBC # BLD AUTO: 9.91 THOUSAND/UL (ref 4.31–10.16)

## 2021-10-01 PROCEDURE — 80048 BASIC METABOLIC PNL TOTAL CA: CPT | Performed by: HOSPITALIST

## 2021-10-01 PROCEDURE — 85025 COMPLETE CBC W/AUTO DIFF WBC: CPT | Performed by: HOSPITALIST

## 2021-10-01 PROCEDURE — 83735 ASSAY OF MAGNESIUM: CPT | Performed by: HOSPITALIST

## 2021-10-01 PROCEDURE — 94640 AIRWAY INHALATION TREATMENT: CPT

## 2021-10-01 PROCEDURE — 99239 HOSP IP/OBS DSCHRG MGMT >30: CPT | Performed by: PHYSICIAN ASSISTANT

## 2021-10-01 PROCEDURE — 82948 REAGENT STRIP/BLOOD GLUCOSE: CPT

## 2021-10-01 PROCEDURE — 94760 N-INVAS EAR/PLS OXIMETRY 1: CPT

## 2021-10-01 RX ORDER — ALBUTEROL SULFATE 2.5 MG/3ML
2.5 SOLUTION RESPIRATORY (INHALATION) EVERY 6 HOURS PRN
Qty: 90 ML | Refills: 0 | Status: SHIPPED | OUTPATIENT
Start: 2021-10-01 | End: 2022-03-22 | Stop reason: SDUPTHER

## 2021-10-01 RX ORDER — PREDNISONE 10 MG/1
TABLET ORAL
Qty: 30 TABLET | Refills: 0 | Status: SHIPPED | OUTPATIENT
Start: 2021-10-01 | End: 2021-10-19 | Stop reason: ALTCHOICE

## 2021-10-01 RX ORDER — GUAIFENESIN 600 MG
600 TABLET, EXTENDED RELEASE 12 HR ORAL EVERY 12 HOURS SCHEDULED
Qty: 12 TABLET | Refills: 0 | Status: SHIPPED | OUTPATIENT
Start: 2021-10-01 | End: 2022-01-31

## 2021-10-01 RX ADMIN — METHYLPREDNISOLONE SODIUM SUCCINATE 40 MG: 40 INJECTION, POWDER, FOR SOLUTION INTRAMUSCULAR; INTRAVENOUS at 05:53

## 2021-10-01 RX ADMIN — IPRATROPIUM BROMIDE AND ALBUTEROL SULFATE 3 ML: 2.5; .5 SOLUTION RESPIRATORY (INHALATION) at 07:34

## 2021-10-01 RX ADMIN — FLUTICASONE PROPIONATE 1 PUFF: 220 AEROSOL, METERED RESPIRATORY (INHALATION) at 08:33

## 2021-10-01 RX ADMIN — IPRATROPIUM BROMIDE AND ALBUTEROL SULFATE 3 ML: 2.5; .5 SOLUTION RESPIRATORY (INHALATION) at 02:48

## 2021-10-01 RX ADMIN — GUAIFENESIN 600 MG: 600 TABLET ORAL at 08:33

## 2021-10-01 RX ADMIN — INSULIN LISPRO 1 UNITS: 100 INJECTION, SOLUTION INTRAVENOUS; SUBCUTANEOUS at 08:33

## 2021-10-01 RX ADMIN — DILTIAZEM HYDROCHLORIDE 240 MG: 240 CAPSULE, COATED, EXTENDED RELEASE ORAL at 08:33

## 2021-10-01 RX ADMIN — CLOPIDOGREL BISULFATE 75 MG: 75 TABLET ORAL at 08:33

## 2021-10-01 RX ADMIN — FUROSEMIDE 20 MG: 40 TABLET ORAL at 08:33

## 2021-10-01 RX ADMIN — HEPARIN SODIUM 5000 UNITS: 5000 INJECTION INTRAVENOUS; SUBCUTANEOUS at 05:53

## 2021-10-01 RX ADMIN — LEVOTHYROXINE SODIUM 125 MCG: 125 TABLET ORAL at 05:53

## 2021-10-04 ENCOUNTER — TRANSITIONAL CARE MANAGEMENT (OUTPATIENT)
Dept: FAMILY MEDICINE CLINIC | Facility: CLINIC | Age: 81
End: 2021-10-04

## 2021-10-04 DIAGNOSIS — E11.9 DIABETES MELLITUS TYPE 2, DIET-CONTROLLED (HCC): ICD-10-CM

## 2021-10-04 DIAGNOSIS — I25.10 CORONARY ARTERY DISEASE INVOLVING NATIVE CORONARY ARTERY OF NATIVE HEART WITHOUT ANGINA PECTORIS: Primary | ICD-10-CM

## 2021-10-04 DIAGNOSIS — I25.10 CORONARY ARTERY DISEASE INVOLVING NATIVE CORONARY ARTERY OF NATIVE HEART WITHOUT ANGINA PECTORIS: ICD-10-CM

## 2021-10-04 DIAGNOSIS — I63.9 CEREBROVASCULAR ACCIDENT (CVA), UNSPECIFIED MECHANISM (HCC): ICD-10-CM

## 2021-10-04 RX ORDER — VIT A/VIT C/VIT E/ZINC/COPPER 4296-226
2 CAPSULE ORAL DAILY
Qty: 180 CAPSULE | Refills: 1 | Status: SHIPPED | OUTPATIENT
Start: 2021-10-04

## 2021-10-04 RX ORDER — ROSUVASTATIN CALCIUM 5 MG/1
TABLET, COATED ORAL
Qty: 40 TABLET | Refills: 3 | Status: CANCELLED | OUTPATIENT
Start: 2021-10-04

## 2021-10-04 RX ORDER — ROSUVASTATIN CALCIUM 5 MG/1
TABLET, COATED ORAL
Qty: 30 TABLET | Refills: 2 | Status: SHIPPED | OUTPATIENT
Start: 2021-10-04 | End: 2022-06-29 | Stop reason: SDUPTHER

## 2021-10-05 ENCOUNTER — TELEPHONE (OUTPATIENT)
Dept: NEUROLOGY | Facility: CLINIC | Age: 81
End: 2021-10-05

## 2021-10-05 ENCOUNTER — OFFICE VISIT (OUTPATIENT)
Dept: FAMILY MEDICINE CLINIC | Facility: CLINIC | Age: 81
End: 2021-10-05
Payer: MEDICARE

## 2021-10-05 ENCOUNTER — OFFICE VISIT (OUTPATIENT)
Dept: PULMONOLOGY | Facility: CLINIC | Age: 81
End: 2021-10-05
Payer: MEDICARE

## 2021-10-05 VITALS
HEIGHT: 64 IN | TEMPERATURE: 97.5 F | WEIGHT: 196 LBS | HEART RATE: 81 BPM | BODY MASS INDEX: 33.46 KG/M2 | OXYGEN SATURATION: 96 % | DIASTOLIC BLOOD PRESSURE: 64 MMHG | SYSTOLIC BLOOD PRESSURE: 124 MMHG

## 2021-10-05 VITALS
WEIGHT: 196 LBS | BODY MASS INDEX: 33.46 KG/M2 | SYSTOLIC BLOOD PRESSURE: 142 MMHG | HEART RATE: 110 BPM | DIASTOLIC BLOOD PRESSURE: 78 MMHG | RESPIRATION RATE: 18 BRPM | HEIGHT: 64 IN | OXYGEN SATURATION: 94 %

## 2021-10-05 DIAGNOSIS — R13.19 OTHER DYSPHAGIA: ICD-10-CM

## 2021-10-05 DIAGNOSIS — I25.10 CORONARY ARTERY DISEASE INVOLVING NATIVE CORONARY ARTERY OF NATIVE HEART WITHOUT ANGINA PECTORIS: ICD-10-CM

## 2021-10-05 DIAGNOSIS — E66.09 CLASS 1 OBESITY DUE TO EXCESS CALORIES WITHOUT SERIOUS COMORBIDITY WITH BODY MASS INDEX (BMI) OF 33.0 TO 33.9 IN ADULT: ICD-10-CM

## 2021-10-05 DIAGNOSIS — T38.0X5A STEROID-INDUCED HYPERGLYCEMIA: Primary | ICD-10-CM

## 2021-10-05 DIAGNOSIS — G25.0 BENIGN FAMILIAL TREMOR: ICD-10-CM

## 2021-10-05 DIAGNOSIS — I10 BENIGN ESSENTIAL HYPERTENSION: ICD-10-CM

## 2021-10-05 DIAGNOSIS — F17.211 NICOTINE DEPENDENCE, CIGARETTES, IN REMISSION: ICD-10-CM

## 2021-10-05 DIAGNOSIS — R73.9 STEROID-INDUCED HYPERGLYCEMIA: Primary | ICD-10-CM

## 2021-10-05 DIAGNOSIS — R06.00 DOE (DYSPNEA ON EXERTION): ICD-10-CM

## 2021-10-05 DIAGNOSIS — Z93.6 NEPHROSTOMY STATUS (HCC): ICD-10-CM

## 2021-10-05 DIAGNOSIS — R05.9 COUGH: ICD-10-CM

## 2021-10-05 DIAGNOSIS — J44.1 CHRONIC OBSTRUCTIVE PULMONARY DISEASE WITH ACUTE EXACERBATION (HCC): ICD-10-CM

## 2021-10-05 DIAGNOSIS — I35.0 NONRHEUMATIC AORTIC VALVE STENOSIS: ICD-10-CM

## 2021-10-05 DIAGNOSIS — Z96.89 S/P DEEP BRAIN STIMULATOR PLACEMENT: ICD-10-CM

## 2021-10-05 DIAGNOSIS — Z86.73 HISTORY OF CVA (CEREBROVASCULAR ACCIDENT): ICD-10-CM

## 2021-10-05 DIAGNOSIS — J43.2 CENTRILOBULAR EMPHYSEMA (HCC): ICD-10-CM

## 2021-10-05 DIAGNOSIS — E11.9 DIABETES MELLITUS TYPE 2, DIET-CONTROLLED (HCC): ICD-10-CM

## 2021-10-05 DIAGNOSIS — E03.9 ACQUIRED HYPOTHYROIDISM: ICD-10-CM

## 2021-10-05 DIAGNOSIS — R60.9 PERIPHERAL EDEMA: ICD-10-CM

## 2021-10-05 DIAGNOSIS — J43.2 CENTRILOBULAR EMPHYSEMA (HCC): Primary | ICD-10-CM

## 2021-10-05 DIAGNOSIS — Z23 INFLUENZA VACCINE NEEDED: ICD-10-CM

## 2021-10-05 PROCEDURE — 99215 OFFICE O/P EST HI 40 MIN: CPT | Performed by: INTERNAL MEDICINE

## 2021-10-05 PROCEDURE — 90662 IIV NO PRSV INCREASED AG IM: CPT

## 2021-10-05 PROCEDURE — G0008 ADMIN INFLUENZA VIRUS VAC: HCPCS

## 2021-10-05 PROCEDURE — 99495 TRANSJ CARE MGMT MOD F2F 14D: CPT | Performed by: FAMILY MEDICINE

## 2021-10-05 PROCEDURE — 94618 PULMONARY STRESS TESTING: CPT | Performed by: INTERNAL MEDICINE

## 2021-10-05 RX ORDER — BUDESONIDE 0.5 MG/2ML
0.5 INHALANT ORAL 2 TIMES DAILY
Qty: 120 ML | Refills: 5 | Status: SHIPPED | OUTPATIENT
Start: 2021-10-05 | End: 2021-10-20

## 2021-10-20 RX ORDER — BUDESONIDE 0.5 MG/2ML
INHALANT ORAL
Qty: 360 ML | Refills: 1 | Status: SHIPPED | OUTPATIENT
Start: 2021-10-20 | End: 2022-02-02 | Stop reason: SDUPTHER

## 2021-10-22 ENCOUNTER — OFFICE VISIT (OUTPATIENT)
Dept: UROLOGY | Facility: MEDICAL CENTER | Age: 81
End: 2021-10-22
Payer: MEDICARE

## 2021-10-22 VITALS
HEIGHT: 64 IN | WEIGHT: 196 LBS | DIASTOLIC BLOOD PRESSURE: 72 MMHG | SYSTOLIC BLOOD PRESSURE: 128 MMHG | BODY MASS INDEX: 33.46 KG/M2

## 2021-10-22 DIAGNOSIS — N39.0 RECURRENT UTI: Primary | ICD-10-CM

## 2021-10-22 PROCEDURE — 87086 URINE CULTURE/COLONY COUNT: CPT | Performed by: PHYSICIAN ASSISTANT

## 2021-10-22 PROCEDURE — 87186 SC STD MICRODIL/AGAR DIL: CPT | Performed by: PHYSICIAN ASSISTANT

## 2021-10-22 PROCEDURE — 99213 OFFICE O/P EST LOW 20 MIN: CPT | Performed by: PHYSICIAN ASSISTANT

## 2021-10-22 PROCEDURE — 87077 CULTURE AEROBIC IDENTIFY: CPT | Performed by: PHYSICIAN ASSISTANT

## 2021-10-22 RX ORDER — LANCETS 33 GAUGE
EACH MISCELLANEOUS DAILY
COMMUNITY
Start: 2021-10-06

## 2021-10-22 RX ORDER — BLOOD SUGAR DIAGNOSTIC
STRIP MISCELLANEOUS DAILY
COMMUNITY
Start: 2021-10-06

## 2021-10-24 LAB
BACTERIA UR CULT: ABNORMAL
BACTERIA UR CULT: ABNORMAL

## 2021-10-25 ENCOUNTER — HOSPITAL ENCOUNTER (OUTPATIENT)
Dept: RADIOLOGY | Facility: HOSPITAL | Age: 81
Discharge: HOME | End: 2021-10-25
Attending: INTERNAL MEDICINE
Payer: MEDICARE

## 2021-10-25 ENCOUNTER — TELEPHONE (OUTPATIENT)
Dept: UROLOGY | Facility: MEDICAL CENTER | Age: 81
End: 2021-10-25

## 2021-10-25 DIAGNOSIS — R13.19 OTHER DYSPHAGIA: ICD-10-CM

## 2021-10-25 DIAGNOSIS — R05.9 COUGH: ICD-10-CM

## 2021-10-25 PROCEDURE — 74230 X-RAY XM SWLNG FUNCJ C+: CPT

## 2021-10-25 PROCEDURE — 92611 MOTION FLUOROSCOPY/SWALLOW: CPT

## 2021-10-28 ENCOUNTER — OFFICE VISIT (OUTPATIENT)
Dept: NEUROLOGY | Facility: CLINIC | Age: 81
End: 2021-10-28
Payer: MEDICARE

## 2021-10-28 VITALS
DIASTOLIC BLOOD PRESSURE: 61 MMHG | HEART RATE: 71 BPM | SYSTOLIC BLOOD PRESSURE: 130 MMHG | WEIGHT: 198.6 LBS | BODY MASS INDEX: 34.09 KG/M2

## 2021-10-28 DIAGNOSIS — G25.0 BENIGN FAMILIAL TREMOR: Primary | ICD-10-CM

## 2021-10-28 PROCEDURE — 95984 ALYS BRN NPGT PRGRMG ADDL 15: CPT | Performed by: PSYCHIATRY & NEUROLOGY

## 2021-10-28 PROCEDURE — 95983 ALYS BRN NPGT PRGRMG 15 MIN: CPT | Performed by: PSYCHIATRY & NEUROLOGY

## 2021-10-29 ENCOUNTER — TELEPHONE (OUTPATIENT)
Dept: UROLOGY | Facility: MEDICAL CENTER | Age: 81
End: 2021-10-29

## 2021-11-02 ENCOUNTER — TELEPHONE (OUTPATIENT)
Dept: NEUROLOGY | Facility: CLINIC | Age: 81
End: 2021-11-02

## 2021-11-08 ENCOUNTER — TELEPHONE (OUTPATIENT)
Dept: OTHER | Facility: OTHER | Age: 81
End: 2021-11-08

## 2021-11-16 ENCOUNTER — OFFICE VISIT (OUTPATIENT)
Dept: PULMONOLOGY | Facility: CLINIC | Age: 81
End: 2021-11-16
Payer: MEDICARE

## 2021-11-16 ENCOUNTER — OFFICE VISIT (OUTPATIENT)
Dept: FAMILY MEDICINE CLINIC | Facility: CLINIC | Age: 81
End: 2021-11-16
Payer: MEDICARE

## 2021-11-16 VITALS
HEART RATE: 71 BPM | DIASTOLIC BLOOD PRESSURE: 72 MMHG | WEIGHT: 198 LBS | TEMPERATURE: 97.5 F | SYSTOLIC BLOOD PRESSURE: 150 MMHG | OXYGEN SATURATION: 95 % | HEIGHT: 64 IN | BODY MASS INDEX: 33.8 KG/M2

## 2021-11-16 VITALS
TEMPERATURE: 97 F | HEIGHT: 64 IN | WEIGHT: 198 LBS | HEART RATE: 74 BPM | RESPIRATION RATE: 18 BRPM | DIASTOLIC BLOOD PRESSURE: 66 MMHG | OXYGEN SATURATION: 94 % | BODY MASS INDEX: 33.8 KG/M2 | SYSTOLIC BLOOD PRESSURE: 146 MMHG

## 2021-11-16 DIAGNOSIS — E66.09 CLASS 1 OBESITY DUE TO EXCESS CALORIES WITHOUT SERIOUS COMORBIDITY WITH BODY MASS INDEX (BMI) OF 33.0 TO 33.9 IN ADULT: ICD-10-CM

## 2021-11-16 DIAGNOSIS — I25.10 CORONARY ARTERY DISEASE INVOLVING NATIVE CORONARY ARTERY OF NATIVE HEART WITHOUT ANGINA PECTORIS: ICD-10-CM

## 2021-11-16 DIAGNOSIS — I65.23 BILATERAL CAROTID ARTERY STENOSIS: ICD-10-CM

## 2021-11-16 DIAGNOSIS — Z96.89 S/P DEEP BRAIN STIMULATOR PLACEMENT: ICD-10-CM

## 2021-11-16 DIAGNOSIS — R06.00 DOE (DYSPNEA ON EXERTION): ICD-10-CM

## 2021-11-16 DIAGNOSIS — R05.9 COUGH: ICD-10-CM

## 2021-11-16 DIAGNOSIS — E11.9 DIABETES MELLITUS TYPE 2, DIET-CONTROLLED (HCC): ICD-10-CM

## 2021-11-16 DIAGNOSIS — E03.9 ACQUIRED HYPOTHYROIDISM: ICD-10-CM

## 2021-11-16 DIAGNOSIS — I35.0 NONRHEUMATIC AORTIC VALVE STENOSIS: ICD-10-CM

## 2021-11-16 DIAGNOSIS — I10 BENIGN ESSENTIAL HYPERTENSION: ICD-10-CM

## 2021-11-16 DIAGNOSIS — J43.2 CENTRILOBULAR EMPHYSEMA (HCC): Primary | ICD-10-CM

## 2021-11-16 DIAGNOSIS — I73.9 PAD (PERIPHERAL ARTERY DISEASE) (HCC): ICD-10-CM

## 2021-11-16 DIAGNOSIS — Z00.00 MEDICARE ANNUAL WELLNESS VISIT, SUBSEQUENT: Primary | ICD-10-CM

## 2021-11-16 DIAGNOSIS — Z86.73 HISTORY OF CVA (CEREBROVASCULAR ACCIDENT): ICD-10-CM

## 2021-11-16 LAB — SL AMB POCT HEMOGLOBIN AIC: 7.3 (ref ?–6.5)

## 2021-11-16 PROCEDURE — 1123F ACP DISCUSS/DSCN MKR DOCD: CPT | Performed by: FAMILY MEDICINE

## 2021-11-16 PROCEDURE — 99213 OFFICE O/P EST LOW 20 MIN: CPT | Performed by: INTERNAL MEDICINE

## 2021-11-16 PROCEDURE — 99213 OFFICE O/P EST LOW 20 MIN: CPT | Performed by: FAMILY MEDICINE

## 2021-11-16 PROCEDURE — G0439 PPPS, SUBSEQ VISIT: HCPCS | Performed by: FAMILY MEDICINE

## 2021-11-16 PROCEDURE — 83036 HEMOGLOBIN GLYCOSYLATED A1C: CPT | Performed by: FAMILY MEDICINE

## 2021-12-08 ENCOUNTER — HOSPITAL ENCOUNTER (OUTPATIENT)
Dept: NON INVASIVE DIAGNOSTICS | Facility: CLINIC | Age: 81
Discharge: HOME | End: 2021-12-08
Payer: MEDICARE

## 2021-12-08 DIAGNOSIS — I65.23 BILATERAL CAROTID ARTERY STENOSIS: ICD-10-CM

## 2021-12-08 DIAGNOSIS — Z98.890 STATUS POST CAROTID ENDARTERECTOMY: ICD-10-CM

## 2021-12-08 PROCEDURE — 93880 EXTRACRANIAL BILAT STUDY: CPT | Performed by: SURGERY

## 2021-12-08 PROCEDURE — 93880 EXTRACRANIAL BILAT STUDY: CPT

## 2022-01-28 ENCOUNTER — ANESTHESIA EVENT (OUTPATIENT)
Dept: PERIOP | Facility: HOSPITAL | Age: 82
End: 2022-01-28
Payer: MEDICARE

## 2022-01-28 ENCOUNTER — APPOINTMENT (OUTPATIENT)
Dept: LAB | Facility: CLINIC | Age: 82
End: 2022-01-28
Payer: MEDICARE

## 2022-01-28 DIAGNOSIS — Q62.11 HYDRONEPHROSIS WITH URETEROPELVIC JUNCTION (UPJ) OBSTRUCTION: ICD-10-CM

## 2022-01-28 DIAGNOSIS — Z01.812 PRE-OPERATIVE LABORATORY EXAMINATION: ICD-10-CM

## 2022-01-28 DIAGNOSIS — R39.89 SUSPECTED UTI: ICD-10-CM

## 2022-01-28 LAB
ALBUMIN SERPL BCP-MCNC: 3.5 G/DL (ref 3.5–5)
ALP SERPL-CCNC: 103 U/L (ref 46–116)
ALT SERPL W P-5'-P-CCNC: 28 U/L (ref 12–78)
ANION GAP SERPL CALCULATED.3IONS-SCNC: 4 MMOL/L (ref 4–13)
AST SERPL W P-5'-P-CCNC: 23 U/L (ref 5–45)
BASOPHILS # BLD AUTO: 0.06 THOUSANDS/ΜL (ref 0–0.1)
BASOPHILS NFR BLD AUTO: 1 % (ref 0–1)
BILIRUB SERPL-MCNC: 0.3 MG/DL (ref 0.2–1)
BUN SERPL-MCNC: 16 MG/DL (ref 5–25)
CALCIUM SERPL-MCNC: 9.3 MG/DL (ref 8.3–10.1)
CHLORIDE SERPL-SCNC: 104 MMOL/L (ref 100–108)
CO2 SERPL-SCNC: 30 MMOL/L (ref 21–32)
CREAT SERPL-MCNC: 1.19 MG/DL (ref 0.6–1.3)
EOSINOPHIL # BLD AUTO: 0.49 THOUSAND/ΜL (ref 0–0.61)
EOSINOPHIL NFR BLD AUTO: 6 % (ref 0–6)
ERYTHROCYTE [DISTWIDTH] IN BLOOD BY AUTOMATED COUNT: 14.9 % (ref 11.6–15.1)
GFR SERPL CREATININE-BSD FRML MDRD: 42 ML/MIN/1.73SQ M
GLUCOSE SERPL-MCNC: 121 MG/DL (ref 65–140)
HCT VFR BLD AUTO: 42.4 % (ref 34.8–46.1)
HGB BLD-MCNC: 12.9 G/DL (ref 11.5–15.4)
IMM GRANULOCYTES # BLD AUTO: 0.04 THOUSAND/UL (ref 0–0.2)
IMM GRANULOCYTES NFR BLD AUTO: 1 % (ref 0–2)
LYMPHOCYTES # BLD AUTO: 1.7 THOUSANDS/ΜL (ref 0.6–4.47)
LYMPHOCYTES NFR BLD AUTO: 20 % (ref 14–44)
MCH RBC QN AUTO: 25.9 PG (ref 26.8–34.3)
MCHC RBC AUTO-ENTMCNC: 30.4 G/DL (ref 31.4–37.4)
MCV RBC AUTO: 85 FL (ref 82–98)
MONOCYTES # BLD AUTO: 0.84 THOUSAND/ΜL (ref 0.17–1.22)
MONOCYTES NFR BLD AUTO: 10 % (ref 4–12)
NEUTROPHILS # BLD AUTO: 5.47 THOUSANDS/ΜL (ref 1.85–7.62)
NEUTS SEG NFR BLD AUTO: 62 % (ref 43–75)
NRBC BLD AUTO-RTO: 0 /100 WBCS
PLATELET # BLD AUTO: 284 THOUSANDS/UL (ref 149–390)
PMV BLD AUTO: 10.3 FL (ref 8.9–12.7)
POTASSIUM SERPL-SCNC: 4.6 MMOL/L (ref 3.5–5.3)
PROT SERPL-MCNC: 8 G/DL (ref 6.4–8.2)
RBC # BLD AUTO: 4.99 MILLION/UL (ref 3.81–5.12)
SODIUM SERPL-SCNC: 138 MMOL/L (ref 136–145)
WBC # BLD AUTO: 8.6 THOUSAND/UL (ref 4.31–10.16)

## 2022-01-28 PROCEDURE — 87077 CULTURE AEROBIC IDENTIFY: CPT

## 2022-01-28 PROCEDURE — 80053 COMPREHEN METABOLIC PANEL: CPT

## 2022-01-28 PROCEDURE — 36415 COLL VENOUS BLD VENIPUNCTURE: CPT

## 2022-01-28 PROCEDURE — 87186 SC STD MICRODIL/AGAR DIL: CPT

## 2022-01-28 PROCEDURE — 85025 COMPLETE CBC W/AUTO DIFF WBC: CPT

## 2022-01-28 PROCEDURE — 87086 URINE CULTURE/COLONY COUNT: CPT

## 2022-01-30 LAB — BACTERIA UR CULT: ABNORMAL

## 2022-01-31 DIAGNOSIS — J42 CHRONIC BRONCHITIS, UNSPECIFIED CHRONIC BRONCHITIS TYPE (HCC): Primary | ICD-10-CM

## 2022-01-31 RX ORDER — ALBUTEROL SULFATE 90 UG/1
2 AEROSOL, METERED RESPIRATORY (INHALATION) EVERY 6 HOURS PRN
COMMUNITY
End: 2022-01-31 | Stop reason: SDUPTHER

## 2022-01-31 NOTE — PRE-PROCEDURE INSTRUCTIONS
Pre-Surgery Instructions:   Medication Instructions    acetaminophen (TYLENOL) 500 mg tablet pt instructed to not take on day of surgery     albuterol (PROVENTIL HFA,VENTOLIN HFA) 90 mcg/act inhaler pt instructed to bring with her on day of surgery     budesonide (PULMICORT) 0 5 mg/2 mL nebulizer solution pt instructed to use on day of surgery     Cholecalciferol (VITAMIN D-3 PO) pt instructed to stop one week prior to surgery     clopidogrel (PLAVIX) 75 mg tablet per pt she always continues this prior to surgery  per surgeons office     diltiazem (CARDIZEM CD) 240 mg 24 hr capsule pt instructed to take on day of surgery with 1-2 sips of water     furosemide (LASIX) 20 mg tablet pt instructed to not take on day of surgery     levothyroxine 125 mcg tablet pt instructed to take on day of surgery with 1-2 sips of water     Multiple Vitamins-Minerals (PreserVision AREDS) CAPS pt instructed to stop one week prior to surgery     multivitamin (THERAGRAN) TABS pt instructed to stop one week prior to surgery     rosuvastatin (CRESTOR) 5 mg tablet pt instructed to not take on day of surgery     Pt denies fever and sore throat  Pt has chronic sob and cough  Per pt she does not stop plavix prior to surgery per urology instructions  Pt instructed to stop nsaids and supplements one week prior to surgery  Pt will bring remote on day of surgery for DBS  Pt verbalized understanding of shower and med instructions

## 2022-02-01 RX ORDER — ALBUTEROL SULFATE 90 UG/1
2 AEROSOL, METERED RESPIRATORY (INHALATION) EVERY 6 HOURS PRN
Qty: 18 G | Refills: 1 | Status: SHIPPED | OUTPATIENT
Start: 2022-02-01

## 2022-02-02 ENCOUNTER — TELEPHONE (OUTPATIENT)
Dept: PULMONOLOGY | Facility: CLINIC | Age: 82
End: 2022-02-02

## 2022-02-02 DIAGNOSIS — J43.2 CENTRILOBULAR EMPHYSEMA (HCC): ICD-10-CM

## 2022-02-02 DIAGNOSIS — R05.9 COUGH: ICD-10-CM

## 2022-02-02 DIAGNOSIS — J43.2 CENTRILOBULAR EMPHYSEMA (HCC): Primary | ICD-10-CM

## 2022-02-02 RX ORDER — AZITHROMYCIN 250 MG/1
TABLET, FILM COATED ORAL
Qty: 6 TABLET | Refills: 0 | Status: SHIPPED | OUTPATIENT
Start: 2022-02-02 | End: 2022-02-06

## 2022-02-02 RX ORDER — BUDESONIDE 0.5 MG/2ML
INHALANT ORAL
Qty: 360 ML | Refills: 1 | Status: SHIPPED | OUTPATIENT
Start: 2022-02-02 | End: 2022-02-03

## 2022-02-02 RX ORDER — PREDNISONE 20 MG/1
TABLET ORAL
Qty: 3 TABLET | Refills: 0 | Status: SHIPPED | OUTPATIENT
Start: 2022-02-02 | End: 2022-02-05 | Stop reason: ALTCHOICE

## 2022-02-02 NOTE — TELEPHONE ENCOUNTER
Called and spoke with daughter and patient  Dee Major said she started with a COPD flare on Monday  She is coughing a lot and has a lot of white mucous  She said she coughs so hard she feels like she is having a heart attack  She is wheezing and is SOB on exertion  Denies chest tightness, fevers and chills  She is using her nebulizer  Her pulse ox is 93% which is normal for her  She has not had any sick contacts  Pharmacy is [place] [phone]

## 2022-02-02 NOTE — TELEPHONE ENCOUNTER
Patient's daughter calling stating Fernando Zhang is having a COPD "falre up" and wants her seen today   Please advise [phone]

## 2022-02-02 NOTE — TELEPHONE ENCOUNTER
Spoke with patient:   Will give prednisone 20 mg x 3 days-patient feels jittery on prednisone, will prescribe Zpack    - if symptoms persist will need imaging will out aspiration pneumonia

## 2022-02-02 NOTE — TELEPHONE ENCOUNTER
Pt stated she needs the solution she is out of it and needs it by today    Please send to [facility] so they can deliver it to her today

## 2022-02-07 PROBLEM — T88.59XA DELAYED EMERGENCE FROM ANESTHESIA: Status: ACTIVE | Noted: 2022-02-07

## 2022-02-07 PROCEDURE — NC001 PR NO CHARGE: Performed by: UROLOGY

## 2022-02-07 NOTE — H&P
HISTORY AND PHYSICAL  ? ? Patient Name: Weston Spain  Patient MRN: [id number]  Attending Provider: Lesley Eduardo MD  Service: Urology  Chief Complaint    Left hydronephrosis    HPI   Weston Spain is a 78 y o  female with indwelling stent for hydronephrosis secondary to UPJ obstruction  Also small renal stone, but that is not the issue  I plan cysto, left stent exchange  Potential risks and complications discussed, and informed consent was given by the patient  Medications  Meds/Allergies   No current facility-administered medications for this encounter  Cannot display prior to admission medications because the patient has not been admitted in this contact  No current facility-administered medications for this encounter  Current Outpatient Medications:     acetaminophen (TYLENOL) 500 mg tablet, Take 1,000 mg by mouth every 6 (six) hours as needed for mild pain  , Disp: , Rfl:     Cholecalciferol (VITAMIN D-3 PO), Take 1,000 Units by mouth daily  , Disp: , Rfl:     clopidogrel (PLAVIX) 75 mg tablet, Take 1 tablet (75 mg total) by mouth daily, Disp: 90 tablet, Rfl: 3    diltiazem (CARDIZEM CD) 240 mg 24 hr capsule, TAKE 1 CAPSULE (240 MG TOTAL) BY MOUTH DAILY, Disp: 90 capsule, Rfl: 3    furosemide (LASIX) 20 mg tablet, TAKE 1 TABLET (20 MG TOTAL) BY MOUTH DAILY, Disp: 90 tablet, Rfl: 2    levothyroxine 125 mcg tablet, TAKE 1 TABLET (125 MCG TOTAL) BY MOUTH DAILY, Disp: 90 tablet, Rfl: 3    Multiple Vitamins-Minerals (PreserVision AREDS) CAPS, Take 2 capsules by mouth daily, Disp: 180 capsule, Rfl: 1    multivitamin (THERAGRAN) TABS, Take 1 tablet by mouth daily, Disp: , Rfl:     rosuvastatin (CRESTOR) 5 mg tablet, ( or as directed ) (Patient taking differently: 1 tab 2 times a week ), Disp: 30 tablet, Rfl: 2    albuterol (PROVENTIL HFA,VENTOLIN HFA) 90 mcg/act inhaler, Inhale 2 puffs every 6 (six) hours as needed for wheezing, Disp: 18 g, Rfl: 1    albuterol CONTINUOUS nebulizing solution, Take by nebulization 2 (two) times a day, Disp: , Rfl:     budesonide (PULMICORT) 0 5 mg/2 mL nebulizer solution, Take 2 mL (0 5 mg total) by nebulization 2 (two) times a day Rinse mouth after use , Disp: 360 mL, Rfl: 1    cephalexin (KEFLEX) 500 mg capsule, Take 1 capsule (500 mg total) by mouth every 6 (six) hours for 7 days, Disp: 28 capsule, Rfl: 0    Lancets (OneTouch Delica Plus PRTUFS20O) MISC, daily Check blood sugar, Disp: , Rfl:     Misc   Devices (Roller Walker) MISC, Needs ROLLATOR [employer] re history CVA, leg weakness, sciatica, osteoarthritis, gait dysfunction, Disp: 1 each, Rfl: 0    OneTouch Ultra test strip, daily Check blood sugar, Disp: , Rfl:     predniSONE 20 mg tablet, 20 mg tablets x 3 days, Disp: 3 tablet, Rfl: 0  Review of Systems  10 point review of systems negative except as noted in HPI  Allergies  Allergies   Allergen Reactions    Ciprofloxacin Diarrhea     She did use Levaquin in April of 2021    Simvastatin Myalgia    Advair Diskus [Fluticasone-Salmeterol] Palpitations     Ok w flovent    Tetracycline Rash     Reaction Date: 10Jan2012;      [facility]  Past Medical History:   Diagnosis Date    Anemia     Anesthesia complication     difficulty awakening    Anesthesia complication     difficulty awakening    Arthritis     Asthma     At risk for falls     rib frac    Benign essential tremor 10/15/2018    Added automatically from request for surgery [id number] has stimulator    [facility]) [facility] [address] [facility]     Coronary artery disease     Diabetes mellitus ([facility] [address] [facility]     borderline    Disease of thyroid gland     hypothyroid    Edema     bles    GERD (gastroesophageal reflux disease)     resolved    Glaucoma     Heart murmur     High cholesterol     Hypertension     Kidney stone     Macular degeneration     Osteopenia     Osteoporosis     S/P deep brain stimulator placement     Shortness of breath     on exertion    Slurred speech     Stroke ([facility] [address] [facility]     x 2- states mini strokes    Urinary leakage     Uses roller walker     with seat    Vitamin D deficiency     Wears dentures     full upper    Wears glasses     reading     Past surgical history  Past Surgical History:   Procedure Laterality Date    CATARACT EXTRACTION W/  INTRAOCULAR LENS IMPLANT Bilateral     COLONOSCOPY      CYSTOSCOPY      CYSTOSCOPY W/ RETROGRADES Left 2/16/2021    Procedure: CYSTO, RETRO PYELOGRAM, STENT EXCHANGE;  Surgeon: Luli Lozano MD;  [facility];  Service: Urology    DEEP BRAIN STIMULATOR PLACEMENT      FL RETROGRADE PYELOGRAM  11/7/2019    FL RETROGRADE PYELOGRAM  5/26/2020    FL RETROGRADE PYELOGRAM  2/16/2021    IR NEPHROSTOMY TO NEPHROURETERAL STENT  1/10/2020    IR TUBE PLACEMENT  11/12/2019    KNEE ARTHROSCOPY      OH CYSTOSCOPY,INSERT URETERAL STENT Left 8/10/2021    Procedure: CYSTOSCOPY, RETROGRADE PYELOGRAM,EXCHANGE STENT URETERAL;  Surgeon: Luli Lozano MD;  [facility];  Service: Urology    OH CYSTOURETHROSCOPY,URETER CATHETER Left 11/7/2019    Procedure: [address], URETEROSCOPY;  Surgeon: Claudia Hurtado MD;  [facility];  Service: Urology    OH IMP STIM,CRANIAL,SUBQ,1 ARRAY Left 1/22/2019    Procedure: REPLACEMENT IMPLANTABLE PULSE GENERATOR (IPG) DEEP BRAIN STIMULATION (DBS), LEFT;  Surgeon: Kevin Chaidez MD;  Location: [facility];  Service: Neurosurgery    OH IMP STIM,CRANIAL,SUBQ,1 ARRAY Left 9/8/2021    Procedure: LEFT IMPLANTABLE PULSE GENERATOR REPLACEMENT/ DEEP BRAIN STIMULATOR GENERATOR,LEFT CHEST;  Surgeon: Ivet Kelly MD;  [facility];  Service: Neurosurgery    OH IMP STIM,CRANIAL,SUBQ,>1 ARRAY Right 12/13/2018    Procedure: REPLACEMENT RIGHT DBS IMPLANTABLE PULSE GENERATOR;  Surgeon: Kevin Chaidez MD;  [facility];  Service: Neurosurgery    [address] Left 10/27/2020    Procedure: CYSTOSCOPY, RETROGRADE PYELOGRAM, EXCHANGE STENT URETERA L, LEFT URETEROSCOPY;  Surgeon: Phil Dove MD;  [facility] MAIN OR;  Service: Urology    MN RMVL & RPLCMT INTLY DWELLING URETERAL STENT PRQ Left 2020    Procedure: Jimi Sandra, STENT;  Surgeon: Phil Dove MD;  [facility];  Service: Urology    MN THROMBOENDARTECTMY Emeterio Meek Right 2020    Procedure: CAROTID ENDARTERECTOMY WITH BOVINE PATCH;  Surgeon: Adrienne Law MD;  [facility];  Service: Vascular    MN TOTAL HIP ARTHROPLASTY Left 2016    Procedure: ARTHROPLASTY HIP TOTAL ANTERIOR;  Surgeon: Bijan Saez MD;  [facility];  Service: Orthopedics    ROTATOR CUFF REPAIR       Social history  Social History     Tobacco Use    Smoking status: Former Smoker     Packs/day: 1 00     Years: 60 00     Pack years: 60 00     Types: Cigarettes     Start date:      Quit date:      Years since quittin 1    Smokeless tobacco: Never Used   Vaping Use    Vaping Use: Never used   Substance Use Topics    Alcohol use: Not Currently    Drug use: No     ?  Physical Exam     vs  General appearance: alert and oriented, in no acute distress  Head: Normocephalic, without obvious abnormality, atraumatic  Eyes: conjunctivae/corneas clear  PERRL, EOM's intact  Fundi benign    Neck: no adenopathy, no carotid bruit, no JVD, supple, symmetrical, trachea midline and thyroid not enlarged, symmetric, no tenderness/mass/nodules  Lungs: clear to auscultation bilaterally  Heart: regular rate and rhythm, S1, S2 normal, no murmur, click, rub or gallop  Abdomen: soft, non-tender; bowel sounds normal; no masses,  no organomegaly  Extremities: extremities normal, warm and well-perfused; no cyanosis, clubbing, or edema  Phil Dove MD

## 2022-02-07 NOTE — ANESTHESIA PREPROCEDURE EVALUATION
Procedure:  CYSTOSCOPY, RETROGRADE PYELOGRAM, EXCHANGE STENT URETERAL (Left Ureter)    Relevant Problems   ANESTHESIA   (+) Delayed emergence from anesthesia      CARDIO   (+) Aortic valve stenosis - (Moderate )   (+) Benign essential hypertension   (+) Bilateral carotid artery stenosis   (+) Coronary artery disease involving native coronary artery of native heart without angina pectoris      ENDO   (+) Acquired hypothyroidism      GI/HEPATIC   (+) Esophageal reflux      /RENAL   (+) Calculus of kidney   (+) Hydronephrosis with ureteropelvic junction (UPJ) obstruction      HEMATOLOGY   (+) Anemia       MUSCULOSKELETAL   (+) Lumbar degenerative disc disease   (+) OA (osteoarthritis)   (+) Osteoarthritis of hip   (+) Sciatica      NEURO/PSYCH   (+) History of CVA (cerebrovascular accident)   (+) History of pancreatitis      PULMONARY   (+) Chronic bronchitis / COPD (HCC)      Other   (+) Diabetes mellitus type 2, diet-controlled (HCC)   (+) Gait disturbance   (+) Nephrostomy tube Left        Physical Exam    Airway    Mallampati score: II  TM Distance: >3 FB  Neck ROM: full     Dental   upper dentures and lower dentures,     Cardiovascular  Rhythm: regular, Rate: normal, Cardiovascular exam normal    Pulmonary  Pulmonary exam normal Breath sounds clear to auscultation,     Other Findings        Anesthesia Plan  ASA Score- 3     Anesthesia Type- general with ASA Monitors  Additional Monitors:   Airway Plan:           Plan Factors-    Chart reviewed  EKG reviewed  Existing labs reviewed  Patient summary reviewed  Patient is not a current smoker  Induction- intravenous  Postoperative Plan-     Informed Consent- Anesthetic plan and risks discussed with patient

## 2022-02-08 ENCOUNTER — ANESTHESIA (OUTPATIENT)
Dept: PERIOP | Facility: HOSPITAL | Age: 82
End: 2022-02-08
Payer: MEDICARE

## 2022-02-08 ENCOUNTER — APPOINTMENT (OUTPATIENT)
Dept: RADIOLOGY | Facility: HOSPITAL | Age: 82
End: 2022-02-08
Payer: MEDICARE

## 2022-02-08 ENCOUNTER — HOSPITAL ENCOUNTER (OUTPATIENT)
Facility: HOSPITAL | Age: 82
Setting detail: OUTPATIENT SURGERY
Discharge: HOME | End: 2022-02-08
Attending: UROLOGY | Admitting: UROLOGY
Payer: MEDICARE

## 2022-02-08 VITALS
HEART RATE: 89 BPM | TEMPERATURE: 97.8 F | RESPIRATION RATE: 20 BRPM | OXYGEN SATURATION: 92 % | WEIGHT: 195.77 LBS | SYSTOLIC BLOOD PRESSURE: 174 MMHG | DIASTOLIC BLOOD PRESSURE: 81 MMHG | BODY MASS INDEX: 33.42 KG/M2 | HEIGHT: 64 IN

## 2022-02-08 DIAGNOSIS — Q62.11 HYDRONEPHROSIS WITH URETEROPELVIC JUNCTION (UPJ) OBSTRUCTION: ICD-10-CM

## 2022-02-08 LAB
GLUCOSE SERPL-MCNC: 140 MG/DL (ref 65–140)
GLUCOSE SERPL-MCNC: 140 MG/DL (ref 65–140)

## 2022-02-08 PROCEDURE — C1769 GUIDE WIRE: HCPCS | Performed by: UROLOGY

## 2022-02-08 PROCEDURE — 82948 REAGENT STRIP/BLOOD GLUCOSE: CPT

## 2022-02-08 PROCEDURE — 52332 CYSTOSCOPY AND TREATMENT: CPT | Performed by: UROLOGY

## 2022-02-08 PROCEDURE — 99024 POSTOP FOLLOW-UP VISIT: CPT | Performed by: UROLOGY

## 2022-02-08 PROCEDURE — 74420 UROGRAPHY RTRGR +-KUB: CPT

## 2022-02-08 PROCEDURE — C2617 STENT, NON-COR, TEM W/O DEL: HCPCS | Performed by: UROLOGY

## 2022-02-08 DEVICE — VARIABLE LENGTH INJECTION STENT SET
Type: IMPLANTABLE DEVICE | Site: URETER | Status: FUNCTIONAL
Brand: CONTOUR VL™ INJECTION STENT SET

## 2022-02-08 RX ORDER — ONDANSETRON 2 MG/ML
INJECTION INTRAMUSCULAR; INTRAVENOUS AS NEEDED
Status: DISCONTINUED | OUTPATIENT
Start: 2022-02-08 | End: 2022-02-08

## 2022-02-08 RX ORDER — FENTANYL CITRATE/PF 50 MCG/ML
25 SYRINGE (ML) INJECTION
Status: DISCONTINUED | OUTPATIENT
Start: 2022-02-08 | End: 2022-02-08 | Stop reason: HOSPADM

## 2022-02-08 RX ORDER — PROPOFOL 10 MG/ML
INJECTION, EMULSION INTRAVENOUS AS NEEDED
Status: DISCONTINUED | OUTPATIENT
Start: 2022-02-08 | End: 2022-02-08

## 2022-02-08 RX ORDER — SODIUM CHLORIDE 9 MG/ML
INJECTION, SOLUTION INTRAVENOUS AS NEEDED
Status: DISCONTINUED | OUTPATIENT
Start: 2022-02-08 | End: 2022-02-08 | Stop reason: HOSPADM

## 2022-02-08 RX ORDER — SODIUM CHLORIDE 9 MG/ML
125 INJECTION, SOLUTION INTRAVENOUS CONTINUOUS
Status: DISCONTINUED | OUTPATIENT
Start: 2022-02-08 | End: 2022-02-08 | Stop reason: HOSPADM

## 2022-02-08 RX ORDER — SODIUM CHLORIDE, SODIUM LACTATE, POTASSIUM CHLORIDE, CALCIUM CHLORIDE 600; 310; 30; 20 MG/100ML; MG/100ML; MG/100ML; MG/100ML
125 INJECTION, SOLUTION INTRAVENOUS CONTINUOUS
Status: DISCONTINUED | OUTPATIENT
Start: 2022-02-08 | End: 2022-02-08

## 2022-02-08 RX ORDER — OXYCODONE HYDROCHLORIDE AND ACETAMINOPHEN 5; 325 MG/1; MG/1
1 TABLET ORAL EVERY 4 HOURS PRN
Status: DISCONTINUED | OUTPATIENT
Start: 2022-02-08 | End: 2022-02-08 | Stop reason: HOSPADM

## 2022-02-08 RX ORDER — DEXAMETHASONE SODIUM PHOSPHATE 4 MG/ML
INJECTION, SOLUTION INTRA-ARTICULAR; INTRALESIONAL; INTRAMUSCULAR; INTRAVENOUS; SOFT TISSUE AS NEEDED
Status: DISCONTINUED | OUTPATIENT
Start: 2022-02-08 | End: 2022-02-08

## 2022-02-08 RX ORDER — ONDANSETRON 2 MG/ML
4 INJECTION INTRAMUSCULAR; INTRAVENOUS ONCE AS NEEDED
Status: DISCONTINUED | OUTPATIENT
Start: 2022-02-08 | End: 2022-02-08 | Stop reason: HOSPADM

## 2022-02-08 RX ORDER — OXYCODONE HYDROCHLORIDE AND ACETAMINOPHEN 5; 325 MG/1; MG/1
2 TABLET ORAL EVERY 4 HOURS PRN
Status: DISCONTINUED | OUTPATIENT
Start: 2022-02-08 | End: 2022-02-08 | Stop reason: HOSPADM

## 2022-02-08 RX ORDER — FENTANYL CITRATE 50 UG/ML
INJECTION, SOLUTION INTRAMUSCULAR; INTRAVENOUS AS NEEDED
Status: DISCONTINUED | OUTPATIENT
Start: 2022-02-08 | End: 2022-02-08

## 2022-02-08 RX ADMIN — DEXAMETHASONE SODIUM PHOSPHATE 4 MG: 4 INJECTION INTRA-ARTICULAR; INTRALESIONAL; INTRAMUSCULAR; INTRAVENOUS; SOFT TISSUE at 14:47

## 2022-02-08 RX ADMIN — PROPOFOL 100 MG: 10 INJECTION, EMULSION INTRAVENOUS at 14:47

## 2022-02-08 RX ADMIN — CEFTRIAXONE SODIUM 1000 MG: 10 INJECTION, POWDER, FOR SOLUTION INTRAVENOUS at 14:38

## 2022-02-08 RX ADMIN — ONDANSETRON 4 MG: 2 INJECTION INTRAMUSCULAR; INTRAVENOUS at 14:47

## 2022-02-08 RX ADMIN — LIDOCAINE HYDROCHLORIDE 100 MG: 20 INJECTION INTRAVENOUS at 14:47

## 2022-02-08 RX ADMIN — FENTANYL CITRATE 50 MCG: 50 INJECTION INTRAMUSCULAR; INTRAVENOUS at 14:52

## 2022-02-08 RX ADMIN — SODIUM CHLORIDE, SODIUM LACTATE, POTASSIUM CHLORIDE, AND CALCIUM CHLORIDE 125 ML/HR: .6; .31; .03; .02 INJECTION, SOLUTION INTRAVENOUS at 12:55

## 2022-02-08 RX ADMIN — SODIUM CHLORIDE 125 ML/HR: 0.9 INJECTION, SOLUTION INTRAVENOUS at 14:18

## 2022-02-08 NOTE — OP NOTE
PERATIVE REPORT  PATIENT NAME: Bekah Ellison    :  1940  MRN: [id number]  Pt Location: [facility]OM [facility]    SURGERY DATE: 2022    Surgeon(s) and Role:     Yanira Whitfield MD - Primary    Preop Diagnosis:  Hydronephrosis with ureteropelvic junction (UPJ) obstruction [Q62 11]    Post-Op Diagnosis Codes: * Hydronephrosis with ureteropelvic junction (UPJ) obstruction [Q62 11]    Procedure(s) (LRB):  CYSTOSCOPY, RETROGRADE PYELOGRAM, EXCHANGE STENT URETERAL (Left)    Specimen(s):  * No specimens in log *    Estimated Blood Loss:   Minimal    Drains:  Ureteral Drain/Stent Left ureter 7 Fr  (Active)   Number of days: 0       Anesthesia Type:   Choice    Operative Indications:  Hydronephrosis with ureteropelvic junction (UPJ) obstruction [Q62 11]      Operative Findings:  Mild left hydro    Complications:   None    Procedure and Technique:     After the patient was placed under adequate anesthesia, they were prepped and draped using chlorhexidine solution in lithotomy position  The 25 [place]ian scope was passed thru the urethra, which showed no strictures  Further findings upon passage of the scope were mild bladder inflammation        The prior left  stent appeared calcified, so I passed a wire around the old stent  The old stent was extracted  A new  seven [place]ian stent was passed over the wire up to the renal pelvis  Contrast was injected, retrograde pyelogram showing good position and mild hydronephrosis  The scope was removed, leaving the stent in place  They were taken to RR in good condition     I was present for the entire procedure    Patient Disposition:  PACU       SIGNATURE: Rupali Izaguirre MD  DATE: 2022  TIME: 3:14 PM

## 2022-02-08 NOTE — PROGRESS NOTES
Dr Sumaya Matthews was called to check on patients low o2 sat    She dose have COPD and her lowest was 91 % here in the hospital   He is ok with pt going to [facility] with a low o2 sat of 91%

## 2022-02-08 NOTE — DISCHARGE SUMMARY
Discharge Summary - Radha Lombardi 78 y o  female MRN: [id number]    Admission Date: 2/8/2022     Admitting Diagnosis: Hydronephrosis with ureteropelvic junction (UPJ) obstruction [Q62 11]    Procedures Performed:  Cysto, left stent exchange    Patient underwent planned outpt surgery and recovered without complication  Discharged in good condition  Medications were prescribed  Pt knows to have office follow-up at the appropriate time

## 2022-02-08 NOTE — DISCHARGE INSTRUCTIONS
ALL YOUR  PREVIOUS MEDS ARE THE SAME  NEW MEDS:  None    EXPECT SOME BLOOD IN URINE, BURNING, FREQUENT URINATION  ACTIVITY:  RESUME FULL ACTIVITY Thursday

## 2022-02-08 NOTE — PROGRESS NOTES
Dr Olivia Smart made aware pt incontinent of urine in PACU, per Dr Martha Higgins, pt does not need to void to meet criteria for d/c and pt ok for d/c  Dr Francy Santiago of anesthesia contacted and made aware of pt's o2 sat upon arrival to [facility] and improvement with deep breaths  Pt states she will take breathing treatments at home  Dr Koby Malloy made aware and ok for pt to be d/c home if o2 sat 90-93% with deep breaths  Pt verbalizes understanding

## 2022-02-09 ENCOUNTER — TELEPHONE (OUTPATIENT)
Dept: UROLOGY | Facility: HOSPITAL | Age: 82
End: 2022-02-09

## 2022-02-09 NOTE — TELEPHONE ENCOUNTER
Post Op Note    Jonny Keene is a 78 y o  female s/p Cysto  L sten performed 02/08/2022  Jonny Keene is a patient of Dr Jamia Hunt and is seen at the [facility] office  How would you rate your pain on a scale from 1 to 10, 10 being the worst pain ever? 0  Have you had a fever? No  Have your bowel movements been regular? Yes  Do you have any difficulty urinating? No  Do you have any other questions or concerns that I can address at this time?  Patient is scheduled for H & P prior to next surgery

## 2022-02-15 ENCOUNTER — TELEPHONE (OUTPATIENT)
Dept: FAMILY MEDICINE CLINIC | Facility: CLINIC | Age: 82
End: 2022-02-15

## 2022-02-15 DIAGNOSIS — I10 ESSENTIAL HYPERTENSION: ICD-10-CM

## 2022-02-15 RX ORDER — DILTIAZEM HYDROCHLORIDE 240 MG/1
240 CAPSULE, COATED, EXTENDED RELEASE ORAL DAILY
Qty: 90 CAPSULE | Refills: 3 | Status: SHIPPED | OUTPATIENT
Start: 2022-02-15 | End: 2022-06-29 | Stop reason: SDUPTHER

## 2022-02-15 NOTE — TELEPHONE ENCOUNTER
Pt called in requesting her med for her BP  She said she was completely out  She stated she takes Diazapam, but I didn't see that on her chart  She also couldn't remember the mg, and requested to ask you

## 2022-02-16 DIAGNOSIS — E03.9 ACQUIRED HYPOTHYROIDISM: ICD-10-CM

## 2022-02-16 RX ORDER — LEVOTHYROXINE SODIUM 0.12 MG/1
125 TABLET ORAL DAILY
Qty: 90 TABLET | Refills: 2 | Status: SHIPPED | OUTPATIENT
Start: 2022-02-16 | End: 2022-06-29 | Stop reason: SDUPTHER

## 2022-03-04 ENCOUNTER — TELEPHONE (OUTPATIENT)
Dept: FAMILY MEDICINE CLINIC | Facility: CLINIC | Age: 82
End: 2022-03-04

## 2022-03-04 DIAGNOSIS — E11.9 DIABETES MELLITUS TYPE 2, DIET-CONTROLLED (HCC): Primary | ICD-10-CM

## 2022-03-04 NOTE — TELEPHONE ENCOUNTER
Patient requesting a referral to see the Eye Dr, at [facility] on [address] [facility]  [phone]    Order faxed to Emanuel Siegel at [phone]

## 2022-03-22 ENCOUNTER — OFFICE VISIT (OUTPATIENT)
Dept: FAMILY MEDICINE CLINIC | Facility: CLINIC | Age: 82
End: 2022-03-22
Payer: MEDICARE

## 2022-03-22 VITALS
WEIGHT: 197 LBS | OXYGEN SATURATION: 94 % | HEART RATE: 74 BPM | DIASTOLIC BLOOD PRESSURE: 70 MMHG | HEIGHT: 64 IN | TEMPERATURE: 97.5 F | BODY MASS INDEX: 33.63 KG/M2 | SYSTOLIC BLOOD PRESSURE: 143 MMHG

## 2022-03-22 DIAGNOSIS — Z93.6 NEPHROSTOMY STATUS (HCC): ICD-10-CM

## 2022-03-22 DIAGNOSIS — N18.32 STAGE 3B CHRONIC KIDNEY DISEASE (HCC): ICD-10-CM

## 2022-03-22 DIAGNOSIS — E78.5 DYSLIPIDEMIA: ICD-10-CM

## 2022-03-22 DIAGNOSIS — E11.9 DIABETES MELLITUS TYPE 2, DIET-CONTROLLED (HCC): ICD-10-CM

## 2022-03-22 DIAGNOSIS — I10 BENIGN ESSENTIAL HYPERTENSION: Primary | ICD-10-CM

## 2022-03-22 DIAGNOSIS — I25.10 CORONARY ARTERY DISEASE INVOLVING NATIVE CORONARY ARTERY OF NATIVE HEART WITHOUT ANGINA PECTORIS: ICD-10-CM

## 2022-03-22 DIAGNOSIS — Z86.73 HISTORY OF CVA (CEREBROVASCULAR ACCIDENT): ICD-10-CM

## 2022-03-22 DIAGNOSIS — K21.9 GASTROESOPHAGEAL REFLUX DISEASE, UNSPECIFIED WHETHER ESOPHAGITIS PRESENT: ICD-10-CM

## 2022-03-22 DIAGNOSIS — R60.9 EDEMA, UNSPECIFIED TYPE: ICD-10-CM

## 2022-03-22 DIAGNOSIS — I73.9 PAD (PERIPHERAL ARTERY DISEASE) (HCC): ICD-10-CM

## 2022-03-22 DIAGNOSIS — E03.9 ACQUIRED HYPOTHYROIDISM: ICD-10-CM

## 2022-03-22 DIAGNOSIS — Z96.89 S/P DEEP BRAIN STIMULATOR PLACEMENT: ICD-10-CM

## 2022-03-22 DIAGNOSIS — J42 CHRONIC BRONCHITIS, UNSPECIFIED CHRONIC BRONCHITIS TYPE (HCC): ICD-10-CM

## 2022-03-22 DIAGNOSIS — I87.2 ACUTE VENOUS STASIS DERMATITIS OF BOTH LEGS: ICD-10-CM

## 2022-03-22 PROBLEM — N18.30 STAGE 3 CHRONIC KIDNEY DISEASE, UNSPECIFIED WHETHER STAGE 3A OR 3B CKD (HCC): Status: ACTIVE | Noted: 2022-03-22

## 2022-03-22 PROBLEM — D64.9 ANEMIA: Status: RESOLVED | Noted: 2021-04-28 | Resolved: 2022-03-22

## 2022-03-22 LAB — SL AMB POCT HEMOGLOBIN AIC: 8.1 (ref ?–6.5)

## 2022-03-22 PROCEDURE — 83036 HEMOGLOBIN GLYCOSYLATED A1C: CPT | Performed by: FAMILY MEDICINE

## 2022-03-22 PROCEDURE — 99214 OFFICE O/P EST MOD 30 MIN: CPT | Performed by: FAMILY MEDICINE

## 2022-03-22 RX ORDER — FUROSEMIDE 20 MG/1
20 TABLET ORAL DAILY
Qty: 90 TABLET | Refills: 3 | Status: SHIPPED | OUTPATIENT
Start: 2022-03-22 | End: 2022-06-29 | Stop reason: SDUPTHER

## 2022-03-22 RX ORDER — ALBUTEROL SULFATE 2.5 MG/3ML
2.5 SOLUTION RESPIRATORY (INHALATION) 2 TIMES DAILY
Qty: 540 ML | Refills: 1 | Status: SHIPPED | OUTPATIENT
Start: 2022-03-22

## 2022-03-22 RX ORDER — TRIAMCINOLONE ACETONIDE 1 MG/G
CREAM TOPICAL
Qty: 45 G | Refills: 0 | Status: SHIPPED | OUTPATIENT
Start: 2022-03-22

## 2022-03-22 NOTE — PATIENT INSTRUCTIONS
Overall medically stable here today, blood pressure is acceptable at 134/70  Her A1c redone here today has climbed to 8 1 but home glucometer readings have been only running 120 to 140 range, I feel her A1c is still acceptable, and concern she would do a fellow hypoglycemia with medication  She will continue to try to watch healthy diet, we will see her again in 4 months, redo A1c then  Continue to control cardiovascular risk as can, last cholesterol 148 with HDL 46, LDL 82 in 2021  I would like her to redo fasting blood work again in June, BMP, TSH along with lipids  As in prior notes her catheterization had been done in January of 2020  She will continue other medication as is  I did refill her albuterol nebulizer solution, she uses that twice daily  She does see Pulmonary every 6 months, hopefully we can continue to avoid prednisone which would elevate her sugars further  Last echocardiogram in 2021 showed moderate AS  Does have peripheral vascular disease, carotid disease, continue Plavix along with statin  She does plan to continue follow-up with GI regarding ileocecal ulcer  She continues to see Urology regarding nephrostomy tube/urinary issues  Continues to do well regarding deep brain stimulator/tremor

## 2022-03-22 NOTE — PROGRESS NOTES
FAMILY PRACTICE OFFICE VISIT  Ania SCHRADER O  [facility]  [address]  [address], [place], [address]      NAME: Doc Perales  AGE: 78 y o  SEX: female  : 1940   MRN: [id number]    DATE: 3/22/2022  TIME: 12:13 PM    Assessment and Plan     Problem List Items Addressed This Visit     Benign essential hypertension - Primary    Relevant Medications    furosemide (LASIX) 20 mg tablet    Other Relevant Orders    Basic metabolic panel    History of CVA (cerebrovascular accident)    Relevant Orders    Lipid panel    Chronic bronchitis / COPD ([facility] [address] )    Relevant Medications    albuterol (2 5 mg/3 mL) 0 083 % nebulizer solution    Esophageal reflux    Dyslipidemia    Relevant Orders    Lipid panel    Acquired hypothyroidism    Relevant Orders    TSH, 3rd generation    S/P deep brain stimulator placement    Nephrostomy tube Left    Coronary artery disease involving native coronary artery of native heart without angina pectoris    Diabetes mellitus type 2, diet-controlled ([facility])    Relevant Orders    Basic metabolic panel    Lipid panel    POCT hemoglobin A1c (Completed)    PAD (peripheral artery disease) ([facility])    Stage 3b chronic kidney disease ([facility])    Relevant Medications    furosemide (LASIX) 20 mg tablet      Other Visit Diagnoses     Acute venous stasis dermatitis of both legs        Relevant Medications    triamcinolone (KENALOG) 0 1 % cream    Edema, unspecified type        Relevant Medications    furosemide (LASIX) 20 mg tablet          Patient Instructions   Overall medically stable here today, blood pressure is acceptable at 134/70  Her A1c redone here today has climbed to 8 1 but home glucometer readings have been only running 120 to 140 range, I feel her A1c is still acceptable, and concern she would do a fellow hypoglycemia with medication    She will continue to try to watch healthy diet, we will see her again in 4 months, redo A1c then     Continue to control cardiovascular risk as can, last cholesterol 148 with HDL 46, LDL 82 in 2021  I would like her to redo fasting blood work again in June, BMP, TSH along with lipids  As in prior notes her catheterization had been done in January of 2020  She will continue other medication as is  I did refill her albuterol nebulizer solution, she uses that twice daily  She does see Pulmonary every 6 months, hopefully we can continue to avoid prednisone which would elevate her sugars further  Last echocardiogram in 2021 showed moderate AS  Does have peripheral vascular disease, carotid disease, continue Plavix along with statin  She does plan to continue follow-up with GI regarding ileocecal ulcer  She continues to see Urology regarding nephrostomy tube/urinary issues  Continues to do well regarding deep brain stimulator/tremor  Chief Complaint     Chief Complaint   Patient presents with    Follow-up       History of Present Illness   Bekah Ellison is a 78y o -year-old female who is in today for a routine follow-up, overall she has been feeling okay, she has been using her nebulizer twice daily, she feels her respiratory status is at baseline  She has had no increased chest pain or palpitations  No new neurological deficit, has been doing okay with speech  She relates her sugars have been good, running 120 to 140 most often  Review of Systems   Review of Systems   Constitutional: Positive for fatigue (Baseline)  Negative for appetite change, fever and unexpected weight change  HENT: Negative for sore throat and trouble swallowing  Respiratory:        She had seen her lung doctor back in November, sees them every 6 months  Cardiovascular: Positive for leg swelling (Mild chronic, does note some calf tightness bilateral)  Negative for chest pain and palpitations  Gastrointestinal: Negative for abdominal pain, blood in stool, nausea and vomiting          No acid reflux     No change in bowel   Genitourinary: Negative for hematuria  She does continue to see Urology, nephrostomy tube   Neurological: Negative for dizziness, syncope, light-headedness and headaches (None recent, does have deep brain stimulator regarding tremors, doing well with that  )  Psychiatric/Behavioral: Negative for behavioral problems and confusion         Active Problem List     Patient Active Problem List   Diagnosis    Benign essential hypertension    Benign familial tremor    History of CVA (cerebrovascular accident)    Chronic bronchitis / COPD ([facility] [address] )    Esophageal reflux    Gait disturbance    Dyslipidemia    Sleep disorder    Sciatica    Right shoulder pain    Osteoarthritis of hip    OA (osteoarthritis)    Acquired hypothyroidism    Lumbar degenerative disc disease    History of pancreatitis    Aortic valve stenosis - (Moderate )    S/P deep brain stimulator placement    Microscopic hematuria    Dysarthria related to Nov 2018 CVA,improved    Swallowing dysfunction    Weakness of left leg    Hydronephrosis with ureteropelvic junction (UPJ) obstruction    Pyelonephritis    Nephrostomy tube Left    Coronary artery disease involving native coronary artery of native heart without angina pectoris    Diabetes mellitus type 2, diet-controlled (HCC)    Calculus of kidney    Status post RIGHT carotid endarterectomy    Bilateral carotid artery stenosis    PAD [facility]peripheral artery disease) ([facility])    UTI (urinary tract infection)    Transfusion history    Ileocecal ulcer    Peripheral edema    Delayed emergence from anesthesia    Stage 3b chronic kidney disease ([facility] [address] )       Past Medical History:  Reviewed    Past Surgical History:  Reviewed    Family History:  Reviewed    Social History:  Reviewed    Objective     Vitals:    03/22/22 0848   BP: 143/70   BP Location: Left arm   Patient Position: Sitting   Cuff Size: Large   Pulse: 74   Temp: 97 5 °F (36 4 °C)   SpO2: 94%   Weight: 89 4 kg (197 lb)   Height: 5' 4" (1 626 m)     Body mass index is 33 81 kg/m²  BP Readings from Last 3 Encounters:   03/22/22 143/70   02/08/22 (!) 174/81   11/16/21 146/66       Wt Readings from Last 3 Encounters:   03/22/22 89 4 kg (197 lb)   02/08/22 88 8 kg (195 lb 12 3 oz)   11/16/21 89 8 kg (198 lb)       Physical Exam  Constitutional:       Appearance: She is well-developed  Comments: Very pleasant 22-year-old seated in room, appears as at baseline  Minimal tremor of hands  Speech is improved here today  Neurologically appears to be at baseline   Eyes:      General: No scleral icterus  Cardiovascular:      Rate and Rhythm: Normal rate and regular rhythm  Heart sounds: Murmur (2/6 systolic ejection murmur) heard  Pulmonary:      Comments: Slightly decreased breath sounds bilateral but no rhonchi, rales, wheezing here today  Abdominal:      Palpations: Abdomen is soft  Tenderness: There is no abdominal tenderness  Musculoskeletal:      Comments: Slight pitting edema at ankles bilateral, no calf tenderness, no redness or warmth, no open wounds   Lymphadenopathy:      Cervical: No cervical adenopathy  Skin:     Coloration: Skin is not jaundiced  Neurological:      Mental Status: She is oriented to person, place, and time  Mental status is at baseline     Psychiatric:         Mood and Affect: Mood normal          Behavior: Behavior normal          ALLERGIES:  Allergies   Allergen Reactions    Ciprofloxacin Diarrhea     She did use Levaquin in April of 2021    Simvastatin Myalgia    Advair Diskus [Fluticasone-Salmeterol] Palpitations     Ok w flovent    Tetracycline Rash     Reaction Date: [id number];        Current Medications     Current Outpatient Medications   Medication Sig Dispense Refill    acetaminophen (TYLENOL) 500 mg tablet Take 1,000 mg by mouth every 6 (six) hours as needed for mild pain        budesonide (PULMICORT) 0 5 mg/2 mL nebulizer solution Take 2 mL (0 5 mg total) by nebulization 2 (two) times a day Rinse mouth after use  360 mL 1    Cholecalciferol (VITAMIN D-3 PO) Take 1,000 Units by mouth daily   clopidogrel (PLAVIX) 75 mg tablet Take 1 tablet (75 mg total) by mouth daily 90 tablet 3    diltiazem (CARDIZEM CD) 240 mg 24 hr capsule Take 1 capsule (240 mg total) by mouth daily 90 capsule 3    furosemide (LASIX) 20 mg tablet Take 1 tablet (20 mg total) by mouth daily 90 tablet 3    levothyroxine 125 mcg tablet TAKE 1 TABLET (125 MCG TOTAL) BY MOUTH DAILY 90 tablet 2    Multiple Vitamins-Minerals (PreserVision AREDS) CAPS Take 2 capsules by mouth daily 180 capsule 1    multivitamin (THERAGRAN) TABS Take 1 tablet by mouth daily      rosuvastatin (CRESTOR) 5 mg tablet ( or as directed ) (Patient taking differently: 1 tab 2 times a week ) 30 tablet 2    albuterol (2 5 mg/3 mL) 0 083 % nebulizer solution Take 3 mL (2 5 mg total) by nebulization 2 (two) times a day ( or up to 4 x a day if needed) 540 mL 1    albuterol (PROVENTIL HFA,VENTOLIN HFA) 90 mcg/act inhaler Inhale 2 puffs every 6 (six) hours as needed for wheezing 18 g 1    Lancets (OneTouch Delica Plus [id number]) MISC daily Check blood sugar      Misc  Devices (Roller Walker) MISC Needs ROLLATOR WALKER re history CVA, leg weakness, sciatica, osteoarthritis, gait dysfunction 1 each 0    OneTouch Ultra test strip daily Check blood sugar      triamcinolone (KENALOG) 0 1 % cream Use small amount on rash lower legs 2 to 3 times daily for up to 5 to 7 days as needed  45 g 0     No current facility-administered medications for this visit              Orders Placed This Encounter   Procedures    TSH, 3rd generation    Basic metabolic panel    Lipid panel    POCT hemoglobin A1c         Dawna Masters DO

## 2022-03-23 ENCOUNTER — TELEPHONE (OUTPATIENT)
Dept: PULMONOLOGY | Facility: CLINIC | Age: 82
End: 2022-03-23

## 2022-03-29 ENCOUNTER — OFFICE VISIT (OUTPATIENT)
Dept: VASCULAR SURGERY | Facility: CLINIC | Age: 82
End: 2022-03-29
Payer: MEDICARE

## 2022-03-29 VITALS
HEART RATE: 70 BPM | OXYGEN SATURATION: 95 % | BODY MASS INDEX: 34.08 KG/M2 | DIASTOLIC BLOOD PRESSURE: 70 MMHG | HEIGHT: 64 IN | SYSTOLIC BLOOD PRESSURE: 144 MMHG | WEIGHT: 199.6 LBS | TEMPERATURE: 97.9 F

## 2022-03-29 DIAGNOSIS — I65.23 BILATERAL CAROTID ARTERY STENOSIS: Primary | ICD-10-CM

## 2022-03-29 DIAGNOSIS — I87.2 VENOUS INSUFFICIENCY: ICD-10-CM

## 2022-03-29 PROCEDURE — 99214 OFFICE O/P EST MOD 30 MIN: CPT | Performed by: PHYSICIAN ASSISTANT

## 2022-03-29 NOTE — PROGRESS NOTES
Assessment/Plan:    Bilateral carotid artery stenosis  22-year-old female former smoker with HTN, HLD, type 2 DM, hypothyroidism, COPD, CAD, lumbar spinal disease, essential tremor, right hemispheric CVA Sept '19 and symptomatic right carotid stenosis w/<50% contralateral disease, s/p R CEA by Dr Caitlin Crysatl 2/18/2020 presents for review of surveillance imaging and risk factor modification      -carotid duplex 12/8/21 stable and unchanged with patent R endarterectomy site and < 50% L ICA stenosis, velocity 56/13  -patient asymptomatic, denies TIA/stroke symptoms  -reviewed pathophysiology and indications for treatment of carotid disease  -no further intervention indicated at this time  -continue surveillance duplex yearly   -continue plavix, statin  -follow up in 1 year     Venous insufficiency  -patient reports intermittent swelling of the lower extremities, R>L   -no clinical evidence of DVT  -patient had a prior LEV which was negative  -recommend conservative management with compression, elevation  -advised patient to call the office with new/worsening symptoms       Diagnoses and all orders for this visit:    Bilateral carotid artery stenosis    Venous insufficiency          Subjective:      Patient ID: Mirtha Barnes is a 78 y o  female  Pt is here today to review results of CV done 12/9/2021  She is s/p a right CEA done 2/18/2020 by Dr Caitlin Crystal  She denies any headaches, dizziness, sudden loss of vision, trouble swallowing, slurred speech, TIA or Stroke symptoms  Pt is taking Plavix and Rosuvastatin  She is a former smoker  78year old female w/ hx of R CEA '20 presents for review of recent carotid duplex  Offers no complaints other than intermittent lower extremity swelling  Denies pain or erythema/wounds/skin changes  Does not wear compression  R>L   Denies neurologic symptoms consistent with TIA/stroke    Imaging reviewed which does show stable unchanged carotid disease without restenosis of R ICA      The following portions of the patient's history were reviewed and updated as appropriate: allergies, current medications, past family history, past medical history, past social history, past surgical history and problem list     Review of Systems   Constitutional: Negative  HENT: Negative  Eyes: Positive for discharge and itching  Respiratory: Positive for cough and shortness of breath (COPD)  Cardiovascular: Positive for leg swelling  Gastrointestinal: Negative  Endocrine: Negative  Genitourinary: Negative  Musculoskeletal: Negative  Skin: Negative  Allergic/Immunologic: Negative  Neurological: Negative  Hematological: Negative  Psychiatric/Behavioral: Negative  I have reviewed and made appropriate changes to the review of systems input by the medical assistant      Vitals:    03/29/22 1500   BP: 144/70   BP Location: Left arm   Patient Position: Sitting   Cuff Size: Standard   Pulse: 70   Temp: 97 9 °F (36 6 °C)   TempSrc: Skin   SpO2: 95%   Weight: 90 5 kg (199 lb 9 6 oz)   Height: 5' 4" (1 626 m)       Patient Active Problem List   Diagnosis    Benign essential hypertension    Benign familial tremor    History of CVA (cerebrovascular accident)    Chronic bronchitis / COPD ([address] )    Esophageal reflux    Gait disturbance    Dyslipidemia    Sleep disorder    Sciatica    Right shoulder pain    Osteoarthritis of hip    OA (osteoarthritis)    Acquired hypothyroidism    Lumbar degenerative disc disease    History of pancreatitis    Aortic valve stenosis - (Moderate )    S/P deep brain stimulator placement    Microscopic hematuria    Dysarthria related to Nov 2018 CVA,improved    Swallowing dysfunction    Weakness of left leg    Hydronephrosis with ureteropelvic junction (UPJ) obstruction    Pyelonephritis    Nephrostomy tube Left    Coronary artery disease involving native coronary artery of native heart without angina pectoris    Diabetes mellitus type 2, diet-controlled ([facility] [address] )    Calculus of kidney    Status post RIGHT carotid endarterectomy    Bilateral carotid artery stenosis    Venous insufficiency    UTI (urinary tract infection)    Transfusion history    Ileocecal ulcer    Peripheral edema    Delayed emergence from anesthesia    Stage 3b chronic kidney disease (HCC)       Past Surgical History:   Procedure Laterality Date    CATARACT EXTRACTION W/  INTRAOCULAR LENS IMPLANT Bilateral     COLONOSCOPY      CYSTOSCOPY      CYSTOSCOPY W/ RETROGRADES Left 2/16/2021    Procedure: CYSTO, RETRO PYELOGRAM, STENT EXCHANGE;  Surgeon: Tri Escalona MD;  [facility];  Service: Urology    DEEP BRAIN STIMULATOR PLACEMENT      FL RETROGRADE PYELOGRAM  11/7/2019    FL RETROGRADE PYELOGRAM  5/26/2020    FL RETROGRADE PYELOGRAM  2/16/2021    FL RETROGRADE PYELOGRAM  2/8/2022    IR NEPHROSTOMY TO NEPHROURETERAL STENT  1/10/2020    IR TUBE PLACEMENT  11/12/2019    KNEE ARTHROSCOPY      NV CYSTOSCOPY,INSERT URETERAL STENT Left 8/10/2021    Procedure: CYSTOSCOPY, RETROGRADE PYELOGRAM,EXCHANGE STENT URETERAL;  Surgeon: Tri Escalona MD;  [facility];  Service: Urology    NV CYSTOSCOPY,INSERT URETERAL STENT Left 2/8/2022    Procedure: Melody Butler PYELOGRAM, EXCHANGE STENT URETERAL;  Surgeon: Tri Escalona MD;  [facility];  Service: Urology    NV CYSTOURETHROSCOPY,URETER CATHETER Left 11/7/2019    Procedure: Link Thornton, URETEROSCOPY;  Surgeon: Lou Marcus MD;  [facility];  Service: Urology    NV IMP STIM,CRANIAL,SUBQ,1 ARRAY Left 1/22/2019    Procedure: REPLACEMENT IMPLANTABLE PULSE GENERATOR (IPG) DEEP BRAIN STIMULATION (DBS), LEFT;  Surgeon: Rashida Cosby MD;  [facility];  Service: Neurosurgery    NV IMP STIM,CRANIAL,SUBQ,1 ARRAY Left 9/8/2021    Procedure: LEFT IMPLANTABLE PULSE GENERATOR REPLACEMENT/ DEEP BRAIN STIMULATOR GENERATOR,LEFT CHEST;  Surgeon: Blair Soliman Jun Kumar MD;  [facility];  Service: Neurosurgery    IN IMP STIM,CRANIAL,SUBQ,>1 ARRAY Right 2018    Procedure: REPLACEMENT RIGHT DBS IMPLANTABLE PULSE GENERATOR;  Surgeon: Erma Wilson MD;  [facility];  Service: Neurosurgery    [address] Left 10/27/2020    Procedure: Juan Pablo Grooms PYELOGRAM, EXCHANGE STENT URETERA L, LEFT URETEROSCOPY;  Surgeon: Leah Bland MD;  [facility];  Service: Urology    IN RMVL & RPLCMT INTLY DWELLING URETERAL STENT PRQ Left 2020    Procedure: Leana Flirt, STENT;  Surgeon: Leah Bland MD;  [facility];  Service: Urology    IN THROMBOENDARTECTMY Star Trino Right 2020    Procedure: CAROTID ENDARTERECTOMY WITH BOVINE PATCH;  Surgeon: Earl White MD;  [facility];  Service: Vascular    IN TOTAL HIP ARTHROPLASTY Left 2016    Procedure: ARTHROPLASTY HIP TOTAL ANTERIOR;  Surgeon: Tesha Yadav MD;  [facility];  Service: Orthopedics    ROTATOR CUFF REPAIR         Family History   Problem Relation Age of Onset    Breast cancer Mother     Throat cancer Family        Social History     Socioeconomic History    Marital status:       Spouse name: Not on file    Number of children: Not on file    Years of education: Not on file    Highest education level: Not on file   Occupational History    Not on file   Tobacco Use    Smoking status: Former Smoker     Packs/day: 1 00     Years: 60 00     Pack years: 60 00     Types: Cigarettes     Start date:      Quit date:      Years since quittin 2    Smokeless tobacco: Never Used   Vaping Use    Vaping Use: Never used   Substance and Sexual Activity    Alcohol use: Not Currently    Drug use: Never    Sexual activity: Not Currently     Birth control/protection: Post-menopausal   Other Topics Concern    Not on file   Social History Narrative    Caffeine use    Living independently         Social Determinants of Health     Financial Resource Strain: Not on file   Food Insecurity: Not on file   Transportation Needs: Not on file   Physical Activity: Not on file   Stress: Not on file   Social Connections: Not on file   Intimate Partner Violence: Not on file   Housing Stability: Not on file       Allergies   Allergen Reactions    Ciprofloxacin Diarrhea     She did use Levaquin in April of 2021    Simvastatin Myalgia    Advair Diskus [Fluticasone-Salmeterol] Palpitations     Ok w flovent    Tetracycline Rash     Reaction Date: [id number];          Current Outpatient Medications:     acetaminophen (TYLENOL) 500 mg tablet, Take 1,000 mg by mouth every 6 (six) hours as needed for mild pain  , Disp: , Rfl:     albuterol (2 5 mg/3 mL) 0 083 % nebulizer solution, Take 3 mL (2 5 mg total) by nebulization 2 (two) times a day ( or up to 4 x a day if needed), Disp: 540 mL, Rfl: 1    albuterol (PROVENTIL HFA,VENTOLIN HFA) 90 mcg/act inhaler, Inhale 2 puffs every 6 (six) hours as needed for wheezing, Disp: 18 g, Rfl: 1    budesonide (PULMICORT) 0 5 mg/2 mL nebulizer solution, Take 2 mL (0 5 mg total) by nebulization 2 (two) times a day Rinse mouth after use , Disp: 360 mL, Rfl: 1    Cholecalciferol (VITAMIN D-3 PO), Take 1,000 Units by mouth daily  , Disp: , Rfl:     clopidogrel (PLAVIX) 75 mg tablet, Take 1 tablet (75 mg total) by mouth daily, Disp: 90 tablet, Rfl: 3    diltiazem (CARDIZEM CD) 240 mg 24 hr capsule, Take 1 capsule (240 mg total) by mouth daily, Disp: 90 capsule, Rfl: 3    furosemide (LASIX) 20 mg tablet, Take 1 tablet (20 mg total) by mouth daily, Disp: 90 tablet, Rfl: 3    Lancets (OneTouch Delica Plus [id number]) MISC, daily Check blood sugar, Disp: , Rfl:     levothyroxine 125 mcg tablet, TAKE 1 TABLET (125 MCG TOTAL) BY MOUTH DAILY, Disp: 90 tablet, Rfl: 2    Misc   Devices (Roller Merlin Sovereign) MISC, Needs ROLLATOR Hayti re history CVA, leg weakness, sciatica, osteoarthritis, gait dysfunction, Disp: 1 each, Rfl: 0    Multiple Vitamins-Minerals (PreserVision AREDS) CAPS, Take 2 capsules by mouth daily, Disp: 180 capsule, Rfl: 1    multivitamin (THERAGRAN) TABS, Take 1 tablet by mouth daily, Disp: , Rfl:     OneTouch Ultra test strip, daily Check blood sugar, Disp: , Rfl:     rosuvastatin (CRESTOR) 5 mg tablet, ( or as directed ) (Patient taking differently: 1 tab 2 times a week ), Disp: 30 tablet, Rfl: 2    triamcinolone (KENALOG) 0 1 % cream, Use small amount on rash lower legs 2 to 3 times daily for up to 5 to 7 days as needed  , Disp: 45 g, Rfl: 0    Objective:      /70 (BP Location: Left arm, Patient Position: Sitting, Cuff Size: Standard)   Pulse 70   Temp 97 9 °F (36 6 °C) (Skin)   Ht 5' 4" (1 626 m)   Wt 90 5 kg (199 lb 9 6 oz)   SpO2 95%   BMI 34 26 kg/m²          Physical Exam  Constitutional:       General: She is not in acute distress  Appearance: Normal appearance  She is not ill-appearing, toxic-appearing or diaphoretic  HENT:      Head: Normocephalic and atraumatic  Right Ear: External ear normal       Left Ear: External ear normal       Nose: Nose normal       Mouth/Throat:      Mouth: Mucous membranes are moist       Pharynx: Oropharynx is clear  Eyes:      General: No scleral icterus  Extraocular Movements: Extraocular movements intact  Conjunctiva/sclera: Conjunctivae normal    Cardiovascular:      Rate and Rhythm: Normal rate and regular rhythm  Heart sounds: Normal heart sounds  Pulmonary:      Effort: Pulmonary effort is normal  No respiratory distress  Breath sounds: Normal breath sounds  Abdominal:      General: Abdomen is flat  Palpations: Abdomen is soft  Tenderness: There is no abdominal tenderness  Musculoskeletal:         General: Normal range of motion  Cervical back: Normal range of motion and neck supple  Right lower leg: Edema present        Left lower leg: Edema present  Skin:     General: Skin is warm and dry  Neurological:      General: No focal deficit present  Mental Status: She is alert and oriented to person, place, and time  Mental status is at baseline  Cranial Nerves: No cranial nerve deficit  Sensory: No sensory deficit  Motor: No weakness     Psychiatric:         Mood and Affect: Mood normal          Behavior: Behavior normal

## 2022-03-29 NOTE — PATIENT INSTRUCTIONS
Carotid Artery Disease   AMBULATORY CARE:   Carotid artery disease (CAD)  means the major blood vessels in your neck are narrowed or becoming blocked  These 2 major blood vessels are called the carotid arteries  They supply your brain with blood  The narrow or blocked blood vessels increase your risk for a stroke  CAD is also called carotid artery stenosis  Have someone call your local emergency number (911 in the [address] [facility]) if:   · You have any of the following signs of a stroke:      ? Numbness or drooping on one side of your face     ? Weakness in an arm or leg    ? Confusion or difficulty speaking    ? Dizziness, a severe headache, or vision loss    · You have any of the following signs of a heart attack:      ? Squeezing, pressure, or pain in your chest    ? You may  also have any of the following:     § Discomfort or pain in your back, neck, jaw, stomach, or arm    § Shortness of breath    § Nausea or vomiting    § Lightheadedness or a sudden cold sweat    Call your doctor if:   · You have questions or concerns about your condition or care  Common signs and symptoms:  CAD develops slowly  You may have no signs or symptoms until you have a mini-stroke, or transient ischemic attack (TIA)  A TIA is a temporary lack of blood flow to your brain  A TIA goes away quickly and does not cause permanent damage  A TIA may be a warning sign that you are about to have a stroke  If you have any symptoms of a TIA or stroke, seek care immediately  Warning signs of a stroke: The words BE FAST can help you remember and recognize warning signs of a stroke:  · B = Balance:  Sudden loss of balance    · E = Eyes:  Loss of vision in one or both eyes    · F = Face:  Face droops on one side    · A = Arms:  Arm drops when both arms are raised    · S = Speech:  Speech is slurred or sounds different    · T = Time:  Time to get help immediately       Treatment  depends on how narrow your arteries have become   Treatment also depends on your symptoms and your general health  The goal of treatment is to lower your risk for a stroke  You may need any of the following:  · Medicines:      ? Aspirin,  or other blood thinner, may be recommended  These will help prevent blood clots from forming in your carotid arteries  If your healthcare provider wants you to take aspirin, do not take acetaminophen or ibuprofen instead  ? Cholesterol medicine  lowers your cholesterol level  ? Blood pressure medicine  lowers or helps control your blood pressure  · Procedures  can help open blocked arteries:     ? Carotid endarterectomy (CEA)  is used to cut and remove plaque buildup from your arteries  ? Carotid angioplasty and stenting (FOREST)  is used to push the plaque against the artery wall with a balloon device  Then, a stent is placed to keep the artery open  A stent is a small metal mesh tube  Prevent a stroke:   · Do not smoke, and avoid secondhand smoke  Nicotine and other chemicals in cigarettes and cigars increase your risk for a stroke  Ask your healthcare provider for information if you currently smoke and need help to quit  E-cigarettes or smokeless tobacco still contain nicotine  Talk to your healthcare provider before you use these products  · Eat a variety of healthy foods  Healthy foods include fruit, vegetables, whole-grain breads, low-fat dairy products, chicken, and fish  Choose fish that are high in omega-3 fatty acids, such as salmon and fresh tuna  Ask your healthcare provider for more information on a heart healthy diet and the DASH eating plan  · Limit sodium (salt)  Sodium may increase your blood pressure  Add less table salt to your foods  Read food labels and choose foods that are low in sodium  Your healthcare provider may suggest you follow a low sodium diet  · Reach or maintain a healthy weight  Extra weight makes your heart work harder  Ask your healthcare provider what a healthy weight is for you  He or she can help you create a safe weight loss plan  Even a weight loss of 10% of your extra body weight can help your heart function better  · Exercise regularly  Exercise helps improve heart function and can help you manage your weight  Exercise can also help lower your cholesterol and blood sugar levels  Try to get at least 30 minutes of exercise 5 times each week  Try to be physically active every day  This may include walking, riding a bicycle, or swimming  Your healthcare provider can help you create an exercise plan that works best for you  · Limit alcohol  Alcohol can increase your blood pressure and triglyceride levels  A drink of alcohol is 12 ounces of beer, 5 ounces of wine, or 1½ ounces of liquor  Follow up with your doctor as directed:  Write down your questions so you remember to ask them during your visits  © Copyright [employer] 2022 Information is for End User's use only and may not be sold, redistributed or otherwise used for commercial purposes  All illustrations and images included in CareNotes® are the copyrighted property of [employer]  or [facility] Juan C Zhu   The above information is an  only  It is not intended as medical advice for individual conditions or treatments  Talk to your doctor, nurse or pharmacist before following any medical regimen to see if it is safe and effective for you

## 2022-03-29 NOTE — ASSESSMENT & PLAN NOTE
-patient reports intermittent swelling of the lower extremities, R>L   -no clinical evidence of DVT  -patient had a prior LEV which was negative  -recommend conservative management with compression, elevation  -advised patient to call the office with new/worsening symptoms

## 2022-03-29 NOTE — ASSESSMENT & PLAN NOTE
59-year-old female former smoker with HTN, HLD, type 2 DM, hypothyroidism, COPD, CAD, lumbar spinal disease, essential tremor, right hemispheric CVA Sept '19 and symptomatic right carotid stenosis w/<50% contralateral disease, s/p R CEA by Dr Aiyana Conn 2/18/2020 presents for review of surveillance imaging and risk factor modification      -carotid duplex 12/8/21 stable and unchanged with patent R endarterectomy site and < 50% L ICA stenosis, velocity 56/13  -patient asymptomatic, denies TIA/stroke symptoms  -reviewed pathophysiology and indications for treatment of carotid disease  -no further intervention indicated at this time  -continue surveillance duplex yearly   -continue plavix, statin  -follow up in 1 year

## 2022-05-06 ENCOUNTER — OFFICE VISIT (OUTPATIENT)
Dept: PULMONOLOGY | Facility: CLINIC | Age: 82
End: 2022-05-06
Payer: MEDICARE

## 2022-05-06 VITALS
HEART RATE: 70 BPM | RESPIRATION RATE: 16 BRPM | OXYGEN SATURATION: 94 % | SYSTOLIC BLOOD PRESSURE: 128 MMHG | BODY MASS INDEX: 33.29 KG/M2 | DIASTOLIC BLOOD PRESSURE: 62 MMHG | WEIGHT: 195 LBS | HEIGHT: 64 IN

## 2022-05-06 DIAGNOSIS — R05.9 COUGH: ICD-10-CM

## 2022-05-06 DIAGNOSIS — R06.00 DOE (DYSPNEA ON EXERTION): ICD-10-CM

## 2022-05-06 DIAGNOSIS — J43.2 CENTRILOBULAR EMPHYSEMA (HCC): Primary | ICD-10-CM

## 2022-05-06 DIAGNOSIS — E66.09 CLASS 1 OBESITY DUE TO EXCESS CALORIES WITHOUT SERIOUS COMORBIDITY WITH BODY MASS INDEX (BMI) OF 33.0 TO 33.9 IN ADULT: ICD-10-CM

## 2022-05-06 DIAGNOSIS — F17.211 NICOTINE DEPENDENCE, CIGARETTES, IN REMISSION: ICD-10-CM

## 2022-05-06 PROCEDURE — 99214 OFFICE O/P EST MOD 30 MIN: CPT | Performed by: INTERNAL MEDICINE

## 2022-05-06 RX ORDER — BUDESONIDE 0.5 MG/2ML
0.5 INHALANT ORAL 2 TIMES DAILY
Qty: 360 ML | Refills: 3 | Status: SHIPPED | OUTPATIENT
Start: 2022-05-06

## 2022-05-06 NOTE — PROGRESS NOTES
Office Progress Note - Pulmonary    Dalton Alvarez 78 y o  female MRN: [id number]    Encounter: [id number]      Assessment:   Centrilobular emphysema   Dyspnea on exertion   History of tobacco use   Cough   Obesity  Plan:     Budesonide 0 5 mg nebulized twice a day   Albuterol nebulized 4 times a day as needed   Continue aspiration precautions   Follow-up as needed  Discussion:   The patient is emphysema is in remission  I have maintained her on the budesonide 0 5 mg nebulized twice a day  Also she will use the albuterol nebulizer treatments 4 times a day as needed  I have emphasized on the importance of continuing the aspiration precautions  I have renewed her prescription  She will be relocating to [place]  I have not scheduled her for another appointment however I will be happy to assist with her records if needed  Subjective: The patient is here for a follow-up visit  She told me that she will be relocating to [place] in the 1st week of June  She denies any significant cough, wheezing or sputum production  She has dyspnea on exertion which is chronic and has not changed  She is taking precautions while eating and he had no aspiration episodes  Review of systems:  A 12 point system review is done and aside from what is stated above the rest of the review of systems is negative  Family history and social history are reviewed  Medications list is reviewed  Vitals: Blood pressure 128/62, pulse 70, resp  rate 16, height 5' 4" (1 626 m), weight 88 5 kg (195 lb), SpO2 94 %, not currently breastfeeding ,     Physical Exam  Gen: Awake, alert, oriented x 3, no acute distress  HEENT: Mucous membranes moist, no oral lesions, no thrush  NECK: No accessory muscle use, JVP not elevated  Cardiac: Regular, single S1, single S2, no murmurs, no rubs, no gallops  Lungs:  Decreased breath sounds  No wheezing or rhonchi    Abdomen: normoactive bowel sounds, soft nontender, nondistended, no rebound or rigidity, no guarding  Extremities: no cyanosis, no clubbing  1+ edema  Neuro:  Grossly nonfocal   Skin:  No rash      Lab Results   Component Value Date    WBC 8 60 01/28/2022    HGB 12 9 01/28/2022    HCT 42 4 01/28/2022    MCV 85 01/28/2022     01/28/2022     Lab Results   Component Value Date    SODIUM 138 01/28/2022    K 4 6 01/28/2022     01/28/2022    CO2 30 01/28/2022    BUN 16 01/28/2022    CREATININE 1 19 01/28/2022    GLUC 121 01/28/2022    CALCIUM 9 3 01/28/2022

## 2022-05-13 ENCOUNTER — TELEPHONE (OUTPATIENT)
Dept: UROLOGY | Facility: AMBULATORY SURGERY CENTER | Age: 82
End: 2022-05-13

## 2022-05-16 ENCOUNTER — TELEPHONE (OUTPATIENT)
Dept: FAMILY MEDICINE CLINIC | Facility: CLINIC | Age: 82
End: 2022-05-16

## 2022-05-16 NOTE — TELEPHONE ENCOUNTER
Please call her back, I do think with her health history including lungs she should do a COVID booster

## 2022-05-16 NOTE — TELEPHONE ENCOUNTER
Pt called and is wondering if she should get the second COVID booster (4th overall vaccine)  They are coming to her home tomorrow to give them

## 2022-05-24 ENCOUNTER — RA CDI HCC (OUTPATIENT)
Dept: OTHER | Facility: HOSPITAL | Age: 82
End: 2022-05-24

## 2022-05-24 NOTE — PROGRESS NOTES
E11 51  E11 22  [facility]  coding opportunities          Chart Reviewed number of suggestions sent to Provider: 2     Patients Insurance     Medicare Insurance: [employer]

## 2022-05-31 ENCOUNTER — APPOINTMENT (OUTPATIENT)
Dept: URGENT CARE | Facility: MEDICAL CENTER | Age: 82
End: 2022-05-31
Attending: UROLOGY
Payer: MEDICARE

## 2022-05-31 ENCOUNTER — OFFICE VISIT (OUTPATIENT)
Dept: FAMILY MEDICINE CLINIC | Facility: CLINIC | Age: 82
End: 2022-05-31
Payer: MEDICARE

## 2022-05-31 ENCOUNTER — APPOINTMENT (OUTPATIENT)
Dept: LAB | Facility: MEDICAL CENTER | Age: 82
End: 2022-05-31
Attending: UROLOGY
Payer: MEDICARE

## 2022-05-31 ENCOUNTER — ANESTHESIA EVENT (OUTPATIENT)
Dept: PERIOP | Facility: HOSPITAL | Age: 82
End: 2022-05-31
Payer: MEDICARE

## 2022-05-31 VITALS
OXYGEN SATURATION: 95 % | DIASTOLIC BLOOD PRESSURE: 74 MMHG | BODY MASS INDEX: 33.8 KG/M2 | HEIGHT: 64 IN | WEIGHT: 198 LBS | TEMPERATURE: 98.5 F | HEART RATE: 80 BPM | SYSTOLIC BLOOD PRESSURE: 140 MMHG

## 2022-05-31 DIAGNOSIS — E11.9 DIABETES MELLITUS TYPE 2, DIET-CONTROLLED (HCC): ICD-10-CM

## 2022-05-31 DIAGNOSIS — N39.0 RECURRENT UTI: ICD-10-CM

## 2022-05-31 DIAGNOSIS — I10 BENIGN ESSENTIAL HYPERTENSION: ICD-10-CM

## 2022-05-31 DIAGNOSIS — I35.0 NONRHEUMATIC AORTIC VALVE STENOSIS: ICD-10-CM

## 2022-05-31 DIAGNOSIS — J42 CHRONIC BRONCHITIS, UNSPECIFIED CHRONIC BRONCHITIS TYPE (HCC): ICD-10-CM

## 2022-05-31 DIAGNOSIS — Z01.810 PRE-OPERATIVE CARDIOVASCULAR EXAMINATION: ICD-10-CM

## 2022-05-31 DIAGNOSIS — Z87.19 HISTORY OF PANCREATITIS: ICD-10-CM

## 2022-05-31 DIAGNOSIS — Z96.89 S/P DEEP BRAIN STIMULATOR PLACEMENT: ICD-10-CM

## 2022-05-31 DIAGNOSIS — N18.32 STAGE 3B CHRONIC KIDNEY DISEASE (HCC): ICD-10-CM

## 2022-05-31 DIAGNOSIS — I25.10 CORONARY ARTERY DISEASE INVOLVING NATIVE CORONARY ARTERY OF NATIVE HEART WITHOUT ANGINA PECTORIS: ICD-10-CM

## 2022-05-31 DIAGNOSIS — Z86.73 HISTORY OF CVA (CEREBROVASCULAR ACCIDENT): ICD-10-CM

## 2022-05-31 DIAGNOSIS — Q62.11 HYDRONEPHROSIS WITH URETEROPELVIC JUNCTION (UPJ) OBSTRUCTION: ICD-10-CM

## 2022-05-31 DIAGNOSIS — Z93.6 NEPHROSTOMY STATUS (HCC): ICD-10-CM

## 2022-05-31 DIAGNOSIS — Z01.812 PRE-OPERATIVE LABORATORY EXAMINATION: ICD-10-CM

## 2022-05-31 DIAGNOSIS — M79.89 SWELLING OF RIGHT LOWER EXTREMITY: Primary | ICD-10-CM

## 2022-05-31 DIAGNOSIS — R39.89 SUSPECTED UTI: ICD-10-CM

## 2022-05-31 LAB
ALBUMIN SERPL BCP-MCNC: 3.8 G/DL (ref 3.5–5)
ALP SERPL-CCNC: 99 U/L (ref 46–116)
ALT SERPL W P-5'-P-CCNC: 26 U/L (ref 12–78)
ANION GAP SERPL CALCULATED.3IONS-SCNC: 0 MMOL/L (ref 4–13)
AST SERPL W P-5'-P-CCNC: 20 U/L (ref 5–45)
BASOPHILS # BLD AUTO: 0.05 THOUSANDS/ΜL (ref 0–0.1)
BASOPHILS NFR BLD AUTO: 1 % (ref 0–1)
BILIRUB SERPL-MCNC: 0.27 MG/DL (ref 0.2–1)
BUN SERPL-MCNC: 19 MG/DL (ref 5–25)
CALCIUM SERPL-MCNC: 9.9 MG/DL (ref 8.3–10.1)
CHLORIDE SERPL-SCNC: 104 MMOL/L (ref 100–108)
CO2 SERPL-SCNC: 35 MMOL/L (ref 21–32)
CREAT SERPL-MCNC: 1.01 MG/DL (ref 0.6–1.3)
EOSINOPHIL # BLD AUTO: 0.21 THOUSAND/ΜL (ref 0–0.61)
EOSINOPHIL NFR BLD AUTO: 2 % (ref 0–6)
ERYTHROCYTE [DISTWIDTH] IN BLOOD BY AUTOMATED COUNT: 14.8 % (ref 11.6–15.1)
EST. AVERAGE GLUCOSE BLD GHB EST-MCNC: 192 MG/DL
GFR SERPL CREATININE-BSD FRML MDRD: 51 ML/MIN/1.73SQ M
GLUCOSE SERPL-MCNC: 171 MG/DL (ref 65–140)
HBA1C MFR BLD: 8.3 %
HCT VFR BLD AUTO: 44.2 % (ref 34.8–46.1)
HGB BLD-MCNC: 13.6 G/DL (ref 11.5–15.4)
IMM GRANULOCYTES # BLD AUTO: 0.04 THOUSAND/UL (ref 0–0.2)
IMM GRANULOCYTES NFR BLD AUTO: 0 % (ref 0–2)
LYMPHOCYTES # BLD AUTO: 2.04 THOUSANDS/ΜL (ref 0.6–4.47)
LYMPHOCYTES NFR BLD AUTO: 19 % (ref 14–44)
MCH RBC QN AUTO: 26.4 PG (ref 26.8–34.3)
MCHC RBC AUTO-ENTMCNC: 30.8 G/DL (ref 31.4–37.4)
MCV RBC AUTO: 86 FL (ref 82–98)
MONOCYTES # BLD AUTO: 0.8 THOUSAND/ΜL (ref 0.17–1.22)
MONOCYTES NFR BLD AUTO: 8 % (ref 4–12)
NEUTROPHILS # BLD AUTO: 7.44 THOUSANDS/ΜL (ref 1.85–7.62)
NEUTS SEG NFR BLD AUTO: 70 % (ref 43–75)
NRBC BLD AUTO-RTO: 0 /100 WBCS
PLATELET # BLD AUTO: 238 THOUSANDS/UL (ref 149–390)
PMV BLD AUTO: 10.8 FL (ref 8.9–12.7)
POTASSIUM SERPL-SCNC: 4.3 MMOL/L (ref 3.5–5.3)
PROT SERPL-MCNC: 7.7 G/DL (ref 6.4–8.2)
RBC # BLD AUTO: 5.16 MILLION/UL (ref 3.81–5.12)
SODIUM SERPL-SCNC: 139 MMOL/L (ref 136–145)
WBC # BLD AUTO: 10.58 THOUSAND/UL (ref 4.31–10.16)

## 2022-05-31 PROCEDURE — 87077 CULTURE AEROBIC IDENTIFY: CPT

## 2022-05-31 PROCEDURE — 83036 HEMOGLOBIN GLYCOSYLATED A1C: CPT

## 2022-05-31 PROCEDURE — 99214 OFFICE O/P EST MOD 30 MIN: CPT | Performed by: FAMILY MEDICINE

## 2022-05-31 PROCEDURE — 80053 COMPREHEN METABOLIC PANEL: CPT

## 2022-05-31 PROCEDURE — 85025 COMPLETE CBC W/AUTO DIFF WBC: CPT

## 2022-05-31 PROCEDURE — 36415 COLL VENOUS BLD VENIPUNCTURE: CPT

## 2022-05-31 PROCEDURE — 87086 URINE CULTURE/COLONY COUNT: CPT

## 2022-05-31 PROCEDURE — 87186 SC STD MICRODIL/AGAR DIL: CPT

## 2022-05-31 NOTE — PRE-PROCEDURE INSTRUCTIONS
Pre-Surgery Instructions:   Medication Instructions    acetaminophen (TYLENOL) 500 mg tablet Uses PRN- OK to take day of surgery    albuterol (2 5 mg/3 mL) 0 083 % nebulizer solution Take day of surgery   albuterol (PROVENTIL HFA,VENTOLIN HFA) 90 mcg/act inhaler Take day of surgery   budesonide (PULMICORT) 0 5 mg/2 mL nebulizer solution Take night before surgery    Cholecalciferol (VITAMIN D-3 PO) Stop taking 7 days prior to surgery   clopidogrel (PLAVIX) 75 mg tablet Instructions provided by MD    diltiazem (CARDIZEM CD) 240 mg 24 hr capsule Take day of surgery   furosemide (LASIX) 20 mg tablet Hold day of surgery   levothyroxine 125 mcg tablet Take day of surgery   Multiple Vitamins-Minerals (PreserVision AREDS) CAPS Stop taking 7 days prior to surgery   multivitamin (THERAGRAN) TABS Stop taking 7 days prior to surgery   rosuvastatin (CRESTOR) 5 mg tablet Take day of surgery   triamcinolone (KENALOG) 0 1 % cream Hold day of surgery  Reviewed with patient, in detail, instructions from "My Surgical Experience"  Instructed to avoid all  OTC vitamins/supplements and NSAIDS for one week prior to surgery per anesthesia guidelines  Tylenol ok to take PRN  Advised patient that [facility] will call with surgery arrival time and hospital directions the business day prior to surgery  Advised patient nothing eat or drink after midnight prior to surgery  Instructed to call surgeon's office in meantime with any new illnesses/exposure  Patient verbalized understanding of current visitor restrictions/masking guidelines and advised that he/she can confirm these at time of arrival call with [facility]  Patient verbalized understanding and knows to call surgeon's office with any additional questions prior to surgery

## 2022-05-31 NOTE — PROGRESS NOTES
FAMILY PRACTICE OFFICE VISIT  [facility]  [address]  [address], [place], [address]      NAME: Sasha Perales  AGE: 78 y o  SEX: female  : 1940   MRN: [id number]    DATE: 2022  TIME: 5:33 PM    Assessment and Plan     Problem List Items Addressed This Visit     Benign essential hypertension    History of CVA (cerebrovascular accident)    Chronic bronchitis / COPD ([facility] )    History of pancreatitis    Aortic valve stenosis - (Moderate )    S/P deep brain stimulator placement    Nephrostomy tube Left    Coronary artery disease involving native coronary artery of native heart without angina pectoris    Diabetes mellitus type 2, diet-controlled ([facility] )    Recurrent UTI    Stage 3b chronic kidney disease ([facility] )      Other Visit Diagnoses     Swelling of right lower extremity    -  Primary    Relevant Orders    VAS lower limb venous duplex study, unilateral/limited          Patient Instructions   She has noted significant pain with calf tightness right lower extremity for the past 3 to 4 weeks, no trauma  I would like her to do stat venous duplex right lower extremity, rule out DVT  She has had history venous insufficiency  Has difficulty using compression socks, last used them last week  I would like her to use extra furosemide 20 mg daily for 5 days  Await test, call us if not improving few days  She has no increased shortness of breath, did see her pulmonologist May 6th  Continue with nebulizer treatments as is  She is status post prednisone from emergency room 1 month ago regarding cough  Elevate legs, avoid salt  She did have an echocardiogram 1 year ago, ejection fraction 60%, moderate AS  She will be seeing Urology , stent exchange    Last creatinine  in January,    Otherwise treatment as discussed at regular physical in March    She will be moving to her daughter's place in [place] in few weeks-    if she sees a provider who works with "[employer]" [address] [place] [address] will be accessible-  she will set up with new providers in [place]  Call us with questions  Chief Complaint     Chief Complaint   Patient presents with    Pre-op Clearance     Stent exchange- Bladder on 6/7/22 Dr Giuseppe Smith        History of Present Illness   Harry Collet is a 78y o -year-old female who has noted swelling with calf tightness right lower extremity for the past 3 to 4 weeks, no new trauma  She has no increased shortness of breath versus baseline, did see Pulmonary earlier this month  She was seen at the emergency room mid April at [facility], had cough, used prednisone  No new issues otherwise, did have visit with us back in March  She is moving to live with her daughter in [place] mid June      Review of Systems   Review of Systems   Constitutional: Positive for fatigue (Baseline)  Negative for appetite change, fever and unexpected weight change  HENT: Negative for sore throat and trouble swallowing  Respiratory:        No hemoptysis, no increased shortness of breath or cough versus baseline   Cardiovascular: Positive for leg swelling  Negative for chest pain and palpitations  Gastrointestinal: Negative for abdominal pain, blood in stool, nausea and vomiting  No acid reflux     No change in bowel   Genitourinary: Negative for dysuria and hematuria  Neurological: Positive for tremors (Remain improved, has deep brain stimulator)  Negative for dizziness, syncope, light-headedness and headaches  Psychiatric/Behavioral: Negative for behavioral problems and confusion         Active Problem List     Patient Active Problem List   Diagnosis    Benign essential hypertension    Benign familial tremor    History of CVA (cerebrovascular accident)    Chronic bronchitis / COPD ([address] )    Esophageal reflux    Gait disturbance    Dyslipidemia    Sleep disorder    Sciatica    Right shoulder pain    Osteoarthritis of hip    OA (osteoarthritis)    Acquired hypothyroidism    Lumbar degenerative disc disease    History of pancreatitis    Aortic valve stenosis - (Moderate )    S/P deep brain stimulator placement    Microscopic hematuria    Dysarthria related to Nov 2018 CVA,improved    Swallowing dysfunction    Weakness of left leg    Hydronephrosis with ureteropelvic junction (UPJ) obstruction    Pyelonephritis    Nephrostomy tube Left    Coronary artery disease involving native coronary artery of native heart without angina pectoris    Diabetes mellitus type 2, diet-controlled (HCC)    Calculus of kidney    Status post RIGHT carotid endarterectomy    Bilateral carotid artery stenosis    Venous insufficiency    Recurrent UTI    Transfusion history    Ileocecal ulcer    Peripheral edema    Delayed emergence from anesthesia    Stage 3b chronic kidney disease (HCC)       Past Medical History:  Reviewed    Past Surgical History:  Reviewed    Family History:  Reviewed    Social History:  Reviewed    Objective     Vitals:    05/31/22 1350   BP: 140/74   BP Location: Left arm   Patient Position: Sitting   Cuff Size: Large   Pulse: 80   Temp: 98 5 °F (36 9 °C)   SpO2: 95%   Weight: 89 8 kg (198 lb)   Height: 5' 4" (1 626 m)     Body mass index is 33 99 kg/m²  BP Readings from Last 3 Encounters:   05/31/22 140/74   05/06/22 128/62   03/29/22 144/70       Wt Readings from Last 3 Encounters:   05/31/22 89 8 kg (198 lb)   05/06/22 88 5 kg (195 lb)   03/29/22 90 5 kg (199 lb 9 6 oz)       Physical Exam  Constitutional:       Comments: Pleasant female seated in chair, appears comfortable other than complaining of right calf tightness along with swelling right lower extremity  Has mild redness without open wound anterior tibial on right, does have tenderness right calf, +1 slightly pitting edema  Cardiovascular:      Rate and Rhythm: Normal rate and regular rhythm  Heart sounds: Normal heart sounds  Pulmonary:      Effort: Pulmonary effort is normal  No respiratory distress  Breath sounds: Normal breath sounds  No wheezing, rhonchi or rales  Abdominal:      Palpations: Abdomen is soft  Tenderness: There is no abdominal tenderness  Skin:     Coloration: Skin is not jaundiced  Neurological:      Mental Status: Mental status is at baseline  Psychiatric:         Behavior: Behavior normal          ALLERGIES:  Allergies   Allergen Reactions    Ciprofloxacin Diarrhea     She did use Levaquin in April of 2021    Simvastatin Myalgia    Advair Diskus [Fluticasone-Salmeterol] Palpitations     Ok w flovent    Tetracycline Rash     Reaction Date: [id number];        Current Medications     Current Outpatient Medications   Medication Sig Dispense Refill    acetaminophen (TYLENOL) 500 mg tablet Take 1,000 mg by mouth every 6 (six) hours as needed for mild pain        albuterol (2 5 mg/3 mL) 0 083 % nebulizer solution Take 3 mL (2 5 mg total) by nebulization 2 (two) times a day ( or up to 4 x a day if needed) 540 mL 1    albuterol (PROVENTIL HFA,VENTOLIN HFA) 90 mcg/act inhaler Inhale 2 puffs every 6 (six) hours as needed for wheezing 18 g 1    budesonide (PULMICORT) 0 5 mg/2 mL nebulizer solution Take 2 mL (0 5 mg total) by nebulization 2 (two) times a day Rinse mouth after use  360 mL 3    Cholecalciferol (VITAMIN D-3 PO) Take 1,000 Units by mouth daily   clopidogrel (PLAVIX) 75 mg tablet Take 1 tablet (75 mg total) by mouth daily 90 tablet 3    diltiazem (CARDIZEM CD) 240 mg 24 hr capsule Take 1 capsule (240 mg total) by mouth daily 90 capsule 3    furosemide (LASIX) 20 mg tablet Take 1 tablet (20 mg total) by mouth daily 90 tablet 3    Lancets (OneTouch Delica Plus [id number]) MISC daily Check blood sugar      levothyroxine 125 mcg tablet TAKE 1 TABLET (125 MCG TOTAL) BY MOUTH DAILY 90 tablet 2    Misc   Devices ([employer]) [address] re history CVA, leg weakness, sciatica, osteoarthritis, gait dysfunction 1 each 0    Multiple Vitamins-Minerals (PreserVision AREDS) CAPS Take 2 capsules by mouth daily 180 capsule 1    multivitamin (THERAGRAN) TABS Take 1 tablet by mouth daily      OneTouch Ultra test strip daily Check blood sugar      rosuvastatin (CRESTOR) 5 mg tablet ( or as directed ) (Patient taking differently: 1 tab 2 times a week ) 30 tablet 2    triamcinolone (KENALOG) 0 1 % cream Use small amount on rash lower legs 2 to 3 times daily for up to 5 to 7 days as needed  (Patient not taking: Reported on 5/31/2022) 45 g 0     No current facility-administered medications for this visit  No orders of the defined types were placed in this encounter          Yael Webster DO

## 2022-05-31 NOTE — PATIENT INSTRUCTIONS
She has noted significant pain with calf tightness right lower extremity for the past 3 to 4 weeks, no trauma  I would like her to do stat venous duplex right lower extremity, rule out DVT  She has had history venous insufficiency  Has difficulty using compression socks, last used them last week  I would like her to use extra furosemide 20 mg daily for 5 days  Await test, call us if not improving few days  She has no increased shortness of breath, did see her pulmonologist May 6th  Continue with nebulizer treatments as is  She is status post prednisone from emergency room 1 month ago regarding cough  Elevate legs, avoid salt  She did have an echocardiogram 1 year ago, ejection fraction 60%, moderate AS  She will be seeing Urology June 7th, stent exchange  Last creatinine 1 19 in January,    Otherwise treatment as discussed at regular physical in March    She will be moving to her daughter's place in [place] in few weeks-    if she sees a provider who works with "[employer]" [address] [place] [address] will be accessible-  she will set up with new providers in [place]  Call us with questions

## 2022-06-01 ENCOUNTER — HOSPITAL ENCOUNTER (OUTPATIENT)
Dept: NON INVASIVE DIAGNOSTICS | Facility: HOSPITAL | Age: 82
Discharge: HOME | End: 2022-06-01
Payer: MEDICARE

## 2022-06-01 DIAGNOSIS — M79.89 SWELLING OF RIGHT LOWER EXTREMITY: ICD-10-CM

## 2022-06-01 PROCEDURE — 93971 EXTREMITY STUDY: CPT | Performed by: SURGERY

## 2022-06-01 PROCEDURE — 93971 EXTREMITY STUDY: CPT

## 2022-06-02 ENCOUNTER — TELEPHONE (OUTPATIENT)
Dept: FAMILY MEDICINE CLINIC | Facility: CLINIC | Age: 82
End: 2022-06-02

## 2022-06-02 LAB — BACTERIA UR CULT: ABNORMAL

## 2022-06-02 NOTE — TELEPHONE ENCOUNTER
Please call them back, I had seen her May 31st with swelling of leg, ultrasound showed no DVT  She is medically stable for upcoming procedure    I defer to specialist regarding if they feel she needs any  preop testing

## 2022-06-02 NOTE — TELEPHONE ENCOUNTER
Marilee Russell called prom pre admission looking for clearance on the patient       Fax number [phone]  Phone number [phone]

## 2022-06-03 DIAGNOSIS — N30.00 ACUTE CYSTITIS WITHOUT HEMATURIA: Primary | ICD-10-CM

## 2022-06-03 RX ORDER — CEFUROXIME AXETIL 250 MG/1
250 TABLET ORAL EVERY 12 HOURS SCHEDULED
Qty: 14 TABLET | Refills: 0 | Status: SHIPPED | OUTPATIENT
Start: 2022-06-03 | End: 2022-06-10

## 2022-06-06 ENCOUNTER — TELEPHONE (OUTPATIENT)
Dept: FAMILY MEDICINE CLINIC | Facility: CLINIC | Age: 82
End: 2022-06-06

## 2022-06-06 NOTE — TELEPHONE ENCOUNTER
Called merlene back and advised that she is clear for surgery and to look at Dr Jadon Rain Telephone encounter  Thank you!

## 2022-06-06 NOTE — TELEPHONE ENCOUNTER
I spoke to Jenn Norton and scheduled her stent exchange for 6/7/2022 with Dr Kalli Hearn at [facility]       -instructions given verbally and mailed  -CBC, CMP,  Urine C&S 10 days prior  -patient does not need to stop any of her usual medications  -MCR/AARP - no auth required
Pt called to check on the surgery and want time  Informed that the hospital will give her a call with the time 
Pt managed by Dr Caleb Gannon, pt called to inform physician she is moving to [place] 06/16/22 can she get stent exchange done prior to moving?
Spoke directly to Dr Kalli Hearn about patient and her request to move her surgery up for stent exchange  Dr Kalli Hearn reviewed OR calendar and can fir patient in on 6-7-2022  He will place orders in her chart for surgery and will do H&P on admit  Attempted to contact the patient  She did not answer and I was unable to leave a message at this time as her mailbox is full  I did contact patient's daughter and she told me that I needed to "keep trying to contact patient" and would not take a message at this time  I did inform her that this was time sensitive and she stated "There is nothing I can do, you just have to keep calling her" 
150

## 2022-06-07 ENCOUNTER — ANESTHESIA (OUTPATIENT)
Dept: PERIOP | Facility: HOSPITAL | Age: 82
End: 2022-06-07
Payer: MEDICARE

## 2022-06-07 ENCOUNTER — APPOINTMENT (OUTPATIENT)
Dept: RADIOLOGY | Facility: HOSPITAL | Age: 82
End: 2022-06-07
Payer: MEDICARE

## 2022-06-07 ENCOUNTER — TELEPHONE (OUTPATIENT)
Dept: UROLOGY | Facility: AMBULATORY SURGERY CENTER | Age: 82
End: 2022-06-07

## 2022-06-07 ENCOUNTER — HOSPITAL ENCOUNTER (OUTPATIENT)
Facility: HOSPITAL | Age: 82
Setting detail: OUTPATIENT SURGERY
Discharge: HOME | End: 2022-06-07
Attending: UROLOGY | Admitting: UROLOGY
Payer: MEDICARE

## 2022-06-07 VITALS
RESPIRATION RATE: 16 BRPM | TEMPERATURE: 97 F | OXYGEN SATURATION: 94 % | WEIGHT: 197.97 LBS | DIASTOLIC BLOOD PRESSURE: 79 MMHG | BODY MASS INDEX: 33.8 KG/M2 | HEIGHT: 64 IN | SYSTOLIC BLOOD PRESSURE: 135 MMHG | HEART RATE: 73 BPM

## 2022-06-07 DIAGNOSIS — Q62.11 HYDRONEPHROSIS WITH URETEROPELVIC JUNCTION (UPJ) OBSTRUCTION: Primary | ICD-10-CM

## 2022-06-07 LAB
ATRIAL RATE: 0 BPM
GLUCOSE SERPL-MCNC: 139 MG/DL (ref 65–140)
GLUCOSE SERPL-MCNC: 171 MG/DL (ref 65–140)
QRS AXIS: -26 DEGREES
QRSD INTERVAL: 90 MS
QT INTERVAL: 434 MS
QTC INTERVAL: 465 MS
T WAVE AXIS: 22 DEGREES
VENTRICULAR RATE: 69 BPM

## 2022-06-07 PROCEDURE — 93005 ELECTROCARDIOGRAM TRACING: CPT

## 2022-06-07 PROCEDURE — 52332 CYSTOSCOPY AND TREATMENT: CPT | Performed by: UROLOGY

## 2022-06-07 PROCEDURE — 76000 FLUOROSCOPY <1 HR PHYS/QHP: CPT

## 2022-06-07 PROCEDURE — C1769 GUIDE WIRE: HCPCS | Performed by: UROLOGY

## 2022-06-07 PROCEDURE — 93010 ELECTROCARDIOGRAM REPORT: CPT | Performed by: INTERNAL MEDICINE

## 2022-06-07 PROCEDURE — C2617 STENT, NON-COR, TEM W/O DEL: HCPCS | Performed by: UROLOGY

## 2022-06-07 PROCEDURE — 82948 REAGENT STRIP/BLOOD GLUCOSE: CPT

## 2022-06-07 PROCEDURE — NC001 PR NO CHARGE: Performed by: UROLOGY

## 2022-06-07 PROCEDURE — 99024 POSTOP FOLLOW-UP VISIT: CPT | Performed by: UROLOGY

## 2022-06-07 RX ORDER — OXYCODONE HYDROCHLORIDE AND ACETAMINOPHEN 5; 325 MG/1; MG/1
1 TABLET ORAL EVERY 4 HOURS PRN
Status: DISCONTINUED | OUTPATIENT
Start: 2022-06-07 | End: 2022-06-07 | Stop reason: HOSPADM

## 2022-06-07 RX ORDER — FENTANYL CITRATE 50 UG/ML
INJECTION, SOLUTION INTRAMUSCULAR; INTRAVENOUS AS NEEDED
Status: DISCONTINUED | OUTPATIENT
Start: 2022-06-07 | End: 2022-06-07

## 2022-06-07 RX ORDER — LIDOCAINE HYDROCHLORIDE 20 MG/ML
INJECTION, SOLUTION EPIDURAL; INFILTRATION; INTRACAUDAL; PERINEURAL AS NEEDED
Status: DISCONTINUED | OUTPATIENT
Start: 2022-06-07 | End: 2022-06-07

## 2022-06-07 RX ORDER — HYDROCODONE BITARTRATE AND ACETAMINOPHEN 5; 325 MG/1; MG/1
TABLET ORAL
Qty: 6 TABLET | Refills: 0 | Status: SHIPPED | OUTPATIENT
Start: 2022-06-07 | End: 2022-06-29 | Stop reason: ALTCHOICE

## 2022-06-07 RX ORDER — MAGNESIUM HYDROXIDE 1200 MG/15ML
LIQUID ORAL AS NEEDED
Status: DISCONTINUED | OUTPATIENT
Start: 2022-06-07 | End: 2022-06-07 | Stop reason: HOSPADM

## 2022-06-07 RX ORDER — FENTANYL CITRATE/PF 50 MCG/ML
25 SYRINGE (ML) INJECTION
Status: DISCONTINUED | OUTPATIENT
Start: 2022-06-07 | End: 2022-06-07 | Stop reason: HOSPADM

## 2022-06-07 RX ORDER — PROPOFOL 10 MG/ML
INJECTION, EMULSION INTRAVENOUS AS NEEDED
Status: DISCONTINUED | OUTPATIENT
Start: 2022-06-07 | End: 2022-06-07

## 2022-06-07 RX ORDER — ONDANSETRON 2 MG/ML
INJECTION INTRAMUSCULAR; INTRAVENOUS AS NEEDED
Status: DISCONTINUED | OUTPATIENT
Start: 2022-06-07 | End: 2022-06-07

## 2022-06-07 RX ORDER — SODIUM CHLORIDE 9 MG/ML
125 INJECTION, SOLUTION INTRAVENOUS CONTINUOUS
Status: DISCONTINUED | OUTPATIENT
Start: 2022-06-07 | End: 2022-06-07 | Stop reason: HOSPADM

## 2022-06-07 RX ORDER — OXYCODONE HYDROCHLORIDE AND ACETAMINOPHEN 5; 325 MG/1; MG/1
2 TABLET ORAL EVERY 4 HOURS PRN
Status: DISCONTINUED | OUTPATIENT
Start: 2022-06-07 | End: 2022-06-07 | Stop reason: HOSPADM

## 2022-06-07 RX ORDER — ONDANSETRON 2 MG/ML
4 INJECTION INTRAMUSCULAR; INTRAVENOUS ONCE AS NEEDED
Status: DISCONTINUED | OUTPATIENT
Start: 2022-06-07 | End: 2022-06-07 | Stop reason: HOSPADM

## 2022-06-07 RX ORDER — CEFAZOLIN SODIUM 2 G/50ML
2000 SOLUTION INTRAVENOUS ONCE
Status: COMPLETED | OUTPATIENT
Start: 2022-06-07 | End: 2022-06-07

## 2022-06-07 RX ADMIN — FENTANYL CITRATE 25 MCG: 50 INJECTION INTRAMUSCULAR; INTRAVENOUS at 12:14

## 2022-06-07 RX ADMIN — SODIUM CHLORIDE 125 ML/HR: 0.9 INJECTION, SOLUTION INTRAVENOUS at 10:09

## 2022-06-07 RX ADMIN — SODIUM CHLORIDE: 0.9 INJECTION, SOLUTION INTRAVENOUS at 12:11

## 2022-06-07 RX ADMIN — PROPOFOL 100 MG: 10 INJECTION, EMULSION INTRAVENOUS at 12:08

## 2022-06-07 RX ADMIN — ONDANSETRON 4 MG: 2 INJECTION INTRAMUSCULAR; INTRAVENOUS at 12:08

## 2022-06-07 RX ADMIN — CEFAZOLIN SODIUM 2000 MG: 2 SOLUTION INTRAVENOUS at 11:55

## 2022-06-07 RX ADMIN — FENTANYL CITRATE 25 MCG: 50 INJECTION INTRAMUSCULAR; INTRAVENOUS at 12:12

## 2022-06-07 RX ADMIN — LIDOCAINE HYDROCHLORIDE 80 MG: 20 INJECTION, SOLUTION EPIDURAL; INFILTRATION; INTRACAUDAL; PERINEURAL at 12:08

## 2022-06-07 NOTE — DISCHARGE INSTRUCTIONS
ALL YOUR  PREVIOUS MEDS ARE THE SAME  NEW MEDS:none      EXPECT SOME BLOOD IN URINE, BURNING, FREQUENT URINATION  ACTIVITY:  RESUME FULL ACTIVITY tomorrow

## 2022-06-07 NOTE — ANESTHESIA POSTPROCEDURE EVALUATION
Post-Op Assessment Note    CV Status:  Stable    Pain management: adequate     Mental Status:  Alert and awake   Hydration Status:  Euvolemic   PONV Controlled:  Controlled   Airway Patency:  Patent      Post Op Vitals Reviewed: Yes      Staff: Anesthesiologist         No complications documented      BP      Temp     Pulse     Resp      SpO2      /79   Pulse 73   Temp (!) 97 °F (36 1 °C) (Temporal)   Resp 16   Ht 5' 4" (1 626 m)   Wt 89 8 kg (197 lb 15 6 oz)   SpO2 94%   BMI 33 98 kg/m²

## 2022-06-07 NOTE — DISCHARGE SUMMARY
Discharge Summary - Annie Pretty 78 y o  female MRN: [id number]    Admission Date: 6/7/2022     Admitting Diagnosis: Hydronephrosis with ureteropelvic junction (UPJ) obstruction [Q62 11]    Procedures Performed: cysto, left stent exchange    Patient underwent planned outpt surgery and recovered without complication  Discharged in good condition  Medications were prescribed  Pt knows to have office follow-up at the appropriate time

## 2022-06-07 NOTE — H&P
HISTORY AND PHYSICAL  ? ? Patient Name: Osvaldo Claros  Patient MRN: [id number]  Attending Provider: Kenton Hughes MD  Service: Urology  Chief Complaint    Left hydronephrosis, indwelling stent    HPI   Osvaldo Claros is a 78 y o  female with above history  I plan cysto, left stent exchange  Potential risks and complications discussed, and informed consent was given by the patient  Medications  Meds/Allergies   No current facility-administered medications for this encounter  Cannot display prior to admission medications because the patient has not been admitted in this contact  No current facility-administered medications for this encounter  Current Outpatient Medications:     acetaminophen (TYLENOL) 500 mg tablet, Take 1,000 mg by mouth every 6 (six) hours as needed for mild pain  , Disp: , Rfl:     albuterol (2 5 mg/3 mL) 0 083 % nebulizer solution, Take 3 mL (2 5 mg total) by nebulization 2 (two) times a day ( or up to 4 x a day if needed), Disp: 540 mL, Rfl: 1    albuterol (PROVENTIL HFA,VENTOLIN HFA) 90 mcg/act inhaler, Inhale 2 puffs every 6 (six) hours as needed for wheezing, Disp: 18 g, Rfl: 1    budesonide (PULMICORT) 0 5 mg/2 mL nebulizer solution, Take 2 mL (0 5 mg total) by nebulization 2 (two) times a day Rinse mouth after use , Disp: 360 mL, Rfl: 3    cefuroxime (CEFTIN) 250 mg tablet, Take 1 tablet (250 mg total) by mouth every 12 (twelve) hours for 7 days Start three days before procedure, Disp: 14 tablet, Rfl: 0    Cholecalciferol (VITAMIN D-3 PO), Take 1,000 Units by mouth daily  , Disp: , Rfl:     clopidogrel (PLAVIX) 75 mg tablet, Take 1 tablet (75 mg total) by mouth daily, Disp: 90 tablet, Rfl: 3    diltiazem (CARDIZEM CD) 240 mg 24 hr capsule, Take 1 capsule (240 mg total) by mouth daily, Disp: 90 capsule, Rfl: 3    furosemide (LASIX) 20 mg tablet, Take 1 tablet (20 mg total) by mouth daily, Disp: 90 tablet, Rfl: 3    levothyroxine 125 mcg tablet, TAKE 1 TABLET (125 MCG TOTAL) BY MOUTH DAILY, Disp: 90 tablet, Rfl: 2    Multiple Vitamins-Minerals (PreserVision AREDS) CAPS, Take 2 capsules by mouth daily, Disp: 180 capsule, Rfl: 1    multivitamin (THERAGRAN) TABS, Take 1 tablet by mouth daily, Disp: , Rfl:     rosuvastatin (CRESTOR) 5 mg tablet, ( or as directed ) (Patient taking differently: 1 tab 2 times a week ), Disp: 30 tablet, Rfl: 2    triamcinolone (KENALOG) 0 1 % cream, Use small amount on rash lower legs 2 to 3 times daily for up to 5 to 7 days as needed  (Patient not taking: Reported on 5/31/2022), Disp: 45 g, Rfl: 0    Lancets (OneTouch Delica Plus [id number]) MISC, daily Check blood sugar, Disp: , Rfl:     Misc   Devices (Roller Walker) MISC, Needs ROLLATOR [employer] re history CVA, leg weakness, sciatica, osteoarthritis, gait dysfunction, Disp: 1 each, Rfl: 0    OneTouch Ultra test strip, daily Check blood sugar, Disp: , Rfl:   Review of Systems  10 point review of systems negative except as noted in HPI  Allergies  Allergies   Allergen Reactions    Ciprofloxacin Diarrhea     She did use Levaquin in April of 2021    Simvastatin Myalgia    Advair Diskus [Fluticasone-Salmeterol] Palpitations     Ok w flovent    Tetracycline Rash     Reaction Date: 10Jan2012;      PMH  Past Medical History:   Diagnosis Date    Anemia     Anesthesia complication     difficulty awakening    Anesthesia complication     difficulty awakening    Arthritis     Asthma     At risk for falls     rib frac    Benign essential tremor 10/15/2018    Added automatically from request for surgery [id number] has stimulator    [facility]ronic obstructive [facility]) [facility] [address] [facility]     Coronary artery disease     Diabetes mellitus ([facility] [address] [facility]     borderline    Disease of thyroid gland     hypothyroid    Edema     bles    GERD (gastroesophageal reflux disease)     resolved    Glaucoma     Heart murmur     High cholesterol     Hypertension     Kidney stone     Macular degeneration     Osteopenia     Osteoporosis     S/P deep brain stimulator placement     Shortness of breath     on exertion    Slurred speech     Stroke (HCC)     x 2- states mini strokes    Urinary leakage     Uses roller walker     with seat    Vitamin D deficiency     Wears dentures     full upper    Wears glasses     reading     Past surgical history  Past Surgical History:   Procedure Laterality Date    CATARACT EXTRACTION W/  INTRAOCULAR LENS IMPLANT Bilateral     COLONOSCOPY      CYSTOSCOPY      CYSTOSCOPY W/ RETROGRADES Left 2/16/2021    Procedure: CYSTO, RETRO PYELOGRAM, STENT EXCHANGE;  Surgeon: Grant Moe MD;  [facility];  Service: Urology    DEEP BRAIN STIMULATOR PLACEMENT      FL RETROGRADE PYELOGRAM  11/7/2019    FL RETROGRADE PYELOGRAM  5/26/2020    FL RETROGRADE PYELOGRAM  2/16/2021    FL RETROGRADE PYELOGRAM  2/8/2022    IR NEPHROSTOMY TO NEPHROURETERAL STENT  1/10/2020    IR TUBE PLACEMENT  11/12/2019    KNEE ARTHROSCOPY      MO CYSTOSCOPY,INSERT URETERAL STENT Left 8/10/2021    Procedure: Rosio Mcmillan, RETROGRADE PYELOGRAM,EXCHANGE STENT URETERAL;  Surgeon: Grant Moe MD;  [facility];  Service: Urology    MO CYSTOSCOPY,INSERT URETERAL STENT Left 2/8/2022    Procedure: Silvia Bryan PYELOGRAM, EXCHANGE STENT URETERAL;  Surgeon: Grant Moe MD;  [facility];  Service: Urology    MO CYSTOURETHROSCOPY,URETER CATHETER Left 11/7/2019    Procedure: Cash Wolfe, URETEROSCOPY;  Surgeon: Shelbi Garcia MD;  [facility];  Service: Urology    MO IMP STIM,CRANIAL,SUBQ,1 ARRAY Left 1/22/2019    Procedure: REPLACEMENT IMPLANTABLE PULSE GENERATOR (IPG) DEEP BRAIN STIMULATION (DBS), LEFT;  Surgeon: Shanna Rosario MD;  [facility];  Service: Neurosurgery    MO IMP STIM,CRANIAL,SUBQ,1 ARRAY Left 9/8/2021    Procedure: LEFT IMPLANTABLE PULSE GENERATOR REPLACEMENT/ DEEP BRAIN STIMULATOR GENERATOR,LEFT CHEST; Surgeon: Amy Green MD;  [facility];  Service: Neurosurgery    ME IMP STIM,CRANIAL,SUBQ,>1 ARRAY Right 2018    Procedure: REPLACEMENT RIGHT DBS IMPLANTABLE PULSE GENERATOR;  Surgeon: Renata Silver MD;  [facility];  Service: Neurosurgery    [address] Left 10/27/2020    Procedure: Kimberly Villa PYELOGRAM, EXCHANGE STENT URETERA L, LEFT URETEROSCOPY;  Surgeon: Nichole Caicedo MD;  Location: [facility] MAIN OR;  Service: Urology    ME RMVL & RPLCMT INTLY DWELLING URETERAL STENT PRQ Left 2020    Procedure: Eduard Ochoa, STENT;  Surgeon: Nichole Caicedo MD;  [facility];  Service: Urology    ME THROMBOENDARTECTMY Francy Epps Right 2020    Procedure: CAROTID ENDARTERECTOMY WITH BOVINE PATCH;  Surgeon: Tony Paez MD;  [facility];  Service: Vascular    ME TOTAL HIP ARTHROPLASTY Left 2016    Procedure: ARTHROPLASTY HIP TOTAL ANTERIOR;  Surgeon: Katy Silva MD;  [facility];  Service: Orthopedics    ROTATOR CUFF REPAIR       Social history  Social History     Tobacco Use    Smoking status: Former Smoker     Packs/day: 1 00     Years: 60 00     Pack years: 60 00     Types: Cigarettes     Start date:      Quit date:      Years since quittin 4    Smokeless tobacco: Never Used   Vaping Use    Vaping Use: Never used   Substance Use Topics    Alcohol use: Not Currently    Drug use: Never     ?   Physical Exam     vs  General appearance: alert and oriented, in no acute distress  Head: Normocephalic, without obvious abnormality, atraumatic  Throat: lips, mucosa, and tongue normal; teeth and gums normal  Neck: no adenopathy, no carotid bruit, no JVD, supple, symmetrical, trachea midline and thyroid not enlarged, symmetric, no tenderness/mass/nodules  Lungs: clear to auscultation bilaterally  Heart: regular rate and rhythm, S1, S2 normal, no murmur, click, rub or gallop  Abdomen: soft, non-tender; bowel sounds normal; no masses,  no organomegaly  Extremities: extremities normal, warm and well-perfused; no cyanosis, clubbing, or edema AR taken back P the stable      Heaven Cristina MD

## 2022-06-07 NOTE — TELEPHONE ENCOUNTER
----- Message from Ward Seaman MD sent at 6/7/2022  1:01 PM EDT -----  Stent exchange done today  Patient is moving to [place] to live with family    I gave her my op report, with detailed history of stent exchanges for her to show her next

## 2022-06-07 NOTE — OP NOTE
OPERATIVE REPORT  PATIENT NAME: Osvaldo Claros    :  1940  MRN: [id number]  Pt [facility]    SURGERY DATE: 2022    Surgeon(s) and Role:     Rodney Corrales MD - Primary    Preop Diagnosis:  Hydronephrosis with ureteropelvic junction (UPJ) obstruction [Q62 11]    Post-Op Diagnosis Codes: * Hydronephrosis with ureteropelvic junction (UPJ) obstruction [Q62 11]    Procedure(s) (LRB):  EXCHANGE LEFT STENT URETERAL AND CYSTOSCOPY (Left)    Specimen(s):  * No specimens in log *    Estimated Blood Loss:   Minimal    Drains:  Ureteral Drain/Stent Left ureter 7 Fr  (Active)   Number of days: 119       Anesthesia Type:   General/LMA    Operative Indications:  Hydronephrosis with ureteropelvic junction (UPJ) obstruction [Q62 11]    Further history:  2019 found to have severe left hydronephrosis with infection and UPJ obstruction  Cystoscopy and attempted ureteral stent placement was unsuccessful  The UPJ was too tight, not even a wire would go through  2  She then underwent left percutaneous nephrostomy, and shortly thereafter IR internalize the stent  3  She has a incidental 3 mm stone in the lower pole kidney, that has not been the cause of obstruction  4  She has had been maintained with an indwelling ureteral stent since that time, it is changed every 4-5 months  5  She has chronic Klebsiella colonization, but rarely any  symptomatic infection  She is given culture specific antibiotics orally for three days preop before her stent exchanges, and IV antibiotics before the procedure  6  Her stent was changed today, she has a seven [employer] [place]ef[employer] contour stent  7  I would recommend her next stent exchange in four months, around 2022  Urine culture 2-3 weeks preop and oral antibiotics for three days before that stent exchange  8  Any questions, call me  [phone]    Operative Findings:  Stent was calcified, but wire went through    Mildly inflamed bladder, moderate chronic hydronephrosis    Complications:   None    Procedure and Technique:     After the patient was placed under adequate anesthesia, they were prepped and draped using chlorhexidine solution in lithotomy position  The 25 Israeli scope was passed thru the urethra, which showed no strictures  Further findings upon passage of the scope were mild bladder inflammation        The prior left  stent was grasped, brought out thru the meatus, and wired  A new seven [employer] Estefania contour stent was passed over the wire up to the renal pelvis  Contrast was injected, retrograde pyelogram showing good position and moderate hydronephrosis  The scope was removed, leaving the stent in place  They were taken to RR in good condition     I was present for the entire procedure    Patient Disposition:  PACU       SIGNATURE: Tamara Alejandro MD  DATE: June 7, 2022  TIME: 12:46 PM

## 2022-06-07 NOTE — INTERVAL H&P NOTE
H&P reviewed  After examining the patient I find no changes in the patients condition since the H&P had been written      Vitals:    06/07/22 0951   BP: 147/70   Pulse: 77   Resp: 16   Temp: 97 5 °F (36 4 °C)   SpO2: 94%

## 2022-06-07 NOTE — ANESTHESIA PREPROCEDURE EVALUATION
Procedure:  EXCHANGE STENT URETERAL (Left Ureter)    Relevant Problems   ANESTHESIA   (+) Delayed emergence from anesthesia      CARDIO   (+) Aortic valve stenosis - (Moderate )   (+) Benign essential hypertension   (+) Coronary artery disease involving native coronary artery of native heart without angina pectoris      ENDO   (+) Acquired hypothyroidism      GI/HEPATIC   (+) Esophageal reflux      /RENAL   (+) Calculus of kidney   (+) Hydronephrosis with ureteropelvic junction (UPJ) obstruction   (+) Stage 3b chronic kidney disease (HCC)      MUSCULOSKELETAL   (+) Lumbar degenerative disc disease   (+) OA (osteoarthritis)   (+) Osteoarthritis of hip   (+) Sciatica      NEURO/PSYCH   (+) History of CVA (cerebrovascular accident)   (+) History of pancreatitis      PULMONARY   (+) Asthma   (+) Chronic bronchitis / COPD (HCC)      Other   (+) S/P deep brain stimulator placement        Physical Exam    Airway    Mallampati score: III  TM Distance: >3 FB  Neck ROM: full     Dental   upper dentures,     Cardiovascular  Rhythm: regular, Rate: normal, Cardiovascular exam normal    Pulmonary  Rhonchi,     Other Findings        Anesthesia Plan  ASA Score- 3     Anesthesia Type- general with ASA Monitors  Additional Monitors:   Airway Plan: LMA  Plan Factors-    Chart reviewed  EKG reviewed  Imaging results reviewed  Existing labs reviewed  Patient summary reviewed  Induction- intravenous  Postoperative Plan- Plan for postoperative opioid use  Informed Consent- Anesthetic plan and risks discussed with patient  LEFT VENTRICLE:  Systolic function was normal by visual assessment  Ejection fraction was estimated to be 60 %  There were no regional wall motion abnormalities  Wall thickness was moderately increased  There was moderate concentric hypertrophy  There was moderate assymetrical hypertrophy    Doppler parameters were consistent with high ventricular filling pressure      LEFT ATRIUM:  The atrium was mildly dilated      MITRAL VALVE:  There was marked annular calcification  There was mild stenosis  Mitral valve area by continuity equation 1 9 centimeter squared, mean gradient 3 8 mmHg at 71 beats per minute      AORTIC VALVE:  The valve was trileaflet  Leaflets exhibited normal thickness, marked calcification, and moderately reduced cuspal separation  There was moderate stenosis  Peak velocity 2 6 m/sec, mean gradient 15 mmHg, aortic valve area by Doppler continuity equation 1 5 centimeter squared     TRICUSPID VALVE:  There was no significant regurgitation  The tricuspid jet envelope definition was inadequate for estimation of RV systolic pressure  There are no indirect findings (abnormal RV volume or geometry, altered pulmonary flow velocity profile, or leftward septal displacement) which  would suggest moderate or severe pulmonary hypertension      AORTA:  The root exhibited mild dilatation  There was mild dilatation of the ascending aorta

## 2022-06-08 NOTE — TELEPHONE ENCOUNTER
Post Op Note    Evelio Potter is a 78 y o  female s/p Cysto stent exchange performed 06/07/2022  Evelio Potter is a patient of Dr Betsey Burgess and is seen at the [facility] office  How would you rate your pain on a scale from 1 to 10, 10 being the worst pain ever? 0  Have you had a fever? No  I  Have your bowel movements been regular? Yes  Do you have any difficulty urinating? No  Do you have any other questions or concerns that I can address at this time? Kari Fuller is movie g to [place] in one week    I told her I would call her in a couple motnhs to make sure she is following up with another urologist

## 2022-06-09 LAB — GLUCOSE SERPL-MCNC: 139 MG/DL (ref 65–140)

## 2022-06-13 DIAGNOSIS — R05.9 COUGH: ICD-10-CM

## 2022-06-13 DIAGNOSIS — J43.2 CENTRILOBULAR EMPHYSEMA (HCC): ICD-10-CM

## 2022-06-29 ENCOUNTER — TELEPHONE (OUTPATIENT)
Dept: FAMILY MEDICINE CLINIC | Facility: CLINIC | Age: 82
End: 2022-06-29

## 2022-06-29 DIAGNOSIS — I10 BENIGN ESSENTIAL HYPERTENSION: ICD-10-CM

## 2022-06-29 DIAGNOSIS — J42 CHRONIC BRONCHITIS, UNSPECIFIED CHRONIC BRONCHITIS TYPE (HCC): ICD-10-CM

## 2022-06-29 DIAGNOSIS — E03.9 ACQUIRED HYPOTHYROIDISM: ICD-10-CM

## 2022-06-29 DIAGNOSIS — I10 ESSENTIAL HYPERTENSION: ICD-10-CM

## 2022-06-29 DIAGNOSIS — I63.9 CEREBROVASCULAR ACCIDENT (CVA), UNSPECIFIED MECHANISM (HCC): ICD-10-CM

## 2022-06-29 DIAGNOSIS — E11.9 DIABETES MELLITUS TYPE 2, DIET-CONTROLLED (HCC): ICD-10-CM

## 2022-06-29 DIAGNOSIS — Z86.73 HISTORY OF CVA (CEREBROVASCULAR ACCIDENT): ICD-10-CM

## 2022-06-29 DIAGNOSIS — I25.10 CORONARY ARTERY DISEASE INVOLVING NATIVE CORONARY ARTERY OF NATIVE HEART WITHOUT ANGINA PECTORIS: ICD-10-CM

## 2022-06-29 DIAGNOSIS — J43.2 CENTRILOBULAR EMPHYSEMA (HCC): ICD-10-CM

## 2022-06-29 DIAGNOSIS — R05.9 COUGH: ICD-10-CM

## 2022-06-29 DIAGNOSIS — R60.9 EDEMA, UNSPECIFIED TYPE: ICD-10-CM

## 2022-06-29 DIAGNOSIS — I73.9 PAD (PERIPHERAL ARTERY DISEASE) (HCC): ICD-10-CM

## 2022-06-29 RX ORDER — FUROSEMIDE 20 MG/1
20 TABLET ORAL DAILY
Qty: 90 TABLET | Refills: 0 | Status: SHIPPED | OUTPATIENT
Start: 2022-06-29

## 2022-06-29 RX ORDER — CLOPIDOGREL BISULFATE 75 MG/1
75 TABLET ORAL DAILY
Qty: 90 TABLET | Refills: 0 | Status: SHIPPED | OUTPATIENT
Start: 2022-06-29

## 2022-06-29 RX ORDER — DILTIAZEM HYDROCHLORIDE 240 MG/1
240 CAPSULE, COATED, EXTENDED RELEASE ORAL DAILY
Qty: 90 CAPSULE | Refills: 0 | Status: SHIPPED | OUTPATIENT
Start: 2022-06-29

## 2022-06-29 RX ORDER — LEVOTHYROXINE SODIUM 0.12 MG/1
125 TABLET ORAL DAILY
Qty: 90 TABLET | Refills: 0 | Status: SHIPPED | OUTPATIENT
Start: 2022-06-29

## 2022-06-29 RX ORDER — ROSUVASTATIN CALCIUM 5 MG/1
TABLET, COATED ORAL
Qty: 36 TABLET | Refills: 0 | Status: SHIPPED | OUTPATIENT
Start: 2022-06-29

## 2022-06-29 NOTE — TELEPHONE ENCOUNTER
Let her know I sent a 3 month rx for all of her pills -  She really needs to find a Dr down in [place] to refill /see her in future      I did not refill nebulizer/ inhalers as I thought she had those from her Lung

## 2022-06-29 NOTE — TELEPHONE ENCOUNTER
Pt has relocated and is requesting ALL medication be sent to a new Pharmacy :    [address] [facility] [address]     *she mention about her water pill needing a refill

## 2022-06-30 NOTE — TELEPHONE ENCOUNTER
Spoke with patient and inform about Rx send to the pharmacy, about calling Dr Rhoda Murillo for inhalers and to fallow up with new doctor for next refills of medications

## 2022-07-12 NOTE — TELEPHONE ENCOUNTER
Called Trudy Mendes and she has an appointment with her new Urologist tomorrow 7/13/22 and will be having her stent exchanged in September

## 2022-07-17 NOTE — PLAN OF CARE
Problem: Potential for Falls  Goal: Patient will remain free of falls  Description: INTERVENTIONS:  - Assess patient frequently for physical needs  -  Identify cognitive and physical deficits and behaviors that affect risk of falls    -  [place] fall precautions as indicated by assessment   - Educate patient/family on patient safety including physical limitations  - Instruct patient to call for assistance with activity based on assessment  - Modify environment to reduce risk of injury  - Consider OT/PT consult to assist with strengthening/mobility  Outcome: Progressing     Problem: PAIN - ADULT  Goal: Verbalizes/displays adequate comfort level or baseline comfort level  Description: Interventions:  - Encourage patient to monitor pain and request assistance  - Assess pain using appropriate pain scale  - Administer analgesics based on type and severity of pain and evaluate response  - Implement non-pharmacological measures as appropriate and evaluate response  - Consider cultural and social influences on pain and pain management  - Notify physician/advanced practitioner if interventions unsuccessful or patient reports new pain  Outcome: Progressing     Problem: INFECTION - ADULT  Goal: Absence or prevention of progression during hospitalization  Description: INTERVENTIONS:  - Assess and monitor for signs and symptoms of infection  - Monitor lab/diagnostic results  - Monitor all insertion sites, i e  indwelling lines, tubes, and drains  - Monitor endotracheal if appropriate and nasal secretions for changes in amount and color  - Irv[place] appropriate cooling/warming therapies per order  - Administer medications as ordered  - Instruct and encourage patient and family to use good hand hygiene technique  - Identify and instruct in appropriate isolation precautions for identified infection/condition  Outcome: Progressing     Problem: SAFETY ADULT  Goal: Patient will remain free of falls  Description: INTERVENTIONS:  - Assess patient frequently for physical needs  -  Identify cognitive and physical deficits and behaviors that affect risk of falls    -  [place] fall precautions as indicated by assessment   - Educate patient/family on patient safety including physical limitations  - Instruct patient to call for assistance with activity based on assessment  - Modify environment to reduce risk of injury  - Consider OT/PT consult to assist with strengthening/mobility  Outcome: Progressing  Goal: Maintain or return to baseline ADL function  Description: INTERVENTIONS:  -  Assess patient's ability to carry out ADLs; assess patient's baseline for ADL function and identify physical deficits which impact ability to perform ADLs (bathing, care of mouth/teeth, toileting, grooming, dressing, etc )  - Assess/evaluate cause of self-care deficits   - Assess range of motion  - Assess patient's mobility; develop plan if impaired  - Assess patient's need for assistive devices and provide as appropriate  - Encourage maximum independence but intervene and supervise when necessary  - Involve family in performance of ADLs  - Assess for home care needs following discharge   - Consider OT consult to assist with ADL evaluation and planning for discharge  - Provide patient education as appropriate  Outcome: Progressing  Goal: Maintain or return mobility status to optimal level  Description: INTERVENTIONS:  - Assess patient's baseline mobility status (ambulation, transfers, stairs, etc )    - Identify cognitive and physical deficits and behaviors that affect mobility  - Identify mobility aids required to assist with transfers and/or ambulation (gait belt, sit-to-stand, lift, walker, cane, etc )  - [place] fall precautions as indicated by assessment  - Record patient progress and toleration of activity level on Mobility SBAR; progress patient to next Phase/Stage  - Instruct patient to call for assistance with activity based on assessment  - Consider rehabilitation consult to assist with strengthening/weightbearing, etc   Outcome: Progressing     Problem: DISCHARGE PLANNING  Goal: Discharge to home or other facility with appropriate resources  Description: INTERVENTIONS:  - Identify barriers to discharge w/patient and caregiver  - Arrange for needed discharge resources and transportation as appropriate  - Identify discharge learning needs (meds, wound care, etc )  - Arrange for interpretive services to assist at discharge as needed  - Refer to Case Management Department for coordinating discharge planning if the patient needs post-hospital services based on physician/advanced practitioner order or complex needs related to functional status, cognitive ability, or social support system  Outcome: Progressing     Problem: Knowledge Deficit  Goal: Patient/family/caregiver demonstrates understanding of disease process, treatment plan, medications, and discharge instructions  Description: Complete learning assessment and assess knowledge base    Interventions:  - Provide teaching at level of understanding  - Provide teaching via preferred learning methods  Outcome: Progressing     Problem: CARDIOVASCULAR - ADULT  Goal: Maintains optimal cardiac output and hemodynamic stability  Description: INTERVENTIONS:  - Monitor I/O, vital signs and rhythm  - Monitor for S/S and trends of decreased cardiac output  - Administer and titrate ordered vasoactive medications to optimize hemodynamic stability  - Assess quality of pulses, skin color and temperature  - Assess for signs of decreased coronary artery perfusion  - Instruct patient to report change in severity of symptoms  Outcome: Progressing  Goal: Absence of cardiac dysrhythmias or at baseline rhythm  Description: INTERVENTIONS:  - Continuous cardiac monitoring, vital signs, obtain 12 lead EKG if ordered  - Administer antiarrhythmic and heart rate control medications as ordered  - Monitor electrolytes and administer replacement therapy as ordered  Outcome: Progressing     Problem: RESPIRATORY - ADULT  Goal: Achieves optimal ventilation and oxygenation  Description: INTERVENTIONS:  - Assess for changes in respiratory status  - Assess for changes in mentation and behavior  - Position to facilitate oxygenation and minimize respiratory effort  - Oxygen administered by appropriate delivery if ordered  - Initiate smoking cessation education as indicated  - Encourage broncho-pulmonary hygiene including cough, deep breathe, Incentive Spirometry  - Assess the need for suctioning and aspirate as needed  - Assess and instruct to report SOB or any respiratory difficulty  - Respiratory Therapy support as indicated  Outcome: Progressing     Problem: GASTROINTESTINAL - ADULT  Goal: Minimal or absence of nausea and/or vomiting  Description: INTERVENTIONS:  - Administer IV fluids if ordered to ensure adequate hydration  - Maintain NPO status until nausea and vomiting are resolved  - Nasogastric tube if ordered  - Administer ordered antiemetic medications as needed  - Provide nonpharmacologic comfort measures as appropriate  - Advance diet as tolerated, if ordered  - Consider nutrition services referral to assist patient with adequate nutrition and appropriate food choices  Outcome: Progressing  Goal: Maintains or returns to baseline bowel function  Description: INTERVENTIONS:  - Assess bowel function  - Encourage oral fluids to ensure adequate hydration  - Administer IV fluids if ordered to ensure adequate hydration  - Administer ordered medications as needed  - Encourage mobilization and activity  - Consider nutritional services referral to assist patient with adequate nutrition and appropriate food choices  Outcome: Progressing  Goal: Maintains adequate nutritional intake  Description: INTERVENTIONS:  - Monitor percentage of each meal consumed  - Identify factors contributing to decreased intake, treat as appropriate  - Assist with meals as needed  - Monitor I&O, weight, and lab values if indicated  - Obtain nutrition services referral as needed  Outcome: Progressing     Problem: METABOLIC, FLUID AND ELECTROLYTES - ADULT  Goal: Electrolytes maintained within normal limits  Description: INTERVENTIONS:  - Monitor labs and assess patient for signs and symptoms of electrolyte imbalances  - Administer electrolyte replacement as ordered  - Monitor response to electrolyte replacements, including repeat lab results as appropriate  - Instruct patient on fluid and nutrition as appropriate  Outcome: Progressing  Goal: Fluid balance maintained  Description: INTERVENTIONS:  - Monitor labs   - Monitor I/O and WT  - Instruct patient on fluid and nutrition as appropriate  - Assess for signs & symptoms of volume excess or deficit  Outcome: Progressing     Problem: Nutrition/Hydration-ADULT  Goal: Nutrient/Hydration intake appropriate for improving, restoring or maintaining nutritional needs  Description: Monitor and assess patient's nutrition/hydration status for malnutrition  Collaborate with interdisciplinary team and initiate plan and interventions as ordered  Monitor patient's weight and dietary intake as ordered or per policy  Utilize nutrition screening tool and intervene as necessary  Determine patient's food preferences and provide high-protein, high-caloric foods as appropriate       INTERVENTIONS:  - Monitor oral intake, urinary output, labs, and treatment plans  - Assess nutrition and hydration status and recommend course of action  - Evaluate amount of meals eaten  - Assist patient with eating if necessary   - Allow adequate time for meals  - Recommend/ encourage appropriate diets, oral nutritional supplements, and vitamin/mineral supplements  - Order, calculate, and assess calorie counts as needed  - Recommend, monitor, and adjust tube feedings and TPN/PPN based on assessed needs  - Assess need for intravenous fluids  - Provide specific nutrition/hydration education as appropriate  - Include patient/family/caregiver in decisions related to nutrition  Outcome: Progressing -patient with sepsis on 6/30 from neutropenic fever  -CTPA without e/o PE or consolidation but given abdominal wound noted to have stool there is concern for fistulization vs peritonitis.   -c/w wound management per gyn-onc   -s/p course of zosyn for peritonitis   -very poor prognosis.

## 2022-08-03 ENCOUNTER — TELEPHONE (OUTPATIENT)
Dept: UROLOGY | Facility: AMBULATORY SURGERY CENTER | Age: 82
End: 2022-08-03

## 2022-08-03 NOTE — TELEPHONE ENCOUNTER
Spoke with pt's daughter Stefanie Wolfe, not Karina Bui, who called about getting release form  Per her request I will mail her the form to her address in [place]: [address], [place], [address]  She will send it back and I explained that it would then be forwarded to [id number] who will process request and fax records

## 2022-08-03 NOTE — TELEPHONE ENCOUNTER
Patient's daughter called and stated that the patient has moved the they need the patient's urology records faxed to her new provider    Patient's daughter was informed that she would need to sign a release     Patient's daughter can be reached [phone]    Urology [facility] [phone]

## 2022-09-06 RX ORDER — BUDESONIDE 0.5 MG/2ML
INHALANT ORAL
Qty: 360 ML | Refills: 3 | Status: SHIPPED | OUTPATIENT
Start: 2022-09-06 | End: 2022-09-16

## 2022-09-10 DIAGNOSIS — R05.9 COUGH: ICD-10-CM

## 2022-09-10 DIAGNOSIS — J43.2 CENTRILOBULAR EMPHYSEMA (HCC): ICD-10-CM

## 2022-09-16 RX ORDER — BUDESONIDE 0.5 MG/2ML
INHALANT ORAL
Qty: 360 ML | Refills: 3 | Status: SHIPPED | OUTPATIENT
Start: 2022-09-16

## 2022-10-12 PROBLEM — N12 PYELONEPHRITIS: Status: RESOLVED | Noted: 2019-11-06 | Resolved: 2022-10-12

## 2024-02-21 PROBLEM — N39.0 RECURRENT UTI: Status: RESOLVED | Noted: 2021-04-25 | Resolved: 2024-02-21

## 2024-11-22 NOTE — TELEPHONE ENCOUNTER
[facility] left message about scheduling cath for pt  I did message Dr Donna Timmons on tiger text if Dr Gooden [address] had sent back task :    Per Dr Donna Timmons-   Yes    I talked to him  And I tried to get a hold of Dr Rosmery Rose her urologist  She has ureteral Stenta and wanted to make  Sure any further procedures he needed to do could be done on Plavix  Can someone please call his office and make sure he is aware of this before we set up the cath  He never got back to me  Its Saleem Corcoran  I did speak to Enrique Noyola from [facility], she is aware we are waiting on Dr Lisa Gonzales response  I will try calling today again to urology  follow up with pmd

## (undated) DEVICE — 3M™ TEGADERM™ TRANSPARENT FILM DRESSING FRAME STYLE, 1626W, 4 IN X 4-3/4 IN (10 CM X 12 CM), 50/CT 4CT/CASE: Brand: 3M™ TEGADERM™

## (undated) DEVICE — REUSABLE BIPOLAR CORD 12FT. (3.6M) THERMOPLASTIC RUBBER: Brand: KIRWAN

## (undated) DEVICE — SUT ETHILON 3-0 FSLX 30 IN 1673H

## (undated) DEVICE — BETHLEHEM UNIVERSAL MINOR GEN: Brand: CARDINAL HEALTH

## (undated) DEVICE — INVIEW CLEAR LEGGINGS: Brand: CONVERTORS

## (undated) DEVICE — INTENDED FOR TISSUE SEPARATION, AND OTHER PROCEDURES THAT REQUIRE A SHARP SURGICAL BLADE TO PUNCTURE OR CUT.: Brand: BARD-PARKER SAFETY BLADES SIZE 10, STERILE

## (undated) DEVICE — SCD SEQUENTIAL COMPRESSION COMFORT SLEEVE MEDIUM KNEE LENGTH: Brand: KENDALL SCD

## (undated) DEVICE — LUBRICANT SURGILUBE TUBE 4 OZ  FLIP TOP

## (undated) DEVICE — TRANSPOSAL ULTRAFLEX DUO/QUAD ULTRA CART MANIFOLD

## (undated) DEVICE — SUT SILK 2-0 SH 30 IN K833H

## (undated) DEVICE — STERILE POLYISOPRENE POWDER-FREE SURGICAL GLOVES: Brand: PROTEXIS

## (undated) DEVICE — GUARDIAN LVC: Brand: GUARDIAN

## (undated) DEVICE — ANTIBACTERIAL VIOLET BRAIDED (POLYGLACTIN 910), SYNTHETIC ABSORBABLE SUTURE: Brand: COATED VICRYL

## (undated) DEVICE — TUBING SUCTION 5MM X 12 FT

## (undated) DEVICE — GUIDEWIRE STRGHT TIP 0.035 IN  SOLO PLUS

## (undated) DEVICE — DRAPE CAMERA/LASER

## (undated) DEVICE — PREMIUM DRY TRAY LF: Brand: MEDLINE INDUSTRIES, INC.

## (undated) DEVICE — 3M™ STERI-STRIP™ COMPOUND BENZOIN TINCTURE 40 BAGS/CARTON 4 CARTONS/CASE C1544: Brand: 3M™ STERI-STRIP™

## (undated) DEVICE — SUT MONOCRYL PLUS 4-0 PS-2 18 IN MCP496G

## (undated) DEVICE — GLOVE SRG BIOGEL 6.5

## (undated) DEVICE — GLOVE SRG BIOGEL 7.5

## (undated) DEVICE — EXIDINE 4 PCT

## (undated) DEVICE — 3M™ IOBAN™ 2 ANTIMICROBIAL INCISE DRAPE 6640EZ: Brand: IOBAN™ 2

## (undated) DEVICE — 3M™ IOBAN™ 2 ANTIMICROBIAL INCISE DRAPE 6650EZ: Brand: IOBAN™ 2

## (undated) DEVICE — SURGICEL FIBRILLAR 1 X 2

## (undated) DEVICE — STERILE SURGICAL LUBRICANT,  TUBE: Brand: SURGILUBE

## (undated) DEVICE — BETHL CAROTID ENDARTERECTOMY: Brand: CARDINAL HEALTH

## (undated) DEVICE — TELFA NON-ADHERENT ABSORBENT DRESSING: Brand: TELFA

## (undated) DEVICE — GLOVE INDICATOR PI UNDERGLOVE SZ 6.5 BLUE

## (undated) DEVICE — VESSEL LOOP MAXI - RED

## (undated) DEVICE — PETRI DISH STERILE

## (undated) DEVICE — ULTRASOUND GEL STERILE FOIL PK

## (undated) DEVICE — PENCIL ELECTROSURG E-Z CLEAN -0035H

## (undated) DEVICE — SPECIMEN CONTAINER STERILE PEEL PACK

## (undated) DEVICE — SPONGE PVP SCRUB WING STERILE

## (undated) DEVICE — BULB SYRINGE,IRRIGATION WITH PROTECTIVE CAP: Brand: DOVER

## (undated) DEVICE — GAUZE SPONGES,16 PLY: Brand: CURITY

## (undated) DEVICE — DRAPE EQUIPMENT RF WAND

## (undated) DEVICE — CHLORAPREP HI-LITE 26ML ORANGE

## (undated) DEVICE — SUT PROLENE 7-0 BV 175-6 24 IN 8735H

## (undated) DEVICE — THYROID SHEET: Brand: CONVERTORS

## (undated) DEVICE — UROCATCH BAG

## (undated) DEVICE — GLOVE SRG BIOGEL ECLIPSE 7

## (undated) DEVICE — PROXIMATE PLUS MD MULTI-DIRECTIONAL RELEASE SKIN STAPLERS CONTAINS 35 STAINLESS STEEL STAPLES APPROXIMATE CLOSED DIMENSIONS: 6.9MM X 3.9MM WIDE: Brand: PROXIMATE

## (undated) DEVICE — ELECTRODE BLADE MOD E-Z CLEAN 2.5IN 6.4CM -0012M

## (undated) DEVICE — GLOVE SRG BIOGEL 6

## (undated) DEVICE — LIGACLIP MCA MULTIPLE CLIP APPLIERS, 20 SMALL CLIPS: Brand: LIGACLIP

## (undated) DEVICE — GLOVE SRG BIOGEL 7

## (undated) DEVICE — PREP SURGICAL PURPREP 26ML

## (undated) DEVICE — VESSEL LOOPS X-RAY DETECTABLE: Brand: DEROYAL

## (undated) DEVICE — NEEDLE 25G X 1 1/2

## (undated) DEVICE — PACK TUR

## (undated) DEVICE — UTILITY MARKER,BLACK WITH LABELS: Brand: DEVON

## (undated) DEVICE — 3M™ STERI-STRIP™ REINFORCED ADHESIVE SKIN CLOSURES, R1547, 1/2 IN X 4 IN (12 MM X 100 MM), 6 STRIPS/ENVELOPE: Brand: 3M™ STERI-STRIP™

## (undated) DEVICE — PROGRAMMER PATIENT DBS ACTIVA

## (undated) DEVICE — CYSTOSCOPY PACK: Brand: CONVERTORS

## (undated) DEVICE — INSTRUMENT POUCH: Brand: CONVERTORS

## (undated) DEVICE — BAG DECANTER

## (undated) DEVICE — GLOVE SRG BIOGEL ORTHOPEDIC 7.5

## (undated) DEVICE — SUT MONOCRYL 3-0 PS-2 27 IN Y427H

## (undated) DEVICE — GLOVE INDICATOR PI UNDERGLOVE SZ 7.5 BLUE

## (undated) DEVICE — SUT SILK 3-0 30 IN A304H

## (undated) DEVICE — STANDARD SURGICAL GOWN, L: Brand: CONVERTORS

## (undated) DEVICE — REM POLYHESIVE ADULT PATIENT RETURN ELECTRODE: Brand: VALLEYLAB

## (undated) DEVICE — CATH URETERAL 5FR X 70 CM FLEX TIP POLYUR BARD

## (undated) DEVICE — VIAL DECANTER

## (undated) DEVICE — 4-PORT MANIFOLD: Brand: NEPTUNE 2

## (undated) DEVICE — INDICATED FOR SURGICAL CLAMPING DURING CARDIOVASCULAR PERIPHERAL VASCULAR, AND GENERAL SURGERY.: Brand: SOFT/FIBRA® SPRING CLIP  1/2 FORCE

## (undated) DEVICE — STENT URETERAL 6FR 24CML INLAY OPTIMA: Type: IMPLANTABLE DEVICE | Status: NON-FUNCTIONAL

## (undated) DEVICE — NEEDLE 22 G X 1 1/2 SAFETY

## (undated) DEVICE — CAROTID ARTERY SHUNT KIT,RADIOPAQUE LINE, STRAIGHT: Brand: ARGYLE

## (undated) DEVICE — INTENDED FOR TISSUE SEPARATION, AND OTHER PROCEDURES THAT REQUIRE A SHARP SURGICAL BLADE TO PUNCTURE OR CUT.: Brand: BARD-PARKER ® CARBON RIB-BACK BLADES

## (undated) DEVICE — OCCLUSIVE GAUZE STRIP,3% BISMUTH TRIBROMOPHENATE IN PETROLATUM BLEND: Brand: XEROFORM

## (undated) DEVICE — SUT MONOCRYL 3-0 SH 27 IN Y416H

## (undated) DEVICE — SUT SILK 2-0 30 IN A305H

## (undated) DEVICE — LIGHT HANDLE COVER SLEEVE DISP BLUE STELLAR

## (undated) DEVICE — PAD GROUNDING ADULT

## (undated) DEVICE — ANTENNA PROGRAMMER PATIENT DBS

## (undated) DEVICE — ELECTRODE EZ CLEAN BLADE -0012

## (undated) DEVICE — PROGRAMMER NEUROSTIM ACTIVA PC RECHARGEABLE DBS THERAPY

## (undated) DEVICE — MEDI-VAC YANK SUCT HNDL W/TPRD BULBOUS TIP: Brand: CARDINAL HEALTH

## (undated) DEVICE — ADHESIVE SKN CLSR HISTOACRYL FLEX 0.5ML LF

## (undated) DEVICE — DRAPE PROBE NEO-PROBE/ULTRASOUND

## (undated) DEVICE — DISPOSABLE EQUIPMENT COVER: Brand: SMALL TOWEL DRAPE

## (undated) DEVICE — SUT PROLENE 6-0 BV130 30 IN 8709H

## (undated) DEVICE — GLOVE INDICATOR PI UNDERGLOVE SZ 8 BLUE

## (undated) DEVICE — 2000CC GUARDIAN II: Brand: GUARDIAN

## (undated) DEVICE — SUT MONOCRYL 4-0 PS-2 27 IN Y426H

## (undated) DEVICE — LIGACLIP MCA MULTIPLE CLIP APPLIERS, 20 MEDIUM CLIPS: Brand: LIGACLIP

## (undated) DEVICE — GLOVE PI ULTRA TOUCH SZ.6.5

## (undated) DEVICE — 40529 DERMAPROX PAD 11'' X 15'' X 1'': Brand: 40529 DERMAPROX PAD 11'' X 15'' X 1''

## (undated) DEVICE — INTENDED FOR TISSUE SEPARATION, AND OTHER PROCEDURES THAT REQUIRE A SHARP SURGICAL BLADE TO PUNCTURE OR CUT.: Brand: BARD-PARKER SAFETY BLADES SIZE 15, STERILE